# Patient Record
Sex: FEMALE | Race: WHITE | Employment: OTHER | URBAN - METROPOLITAN AREA
[De-identification: names, ages, dates, MRNs, and addresses within clinical notes are randomized per-mention and may not be internally consistent; named-entity substitution may affect disease eponyms.]

---

## 2020-11-20 ENCOUNTER — APPOINTMENT (OUTPATIENT)
Dept: GENERAL RADIOLOGY | Age: 80
DRG: 247 | End: 2020-11-20
Payer: MEDICARE

## 2020-11-20 ENCOUNTER — HOSPITAL ENCOUNTER (INPATIENT)
Age: 80
LOS: 1 days | Discharge: HOME | DRG: 247 | End: 2020-11-21
Attending: EMERGENCY MEDICINE | Admitting: INTERNAL MEDICINE
Payer: MEDICARE

## 2020-11-20 PROBLEM — I21.4 NSTEMI (NON-ST ELEVATED MYOCARDIAL INFARCTION) (HCC): Status: ACTIVE | Noted: 2020-11-20

## 2020-11-20 LAB
A/G RATIO: 0.9 (ref 1.1–2.2)
ALBUMIN SERPL-MCNC: 3.2 G/DL (ref 3.4–5)
ALP BLD-CCNC: 84 U/L (ref 40–129)
ALT SERPL-CCNC: <5 U/L (ref 10–40)
ANION GAP SERPL CALCULATED.3IONS-SCNC: 9 MMOL/L (ref 3–16)
AST SERPL-CCNC: 17 U/L (ref 15–37)
BASOPHILS ABSOLUTE: 0 K/UL (ref 0–0.2)
BASOPHILS RELATIVE PERCENT: 0.5 %
BILIRUB SERPL-MCNC: 0.3 MG/DL (ref 0–1)
BUN BLDV-MCNC: 23 MG/DL (ref 7–20)
CALCIUM SERPL-MCNC: 9.1 MG/DL (ref 8.3–10.6)
CHLORIDE BLD-SCNC: 106 MMOL/L (ref 99–110)
CO2: 23 MMOL/L (ref 21–32)
CREAT SERPL-MCNC: 1.1 MG/DL (ref 0.6–1.2)
EKG ATRIAL RATE: 45 BPM
EKG DIAGNOSIS: NORMAL
EKG P AXIS: 23 DEGREES
EKG P-R INTERVAL: 218 MS
EKG Q-T INTERVAL: 520 MS
EKG QRS DURATION: 86 MS
EKG QTC CALCULATION (BAZETT): 449 MS
EKG R AXIS: -11 DEGREES
EKG T AXIS: 109 DEGREES
EKG VENTRICULAR RATE: 45 BPM
EOSINOPHILS ABSOLUTE: 0.1 K/UL (ref 0–0.6)
EOSINOPHILS RELATIVE PERCENT: 1.1 %
GFR AFRICAN AMERICAN: 58
GFR NON-AFRICAN AMERICAN: 48
GLOBULIN: 3.4 G/DL
GLUCOSE BLD-MCNC: 120 MG/DL (ref 70–99)
HCT VFR BLD CALC: 30.5 % (ref 36–48)
HEMOGLOBIN: 9.4 G/DL (ref 12–16)
LEFT VENTRICULAR EJECTION FRACTION MODE: NORMAL
LIPASE: 25 U/L (ref 13–60)
LV EF: 55 %
LYMPHOCYTES ABSOLUTE: 1.3 K/UL (ref 1–5.1)
LYMPHOCYTES RELATIVE PERCENT: 18.1 %
MCH RBC QN AUTO: 25.2 PG (ref 26–34)
MCHC RBC AUTO-ENTMCNC: 31 G/DL (ref 31–36)
MCV RBC AUTO: 81.3 FL (ref 80–100)
MONOCYTES ABSOLUTE: 0.5 K/UL (ref 0–1.3)
MONOCYTES RELATIVE PERCENT: 6.5 %
NEUTROPHILS ABSOLUTE: 5.5 K/UL (ref 1.7–7.7)
NEUTROPHILS RELATIVE PERCENT: 73.8 %
PDW BLD-RTO: 15.6 % (ref 12.4–15.4)
PLATELET # BLD: 238 K/UL (ref 135–450)
PMV BLD AUTO: 8.8 FL (ref 5–10.5)
POC ACT LR: 176 SEC
POC ACT LR: 288 SEC
POC ACT LR: >400 SEC
POTASSIUM SERPL-SCNC: 4.2 MMOL/L (ref 3.5–5.1)
PRO-BNP: 1026 PG/ML (ref 0–449)
RBC # BLD: 3.75 M/UL (ref 4–5.2)
SODIUM BLD-SCNC: 138 MMOL/L (ref 136–145)
TOTAL PROTEIN: 6.6 G/DL (ref 6.4–8.2)
TROPONIN: 0.03 NG/ML
WBC # BLD: 7.5 K/UL (ref 4–11)

## 2020-11-20 PROCEDURE — 6360000004 HC RX CONTRAST MEDICATION: Performed by: INTERNAL MEDICINE

## 2020-11-20 PROCEDURE — 99223 1ST HOSP IP/OBS HIGH 75: CPT | Performed by: INTERNAL MEDICINE

## 2020-11-20 PROCEDURE — 99152 MOD SED SAME PHYS/QHP 5/>YRS: CPT

## 2020-11-20 PROCEDURE — 6370000000 HC RX 637 (ALT 250 FOR IP): Performed by: INTERNAL MEDICINE

## 2020-11-20 PROCEDURE — 71045 X-RAY EXAM CHEST 1 VIEW: CPT

## 2020-11-20 PROCEDURE — C1874 STENT, COATED/COV W/DEL SYS: HCPCS

## 2020-11-20 PROCEDURE — 6370000000 HC RX 637 (ALT 250 FOR IP): Performed by: PHYSICIAN ASSISTANT

## 2020-11-20 PROCEDURE — 2709999900 HC NON-CHARGEABLE SUPPLY

## 2020-11-20 PROCEDURE — C9600 PERC DRUG-EL COR STENT SING: HCPCS

## 2020-11-20 PROCEDURE — 93458 L HRT ARTERY/VENTRICLE ANGIO: CPT

## 2020-11-20 PROCEDURE — 93005 ELECTROCARDIOGRAM TRACING: CPT | Performed by: EMERGENCY MEDICINE

## 2020-11-20 PROCEDURE — 2100000000 HC CCU R&B

## 2020-11-20 PROCEDURE — 85347 COAGULATION TIME ACTIVATED: CPT

## 2020-11-20 PROCEDURE — 2140000000 HC CCU INTERMEDIATE R&B

## 2020-11-20 PROCEDURE — 2500000003 HC RX 250 WO HCPCS

## 2020-11-20 PROCEDURE — C1725 CATH, TRANSLUMIN NON-LASER: HCPCS

## 2020-11-20 PROCEDURE — 027034Z DILATION OF CORONARY ARTERY, ONE ARTERY WITH DRUG-ELUTING INTRALUMINAL DEVICE, PERCUTANEOUS APPROACH: ICD-10-PCS | Performed by: INTERNAL MEDICINE

## 2020-11-20 PROCEDURE — 84484 ASSAY OF TROPONIN QUANT: CPT

## 2020-11-20 PROCEDURE — 36415 COLL VENOUS BLD VENIPUNCTURE: CPT

## 2020-11-20 PROCEDURE — 6360000002 HC RX W HCPCS: Performed by: INTERNAL MEDICINE

## 2020-11-20 PROCEDURE — 6370000000 HC RX 637 (ALT 250 FOR IP)

## 2020-11-20 PROCEDURE — C1894 INTRO/SHEATH, NON-LASER: HCPCS

## 2020-11-20 PROCEDURE — 99284 EMERGENCY DEPT VISIT MOD MDM: CPT

## 2020-11-20 PROCEDURE — 83690 ASSAY OF LIPASE: CPT

## 2020-11-20 PROCEDURE — 93010 ELECTROCARDIOGRAM REPORT: CPT | Performed by: INTERNAL MEDICINE

## 2020-11-20 PROCEDURE — B2111ZZ FLUOROSCOPY OF MULTIPLE CORONARY ARTERIES USING LOW OSMOLAR CONTRAST: ICD-10-PCS | Performed by: INTERNAL MEDICINE

## 2020-11-20 PROCEDURE — C1769 GUIDE WIRE: HCPCS

## 2020-11-20 PROCEDURE — 6360000002 HC RX W HCPCS

## 2020-11-20 PROCEDURE — B2151ZZ FLUOROSCOPY OF LEFT HEART USING LOW OSMOLAR CONTRAST: ICD-10-PCS | Performed by: INTERNAL MEDICINE

## 2020-11-20 PROCEDURE — 2580000003 HC RX 258: Performed by: INTERNAL MEDICINE

## 2020-11-20 PROCEDURE — 85025 COMPLETE CBC W/AUTO DIFF WBC: CPT

## 2020-11-20 PROCEDURE — 2580000003 HC RX 258

## 2020-11-20 PROCEDURE — 4A023N7 MEASUREMENT OF CARDIAC SAMPLING AND PRESSURE, LEFT HEART, PERCUTANEOUS APPROACH: ICD-10-PCS | Performed by: INTERNAL MEDICINE

## 2020-11-20 PROCEDURE — 83880 ASSAY OF NATRIURETIC PEPTIDE: CPT

## 2020-11-20 PROCEDURE — 99153 MOD SED SAME PHYS/QHP EA: CPT

## 2020-11-20 PROCEDURE — 80053 COMPREHEN METABOLIC PANEL: CPT

## 2020-11-20 RX ORDER — SODIUM CHLORIDE 0.9 % (FLUSH) 0.9 %
10 SYRINGE (ML) INJECTION EVERY 12 HOURS SCHEDULED
Status: DISCONTINUED | OUTPATIENT
Start: 2020-11-20 | End: 2020-11-21 | Stop reason: HOSPADM

## 2020-11-20 RX ORDER — ASPIRIN 81 MG/1
81 TABLET, CHEWABLE ORAL DAILY
Status: DISCONTINUED | OUTPATIENT
Start: 2020-11-21 | End: 2020-11-21 | Stop reason: HOSPADM

## 2020-11-20 RX ORDER — ACETAMINOPHEN 325 MG/1
650 TABLET ORAL EVERY 4 HOURS PRN
Status: DISCONTINUED | OUTPATIENT
Start: 2020-11-20 | End: 2020-11-21 | Stop reason: HOSPADM

## 2020-11-20 RX ORDER — SODIUM CHLORIDE 0.9 % (FLUSH) 0.9 %
10 SYRINGE (ML) INJECTION PRN
Status: DISCONTINUED | OUTPATIENT
Start: 2020-11-20 | End: 2020-11-21 | Stop reason: HOSPADM

## 2020-11-20 RX ORDER — ONDANSETRON 2 MG/ML
4 INJECTION INTRAMUSCULAR; INTRAVENOUS EVERY 6 HOURS PRN
Status: DISCONTINUED | OUTPATIENT
Start: 2020-11-20 | End: 2020-11-21 | Stop reason: HOSPADM

## 2020-11-20 RX ORDER — ATORVASTATIN CALCIUM 40 MG/1
40 TABLET, FILM COATED ORAL NIGHTLY
Status: DISCONTINUED | OUTPATIENT
Start: 2020-11-20 | End: 2020-11-21 | Stop reason: HOSPADM

## 2020-11-20 RX ORDER — CARVEDILOL 3.12 MG/1
3.12 TABLET ORAL 2 TIMES DAILY WITH MEALS
Status: DISCONTINUED | OUTPATIENT
Start: 2020-11-20 | End: 2020-11-21 | Stop reason: HOSPADM

## 2020-11-20 RX ORDER — LISINOPRIL 5 MG/1
2.5 TABLET ORAL DAILY
Status: DISCONTINUED | OUTPATIENT
Start: 2020-11-20 | End: 2020-11-21 | Stop reason: HOSPADM

## 2020-11-20 RX ADMIN — TICAGRELOR 90 MG: 90 TABLET ORAL at 21:33

## 2020-11-20 RX ADMIN — DESMOPRESSIN ACETATE 40 MG: 0.2 TABLET ORAL at 21:33

## 2020-11-20 RX ADMIN — TIROFIBAN 0.15 MCG/KG/MIN: 5 INJECTION, SOLUTION INTRAVENOUS at 15:40

## 2020-11-20 RX ADMIN — IOPAMIDOL 86 ML: 755 INJECTION, SOLUTION INTRAVENOUS at 13:37

## 2020-11-20 RX ADMIN — LISINOPRIL 2.5 MG: 5 TABLET ORAL at 16:42

## 2020-11-20 RX ADMIN — NITROGLYCERIN 1 INCH: 20 OINTMENT TOPICAL at 09:58

## 2020-11-20 RX ADMIN — Medication 10 ML: at 22:22

## 2020-11-20 ASSESSMENT — PAIN SCALES - GENERAL
PAINLEVEL_OUTOF10: 0
PAINLEVEL_OUTOF10: 1
PAINLEVEL_OUTOF10: 0

## 2020-11-20 NOTE — ED PROVIDER NOTES
[facility] CATH LAB  EMERGENCY DEPARTMENT ENCOUNTER        Pt Name: Matilde Max  MRN: [id number]  [place] 1940  Date of evaluation: 11/20/2020  Provider: Destiny Mike PA-C  PCP: Sarahi Shannon MD     I have seen and evaluated this patient with my supervising physician Aguila Marina The total critical care time spent while evaluating and treating this patient was at least 33 minutes. This excludes time spent doing separately billable procedures. This includes time at the bedside, data interpretation, medication management, obtaining critical history from collateral sources if the patient is unable to provide it directly, and physician consultation. Specifics of interventions taken and potentially life-threatening diagnostic considerations are listed above in the medical decision making. CHIEF COMPLAINT       Chief Complaint   Patient presents with    Chest Pain     Pt to ER via [facility] for complaint of chest pain, starting yesterday. Pain 8/10, crushing. Pt self admin. 325 aspirin prior to EMS arrival, had 2 of nitro and 50 of fentanyl en route, pain now 2/10. HISTORY OF PRESENT ILLNESS   (Location, Timing/Onset, Context/Setting, Quality, Duration, Modifying Factors, Severity, Associated Signs and Symptoms)  Note limiting factors. Matilde Max is a 68 y.o. female that presents to the emergency department with a chief complaint of chest pain. Patient has had this 3 times in the past 5 days. She states she had this yesterday mildly in the morning and she states that she belched and it went away. This began again this morning when she was sitting down drinking some tea after she ate some toast.  States that it was a 10 out of 10 and she became diaphoretic, short of breath and nauseous. She called the squad and was given 2 nitro and fentanyl in route and the pain is now 2 out of 10. She already took full-strength aspirin earlier this morning.   She has history of hypertension and family history of heart disease but denies any personal history of heart disease, hyperlipidemia, diabetes or smoking. Denies abdominal pain, urinary or bowel symptoms. Denies radiation of the pain. She states it felt like something was sitting on her chest.  Has never had stress test or angiogram.    Nursing Notes were all reviewed and agreed with or any disagreements were addressed in the HPI. REVIEW OF SYSTEMS    (2-9 systems for level 4, 10 or more for level 5)     Review of Systems    Positives and Pertinent negatives as per HPI. Except as noted above in the ROS, all other systems were reviewed and negative. PAST MEDICAL HISTORY     Past Medical History:   Diagnosis Date    Hypertension          SURGICAL HISTORY     Past Surgical History:   Procedure Laterality Date    CYSTOSCOPY  11/21/13    w/ bladder biopsy    FRACTURE SURGERY      right wrist         CURRENTMEDICATIONS       Current Discharge Medication List      CONTINUE these medications which have NOT CHANGED    Details   ammonium lactate (LAC-HYDRIN) 12 % lotion Apply topically as needed. Qty: 1 Bottle, Refills: 1      amLODIPine (NORVASC) 5 MG tablet Take 1 tablet by mouth daily  Qty: 30 tablet, Refills: 1      ferrous sulfate 325 (65 FE) MG tablet Take 1 tablet by mouth 2 times daily (with meals)  Qty: 30 tablet, Refills: 3               ALLERGIES     Patient has no known allergies.     FAMILYHISTORY       Family History   Problem Relation Age of Onset    Heart Disease Father           SOCIAL HISTORY       Social History     Tobacco Use    Smoking status: Never Smoker    Smokeless tobacco: Never Used    Tobacco comment:  was a heavy smoker   Substance Use Topics    Alcohol use: No    Drug use: No       SCREENINGS             PHYSICAL EXAM    (up to 7 for level 4, 8 or more for level 5)     ED Triage Vitals [11/20/20 0911]   BP Temp Temp Source Pulse Resp SpO2 Height Weight   (!) 145/41 97 °F (36.1 °C) Oral 58 16 99 % 5' 1\" (1.549 m) 234 lb (106.1 kg)       Physical Exam  Vitals signs and nursing note reviewed. Constitutional:       Appearance: She is well-developed. She is not diaphoretic. HENT:      Head: Atraumatic. Nose: Nose normal.   Eyes:      General:         Right eye: No discharge. Left eye: No discharge. Neck:      Musculoskeletal: Normal range of motion. Cardiovascular:      Rate and Rhythm: Normal rate and regular rhythm. Heart sounds: No murmur. No friction rub. No gallop. Pulmonary:      Effort: Pulmonary effort is normal. No respiratory distress. Breath sounds: No stridor. No wheezing, rhonchi or rales. Abdominal:      General: Bowel sounds are normal. There is no distension. Palpations: Abdomen is soft. There is no mass. Tenderness: There is no abdominal tenderness. There is no guarding or rebound. Hernia: No hernia is present. Musculoskeletal: Normal range of motion. General: No swelling. Right lower leg: Edema present. Left lower leg: Edema present. Comments: 1+ pretibial and pedal pitting edema bilaterally   Skin:     General: Skin is warm and dry. Findings: No erythema or rash. Neurological:      Mental Status: She is alert and oriented to person, place, and time. Cranial Nerves: No cranial nerve deficit.    Psychiatric:         Behavior: Behavior normal.         DIAGNOSTIC RESULTS   LABS:    Labs Reviewed   CBC WITH AUTO DIFFERENTIAL - Abnormal; Notable for the following components:       Result Value    RBC 3.75 (*)     Hemoglobin 9.4 (*)     Hematocrit 30.5 (*)     MCH 25.2 (*)     RDW 15.6 (*)     All other components within normal limits    Narrative:     Performed at:  [facility] [address], [place], [facility] [address]   Phone [phone]   COMPREHENSIVE METABOLIC PANEL - Abnormal; Notable for the following components:    Glucose 120 (*)     BUN 23 (*) GFR Non- 48 (*)     GFR  58 (*)     Alb 3.2 (*)     Albumin/Globulin Ratio 0.9 (*)     ALT <5 (*)     All other components within normal limits    Narrative:     Performed at:  [facility] [address]   Phone [phone]   TROPONIN - Abnormal; Notable for the following components:    Troponin 0.03 (*)     All other components within normal limits    Narrative:     Performed at:  [facility] [address]   Phone [phone]  - Abnormal; Notable for the following components:    Pro-BNP 1,026 (*)     All other components within normal limits    Narrative:     Performed at:  [facility] [address]   Phone [phone]   LIPASE    Narrative:     Performed at:  [facility] [address]   Phone [phone]   POC ACT-LR    Narrative:     Performed at:  [facility] [address]   Phone [phone]   POC ACT-LR    Narrative:     Performed at:  [facility] [address]   Phone [phone]   POC ACT-LR    Narrative:     Performed at:  [facility] [address]   Phone [phone]       All other labs were within normal range or not returned as of this dictation. EKG: All EKG's are interpreted by the Emergency Department Physician in the absence of a cardiologist.  Please see their note for interpretation of EKG.       RADIOLOGY:   Non-plain film images such as CT, Ultrasound and MRI are read by the radiologist. Plain radiographic images are visualized and preliminarily interpreted by the ED Provider with the below findings:        Interpretation per the Radiologist below, if available at the time of this note:    XR CHEST PORTABLE   Final Result   Interval removal of the right-sided PICC line catheter. No consolidation. Xr Chest Portable    Result Date: 11/20/2020  EXAMINATION: ONE XRAY VIEW OF THE CHEST 11/20/2020 9:58 am COMPARISON: May 29, 2015 HISTORY: ORDERING SYSTEM PROVIDED HISTORY: chest pain TECHNOLOGIST PROVIDED HISTORY: Reason for exam:->chest pain Reason for Exam: chest pain Acuity: Acute Type of Exam: Initial FINDINGS: The lungs are clear. Previously seen right-sided PICC line catheter has been removed. The cardiac silhouette is magnified. There are no pleural effusions. There is no pneumothorax. Interval removal of the right-sided PICC line catheter. No consolidation. PROCEDURES   Unless otherwise noted below, none     Procedures    CRITICAL CARE TIME   N/A    CONSULTS: Dr. Bossman Michelle and DIFFERENTIAL DIAGNOSIS/MDM:   Vitals:    Vitals:    11/20/20 1130 11/20/20 1145 11/20/20 1200 11/20/20 1215   BP: (!) 142/50 (!) 146/47 (!) 145/56 (!) 160/54   Pulse: 61 60 67 72   Resp: 19 15 22 22   Temp:       TempSrc:       SpO2: 100% 100% 100% 100%   Weight:       Height:           Patient was given the following medications:  Medications   nitroglycerin (NITRO-BID) 2 % ointment 1 inch (1 inch Topical Given 11/20/20 0944)   iopamidol (ISOVUE-370) 76 % injection 86 mL (86 mLs Other Given by Other 11/20/20 1337)           Patient presented with some chest pain, shortness of breath and diaphoresis. This is her third episode today over the past 5 days. Does have some family history of heart disease and history of hypertension. Pain was already down to a 2 out of 10 after fentanyl and nitro in squad. Already had 325 of aspirin this morning. After Nitropaste she is pain-free.   EKG does reveal some flattening ST segments and flipped T waves that

## 2020-11-20 NOTE — H&P
[facility]                     [facility], [address]                              HISTORY AND PHYSICAL    PATIENT NAME: Brandie Alcantara                     :        1940  MED REC NO:   [id number]                          ROOM:       0008  ACCOUNT NO:   [de-identified]                           ADMIT DATE: 2020  PROVIDER:     Kandi Segura MD    REASON FOR ADMISSION:  Chest pain. HISTORY OF PRESENT ILLNESS:  The patient is a pleasant 68-year-old lady  who remains fairly active. She does have a history of hypertension. She is somewhat limited by bilateral knee pain. She has been  experiencing episodic chest discomfort over the last week. Initially,  it came on with exertion and would resolve quickly. She actually  thought it was indigestion, it keeps happening and this morning she had  a bad episode that lasted for 30-45 minutes. It did resolve  spontaneously, but with that she came to the emergency room. In the  emergency room, she had an EKG which showed T-wave inversion anteriorly  consistent with anterior wall ischemia, troponin level 0.03, proBNP is  1000. She is currently pain free. She has been somewhat short of  breath with these episodes. She describes the episodes as becoming more  anxiety provoking. She has felt along it was likely indigestion. She  has had some diaphoresis with it. PAST MEDICAL HISTORY:  Notable for history of hypertension. She has  also had bilateral leg cellulitis. PAST SURGICAL HISTORY:  Includes a wrist surgery in the past.  She has  also had three  sections. MEDICATIONS AT HOME:  Amlodipine 5 mg daily and iron tablets twice a  day. ALLERGIES:  None. FAMILY HISTORY:  Notable for heart disease, but not at a premature age  in her father. SOCIAL HISTORY:  Lives with her . She has not used tobacco.  She  does not drink alcohol.     REVIEW OF SYSTEMS:  14-system review of systems otherwise negative. PHYSICAL EXAMINATION:  Currently on physical examination:  GENERAL:  She appears comfortable at rest.  She is 5 feet 1 inch,  weights 234 pounds. VITAL SIGNS:  Pulse ox on room air 99%. Current pulse rate 58. She is  afebrile. HEENT:  Sclerae are clear. Posterior pharynx clear. NECK:  Neck is supple. There are no carotid bruits. LUNGS:  Lung fields are clear. HEART:  Cardiac sounds are normal.  ABDOMEN:  Soft, nontender, obese. EXTREMITIES:  Without edema. She has good peripheral pulses. She has  evidence for trace pedal edema bilaterally. She also has obvious knee  changes of osteoarthritis. NEUROLOGIC:  She moves all extremities well. Cranial nerves II-XII  intact. SKIN:  Warm and dry. LABORATORY RONNIE:  Lab work is reviewed. Creatinine 1.1, BUN 23,  potassium 4.2. ProBNP 1000. Troponin 0.03. Albumin 3.2. Glucose  slightly elevated at 120. Hemoglobin 9.4, hematocrit 30.5 which is  consistent with her chronic anemia dating back over several years. DIAGNOSTIC DATA:  Chest x-ray is unremarkable. The patient has had no  prior testing. EKG on presentation shows a sinus bradycardia, anterior  lateral T-wave inversion that suggest ischemia. IMPRESSION:  This patient's chest symptoms with associated EKG changes  and slightly elevated troponin is consistent with the presence of  unstable angina. She does have history of hypertension. RECOMMENDATIONS:  Recommendations would be to proceed with cardiac  catheterization. The patient understands risks, benefits, options and  agrees to proceed. This will be done right away due to her just having  had bad symptoms within the last hour.         Alexandria Donato MD    D: 11/20/2020 12:11:55       T: 11/20/2020 12:23:32     [email or web address]  Job#: [id number]     Doc#: [id number]    CC:

## 2020-11-20 NOTE — PROGRESS NOTES
Patient brought over to CVU from cath lab and attached to CVU monitoring. Report reviewed with cath lab RN. Right radial soft and no hematoma or oozing noted. Right radial TR band in place and 2cc air will be removed every 15 minutes once patient is s/p intervention for 60 minutes.       Electronically signed by Salomon Negron RN on 11/20/2020 at 3:47 PM

## 2020-11-20 NOTE — PROGRESS NOTES
New onset angina with ECG suggesting anterior ischemia    Rec- cardiac cath.  Patient understands risks,benefits and options and agrees to proceed

## 2020-11-20 NOTE — CONSULTS
Outpatient Cardiac Rehab explained to pt. Given brochure And reviewed. Pt reports her  was a previous patient and she was very interested in attending and hopefully he could return with her in Phase3.

## 2020-11-20 NOTE — OP NOTE
[facility]  Procedure Note    Procedure: [facility]  Indication: Unstable angina    Procedure Details:  Consent Access Bleed R Sedat Start Stop Versed Fentanyl Contrast Fluoro EBL Comp Spec   Yes RRA high MCSFC 1251 1329 3.5 100 86 7.6 <20 None None   *Ultrasound Note: Ultrasound guidance used to determine aforementioned artery patency, size (>2mm), anatomic variations and ideal puncture location. Real-time ultrasound utilized concurrent with vascular needle entry into the artery. Image(s) permanently recorded and reported in the patient chart. *Sedation Note: MCSFC = minimal conscious sedation for comfort    Findings:  Artery Findings/Result   LM Normal   LAD 95% mid   Cx OM2 50%   RI NA   RCA 20% prox, 20% mid   LVEDP 25   LVG 55%     Intervention:   PCI of LAD with 3.3U62Oafwyp REBECA (PD with 3.75NC)    Post Cath Dx:   Severe , nonobstructive otherwise    Med Rec:   Recommendation Indicated?  Not Given Due To: Note   DAPT  yes  Asa, plavix   STATIN - HIGH DOSE yes  lipitor 40   BETA BLOCKER yes  Coreg 3.125bid   ACE/ARB/ARNI yes  Lisinopril 2.5   ALDACTONE no       Non-Med Rec:   Category Recommendation   EF ASSESSMENT Noninvasive assessment of EF if LVG deferred as IP/OP as appropriate   TOBACCO Avoidance of first and second hand smoke   DIET/EXERCISE Maintain healthy lifestyle with focus on diet, exercise and weight loss

## 2020-11-20 NOTE — ED NOTES
Bed: 08  Expected date:   Expected time:   Means of arrival:   Comments:  Giana Lagos [facility]     Belia Finn RN  11/20/20 5234

## 2020-11-20 NOTE — ED PROVIDER NOTES
[facility] [employer]   Phone [phone]   LIPASE    Narrative:     Performed at:  [facility] [address] [address] [facility] [address]   Phone [phone]           EKG:    Sinus bradycardia at a rate of 45 beats a minute with no acute ST elevations. Patient had T wave inversions that appear to be new on her anterior and lateral precordial leads. Exam:    Well-nourished female no acute distress. Chest was clear to auscultation bilaterally. Heart was regular rate rhythm with no murmurs rubs gallops. Medical decision makin-year-old female presents with chest pain and T wave inversions in her anterior lateral precordial leads. Her initial work-up showed a minimally elevated troponin. Our cardiologist (Dr Samantha Cleveland) was consulted and the patient will go to the cardiac Cath Lab. Critical care time: 40 minutes of critical care time spent with this patient.     FINAL IMPRESSION:    1. NSTEMI (non-ST elevated myocardial infarction) ([facility] [address]           Jennifer Mcduffie MD  20 [id number]

## 2020-11-20 NOTE — PRE SEDATION
Mallampati:II (soft palate, uvula, fauces visible)  ASA Classification: Class 2 - A normal healthy patient with mild systemic disease and Class 2 -- A normal healthy patient with mild systemic disease  Stephon Scale: Activity: 2 - Able to move 4 extremities voluntarily on command  Respiration: 2 - Able to breathe deeply and cough freely  Circulation: 2 - BP+/- 20mmHg of normal  Consciousness: 2 - Fully awake  Oxygen Saturation (color):  2 - Able to maintain oxygen saturation >92% on room air    Sedation Plan     Goals: Guard patient safety/welfare, minimize physical discomfort, minimize negative psych responses to tx by providing sedation/analgesia and maximize potential amnesia. Meet pre-procedure discharge plan.   Meds Planned: IV fentanyl and/or versed  Sedation Candidacy: Patient is an appropriate candidate for plan of sedation: Yes    Abel Albarado MD on 11/20/2020 at 12:37 PM

## 2020-11-21 VITALS
HEIGHT: 60 IN | RESPIRATION RATE: 17 BRPM | TEMPERATURE: 97.2 F | WEIGHT: 195.99 LBS | HEART RATE: 65 BPM | BODY MASS INDEX: 38.48 KG/M2 | DIASTOLIC BLOOD PRESSURE: 46 MMHG | SYSTOLIC BLOOD PRESSURE: 136 MMHG | OXYGEN SATURATION: 98 %

## 2020-11-21 LAB
ANION GAP SERPL CALCULATED.3IONS-SCNC: 11 MMOL/L (ref 3–16)
BUN BLDV-MCNC: 21 MG/DL (ref 7–20)
CALCIUM SERPL-MCNC: 9.3 MG/DL (ref 8.3–10.6)
CHLORIDE BLD-SCNC: 107 MMOL/L (ref 99–110)
CO2: 23 MMOL/L (ref 21–32)
CREAT SERPL-MCNC: 1.1 MG/DL (ref 0.6–1.2)
GFR AFRICAN AMERICAN: 58
GFR NON-AFRICAN AMERICAN: 48
GLUCOSE BLD-MCNC: 110 MG/DL (ref 70–99)
HCT VFR BLD CALC: 30.6 % (ref 36–48)
HEMOGLOBIN: 10.2 G/DL (ref 12–16)
MCH RBC QN AUTO: 26.4 PG (ref 26–34)
MCHC RBC AUTO-ENTMCNC: 33.4 G/DL (ref 31–36)
MCV RBC AUTO: 78.9 FL (ref 80–100)
PDW BLD-RTO: 15 % (ref 12.4–15.4)
PLATELET # BLD: 260 K/UL (ref 135–450)
PMV BLD AUTO: 8.1 FL (ref 5–10.5)
POTASSIUM SERPL-SCNC: 4.4 MMOL/L (ref 3.5–5.1)
RBC # BLD: 3.88 M/UL (ref 4–5.2)
SODIUM BLD-SCNC: 141 MMOL/L (ref 136–145)
WBC # BLD: 9.4 K/UL (ref 4–11)

## 2020-11-21 PROCEDURE — 99239 HOSP IP/OBS DSCHRG MGMT >30: CPT | Performed by: NURSE PRACTITIONER

## 2020-11-21 PROCEDURE — 2580000003 HC RX 258: Performed by: INTERNAL MEDICINE

## 2020-11-21 PROCEDURE — 6370000000 HC RX 637 (ALT 250 FOR IP): Performed by: INTERNAL MEDICINE

## 2020-11-21 PROCEDURE — 85027 COMPLETE CBC AUTOMATED: CPT

## 2020-11-21 PROCEDURE — 80048 BASIC METABOLIC PNL TOTAL CA: CPT

## 2020-11-21 PROCEDURE — 80061 LIPID PANEL: CPT

## 2020-11-21 RX ORDER — ASPIRIN 81 MG/1
81 TABLET, CHEWABLE ORAL DAILY
Qty: 30 TABLET | Refills: 3 | Status: SHIPPED | OUTPATIENT
Start: 2020-11-22 | End: 2021-03-30

## 2020-11-21 RX ORDER — CARVEDILOL 3.12 MG/1
3.12 TABLET ORAL 2 TIMES DAILY WITH MEALS
Qty: 60 TABLET | Refills: 3 | Status: SHIPPED | OUTPATIENT
Start: 2020-11-21 | End: 2021-04-12

## 2020-11-21 RX ORDER — LISINOPRIL 2.5 MG/1
2.5 TABLET ORAL DAILY
Qty: 30 TABLET | Refills: 3 | Status: ON HOLD | OUTPATIENT
Start: 2020-11-22 | End: 2021-03-16 | Stop reason: HOSPADM

## 2020-11-21 RX ORDER — ATORVASTATIN CALCIUM 40 MG/1
40 TABLET, FILM COATED ORAL NIGHTLY
Qty: 30 TABLET | Refills: 3 | Status: SHIPPED | OUTPATIENT
Start: 2020-11-21 | End: 2020-11-30 | Stop reason: SINTOL

## 2020-11-21 RX ADMIN — LISINOPRIL 2.5 MG: 5 TABLET ORAL at 08:41

## 2020-11-21 RX ADMIN — TICAGRELOR 90 MG: 90 TABLET ORAL at 08:41

## 2020-11-21 RX ADMIN — CARVEDILOL 3.12 MG: 3.12 TABLET, FILM COATED ORAL at 08:40

## 2020-11-21 RX ADMIN — ASPIRIN 81 MG: 81 TABLET, CHEWABLE ORAL at 08:41

## 2020-11-21 RX ADMIN — Medication 10 ML: at 08:41

## 2020-11-21 ASSESSMENT — PAIN SCALES - GENERAL
PAINLEVEL_OUTOF10: 0

## 2020-11-21 NOTE — DISCHARGE SUMMARY
[facility] SUMMARY      Patient ID:  Katie Chen  [id number] 6 y.o. 1940    Admit date: 11/20/2020    Discharge date:  11/21/20    Admitting Physician: Enrrique Horton MD     Discharge NP: Kianna Bravo CNP    Admission Diagnoses: NSTEMI (non-ST elevated myocardial infarction) [facility]) [I21.4]    Discharge Diagnoses:   Patient Active Problem List   Diagnosis    Pulmonary nodule    Hypertension    Chronic venous hypertension with ulcer ([facility] [address]    Leg ulcer ([facility]    NSTEMI (non-ST elevated myocardial infarction) [facility])         Discharged Condition: good    Hospital Course: Katie Chen presented with CP. ECG suggested anterior ischemia. [facility] 11/20 s/p PCI of LAD with REBECA, residual nonobstructive disease to OM2 and RCA. LVEF 55%. Pt will be started on Brilinta, aspirin, coreg, lisinopril, and Lipitor. At discharge, pt feels well. She has no further chest pain. Denies SOB, orthopnea, palpations, or dizziness. Right radial site c/d/I. No hematoma or bruising. Good pulses, color, warmth, and sensation to that extremity. Medications and discharge instructions reviewed. All questions and concerns addressed    Consults: IP CONSULT TO CARDIOLOGY  IP CONSULT TO CARDIAC REHAB    The patient was seen and examined. Notes, labs, and recent testing reviewed. There were not complications over night. The patient is being seen for coronary artery stent. Objective:     BP (!) 136/46   Pulse 65   Temp 97.2 °F (36.2 °C) (Temporal)   Resp 17   Ht 5' (1.524 m)   Wt 195 lb 15.8 oz (88.9 kg)   SpO2 98%   BMI 38.28 kg/m²      Intake/Output Summary (Last 24 hours) at 11/21/2020 0934  Last data filed at 11/21/2020 0730  Gross per 24 hour   Intake --   Output 800 ml   Net -800 ml       Physical Exam:  General:  Awake, alert, NAD  Skin:  Warm and dry. Procedure site stable as described above.    Neck:  JVD<8, no bruit  Chest:  Clear to auscultation, no wheezes/rhonchi/rales  Cardiovascular:  RRR S1S2  Abdomen:  Soft, nontender, +bowel sounds  Extremities:  No edema      Significant Diagnostic Studies:     EC20  Marked sinus bradycardia with 1st degree A-V block  T wave abnormality, consider anterolateral ischemia  Abnormal ECG      Cath: 20  Findings:  Artery Findings/Result   LM Normal   LAD 95% mid   Cx OM2 50%   RI NA   RCA 20% prox, 20% mid   LVEDP 25   LVG 55%      Intervention:         PCI of LAD with 3.1W27Xpdmfa REBECA (PD with 3.75NC)     Post Cath Dx:       Severe , nonobstructive otherwise      Labs:   Lab Results   Component Value Date    CREATININE 1.1 2020    BUN 21 (H) 2020     2020    K 4.4 2020     2020    CO2 23 2020      Lab Results   Component Value Date    WBC 9.4 2020    HGB 10.2 (L) 2020    HCT 30.6 (L) 2020    MCV 78.9 (L) 2020     2020    No results found for: INR, PROTIME No results found for: BNP  CARDIAC ENZYMES:  Recent Labs     20  1012   TROPONINI 0.03*     FASTING LIPID PANEL:No results found for: CHOL, HDL, TRIG     Chest pain   ~ Trop 0.03, ECG with anterior lateral T-wave inversion suggestive of ischemia   ~ [facility] :  s/p PCI of LAD  ~ denies further CP     Coronary artery disease   ~ NSTEMI s/p [facility] : PCI of LAD with REBECA, residual nonobstructive disease to OM2 and RCA. LVEF 55%  ~ ASA/ plavix/ coreg/ lisinopril/ Lipitor      Hypertension   ~ controlled     Disposition: home    Patient Instructions:      Levon Garsia   Home Medication Instructions HPO:[id number]    Printed on:20 2832   Medication Information                      ammonium lactate (LAC-HYDRIN) 12 % lotion  Apply topically as needed.              aspirin 81 MG chewable tablet  Take 1 tablet by mouth daily             atorvastatin (LIPITOR) 40 MG tablet  Take 1 tablet by mouth nightly             carvedilol (COREG) 3.125 MG tablet  Take 1 tablet by mouth 2 times daily (with meals)             ferrous sulfate 325 (65 FE) MG tablet  Take 1 tablet by mouth 2 times daily (with meals)             lisinopril (PRINIVIL;ZESTRIL) 2.5 MG tablet  Take 1 tablet by mouth daily             ticagrelor (BRILINTA) 90 MG TABS tablet  Take 1 tablet by mouth 2 times daily                 The patient is on a beta-blocker  The patient is on an ace-I   The patient is on a statin  The patient is on antiplatelet therapy  The patient does not have history of AF, and is not on anticoagulation    1. Overall the patient is stable from CV standpoint  2. Most recent cardiac testing has been reviewed as above. Further testing is not required at this time. 3. The patient is not currently smoking. The risks related to smoking were reviewed with the patient. Recommend maintaining a smoke-free lifestyle. Products available for smoking cessation were discussed in detail. Activity: no heavy lifting for 10 days  Diet: cardiac diet  Wound Care: keep wound clean and dry    Follow-up with [address] in 2 weeks. Recommend follow up with PCP in 3-4 weeks. Signed:  Tristan Martines CNP, 11/21/2020, 9:53 AM  Time spent on discharge of patient: >31 minutes, including plan of care, patient education, and care coordination.

## 2020-11-21 NOTE — PLAN OF CARE
Problem: Falls - Risk of:  Goal: Will remain free from falls  Description: Will remain free from falls  Outcome: Ongoing  Note: Will remain free from falls  All fall precautions in place  Alert and oriented  Uses cane for ambulation  Instructed to call for assistance     Problem: Tissue Perfusion - Cardiopulmonary, Altered:  Goal: Absence of angina  Description: Absence of angina  Outcome: Ongoing  Note: Denies any chest pain or shortness of breath       Problem: Tissue Perfusion - Peripheral, Altered:  Goal: Absence of hematoma at arterial access site  Description: Absence of hematoma at arterial access site  Outcome: Ongoing  Note: Right radial site soft with with no bleeding or hematoma

## 2020-11-22 LAB
CHOLESTEROL, TOTAL: 155 MG/DL (ref 0–199)
HDLC SERPL-MCNC: 38 MG/DL (ref 40–60)
LDL CHOLESTEROL CALCULATED: 93 MG/DL
TRIGL SERPL-MCNC: 121 MG/DL (ref 0–150)
VLDLC SERPL CALC-MCNC: 24 MG/DL

## 2020-11-30 ENCOUNTER — OFFICE VISIT (OUTPATIENT)
Dept: CARDIOLOGY CLINIC | Age: 80
End: 2020-11-30
Payer: MEDICARE

## 2020-11-30 ENCOUNTER — HOSPITAL ENCOUNTER (OUTPATIENT)
Age: 80
Discharge: HOME | End: 2020-11-30
Payer: MEDICARE

## 2020-11-30 VITALS
HEART RATE: 66 BPM | SYSTOLIC BLOOD PRESSURE: 154 MMHG | BODY MASS INDEX: 44.96 KG/M2 | DIASTOLIC BLOOD PRESSURE: 68 MMHG | HEIGHT: 60 IN | WEIGHT: 229 LBS | RESPIRATION RATE: 22 BRPM

## 2020-11-30 LAB
ANION GAP SERPL CALCULATED.3IONS-SCNC: 12 MMOL/L (ref 3–16)
BUN BLDV-MCNC: 23 MG/DL (ref 7–20)
CALCIUM SERPL-MCNC: 9 MG/DL (ref 8.3–10.6)
CHLORIDE BLD-SCNC: 108 MMOL/L (ref 99–110)
CO2: 23 MMOL/L (ref 21–32)
CREAT SERPL-MCNC: 1.1 MG/DL (ref 0.6–1.2)
GFR AFRICAN AMERICAN: 58
GFR NON-AFRICAN AMERICAN: 48
GLUCOSE BLD-MCNC: 103 MG/DL (ref 70–99)
POTASSIUM SERPL-SCNC: 4.6 MMOL/L (ref 3.5–5.1)
SODIUM BLD-SCNC: 143 MMOL/L (ref 136–145)

## 2020-11-30 PROCEDURE — 80048 BASIC METABOLIC PNL TOTAL CA: CPT

## 2020-11-30 PROCEDURE — 36415 COLL VENOUS BLD VENIPUNCTURE: CPT

## 2020-11-30 PROCEDURE — 99214 OFFICE O/P EST MOD 30 MIN: CPT | Performed by: NURSE PRACTITIONER

## 2020-11-30 RX ORDER — ROSUVASTATIN CALCIUM 20 MG/1
20 TABLET, COATED ORAL NIGHTLY
Qty: 30 TABLET | Refills: 2 | Status: SHIPPED | OUTPATIENT
Start: 2020-11-30 | End: 2021-06-21

## 2020-11-30 RX ORDER — HYDROCODONE BITARTRATE AND ACETAMINOPHEN 5; 325 MG/1; MG/1
1 TABLET ORAL 2 TIMES DAILY
Status: ON HOLD | COMMUNITY
Start: 2020-11-12 | End: 2021-03-16 | Stop reason: SDUPTHER

## 2020-11-30 NOTE — PROGRESS NOTES
[facility]     Outpatient Follow Up Note    CHIEF COMPLAINT / HPI: Hospital Follow Up secondary to status post coronary artery stenting    Hospital record has been reviewed  Hospital Course progressed as follows per discharge summary:     Hospital Course: Rony Minaya presented with CP. ECG suggested anterior ischemia. [facility] 11/20 s/p PCI of LAD with REBECA, residual nonobstructive disease to OM2 and RCA. LVEF 55%. Pt will be started on Brilinta, aspirin, coreg, lisinopril, and Lipitor. Rony Minaya is 68 y.o. female who presents today for a routine follow up after a recent hospitalization related to the above mentioned issues. Subjective: Rony Minaya has a significant past medical history of CKD, HTN, anemia  in addition to what is mentioned above. Since the time of discharge, the patient admits their symptoms have improved. Today, she denies significant chest pain. There is no SOB/EUBANKS. She does routine walking in her house back and fourth to build her stamina back up. The patient denies orthopnea/PND. The patient does not have swelling. The patients weight is stable. She is not experiencing palpitations or dizziness. Pt stopped taking lipitor as it causes her GI upset so she is no longer taking it. Home -130/50-70  HR avg 60    With regard to medication therapy the patient has been compliant with prescribed regimen. They have tolerated therapy to date.       Past Medical History:   Diagnosis Date    Hypertension      Social History:    Social History     Tobacco Use   Smoking Status Never Smoker   Smokeless Tobacco Never Used   Tobacco Comment     was a heavy smoker     Current Medications:  Current Outpatient Medications   Medication Sig Dispense Refill    aspirin 81 MG chewable tablet Take 1 tablet by mouth daily 30 tablet 3    atorvastatin (LIPITOR) 40 MG tablet Take 1 tablet by mouth nightly 30 tablet 3    lisinopril (PRINIVIL;ZESTRIL) 2.5 MG tablet Take 1 tablet by mouth daily 30 tablet 3    carvedilol (COREG) 3.125 MG tablet Take 1 tablet by mouth 2 times daily (with meals) 60 tablet 3    ticagrelor (BRILINTA) 90 MG TABS tablet Take 1 tablet by mouth 2 times daily 60 tablet 3    ammonium lactate (LAC-HYDRIN) 12 % lotion Apply topically as needed. 1 Bottle 1    ferrous sulfate 325 (65 FE) MG tablet Take 1 tablet by mouth 2 times daily (with meals) 30 tablet 3     No current facility-administered medications for this visit. REVIEW OF SYSTEMS:   CONSTITUTIONAL: No major weight gain or loss, night sweats, fever, fatigue, or weakness. HEENT: No new vision difficulties or ringing in the ears. RESPIRATORY: No new SOB, PND, orthopnea or cough. CARDIOVASCULAR: See HPI  GI: No N/V/D, constipation, or abdominal pain. No black/tarry stools  : No urinary urgency, incontinence, or hematuria. SKIN: No cyanosis or skin lesions. MUSCULOSKELETAL: No new muscle or joint pain. NEUROLOGICAL: No syncope or TIA-like symptoms. PSYCHIATRIC: No anxiety, pain, insomnia or depression    Objective:   PHYSICAL EXAM:        Vitals:    11/30/20 1404 11/30/20 1420   BP: (!) 158/74 (!) 154/68   Site: Right Upper Arm    Position: Sitting    Cuff Size: Large Adult    Pulse: 66    Resp: 22    Weight: 229 lb (103.9 kg)    Height: 5' (1.524 m)       VITALS:  BP (!) 154/68   Pulse 66   Resp 22   Ht 5' (1.524 m)   Wt 229 lb (103.9 kg)   BMI 44.72 kg/m²     CONSTITUTIONAL: Cooperative, no apparent distress, and appears overweight   NEUROLOGIC:  Awake and orientated to person, place, and time. PSYCH: Calm affect. SKIN: Warm and dry. Right radial site c/d/I. No hematoma or bruising. Good pulses, color, warmth, and sensation to that extremity. HEENT: Sclera non-icteric, normocephalic, neck supple. RESPIRATORY:  No increased work of breathing and clear to auscultation, no crackles or wheezing. CARDIOVASCULAR:  Regular rate and rhythm without murmur. Normal S1 and S2.  No gallops or rubs. Normal PMI. No elevation of JVP. Normal carotid pulses with no bruits. GI:  Normal bowel sounds. Non-distended. Non-tender to palpation  EXT: No edema. No calf tenderness. Pulses are present bilaterally. DATA:    Lab Results   Component Value Date    ALT <5 (L) 2020    AST 17 2020    ALKPHOS 84 2020    BILITOT 0.3 2020     Lab Results   Component Value Date    CREATININE 1.1 2020    BUN 21 (H) 2020     2020    K 4.4 2020     2020    CO2 23 2020     No results found for: TSH, M7ONSNT, O6LEQRT, THYROIDAB  Lab Results   Component Value Date    WBC 9.4 2020    HGB 10.2 (L) 2020    HCT 30.6 (L) 2020    MCV 78.9 (L) 2020     2020     No components found for: CHLPL  Lab Results   Component Value Date    TRIG 121 2020     Lab Results   Component Value Date    HDL 38 (L) 2020     Lab Results   Component Value Date    LDLCALC 93 2020     Lab Results   Component Value Date    LABVLDL 24 2020     Radiology Review:  Pertinent images / reports were reviewed as a part of this visit and reveals the following:    Cath: 20  Findings:  Artery Findings/Result   LM Normal   LAD 95% mid   Cx OM2 50%   RI NA   RCA 20% prox, 20% mid   LVEDP 25   LVG 55%      Intervention:         PCI of LAD with [id number] REBECA (PD with 3.75NC)     Post Cath Dx:       Severe , nonobstructive otherwise    Assessment:     1. Coronary artery disease   ~ stable ; no c/o angina   ~ NSTEMI s/p [facility] : PCI of LAD with REBECA, residual nonobstructive disease to OM2 and RCA. LVEF 55%  ~ ASA/ plavix/ coreg/ lisinopril/ Lipitor   ~ encouraged routine exercise, pt refusing cardiac rehab at this time d/t needing to be home with her debilitated .      2. Dyslipidemia   ~ LDL 93 (2020)  ~ lipitor 40    3.  Hypertension   ~ elevated in office but controlled by home readings Patient  is stable since hospital discharge. Plan:     1. Stop lipitor d/t GI upset and start Crestor 20mg nightly    Recheck fasting lipids and LFTs in 2 months   2. BMP today with newly starting lisinopril  3. Continue home BP monitoring, goal <140/80   Bring cuff to next OV to ensure accuracy    4. Follow up in 3 months     I have addresed the patient's cardiac risk factors and adjusted pharmacologic treatment as needed. In addition, I have reinforced the need for patient directed risk factor modification. Further evaluation will be based upon the patient's clinical course and testing results. All questions and concerns were addressed to the patient. Alternatives to my treatment were discussed. The patient verbalizes understanding not to stop medications without discussing with us. The patient is not currently smoking. The risks related to smoking were reviewed with the patient. Recommend maintaining a smoke-free lifestyle. Patient is on a beta-blocker  Patient is on an ACE   Patient is on a statin    Dual Antiplatelet therapy has been recommended / prescribed for this patient. Education conducted on adverse reactions including bleeding were discussed. Discussed exercise: 30min of sustained cardiovascular exercise most days of the week   Discussed Low saturated fat / Cholesterol diet. Thank you for allowing us to participate in the care of [address] [place] [address].     Lydia BUTTERFIELD  [facility]   Office: [phone]    Documentation of today's visit sent to PCP

## 2020-11-30 NOTE — PATIENT INSTRUCTIONS
Labs today. Start Crestor 20mg nightly. Complete fasting blood work around 1/30/21 to recheck cholesterol and liver function after starting the crestor. Fast for 12 hours prior, may have black coffee or water. Continue home BP monitoring. Goal < 140/80. Bring BP cuff into office next visit to have it compared with our office cuff. Follow up in 3 months.

## 2020-12-01 ENCOUNTER — TELEPHONE (OUTPATIENT)
Dept: CARDIOLOGY CLINIC | Age: 80
End: 2020-12-01

## 2021-03-01 ENCOUNTER — HOSPITAL ENCOUNTER (OUTPATIENT)
Dept: GENERAL RADIOLOGY | Age: 81
Discharge: HOME | DRG: 329 | End: 2021-03-01
Payer: MEDICARE

## 2021-03-01 ENCOUNTER — HOSPITAL ENCOUNTER (OUTPATIENT)
Age: 81
Discharge: HOME | DRG: 329 | End: 2021-03-01
Payer: MEDICARE

## 2021-03-01 ENCOUNTER — TELEPHONE (OUTPATIENT)
Dept: CARDIOLOGY CLINIC | Age: 81
End: 2021-03-01

## 2021-03-01 ENCOUNTER — OFFICE VISIT (OUTPATIENT)
Dept: CARDIOLOGY CLINIC | Age: 81
End: 2021-03-01
Payer: MEDICARE

## 2021-03-01 DIAGNOSIS — R06.09 DOE (DYSPNEA ON EXERTION): ICD-10-CM

## 2021-03-01 DIAGNOSIS — I25.10 CORONARY ARTERY DISEASE INVOLVING NATIVE CORONARY ARTERY OF NATIVE HEART WITHOUT ANGINA PECTORIS: Primary | ICD-10-CM

## 2021-03-01 DIAGNOSIS — R05.8 PRODUCTIVE COUGH: ICD-10-CM

## 2021-03-01 DIAGNOSIS — E78.5 DYSLIPIDEMIA: ICD-10-CM

## 2021-03-01 DIAGNOSIS — R60.0 LEG EDEMA: ICD-10-CM

## 2021-03-01 DIAGNOSIS — I10 ESSENTIAL HYPERTENSION: ICD-10-CM

## 2021-03-01 PROCEDURE — 71046 X-RAY EXAM CHEST 2 VIEWS: CPT

## 2021-03-01 PROCEDURE — 99214 OFFICE O/P EST MOD 30 MIN: CPT | Performed by: NURSE PRACTITIONER

## 2021-03-01 RX ORDER — FUROSEMIDE 20 MG/1
20 TABLET ORAL DAILY PRN
Qty: 30 TABLET | Refills: 1 | Status: ON HOLD | OUTPATIENT
Start: 2021-03-01 | End: 2021-03-16 | Stop reason: HOSPADM

## 2021-03-01 NOTE — PROGRESS NOTES
daily (with meals) 60 tablet 3    ticagrelor (BRILINTA) 90 MG TABS tablet Take 1 tablet by mouth 2 times daily 60 tablet 3    ammonium lactate (LAC-HYDRIN) 12 % lotion Apply topically as needed. 1 Bottle 1    ferrous sulfate 325 (65 FE) MG tablet Take 1 tablet by mouth 2 times daily (with meals) 30 tablet 3     No current facility-administered medications for this visit. REVIEW OF SYSTEMS:    CONSTITUTIONAL: No major weight gain or loss, night sweats, fever, fatigue, or weakness. HEENT: No new vision difficulties or ringing in the ears. RESPIRATORY: No new SOB, PND, orthopnea or cough. CARDIOVASCULAR: See HPI  GI: No N/V/D, constipation, or abdominal pain. No black/tarry stools  : No urinary urgency, incontinence, or hematuria. SKIN: No cyanosis or skin lesions. MUSCULOSKELETAL: No new muscle or joint pain. NEUROLOGICAL: No syncope or TIA-like symptoms. PSYCHIATRIC: No anxiety, pain, insomnia or depression    Objective:   PHYSICAL EXAM:        Vitals:    03/01/21 1343 03/01/21 1358   BP: (!) 150/70 (!) 150/70   Site: Right Upper Arm    Position: Sitting    Cuff Size: Large Adult    Pulse: 83    SpO2: 98%    Weight: 235 lb 9.6 oz (106.9 kg)    Height: 5' 1\" (1.549 m)       VITALS:  BP (!) 150/70   Pulse 83   Ht 5' 1\" (1.549 m)   Wt 235 lb 9.6 oz (106.9 kg)   SpO2 98%   BMI 44.52 kg/m²   CONSTITUTIONAL: Cooperative, no apparent distress, and appears overweight   NEUROLOGIC:  Awake and orientated to person, place, and time. PSYCH: Calm affect. SKIN: Warm and dry. Reddened LE and weeping R shin. HEENT: Sclera non-icteric, normocephalic, neck supple. RESPIRATORY:  No increased work of breathing and with crackles to bases to auscultation. CARDIOVASCULAR:  Regular rate and rhythm without murmur. Normal S1 and S2. No gallops or rubs. Normal PMI. No elevation of JVP. Normal carotid pulses with no bruits. GI:  Normal bowel sounds. Non-distended.  Non-tender to palpation  EXT: +1 pitting LE edema. No calf tenderness. Pulses are present bilaterally. DATA:    Lab Results   Component Value Date    ALT <5 (L) 2020    AST 17 2020    ALKPHOS 84 2020    BILITOT 0.3 2020     Lab Results   Component Value Date    CREATININE 1.1 2020    BUN 23 (H) 2020     2020    K 4.6 2020     2020    CO2 23 2020     No results found for: TSH, R0WYEQJ, F9KFZJP, THYROIDAB  Lab Results   Component Value Date    WBC 9.4 2020    HGB 10.2 (L) 2020    HCT 30.6 (L) 2020    MCV 78.9 (L) 2020     2020     No components found for: CHLPL  Lab Results   Component Value Date    TRIG 121 2020     Lab Results   Component Value Date    HDL 38 (L) 2020     Lab Results   Component Value Date    LDLCALC 93 2020     Lab Results   Component Value Date    LABVLDL 24 2020      No results found for: BNP  Radiology Review:  Pertinent images / reports were reviewed as a part of this visit and reveals the following:    Cath: 20  Findings:  Artery Findings/Result   LM Normal   LAD 95% mid   Cx OM2 50%   RI NA   RCA 20% prox, 20% mid   LVEDP 25   LVG 55%      Intervention:         PCI of LAD with 3.1U69Bnozhf REBECA (PD with 3.75NC)     Post Cath Dx:       Severe , nonobstructive otherwise    Assessment:     1. Coronary artery disease   ~ stable ; no c/o angina   ~ NSTEMI s/p [facility] : PCI of LAD with REBECA, residual nonobstructive disease to OM2 and RCA. LVEF 55%  ~ ASA/ plavix/ coreg/ lisinopril/ Lipitor      2. Dyslipidemia   ~ LDL 93, LFTs ok (2020)  ~ lipitor 40     3. Hypertension   ~ elevated in office     4. EUBANKS  ~ d/t infectious process given green productive cough vs fluid overload   ~ LVG 2020: EF 55%    5.  BLE edema   ~ +1 pitting, reddened, and weeping   ~ pt reports hx of cellulitis and hopes to get abx by PCP tomorrow     I had the opportunity to review the clinical symptoms and presentation of Glynn Person. Plan:     1. CXR today with crackles, SOB, and productive cough. Will also forward results to your PCP as you see her in office tomorrow. 2. Start lasix 20mg daily PRN for swelling and SOB   Educated on daily wts, BP monitoring, salt/fluid restriction. 3. Echocardiogram soon   4. Follow up in 3 months with Dr. Kennedi Luque     Overall the patient is stable from CV standpoint    I have addressed the patient's cardiac risk factors and adjusted pharmacologic treatment as needed. In addition, I have reinforced the need for patient directed risk factor modification. Further evaluation will be based upon the patient's clinical course and testing results. All questions and concerns were addressed to the patient. Alternatives to my treatment were discussed. The patient verbalizes understanding not to stop medications without discussing with us. The patient is not currently smoking. The risks related to smoking were reviewed with the patient. Recommend maintaining a smoke-free lifestyle. Patient is on a beta-blocker  Patient is on an ACE / ARB  Patient is on a statin    Dual Antiplatelet therapy has been recommended / prescribed for this patient. Education conducted on adverse reactions including bleeding were discussed. Angiotension inhibitor has been prescribed / recommended. Daily weights, low sodium diet were discussed. Patient instructed to call the office with a weight gain: 3lbs over night and/or 5lbs in one week, swelling, shortness of breath, orthopnea, and/or PND. Discussed exercise: 30min of sustained cardiovascular exercise most days of the week   Discussed Low saturated fat / Cholesterol diet. Thank you for allowing us to participate in the care of Glynn Person.     Chapis Howell APRN-CNP  [facility]   Office: [phone]    Documentation of today's visit sent to PCP

## 2021-03-01 NOTE — TELEPHONE ENCOUNTER
Patient was in to see npkl today- per check out note, she needs to follow up in 3 mo w/dce. Please advise on where to add patient to schedule.

## 2021-03-03 ENCOUNTER — HOSPITAL ENCOUNTER (INPATIENT)
Age: 81
LOS: 13 days | Discharge: HOME HEALTH | DRG: 329 | End: 2021-03-16
Attending: INTERNAL MEDICINE | Admitting: INTERNAL MEDICINE
Payer: MEDICARE

## 2021-03-03 VITALS
SYSTOLIC BLOOD PRESSURE: 150 MMHG | WEIGHT: 235.6 LBS | BODY MASS INDEX: 44.48 KG/M2 | HEIGHT: 61 IN | HEART RATE: 83 BPM | DIASTOLIC BLOOD PRESSURE: 70 MMHG | OXYGEN SATURATION: 98 %

## 2021-03-03 DIAGNOSIS — C18.2 MALIGNANT NEOPLASM OF ASCENDING COLON (HCC): ICD-10-CM

## 2021-03-03 DIAGNOSIS — D64.9 ANEMIA, UNSPECIFIED TYPE: Primary | ICD-10-CM

## 2021-03-03 PROBLEM — I25.10 CAD (CORONARY ARTERY DISEASE): Status: ACTIVE | Noted: 2021-03-03

## 2021-03-03 PROBLEM — E78.00 HIGH CHOLESTEROL: Status: ACTIVE | Noted: 2021-03-03

## 2021-03-03 LAB
A/G RATIO: 1.3 (ref 1.1–2.2)
ABO/RH: NORMAL
ALBUMIN SERPL-MCNC: 3.8 G/DL (ref 3.4–5)
ALP BLD-CCNC: 89 U/L (ref 40–129)
ALT SERPL-CCNC: 6 U/L (ref 10–40)
ANION GAP SERPL CALCULATED.3IONS-SCNC: 7 MMOL/L (ref 3–16)
ANTIBODY SCREEN: NORMAL
APTT: 30.4 SEC (ref 24.2–36.2)
AST SERPL-CCNC: 11 U/L (ref 15–37)
BASOPHILS ABSOLUTE: 0 K/UL (ref 0–0.2)
BASOPHILS RELATIVE PERCENT: 0.7 %
BILIRUB SERPL-MCNC: 0.3 MG/DL (ref 0–1)
BLOOD BANK DISPENSE STATUS: NORMAL
BLOOD BANK PRODUCT CODE: NORMAL
BPU ID: NORMAL
BUN BLDV-MCNC: 22 MG/DL (ref 7–20)
CALCIUM SERPL-MCNC: 8.8 MG/DL (ref 8.3–10.6)
CHLORIDE BLD-SCNC: 108 MMOL/L (ref 99–110)
CO2: 26 MMOL/L (ref 21–32)
CREAT SERPL-MCNC: 1.1 MG/DL (ref 0.6–1.2)
DESCRIPTION BLOOD BANK: NORMAL
EOSINOPHILS ABSOLUTE: 0.2 K/UL (ref 0–0.6)
EOSINOPHILS RELATIVE PERCENT: 3 %
GFR AFRICAN AMERICAN: 58
GFR NON-AFRICAN AMERICAN: 48
GLOBULIN: 3 G/DL
GLUCOSE BLD-MCNC: 121 MG/DL (ref 70–99)
HCT VFR BLD CALC: 20.2 % (ref 36–48)
HEMOGLOBIN: 5.8 G/DL (ref 12–16)
INR BLD: 1.11 (ref 0.86–1.14)
IRON SATURATION: 4 % (ref 15–50)
IRON: 17 UG/DL (ref 37–145)
LYMPHOCYTES ABSOLUTE: 1.5 K/UL (ref 1–5.1)
LYMPHOCYTES RELATIVE PERCENT: 20.1 %
MCH RBC QN AUTO: 19.2 PG (ref 26–34)
MCHC RBC AUTO-ENTMCNC: 28.7 G/DL (ref 31–36)
MCV RBC AUTO: 66.7 FL (ref 80–100)
MONOCYTES ABSOLUTE: 0.7 K/UL (ref 0–1.3)
MONOCYTES RELATIVE PERCENT: 9.4 %
NEUTROPHILS ABSOLUTE: 4.9 K/UL (ref 1.7–7.7)
NEUTROPHILS RELATIVE PERCENT: 66.8 %
OCCULT BLOOD DIAGNOSTIC: NORMAL
PDW BLD-RTO: 18.7 % (ref 12.4–15.4)
PLATELET # BLD: 379 K/UL (ref 135–450)
PMV BLD AUTO: 7.7 FL (ref 5–10.5)
POTASSIUM REFLEX MAGNESIUM: 4.2 MMOL/L (ref 3.5–5.1)
PROTHROMBIN TIME: 12.9 SEC (ref 10–13.2)
RBC # BLD: 3.02 M/UL (ref 4–5.2)
SODIUM BLD-SCNC: 141 MMOL/L (ref 136–145)
TOTAL IRON BINDING CAPACITY: 420 UG/DL (ref 260–445)
TOTAL PROTEIN: 6.8 G/DL (ref 6.4–8.2)
WBC # BLD: 7.4 K/UL (ref 4–11)

## 2021-03-03 PROCEDURE — 82607 VITAMIN B-12: CPT

## 2021-03-03 PROCEDURE — 2580000003 HC RX 258: Performed by: INTERNAL MEDICINE

## 2021-03-03 PROCEDURE — 86901 BLOOD TYPING SEROLOGIC RH(D): CPT

## 2021-03-03 PROCEDURE — 2060000000 HC ICU INTERMEDIATE R&B

## 2021-03-03 PROCEDURE — 83550 IRON BINDING TEST: CPT

## 2021-03-03 PROCEDURE — 86850 RBC ANTIBODY SCREEN: CPT

## 2021-03-03 PROCEDURE — 85730 THROMBOPLASTIN TIME PARTIAL: CPT

## 2021-03-03 PROCEDURE — 86900 BLOOD TYPING SEROLOGIC ABO: CPT

## 2021-03-03 PROCEDURE — 85610 PROTHROMBIN TIME: CPT

## 2021-03-03 PROCEDURE — G0328 FECAL BLOOD SCRN IMMUNOASSAY: HCPCS

## 2021-03-03 PROCEDURE — 85025 COMPLETE CBC W/AUTO DIFF WBC: CPT

## 2021-03-03 PROCEDURE — 80053 COMPREHEN METABOLIC PANEL: CPT

## 2021-03-03 PROCEDURE — P9016 RBC LEUKOCYTES REDUCED: HCPCS

## 2021-03-03 PROCEDURE — 99285 EMERGENCY DEPT VISIT HI MDM: CPT

## 2021-03-03 PROCEDURE — 6370000000 HC RX 637 (ALT 250 FOR IP): Performed by: INTERNAL MEDICINE

## 2021-03-03 PROCEDURE — 82728 ASSAY OF FERRITIN: CPT

## 2021-03-03 PROCEDURE — 36430 TRANSFUSION BLD/BLD COMPNT: CPT

## 2021-03-03 PROCEDURE — 94760 N-INVAS EAR/PLS OXIMETRY 1: CPT

## 2021-03-03 PROCEDURE — 83540 ASSAY OF IRON: CPT

## 2021-03-03 PROCEDURE — 86923 COMPATIBILITY TEST ELECTRIC: CPT

## 2021-03-03 RX ORDER — POTASSIUM CHLORIDE 7.45 MG/ML
10 INJECTION INTRAVENOUS PRN
Status: DISCONTINUED | OUTPATIENT
Start: 2021-03-03 | End: 2021-03-16 | Stop reason: HOSPADM

## 2021-03-03 RX ORDER — SODIUM CHLORIDE 9 MG/ML
INJECTION, SOLUTION INTRAVENOUS PRN
Status: DISCONTINUED | OUTPATIENT
Start: 2021-03-03 | End: 2021-03-16 | Stop reason: HOSPADM

## 2021-03-03 RX ORDER — POTASSIUM CHLORIDE 20 MEQ/1
40 TABLET, EXTENDED RELEASE ORAL PRN
Status: DISCONTINUED | OUTPATIENT
Start: 2021-03-03 | End: 2021-03-03

## 2021-03-03 RX ORDER — HYDROCODONE BITARTRATE AND ACETAMINOPHEN 5; 325 MG/1; MG/1
1 TABLET ORAL 2 TIMES DAILY
Status: DISCONTINUED | OUTPATIENT
Start: 2021-03-03 | End: 2021-03-08

## 2021-03-03 RX ORDER — POTASSIUM CHLORIDE 7.45 MG/ML
10 INJECTION INTRAVENOUS PRN
Status: DISCONTINUED | OUTPATIENT
Start: 2021-03-03 | End: 2021-03-03

## 2021-03-03 RX ORDER — ONDANSETRON 2 MG/ML
4 INJECTION INTRAMUSCULAR; INTRAVENOUS EVERY 6 HOURS PRN
Status: DISCONTINUED | OUTPATIENT
Start: 2021-03-03 | End: 2021-03-16 | Stop reason: HOSPADM

## 2021-03-03 RX ORDER — 0.9 % SODIUM CHLORIDE 0.9 %
500 INTRAVENOUS SOLUTION INTRAVENOUS PRN
Status: DISCONTINUED | OUTPATIENT
Start: 2021-03-03 | End: 2021-03-16 | Stop reason: HOSPADM

## 2021-03-03 RX ORDER — ROSUVASTATIN CALCIUM 20 MG/1
20 TABLET, COATED ORAL NIGHTLY
Status: DISCONTINUED | OUTPATIENT
Start: 2021-03-03 | End: 2021-03-16 | Stop reason: HOSPADM

## 2021-03-03 RX ORDER — LISINOPRIL 5 MG/1
2.5 TABLET ORAL DAILY
Status: DISCONTINUED | OUTPATIENT
Start: 2021-03-03 | End: 2021-03-11

## 2021-03-03 RX ORDER — HYDROCODONE BITARTRATE AND ACETAMINOPHEN 5; 325 MG/1; MG/1
1 TABLET ORAL ONCE
Status: COMPLETED | OUTPATIENT
Start: 2021-03-03 | End: 2021-03-03

## 2021-03-03 RX ORDER — PROMETHAZINE HYDROCHLORIDE 25 MG/1
12.5 TABLET ORAL EVERY 6 HOURS PRN
Status: DISCONTINUED | OUTPATIENT
Start: 2021-03-03 | End: 2021-03-16 | Stop reason: HOSPADM

## 2021-03-03 RX ORDER — HYDROCODONE BITARTRATE AND ACETAMINOPHEN 5; 325 MG/1; MG/1
1 TABLET ORAL 2 TIMES DAILY
Status: DISCONTINUED | OUTPATIENT
Start: 2021-03-03 | End: 2021-03-03

## 2021-03-03 RX ORDER — CARVEDILOL 3.12 MG/1
3.12 TABLET ORAL 2 TIMES DAILY WITH MEALS
Status: DISCONTINUED | OUTPATIENT
Start: 2021-03-03 | End: 2021-03-16 | Stop reason: HOSPADM

## 2021-03-03 RX ORDER — ACETAMINOPHEN 325 MG/1
650 TABLET ORAL EVERY 6 HOURS PRN
Status: DISCONTINUED | OUTPATIENT
Start: 2021-03-03 | End: 2021-03-16 | Stop reason: HOSPADM

## 2021-03-03 RX ORDER — POTASSIUM CHLORIDE 20 MEQ/1
40 TABLET, EXTENDED RELEASE ORAL PRN
Status: DISCONTINUED | OUTPATIENT
Start: 2021-03-03 | End: 2021-03-16 | Stop reason: HOSPADM

## 2021-03-03 RX ORDER — HYDRALAZINE HYDROCHLORIDE 20 MG/ML
10 INJECTION INTRAMUSCULAR; INTRAVENOUS EVERY 6 HOURS PRN
Status: DISCONTINUED | OUTPATIENT
Start: 2021-03-03 | End: 2021-03-16 | Stop reason: HOSPADM

## 2021-03-03 RX ORDER — SODIUM CHLORIDE 9 MG/ML
INJECTION, SOLUTION INTRAVENOUS CONTINUOUS
Status: DISCONTINUED | OUTPATIENT
Start: 2021-03-03 | End: 2021-03-04

## 2021-03-03 RX ORDER — ACETAMINOPHEN 650 MG/1
650 SUPPOSITORY RECTAL EVERY 6 HOURS PRN
Status: DISCONTINUED | OUTPATIENT
Start: 2021-03-03 | End: 2021-03-16 | Stop reason: HOSPADM

## 2021-03-03 RX ORDER — FAMOTIDINE 20 MG/1
20 TABLET, FILM COATED ORAL DAILY
Status: DISCONTINUED | OUTPATIENT
Start: 2021-03-03 | End: 2021-03-13

## 2021-03-03 RX ORDER — FUROSEMIDE 20 MG/1
20 TABLET ORAL DAILY PRN
Status: DISCONTINUED | OUTPATIENT
Start: 2021-03-03 | End: 2021-03-11

## 2021-03-03 RX ORDER — SODIUM CHLORIDE 0.9 % (FLUSH) 0.9 %
10 SYRINGE (ML) INJECTION EVERY 12 HOURS SCHEDULED
Status: DISCONTINUED | OUTPATIENT
Start: 2021-03-03 | End: 2021-03-11 | Stop reason: SDUPTHER

## 2021-03-03 RX ORDER — SODIUM CHLORIDE 0.9 % (FLUSH) 0.9 %
10 SYRINGE (ML) INJECTION PRN
Status: DISCONTINUED | OUTPATIENT
Start: 2021-03-03 | End: 2021-03-11 | Stop reason: SDUPTHER

## 2021-03-03 RX ADMIN — LISINOPRIL 2.5 MG: 5 TABLET ORAL at 16:25

## 2021-03-03 RX ADMIN — FAMOTIDINE 20 MG: 20 TABLET, FILM COATED ORAL at 16:24

## 2021-03-03 RX ADMIN — TICAGRELOR 90 MG: 90 TABLET ORAL at 20:37

## 2021-03-03 RX ADMIN — HYDROCODONE BITARTRATE AND ACETAMINOPHEN 1 TABLET: 5; 325 TABLET ORAL at 16:25

## 2021-03-03 RX ADMIN — SODIUM CHLORIDE: 9 INJECTION, SOLUTION INTRAVENOUS at 15:15

## 2021-03-03 RX ADMIN — ROSUVASTATIN 20 MG: 20 TABLET, FILM COATED ORAL at 20:37

## 2021-03-03 RX ADMIN — CARVEDILOL 3.12 MG: 3.12 TABLET, FILM COATED ORAL at 16:24

## 2021-03-03 RX ADMIN — Medication 10 ML: at 20:37

## 2021-03-03 ASSESSMENT — ENCOUNTER SYMPTOMS
EYE PAIN: 0
COUGH: 0
VOMITING: 0
BACK PAIN: 0
ABDOMINAL PAIN: 0
SHORTNESS OF BREATH: 0
NAUSEA: 0
BLOOD IN STOOL: 0
CONSTIPATION: 0
COLOR CHANGE: 0
RESPIRATORY NEGATIVE: 1
DIARRHEA: 0
CHEST TIGHTNESS: 0
EYE REDNESS: 0

## 2021-03-03 ASSESSMENT — PAIN SCALES - GENERAL
PAINLEVEL_OUTOF10: 10
PAINLEVEL_OUTOF10: 0

## 2021-03-03 NOTE — ED PROVIDER NOTES
[address] [facility]        Pt Name: Roseanne Ch  MRN: [id number]  [place] 1940  Date of evaluation: 3/3/2021  Provider: BASILIO Doherty  PCP: Nehemiah Rainey MD    BRYANNA. I have evaluated this patient. My supervising physician was available for consultation. CHIEF COMPLAINT       Chief Complaint   Patient presents with    Abnormal Lab     pt sent in by her MD for low H/H       HISTORY OF PRESENT ILLNESS   (Location, Timing/Onset, Context/Setting, Quality, Duration, Modifying Factors, Severity, Associated Signs and Symptoms)  Note limiting factors. Roseanne Ch is a 68 y.o. female with past medical history of hypertension and history of CAD with stent in her heart recently started on Brilinta back in late 2020 who presents to the ED with complaint of abnormal labs. Patient had routine blood work drawn by PCP and states she was told she had low hemoglobin. Patient states she is unsure what hemoglobin actually was. Patient states she feels fine. Denies lightheadedness, dizziness, palpitations, headache, chest pain, shortness of breath, abdominal pain, nausea/vomiting, urinary symptoms or changes in bowel movements. Denies any dark black or melenic stools. Patient denies any epistaxis, vaginal bleeding or hematuria. Denies any history of GI bleeding in the past.  Patient states PCP recommended she come to the ED for further evaluation and treatment. Nursing Notes were all reviewed and agreed with or any disagreements were addressed in the HPI. REVIEW OF SYSTEMS    (2-9 systems for level 4, 10 or more for level 5)     Review of Systems   Constitutional: Negative for activity change, appetite change, chills, diaphoresis, fatigue and fever. Respiratory: Negative. Negative for cough, chest tightness and shortness of breath. Cardiovascular: Negative. Negative for chest pain, palpitations and leg swelling.    Gastrointestinal: Negative for abdominal pain, constipation, diarrhea, nausea and vomiting. Genitourinary: Negative for decreased urine volume, difficulty urinating, dysuria, flank pain, frequency, hematuria and urgency. Musculoskeletal: Negative for arthralgias, back pain, myalgias, neck pain and neck stiffness. Skin: Negative for color change, pallor, rash and wound. Neurological: Negative for dizziness, light-headedness and headaches. Positives and Pertinent negatives as per HPI. Except as noted above in the ROS, all other systems were reviewed and negative. PAST MEDICAL HISTORY     Past Medical History:   Diagnosis Date    Anemia     CKD (chronic kidney disease)     Hyperlipidemia     Hypertension          SURGICAL HISTORY     Past Surgical History:   Procedure Laterality Date    CYSTOSCOPY  11/21/13    w/ bladder biopsy    FRACTURE SURGERY      right wrist         CURRENTMEDICATIONS       Current Discharge Medication List      CONTINUE these medications which have NOT CHANGED    Details   furosemide (LASIX) 20 MG tablet Take 1 tablet by mouth daily as needed (swelling or shortness of breath)  Qty: 30 tablet, Refills: 1      HYDROcodone-acetaminophen (NORCO) 5-325 MG per tablet Take 1 tablet by mouth 2 times daily. rosuvastatin (CRESTOR) 20 MG tablet Take 1 tablet by mouth nightly  Qty: 30 tablet, Refills: 2      aspirin 81 MG chewable tablet Take 1 tablet by mouth daily  Qty: 30 tablet, Refills: 3      lisinopril (PRINIVIL;ZESTRIL) 2.5 MG tablet Take 1 tablet by mouth daily  Qty: 30 tablet, Refills: 3      carvedilol (COREG) 3.125 MG tablet Take 1 tablet by mouth 2 times daily (with meals)  Qty: 60 tablet, Refills: 3      ticagrelor (BRILINTA) 90 MG TABS tablet Take 1 tablet by mouth 2 times daily  Qty: 60 tablet, Refills: 3               ALLERGIES     Patient has no known allergies.     FAMILYHISTORY       Family History   Problem Relation Age of Onset    Heart Disease Father           SOCIAL HISTORY Social History     Tobacco Use    Smoking status: Never Smoker    Smokeless tobacco: Never Used    Tobacco comment:  was a heavy smoker   Substance Use Topics    Alcohol use: No    Drug use: No       SCREENINGS    Dulce Maria Coma Scale  Eye Opening: Spontaneous  Best Verbal Response: Oriented  Best Motor Response: Obeys commands  [place] Coma Scale Score: 15        PHYSICAL EXAM    (up to 7 for level 4, 8 or more for level 5)     ED Triage Vitals [03/03/21 0959]   BP Temp Temp Source Pulse Resp SpO2 Height Weight   (!) 166/72 98 °F (36.7 °C) Oral 67 18 97 % 5' (1.524 m) 234 lb (106.1 kg)       Physical Exam  Constitutional:       General: She is not in acute distress. Appearance: Normal appearance. She is well-developed. She is not ill-appearing, toxic-appearing or diaphoretic. HENT:      Head: Normocephalic and atraumatic. Right Ear: External ear normal.      Left Ear: External ear normal.      Mouth/Throat:      Mouth: Mucous membranes are moist.      Pharynx: No oropharyngeal exudate or posterior oropharyngeal erythema. Eyes:      General:         Right eye: No discharge. Left eye: No discharge. Conjunctiva/sclera: Conjunctivae normal.   Neck:      Musculoskeletal: Normal range of motion and neck supple. Cardiovascular:      Rate and Rhythm: Normal rate and regular rhythm. Pulses: Normal pulses. Heart sounds: Normal heart sounds. No murmur. No friction rub. No gallop. Comments: 2+ radial pulses bilaterally. No calf tenderness. No JVD. Patient does have 2+ pitting edema to the bilateral lower extremities. Pulmonary:      Effort: Pulmonary effort is normal. No respiratory distress. Breath sounds: Normal breath sounds. No stridor. No wheezing, rhonchi or rales. Chest:      Chest wall: No tenderness. Abdominal:      General: Abdomen is flat. Bowel sounds are normal. There is no distension. Palpations: Abdomen is soft. There is no mass. Tenderness: There is no abdominal tenderness. There is no right CVA tenderness, left CVA tenderness, guarding or rebound. Hernia: No hernia is present. Genitourinary:     Comments: Rectal exam performed by myself with chaperone at bedside. Brown stool noted without bright red blood. No melenic stool. No mass. No fissures or fistulas. No tenderness to the rectum. Musculoskeletal: Normal range of motion. Skin:     General: Skin is warm and dry. Coloration: Skin is pale. Findings: No erythema or rash. Comments: Patient appears pale. Patient states she always is pale. States he does not appear any different than normal.   Neurological:      Mental Status: She is alert and oriented to person, place, and time.    Psychiatric:         Behavior: Behavior normal.         DIAGNOSTIC RESULTS   LABS:    Labs Reviewed   CBC WITH AUTO DIFFERENTIAL - Abnormal; Notable for the following components:       Result Value    RBC 3.02 (*)     Hemoglobin 5.8 (*)     Hematocrit 20.2 (*)     MCV 66.7 (*)     MCH 19.2 (*)     MCHC 28.7 (*)     RDW 18.7 (*)     All other components within normal limits    Narrative:     CALL  [facility] tel. [phone],  Hematology results called to and read back by Juan David Giles rn, 03/03/2021  10:52, by [facility]  Performed at:  [facility]  [address] [facility], [address]   Phone [phone]   COMPREHENSIVE METABOLIC PANEL W/ REFLEX TO MG FOR LOW K - Abnormal; Notable for the following components:    Glucose 121 (*)     BUN 22 (*)     GFR Non- 48 (*)     GFR  58 (*)     ALT 6 (*)     AST 11 (*)     All other components within normal limits    Narrative:     Performed at:  [facility]  [address] [facility], [address]   Phone [phone]   IRON AND TIBC - Abnormal; Notable for the following components:    Iron 17 (*)     Iron Saturation 4 (*)     All other components productive cough FINDINGS: Cardiomegaly. Pulmonary vascular congestion. Ill-defined peripheral pulmonary opacities. No pneumothorax. No pleural effusion.      Ill-defined bilateral pulmonary opacities could represent pulmonary edema possibly related to congestive heart failure or atypical pneumonia           PROCEDURES   Unless otherwise noted below, none     Procedures    CRITICAL CARE TIME   N/A    CONSULTS:  IP CONSULT TO INTERNAL MEDICINE  IP CONSULT TO CARDIOLOGY  IP CONSULT TO HEM/ONC      EMERGENCY DEPARTMENT COURSE and DIFFERENTIAL DIAGNOSIS/MDM:   Vitals:    Vitals:    03/03/21 1445 03/03/21 1607 03/03/21 1611 03/03/21 1630   BP: (!) 182/94  (!) 187/70 (!) 175/79   Pulse: 67  67 70   Resp: 18  18 18   Temp: 97.1 °F (36.2 °C)  97.2 °F (36.2 °C) 97.8 °F (36.6 °C)   TempSrc: Oral   Tympanic   SpO2: 96% 91%  95%   Weight:       Height:           Patient was given the following medications:  Medications   0.9 % sodium chloride infusion (has no administration in time range)   lisinopril (PRINIVIL;ZESTRIL) tablet 2.5 mg (2.5 mg Oral Given 3/3/21 1625)   carvedilol (COREG) tablet 3.125 mg (3.125 mg Oral Given 3/3/21 1624)   ticagrelor (BRILINTA) tablet 90 mg (has no administration in time range)   rosuvastatin (CRESTOR) tablet 20 mg (has no administration in time range)   furosemide (LASIX) tablet 20 mg (has no administration in time range)   0.9 % sodium chloride infusion ( Intravenous New Bag 3/3/21 1515)   sodium chloride flush 0.9 % injection 10 mL (has no administration in time range)   sodium chloride flush 0.9 % injection 10 mL (has no administration in time range)   potassium chloride (KLOR-CON M) extended release tablet 40 mEq (has no administration in time range)     Or   potassium bicarb-citric acid (EFFER-K) effervescent tablet 40 mEq (has no administration in time range)     Or   potassium chloride 10 mEq/100 mL IVPB (Peripheral Line) (has no administration in time range)   promethazine (PHENERGAN) tablet 12.5 mg (has no administration in time range)     Or   ondansetron (ZOFRAN) injection 4 mg (has no administration in time range)   magnesium hydroxide (MILK OF MAGNESIA) 400 MG/5ML suspension 30 mL (has no administration in time range)   famotidine (PEPCID) tablet 20 mg (20 mg Oral Given 3/3/21 1624)   acetaminophen (TYLENOL) tablet 650 mg (has no administration in time range)     Or   acetaminophen (TYLENOL) suppository 650 mg (has no administration in time range)   hydrALAZINE (APRESOLINE) injection 10 mg (has no administration in time range)   0.9 % sodium chloride bolus (has no administration in time range)   0.9 % sodium chloride infusion (has no administration in time range)   HYDROcodone-acetaminophen (NORCO) 5-325 MG per tablet 1 tablet (has no administration in time range)   HYDROcodone-acetaminophen (NORCO) 5-325 MG per tablet 1 tablet (1 tablet Oral Given 3/3/21 1625)           Patient is a 81-year-old female who presents to the ED with complaint of abnormal labs. Apparently has low hemoglobin anemia per report from outpatient labs. Unsure the level. Cannot review these for comparison. Patient does appear pale. Patient states she is always pale. CBC showed hemoglobin of 5.8 with normal white count and platelets. Blood occult stool was negative. CMP unremarkable. Blood type a positive. Coags obtained. Patient ordered unit of transfusion here in the emergency department. I have discussed with the patient the rationale for blood transfusion, its benefits in treating or preventing symptomatic anemia which could lead to fatigue, organ damage or death, and its risk which include: mild transfusion reactions, rare risk of blood borne infection, or more serious but rare allergic reactions. I have discussed the alternatives to transfusion, including the risk and consequences of not receiving transfusion.  The patient had an opportunity to ask questions and had agreed to proceed with transfusion

## 2021-03-03 NOTE — ED NOTES
Bed: 08  Expected date:   Expected time:   Means of arrival:   Comments:  Magdalena Starr RN  03/03/21 1522

## 2021-03-03 NOTE — CARE COORDINATION
Discharge Planning Assessment    SW discharge planner met with patient to discuss reason for admission, current living situation, and potential needs at the time of discharge. Pt in ED d/t anemia    Demographics/Insurance verified:  Yes    Current type of dwelling:  House    Living arrangements:  W/spouse and grandtr    Level of function/Support:  Pt reports she uses a cane at all times at baseline. Stated has a lot of support from spouse and grandtr and other family members who live nearby. PCP:  Dr. Bernard Hugo    Last Visit to PCP:  Yesterday    DME:  cane    Active with any community resources/agencies/skilled home care:  None. No non-skilled needs reported. Medication compliance issues:  No    Financial issues that could impact healthcare:  No    Tentative discharge plan:  Home most likely w/no needs. PT/OT pending but pt reported she feels the same psychically and doesn't feel she will need therapy at d/c. Discussed with patient and/or family that on the day of discharge home tentative time of discharge will be between 10 AM and noon. Transportation at the time of discharge:  Pt drives. Family can assist home.     Electronically signed by GOLRIA Cota, JEANA on 3/3/2021 at 1:54 PM

## 2021-03-03 NOTE — H&P
History and Physical  Dr. Colt Tavera  3/3/2021    PCP: En Chappell MD    Cc:   Chief Complaint   Patient presents with    Abnormal Lab     pt sent in by her MD for low H/H       HPI:  Isaiah Dove is a 68 y.o. female who has a past medical history of Hypertension. Patient presents with Anemia. HPI     68 y.o. female with past medical history of hypertension and history of CAD with stent in her heart recently started on Brilinta back in late 2020 who presents to the ED with complaint of abnormal labs. Patient had routine blood work drawn by PCP and states she was told she had low hemoglobin. Patient states she is unsure what hemoglobin actually was. Patient states she feels fine. Denies lightheadedness, dizziness, palpitations, headache, chest pain, shortness of breath, abdominal pain, nausea/vomiting, urinary symptoms or changes in bowel movements. Denies any dark black or melenic stools. Found to be profoundly anemic, microcytic  Occult stool neg in ER  Pt is on multiple blood thinners per cards    To be admiitted  Heme/Onc and Cards asked to see    Problem list of hospitalization thus far: Active Hospital Problems    Diagnosis    Anemia [D64.9]    CAD (coronary artery disease) [I25.10]    High cholesterol [E78.00]    Hypertension [I10]         Review of Systems: (1 system for EPF, 2-9 for detailed, 10+ for comprehensive)  Review of Systems   Constitutional: Positive for fatigue. Negative for chills and fever. HENT: Negative for ear discharge and ear pain. Eyes: Negative for pain and redness. Respiratory: Negative for cough and shortness of breath. Cardiovascular: Negative for chest pain and leg swelling. Gastrointestinal: Negative for blood in stool, nausea and vomiting. Endocrine: Negative for heat intolerance and polydipsia. Genitourinary: Negative for enuresis and urgency. Musculoskeletal: Negative for arthralgias and gait problem. Skin: Positive for pallor.  Negative for color change. Allergic/Immunologic: Negative for food allergies. Neurological: Negative for dizziness and headaches. Hematological: Negative for adenopathy. Psychiatric/Behavioral: Negative for confusion and dysphoric mood. Past Medical History:   Past Medical History:   Diagnosis Date    Hypertension        Past Surgical History:   Past Surgical History:   Procedure Laterality Date    CYSTOSCOPY  11/21/13    w/ bladder biopsy    FRACTURE SURGERY      right wrist       Social History:   Social History     Tobacco History     Smoking Status  Never Smoker    Smokeless Tobacco Use  Never Used    Tobacco Comment   was a heavy smoker          Alcohol History     Alcohol Use Status  No          Drug Use     Drug Use Status  No          Sexual Activity     Sexually Active  Not Currently                Fam History:   Family History   Problem Relation Age of Onset    Heart Disease Father        PFSH: The above PMHx, PSHx, SocHx, FamHx has been reviewed by myself. (1 area for detailed, 2-3 for comprehensive)      Code Status: Prior    Meds - following list of home medications fromelectronic chart has been reviewed by myself  Prior to Admission medications    Medication Sig Start Date End Date Taking? Authorizing Provider   furosemide (LASIX) 20 MG tablet Take 1 tablet by mouth daily as needed (swelling or shortness of breath) 3/1/21  Yes JIMENA Carrera CNP   HYDROcodone-acetaminophen (NORCO) 5-325 MG per tablet Take 1 tablet by mouth 2 times daily.  11/12/20  Yes Historical Provider, MD   rosuvastatin (CRESTOR) 20 MG tablet Take 1 tablet by mouth nightly 11/30/20  Yes JIMENA Carrera CNP   aspirin 81 MG chewable tablet Take 1 tablet by mouth daily 11/22/20  Yes JIMENA Carrera CNP   lisinopril (PRINIVIL;ZESTRIL) 2.5 MG tablet Take 1 tablet by mouth daily 11/22/20  Yes JIMENA Carrera CNP   carvedilol (COREG) 3.125 MG tablet Take 1 tablet by mouth 2 times daily (with meals) 11/21/20  Yes JIMENA Gonzalez CNP   ticagrelor (BRILINTA) 90 MG TABS tablet Take 1 tablet by mouth 2 times daily 11/21/20  Yes JIMENA Gonzalez CNP         No Known Allergies          EXAM: (2-7 system for EPF/Detailed, ?8 for Comprehensive)  BP (!) 145/47   Pulse (!) 48   Temp 98 °F (36.7 °C) (Oral)   Resp 16   Ht 5' (1.524 m)   Wt 234 lb (106.1 kg)   SpO2 97%   BMI 45.70 kg/m²   Constitutional: vitals as above: alert, appears stated age and cooperative  Psychiatric: normal insight and judgment, oriented to person, place, time, and general circumstances  Head: Normocephalic, without obvious abnormality, atraumatic  Eyes:lids and lashes normal, conjunctivae and sclerae normal and pupils equal, round, reactive to light and accomodation  EMNT: external ears normal, lips normal  Neck: no adenopathy, supple, symmetrical, trachea midline and thyroid not enlarged, symmetric, no tenderness/mass/nodules   Respiratory: clear to auscultation and percussion bilaterally with normal respiratory effort  Cardiovascular: normal rate, regular rhythm, normal S1 and S2 and no carotid bruits  Gastrointestinal: soft, non-tender, non-distended, normal bowel sounds, no masses or organomegaly  Lymphatic:   Extremities: no edema,no clubbing  Skin:+pallor  Neurologic:negative    LABS:  Labs Reviewed   CBC WITH AUTO DIFFERENTIAL - Abnormal; Notable for the following components:       Result Value    RBC 3.02 (*)     Hemoglobin 5.8 (*)     Hematocrit 20.2 (*)     MCV 66.7 (*)     MCH 19.2 (*)     MCHC 28.7 (*)     RDW 18.7 (*)     All other components within normal limits    Narrative:     CALL  [facility] tel. [phone],  Hematology results called to and read back by Bear Mcwilliams rn, 03/03/2021  10:52, by [facility]  Performed at:  [facility] [address], [place], [address]   Phone [phone]   COMPREHENSIVE METABOLIC PANEL W/ REFLEX TO MG FOR LOW K - Abnormal; Notable for the following components:    Glucose 121 (*)     BUN 22 (*)     GFR Non- 48 (*)     GFR  58 (*)     ALT 6 (*)     AST 11 (*)     All other components within normal limits    Narrative:     Performed at:  [facility]  [address], [address]   Phone [phone]   PROTIME-INR    Narrative:     Performed at:  [facility] [address], [address]   Phone [phone]   APTT    Narrative:     Performed at:  [facility] [address],  [employer], [address]   Phone [phone]   BLOOD OCCULT STOOL DIAGNOSTIC    Narrative:     ORDER#: [id number]                          ORDERED BY: Keith Mckeon  SOURCE: Stool Formed                       COLLECTED:  03/03/21 11:20  ANTIBIOTICS AT [place].:                      RECEIVED :  03/03/21 11:30  Performed at:  [facility] [address], [place]   Phone [phone]   TYPE AND SCREEN    Narrative:     Performed at:  [facility] [address],  [employer], [place]   Phone [phone]   PREPARE RBC (CROSSMATCH)         IMAGING:  Imaging results from the ER have been reviewed in the computerized chart. Xr Chest (2 Vw)    Result Date: 3/1/2021  EXAMINATION: TWO XRAY VIEWS OF THE CHEST 3/1/2021 2:58 pm COMPARISON: None. HISTORY: ORDERING SYSTEM PROVIDED HISTORY: EUBANKS (dyspnea on exertion) TECHNOLOGIST PROVIDED HISTORY: Reason for exam:->3-4wk hx of EUBANKS and productive cough FINDINGS: Cardiomegaly. Pulmonary vascular congestion. Ill-defined peripheral pulmonary opacities. No pneumothorax. No pleural effusion.      Ill-defined bilateral pulmonary opacities could represent pulmonary edema possibly related to congestive heart failure or atypical pneumonia                 MEDICAL DECISION MAKING:    Principal Problem:    Anemia -New Problem to me. Pt with profound anemia, microcytic  Plan: transfusion ordered. I have discussed with the patient the rationale for blood transfusion, its benefits in treating or preventing symptomatic anemia which could lead to fatigue, organ damage or death, and its risk which include: mild transfusion reactions, rare risk of blood borne infection, or more serious but rare allergic reactions. I have discussed the alternatives to transfusion, including the risk and consequences of not receiving transfusion. The patient had an opportunity to ask questions and had agreed to proceed with transfusion of packed red blood cells. Active Problems:    Hypertension -Established problem. Stable. Plan: Pt home BP meds reviewed and will be continued. IV Hydralazine ordered for control of extremely high blood pressures   Will monitor labs to assess Creat/K for possible complications of medications. CAD (coronary artery disease) -Established problem. Stable. No CP now  Plan: consult cards to see if all antiplatelet agents are necessary    High cholesterol  Plan: Continue present orders/plan. Diagnoses as listed above, designated as new or established and plan outlined for each. Data Reviewed:   (1) Lab tests were reviewed or ordered. (1) Radiology tests were reviewed or ordered. (1) Medical test (Echo, EKG, PFT/bayron) were ordered. (1)History was not obtained from someone other than patient  (1) Old records  were reviewed - see HPI/MDM for pertinent details if review done. (2) Case wasdiscussed with another health care provider:  [facility]  (2) Imaging was viewed by myself. (2) EKG  was viewed by myself.        The patient isbeing placed in inpatient status with the expectation of requiring a hospital stay spanning at least two midnights for care and treatment of the problems noted in the problem list.  This determination is also based on thepatients comorbidities and past medical history, the severity and timing of the signs and symptoms upon presentation.         Electronically signed by: Serafin Richard 3/3/2021

## 2021-03-03 NOTE — PROGRESS NOTES
Pt seen in  ED, admission completed. Pt is alert and oriented x 3. Pt lives at home with her family and is being admitted for anemia. Plan of care updated, all questions answered.

## 2021-03-03 NOTE — ED NOTES
Patient to room 8. Per patient report, her PCP called her and told her to come to the ED due to [facility]. \" Patient denies dark stools or noted blood, but states that she is anticoagulated on Brilenta. Patient is ambulatory with cane at baseline, denies dizziness or lightheadedness. Patient is currently AOx4.             Lito Jarrell RN  03/03/21 1036

## 2021-03-03 NOTE — ED NOTES
RN called blood bank for blood. They state that they will tube it to ED.      Jesus Reardon RN  03/03/21 3745

## 2021-03-04 LAB
A/G RATIO: 1.2 (ref 1.1–2.2)
ALBUMIN SERPL-MCNC: 3.7 G/DL (ref 3.4–5)
ALP BLD-CCNC: 90 U/L (ref 40–129)
ALT SERPL-CCNC: 6 U/L (ref 10–40)
ANION GAP SERPL CALCULATED.3IONS-SCNC: 10 MMOL/L (ref 3–16)
ANION GAP SERPL CALCULATED.3IONS-SCNC: 11 MMOL/L (ref 3–16)
AST SERPL-CCNC: 15 U/L (ref 15–37)
BILIRUB SERPL-MCNC: 0.9 MG/DL (ref 0–1)
BUN BLDV-MCNC: 18 MG/DL (ref 7–20)
BUN BLDV-MCNC: 19 MG/DL (ref 7–20)
CALCIUM SERPL-MCNC: 8.7 MG/DL (ref 8.3–10.6)
CALCIUM SERPL-MCNC: 9.5 MG/DL (ref 8.3–10.6)
CHLORIDE BLD-SCNC: 101 MMOL/L (ref 99–110)
CHLORIDE BLD-SCNC: 106 MMOL/L (ref 99–110)
CO2: 26 MMOL/L (ref 21–32)
CO2: 28 MMOL/L (ref 21–32)
CREAT SERPL-MCNC: 1 MG/DL (ref 0.6–1.2)
CREAT SERPL-MCNC: 1.2 MG/DL (ref 0.6–1.2)
EKG ATRIAL RATE: 102 BPM
EKG ATRIAL RATE: 326 BPM
EKG DIAGNOSIS: NORMAL
EKG DIAGNOSIS: NORMAL
EKG Q-T INTERVAL: 328 MS
EKG Q-T INTERVAL: 368 MS
EKG QRS DURATION: 130 MS
EKG QRS DURATION: 84 MS
EKG QTC CALCULATION (BAZETT): 423 MS
EKG QTC CALCULATION (BAZETT): 472 MS
EKG R AXIS: -15 DEGREES
EKG R AXIS: -29 DEGREES
EKG T AXIS: 14 DEGREES
EKG T AXIS: 87 DEGREES
EKG VENTRICULAR RATE: 100 BPM
EKG VENTRICULAR RATE: 99 BPM
FERRITIN: 8.2 NG/ML (ref 15–150)
GFR AFRICAN AMERICAN: 53
GFR AFRICAN AMERICAN: >60
GFR NON-AFRICAN AMERICAN: 43
GFR NON-AFRICAN AMERICAN: 54
GLOBULIN: 3.1 G/DL
GLUCOSE BLD-MCNC: 113 MG/DL (ref 70–99)
GLUCOSE BLD-MCNC: 129 MG/DL (ref 70–99)
HCT VFR BLD CALC: 30.3 % (ref 36–48)
HCT VFR BLD CALC: 33.9 % (ref 36–48)
HEMATOLOGY PATH CONSULT: NORMAL
HEMOGLOBIN: 10.6 G/DL (ref 12–16)
HEMOGLOBIN: 9.3 G/DL (ref 12–16)
MAGNESIUM: 2.1 MG/DL (ref 1.8–2.4)
MAGNESIUM: 2.1 MG/DL (ref 1.8–2.4)
MCH RBC QN AUTO: 22.8 PG (ref 26–34)
MCHC RBC AUTO-ENTMCNC: 31.2 G/DL (ref 31–36)
MCV RBC AUTO: 72.8 FL (ref 80–100)
PDW BLD-RTO: 22.3 % (ref 12.4–15.4)
PLATELET # BLD: 367 K/UL (ref 135–450)
PMV BLD AUTO: 7.7 FL (ref 5–10.5)
POTASSIUM REFLEX MAGNESIUM: 3.7 MMOL/L (ref 3.5–5.1)
POTASSIUM SERPL-SCNC: 3.7 MMOL/L (ref 3.5–5.1)
PRO-BNP: 2792 PG/ML (ref 0–449)
RBC # BLD: 4.65 M/UL (ref 4–5.2)
SODIUM BLD-SCNC: 140 MMOL/L (ref 136–145)
SODIUM BLD-SCNC: 142 MMOL/L (ref 136–145)
TOTAL PROTEIN: 6.8 G/DL (ref 6.4–8.2)
TSH REFLEX: 2.06 UIU/ML (ref 0.27–4.2)
VITAMIN B-12: 328 PG/ML (ref 211–911)
WBC # BLD: 9.2 K/UL (ref 4–11)

## 2021-03-04 PROCEDURE — 97162 PT EVAL MOD COMPLEX 30 MIN: CPT

## 2021-03-04 PROCEDURE — 6370000000 HC RX 637 (ALT 250 FOR IP): Performed by: INTERNAL MEDICINE

## 2021-03-04 PROCEDURE — APPNB45 APP NON BILLABLE 31-45 MINUTES: Performed by: NURSE PRACTITIONER

## 2021-03-04 PROCEDURE — 85014 HEMATOCRIT: CPT

## 2021-03-04 PROCEDURE — 6360000002 HC RX W HCPCS: Performed by: INTERNAL MEDICINE

## 2021-03-04 PROCEDURE — 99223 1ST HOSP IP/OBS HIGH 75: CPT | Performed by: INTERNAL MEDICINE

## 2021-03-04 PROCEDURE — 99222 1ST HOSP IP/OBS MODERATE 55: CPT | Performed by: INTERNAL MEDICINE

## 2021-03-04 PROCEDURE — 97530 THERAPEUTIC ACTIVITIES: CPT

## 2021-03-04 PROCEDURE — 80053 COMPREHEN METABOLIC PANEL: CPT

## 2021-03-04 PROCEDURE — 83735 ASSAY OF MAGNESIUM: CPT

## 2021-03-04 PROCEDURE — 93010 ELECTROCARDIOGRAM REPORT: CPT | Performed by: INTERNAL MEDICINE

## 2021-03-04 PROCEDURE — 36415 COLL VENOUS BLD VENIPUNCTURE: CPT

## 2021-03-04 PROCEDURE — 94760 N-INVAS EAR/PLS OXIMETRY 1: CPT

## 2021-03-04 PROCEDURE — 2060000000 HC ICU INTERMEDIATE R&B

## 2021-03-04 PROCEDURE — 93005 ELECTROCARDIOGRAM TRACING: CPT | Performed by: INTERNAL MEDICINE

## 2021-03-04 PROCEDURE — 85027 COMPLETE CBC AUTOMATED: CPT

## 2021-03-04 PROCEDURE — 2580000003 HC RX 258: Performed by: INTERNAL MEDICINE

## 2021-03-04 PROCEDURE — 83880 ASSAY OF NATRIURETIC PEPTIDE: CPT

## 2021-03-04 PROCEDURE — 97166 OT EVAL MOD COMPLEX 45 MIN: CPT

## 2021-03-04 PROCEDURE — 84443 ASSAY THYROID STIM HORMONE: CPT

## 2021-03-04 PROCEDURE — 85018 HEMOGLOBIN: CPT

## 2021-03-04 PROCEDURE — 51702 INSERT TEMP BLADDER CATH: CPT

## 2021-03-04 RX ORDER — PEG-3350, SODIUM SULFATE, SODIUM CHLORIDE, POTASSIUM CHLORIDE, SODIUM ASCORBATE AND ASCORBIC ACID 7.5-2.691G
100 KIT ORAL ONCE
Status: COMPLETED | OUTPATIENT
Start: 2021-03-04 | End: 2021-03-04

## 2021-03-04 RX ORDER — ASPIRIN 81 MG/1
81 TABLET ORAL DAILY
Status: DISCONTINUED | OUTPATIENT
Start: 2021-03-04 | End: 2021-03-16 | Stop reason: HOSPADM

## 2021-03-04 RX ORDER — FUROSEMIDE 10 MG/ML
80 INJECTION INTRAMUSCULAR; INTRAVENOUS 2 TIMES DAILY
Status: DISCONTINUED | OUTPATIENT
Start: 2021-03-04 | End: 2021-03-04

## 2021-03-04 RX ORDER — FUROSEMIDE 10 MG/ML
20 INJECTION INTRAMUSCULAR; INTRAVENOUS ONCE
Status: COMPLETED | OUTPATIENT
Start: 2021-03-04 | End: 2021-03-04

## 2021-03-04 RX ORDER — FUROSEMIDE 10 MG/ML
40 INJECTION INTRAMUSCULAR; INTRAVENOUS 2 TIMES DAILY
Status: DISCONTINUED | OUTPATIENT
Start: 2021-03-04 | End: 2021-03-05

## 2021-03-04 RX ADMIN — HYDROCODONE BITARTRATE AND ACETAMINOPHEN 1 TABLET: 5; 325 TABLET ORAL at 21:56

## 2021-03-04 RX ADMIN — CARVEDILOL 3.12 MG: 3.12 TABLET, FILM COATED ORAL at 07:41

## 2021-03-04 RX ADMIN — ROSUVASTATIN 20 MG: 20 TABLET, FILM COATED ORAL at 21:56

## 2021-03-04 RX ADMIN — POLYETHYLENE GLYCOL 3350, SODIUM SULFATE, SODIUM CHLORIDE, POTASSIUM CHLORIDE, ASCORBIC ACID, SODIUM ASCORBATE 100 G: KIT at 23:35

## 2021-03-04 RX ADMIN — Medication 10 ML: at 08:43

## 2021-03-04 RX ADMIN — HYDRALAZINE HYDROCHLORIDE 10 MG: 20 INJECTION INTRAMUSCULAR; INTRAVENOUS at 08:43

## 2021-03-04 RX ADMIN — LISINOPRIL 2.5 MG: 5 TABLET ORAL at 08:42

## 2021-03-04 RX ADMIN — HYDROCODONE BITARTRATE AND ACETAMINOPHEN 1 TABLET: 5; 325 TABLET ORAL at 07:40

## 2021-03-04 RX ADMIN — CARVEDILOL 3.12 MG: 3.12 TABLET, FILM COATED ORAL at 18:24

## 2021-03-04 RX ADMIN — IRON SUCROSE 200 MG: 20 INJECTION, SOLUTION INTRAVENOUS at 10:26

## 2021-03-04 RX ADMIN — POLYETHYLENE GLYCOL 3350, SODIUM SULFATE, SODIUM CHLORIDE, POTASSIUM CHLORIDE, ASCORBIC ACID, SODIUM ASCORBATE 100 G: KIT at 19:54

## 2021-03-04 RX ADMIN — FAMOTIDINE 20 MG: 20 TABLET, FILM COATED ORAL at 08:42

## 2021-03-04 RX ADMIN — FUROSEMIDE 40 MG: 10 INJECTION, SOLUTION INTRAMUSCULAR; INTRAVENOUS at 08:44

## 2021-03-04 RX ADMIN — ASPIRIN 81 MG: 81 TABLET, COATED ORAL at 08:44

## 2021-03-04 RX ADMIN — FUROSEMIDE 40 MG: 10 INJECTION, SOLUTION INTRAMUSCULAR; INTRAVENOUS at 18:17

## 2021-03-04 RX ADMIN — SODIUM CHLORIDE: 9 INJECTION, SOLUTION INTRAVENOUS at 00:52

## 2021-03-04 RX ADMIN — FUROSEMIDE 20 MG: 10 INJECTION, SOLUTION INTRAMUSCULAR; INTRAVENOUS at 02:07

## 2021-03-04 ASSESSMENT — PAIN DESCRIPTION - LOCATION: LOCATION: LEG

## 2021-03-04 ASSESSMENT — PAIN SCALES - GENERAL
PAINLEVEL_OUTOF10: 0
PAINLEVEL_OUTOF10: 0
PAINLEVEL_OUTOF10: 8
PAINLEVEL_OUTOF10: 8
PAINLEVEL_OUTOF10: 0

## 2021-03-04 ASSESSMENT — PAIN DESCRIPTION - PAIN TYPE: TYPE: CHRONIC PAIN

## 2021-03-04 NOTE — CONSULTS
[facility]  [phone]      Chief Complaint   Patient presents with    Abnormal Lab     pt sent in by her MD for low H/H            History of Present Illness:  Avery Mirza is a 68 y.o. patient who presented to the hospital with complaints of fatigue. She was found to be in anemic in the ED. She was given blood transfusion. Overnight she had episodes of bradycardia and atrial fibrillation. She had NSVT as well. She denies any symptoms of SOB/CP/palpitations. She had recent REBECA to LAD with REBECA 3 months ago and has been on brilinta. She was not put on DAPT. No evidence of significant bleeding. I have been asked to provide consultation regarding further management and testing. [facility] 11/20 s/p PCI of LAD with REBECA, residual nonobstructive disease to OM2 and RCA. LVEF 55%    Past Medical History:   has a past medical history of Anemia, CAD (coronary artery disease), CKD (chronic kidney disease), Hyperlipidemia, and Hypertension. Surgical History:   has a past surgical history that includes fracture surgery and Cystocopy (11/21/13). Social History:   reports that she has never smoked. She has never used smokeless tobacco. She reports that she does not drink alcohol or use drugs. Family History:  family history includes Heart Disease in her father. Home Medications:  Were reviewed and are listed in nursing record. and/or listed below  Prior to Admission medications    Medication Sig Start Date End Date Taking? Authorizing Provider   furosemide (LASIX) 20 MG tablet Take 1 tablet by mouth daily as needed (swelling or shortness of breath) 3/1/21  Yes JIMENA Mathew CNP   HYDROcodone-acetaminophen (NORCO) 5-325 MG per tablet Take 1 tablet by mouth 2 times daily.  11/12/20  Yes Historical Provider, MD   rosuvastatin (CRESTOR) 20 MG tablet Take 1 tablet by mouth nightly 11/30/20  Yes JIMENA Mathew CNP   aspirin 81 MG chewable tablet Take 1 tablet by mouth daily 11/22/20  Yes JIMENA Nunez CNP   lisinopril (PRINIVIL;ZESTRIL) 2.5 MG tablet Take 1 tablet by mouth daily 11/22/20  Yes JIMENA Nunez CNP   carvedilol (COREG) 3.125 MG tablet Take 1 tablet by mouth 2 times daily (with meals) 11/21/20  Yes JIMENA Nunez CNP   ticagrelor (BRILINTA) 90 MG TABS tablet Take 1 tablet by mouth 2 times daily 11/21/20  Yes JIMENA Nunez CNP        Current Medications:  Current Facility-Administered Medications   Medication Dose Route Frequency Provider Last Rate Last Admin    iron sucrose (VENOFER) 200 mg in sodium chloride 0.9 % 100 mL IVPB  200 mg Intravenous Q24H Natalie Musa MD        aspirin EC tablet 81 mg  81 mg Oral Daily Patrice Zayas MD        furosemide (LASIX) injection 40 mg  40 mg Intravenous BID Patrice Zayas MD        0.9 % sodium chloride infusion   Intravenous PRN BASILIO Bragg        lisinopril (PRINIVIL;ZESTRIL) tablet 2.5 mg  2.5 mg Oral Daily Yolanda Forbes MD   2.5 mg at 03/03/21 1625    carvedilol (COREG) tablet 3.125 mg  3.125 mg Oral BID WC Yolanda Forbes MD   3.125 mg at 03/04/21 0741    rosuvastatin (CRESTOR) tablet 20 mg  20 mg Oral Nightly Yolanda Forbes MD   20 mg at 03/03/21 2037    furosemide (LASIX) tablet 20 mg  20 mg Oral Daily PRN Yolanda Forbes MD        sodium chloride flush 0.9 % injection 10 mL  10 mL Intravenous 2 times per day Yolanda Forbes MD   10 mL at 03/03/21 2037    sodium chloride flush 0.9 % injection 10 mL  10 mL Intravenous PRN Yolanda Forbes MD        potassium chloride Geovany LITTLE) extended release tablet 40 mEq  40 mEq Oral PRN Yolanda Forbes MD        Or    potassium bicarb-citric acid (EFFER-K) effervescent tablet 40 mEq  40 mEq Oral PRN Yolanda Forbes MD        Or    potassium chloride 10 mEq/100 mL IVPB (Peripheral Line)  10 mEq Intravenous PRN Yolanda Forbes MD        promethazine (PHENERGAN) tablet 12.5 mg  12.5 mg Oral Q6H PRN Vesta Modest Brian Hernandez MD        Or    ondansetron [facility]) injection 4 mg  4 mg Intravenous Q6H PRN Ralph Abarca MD        magnesium hydroxide (MILK OF MAGNESIA) 400 MG/5ML suspension 30 mL  30 mL Oral Daily PRN Ralph Abarca MD        famotidine (PEPCID) tablet 20 mg  20 mg Oral Daily Ralph Abarca MD   20 mg at 03/03/21 1624    acetaminophen (TYLENOL) tablet 650 mg  650 mg Oral Q6H PRN Ralph Abarca MD        Or    acetaminophen (TYLENOL) suppository 650 mg  650 mg Rectal Q6H PRN Ralph Abarca MD        hydrALAZINE (APRESOLINE) injection 10 mg  10 mg Intravenous Q6H PRN Ralph Abarca MD        0.9 % sodium chloride bolus  500 mL Intravenous PRN Ralph Abarca MD        0.9 % sodium chloride infusion   Intravenous PRN Ralph Abarca MD        HYDROcodone-acetaminophen [facility]) 5-325 MG per tablet 1 tablet  1 tablet Oral BID Ralph Abarca MD   1 tablet at 03/04/21 0740        Allergies:  Patient has no known allergies. Review of Systems:     · Constitutional: there has been no unanticipated weight loss. There's been no change in energy level, sleep pattern, or activity level. · Eyes: No visual changes or diplopia. No scleral icterus. · ENT: No Headaches, hearing loss or vertigo. No mouth sores or sore throat. · Cardiovascular: Reviewed in HPI  · Respiratory: No cough or wheezing, no sputum production. No hematemesis. · Gastrointestinal: No abdominal pain, appetite loss, blood in stools. No change in bowel or bladder habits. · Genitourinary: No dysuria, trouble voiding, or hematuria. · Musculoskeletal:  No gait disturbance, weakness or joint complaints. · Integumentary: No rash or pruritis. · Neurological: No headache, diplopia, change in muscle strength, numbness or tingling. No change in gait, balance, coordination, mood, affect, memory, mentation, behavior. · Psychiatric: No anxiety, no depression. · Endocrine: No malaise, fatigue or temperature intolerance.  No excessive thirst, fluid intake, or urination. No tremor. · Hematologic/Lymphatic: No abnormal bruising or bleeding, blood clots or swollen lymph nodes. · Allergic/Immunologic: No nasal congestion or hives.   ·     Physical Examination:    Vitals:    03/04/21 0730   BP: (!) 162/98   Pulse: 92   Resp: 18   Temp: 97.9 °F (36.6 °C)   SpO2: 94%    Weight: 220 lb 0.3 oz (99.8 kg)         General Appearance:  Alert, cooperative, no distress, appears stated age   Head:  Normocephalic, without obvious abnormality, atraumatic   Eyes:  PERRL, conjunctiva/corneas clear       Nose: Nares normal, no drainage or sinus tenderness   Throat: Lips, mucosa, and tongue normal   Neck: Supple, symmetrical, trachea midline, no adenopathy, thyroid: not enlarged, symmetric, no tenderness/mass/nodules, no carotid bruit or JVD       Lungs:   Clear to auscultation bilaterally, respirations unlabored   Chest Wall:  No tenderness or deformity   Heart:  Irregular rate and rhythm, S1, S2 normal, no murmur, rub or gallop   Abdomen:   Soft, non-tender, bowel sounds active all four quadrants,  no masses, no organomegaly           Extremities: Extremities normal, atraumatic, no cyanosis or edema   Pulses: 2+ and symmetric   Skin: Skin color, texture, turgor normal, no rashes or lesions   Pysch: Normal mood and affect   Neurologic: Normal gross motor and sensory exam.         Labs  CBC:   Lab Results   Component Value Date    WBC 7.4 03/03/2021    RBC 3.02 03/03/2021    HGB 9.3 03/04/2021    HCT 30.3 03/04/2021    MCV 66.7 03/03/2021    RDW 18.7 03/03/2021     03/03/2021     CMP:    Lab Results   Component Value Date     03/04/2021    K 3.7 03/04/2021     03/04/2021    CO2 26 03/04/2021    BUN 19 03/04/2021    CREATININE 1.0 03/04/2021    GFRAA >60 03/04/2021    AGRATIO 1.2 03/04/2021    LABGLOM 54 03/04/2021    GLUCOSE 113 03/04/2021    PROT 6.8 03/04/2021    CALCIUM 8.7 03/04/2021    BILITOT 0.9 03/04/2021    ALKPHOS 90 03/04/2021    AST 15 03/04/2021    ALT 6 03/04/2021     PT/INR:  No results found for: PTINR  Lab Results   Component Value Date    TROPONINI 0.03 (H) 11/20/2020       EKG:  I have reviewed EKG with the following interpretation:  Impression:  Atrial fibrillation with premature ventricular or aberrantly conducted complexesMinimal voltage criteria for LVH, may be normal variantCannot rule out Anterior infarct , age undeterminedAbnormal ECGWhen compared with ECG of 04-MAR-2021 12:42,No significant change was foundConfirmed     Pt has CAD with following history:  CABG: (date/details),   Valve replacement (date/details)   GXT/Myoview/Lexiscan: (date)  (results)   Stress/echo: (date) (result)   CATH/PCI:  (date)- (results)  - (# and type of stents) -   ECHO: (date) results EF    %. EDWIN (date) (results)  EP procedures/studies/ablation:  (specific data). Device information:  Event monitor: (date/results)    All testing and labs listed below were personally reviewed. Assessment  Patient Active Problem List   Diagnosis    Pulmonary nodule    Hypertension    [facility]    Leg ulcer ([facility]    NSTEMI (non-ST elevated myocardial infarction) ([facility]    Anemia    CAD (coronary artery disease)    High cholesterol         Plan:    I had the opportunity to review the clinical symptoms and presentation of [address] [place] [address]. Assessment/Plan:  Principal Problem:    Anemia  Plan: unclear cause for low hgb. Possibly iron deficiency. Hold brilinta for possible GIB. Resume if ok with GI  Active Problems:    Hypertension  Plan: controlled. Cont home meds lisinopril, coreg    CAD (coronary artery disease)  Plan: h/o PCI to LAD. On brilinta, no aspirin. No symptoms. Resume brilinta when ok per GI    High cholesterol  Plan: cont statin      Afib: likely result of volume overload from rapid transfusion of blood products. Cont lasix. C/s EP for mgmt given anemia and bradycardia.     I will address the patient's cardiac risk factors and adjusted pharmacologic treatment as needed. In addition, I have reinforced the need for patient directed risk factor modification. Tobacco use was discussed with the patient and educated on the negative effects. I have asked the patient to not utilize these agents. Thank you for allowing to us to participate in the care or Aletha Tapia. Further evaluation will be based upon the patient's clinical course and testing results. All questions and concerns were addressed to the patient/family. Alternatives to my treatment were discussed. The note was completed using EMR. Every effort was made to ensure accuracy; however, inadvertent computerized transcription errors may be present.     Gallito Donato MD 3/4/2021 8:20 AM

## 2021-03-04 NOTE — PROGRESS NOTES
Progress Note - Dr. Alexx Heath - Internal Medicine  PCP: Veronique Johnson  [address] [place] [address] Dayan Ortega  [phone]    Hospital Day: 1  Code Status: Full Code  Current Diet: DIET GENERAL;        CC: follow up on medical issues    Subjective: Ant Giron is a 68 y.o. female. She denies problems    hgb much better after transfusion, 9.3  Pt feels better  Heme/onc eval pending    She denies chest pain, denies shortness of breath, denies nausea,  denies emesis. 10 system Review of Systems is reviewed with patient, and pertinent positives are noted in HPI above . Otherwise, Review of systems is negative. I have reviewed the patient's medical and social history in detail and updated the computerized patient record. To recap: She  has a past medical history of Anemia, CAD (coronary artery disease), CKD (chronic kidney disease), Hyperlipidemia, and Hypertension. . She  has a past surgical history that includes fracture surgery and Cystocopy (11/21/13). . She  reports that she has never smoked. She has never used smokeless tobacco. She reports that she does not drink alcohol or use drugs. .        Active Hospital Problems    Diagnosis Date Noted    Anemia [D64.9] 03/03/2021    CAD (coronary artery disease) [I25.10] 03/03/2021    High cholesterol [E78.00] 03/03/2021    Hypertension [I10] 01/10/2014       Current Facility-Administered Medications: iron sucrose (VENOFER) 200 mg in sodium chloride 0.9 % 100 mL IVPB, 200 mg, Intravenous, Q24H  0.9 % sodium chloride infusion, , Intravenous, PRN  lisinopril (PRINIVIL;ZESTRIL) tablet 2.5 mg, 2.5 mg, Oral, Daily  carvedilol (COREG) tablet 3.125 mg, 3.125 mg, Oral, BID WC  ticagrelor (BRILINTA) tablet 90 mg, 90 mg, Oral, BID  rosuvastatin (CRESTOR) tablet 20 mg, 20 mg, Oral, Nightly  furosemide (LASIX) tablet 20 mg, 20 mg, Oral, Daily PRN  sodium chloride flush 0.9 % injection 10 mL, 10 mL, Intravenous, 2 times per day  sodium chloride flush 0.9 Tympanic 70 18 95 % -- --   03/03/21 1611 (!) 187/70 97.2 °F (36.2 °C) -- 67 18 -- -- --   03/03/21 1607 -- -- -- -- -- 91 % -- --   03/03/21 1445 (!) 182/94 97.1 °F (36.2 °C) Oral 67 18 96 % -- --   03/03/21 1311 (!) 147/51 97 °F (36.1 °C) -- 67 20 100 % -- --   03/03/21 1250 (!) 144/49 96.8 °F (36 °C) Oral -- 16 -- -- --   03/03/21 1230 (!) 147/103 -- -- 65 -- 96 % -- --   03/03/21 1219 (!) 145/47 -- -- (!) 48 16 97 % -- --   03/03/21 1200 (!) 145/47 -- -- 60 -- 96 % -- --   03/03/21 1130 (!) 150/56 -- -- 66 -- 97 % -- --   03/03/21 1100 (!) 158/60 -- -- 66 -- 97 % -- --   03/03/21 1045 (!) 148/48 -- -- 65 -- 97 % -- --   03/03/21 1044 (!) 144/56 -- -- 65 16 97 % -- --   03/03/21 1036 (!) 173/55 -- -- 67 16 97 % -- --   03/03/21 0959 (!) 166/72 98 °F (36.7 °C) Oral 67 18 97 % 5' (1.524 m) 234 lb (106.1 kg)     Patient Vitals for the past 96 hrs (Last 3 readings):   Weight   03/04/21 0730 220 lb 0.3 oz (99.8 kg)   03/03/21 0959 234 lb (106.1 kg)           Intake/Output Summary (Last 24 hours) at 3/4/2021 0805  Last data filed at 3/4/2021 0630  Gross per 24 hour   Intake 2080 ml   Output 3025 ml   Net -945 ml         Physical Exam:   BP (!) 162/98   Pulse 92   Temp 97.9 °F (36.6 °C) (Oral)   Resp 18   Ht 5' (1.524 m)   Wt 220 lb 0.3 oz (99.8 kg)   SpO2 94%   BMI 42.97 kg/m²   General appearance: alert, appears stated age and cooperative  Head: Normocephalic, without obvious abnormality, atraumatic  Lungs: clear to auscultation bilaterally  Heart: regular rate and rhythm, S1, S2 normal, no murmur, click, rub or gallop  Abdomen: soft, non-tender; bowel sounds normal; no masses,  no organomegaly  Extremities: extremities normal, atraumatic, no cyanosis or edema    Labs:  Lab Results   Component Value Date    WBC 7.4 03/03/2021    HGB 9.3 (L) 03/04/2021    HCT 30.3 (L) 03/04/2021     03/03/2021    CHOL 155 11/21/2020    TRIG 121 11/21/2020    HDL 38 (L) 11/21/2020    ALT 6 (L) 03/04/2021    AST 15 03/04/2021     03/04/2021    K 3.7 03/04/2021     03/04/2021    CREATININE 1.0 03/04/2021    BUN 19 03/04/2021    CO2 26 03/04/2021    INR 1.11 03/03/2021     Lab Results   Component Value Date    TROPONINI 0.03 (H) 11/20/2020       Recent Imaging Results are Reviewed:  Xr Chest (2 Vw)    Result Date: 3/1/2021  EXAMINATION: TWO XRAY VIEWS OF THE CHEST 3/1/2021 2:58 pm COMPARISON: None. HISTORY: ORDERING SYSTEM PROVIDED HISTORY: EUBANKS (dyspnea on exertion) TECHNOLOGIST PROVIDED HISTORY: Reason for exam:->3-4wk hx of EUBANKS and productive cough FINDINGS: Cardiomegaly. Pulmonary vascular congestion. Ill-defined peripheral pulmonary opacities. No pneumothorax. No pleural effusion. Ill-defined bilateral pulmonary opacities could represent pulmonary edema possibly related to congestive heart failure or atypical pneumonia       Assessment and Plan:  Principal Problem:    Anemia -Established problem. Stable. Better after transfusion  Plan: await workup per heme/onc  Active Problems:    Hypertension -Established problem. Stable. 162/98  Plan: see if improves with am meds    CAD (coronary artery disease) -Established problem. Stable. No chest pain. Plan: Continue present orders/plan. Await cards eval to see whether we should continue all 3 agents    High cholesterol -Established problem. Stable.     Plan: cont statin            Hope Cool  3/4/2021

## 2021-03-04 NOTE — PROGRESS NOTES
Physical Therapy    Facility/Department: [address]  Initial Assessment    NAME: Nya Harrington  : 1940  MRN: [id number]    Date of Service: 3/4/2021    Discharge Recommendations: Nya Harrington scored a 19/24 on the AM-PAC short mobility form. Current research shows that an AM-PAC score of 18 or greater is typically associated with a discharge to the patient's home setting. Based on the patient's AM-PAC score and their current functional mobility deficits, it is recommended that the patient have 2-3 sessions per week of Physical Therapy at d/c to increase the patient's independence. At this time, this patient demonstrates the endurance and safety to discharge home with (home services) and a follow up treatment frequency of 2-3x/wk. Please see assessment section for further patient specific details. If patient discharges prior to next session this note will serve as a discharge summary. Please see below for the latest assessment towards goals. HOME HEALTH CARE: LEVEL 1 STANDARD    - Initial home health evaluation to occur within 24-48 hours, in patient home   - Therapy to evaluate with goal of regaining prior level of functioning   - Therapy to evaluate if patient has [address] needs for personal care    2-3 sessions per week   PT Equipment Recommendations  Equipment Needed: No    Assessment   Body structures, Functions, Activity limitations: Decreased functional mobility ; Decreased strength;Decreased endurance;Decreased balance  Prognosis: Good  Decision Making: Medium Complexity  Clinical Presentation: evolving  PT Education: Goals;PT Role;Plan of Care;Transfer Training;Functional Mobility Training;Gait Training  REQUIRES PT FOLLOW UP: Yes  Activity Tolerance  Activity Tolerance: Patient Tolerated treatment well       Patient Diagnosis(es): The encounter diagnosis was Anemia, unspecified type.      has a past medical history of Anemia, CAD (coronary artery disease), CKD Cognition  Overall Cognitive Status: WFL    Objective  Bed mobility  Supine to Sit: Stand by assistance  Scooting: Stand by assistance  Comment: left in recliner  Transfers  Sit to Stand: Stand by assistance(up to cane)  Stand to sit: Stand by assistance  Ambulation  Ambulation?: Yes  Ambulation 1  Surface: level tile  Device: Single point cane  Assistance: Contact guard assistance  Gait Deviations: Slow Bibiana;Decreased step length  Distance: 60'  Comments: Trendelenburg gait  Stairs/Curb  Stairs?: No     Balance  Posture: Good  Sitting - Static: Good  Sitting - Dynamic: Good  Standing - Static: Good  Standing - Dynamic: [address]  Times per week: 3-5  Current Treatment Recommendations: Strengthening, Transfer Training, Gait Training, Safety Education & Training, Functional Mobility Training, Neuromuscular Re-education  Safety Devices  Type of devices: Call light within reach, Chair alarm in place, Left in chair, Nurse notified    AM-PAC Score  AM-PAC Inpatient Mobility Raw Score : 19 (03/04/21 1526)  AM-PAC Inpatient T-Scale Score : 45.44 (03/04/21 1526)  Mobility Inpatient CMS 0-100% Score: 41.77 (03/04/21 1526)  Mobility Inpatient CMS G-Code Modifier : CK (03/04/21 1526)          Goals  Short term goals  Time Frame for Short term goals: to be met by d/c  Short term goal 1: pt will complete STS with mod I  Short term goal 2: pt will ambulate x 150' with mod I  Short term goal 3: pt completes bed mobility independently  Patient Goals   Patient goals : to go home       Therapy Time   Individual Concurrent Group Co-treatment   Time In       1055   Time Out       1118   Minutes       23   Timed Code Treatment Minutes: [address] [place] [place] Asif Ibrahim DPT [id number]

## 2021-03-04 NOTE — PROGRESS NOTES
Patient has been in fluid overload and she has titus down to the 20\"s overnnight, I called the cardiology nurse to make her aware of what is going on and she is going to let Dr. Erica Dixon know.

## 2021-03-04 NOTE — CONSULTS
[address]   Electrophysiology Consultation   Date: 3/4/2021  Reason for Consultation: Bradycardia   Consult Requesting Physician: Ralph Abarca MD   Chief Complaint   Patient presents with    Abnormal Lab     pt sent in by her MD for low H/H       CC: Bradycardia   HPI: Joshua Tinajero is a 68 y.o. female past medical history significant for hypertension, hyperlipidemia, CKD, iron deficiency anemia, CAD status post PCI to LAD on 11/20/2020 on Brilinta and aspirin who presented to the hospital with severe anemia. Her hemoglobin was 5.8. She received blood transfusion and hemoglobin is now stable. She has been evaluated by GI recommend EGD and colonoscopy. Patient reports no chest pain or shortness of breath. She has noted to have some episodes of atrial fibrillation. She also has had bradycardia mostly at night. She noted to have wide-complex rhythm on telemetry and EP has been consulted. Assessment and plan:   -Left bundle branch block aberrancy   Patient has a left bundle branch block aberrancy on telemetry and also by twelve-lead ECG. No ventricular tachycardia noted. -Paroxysmal atrial fibrillation. Patient has had paroxysmal atrial fibrillation, reports no chest pressure or shortness of breath   [id number] Score: 5   Patient has high [id number] score and is high risk for thromboembolism however due to severe anemia and being on aspirin and Brilinta for recent PCI anticoagulation is not recommended at this time. Interventional cardiology opinion for duration of dual antiplatelet therapy. If patient can receive single antiplatelet therapy and her hemoglobin is stable (> 10) and there is no active GI bleeding, then anticoagulation is recommended. Can use low dose DOACs. Long-term no tolerates anticoagulation, watchman procedure for her would be appropriate. -Bradycardia   Appears asymptomatic. Most bradycardia episodes happens at night.   Patient has had atrial fibrillation with bradycardia and pauses at night  Patient is obese and has clinical characteristics of obstructive sleep apnea. Overnight pulse oximetry. - Morbid obesity: Body mass index is 42.97 kg/m². Weight loss       Discussed with nursing staff. Active Hospital Problems    Diagnosis Date Noted    Anemia [D64.9] 2021    CAD (coronary artery disease) [I25.10] 2021    High cholesterol [E78.00] 2021    Hypertension [I10] 01/10/2014       Diagnostic studies:     Findings:  Artery Findings/Result  LM Normal  LAD 95% mid  Cx OM2 50%  RI NA  RCA 20% prox, 20% mid  LVEDP 25  LVG 55%     Intervention:         PCI of LAD with 3.7X59Atrtdu REBECA (PD with 3.75NC)      I independently reviewed the cardiac diagnostic studies, ECG and relevant imaging studies. Lab Results   Component Value Date    LVEF 55 2020    LVEFMODE Cardiac Cath 2020     No results found for: TSHFT4, TSH    Physical Examination:  Vitals:    21 1151   BP: 126/64   Pulse: 91   Resp: 16   Temp: 97.9 °F (36.6 °C)   SpO2:       In: 825.8 [P.O.:480; Blood:345.8]  Out: 5185    Wt Readings from Last 3 Encounters:   21 220 lb 0.3 oz (99.8 kg)   21 235 lb 9.6 oz (106.9 kg)   20 229 lb (103.9 kg)     Temp  Av °F (36.7 °C)  Min: 96.6 °F (35.9 °C)  Max: 98.6 °F (37 °C)  Pulse  Av.8  Min: 63  Max: 92  BP  Min: 126/64  Max: 179/72  SpO2  Av.4 %  Min: 93 %  Max: 97 %    Intake/Output Summary (Last 24 hours) at 3/4/2021 1615  Last data filed at 3/4/2021 1449  Gross per 24 hour   Intake 2560 ml   Output 5185 ml   Net -2625 ml         I independently reviewed all cardiac tracing from cardiac telemetry. · Constitutional: Oriented. No distress. · Head: Normocephalic and atraumatic. · Mouth/Throat: Oropharynx is clear and moist.   · Eyes: Conjunctivae normal. EOM are normal.   · Neck: Neck supple. No JVD present. · Cardiovascular: Normal rate, Irregular rhythm, S1&S2. · Pulmonary/Chest: Bilateral respiratory sounds. No rhonchi. · Abdominal: Soft. No tenderness. · Musculoskeletal: No tenderness. + Ace wrap bilateral lower ext. · Lymphadenopathy: Has no cervical adenopathy. · Neurological: Alert and oriented. Follows command, No Gross deficit   · Skin: Skin is warm, No rash noted. · Psychiatric: Has a normal behavior       Scheduled Meds:   iron sucrose (VENOFER) iv piggyback 100 mL (Admin over 60 minutes)  200 mg Intravenous Q24H    aspirin  81 mg Oral Daily    furosemide  40 mg Intravenous BID    lisinopril  2.5 mg Oral Daily    carvedilol  3.125 mg Oral BID WC    rosuvastatin  20 mg Oral Nightly    sodium chloride flush  10 mL Intravenous 2 times per day    famotidine  20 mg Oral Daily    HYDROcodone-acetaminophen  1 tablet Oral BID     Continuous Infusions:   sodium chloride      sodium chloride       PRN Meds:.perflutren lipid microspheres, sodium chloride, furosemide, sodium chloride flush, potassium chloride **OR** potassium alternative oral replacement **OR** potassium chloride, promethazine **OR** ondansetron, magnesium hydroxide, acetaminophen **OR** acetaminophen, hydrALAZINE, sodium chloride, sodium chloride     Review of System:  [x] Full ROS obtained and negative except as mentioned in HPI    Prior to Admission medications    Medication Sig Start Date End Date Taking? Authorizing Provider   furosemide (LASIX) 20 MG tablet Take 1 tablet by mouth daily as needed (swelling or shortness of breath) 3/1/21  Yes JIMENA Mathew CNP   HYDROcodone-acetaminophen (NORCO) 5-325 MG per tablet Take 1 tablet by mouth 2 times daily.  11/12/20  Yes Historical Provider, MD   rosuvastatin (CRESTOR) 20 MG tablet Take 1 tablet by mouth nightly 11/30/20  Yes JIMENA Mathew CNP   aspirin 81 MG chewable tablet Take 1 tablet by mouth daily 11/22/20  Yes JIMENA Mathew CNP   lisinopril (PRINIVIL;ZESTRIL) 2.5 MG tablet Take 1 tablet by mouth daily 11/22/20  Yes JIMENA Hernandez CNP   carvedilol (COREG) 3.125 MG tablet Take 1 tablet by mouth 2 times daily (with meals) 11/21/20  Yes JIMENA Hernandez CNP   ticagrelor (BRILINTA) 90 MG TABS tablet Take 1 tablet by mouth 2 times daily 11/21/20  Yes JIMENA Hernandez - CNP       Past Medical History:   Diagnosis Date    Anemia     CAD (coronary artery disease) 11/2020    Stent    CKD (chronic kidney disease)     Hyperlipidemia     Hypertension         Past Surgical History:   Procedure Laterality Date    CYSTOSCOPY  11/21/13    w/ bladder biopsy    FRACTURE SURGERY      right wrist       No Known Allergies    Social History:  Reviewed. reports that she has never smoked. She has never used smokeless tobacco. She reports that she does not drink alcohol or use drugs. Family History:  Reviewed. Reviewed. No family history of SCD. Relevant and available labs, and cardiovascular diagnostics reviewed. Reviewed. Recent Labs     03/03/21  1024 03/04/21  0439 03/04/21  1310    142 140   K 4.2 3.7 3.7    106 101   CO2 26 26 28   BUN 22* 19 18   CREATININE 1.1 1.0 1.2     Recent Labs     03/03/21  1024 03/04/21  0054 03/04/21  1310   WBC 7.4  --  9.2   HGB 5.8* 9.3* 10.6*   HCT 20.2* 30.3* 33.9*   MCV 66.7*  --  72.8*     --  367     Estimated Creatinine Clearance: 41 mL/min (based on SCr of 1.2 mg/dL). No results found for: BNP    I independently reviewed all cardiac tracing from cardiac telemetry. I independently reviewed relevant and available cardiac diagnostic tests ECG, CXR, Echo, Stress test, Device interrogation, Holter, CT scan. Complex medical condition with multiple medical problems affecting prognosis and outcome of EP interventions  Severe exacerbation of underlying medical condition requiring hospitalization and at risk of decompensation. All questions and concerns were addressed to the patient/family.  Alternatives to my treatment were

## 2021-03-04 NOTE — DISCHARGE INSTR - COC
Continuity of Care Form    Patient Name: Jose Coleman   :  1940  MRN:  [id number]    Admit date:  3/3/2021  Discharge date:  3/16/2021    Code Status Order: Full Code   Advance Directives:   Advance Care Flowsheet Documentation       Date/Time Healthcare Directive Type of Healthcare Directive Copy in [address] Agent's Name Healthcare Agent's Phone Number    21 1014  Yes, patient has an advance directive for healthcare treatment  --  --  --  --  --    21 1458  Yes, patient has an advance directive for healthcare treatment  Living will  No, copy requested from family  --  --  --    21 1226  Yes, patient has an advance directive for healthcare treatment  Living will;Durable power of  for health care  No, copy requested from family  --  --  --            Admitting Physician:  Ludin Delgadillo MD  PCP: Carri Alvarez MD    Discharging Nurse: Stacia Michelle [facility] Unit/Room#: 1PM-2870/6482-88  Discharging Unit Phone Number: [phone]    Emergency Contact:   Extended Emergency Contact Information  Primary Emergency Contact: [facility]  Address: [address] Phone: [phone]  Relation: Spouse  Secondary Emergency Contact[address] Phone: [phone]  Relation: Child    Past Surgical History:  Past Surgical History:   Procedure Laterality Date    CHOLECYSTECTOMY, LAPAROSCOPIC N/A 3/8/2021    LAPAROSCOPIC CHOLECYSTECTOMY performed by Lidia Cabrera MD at [facility] [place] [address] N/A 3/5/2021    COLONOSCOPY WITH BIOPSY performed by Paul Bates MD at [address] [place] [address]  13    w/ bladder biopsy    FRACTURE SURGERY      right wrist    HEMICOLECTOMY Right 3/8/2021    LAPAROSCOPIC RIGHT COLON RESECTION performed by Lidia Cabrera MD at [facility] 3/5/2021    EGD BIOPSY performed by Paul Bates MD at [address] ENDOSCOPY       Immunization History: There is no immunization history on file for this patient. Active Problems:  Patient Active Problem List   Diagnosis Code    Hypertension I10    Anemia D64.9    CAD (coronary artery disease) I25.10    High cholesterol H52.38    Diastolic dysfunction P31.22    Hypokalemia E87.6    Malignant neoplasm of ascending colon (HCC) C18.2    Atrial fibrillation (HCC) I48.91    Colonic mass K63.89       Isolation/Infection:   Isolation            No Isolation          Patient Infection Status       None to display            Nurse Assessment:  Last Vital Signs: /70   Pulse 84   Temp 97.6 °F (36.4 °C) (Axillary)   Resp 18   Ht 5' (1.524 m)   Wt 229 lb 8 oz (104.1 kg)   SpO2 99%   BMI 44.82 kg/m²     Last documented pain score (0-10 scale): Pain Level: 0  Last Weight:   Wt Readings from Last 1 Encounters:   03/12/21 229 lb 8 oz (104.1 kg)     Mental Status:  oriented and alert    IV Access:  - None    Nursing Mobility/ADLs:  Walking   Assisted  Transfer  Assisted  Bathing  Assisted  Dressing  Assisted  Toileting  Assisted  Feeding  Independent  Med Admin  Assisted  Med Delivery   crushed in applesauce    Wound Care Documentation and Therapy:        Elimination:  Continence:   · Bowel: Yes  · Bladder: Yes  Urinary Catheter:   Colostomy/Ileostomy/Ileal Conduit: No       Date of Last BM: 3/14/2021    Intake/Output Summary (Last 24 hours) at 3/15/2021 1039  Last data filed at 3/15/2021 0656  Gross per 24 hour   Intake 840 ml   Output 830 ml   Net 10 ml     I/O last 3 completed shifts: In: 65 [P.O.:840; I.V.:10]  Out: 1600 [Urine:1100; Emesis/NG output:500]    Safety Concerns:      At Risk for Falls    Impairments/Disabilities:      None    Nutrition Therapy:  Current Nutrition Therapy:   - Oral Diet:  Low Fiber    Routes of Feeding: Oral  Liquids: No Restrictions  Daily Fluid Restriction: no  Last Modified Barium Swallow with Video (Video Swallowing Test): not done    Treatments at the Time of Hospital Discharge:   Respiratory Treatments: none  Oxygen Therapy:  is not on home oxygen therapy.   Ventilator:    - No ventilator support    Rehab Therapies: Physical Therapy, Occupational Therapy and Nursing  Weight Bearing Status/Restrictions: No weight bearing restirctions  Other Medical Equipment (for information only, NOT a DME order):  walker  Other Treatments: HOME HEALTH CARE: LEVEL 3 SAFETY        -Initial home health evaluation to occur within 24-48 hours, in patient home    -Home health agency to establish plan of care for patient over 60 day period    -Medication Reconciliation    -PT/OT/Speech evaluations in home within 24-48 hours of discharge; including  -DME and home safety    -Frontload therapy 5 days, then 3x a week    -OT to evaluate if patient has [address] needs for personal care    - evaluation within 24-48 hours, includes evaluation of resources   and insurance to determine [place], [place], LTC, and Medicaid options    -PCP Visit scheduled within three to seven days of discharge               -Telehealth if Patient Able and Willing to Participate    Patient's personal belongings (please select all that are sent with patient):  belongings pt came in with    RN SIGNATURE:  Electronically signed by Ernst Somers RN on 3/16/21 at 12:43 PM EDT    CASE MANAGEMENT/SOCIAL WORK SECTION    Inpatient Status Date: 3-3-21    Readmission Risk Assessment Score:  Readmission Risk              Risk of Unplanned Readmission:        28           Discharging to Facility/ Agency     Name: Javier Beltran will call for Appointment  Phone: [phone]  Fax: [phone]      Dialysis Facility (if applicable)     / signature: Electronically signed by JEANA Juarez on 3/4/21 at 12:52 PM EST    PHYSICIAN SECTION    Prognosis: Guarded    Condition at Discharge: Stable    Rehab Potential (if transferring to Rehab): Good    Recommended Labs or Other Treatments After Discharge: Home Care: RN PT OT    Physician Certification: I certify the above information and transfer of Veronika Schaffer  is necessary for the continuing treatment of the diagnosis listed and that she requires Home Care for greater 30 days.      Update Admission H&P: No change in H&P    PHYSICIAN SIGNATURE:  Electronically signed by Loyd Frias MD on 3/16/21 at 9:02 AM EDT

## 2021-03-04 NOTE — CONSULTS
Gastroenterology Consult Note        Patient: [address] [place] [address]  : 1940  Acct#:      Date:  3/4/2021    Subjective:       History of Present Illness  Patient is a 68 y.o.  female admitted with Anemia [D64.9] who is seen in consult for anemia. H/o CAD with coronary artery stent 2020 on Brilinta (last took this last night) and ASA. She was sent to the ED for anemia. Found to have iron deficiency. No melena or hematochezia. No prior EGD or colonoscopy. She reports SOB and mild nonproductive cough. Per report, pt was bradycardic this am and now in afib. No abdominal pain, N/V, change in bowel habits. Past Medical History:   Diagnosis Date    Anemia     CAD (coronary artery disease) 2020    Stent    CKD (chronic kidney disease)     Hyperlipidemia     Hypertension       Past Surgical History:   Procedure Laterality Date    CYSTOSCOPY  13    w/ bladder biopsy    FRACTURE SURGERY      right wrist      Past Endoscopic History    Admission Meds  No current facility-administered medications on file prior to encounter. Current Outpatient Medications on File Prior to Encounter   Medication Sig Dispense Refill    furosemide (LASIX) 20 MG tablet Take 1 tablet by mouth daily as needed (swelling or shortness of breath) 30 tablet 1    HYDROcodone-acetaminophen (NORCO) 5-325 MG per tablet Take 1 tablet by mouth 2 times daily.       rosuvastatin (CRESTOR) 20 MG tablet Take 1 tablet by mouth nightly 30 tablet 2    aspirin 81 MG chewable tablet Take 1 tablet by mouth daily 30 tablet 3    lisinopril (PRINIVIL;ZESTRIL) 2.5 MG tablet Take 1 tablet by mouth daily 30 tablet 3    carvedilol (COREG) 3.125 MG tablet Take 1 tablet by mouth 2 times daily (with meals) 60 tablet 3    ticagrelor (BRILINTA) 90 MG TABS tablet Take 1 tablet by mouth 2 times daily 60 tablet 3       Ibuprofen occasionally - a few times a month    Allergies  No Known Allergies   Social   Social History Tobacco Use    Smoking status: Never Smoker    Smokeless tobacco: Never Used    Tobacco comment:  was a heavy smoker   Substance Use Topics    Alcohol use: No        Family History   Problem Relation Age of Onset    Heart Disease Father         Review of Systems  Constitutional: negative for fevers, chills, sweats    Ears, nose, mouth, throat, and face: negative for nasal congestion and sore throat   Respiratory: + for cough and shortness of breath   Cardiovascular: negative for chest pain and dyspnea   Gastrointestinal: see hpi   Genitourinary:negative for dysuria and frequency   Integument/breast: negative for pruritus and rash   Hematologic/lymphatic: negative for bleeding and easy bruising   Musculoskeletal:negative for arthralgias and myalgias   Neurological: negative for dizziness and weakness         Physical Exam  Blood pressure (!) 162/98, pulse 92, temperature 97.9 °F (36.6 °C), temperature source Oral, resp. rate 18, height 5' (1.524 m), weight 220 lb 0.3 oz (99.8 kg), SpO2 93 %. General appearance: alert, cooperative, no distress, appears stated age  Eyes: Anicteric  Head: Normocephalic, without obvious abnormality  Lungs: clear to auscultation bilaterally, Normal Effort  Heart: irregularly irregular, no murmurs or rubs  Abdomen: soft, non-tender. Bowel sounds normal. No masses,  no organomegaly. Extremities: atraumatic, erythema RLE.  Trace ble edema  Skin: warm and dry, no jaundice  Neuro: Grossly intact, A&OX3  Musculoskeletal: 5/5  strength BUE      Data Review:    Recent Labs     03/03/21  1024 03/04/21  0054   WBC 7.4  --    HGB 5.8* 9.3*   HCT 20.2* 30.3*   MCV 66.7*  --      --      Recent Labs     03/03/21  1024 03/04/21  0439    142   K 4.2 3.7    106   CO2 26 26   BUN 22* 19   CREATININE 1.1 1.0     Recent Labs     03/03/21  1024 03/04/21  0439   AST 11* 15   ALT 6* 6*   BILITOT 0.3 0.9   ALKPHOS 89 90     No results for input(s): LIPASE, AMYLASE in the last 72 hours. Recent Labs     03/03/21  1024   PROTIME 12.9   INR 1.11     No results for input(s): PTT in the last 72 hours. No results for input(s): OCCULTBLD in the last 72 hours. Imaging Studies:                               Assessment:     Principal Problem:    Anemia  Active Problems:    Hypertension    CAD (coronary artery disease)    High cholesterol  Resolved Problems:    * No resolved hospital problems. *    Iron deficiency anemia -hgb from 5.8 to 9.3 with 3 u prbc. No gross GI bleeding. CAD - on Brilinta and ASA for stent 11/2020. Recommendations:   - iron replacement  - clear liquids  - rec EGD and colonoscopy when pt stable from cardiology perspective  - If Brilinta needed from cardiac standpoint, ok from GI standpoint to continue. Discussed with Dr. Emanuel Phillips PA-C  [facility]    I have personally performed a face to face diagnostic evaluation on this patient. I have interviewed and examined the patient and I agree with the findings and recommended plan of care. In summary, my findings and plan are the following: anemia but no visible gi bleed on asa and brilinta s/p stent, abd nontender, hbg incremented with prbc, plan egd/colonoscopy when ok cardiology, can continue asa/brilinta if needed since hbg incremented and no visible gi blood loss and stent 11/2020.      Kd Pat MD  [address]

## 2021-03-04 NOTE — PROGRESS NOTES
HR dropped to 24 b/min for approximately 8 seconds. Patient woke up when name called. HR returned to 74 b/min. Resting well without complaints. Denies having sleep apnea and has never had a sleep study. Pickard drained 1700 cc.

## 2021-03-04 NOTE — PROGRESS NOTES
[address]   Electrophysiology Nurse Practitioner  Pre-Consult Note    HISTORY OF PRESENT ILLNESS: History obtained from patient and medical record. Jose Coleman is a 68 y.o. female with a past medical history of HTN, HLD,  CKD, iron deficiency anemia, and CAD s.p stent to LAD 11/20/2020 who presented on the recommendation of her PCP for anemia. Recent stent placed 11/2020 and she has been on Brilenta and ASA. She was found to be in atrial fibrillation RVR on admission, which is new for her and EP has been asked for consultation. Found to be severely anemic on admission with H/H of 5.8/20.2. Received multiple blood transfusions and hgb stable today 9.3.  GI has evaluated and recommending EGD and colonoscopy with iron replacement. No gross GI bleeding noted. Interval Hx: Today, she reports she had worsening SOB and was seen in the cardiology office and started on diuretic 3/1/2021. She had improvement of her SOB with that. She had blood work with PCP and hgb found to be critically low and she was sent to the ER. Today she is in afib CVR on monitor and having runs of tachycardia (VT vs aberrancy). She is asymptomatic and feels better today. Dr. Kimberly Georges is following, she had recent stent. On ASA Brilenta was being held. Patient seen and examined. Clinical notes reviewed. Telemetry reviewed. No new complaints today. No major events overnight. Denies having chest pain, palpitations, shortness of breath, orthopnea, cough, or dizziness at the time of this visit.     Assessment and Plan:   Atrial Fibrillation RVR- New diagnosis   - PAF on monitor CVR   - [id number] 5 and high risk for thromboembolism, however she is very high risk for bleeding on triple therapy and is being treted for very severe anemia     ~ Not a candidate for [facility] at this time   - Add TSH   - Obtain echo   Tachycardia   - BMP, mag, CBC and BNP    - Echo    - Reviewed with Dr. Yokasta Yu and felt to be aberrancy with hx of LBBB  [place]ia   - Transient HR to 20's overnight   - Likely undiagnosed SAHRA is contributing   - She is asymptomatic    - May need PM in future if tachy-titus syndrome  Severe Anemia   - Required blood transfusions   - Stable today    - Plans for EGD/colon with GI  CAD   - S/p stent to LAD 11/2020 (LVG showed EF 55% at that time)   - Dr. Caresse Prader following    Full consult to follow by EP physician    ECG: 3/4/2021: afib LBBB    ECHO: Pending    2013, records not available    Stress Test:   Findings:  Artery Findings/Result   LM Normal   LAD 95% mid   Cx OM2 50%   RI NA   RCA 20% prox, 20% mid   LVEDP 25   LVG 55%      Intervention:         PCI of LAD with 3.4T52Hyftod REBECA (PD with 3.75NC)    JIMENA Madrigal-CNP  [facility]   Office: [phone]

## 2021-03-04 NOTE — PROGRESS NOTES
Lost IV access, attempted to get new access x2 unsuccessful. Will have another nurse attempt to get a peripheral IV so can infuse iron.

## 2021-03-04 NOTE — CONSULTS
[facility]   CONSULT NOTE    3/4/2021 9:45 AM    Patient Information: Markel Person   Date of Admit:  3/3/2021  Primary Care Physician:  Geo Hsu MD  Requesting Physician:  Mony Perez MD    Reason for consult:   Evaluation and recommendations for anemia    Chief complaint:    Chief Complaint   Patient presents with    Abnormal Lab     pt sent in by her MD for low H/H       History of Present Illness:  Markel Person is a 68 y.o. female on Mony Perez MD service who was admitted on 3/3/2021 for anemia. She presented to the ED as advised by her PCP after routine blood work showed low hemoglobin. Hgb drawn in ED was 5.8, she received 3 units pRBCs, hgb 9.3 today. She had been feeling well at that time, no dizziness, lightheadedness, SOB or chest pain. Iron studies showed deficiency. She has been having bradycardia overnight, cardiology has been consulted. She has a history of CAD with recent stent placement, recently started on Brilinta late 2020. Past Medical History:     has a past medical history of Anemia, CAD (coronary artery disease), CKD (chronic kidney disease), Hyperlipidemia, and Hypertension. Past Surgical History:    Past Surgical History:   Procedure Laterality Date    CYSTOSCOPY  11/21/13    w/ bladder biopsy    FRACTURE SURGERY      right wrist        Current Medications:     iron sucrose (VENOFER) iv piggyback 100 mL (Admin over 60 minutes)  200 mg Intravenous Q24H    aspirin  81 mg Oral Daily    furosemide  40 mg Intravenous BID    lisinopril  2.5 mg Oral Daily    carvedilol  3.125 mg Oral BID WC    rosuvastatin  20 mg Oral Nightly    sodium chloride flush  10 mL Intravenous 2 times per day    famotidine  20 mg Oral Daily    HYDROcodone-acetaminophen  1 tablet Oral BID       Allergies:    No Known Allergies     Social History:    reports that she has never smoked.  She has never used smokeless tobacco. She reports that she does not drink alcohol or use drugs. Family History:     family history includes Heart Disease in her father. ROS:    Constitutional:  Negative for fever, chills or night sweats  Eyes:  Negative for exudate, itching  Ears:  Negative for drainage   Nose:  Negative for rhinorrhea, epistaxis  Mouth/Throat:  Negative for hoarseness, sore throat. Respiratory:   Negative for shortness of breath, hemoptysis, wheezing  Cardiovascular: Negative for chest pain, palpitations   Gastrointestinal:  Negative for nausea, vomiting, diarrhea, black stool, bright red blood in the stool  Genitourinary:  Negative for dysuria, hematuria   Hematologic/Lymphatic:  Negative for  bleeding tendency, easy bruising  Musculoskeletal:  Negative for myalgias, arthralgias  Neurologic:  Negative for  confusion,dysarthria. Skin :  Negative for itching, rash  Psychiatric:  Negative for depression, anxiety. Endocrine:  Negative for polydipsia, polyuria, heat /cold intolerance. PHYSICAL EXAM:    Vitals:  Vitals:    03/04/21 0907   BP:    Pulse:    Resp:    Temp:    SpO2: 93%        Intake/Output Summary (Last 24 hours) at 3/4/2021 0945  Last data filed at 3/4/2021 0630  Gross per 24 hour   Intake 2080 ml   Output 3025 ml   Net -945 ml      Wt Readings from Last 3 Encounters:   03/04/21 220 lb 0.3 oz (99.8 kg)   03/01/21 235 lb 9.6 oz (106.9 kg)   11/30/20 229 lb (103.9 kg)        General appearance: Appears comfortable. Well nourished  Eyes: Sclera clear, pupils equal  ENT: Moist mucus membranes, no thrush  Neck: Trachea midline, symmetrical  Cardiovascular: Regular rhythm, normal S1, S2. No murmur, gallop, rub. No edema in  lower extremities  Respiratory: Clear to auscultation bilaterally. No wheeze. Good inspiratory effort  Gastrointestinal: Abdomen soft, not tender, not distended, normal bowel sounds  Musculoskeletal: No cyanosis in digits, warm extremities  Neurologic: Cranial nerves grossly intact, no motor or speech deficits.   Psychiatric: Normal affect. Alert and oriented to time, place and person. Skin: Warm, dry, normal turgor, no rash    DATA:  CBC:   Lab Results   Component Value Date    WBC 7.4 03/03/2021    RBC 3.02 03/03/2021    HGB 9.3 03/04/2021    HCT 30.3 03/04/2021    MCV 66.7 03/03/2021    MCH 19.2 03/03/2021    MCHC 28.7 03/03/2021    RDW 18.7 03/03/2021     03/03/2021    MPV 7.7 03/03/2021     BMP:  Lab Results   Component Value Date     03/04/2021    K 3.7 03/04/2021     03/04/2021    CO2 26 03/04/2021    BUN 19 03/04/2021    CREATININE 1.0 03/04/2021    CALCIUM 8.7 03/04/2021    GFRAA >60 03/04/2021    LABGLOM 54 03/04/2021    GLUCOSE 113 03/04/2021     Magnesium:   Lab Results   Component Value Date    MG 2.10 03/04/2021     LIVER PROFILE:   Recent Labs     03/03/21  1024 03/04/21  0439   AST 11* 15   ALT 6* 6*   BILITOT 0.3 0.9   ALKPHOS 89 90     PT/INR:    Lab Results   Component Value Date    PROTIME 12.9 03/03/2021    INR 1.11 03/03/2021       IMPRESSION/RECOMMENDATIONS:    Principal Problem:    Anemia  Active Problems:    Hypertension    CAD (coronary artery disease)    High cholesterol  Resolved Problems:    * No resolved hospital problems. *       Anemia  - iron deficiency  - Venofer ordered  - Vit B12 wnl  - stool for occult blood negative 3/3/21  - GI consulted    CAD  - s/p stent placement  - on Brilinta  - cardiology consult pending      This plan was discussed with the patient and he/she verbalized understanding. Thank you for allowing us to participate in the care of this patient. Ayse Schaffer CNP  [facility]    Patient was seen and examined. Agree with above. Has iron deficiency anemia  Never had colonoscopy done. Will do IV iron  Consult GI.     Peyton Starr MD

## 2021-03-04 NOTE — PROGRESS NOTES
Patient agreeable to having a urinary catheter placed. Lasix given and catheter placed. Patient immediately felt better. Will continue to monitor.

## 2021-03-04 NOTE — PROGRESS NOTES
Occupational Therapy   Occupational Therapy Initial Assessment  Date: 3/4/2021   Patient Name: Kalpesh Lopez  MRN: [id number]     : 1940    Date of Service: 3/4/2021    Discharge Recommendations: Kalpesh Lopez scored a 18/24 on the AM-PAC ADL Inpatient form. Current research shows that an AM-PAC score of 18 or greater is typically associated with a discharge to the patient's home setting. Based on the patient's AM-PAC score, and their current ADL deficits, it is recommended that the patient have 2-3 sessions per week of Occupational Therapy at d/c to increase the patient's independence. At this time, this patient demonstrates the endurance and safety to discharge home with home health  (home vs OP services) and a follow up treatment frequency of 2-3x/wk. Please see assessment section for further patient specific details. If patient discharges prior to next session this note will serve as a discharge summary. Please see below for the latest assessment towards goals. HOME HEALTH CARE: LEVEL 1 STANDARD    - Initial home health evaluation to occur within 24-48 hours, in patient home   - Therapy to evaluate with goal of regaining prior level of functioning   - Therapy to evaluate if patient has [address] needs for personal care       OT Equipment Recommendations  Equipment Needed: No    Assessment   Performance deficits / Impairments: Decreased functional mobility ; Decreased ADL status; Decreased high-level IADLs  Assessment: pt not at baseline level of functioning and would benefit from ongoing skilled OT services in order to return to PLOF.   Treatment Diagnosis: Anemia  Prognosis: Good  Decision Making: Low Complexity  History: pt lives at home with family, independent with ADLs  Assistance / Modification: assist of 1, CGA  Patient Education: LINDA perez, discharge-pt independent with education  REQUIRES OT FOLLOW UP: Yes  Activity Tolerance  Activity Tolerance: Patient Tolerated treatment well  Safety Devices  Safety Devices in place: Yes  Type of devices: All fall risk precautions in place;Nurse notified;Call light within reach; Left in chair;Chair alarm in place; Patient at risk for falls  Restraints  Initially in place: No           Patient Diagnosis(es): The encounter diagnosis was Anemia, unspecified type. has a past medical history of Anemia, CAD (coronary artery disease), CKD (chronic kidney disease), Hyperlipidemia, and Hypertension. has a past surgical history that includes fracture surgery and Cystocopy (11/21/13). Treatment Diagnosis: Anemia      Restrictions  Restrictions/Precautions  Restrictions/Precautions: Fall Risk  Position Activity Restriction  Other position/activity restrictions: Zeinab Bianchi is a 68 y.o. female on Kenneth James MD service who was admitted on 3/3/2021 for anemia. She presented to the ED as advised by her PCP after routine blood work showed low hemoglobin. Hgb drawn in ED was 5.8, she received 3 units pRBCs, hgb 9.3 today. She had been feeling well at that time, no dizziness, lightheadedness, SOB or chest pain. Iron studies showed deficiency. She has been having bradycardia overnight, cardiology has been consulted. She has a history of CAD with recent stent placement, recently started on Brilinta late 2020. Subjective   General  Chart Reviewed: Yes  Patient assessed for rehabilitation services?: Yes  Family / Caregiver Present: No(family arriving at end of session)  Diagnosis: anemia  Subjective  Subjective: pt requesting to sit EOB upon arrival, agreeable to OT eval, reporting 8/10 pain in B LEs, RN aware  Patient Currently in Pain: No  Pain Assessment  Pain Assessment: 0-10  Pain Level: 8  Pain Type: Chronic pain  Pain Location: Leg  Non-Pharmaceutical Pain Intervention(s): Massage;Repositioned  Response to Pain Intervention: Patient Satisfied  Pre Treatment Pain Screening  Intervention List: Nurse/Physician notified; Patient able to continue Stand by assistance  Stand to sit: Stand by assistance     Cognition  Overall Cognitive Status: WFL  Perception  Overall Perceptual Status: WFL     Sensation  Overall Sensation Status: WFL        LUE AROM (degrees)  LUE AROM : WFL  RUE AROM (degrees)  RUE AROM : WFL  LUE Strength  Gross LUE Strength: WFL  RUE Strength  Gross RUE Strength: WFL                   Plan   Plan  Times per week: 3-5  Times per day: Daily  Current Treatment Recommendations: Functional Mobility Training, Self-Care / ADL, Home Management Training    AM-PAC Score        AM-[facility] Inpatient Daily Activity Raw Score: 18 (03/04/21 1215)  AM-PAC Inpatient ADL T-Scale Score : 38.66 (03/04/21 1215)  ADL Inpatient CMS 0-100% Score: 46.65 (03/04/21 1215)  ADL Inpatient CMS G-Code Modifier : CK (03/04/21 1215)    Goals  Short term goals  Time Frame for Short term goals: discharge  Short term goal 1: bed mobility mod I  Short term goal 2: functional mobility with LRAD mod I for ADL/IADL tasks  Short term goal 3: functional ADL transfer mod I  Short term goal 4: UB/LB ADLs mod I  Patient Goals   Patient goals : get home       Therapy Time   Individual Concurrent Group Co-treatment   Time In 6003         Time Out 1118         Minutes 23           Timed Code Treatment Minutes:  8 Minutes    Total Treatment Minutes:  23 minutes       Kinjal Gunter OTR/L [id number]    Kinjal Gunter OT

## 2021-03-04 NOTE — PROGRESS NOTES
Had 5.35 sec pause and now rhythm has changed to atrial flutter. Stat EKG ordered. Waiting for K and Mg lab results. Will notify Dr. Gema Monae if EKG is confirmed. 12 lead shows Afib with wide QRS rate is 73 b/min. Patient remains asymptomatic.

## 2021-03-04 NOTE — PROGRESS NOTES
Third unit of blood started. Tolerating well. Denies any pain or SOB. Vitals are stable. Assessment complete.

## 2021-03-04 NOTE — PROGRESS NOTES
While ambulating to the bathroom, SOB noted. Unable to rest and BP is elevated. Dr. Alexx Heath notified. Lasix 20 mg IV ordered. Will place purewick to conserve patient energy.

## 2021-03-04 NOTE — PROGRESS NOTES
Dr. Kandy Simpson was called and made aware of the change in rhythm. Am electrolytes are WNL. No new orders. Cardiology will see today.

## 2021-03-04 NOTE — PROGRESS NOTES
Monitor showed 5 beat run of VT. Patient is asymptomatic. BP elevated. Will recheck. Continues to report how much she feels better. Will continue to monitor. Repeat /91. Approximately 1000 cc drained into the harris bag. Monitor tech reported HR dropped down to 37b/min then returned to the 60's. Please see strips on the chart.

## 2021-03-05 ENCOUNTER — ANESTHESIA (OUTPATIENT)
Dept: ENDOSCOPY | Age: 81
DRG: 329 | End: 2021-03-05
Payer: MEDICARE

## 2021-03-05 ENCOUNTER — ANESTHESIA EVENT (OUTPATIENT)
Dept: ENDOSCOPY | Age: 81
DRG: 329 | End: 2021-03-05
Payer: MEDICARE

## 2021-03-05 ENCOUNTER — APPOINTMENT (OUTPATIENT)
Dept: CT IMAGING | Age: 81
DRG: 329 | End: 2021-03-05
Payer: MEDICARE

## 2021-03-05 VITALS
SYSTOLIC BLOOD PRESSURE: 155 MMHG | RESPIRATION RATE: 14 BRPM | OXYGEN SATURATION: 82 % | DIASTOLIC BLOOD PRESSURE: 105 MMHG

## 2021-03-05 LAB
ANION GAP SERPL CALCULATED.3IONS-SCNC: 11 MMOL/L (ref 3–16)
BASOPHILS ABSOLUTE: 0.1 K/UL (ref 0–0.2)
BASOPHILS RELATIVE PERCENT: 0.5 %
BUN BLDV-MCNC: 22 MG/DL (ref 7–20)
CALCIUM SERPL-MCNC: 9.2 MG/DL (ref 8.3–10.6)
CEA: 1.7 NG/ML (ref 0–5)
CHLORIDE BLD-SCNC: 102 MMOL/L (ref 99–110)
CO2: 29 MMOL/L (ref 21–32)
CREAT SERPL-MCNC: 1.3 MG/DL (ref 0.6–1.2)
EKG ATRIAL RATE: 72 BPM
EKG DIAGNOSIS: NORMAL
EKG P AXIS: 54 DEGREES
EKG P-R INTERVAL: 192 MS
EKG Q-T INTERVAL: 404 MS
EKG QRS DURATION: 88 MS
EKG QTC CALCULATION (BAZETT): 442 MS
EKG R AXIS: -17 DEGREES
EKG T AXIS: 24 DEGREES
EKG VENTRICULAR RATE: 72 BPM
EOSINOPHILS ABSOLUTE: 0.1 K/UL (ref 0–0.6)
EOSINOPHILS RELATIVE PERCENT: 0.8 %
GFR AFRICAN AMERICAN: 48
GFR NON-AFRICAN AMERICAN: 40
GLUCOSE BLD-MCNC: 117 MG/DL (ref 70–99)
HCT VFR BLD CALC: 33.2 % (ref 36–48)
HEMOGLOBIN: 10.2 G/DL (ref 12–16)
LYMPHOCYTES ABSOLUTE: 1.5 K/UL (ref 1–5.1)
LYMPHOCYTES RELATIVE PERCENT: 13.2 %
MCH RBC QN AUTO: 22.1 PG (ref 26–34)
MCHC RBC AUTO-ENTMCNC: 30.6 G/DL (ref 31–36)
MCV RBC AUTO: 72 FL (ref 80–100)
MONOCYTES ABSOLUTE: 0.9 K/UL (ref 0–1.3)
MONOCYTES RELATIVE PERCENT: 7.7 %
NEUTROPHILS ABSOLUTE: 8.7 K/UL (ref 1.7–7.7)
NEUTROPHILS RELATIVE PERCENT: 77.8 %
PDW BLD-RTO: 23.1 % (ref 12.4–15.4)
PLATELET # BLD: 352 K/UL (ref 135–450)
PMV BLD AUTO: 8.1 FL (ref 5–10.5)
POTASSIUM SERPL-SCNC: 3.2 MMOL/L (ref 3.5–5.1)
RBC # BLD: 4.6 M/UL (ref 4–5.2)
SODIUM BLD-SCNC: 142 MMOL/L (ref 136–145)
WBC # BLD: 11.1 K/UL (ref 4–11)

## 2021-03-05 PROCEDURE — 6360000002 HC RX W HCPCS: Performed by: NURSE ANESTHETIST, CERTIFIED REGISTERED

## 2021-03-05 PROCEDURE — 6370000000 HC RX 637 (ALT 250 FOR IP): Performed by: INTERNAL MEDICINE

## 2021-03-05 PROCEDURE — 2580000003 HC RX 258: Performed by: INTERNAL MEDICINE

## 2021-03-05 PROCEDURE — 85025 COMPLETE CBC W/AUTO DIFF WBC: CPT

## 2021-03-05 PROCEDURE — 2500000003 HC RX 250 WO HCPCS: Performed by: NURSE ANESTHETIST, CERTIFIED REGISTERED

## 2021-03-05 PROCEDURE — 99222 1ST HOSP IP/OBS MODERATE 55: CPT | Performed by: SURGERY

## 2021-03-05 PROCEDURE — 88342 IMHCHEM/IMCYTCHM 1ST ANTB: CPT

## 2021-03-05 PROCEDURE — 2580000003 HC RX 258: Performed by: NURSE ANESTHETIST, CERTIFIED REGISTERED

## 2021-03-05 PROCEDURE — 94762 N-INVAS EAR/PLS OXIMTRY CONT: CPT

## 2021-03-05 PROCEDURE — 6360000002 HC RX W HCPCS: Performed by: INTERNAL MEDICINE

## 2021-03-05 PROCEDURE — 88305 TISSUE EXAM BY PATHOLOGIST: CPT

## 2021-03-05 PROCEDURE — 3700000000 HC ANESTHESIA ATTENDED CARE: Performed by: INTERNAL MEDICINE

## 2021-03-05 PROCEDURE — 2709999900 HC NON-CHARGEABLE SUPPLY: Performed by: INTERNAL MEDICINE

## 2021-03-05 PROCEDURE — 88341 IMHCHEM/IMCYTCHM EA ADD ANTB: CPT

## 2021-03-05 PROCEDURE — 7100000000 HC PACU RECOVERY - FIRST 15 MIN: Performed by: INTERNAL MEDICINE

## 2021-03-05 PROCEDURE — 3609010300 HC COLONOSCOPY W/BIOPSY SINGLE/MULTIPLE: Performed by: INTERNAL MEDICINE

## 2021-03-05 PROCEDURE — 97116 GAIT TRAINING THERAPY: CPT

## 2021-03-05 PROCEDURE — 82378 CARCINOEMBRYONIC ANTIGEN: CPT

## 2021-03-05 PROCEDURE — 3700000001 HC ADD 15 MINUTES (ANESTHESIA): Performed by: INTERNAL MEDICINE

## 2021-03-05 PROCEDURE — 99233 SBSQ HOSP IP/OBS HIGH 50: CPT | Performed by: NURSE PRACTITIONER

## 2021-03-05 PROCEDURE — 97530 THERAPEUTIC ACTIVITIES: CPT

## 2021-03-05 PROCEDURE — 71250 CT THORAX DX C-: CPT

## 2021-03-05 PROCEDURE — APPNB30 APP NON BILLABLE TIME 0-30 MINS: Performed by: NURSE PRACTITIONER

## 2021-03-05 PROCEDURE — 36415 COLL VENOUS BLD VENIPUNCTURE: CPT

## 2021-03-05 PROCEDURE — 51702 INSERT TEMP BLADDER CATH: CPT

## 2021-03-05 PROCEDURE — 2060000000 HC ICU INTERMEDIATE R&B

## 2021-03-05 PROCEDURE — 93010 ELECTROCARDIOGRAM REPORT: CPT | Performed by: INTERNAL MEDICINE

## 2021-03-05 PROCEDURE — 7100000001 HC PACU RECOVERY - ADDTL 15 MIN: Performed by: INTERNAL MEDICINE

## 2021-03-05 PROCEDURE — 6360000004 HC RX CONTRAST MEDICATION: Performed by: INTERNAL MEDICINE

## 2021-03-05 PROCEDURE — 80048 BASIC METABOLIC PNL TOTAL CA: CPT

## 2021-03-05 PROCEDURE — 3609012400 HC EGD TRANSORAL BIOPSY SINGLE/MULTIPLE: Performed by: INTERNAL MEDICINE

## 2021-03-05 RX ORDER — HYDROCODONE BITARTRATE AND ACETAMINOPHEN 5; 325 MG/1; MG/1
1 TABLET ORAL
Status: DISCONTINUED | OUTPATIENT
Start: 2021-03-05 | End: 2021-03-05 | Stop reason: HOSPADM

## 2021-03-05 RX ORDER — LIDOCAINE HYDROCHLORIDE 20 MG/ML
INJECTION, SOLUTION INFILTRATION; PERINEURAL PRN
Status: DISCONTINUED | OUTPATIENT
Start: 2021-03-05 | End: 2021-03-05 | Stop reason: SDUPTHER

## 2021-03-05 RX ORDER — SODIUM CHLORIDE 9 MG/ML
INJECTION, SOLUTION INTRAVENOUS CONTINUOUS PRN
Status: DISCONTINUED | OUTPATIENT
Start: 2021-03-05 | End: 2021-03-05 | Stop reason: SDUPTHER

## 2021-03-05 RX ORDER — LIDOCAINE HYDROCHLORIDE 10 MG/ML
1 INJECTION, SOLUTION EPIDURAL; INFILTRATION; INTRACAUDAL; PERINEURAL
Status: DISCONTINUED | OUTPATIENT
Start: 2021-03-05 | End: 2021-03-05 | Stop reason: HOSPADM

## 2021-03-05 RX ORDER — FENTANYL CITRATE 50 UG/ML
INJECTION, SOLUTION INTRAMUSCULAR; INTRAVENOUS PRN
Status: DISCONTINUED | OUTPATIENT
Start: 2021-03-05 | End: 2021-03-05 | Stop reason: SDUPTHER

## 2021-03-05 RX ORDER — GLYCOPYRROLATE 0.2 MG/ML
INJECTION INTRAMUSCULAR; INTRAVENOUS PRN
Status: DISCONTINUED | OUTPATIENT
Start: 2021-03-05 | End: 2021-03-05 | Stop reason: SDUPTHER

## 2021-03-05 RX ORDER — PROPOFOL 10 MG/ML
INJECTION, EMULSION INTRAVENOUS PRN
Status: DISCONTINUED | OUTPATIENT
Start: 2021-03-05 | End: 2021-03-05 | Stop reason: SDUPTHER

## 2021-03-05 RX ORDER — ONDANSETRON 2 MG/ML
4 INJECTION INTRAMUSCULAR; INTRAVENOUS
Status: DISCONTINUED | OUTPATIENT
Start: 2021-03-05 | End: 2021-03-05 | Stop reason: HOSPADM

## 2021-03-05 RX ORDER — SODIUM CHLORIDE 9 MG/ML
INJECTION, SOLUTION INTRAVENOUS CONTINUOUS
Status: DISCONTINUED | OUTPATIENT
Start: 2021-03-05 | End: 2021-03-05

## 2021-03-05 RX ADMIN — PROPOFOL 50 MG: 10 INJECTION, EMULSION INTRAVENOUS at 11:19

## 2021-03-05 RX ADMIN — PROPOFOL 50 MG: 10 INJECTION, EMULSION INTRAVENOUS at 11:42

## 2021-03-05 RX ADMIN — PHENYLEPHRINE HYDROCHLORIDE 100 MCG: 10 INJECTION INTRAVENOUS at 11:51

## 2021-03-05 RX ADMIN — PROPOFOL 50 MG: 10 INJECTION, EMULSION INTRAVENOUS at 11:36

## 2021-03-05 RX ADMIN — CARVEDILOL 3.12 MG: 3.12 TABLET, FILM COATED ORAL at 16:59

## 2021-03-05 RX ADMIN — Medication 10 ML: at 16:54

## 2021-03-05 RX ADMIN — PROPOFOL 50 MG: 10 INJECTION, EMULSION INTRAVENOUS at 11:25

## 2021-03-05 RX ADMIN — PROPOFOL 50 MG: 10 INJECTION, EMULSION INTRAVENOUS at 11:13

## 2021-03-05 RX ADMIN — ASPIRIN 81 MG: 81 TABLET, COATED ORAL at 14:15

## 2021-03-05 RX ADMIN — PHENYLEPHRINE HYDROCHLORIDE 100 MCG: 10 INJECTION INTRAVENOUS at 11:33

## 2021-03-05 RX ADMIN — Medication 10 ML: at 22:45

## 2021-03-05 RX ADMIN — PROPOFOL 50 MG: 10 INJECTION, EMULSION INTRAVENOUS at 11:53

## 2021-03-05 RX ADMIN — IOHEXOL 50 ML: 240 INJECTION, SOLUTION INTRATHECAL; INTRAVASCULAR; INTRAVENOUS; ORAL at 14:14

## 2021-03-05 RX ADMIN — FENTANYL CITRATE 50 MCG: 50 INJECTION INTRAMUSCULAR; INTRAVENOUS at 12:06

## 2021-03-05 RX ADMIN — LIDOCAINE HYDROCHLORIDE 100 MG: 20 INJECTION, SOLUTION INFILTRATION; PERINEURAL at 11:13

## 2021-03-05 RX ADMIN — PROPOFOL 50 MG: 10 INJECTION, EMULSION INTRAVENOUS at 11:17

## 2021-03-05 RX ADMIN — HYDROCODONE BITARTRATE AND ACETAMINOPHEN 1 TABLET: 5; 325 TABLET ORAL at 22:44

## 2021-03-05 RX ADMIN — POTASSIUM BICARBONATE 40 MEQ: 782 TABLET, EFFERVESCENT ORAL at 18:13

## 2021-03-05 RX ADMIN — PHENYLEPHRINE HYDROCHLORIDE 100 MCG: 10 INJECTION INTRAVENOUS at 11:39

## 2021-03-05 RX ADMIN — GLYCOPYRROLATE 0.2 MG: 0.2 INJECTION INTRAMUSCULAR; INTRAVENOUS at 11:10

## 2021-03-05 RX ADMIN — PHENYLEPHRINE HYDROCHLORIDE 100 MCG: 10 INJECTION INTRAVENOUS at 11:25

## 2021-03-05 RX ADMIN — SODIUM CHLORIDE: 9 INJECTION, SOLUTION INTRAVENOUS at 10:45

## 2021-03-05 RX ADMIN — FAMOTIDINE 20 MG: 20 TABLET, FILM COATED ORAL at 14:15

## 2021-03-05 RX ADMIN — PROPOFOL 50 MG: 10 INJECTION, EMULSION INTRAVENOUS at 11:22

## 2021-03-05 RX ADMIN — IRON SUCROSE 200 MG: 20 INJECTION, SOLUTION INTRAVENOUS at 15:22

## 2021-03-05 RX ADMIN — LISINOPRIL 2.5 MG: 5 TABLET ORAL at 14:14

## 2021-03-05 RX ADMIN — PROPOFOL 50 MG: 10 INJECTION, EMULSION INTRAVENOUS at 11:31

## 2021-03-05 RX ADMIN — PHENYLEPHRINE HYDROCHLORIDE 100 MCG: 10 INJECTION INTRAVENOUS at 11:45

## 2021-03-05 RX ADMIN — PROPOFOL 50 MG: 10 INJECTION, EMULSION INTRAVENOUS at 11:15

## 2021-03-05 RX ADMIN — PHENYLEPHRINE HYDROCHLORIDE 200 MCG: 10 INJECTION INTRAVENOUS at 11:22

## 2021-03-05 RX ADMIN — ROSUVASTATIN 20 MG: 20 TABLET, FILM COATED ORAL at 22:44

## 2021-03-05 RX ADMIN — Medication 10 ML: at 09:01

## 2021-03-05 RX ADMIN — FENTANYL CITRATE 50 MCG: 50 INJECTION INTRAMUSCULAR; INTRAVENOUS at 12:10

## 2021-03-05 ASSESSMENT — PULMONARY FUNCTION TESTS
PIF_VALUE: 1

## 2021-03-05 ASSESSMENT — PAIN SCALES - GENERAL
PAINLEVEL_OUTOF10: 0

## 2021-03-05 ASSESSMENT — ENCOUNTER SYMPTOMS
SHORTNESS OF BREATH: 1
COLOR CHANGE: 1
VOMITING: 0
NAUSEA: 0
BACK PAIN: 0
EYE DISCHARGE: 0
ABDOMINAL PAIN: 0
ABDOMINAL DISTENTION: 0
APNEA: 0
EYE ITCHING: 0
CHEST TIGHTNESS: 0

## 2021-03-05 NOTE — CONSULTS
Megha Rojas     CC-anemia, colon mass    HPI:-year-old female admitted 2 days ago with anemia. She is status post coronary stent placement in November 2020 and is currently taking Brilinta and aspirin. No complaints of abdominal pain, unexpected weight loss, change in bowel habits or urinary symptoms. Colonoscopy today showed a right colonic mass. Biopsies and CAT scan are pending.     Past Medical History:   Diagnosis Date    Anemia     CAD (coronary artery disease) 11/2020    Stent    CKD (chronic kidney disease)     Hyperlipidemia     Hypertension        Past Surgical History:   Procedure Laterality Date    COLONOSCOPY N/A 3/5/2021    COLONOSCOPY WITH BIOPSY performed by Bishnu Bowen MD at [address] [place] [address]  11/21/13    w/ bladder biopsy    FRACTURE SURGERY      right wrist    UPPER GASTROINTESTINAL ENDOSCOPY N/A 3/5/2021    EGD BIOPSY performed by Bishnu Bowen MD at [address] [place] [address] Marital status:      Spouse name: Not on file    Number of children: Not on file    Years of education: Not on file    Highest education level: Not on file   Occupational History    Not on file   Social Needs    Financial resource strain: Not on file    Food insecurity     Worry: Not on file     Inability: Not on file    Transportation needs     Medical: Not on file     Non-medical: Not on file   Tobacco Use    Smoking status: Never Smoker    Smokeless tobacco: Never Used    Tobacco comment:  was a heavy smoker   Substance and Sexual Activity    Alcohol use: No    Drug use: No    Sexual activity: Not Currently   Lifestyle    Physical activity     Days per week: Not on file     Minutes per session: Not on file    Stress: Not on file   Relationships    Social connections     Talks on phone: Not on file     Gets together: Not on file     Attends Sabianist service: Not on file     Active member of club or organization: Not on file     Attends meetings of clubs or organizations: Not on file     Relationship status: Not on file    Intimate partner violence     Fear of current or ex partner: Not on file     Emotionally abused: Not on file     Physically abused: Not on file     Forced sexual activity: Not on file   Other Topics Concern    Not on file   Social History Narrative    Not on file       Allergies: No Known Allergies    Prior to Admission medications    Medication Sig Start Date End Date Taking? Authorizing Provider   furosemide (LASIX) 20 MG tablet Take 1 tablet by mouth daily as needed (swelling or shortness of breath) 3/1/21  Yes JIMENA Marquez CNP   HYDROcodone-acetaminophen (NORCO) 5-325 MG per tablet Take 1 tablet by mouth 2 times daily. 11/12/20  Yes Historical Provider, MD   rosuvastatin (CRESTOR) 20 MG tablet Take 1 tablet by mouth nightly 11/30/20  Yes JIMENA Marquez CNP   aspirin 81 MG chewable tablet Take 1 tablet by mouth daily 11/22/20  Yes JIMENA Marquez CNP   lisinopril (PRINIVIL;ZESTRIL) 2.5 MG tablet Take 1 tablet by mouth daily 11/22/20  Yes JIMENA Marquez CNP   carvedilol (COREG) 3.125 MG tablet Take 1 tablet by mouth 2 times daily (with meals) 11/21/20  Yes JIMENA Marquez CNP   ticagrelor (BRILINTA) 90 MG TABS tablet Take 1 tablet by mouth 2 times daily 11/21/20  Yes JIMENA Marquez CNP       Principal Problem:    Anemia  Active Problems:    Hypertension    CAD (coronary artery disease)    High cholesterol  Resolved Problems:    * No resolved hospital problems. *      Blood pressure (!) 143/104, pulse 72, temperature 97.3 °F (36.3 °C), temperature source Temporal, resp. rate 18, height 5' (1.524 m), weight 217 lb 2.5 oz (98.5 kg), SpO2 96 %. Review of Systems   Constitutional: Negative for activity change and appetite change. HENT: Negative for congestion and dental problem. Eyes: Negative for discharge and itching.    Respiratory: mass concerning for a primary colon cancer. Biopsies are pending. Plan:  CAT scan of the abdomen and pelvis has been ordered for tonight to evaluate for metastatic disease. Will check a CEA level. Continue to hold Brilinta for anticipated laparoscopic cholecystectomy on Monday (3 days from now ).     Ambar Marmolejo MD  3/5/2021

## 2021-03-05 NOTE — PROGRESS NOTES
After pt went to BR and had a bowel movement, pt converted back to NSR. Stat EKG confirmed NSR and HR in 70's.

## 2021-03-05 NOTE — PROGRESS NOTES
Pt arrived from OR to PACU bay 8. Report received from OR staff. Pt arouses easily to voice. 3L NC, NSR, VSS. Will continue to monitor.

## 2021-03-05 NOTE — ANESTHESIA POSTPROCEDURE EVALUATION
Department of Anesthesiology  Postprocedure Note    Patient: Angelo Ornelas  MRN: [id number]  YOB: 1940  Date of evaluation: 3/5/2021  Time:  2:17 PM     Procedure Summary     Date: 03/05/21 Room / Location: [address] [place] [address]    Anesthesia Start: 1110 Anesthesia Stop: 7244    Procedures:       EGD BIOPSY (N/A Abdomen)      COLONOSCOPY WITH BIOPSY (N/A )      COLONOSCOPY POLYPECTOMY SNARE/COLD BIOPSY Diagnosis: (Anemia)    Surgeons: Leslie Spencer MD Responsible Provider: Mitra Valencia MD    Anesthesia Type: TIVA ASA Status: 3          Anesthesia Type: TIVA    Stephon Phase I: Stephon Score: 10    Stephon Phase II:      Last vitals: Reviewed and per EMR flowsheets.        Anesthesia Post Evaluation    Patient location during evaluation: PACU  Patient participation: complete - patient participated  Level of consciousness: awake  Airway patency: patent  Nausea & Vomiting: no vomiting  Complications: no  Cardiovascular status: hemodynamically stable  Respiratory status: acceptable  Hydration status: euvolemic

## 2021-03-05 NOTE — PROGRESS NOTES
PACU complete.   Ready to be transferred back to floor but waiting on MD to speak with patient per his request.

## 2021-03-05 NOTE — PROGRESS NOTES
[facility]   Progress Note      3/5/2021 9:50 AM        Name: Aletha Tapia .               Admitted: 3/3/2021    SUBJECTIVE:  She is feeling better today, planning on colonoscopy and EGD today    Reviewed interval ancillary notes    Current Medications      HYDROcodone-acetaminophen (NORCO) 5-325 MG per tablet 1 tablet, Once PRN      ondansetron (ZOFRAN) injection 4 mg, Once PRN      ticagrelor (BRILINTA) tablet 90 mg, BID      iron sucrose (VENOFER) 200 mg in sodium chloride 0.9 % 100 mL IVPB, Q24H      aspirin EC tablet 81 mg, Daily      perflutren lipid microspheres (DEFINITY) injection 1.65 mg, ONCE PRN      0.9 % sodium chloride infusion, PRN      lisinopril (PRINIVIL;ZESTRIL) tablet 2.5 mg, Daily      carvedilol (COREG) tablet 3.125 mg, BID WC      rosuvastatin (CRESTOR) tablet 20 mg, Nightly      furosemide (LASIX) tablet 20 mg, Daily PRN      sodium chloride flush 0.9 % injection 10 mL, 2 times per day      sodium chloride flush 0.9 % injection 10 mL, PRN      potassium chloride (KLOR-CON M) extended release tablet 40 mEq, PRN    Or      potassium bicarb-citric acid (EFFER-K) effervescent tablet 40 mEq, PRN    Or      potassium chloride 10 mEq/100 mL IVPB (Peripheral Line), PRN      promethazine (PHENERGAN) tablet 12.5 mg, Q6H PRN    Or      ondansetron (ZOFRAN) injection 4 mg, Q6H PRN      magnesium hydroxide (MILK OF MAGNESIA) 400 MG/5ML suspension 30 mL, Daily PRN      famotidine (PEPCID) tablet 20 mg, Daily      acetaminophen (TYLENOL) tablet 650 mg, Q6H PRN    Or      acetaminophen (TYLENOL) suppository 650 mg, Q6H PRN      hydrALAZINE (APRESOLINE) injection 10 mg, Q6H PRN      0.9 % sodium chloride bolus, PRN      0.9 % sodium chloride infusion, PRN      HYDROcodone-acetaminophen (NORCO) 5-325 MG per tablet 1 tablet, BID        Objective:  BP (!) 148/76   Pulse 70   Temp 97 °F (36.1 °C) (Tympanic)   Resp 16   Ht 5' (1.524 m)   Wt 217 lb 2.5 oz (98.5 kg) Care    The patient was evaluated in the morning. Dr. Tommy Wood called me with the results of colonoscopy. She was noted to have ascending colon mass. This is suspicious for adenocarcinoma. Plan would be to obtain CEA, CT scans chest abdomen and pelvis. General surgery has been consulted.     Olena Gillis MD

## 2021-03-05 NOTE — PROGRESS NOTES
Physician Progress Note      PATIENT:               Ludmila Lopez  CSN #:                  [id number]  :                       1940  ADMIT DATE:       3/3/2021 9:55 AM  DISCH DATE:  RESPONDING  PROVIDER #:        Zee Duran MD          QUERY TEXT:    Patient admitted with BMI 42.97. If possible, please document in progress   notes and discharge summary if you are evaluating and /or treating any of the   following: The medical record reflects the following:  Risk Factors: N/A  Clinical Indicators: Ht: 5'0 Wt: 220 lbs BMI: 42.97  Treatment: N/A  Options provided:  -- Morbid obesity  -- BMI not clinically significant  -- Other - I will add my own diagnosis  -- Disagree - Not applicable / Not valid  -- Disagree - Clinically unable to determine / Unknown  -- Refer to Clinical Documentation Reviewer    PROVIDER RESPONSE TEXT:    This patient has morbid obesity.     Query created by: Cresencio Cortes on 3/4/2021 1:54 PM      Electronically signed by:  Zee Duran MD 3/5/2021 7:59 AM

## 2021-03-05 NOTE — PROGRESS NOTES
egd possible short mauro's, otherwise normal  Colon mass prox ascending colon explains anemia; smaller polyp not removed. Rec;  follow up biopsy results  follow up oncology for imaging and cea levels -- i left voicemail with Dr Madeleine Cantrell  Surgical consult placed for surgical resection evaluation  ok continue asa; may need hold brilinta for possible surgery, will leave to cardio/surgery discussion; no active bleeding at this time so if needs brilinta in meantime ok resume since appears slow blood loss from mass/ no active/visible bleed. i discussed with patient who understands; offerred call daughter as well.   otherwise will sign off, call if needed.

## 2021-03-05 NOTE — PROGRESS NOTES
Progress Note - Dr. Mario Staton - Internal Medicine  PCP: Antonio Zuniga MD [address] [place] [address] [facility]  [phone]    Hospital Day: 2  Code Status: Full Code  Current Diet: Diet NPO, After Midnight        CC: follow up on medical issues    Subjective: Jevon Chen is a 68 y.o. female. She denies problems    Feels about the same  Hgb much better, 10.2  Plan for poss egd today        She denies chest pain, denies shortness of breath, denies nausea,  denies emesis. 10 system Review of Systems is reviewed with patient, and pertinent positives are noted in HPI above . Otherwise, Review of systems is negative. I have reviewed the patient's medical and social history in detail and updated the computerized patient record. To recap: She  has a past medical history of Anemia, CAD (coronary artery disease), CKD (chronic kidney disease), Hyperlipidemia, and Hypertension. . She  has a past surgical history that includes fracture surgery and Cystocopy (11/21/13). . She  reports that she has never smoked. She has never used smokeless tobacco. She reports that she does not drink alcohol or use drugs. .        Active Hospital Problems    Diagnosis Date Noted    Anemia [D64.9] 03/03/2021    CAD (coronary artery disease) [I25.10] 03/03/2021    High cholesterol [E78.00] 03/03/2021    Hypertension [I10] 01/10/2014       Current Facility-Administered Medications: HYDROcodone-acetaminophen (NORCO) 5-325 MG per tablet 1 tablet, 1 tablet, Oral, Once PRN  ondansetron (ZOFRAN) injection 4 mg, 4 mg, Intravenous, Once PRN  ticagrelor (BRILINTA) tablet 90 mg, 90 mg, Oral, BID  iron sucrose (VENOFER) 200 mg in sodium chloride 0.9 % 100 mL IVPB, 200 mg, Intravenous, Q24H  aspirin EC tablet 81 mg, 81 mg, Oral, Daily  perflutren lipid microspheres (DEFINITY) injection 1.65 mg, 1.5 mL, Intravenous, ONCE PRN  0.9 % sodium chloride infusion, , Intravenous, PRN  lisinopril (PRINIVIL;ZESTRIL) tablet 2.5 mg, 2.5 mg, Oral, Daily  carvedilol (COREG) tablet 3.125 mg, 3.125 mg, Oral, BID WC  rosuvastatin (CRESTOR) tablet 20 mg, 20 mg, Oral, Nightly  furosemide (LASIX) tablet 20 mg, 20 mg, Oral, Daily PRN  sodium chloride flush 0.9 % injection 10 mL, 10 mL, Intravenous, 2 times per day  sodium chloride flush 0.9 % injection 10 mL, 10 mL, Intravenous, PRN  potassium chloride (KLOR-CON M) extended release tablet 40 mEq, 40 mEq, Oral, PRN **OR** potassium bicarb-citric acid (EFFER-K) effervescent tablet 40 mEq, 40 mEq, Oral, PRN **OR** potassium chloride 10 mEq/100 mL IVPB (Peripheral Line), 10 mEq, Intravenous, PRN  promethazine (PHENERGAN) tablet 12.5 mg, 12.5 mg, Oral, Q6H PRN **OR** ondansetron (ZOFRAN) injection 4 mg, 4 mg, Intravenous, Q6H PRN  magnesium hydroxide (MILK OF MAGNESIA) 400 MG/5ML suspension 30 mL, 30 mL, Oral, Daily PRN  famotidine (PEPCID) tablet 20 mg, 20 mg, Oral, Daily  acetaminophen (TYLENOL) tablet 650 mg, 650 mg, Oral, Q6H PRN **OR** acetaminophen (TYLENOL) suppository 650 mg, 650 mg, Rectal, Q6H PRN  hydrALAZINE (APRESOLINE) injection 10 mg, 10 mg, Intravenous, Q6H PRN  0.9 % sodium chloride bolus, 500 mL, Intravenous, PRN  0.9 % sodium chloride infusion, , Intravenous, PRN  HYDROcodone-acetaminophen (NORCO) 5-325 MG per tablet 1 tablet, 1 tablet, Oral, BID         Objective:  BP (!) 148/76   Pulse 70   Temp 97 °F (36.1 °C) (Tympanic)   Resp 16   Ht 5' (1.524 m)   Wt 217 lb 2.5 oz (98.5 kg)   SpO2 94%   BMI 42.41 kg/m²      Patient Vitals for the past 24 hrs:   BP Temp Temp src Pulse Resp SpO2 Weight   03/05/21 0800 (!) 148/76 97 °F (36.1 °C) Tympanic 70 16 94 % --   03/05/21 0754 -- -- -- -- -- -- 217 lb 2.5 oz (98.5 kg)   03/05/21 0421 (!) 138/54 98.2 °F (36.8 °C) Oral 60 18 93 % --   03/05/21 0400 -- -- -- -- -- 90 % --   03/05/21 0157 -- -- -- -- -- (!) 83 % --   03/05/21 0050 (!) 147/52 -- -- 70 18 95 % --   03/04/21 2145 (!) 115/53 96.8 °F (36 °C) Temporal 66 18 95 % --   03/04/21 1645 (!) 143/82 98 °F (36.7 °C) Oral 72 16 95 % --   03/04/21 1151 126/64 97.9 °F (36.6 °C) Oral 91 16 -- --   03/04/21 1143 -- -- -- -- -- 97 % --   03/04/21 0907 -- -- -- -- -- 93 % --     Patient Vitals for the past 96 hrs (Last 3 readings):   Weight   03/05/21 0754 217 lb 2.5 oz (98.5 kg)   03/04/21 0730 220 lb 0.3 oz (99.8 kg)   03/03/21 0959 234 lb (106.1 kg)           Intake/Output Summary (Last 24 hours) at 3/5/2021 0809  Last data filed at 3/5/2021 7619  Gross per 24 hour   Intake 480 ml   Output 2760 ml   Net -2280 ml         Physical Exam:   BP (!) 148/76   Pulse 70   Temp 97 °F (36.1 °C) (Tympanic)   Resp 16   Ht 5' (1.524 m)   Wt 217 lb 2.5 oz (98.5 kg)   SpO2 94%   BMI 42.41 kg/m²   General appearance: alert, appears stated age and cooperative  Head: Normocephalic, without obvious abnormality, atraumatic  Lungs: clear to auscultation bilaterally  Heart: regular rate and rhythm, S1, S2 normal, no murmur, click, rub or gallop  Abdomen: soft, non-tender; bowel sounds normal; no masses,  no organomegaly  Extremities: extremities normal, atraumatic, no cyanosis or edema    Labs:  Lab Results   Component Value Date    WBC 11.1 (H) 03/05/2021    HGB 10.2 (L) 03/05/2021    HCT 33.2 (L) 03/05/2021     03/05/2021    CHOL 155 11/21/2020    TRIG 121 11/21/2020    HDL 38 (L) 11/21/2020    ALT 6 (L) 03/04/2021    AST 15 03/04/2021     03/05/2021    K 3.2 (L) 03/05/2021     03/05/2021    CREATININE 1.3 (H) 03/05/2021    BUN 22 (H) 03/05/2021    CO2 29 03/05/2021    INR 1.11 03/03/2021     Lab Results   Component Value Date    TROPONINI 0.03 (H) 11/20/2020       Recent Imaging Results are Reviewed:  Xr Chest (2 Vw)    Result Date: 3/1/2021  EXAMINATION: TWO XRAY VIEWS OF THE CHEST 3/1/2021 2:58 pm COMPARISON: None. HISTORY: ORDERING SYSTEM PROVIDED HISTORY: EUBANKS (dyspnea on exertion) TECHNOLOGIST PROVIDED HISTORY: Reason for exam:->3-4wk hx of EUBANKS and productive cough FINDINGS: Cardiomegaly.   Pulmonary

## 2021-03-05 NOTE — PROGRESS NOTES
Per continuous pulsox monitor, patient's 02 sat 83% on RA. Rn spoke to RT Janelle Ibrahim and was informed overnight study is supposed to be done RA.  02 not applied. Will continue to monitor.

## 2021-03-05 NOTE — ANESTHESIA PRE PROCEDURE
Department of Anesthesiology  Preprocedure Note       Name:  Iker January   Age:  68 y.o.  :  1940                                          MRN:  [id number]         Date:  3/5/2021      Surgeon: Aniyah Cohn):  Lauren Schwarz MD    Procedure: Procedure(s):  EGD DIAGNOSTIC ONLY  COLONOSCOPY DIAGNOSTIC    Medications prior to admission:   Prior to Admission medications    Medication Sig Start Date End Date Taking? Authorizing Provider   furosemide (LASIX) 20 MG tablet Take 1 tablet by mouth daily as needed (swelling or shortness of breath) 3/1/21  Yes JIMENA Caicedo CNP   HYDROcodone-acetaminophen (NORCO) 5-325 MG per tablet Take 1 tablet by mouth 2 times daily.  20  Yes Historical Provider, MD   rosuvastatin (CRESTOR) 20 MG tablet Take 1 tablet by mouth nightly 20  Yes JIMENA Caicedo CNP   aspirin 81 MG chewable tablet Take 1 tablet by mouth daily 20  Yes JIMENA Caicedo CNP   lisinopril (PRINIVIL;ZESTRIL) 2.5 MG tablet Take 1 tablet by mouth daily 20  Yes JIMENA Caicedo CNP   carvedilol (COREG) 3.125 MG tablet Take 1 tablet by mouth 2 times daily (with meals) 20  Yes JIMENA Caicedo CNP   ticagrelor (BRILINTA) 90 MG TABS tablet Take 1 tablet by mouth 2 times daily 20  Yes JIMENA Caicedo CNP       Current medications:    Current Facility-Administered Medications   Medication Dose Route Frequency Provider Last Rate Last Admin    HYDROcodone-acetaminophen (NORCO) 5-325 MG per tablet 1 tablet  1 tablet Oral Once PRN Deisy Hinton MD        ondansetron [facility]) injection 4 mg  4 mg Intravenous Once PRN Deisy Hinton MD        0.9 % sodium chloride infusion   Intravenous Continuous Deisy Hitnon MD        lidocaine PF 1 % injection 1 mL  1 mL Intradermal Once PRN Deisy Hinton MD        ticagrelor [facility]) tablet 90 mg  90 mg Oral BID Loy Greer MD        iron sucrose (VENOFER) 200 mg in sodium chloride 0.9 % 100 mL IVPB  200 mg Intravenous Q24H Jesu Hall MD   Stopped at 03/04/21 1126    aspirin EC tablet 81 mg  81 mg Oral Daily Nga Noriega MD   81 mg at 03/04/21 0844    perflutren lipid microspheres (DEFINITY) injection 1.65 mg  1.5 mL Intravenous ONCE PRN Greta Casey APRN - CNP        0.9 % sodium chloride infusion   Intravenous PRN BASILIO Worthy        lisinopril (PRINIVIL;ZESTRIL) tablet 2.5 mg  2.5 mg Oral Daily Itz Anaya MD   2.5 mg at 03/04/21 1711    carvedilol (COREG) tablet 3.125 mg  3.125 mg Oral BID WC Itz Anaya MD   3.125 mg at 03/04/21 1824    rosuvastatin (CRESTOR) tablet 20 mg  20 mg Oral Nightly Itz Anaya MD   20 mg at 03/04/21 2156    furosemide (LASIX) tablet 20 mg  20 mg Oral Daily PRN Itz Anaya MD        sodium chloride flush 0.9 % injection 10 mL  10 mL Intravenous 2 times per day Itz Anaya MD   10 mL at 03/05/21 0901    sodium chloride flush 0.9 % injection 10 mL  10 mL Intravenous PRN Itz Anaya MD        potassium chloride (KLOR-CON M) extended release tablet 40 mEq  40 mEq Oral PRN Itz Anaya MD        Or    potassium bicarb-citric acid (EFFER-K) effervescent tablet 40 mEq  40 mEq Oral PRN Itz Anaya MD        Or    potassium chloride 10 mEq/100 mL IVPB (Peripheral Line)  10 mEq Intravenous PRN Itz Anaya MD        promethazine [facility]) tablet 12.5 mg  12.5 mg Oral Q6H PRN Itz Anaya MD        Or    ondansetron [facility]) injection 4 mg  4 mg Intravenous Q6H PRN Itz Anaya MD        magnesium hydroxide (MILK OF MAGNESIA) 400 MG/5ML suspension 30 mL  30 mL Oral Daily PRN Itz Anaya MD        famotidine (PEPCID) tablet 20 mg  20 mg Oral Daily Itz Anaya MD   20 mg at 03/04/21 8836    acetaminophen (TYLENOL) tablet 650 mg  650 mg Oral Q6H PRN Itz Anaya MD        Or    acetaminophen (TYLENOL) suppository 650 mg  650 mg Rectal Q6H PRN Ulyess Human, MD        hydrALAZINE (APRESOLINE) injection 10 mg  10 mg Intravenous Q6H PRN Gilberto Ding MD   10 mg at 03/04/21 0843    0.9 % sodium chloride bolus  500 mL Intravenous PRN Gilberto Ding MD        0.9 % sodium chloride infusion   Intravenous PRN Gilberto Ding MD        HYDROcodone-acetaminophen [facility]) 5-325 MG per tablet 1 tablet  1 tablet Oral BID Gilberto Ding MD   1 tablet at 03/04/21 2156       Allergies:  No Known Allergies    Problem List:    Patient Active Problem List   Diagnosis Code    Pulmonary nodule R91.1    Hypertension I10    Chronic venous hypertension with ulcer ([facility] [address] I87.319, L97.909    Leg ulcer ([facility] [address] L97.909    NSTEMI (non-ST elevated myocardial infarction) ([facility] [address] I21.4    Anemia D64.9    CAD (coronary artery disease) I25.10    High cholesterol E78.00       Past Medical History:        Diagnosis Date    Anemia     CAD (coronary artery disease) 11/2020    Stent    CKD (chronic kidney disease)     Hyperlipidemia     Hypertension        Past Surgical History:        Procedure Laterality Date    CYSTOSCOPY  11/21/13    w/ bladder biopsy    FRACTURE SURGERY      right wrist       Social History:    Social History     Tobacco Use    Smoking status: Never Smoker    Smokeless tobacco: Never Used    Tobacco comment:  was a heavy smoker   Substance Use Topics    Alcohol use:  No                                Counseling given: Not Answered  Comment:  was a heavy smoker      Vital Signs (Current):   Vitals:    03/05/21 0421 03/05/21 0754 03/05/21 0800 03/05/21 1013   BP: (!) 138/54  (!) 148/76 (!) 166/55   Pulse: 60  70 70   Resp: 18  16 14   Temp: 98.2 °F (36.8 °C)  97 °F (36.1 °C) 97.8 °F (36.6 °C)   TempSrc: Oral  Tympanic Temporal   SpO2: 93%  94% 98%   Weight:  217 lb 2.5 oz (98.5 kg)     Height:                                                  BP Readings from Last 3 Encounters:   03/05/21 (!) 166/55   03/01/21 (!) 150/70   11/30/20 (!) 154/68 NPO Status: Time of last liquid consumption: 1245                        Time of last solid consumption: 1400                        Date of last liquid consumption: 03/05/21                        Date of last solid food consumption: 03/03/21    BMI:   Wt Readings from Last 3 Encounters:   03/05/21 217 lb 2.5 oz (98.5 kg)   03/01/21 235 lb 9.6 oz (106.9 kg)   11/30/20 229 lb (103.9 kg)     Body mass index is 42.41 kg/m². CBC:   Lab Results   Component Value Date    WBC 11.1 03/05/2021    RBC 4.60 03/05/2021    HGB 10.2 03/05/2021    HCT 33.2 03/05/2021    MCV 72.0 03/05/2021    RDW 23.1 03/05/2021     03/05/2021       CMP:   Lab Results   Component Value Date     03/05/2021    K 3.2 03/05/2021    K 3.7 03/04/2021     03/05/2021    CO2 29 03/05/2021    BUN 22 03/05/2021    CREATININE 1.3 03/05/2021    GFRAA 48 03/05/2021    AGRATIO 1.2 03/04/2021    LABGLOM 40 03/05/2021    GLUCOSE 117 03/05/2021    PROT 6.8 03/04/2021    CALCIUM 9.2 03/05/2021    BILITOT 0.9 03/04/2021    ALKPHOS 90 03/04/2021    AST 15 03/04/2021    ALT 6 03/04/2021       POC Tests: No results for input(s): POCGLU, POCNA, POCK, POCCL, POCBUN, POCHEMO, POCHCT in the last 72 hours.     Coags:   Lab Results   Component Value Date    PROTIME 12.9 03/03/2021    INR 1.11 03/03/2021    APTT 30.4 03/03/2021       HCG (If Applicable): No results found for: PREGTESTUR, PREGSERUM, HCG, HCGQUANT     ABGs: No results found for: PHART, PO2ART, LHN0TXB, AMF1YZC, BEART, S3XEOQFR     Type & Screen (If Applicable):  No results found for: LABABO, LABRH    Drug/Infectious Status (If Applicable):  No results found for: HIV, HEPCAB    COVID-19 Screening (If Applicable): No results found for: COVID19      Anesthesia Evaluation  Patient summary reviewed no history of anesthetic complications:   Airway: Mallampati: II  TM distance: >3 FB   Neck ROM: full  Mouth opening: > = 3 FB Dental:    (+) upper dentures  Comment: Lower broken teeth, fragile appearance, missing many. Patient verbalizes understanding that fragile teeth may be broken or come out during the procedure and pose an aspiration risk. She verbalizes her wishes to proceed with planned anesthetic. Pulmonary:Negative Pulmonary ROS breath sounds clear to auscultation      (-) wheezes                           Cardiovascular:    (+) hypertension:, past MI:, CAD:, CABG/stent (stent 11/2020, no CP since):, dysrhythmias: atrial fibrillation,         Rhythm: irregular  Rate: normal      Cleared by cardiology     Beta Blocker:  Dose within 24 Hrs      ROS comment: Seen by EP for bradycardia and pauses at night. Pt denies syncope or symptoms during the day    LBBB     Neuro/Psych:   Negative Neuro/Psych ROS     (-) seizures, TIA and CVA           GI/Hepatic/Renal:            ROS comment: anemia. Endo/Other:    (+) blood dyscrasia: anticoagulation therapy:., .                 Abdominal:           Vascular:                                        Anesthesia Plan      TIVA     ASA 3     (Patient verbalizes understanding that there is the possibility of recall with TIVA. Patient verbalizes her wishes to proceed with planned anesthetic.)  Induction: intravenous. Anesthetic plan and risks discussed with patient. Plan discussed with CRNA.                   Aminta Morrison MD   3/5/2021

## 2021-03-05 NOTE — PROGRESS NOTES
Teaching / education initiated regarding perioperative experience, expectations, and pain management during stay. Patient verbalized understanding.     Electronically signed by Alis Romero RN on 3/5/2021 at 10:14 AM

## 2021-03-05 NOTE — PROGRESS NOTES
[facility]   Electrophysiology Progress Note   Date: 3/5/2021  Admit Date: 3/3/2021     Reason for follow up: Bradycardia    Chief Complaint:   Chief Complaint   Patient presents with    Abnormal Lab     pt sent in by her MD for low H/H     History of Present Illness: History obtained from patient and medical record. Avery Mirza is a 68 y.o. female with a past medical history of HTN, HLD,  CKD, iron deficiency anemia, and CAD s.p stent to LAD 11/20/2020 who presented on the recommendation of her PCP for anemia. Recent stent placed 11/2020 and she has been on Brilenta and ASA. She was found to be in atrial fibrillation RVR on admission, which is new for her and EP has been asked for consultation. Found to be severely anemic on admission with H/H of 5.8/20.2. Received multiple blood transfusions and hgb stable today 9.3.  GI has evaluated and recommending EGD and colonoscopy with iron replacement. No gross GI bleeding noted. Reports she had worsening SOB and was seen in the cardiology office and started on diuretic 3/1/2021. She had improvement of her SOB with that. She had blood work with PCP and hgb found to be critically low and she was sent to the ER. Today she is in afib CVR with LBBB aberrracy. Dr. Tacho Frye is following, she had recent stent and Maco Miu was stopped. Interval Hx: Today, she is SR on monitor with intermittent PAF/CVR. Remains asymptomatic with it. Overnight pulse oximetry showed avg SPO2 91% with 10 drop between 80-89% and no drops below 80%. Found to have mass in colon during colonoscopy today. General surgery planning for resection on Monday. AC are on hold until then. No new complaints today. No major events overnight. Denies having chest pain, palpitations, shortness of breath, orthopnea/PND, cough, or dizziness. Patient seen and examined. Clinical notes reviewed. Telemetry reviewed.     Problem List:   Patient Active Problem List Diagnosis Date Noted    Anemia 03/03/2021    CAD (coronary artery disease) 03/03/2021    High cholesterol 03/03/2021    NSTEMI (non-ST elevated myocardial infarction) ([facility] 11/20/2020    [facility] with [facility] 05/18/2015    Leg ulcer ([facility] 05/18/2015    Pulmonary nodule 01/10/2014    Hypertension 01/10/2014      Assessment and Plan:  LBBB with aberrancy   - Noted on ECG and confirmed by electrophysiologist   - No VT noted  PAF   - Now in SR, PAF overnight with CVR lasting 3 hours   - Asymptomatic   - [id number] 5. High risk for thromboembolism    ~ On hold for surgery monday    ~ Watchman procedure can be considered as an OP if she does not tolerate long-term AC   - Not a good candidate for ablation  Bradycardia   - No recurrence today   - Asymptomatic and isolated to night time and sleeping   - Tolerating Coreg, very low-dose   - Overnight pulse oximetry showed 10 desaturations  Obesity: Body mass index is 42.41 kg/m². - Recommend weight loss and lifestyle modifications. Suspected sleep apnea   - Overnight pulse oximetry showed avg O2 91% with 10 drops 80-89% none below 80%   - Will need OP sleep medicine referral  Colon Mass   - New finding with colonoscopy   - gen sx following planning for resection on Monday   - No AC due to risk of bleeding and pre-surgery washout    - Stop Brilinta  - Continue ASA, OK per surgery  - Will follow as needed, AC is recommended when acceptable per gen surgery post-rocedure    Discussed with Dr. Shiloh Mullins. All pertinent information and plan of care discussed with the EP physician. Multiple medical conditions with risk of decompensation. Allergies:  No Known Allergies    Home Meds:  Prior to Visit Medications    Medication Sig Taking?  Authorizing Provider   furosemide (LASIX) 20 MG tablet Take 1 tablet by mouth daily as needed (swelling or shortness of breath) Yes JIMENA Acosta - CNP   HYDROcodone-acetaminophen (NORCO) 5-325 MG per tablet Take 1 tablet by mouth 2 times daily. Yes Historical Provider, MD   rosuvastatin (CRESTOR) 20 MG tablet Take 1 tablet by mouth nightly Yes JIMENA Marquez CNP   aspirin 81 MG chewable tablet Take 1 tablet by mouth daily Yes JIMENA Marquez CNP   lisinopril (PRINIVIL;ZESTRIL) 2.5 MG tablet Take 1 tablet by mouth daily Yes JIMENA Marquez CNP   carvedilol (COREG) 3.125 MG tablet Take 1 tablet by mouth 2 times daily (with meals) Yes JIMENA Marquez CNP   ticagrelor (BRILINTA) 90 MG TABS tablet Take 1 tablet by mouth 2 times daily Yes JIMENA Marquez CNP      Scheduled Meds:   ticagrelor  90 mg Oral BID    iron sucrose (VENOFER) iv piggyback 100 mL (Admin over 60 minutes)  200 mg Intravenous Q24H    aspirin  81 mg Oral Daily    lisinopril  2.5 mg Oral Daily    carvedilol  3.125 mg Oral BID     rosuvastatin  20 mg Oral Nightly    sodium chloride flush  10 mL Intravenous 2 times per day    famotidine  20 mg Oral Daily    HYDROcodone-acetaminophen  1 tablet Oral BID     Continuous Infusions:   sodium chloride      sodium chloride       PRN Meds:HYDROcodone 5 mg - acetaminophen, ondansetron, perflutren lipid microspheres, sodium chloride, furosemide, sodium chloride flush, potassium chloride **OR** potassium alternative oral replacement **OR** potassium chloride, promethazine **OR** ondansetron, magnesium hydroxide, acetaminophen **OR** acetaminophen, hydrALAZINE, sodium chloride, sodium chloride   Past Medical History:  Past Medical History:   Diagnosis Date    Anemia     CAD (coronary artery disease) 11/2020    Stent    CKD (chronic kidney disease)     Hyperlipidemia     Hypertension         Past Surgical History:    has a past surgical history that includes fracture surgery and Cystocopy (11/21/13). Social History:  Reviewed. reports that she has never smoked.  She has never used smokeless tobacco. She reports that she does not drink alcohol or use drugs. Family History:  Reviewed. family history includes Heart Disease in her father. Denies family history of sudden cardiac death, arrhythmia, premature CAD    Review of Systems:  · Constitutional: Negative for fever, weight changes, or weakness  · Skin: Negative for bruising, bleeding, blood clots, or changes in skin pigment  · HEENT: Negative for vision changes or dysphagia  · Respiratory: Reviewed in HPI  · Cardiovascular: Reviewed in HPI  · Gastrointestinal: Negative for abdominal pain, N/V/D, constipation, or black/tarry stools  · Genito-Urinary: Negative for hematuria  · Musculoskeletal: No focal weakness  · Neurological/Psych: Negative for confusion or TIA-like symptoms. No anxiety, depression, or insomnia    Physical Examination:  Vitals:    03/05/21 0800   BP: (!) 148/76   Pulse: 70   Resp: 16   Temp: 97 °F (36.1 °C)   SpO2: 94%      In: 10 [I.V.:10]  Out: 600    Wt Readings from Last 3 Encounters:   03/05/21 217 lb 2.5 oz (98.5 kg)   03/01/21 235 lb 9.6 oz (106.9 kg)   11/30/20 229 lb (103.9 kg)       Intake/Output Summary (Last 24 hours) at 3/5/2021 5304  Last data filed at 3/5/2021 0901  Gross per 24 hour   Intake 250 ml   Output 2760 ml   Net -2510 ml     Telemetry: Personally Reviewed Normal sinus rhythm  · Constitutional: Cooperative and in no apparent distress, and appears well nourished  · Skin: Warm and pink; no cyanosis, bruising, or clubbing + pallor  · HEENT: Symmetric and normocephalic. Conjunctiva pink with clear sclera. Mucus membranes pink and moist.   · Cardiovascular: regular and rhythm. S1 & S2, negative for murmurs. Peripheral pulses 2+, capillary refill < 3 seconds. negative elevation of JVP. Trace edema  · Respiratory: Respirations symmetric and unlabored. Lungs clear to auscultation bilaterally, no wheezing, crackles, or rhonchi  · Gastrointestinal: Abdomen soft and round. Bowel sounds normoactive in all quadrants.   · Musculoskeletal: No focal weakness. · Neurologic/Psych: Awake and orientated to person, place and time. Calm affect, appropriate mood    Pertinent labs, diagnostic, device, and imaging results reviewed as a part of this visit    Labs:    BMP:   Recent Labs     03/04/21  0429 03/04/21  0439 03/04/21  1310 03/05/21  0459   NA  --  142 140 142   K  --  3.7 3.7 3.2*   CL  --  106 101 102   CO2  --  26 28 29   BUN  --  19 18 22*   CREATININE  --  1.0 1.2 1.3*   MG 2.10  --  2.10  --      Estimated Creatinine Clearance: 38 mL/min (A) (based on SCr of 1.3 mg/dL (H)). CBC:   Recent Labs     03/03/21  1024 03/04/21  0054 03/04/21  1310 03/05/21  0459   WBC 7.4  --  9.2 11.1*   HGB 5.8* 9.3* 10.6* 10.2*   HCT 20.2* 30.3* 33.9* 33.2*   MCV 66.7*  --  72.8* 72.0*     --  367 352     Thyroid: No results found for: TSH, H5PRWAL, M9HPHPZ, THYROIDAB  Lipids:   Lab Results   Component Value Date    CHOL 155 11/21/2020    HDL 38 11/21/2020    TRIG 121 11/21/2020     LFTS:   Lab Results   Component Value Date    ALT 6 03/04/2021    AST 15 03/04/2021    ALKPHOS 90 03/04/2021    PROT 6.8 03/04/2021    AGRATIO 1.2 03/04/2021    BILITOT 0.9 03/04/2021     Cardiac Enzymes:   Lab Results   Component Value Date    TROPONINI 0.03 11/20/2020     Coags:   Lab Results   Component Value Date    PROTIME 12.9 03/03/2021    INR 1.11 03/03/2021     ECG: 3/4/2021: afib LBBB    ECHO: Pending    2013, records not available    Stress Test:   Findings:  Artery Findings/Result   LM Normal   LAD 95% mid   Cx OM2 50%   RI NA   RCA 20% prox, 20% mid   LVEDP 25   LVG 55%      Intervention:         PCI of LAD with [id number] REBECA (PD with 3.75NC)    All questions and concerns were addressed to the patient. Alternatives to my treatment were discussed. I have discussed the above stated plan with patient and the nurse. The patient verbalized understanding and agreed with the plan. Thank you for allowing to us to participate in the care of [address] [place] [address].     Tam Mullins, JIMENA-CNP  [place]    Office: [phone]

## 2021-03-05 NOTE — PROGRESS NOTES
Physical Therapy  Facility/Department: [address]  Daily Treatment Note  NAME: Angelo Ornelas  : 1940  MRN: [id number]    Date of Service: 3/5/2021    Discharge Recommendations: Angelo Ornelas scored a 19/24 on the AM-PAC short mobility form. Current research shows that an AM-PAC score of 18 or greater is typically associated with a discharge to the patient's home setting. Based on the patient's AM-PAC score and their current functional mobility deficits, it is recommended that the patient have 2-3 sessions per week of Physical Therapy at d/c to increase the patient's independence. At this time, this patient demonstrates the endurance and safety to discharge home with HHPT and a follow up treatment frequency of 2-3x/wk. Please see assessment section for further patient specific details. HOME HEALTH CARE: LEVEL 1 STANDARD  - Initial home health evaluation to occur within 24-48 hours, in patient home   - Therapy to evaluate with goal of regaining prior level of functioning   - Therapy to evaluate if patient has [id number] [facility] needs for personal care    If patient discharges prior to next session this note will serve as a discharge summary. Please see below for the latest assessment towards goals. PT Equipment Recommendations  Equipment Needed: No    Assessment   Body structures, Functions, Activity limitations: Decreased functional mobility ; Decreased strength;Decreased endurance;Decreased balance  Assessment: Pt able to increase ambulation disstance this date and perform all mobility with supervision/SBA with SPC. Pt does present with slightly decreased dynamic standing balance, especially with ambulatory tasks, and would benefit from continued skilled PT services to address deficits.   Treatment Diagnosis: impaired gait, transfers, and balance  Prognosis: Good  Decision Making: Medium Complexity  Clinical Presentation: evolving  PT Education: Goals;PT Role;Plan of Care;Transfer Training;Functional Mobility Training;Gait Training;General Safety  Patient Education: d/c recommendations--pt verbalizing understanding  Barriers to Learning: none  REQUIRES PT FOLLOW UP: Yes  Activity Tolerance  Activity Tolerance: Patient Tolerated treatment well     Patient Diagnosis(es): The encounter diagnosis was Anemia, unspecified type. has a past medical history of Anemia, CAD (coronary artery disease), CKD (chronic kidney disease), Hyperlipidemia, and Hypertension. has a past surgical history that includes fracture surgery and Cystocopy (11/21/13). Restrictions  Restrictions/Precautions  Restrictions/Precautions: Fall Risk(medium fall risk)  Position Activity Restriction  Other position/activity restrictions: Sin Murillo is a 68 y.o. female on [facility] MD service who was admitted on 3/3/2021 for anemia. She presented to the ED as advised by her PCP after routine blood work showed low hemoglobin. Hgb drawn in ED was 5.8, she received 3 units pRBCs, hgb 9.3 today. She had been feeling well at that time, no dizziness, lightheadedness, SOB or chest pain. Iron studies showed deficiency. She has been having bradycardia overnight, cardiology has been consulted. She has a history of CAD with recent stent placement, recently started on Brilinta late 2020. Subjective   General  Chart Reviewed: Yes  Response To Previous Treatment: Patient with no complaints from previous session. Family / Caregiver Present: No  Subjective  Subjective: Pt reporting no pain at this time, agreeable to PT session.   General Comment  Comments: Pt supine in bed upon arrival.  Pain Screening  Patient Currently in Pain: Denies  Vital Signs  Patient Currently in Pain: Denies       Orientation  Orientation  Overall Orientation Status: Within Functional Limits    Objective   Bed mobility  Supine to Sit: Supervision  Scooting: Supervision  Transfers  Sit to Stand: Supervision(x1 EOB, x1 toilet)  Stand to sit: Supervision  Ambulation  Ambulation?: Yes  Ambulation 1  Surface: level tile  Device: Single point cane  Assistance: Stand by assistance  Quality of Gait: wide Cristopher, slightly decreased pawan, increased medial-lateral sway noted with trendelenberg gait  Distance: 100'+10'  Comments: Pt with slight LOB towards R when turning and when exiting bathroom however pt able to self-correct.   Stairs/Curb  Stairs?: No     Balance  Posture: Good  Sitting - Static: Good;-(supervision seated at toilet for toileting ~8 minutes total)  Sitting - Dynamic: Good;-(supervision seated at toilet for pericare)  Standing - Static: Good;-(with SPC and intermittent UE support on sink during ADLs ~4 minutes total)  Standing - Dynamic: Fair;+(with SPC for transfers and ambulation)            G-Code     OutComes Score       AM-PAC Score  AM-PAC Inpatient Mobility Raw Score : 19 (03/05/21 0959)  AM-PAC Inpatient T-Scale Score : 45.44 (03/05/21 0959)  Mobility Inpatient CMS 0-100% Score: 41.77 (03/05/21 0959)  Mobility Inpatient CMS G-Code Modifier : CK (03/05/21 0959)          Goals  Short term goals  Time Frame for Short term goals: to be met by d/c  Short term goal 1: pt will complete STS with mod I  Short term goal 2: pt will ambulate x 150' with mod I  Short term goal 3: pt completes bed mobility independently  Patient Goals   Patient goals : to go home  NO GOALS MET THIS DATE    Plan    Plan  Times per week: 3-5x  Times per day: Daily  Current Treatment Recommendations: Strengthening, Transfer Training, Gait Training, Safety Education & Training, Functional Mobility Training, Neuromuscular Re-education, Balance Training, Endurance Training, Home Exercise Program, Positioning, Modalities, Equipment Evaluation, Education, & procurement, Patient/Caregiver Education & Training  Safety Devices  Type of devices: Call light within reach, Nurse notified, Left in bed, Gait belt  Restraints  Initially in place: No     Therapy Time   Individual Concurrent Group Co-treatment   Time In 0816         Time Out 0839         Minutes 23              Timed Code Treatment Minutes: 23  Total Treatment Minutes:  [address], [address] [facility], [facility] [address] [place] [address]

## 2021-03-06 LAB
ANION GAP SERPL CALCULATED.3IONS-SCNC: 9 MMOL/L (ref 3–16)
BASOPHILS ABSOLUTE: 0 K/UL (ref 0–0.2)
BASOPHILS RELATIVE PERCENT: 0.5 %
BUN BLDV-MCNC: 18 MG/DL (ref 7–20)
CA 125: 16.4 U/ML (ref 0–35)
CALCIUM SERPL-MCNC: 8.6 MG/DL (ref 8.3–10.6)
CHLORIDE BLD-SCNC: 104 MMOL/L (ref 99–110)
CO2: 29 MMOL/L (ref 21–32)
CREAT SERPL-MCNC: 1.2 MG/DL (ref 0.6–1.2)
EOSINOPHILS ABSOLUTE: 0.2 K/UL (ref 0–0.6)
EOSINOPHILS RELATIVE PERCENT: 2.4 %
GFR AFRICAN AMERICAN: 53
GFR NON-AFRICAN AMERICAN: 43
GLUCOSE BLD-MCNC: 110 MG/DL (ref 70–99)
HCT VFR BLD CALC: 29.1 % (ref 36–48)
HEMOGLOBIN: 9.6 G/DL (ref 12–16)
LV EF: 58 %
LVEF MODALITY: NORMAL
LYMPHOCYTES ABSOLUTE: 1.7 K/UL (ref 1–5.1)
LYMPHOCYTES RELATIVE PERCENT: 18.5 %
MCH RBC QN AUTO: 23.9 PG (ref 26–34)
MCHC RBC AUTO-ENTMCNC: 33 G/DL (ref 31–36)
MCV RBC AUTO: 72.3 FL (ref 80–100)
MONOCYTES ABSOLUTE: 1 K/UL (ref 0–1.3)
MONOCYTES RELATIVE PERCENT: 11.4 %
NEUTROPHILS ABSOLUTE: 6 K/UL (ref 1.7–7.7)
NEUTROPHILS RELATIVE PERCENT: 67.2 %
PDW BLD-RTO: 23.6 % (ref 12.4–15.4)
PLATELET # BLD: 310 K/UL (ref 135–450)
PMV BLD AUTO: 7.7 FL (ref 5–10.5)
POTASSIUM SERPL-SCNC: 3.9 MMOL/L (ref 3.5–5.1)
RBC # BLD: 4.02 M/UL (ref 4–5.2)
SODIUM BLD-SCNC: 142 MMOL/L (ref 136–145)
WBC # BLD: 8.9 K/UL (ref 4–11)

## 2021-03-06 PROCEDURE — 2580000003 HC RX 258: Performed by: INTERNAL MEDICINE

## 2021-03-06 PROCEDURE — 6360000002 HC RX W HCPCS: Performed by: INTERNAL MEDICINE

## 2021-03-06 PROCEDURE — 85025 COMPLETE CBC W/AUTO DIFF WBC: CPT

## 2021-03-06 PROCEDURE — 80048 BASIC METABOLIC PNL TOTAL CA: CPT

## 2021-03-06 PROCEDURE — 94760 N-INVAS EAR/PLS OXIMETRY 1: CPT

## 2021-03-06 PROCEDURE — APPNB30 APP NON BILLABLE TIME 0-30 MINS: Performed by: NURSE PRACTITIONER

## 2021-03-06 PROCEDURE — 86304 IMMUNOASSAY TUMOR CA 125: CPT

## 2021-03-06 PROCEDURE — 2060000000 HC ICU INTERMEDIATE R&B

## 2021-03-06 PROCEDURE — 36415 COLL VENOUS BLD VENIPUNCTURE: CPT

## 2021-03-06 PROCEDURE — 6370000000 HC RX 637 (ALT 250 FOR IP): Performed by: INTERNAL MEDICINE

## 2021-03-06 PROCEDURE — APPSS15 APP SPLIT SHARED TIME 0-15 MINUTES: Performed by: NURSE PRACTITIONER

## 2021-03-06 PROCEDURE — 93005 ELECTROCARDIOGRAM TRACING: CPT | Performed by: INTERNAL MEDICINE

## 2021-03-06 PROCEDURE — 93306 TTE W/DOPPLER COMPLETE: CPT

## 2021-03-06 RX ADMIN — ASPIRIN 81 MG: 81 TABLET, COATED ORAL at 08:06

## 2021-03-06 RX ADMIN — IRON SUCROSE 200 MG: 20 INJECTION, SOLUTION INTRAVENOUS at 10:57

## 2021-03-06 RX ADMIN — HYDROCODONE BITARTRATE AND ACETAMINOPHEN 1 TABLET: 5; 325 TABLET ORAL at 08:06

## 2021-03-06 RX ADMIN — Medication 10 ML: at 08:08

## 2021-03-06 RX ADMIN — FAMOTIDINE 20 MG: 20 TABLET, FILM COATED ORAL at 08:06

## 2021-03-06 RX ADMIN — HYDROCODONE BITARTRATE AND ACETAMINOPHEN 1 TABLET: 5; 325 TABLET ORAL at 21:43

## 2021-03-06 RX ADMIN — CARVEDILOL 3.12 MG: 3.12 TABLET, FILM COATED ORAL at 08:06

## 2021-03-06 RX ADMIN — LISINOPRIL 2.5 MG: 5 TABLET ORAL at 08:06

## 2021-03-06 RX ADMIN — ROSUVASTATIN 20 MG: 20 TABLET, FILM COATED ORAL at 21:43

## 2021-03-06 RX ADMIN — Medication 10 ML: at 21:44

## 2021-03-06 RX ADMIN — CARVEDILOL 3.12 MG: 3.12 TABLET, FILM COATED ORAL at 16:20

## 2021-03-06 ASSESSMENT — PAIN SCALES - GENERAL
PAINLEVEL_OUTOF10: 0

## 2021-03-06 NOTE — PROGRESS NOTES
Patient back from ECHO. Legs unwrapped and IV iron started. Daughter, Dallas Oakes @ bedside. Patient denies other needs at this time.

## 2021-03-06 NOTE — PROGRESS NOTES
[facility]   Progress Note      3/6/2021 3:17 PM        Name: Donavan Webster . Admitted: 3/3/2021    SUBJECTIVE:  No new complaints. No pain at present.      Reviewed interval ancillary notes    Current Medications  aspirin EC tablet 81 mg, Daily  perflutren lipid microspheres (DEFINITY) injection 1.65 mg, ONCE PRN  0.9 % sodium chloride infusion, PRN  lisinopril (PRINIVIL;ZESTRIL) tablet 2.5 mg, Daily  carvedilol (COREG) tablet 3.125 mg, BID WC  rosuvastatin (CRESTOR) tablet 20 mg, Nightly  furosemide (LASIX) tablet 20 mg, Daily PRN  sodium chloride flush 0.9 % injection 10 mL, 2 times per day  sodium chloride flush 0.9 % injection 10 mL, PRN  potassium chloride (KLOR-CON M) extended release tablet 40 mEq, PRN    Or  potassium bicarb-citric acid (EFFER-K) effervescent tablet 40 mEq, PRN    Or  potassium chloride 10 mEq/100 mL IVPB (Peripheral Line), PRN  promethazine (PHENERGAN) tablet 12.5 mg, Q6H PRN    Or  ondansetron (ZOFRAN) injection 4 mg, Q6H PRN  magnesium hydroxide (MILK OF MAGNESIA) 400 MG/5ML suspension 30 mL, Daily PRN  famotidine (PEPCID) tablet 20 mg, Daily  acetaminophen (TYLENOL) tablet 650 mg, Q6H PRN    Or  acetaminophen (TYLENOL) suppository 650 mg, Q6H PRN  hydrALAZINE (APRESOLINE) injection 10 mg, Q6H PRN  0.9 % sodium chloride bolus, PRN  0.9 % sodium chloride infusion, PRN  HYDROcodone-acetaminophen (NORCO) 5-325 MG per tablet 1 tablet, BID        Objective:  /64   Pulse 60   Temp 97.4 °F (36.3 °C) (Temporal)   Resp 16   Ht 5' (1.524 m)   Wt 219 lb 5.7 oz (99.5 kg)   SpO2 94%   BMI 42.84 kg/m²     Intake/Output Summary (Last 24 hours) at 3/6/2021 1517  Last data filed at 3/6/2021 0808  Gross per 24 hour   Intake 1010 ml   Output 3300 ml   Net -2290 ml      Wt Readings from Last 3 Encounters:   03/06/21 219 lb 5.7 oz (99.5 kg)   03/01/21 235 lb 9.6 oz (106.9 kg)   11/30/20 229 lb (103.9 kg)       General appearance:  Appears comfortable  Eyes: Sclera clear. Pupils equal.  ENT: Moist oral mucosa. Trachea midline, no adenopathy. Cardiovascular: Regular rhythm, normal S1, S2. No murmur. No edema in lower extremities  Respiratory: Not using accessory muscles. Good inspiratory effort. Clear to auscultation bilaterally, no wheeze or crackles. GI: Abdomen soft, no tenderness, not distended  Musculoskeletal: No cyanosis in digits, neck supple  Neurology: CN 2-12 grossly intact. No speech or motor deficits  Psych: Normal affect. Alert and oriented in time, place and person  Skin: Warm, dry, normal turgor    Labs and Tests:  CBC:   Recent Labs     03/04/21  1310 03/05/21  0459 03/06/21  0448   WBC 9.2 11.1* 8.9   HGB 10.6* 10.2* 9.6*    352 310     BMP:    Recent Labs     03/04/21  1310 03/05/21  0459 03/06/21  0448    142 142   K 3.7 3.2* 3.9    102 104   CO2 28 29 29   BUN 18 22* 18   CREATININE 1.2 1.3* 1.2   GLUCOSE 129* 117* 110*     Hepatic:   Recent Labs     03/04/21  0439   AST 15   ALT 6*   BILITOT 0.9   ALKPHOS 90       ASSESSMENT AND PLAN    Principal Problem:    Anemia  Active Problems:    Hypertension    CAD (coronary artery disease)    High cholesterol  Resolved Problems:    * No resolved hospital problems. *      Anemia. Hb remains stable. - iron deficiency due to GI blood loss. - Venofer ordered  - Vit B12 wnl    Ascending colon mass consistent with primary colon cancer. Pathology for colonoscopy pending. CEA 1.7. Enlarged bilateral adnexal masses and peritoneal nodules. Suspicion for gynecologic malignancy is low at this point.  was ordered. Right hemicolectomy was scheduled for Monday. Exploration will be considered. Otherwise no clear evidence of distant metastasis. Mediastinal nodes, small. Apparently she was evaluated by Dr. Carlitos Blum in the past.  No malignancy was reported.      CAD  - s/p stent placement  - on Brilinta  - cardiology consult pending    Explained to the patient and her family this morning.

## 2021-03-06 NOTE — PROGRESS NOTES
Stopped by to see patient but out of room for echo. CEA normal and CT chest/abdomen/pelvis reviewed. Updated daughter Elena Johnson), all questions answered. Anticipating surgery on Monday to allow time for Brilinta to metabolize. Okay to continue ASA. Diet as tolerated today. Reviewed and discussed with Dr. Magi Hensley.       Signed:  JIMENA Laughlin - CNP  3/6/2021 11:05 AM    Patient out of the room undergoing cardiac echo  Spoke with the patient's daughter, Adriana Workman  Discussed upcoming surgery in 2 days  Okay for patient to continue  general diet  Will see patient tomorrow to preop for surgery

## 2021-03-06 NOTE — PROGRESS NOTES
Pt resting in bed w/ eyes closed, awakens easily to voice. Pt denies any Chest pain, palpitations or shortness of breath. Telemetry  Now showing afib with multiple PVC's at times and back to sinus rhythm. Rhythms appear similar to prior EKGs/tele strips. Will notify RT of ekg order d/t downtime.

## 2021-03-07 PROBLEM — I51.89 DIASTOLIC DYSFUNCTION: Status: ACTIVE | Noted: 2021-03-07

## 2021-03-07 PROBLEM — C18.2 MALIGNANT NEOPLASM OF ASCENDING COLON (HCC): Status: ACTIVE | Noted: 2021-03-07

## 2021-03-07 PROBLEM — E87.6 HYPOKALEMIA: Status: ACTIVE | Noted: 2021-03-07

## 2021-03-07 PROBLEM — I21.4 NSTEMI (NON-ST ELEVATED MYOCARDIAL INFARCTION) (HCC): Status: RESOLVED | Noted: 2020-11-20 | Resolved: 2021-03-07

## 2021-03-07 PROCEDURE — 2060000000 HC ICU INTERMEDIATE R&B

## 2021-03-07 PROCEDURE — 2580000003 HC RX 258: Performed by: INTERNAL MEDICINE

## 2021-03-07 PROCEDURE — 6370000000 HC RX 637 (ALT 250 FOR IP): Performed by: INTERNAL MEDICINE

## 2021-03-07 RX ADMIN — CARVEDILOL 3.12 MG: 3.12 TABLET, FILM COATED ORAL at 16:02

## 2021-03-07 RX ADMIN — Medication 10 ML: at 08:32

## 2021-03-07 RX ADMIN — ASPIRIN 81 MG: 81 TABLET, COATED ORAL at 08:31

## 2021-03-07 RX ADMIN — ROSUVASTATIN 20 MG: 20 TABLET, FILM COATED ORAL at 21:18

## 2021-03-07 RX ADMIN — FAMOTIDINE 20 MG: 20 TABLET, FILM COATED ORAL at 08:31

## 2021-03-07 RX ADMIN — Medication 10 ML: at 21:19

## 2021-03-07 RX ADMIN — LISINOPRIL 2.5 MG: 5 TABLET ORAL at 08:31

## 2021-03-07 RX ADMIN — CARVEDILOL 3.12 MG: 3.12 TABLET, FILM COATED ORAL at 08:31

## 2021-03-07 ASSESSMENT — PAIN SCALES - GENERAL
PAINLEVEL_OUTOF10: 0

## 2021-03-07 NOTE — PROGRESS NOTES
Department of Internal Medicine  General Internal Medicine   Progress Note      SUBJECTIVE: no unusual pain general weakness bowels constipated does have exertional SOB     History obtained from the patient  General ROS: positive for  - fatigue, malaise and weight loss  negative for - chills, fever or night sweats  Psychological ROS: negative  Ophthalmic ROS: negative  Respiratory ROS: positive for - shortness of breath  negative for - cough, pleuritic pain or wheezing  Cardiovascular ROS: positive for - dyspnea on exertion  negative for - chest pain  Gastrointestinal ROS: no abdominal pain, change in bowel habits, or black or bloody stools  Genito-Urinary ROS: no dysuria, trouble voiding, or hematuria  Musculoskeletal ROS: negative  Neurological ROS: no TIA or stroke symptoms  Dermatological ROS: negative    OBJECTIVE      Medications      Current Facility-Administered Medications: aspirin EC tablet 81 mg, 81 mg, Oral, Daily  perflutren lipid microspheres (DEFINITY) injection 1.65 mg, 1.5 mL, Intravenous, ONCE PRN  0.9 % sodium chloride infusion, , Intravenous, PRN  lisinopril (PRINIVIL;ZESTRIL) tablet 2.5 mg, 2.5 mg, Oral, Daily  carvedilol (COREG) tablet 3.125 mg, 3.125 mg, Oral, BID WC  rosuvastatin (CRESTOR) tablet 20 mg, 20 mg, Oral, Nightly  furosemide (LASIX) tablet 20 mg, 20 mg, Oral, Daily PRN  sodium chloride flush 0.9 % injection 10 mL, 10 mL, Intravenous, 2 times per day  sodium chloride flush 0.9 % injection 10 mL, 10 mL, Intravenous, PRN  potassium chloride (KLOR-CON M) extended release tablet 40 mEq, 40 mEq, Oral, PRN **OR** potassium bicarb-citric acid (EFFER-K) effervescent tablet 40 mEq, 40 mEq, Oral, PRN **OR** potassium chloride 10 mEq/100 mL IVPB (Peripheral Line), 10 mEq, Intravenous, PRN  promethazine (PHENERGAN) tablet 12.5 mg, 12.5 mg, Oral, Q6H PRN **OR** ondansetron (ZOFRAN) injection 4 mg, 4 mg, Intravenous, Q6H PRN  magnesium hydroxide (MILK OF MAGNESIA) 400 MG/5ML suspension 30 mL, 30 mL, Oral, Daily PRN  famotidine (PEPCID) tablet 20 mg, 20 mg, Oral, Daily  acetaminophen (TYLENOL) tablet 650 mg, 650 mg, Oral, Q6H PRN **OR** acetaminophen (TYLENOL) suppository 650 mg, 650 mg, Rectal, Q6H PRN  hydrALAZINE (APRESOLINE) injection 10 mg, 10 mg, Intravenous, Q6H PRN  0.9 % sodium chloride bolus, 500 mL, Intravenous, PRN  0.9 % sodium chloride infusion, , Intravenous, PRN  HYDROcodone-acetaminophen (NORCO) 5-325 MG per tablet 1 tablet, 1 tablet, Oral, BID    Physical      Vitals: BP (!) 151/71   Pulse 72   Temp 96.2 °F (35.7 °C) (Temporal)   Resp 16   Ht 5' (1.524 m)   Wt 218 lb 8 oz (99.1 kg)   SpO2 95%   BMI 42.67 kg/m²   Temp: Temp: 96.2 °F (35.7 °C)  Max: Temp  Av.9 °F (36.1 °C)  Min: 96.2 °F (35.7 °C)  Max: 97.4 °F (36.3 °C)  Respiration range:  Resp  Avg: 15.2  Min: 14  Max: 16  Pulse Range:  Pulse  Av.9  Min: 60  Max: 87  Blood pressure range:  Systolic (89GSG), EFK:232 , Min:117 , LH   , Diastolic (28VIA), XDS:94, Min:55, Max:72    SpO2  Av.3 %  Min: 88 %  Max: 98 %    Intake/Output Summary (Last 24 hours) at 3/7/2021 1052  Last data filed at 3/7/2021 3393  Gross per 24 hour   Intake 10 ml   Output 450 ml   Net -440 ml       Vent settings:  Pulse  Av.1  Min: 48  Max: 92  Resp  Av.3  Min: 14  Max: 28  SpO2  Av.2 %  Min: 82 %  Max: 100 %    CONSTITUTIONAL:  fatigued, alert, cooperative, mild distress, appears stated age and morbidly obese  EYES:  Unremarkable   NECK:  No JVD  and supple, symmetrical, trachea midline  BACK:  symmetric and no curvature  LUNGS:  No increased work of breathing, good air exchange, clear to auscultation bilaterally, no crackles or wheezing  CARDIOVASCULAR:  normal apical pulses, regular rate and rhythm, normal S1 and S2 and no S3  ABDOMEN:  Soft BS + non tender   MUSCULOSKELETAL:  Trace edema   NEUROLOGIC:  Grossly intact   SKIN:  Warm dry and pale  and no bruising or bleeding    Data      Recent Results (from the past 96 hour(s))   Blood occult stool #1    Collection Time: 03/03/21 11:20 AM   Result Value Ref Range    Occult Blood Diagnostic Result: Negative  Normal range: Negative      Vitamin B12    Collection Time: 03/03/21  9:45 PM   Result Value Ref Range    Vitamin B-12 328 211 - 911 pg/mL   Hemoglobin and hematocrit, blood    Collection Time: 03/04/21 12:54 AM   Result Value Ref Range    Hemoglobin 9.3 (L) 12.0 - 16.0 g/dL    Hematocrit 30.3 (L) 36.0 - 48.0 %   Magnesium    Collection Time: 03/04/21  4:29 AM   Result Value Ref Range    Magnesium 2.10 1.80 - 2.40 mg/dL   Comprehensive Metabolic Panel w/ Reflex to MG    Collection Time: 03/04/21  4:39 AM   Result Value Ref Range    Sodium 142 136 - 145 mmol/L    Potassium reflex Magnesium 3.7 3.5 - 5.1 mmol/L    Chloride 106 99 - 110 mmol/L    CO2 26 21 - 32 mmol/L    Anion Gap 10 3 - 16    Glucose 113 (H) 70 - 99 mg/dL    BUN 19 7 - 20 mg/dL    CREATININE 1.0 0.6 - 1.2 mg/dL    GFR Non-African American 54 (A) >60    GFR African American >60 >60    Calcium 8.7 8.3 - 10.6 mg/dL    Total Protein 6.8 6.4 - 8.2 g/dL    Albumin 3.7 3.4 - 5.0 g/dL    Albumin/Globulin Ratio 1.2 1.1 - 2.2    Total Bilirubin 0.9 0.0 - 1.0 mg/dL    Alkaline Phosphatase 90 40 - 129 U/L    ALT 6 (L) 10 - 40 U/L    AST 15 15 - 37 U/L    Globulin 3.1 g/dL   TSH with Reflex    Collection Time: 03/04/21  4:39 AM   Result Value Ref Range    TSH 2.06 0.27 - 4.20 uIU/mL   EKG 12 Lead    Collection Time: 03/04/21  5:40 AM   Result Value Ref Range    Ventricular Rate 100 BPM    Atrial Rate 102 BPM    QRS Duration 130 ms    Q-T Interval 328 ms    QTc Calculation (Bazett) 423 ms    R Axis -29 degrees    T Axis 87 degrees    Diagnosis       Atrial fibrillationLeft bundle branch blockAbnormal ECGWhen compared with ECG of 20-NOV-2020 09:23,Atrial fibrillation has replaced Sinus rhythmVent.  rate has increased BY  55 BPMLeft bundle branch block is now PresentConfirmed by Eben tSorey MD, Jose Decker ([id number]) on 3/4/2021 12:09:00 PM EKG 12 Lead    Collection Time: 03/04/21 12:43 PM   Result Value Ref Range    Ventricular Rate 99 BPM    Atrial Rate 326 BPM    QRS Duration 84 ms    Q-T Interval 368 ms    QTc Calculation (Bazett) 472 ms    R Axis -15 degrees    T Axis 14 degrees    Diagnosis       Atrial fibrillation with premature ventricular or aberrantly conducted complexesMinimal voltage criteria for LVH, may be normal variantCannot rule out Anterior infarct , age undeterminedAbnormal ECGWhen compared with ECG of 04-MAR-2021 12:42,No significant change was foundConfirmed by Eben Storey MD, Jose Decker ([id number]) on 3/4/2021 2:26:20 PM   CBC    Collection Time: 03/04/21  1:10 PM   Result Value Ref Range    WBC 9.2 4.0 - 11.0 K/uL    RBC 4.65 4.00 - 5.20 M/uL    Hemoglobin 10.6 (L) 12.0 - 16.0 g/dL    Hematocrit 33.9 (L) 36.0 - 48.0 %    MCV 72.8 (L) 80.0 - 100.0 fL    MCH 22.8 (L) 26.0 - 34.0 pg    MCHC 31.2 31.0 - 36.0 g/dL    RDW 22.3 (H) 12.4 - 15.4 %    Platelets 987 050 - 900 K/uL    MPV 7.7 5.0 - 10.5 fL   Basic metabolic panel    Collection Time: 03/04/21  1:10 PM   Result Value Ref Range    Sodium 140 136 - 145 mmol/L    Potassium 3.7 3.5 - 5.1 mmol/L    Chloride 101 99 - 110 mmol/L    CO2 28 21 - 32 mmol/L    Anion Gap 11 3 - 16    Glucose 129 (H) 70 - 99 mg/dL    BUN 18 7 - 20 mg/dL    CREATININE 1.2 0.6 - 1.2 mg/dL    GFR Non- 43 (A) >60    GFR  53 (A) >60    Calcium 9.5 8.3 - 10.6 mg/dL   Brain Natriuretic Peptide    Collection Time: 03/04/21  1:10 PM   Result Value Ref Range    Pro-BNP 2,792 (H) 0 - 449 pg/mL   Magnesium    Collection Time: 03/04/21  1:10 PM   Result Value Ref Range    Magnesium 2.10 1.80 - 2.40 mg/dL   EKG 12 Lead    Collection Time: 03/04/21  5:06 PM   Result Value Ref Range    Ventricular Rate 72 BPM    Atrial Rate 72 BPM    P-R Interval 192 ms    QRS Duration 88 ms    Q-T Interval 404 ms    QTc Calculation (Bazett) 442 ms    P Axis 54 degrees    R Axis -17 degrees    T Axis 24 degrees Diagnosis       Sinus rhythm with Premature atrial complexesMinimal voltage criteria for LVH, may be normal variantAnterior infarct (cited on or before 04-MAR-2021)Abnormal ECGWhen compared with ECG of 04-MAR-2021 12:43,Sinus rhythm has replaced Atrial fibrillationConfirmed by Yared Riojas MD ([id number]) on 3/5/2021 11:52:38 AM   CEA    Collection Time: 03/05/21  4:49 AM   Result Value Ref Range    CEA 1.7 0.0 - 5.0 ng/mL   Basic Metabolic Panel    Collection Time: 03/05/21  4:59 AM   Result Value Ref Range    Sodium 142 136 - 145 mmol/L    Potassium 3.2 (L) 3.5 - 5.1 mmol/L    Chloride 102 99 - 110 mmol/L    CO2 29 21 - 32 mmol/L    Anion Gap 11 3 - 16    Glucose 117 (H) 70 - 99 mg/dL    BUN 22 (H) 7 - 20 mg/dL    CREATININE 1.3 (H) 0.6 - 1.2 mg/dL    GFR Non- 40 (A) >60    GFR  48 (A) >60    Calcium 9.2 8.3 - 10.6 mg/dL   CBC Auto Differential    Collection Time: 03/05/21  4:59 AM   Result Value Ref Range    WBC 11.1 (H) 4.0 - 11.0 K/uL    RBC 4.60 4.00 - 5.20 M/uL    Hemoglobin 10.2 (L) 12.0 - 16.0 g/dL    Hematocrit 33.2 (L) 36.0 - 48.0 %    MCV 72.0 (L) 80.0 - 100.0 fL    MCH 22.1 (L) 26.0 - 34.0 pg    MCHC 30.6 (L) 31.0 - 36.0 g/dL    RDW 23.1 (H) 12.4 - 15.4 %    Platelets 717 499 - 432 K/uL    MPV 8.1 5.0 - 10.5 fL    Neutrophils % 77.8 %    Lymphocytes % 13.2 %    Monocytes % 7.7 %    Eosinophils % 0.8 %    Basophils % 0.5 %    Neutrophils Absolute 8.7 (H) 1.7 - 7.7 K/uL    Lymphocytes Absolute 1.5 1.0 - 5.1 K/uL    Monocytes Absolute 0.9 0.0 - 1.3 K/uL    Eosinophils Absolute 0.1 0.0 - 0.6 K/uL    Basophils Absolute 0.1 0.0 - 0.2 K/uL   EKG 12 Lead    Collection Time: 03/06/21  2:33 AM   Result Value Ref Range    Ventricular Rate 118 BPM    Atrial Rate 144 BPM    P-R Interval 208 ms    QRS Duration 144 ms    Q-T Interval 398 ms    QTc Calculation (Bazett) 557 ms    P Axis 89 degrees    R Axis -26 degrees    T Axis 115 degrees    Diagnosis       Sinus tachycardia with frequent and consecutive Premature ventricular complexes and Fusion complexesLeft bundle branch blockAbnormal ECGNo previous ECGs available       Collection Time: 03/06/21  4:46 AM   Result Value Ref Range     16.4 0.0 - 35.0 U/mL   CBC Auto Differential    Collection Time: 03/06/21  4:48 AM   Result Value Ref Range    WBC 8.9 4.0 - 11.0 K/uL    RBC 4.02 4.00 - 5.20 M/uL    Hemoglobin 9.6 (L) 12.0 - 16.0 g/dL    Hematocrit 29.1 (L) 36.0 - 48.0 %    MCV 72.3 (L) 80.0 - 100.0 fL    MCH 23.9 (L) 26.0 - 34.0 pg    MCHC 33.0 31.0 - 36.0 g/dL    RDW 23.6 (H) 12.4 - 15.4 %    Platelets 228 801 - 258 K/uL    MPV 7.7 5.0 - 10.5 fL    Neutrophils % 67.2 %    Lymphocytes % 18.5 %    Monocytes % 11.4 %    Eosinophils % 2.4 %    Basophils % 0.5 %    Neutrophils Absolute 6.0 1.7 - 7.7 K/uL    Lymphocytes Absolute 1.7 1.0 - 5.1 K/uL    Monocytes Absolute 1.0 0.0 - 1.3 K/uL    Eosinophils Absolute 0.2 0.0 - 0.6 K/uL    Basophils Absolute 0.0 0.0 - 0.2 K/uL   Basic Metabolic Panel    Collection Time: 03/06/21  4:48 AM   Result Value Ref Range    Sodium 142 136 - 145 mmol/L    Potassium 3.9 3.5 - 5.1 mmol/L    Chloride 104 99 - 110 mmol/L    CO2 29 21 - 32 mmol/L    Anion Gap 9 3 - 16    Glucose 110 (H) 70 - 99 mg/dL    BUN 18 7 - 20 mg/dL    CREATININE 1.2 0.6 - 1.2 mg/dL    GFR Non- 43 (A) >60    GFR  53 (A) >60    Calcium 8.6 8.3 - 10.6 mg/dL       ASSESSMENT AND PLAN     Principal Problem:    Anemia  Active Problems:    Hypertension    CAD (coronary artery disease)    High cholesterol    Diastolic dysfunction    Hypokalemia    Malignant neoplasm of ascending colon (HCC)  Resolved Problems:    * No resolved hospital problems.  *    Chart reviewed    Patient interviewed and examined    ct Iron therapy Hgb now at 9.6 up from 5.8 on admission   Microcytic indices indicate chronic blood loss for a long time    awaiting biopsy report of presumed colon malignancy , in fact Right Hemicolectomy already planned ,  May have mediastinal nodes   Discussed at length with patient , she appears upbeat

## 2021-03-07 NOTE — PLAN OF CARE
Problem: Falls - Risk of:  Goal: Will remain free from falls  3/7/2021 0709 by Kurt Oakes  Outcome: Ongoing  3/6/2021 1728 by Roslyn Morrison RN  Outcome: Ongoing     Problem: Pain:  Goal: Pain level will decrease  3/7/2021 0709 by Kurt Oakes  Outcome: Ongoing  3/6/2021 1728 by Roslyn Morrison RN  Outcome: Ongoing     Problem: Musculor/Skeletal Functional Status  Goal: Highest potential functional level  3/6/2021 1728 by Roslyn Morrison RN  Outcome: Ongoing

## 2021-03-07 NOTE — PLAN OF CARE
Problem: Falls - Risk of:  Goal: Will remain free from falls  Description: Will remain free from falls  3/7/2021 1833 by Balbina Ponce RN  Outcome: Ongoing     Problem: Falls - Risk of:  Goal: Absence of physical injury  Description: Absence of physical injury  Outcome: Ongoing     Problem: Pain:  Goal: Pain level will decrease  Description: Pain level will decrease  3/7/2021 1833 by Balbina Ponce RN  Outcome: Ongoing     Problem: Musculor/Skeletal Functional Status  Goal: Highest potential functional level  3/7/2021 1833 by Balbina Ponce RN  Outcome: Ongoing     Problem: Musculor/Skeletal Functional Status  Goal: Absence of falls  Outcome: Ongoing

## 2021-03-08 ENCOUNTER — ANESTHESIA (OUTPATIENT)
Dept: OPERATING ROOM | Age: 81
DRG: 329 | End: 2021-03-08
Payer: MEDICARE

## 2021-03-08 ENCOUNTER — APPOINTMENT (OUTPATIENT)
Dept: GENERAL RADIOLOGY | Age: 81
DRG: 329 | End: 2021-03-08
Payer: MEDICARE

## 2021-03-08 ENCOUNTER — ANESTHESIA EVENT (OUTPATIENT)
Dept: OPERATING ROOM | Age: 81
DRG: 329 | End: 2021-03-08
Payer: MEDICARE

## 2021-03-08 VITALS
SYSTOLIC BLOOD PRESSURE: 119 MMHG | OXYGEN SATURATION: 97 % | DIASTOLIC BLOOD PRESSURE: 57 MMHG | RESPIRATION RATE: 8 BRPM | TEMPERATURE: 68.4 F

## 2021-03-08 LAB
ABO/RH: NORMAL
ANION GAP SERPL CALCULATED.3IONS-SCNC: 8 MMOL/L (ref 3–16)
ANTIBODY SCREEN: NORMAL
BUN BLDV-MCNC: 27 MG/DL (ref 7–20)
CALCIUM SERPL-MCNC: 9 MG/DL (ref 8.3–10.6)
CHLORIDE BLD-SCNC: 105 MMOL/L (ref 99–110)
CO2: 29 MMOL/L (ref 21–32)
CREAT SERPL-MCNC: 1.2 MG/DL (ref 0.6–1.2)
EKG ATRIAL RATE: 144 BPM
EKG DIAGNOSIS: NORMAL
EKG P AXIS: 89 DEGREES
EKG P-R INTERVAL: 208 MS
EKG Q-T INTERVAL: 398 MS
EKG QRS DURATION: 144 MS
EKG QTC CALCULATION (BAZETT): 557 MS
EKG R AXIS: -26 DEGREES
EKG T AXIS: 115 DEGREES
EKG VENTRICULAR RATE: 118 BPM
GFR AFRICAN AMERICAN: 53
GFR NON-AFRICAN AMERICAN: 43
GLUCOSE BLD-MCNC: 118 MG/DL (ref 70–99)
HCT VFR BLD CALC: 32.9 % (ref 36–48)
HEMOGLOBIN: 10 G/DL (ref 12–16)
INR BLD: 1.09 (ref 0.86–1.14)
MCH RBC QN AUTO: 22.8 PG (ref 26–34)
MCHC RBC AUTO-ENTMCNC: 30.5 G/DL (ref 31–36)
MCV RBC AUTO: 74.5 FL (ref 80–100)
PDW BLD-RTO: 24.7 % (ref 12.4–15.4)
PLATELET # BLD: 288 K/UL (ref 135–450)
PMV BLD AUTO: 7.9 FL (ref 5–10.5)
POTASSIUM SERPL-SCNC: 4.5 MMOL/L (ref 3.5–5.1)
PROTHROMBIN TIME: 12.7 SEC (ref 10–13.2)
RBC # BLD: 4.42 M/UL (ref 4–5.2)
SODIUM BLD-SCNC: 142 MMOL/L (ref 136–145)
WBC # BLD: 8.4 K/UL (ref 4–11)

## 2021-03-08 PROCEDURE — 6370000000 HC RX 637 (ALT 250 FOR IP): Performed by: SURGERY

## 2021-03-08 PROCEDURE — 2500000003 HC RX 250 WO HCPCS: Performed by: NURSE ANESTHETIST, CERTIFIED REGISTERED

## 2021-03-08 PROCEDURE — 88342 IMHCHEM/IMCYTCHM 1ST ANTB: CPT

## 2021-03-08 PROCEDURE — 0FT44ZZ RESECTION OF GALLBLADDER, PERCUTANEOUS ENDOSCOPIC APPROACH: ICD-10-PCS | Performed by: SURGERY

## 2021-03-08 PROCEDURE — 6360000002 HC RX W HCPCS: Performed by: SURGERY

## 2021-03-08 PROCEDURE — 80048 BASIC METABOLIC PNL TOTAL CA: CPT

## 2021-03-08 PROCEDURE — 7100000000 HC PACU RECOVERY - FIRST 15 MIN: Performed by: SURGERY

## 2021-03-08 PROCEDURE — 3600000004 HC SURGERY LEVEL 4 BASE: Performed by: SURGERY

## 2021-03-08 PROCEDURE — 2060000000 HC ICU INTERMEDIATE R&B

## 2021-03-08 PROCEDURE — 2500000003 HC RX 250 WO HCPCS: Performed by: SURGERY

## 2021-03-08 PROCEDURE — 0DTF4ZZ RESECTION OF RIGHT LARGE INTESTINE, PERCUTANEOUS ENDOSCOPIC APPROACH: ICD-10-PCS | Performed by: SURGERY

## 2021-03-08 PROCEDURE — 88341 IMHCHEM/IMCYTCHM EA ADD ANTB: CPT

## 2021-03-08 PROCEDURE — 86901 BLOOD TYPING SEROLOGIC RH(D): CPT

## 2021-03-08 PROCEDURE — 2580000003 HC RX 258: Performed by: SURGERY

## 2021-03-08 PROCEDURE — 6370000000 HC RX 637 (ALT 250 FOR IP): Performed by: INTERNAL MEDICINE

## 2021-03-08 PROCEDURE — 36415 COLL VENOUS BLD VENIPUNCTURE: CPT

## 2021-03-08 PROCEDURE — 2580000003 HC RX 258: Performed by: INTERNAL MEDICINE

## 2021-03-08 PROCEDURE — 2720000010 HC SURG SUPPLY STERILE: Performed by: SURGERY

## 2021-03-08 PROCEDURE — P9045 ALBUMIN (HUMAN), 5%, 250 ML: HCPCS | Performed by: NURSE ANESTHETIST, CERTIFIED REGISTERED

## 2021-03-08 PROCEDURE — 3700000000 HC ANESTHESIA ATTENDED CARE: Performed by: SURGERY

## 2021-03-08 PROCEDURE — 2580000003 HC RX 258: Performed by: NURSE ANESTHETIST, CERTIFIED REGISTERED

## 2021-03-08 PROCEDURE — 6360000002 HC RX W HCPCS: Performed by: ANESTHESIOLOGY

## 2021-03-08 PROCEDURE — 44205 LAP COLECTOMY PART W/ILEUM: CPT | Performed by: SURGERY

## 2021-03-08 PROCEDURE — 7100000001 HC PACU RECOVERY - ADDTL 15 MIN: Performed by: SURGERY

## 2021-03-08 PROCEDURE — 86900 BLOOD TYPING SEROLOGIC ABO: CPT

## 2021-03-08 PROCEDURE — 99233 SBSQ HOSP IP/OBS HIGH 50: CPT | Performed by: NURSE PRACTITIONER

## 2021-03-08 PROCEDURE — 88309 TISSUE EXAM BY PATHOLOGIST: CPT

## 2021-03-08 PROCEDURE — 3700000001 HC ADD 15 MINUTES (ANESTHESIA): Performed by: SURGERY

## 2021-03-08 PROCEDURE — 47562 LAPAROSCOPIC CHOLECYSTECTOMY: CPT | Performed by: SURGERY

## 2021-03-08 PROCEDURE — 85610 PROTHROMBIN TIME: CPT

## 2021-03-08 PROCEDURE — 3600000014 HC SURGERY LEVEL 4 ADDTL 15MIN: Performed by: SURGERY

## 2021-03-08 PROCEDURE — 93010 ELECTROCARDIOGRAM REPORT: CPT | Performed by: INTERNAL MEDICINE

## 2021-03-08 PROCEDURE — 88304 TISSUE EXAM BY PATHOLOGIST: CPT

## 2021-03-08 PROCEDURE — 86850 RBC ANTIBODY SCREEN: CPT

## 2021-03-08 PROCEDURE — 85027 COMPLETE CBC AUTOMATED: CPT

## 2021-03-08 PROCEDURE — 6360000002 HC RX W HCPCS: Performed by: NURSE ANESTHETIST, CERTIFIED REGISTERED

## 2021-03-08 PROCEDURE — 2709999900 HC NON-CHARGEABLE SUPPLY: Performed by: SURGERY

## 2021-03-08 PROCEDURE — APPNB30 APP NON BILLABLE TIME 0-30 MINS: Performed by: NURSE PRACTITIONER

## 2021-03-08 RX ORDER — SODIUM CHLORIDE 9 MG/ML
INJECTION, SOLUTION INTRAVENOUS CONTINUOUS
Status: DISCONTINUED | OUTPATIENT
Start: 2021-03-08 | End: 2021-03-14

## 2021-03-08 RX ORDER — LIDOCAINE HYDROCHLORIDE 10 MG/ML
1 INJECTION, SOLUTION EPIDURAL; INFILTRATION; INTRACAUDAL; PERINEURAL
Status: DISCONTINUED | OUTPATIENT
Start: 2021-03-08 | End: 2021-03-08 | Stop reason: HOSPADM

## 2021-03-08 RX ORDER — EPHEDRINE SULFATE 50 MG/ML
INJECTION INTRAVENOUS PRN
Status: DISCONTINUED | OUTPATIENT
Start: 2021-03-08 | End: 2021-03-08 | Stop reason: SDUPTHER

## 2021-03-08 RX ORDER — DEXAMETHASONE SODIUM PHOSPHATE 4 MG/ML
INJECTION, SOLUTION INTRA-ARTICULAR; INTRALESIONAL; INTRAMUSCULAR; INTRAVENOUS; SOFT TISSUE PRN
Status: DISCONTINUED | OUTPATIENT
Start: 2021-03-08 | End: 2021-03-08 | Stop reason: SDUPTHER

## 2021-03-08 RX ORDER — DEXMEDETOMIDINE HYDROCHLORIDE 100 UG/ML
INJECTION, SOLUTION INTRAVENOUS PRN
Status: DISCONTINUED | OUTPATIENT
Start: 2021-03-08 | End: 2021-03-08 | Stop reason: SDUPTHER

## 2021-03-08 RX ORDER — SUCCINYLCHOLINE CHLORIDE 20 MG/ML
INJECTION INTRAMUSCULAR; INTRAVENOUS PRN
Status: DISCONTINUED | OUTPATIENT
Start: 2021-03-08 | End: 2021-03-08 | Stop reason: SDUPTHER

## 2021-03-08 RX ORDER — LORAZEPAM 2 MG/ML
1 INJECTION INTRAMUSCULAR ONCE
Status: DISCONTINUED | OUTPATIENT
Start: 2021-03-08 | End: 2021-03-08

## 2021-03-08 RX ORDER — ALBUMIN, HUMAN INJ 5% 5 %
SOLUTION INTRAVENOUS PRN
Status: DISCONTINUED | OUTPATIENT
Start: 2021-03-08 | End: 2021-03-08 | Stop reason: SDUPTHER

## 2021-03-08 RX ORDER — SODIUM CHLORIDE, SODIUM LACTATE, POTASSIUM CHLORIDE, CALCIUM CHLORIDE 600; 310; 30; 20 MG/100ML; MG/100ML; MG/100ML; MG/100ML
INJECTION, SOLUTION INTRAVENOUS CONTINUOUS PRN
Status: COMPLETED | OUTPATIENT
Start: 2021-03-08 | End: 2021-03-08

## 2021-03-08 RX ORDER — MAGNESIUM SULFATE HEPTAHYDRATE 500 MG/ML
INJECTION, SOLUTION INTRAMUSCULAR; INTRAVENOUS PRN
Status: DISCONTINUED | OUTPATIENT
Start: 2021-03-08 | End: 2021-03-08 | Stop reason: SDUPTHER

## 2021-03-08 RX ORDER — ONDANSETRON 2 MG/ML
4 INJECTION INTRAMUSCULAR; INTRAVENOUS
Status: DISCONTINUED | OUTPATIENT
Start: 2021-03-08 | End: 2021-03-08 | Stop reason: HOSPADM

## 2021-03-08 RX ORDER — LIDOCAINE HYDROCHLORIDE 20 MG/ML
INJECTION, SOLUTION EPIDURAL; INFILTRATION; INTRACAUDAL; PERINEURAL PRN
Status: DISCONTINUED | OUTPATIENT
Start: 2021-03-08 | End: 2021-03-08 | Stop reason: SDUPTHER

## 2021-03-08 RX ORDER — HYDROMORPHONE HCL 110MG/55ML
0.25 PATIENT CONTROLLED ANALGESIA SYRINGE INTRAVENOUS EVERY 5 MIN PRN
Status: DISCONTINUED | OUTPATIENT
Start: 2021-03-08 | End: 2021-03-08 | Stop reason: HOSPADM

## 2021-03-08 RX ORDER — SODIUM CHLORIDE 9 MG/ML
INJECTION, SOLUTION INTRAVENOUS CONTINUOUS PRN
Status: DISCONTINUED | OUTPATIENT
Start: 2021-03-08 | End: 2021-03-08 | Stop reason: SDUPTHER

## 2021-03-08 RX ORDER — MAGNESIUM HYDROXIDE 1200 MG/15ML
LIQUID ORAL
Status: COMPLETED | OUTPATIENT
Start: 2021-03-08 | End: 2021-03-08

## 2021-03-08 RX ORDER — MAGNESIUM HYDROXIDE 1200 MG/15ML
LIQUID ORAL CONTINUOUS PRN
Status: COMPLETED | OUTPATIENT
Start: 2021-03-08 | End: 2021-03-08

## 2021-03-08 RX ORDER — PROPOFOL 10 MG/ML
INJECTION, EMULSION INTRAVENOUS PRN
Status: DISCONTINUED | OUTPATIENT
Start: 2021-03-08 | End: 2021-03-08 | Stop reason: SDUPTHER

## 2021-03-08 RX ORDER — HYDROCODONE BITARTRATE AND ACETAMINOPHEN 5; 325 MG/1; MG/1
1 TABLET ORAL
Status: DISCONTINUED | OUTPATIENT
Start: 2021-03-08 | End: 2021-03-08 | Stop reason: HOSPADM

## 2021-03-08 RX ORDER — HYDROMORPHONE HCL 110MG/55ML
0.5 PATIENT CONTROLLED ANALGESIA SYRINGE INTRAVENOUS EVERY 5 MIN PRN
Status: COMPLETED | OUTPATIENT
Start: 2021-03-08 | End: 2021-03-08

## 2021-03-08 RX ORDER — ESMOLOL HYDROCHLORIDE 10 MG/ML
INJECTION INTRAVENOUS PRN
Status: DISCONTINUED | OUTPATIENT
Start: 2021-03-08 | End: 2021-03-08 | Stop reason: SDUPTHER

## 2021-03-08 RX ORDER — ONDANSETRON 2 MG/ML
INJECTION INTRAMUSCULAR; INTRAVENOUS PRN
Status: DISCONTINUED | OUTPATIENT
Start: 2021-03-08 | End: 2021-03-08 | Stop reason: SDUPTHER

## 2021-03-08 RX ORDER — HYDROMORPHONE HCL 110MG/55ML
PATIENT CONTROLLED ANALGESIA SYRINGE INTRAVENOUS PRN
Status: DISCONTINUED | OUTPATIENT
Start: 2021-03-08 | End: 2021-03-08 | Stop reason: SDUPTHER

## 2021-03-08 RX ORDER — ROCURONIUM BROMIDE 10 MG/ML
INJECTION, SOLUTION INTRAVENOUS PRN
Status: DISCONTINUED | OUTPATIENT
Start: 2021-03-08 | End: 2021-03-08 | Stop reason: SDUPTHER

## 2021-03-08 RX ORDER — SODIUM CHLORIDE 9 MG/ML
INJECTION, SOLUTION INTRAVENOUS CONTINUOUS
Status: DISCONTINUED | OUTPATIENT
Start: 2021-03-08 | End: 2021-03-08

## 2021-03-08 RX ORDER — BUPIVACAINE HYDROCHLORIDE 5 MG/ML
INJECTION, SOLUTION EPIDURAL; INTRACAUDAL
Status: COMPLETED | OUTPATIENT
Start: 2021-03-08 | End: 2021-03-08

## 2021-03-08 RX ORDER — ACETAMINOPHEN 500 MG
1000 TABLET ORAL EVERY 8 HOURS SCHEDULED
Status: DISCONTINUED | OUTPATIENT
Start: 2021-03-08 | End: 2021-03-10

## 2021-03-08 RX ADMIN — ROCURONIUM BROMIDE 10 MG: 10 INJECTION, SOLUTION INTRAVENOUS at 14:02

## 2021-03-08 RX ADMIN — HYDROMORPHONE HYDROCHLORIDE 0.5 MG: 2 INJECTION, SOLUTION INTRAMUSCULAR; INTRAVENOUS; SUBCUTANEOUS at 12:55

## 2021-03-08 RX ADMIN — PHENYLEPHRINE HYDROCHLORIDE 200 MCG: 10 INJECTION INTRAVENOUS at 13:22

## 2021-03-08 RX ADMIN — HYDROMORPHONE HYDROCHLORIDE 0.5 MG: 2 INJECTION, SOLUTION INTRAMUSCULAR; INTRAVENOUS; SUBCUTANEOUS at 16:33

## 2021-03-08 RX ADMIN — MAGNESIUM SULFATE HEPTAHYDRATE 1 G: 500 INJECTION, SOLUTION INTRAMUSCULAR; INTRAVENOUS at 13:09

## 2021-03-08 RX ADMIN — Medication 10 ML: at 09:52

## 2021-03-08 RX ADMIN — PROPOFOL 110 MG: 10 INJECTION, EMULSION INTRAVENOUS at 12:40

## 2021-03-08 RX ADMIN — ACETAMINOPHEN 1000 MG: 500 TABLET, FILM COATED ORAL at 23:02

## 2021-03-08 RX ADMIN — LIDOCAINE HYDROCHLORIDE 50 MG: 20 INJECTION, SOLUTION EPIDURAL; INFILTRATION; INTRACAUDAL; PERINEURAL at 12:40

## 2021-03-08 RX ADMIN — SUGAMMADEX 200 MG: 100 INJECTION, SOLUTION INTRAVENOUS at 15:07

## 2021-03-08 RX ADMIN — SODIUM CHLORIDE: 9 INJECTION, SOLUTION INTRAVENOUS at 12:36

## 2021-03-08 RX ADMIN — ROCURONIUM BROMIDE 10 MG: 10 INJECTION, SOLUTION INTRAVENOUS at 14:17

## 2021-03-08 RX ADMIN — HYDROMORPHONE HYDROCHLORIDE 0.5 MG: 2 INJECTION, SOLUTION INTRAMUSCULAR; INTRAVENOUS; SUBCUTANEOUS at 16:54

## 2021-03-08 RX ADMIN — HYDROMORPHONE HYDROCHLORIDE 0.5 MG: 2 INJECTION, SOLUTION INTRAMUSCULAR; INTRAVENOUS; SUBCUTANEOUS at 16:41

## 2021-03-08 RX ADMIN — SUCCINYLCHOLINE CHLORIDE 100 MG: 20 INJECTION, SOLUTION INTRAMUSCULAR; INTRAVENOUS at 12:41

## 2021-03-08 RX ADMIN — PHENYLEPHRINE HYDROCHLORIDE 100 MCG: 10 INJECTION INTRAVENOUS at 14:05

## 2021-03-08 RX ADMIN — HYDROMORPHONE HYDROCHLORIDE 1 MG: 1 INJECTION, SOLUTION INTRAMUSCULAR; INTRAVENOUS; SUBCUTANEOUS at 22:17

## 2021-03-08 RX ADMIN — PHENYLEPHRINE HYDROCHLORIDE 200 MCG: 10 INJECTION INTRAVENOUS at 12:49

## 2021-03-08 RX ADMIN — CARVEDILOL 3.12 MG: 3.12 TABLET, FILM COATED ORAL at 09:51

## 2021-03-08 RX ADMIN — ESMOLOL HYDROCHLORIDE 5 MG: 10 INJECTION, SOLUTION INTRAVENOUS at 14:04

## 2021-03-08 RX ADMIN — HYDROMORPHONE HYDROCHLORIDE 0.5 MG: 2 INJECTION, SOLUTION INTRAMUSCULAR; INTRAVENOUS; SUBCUTANEOUS at 16:20

## 2021-03-08 RX ADMIN — HYDROMORPHONE HYDROCHLORIDE 0.5 MG: 2 INJECTION, SOLUTION INTRAMUSCULAR; INTRAVENOUS; SUBCUTANEOUS at 14:40

## 2021-03-08 RX ADMIN — ALBUMIN (HUMAN) 250 ML: 12.5 INJECTION, SOLUTION INTRAVENOUS at 13:52

## 2021-03-08 RX ADMIN — ROCURONIUM BROMIDE 50 MG: 10 INJECTION, SOLUTION INTRAVENOUS at 12:48

## 2021-03-08 RX ADMIN — PHENYLEPHRINE HYDROCHLORIDE 100 MCG: 10 INJECTION INTRAVENOUS at 15:03

## 2021-03-08 RX ADMIN — Medication 10 ML: at 20:29

## 2021-03-08 RX ADMIN — DEXMEDETOMIDINE HYDROCHLORIDE 5 MCG: 100 INJECTION, SOLUTION INTRAVENOUS at 14:48

## 2021-03-08 RX ADMIN — SODIUM CHLORIDE: 9 INJECTION, SOLUTION INTRAVENOUS at 14:12

## 2021-03-08 RX ADMIN — ROSUVASTATIN 20 MG: 20 TABLET, FILM COATED ORAL at 23:02

## 2021-03-08 RX ADMIN — CEFAZOLIN SODIUM 2000 MG: 10 INJECTION, POWDER, FOR SOLUTION INTRAVENOUS at 12:30

## 2021-03-08 RX ADMIN — METRONIDAZOLE 500 MG: 500 INJECTION, SOLUTION INTRAVENOUS at 20:29

## 2021-03-08 RX ADMIN — ONDANSETRON 4 MG: 2 INJECTION INTRAMUSCULAR; INTRAVENOUS at 14:48

## 2021-03-08 RX ADMIN — PHENYLEPHRINE HYDROCHLORIDE 40 MCG/MIN: 10 INJECTION INTRAVENOUS at 14:10

## 2021-03-08 RX ADMIN — SODIUM CHLORIDE: 9 INJECTION, SOLUTION INTRAVENOUS at 18:10

## 2021-03-08 RX ADMIN — ROCURONIUM BROMIDE 20 MG: 10 INJECTION, SOLUTION INTRAVENOUS at 13:36

## 2021-03-08 RX ADMIN — DEXMEDETOMIDINE HYDROCHLORIDE 10 MCG: 100 INJECTION, SOLUTION INTRAVENOUS at 14:17

## 2021-03-08 RX ADMIN — PHENYLEPHRINE HYDROCHLORIDE 100 MCG: 10 INJECTION INTRAVENOUS at 13:55

## 2021-03-08 RX ADMIN — EPHEDRINE SULFATE 10 MG: 50 INJECTION, SOLUTION INTRAVENOUS at 12:48

## 2021-03-08 RX ADMIN — METRONIDAZOLE 500 MG: 500 INJECTION, SOLUTION INTRAVENOUS at 12:44

## 2021-03-08 RX ADMIN — DEXAMETHASONE SODIUM PHOSPHATE 8 MG: 4 INJECTION, SOLUTION INTRAMUSCULAR; INTRAVENOUS at 12:59

## 2021-03-08 RX ADMIN — PHENYLEPHRINE HYDROCHLORIDE 100 MCG: 10 INJECTION INTRAVENOUS at 14:12

## 2021-03-08 RX ADMIN — MAGNESIUM SULFATE HEPTAHYDRATE 1 G: 500 INJECTION, SOLUTION INTRAMUSCULAR; INTRAVENOUS at 13:50

## 2021-03-08 ASSESSMENT — PULMONARY FUNCTION TESTS
PIF_VALUE: 29
PIF_VALUE: 29
PIF_VALUE: 35
PIF_VALUE: 35
PIF_VALUE: 1
PIF_VALUE: 23
PIF_VALUE: 23
PIF_VALUE: 28
PIF_VALUE: 24
PIF_VALUE: 29
PIF_VALUE: 22
PIF_VALUE: 32
PIF_VALUE: 24
PIF_VALUE: 32
PIF_VALUE: 22
PIF_VALUE: 24
PIF_VALUE: 24
PIF_VALUE: 22
PIF_VALUE: 4
PIF_VALUE: 27
PIF_VALUE: 30
PIF_VALUE: 24
PIF_VALUE: 27
PIF_VALUE: 23
PIF_VALUE: 23
PIF_VALUE: 36
PIF_VALUE: 25
PIF_VALUE: 23
PIF_VALUE: 24
PIF_VALUE: 34
PIF_VALUE: 24
PIF_VALUE: 29
PIF_VALUE: 29
PIF_VALUE: 28
PIF_VALUE: 27
PIF_VALUE: 29
PIF_VALUE: 30
PIF_VALUE: 32
PIF_VALUE: 31
PIF_VALUE: 29
PIF_VALUE: 26
PIF_VALUE: 32
PIF_VALUE: 1
PIF_VALUE: 33
PIF_VALUE: 23
PIF_VALUE: 28
PIF_VALUE: 35
PIF_VALUE: 22
PIF_VALUE: 24
PIF_VALUE: 20
PIF_VALUE: 3
PIF_VALUE: 29
PIF_VALUE: 36
PIF_VALUE: 27
PIF_VALUE: 30
PIF_VALUE: 23
PIF_VALUE: 21
PIF_VALUE: 27
PIF_VALUE: 23
PIF_VALUE: 27
PIF_VALUE: 24
PIF_VALUE: 24
PIF_VALUE: 25
PIF_VALUE: 28
PIF_VALUE: 22
PIF_VALUE: 23
PIF_VALUE: 13
PIF_VALUE: 36
PIF_VALUE: 24
PIF_VALUE: 0
PIF_VALUE: 24
PIF_VALUE: 36
PIF_VALUE: 23
PIF_VALUE: 24
PIF_VALUE: 27
PIF_VALUE: 24
PIF_VALUE: 29
PIF_VALUE: 35
PIF_VALUE: 24
PIF_VALUE: 36
PIF_VALUE: 27
PIF_VALUE: 23
PIF_VALUE: 35
PIF_VALUE: 30
PIF_VALUE: 32
PIF_VALUE: 31
PIF_VALUE: 23
PIF_VALUE: 26

## 2021-03-08 ASSESSMENT — PAIN SCALES - GENERAL
PAINLEVEL_OUTOF10: 8
PAINLEVEL_OUTOF10: 10

## 2021-03-08 ASSESSMENT — PAIN DESCRIPTION - LOCATION: LOCATION: ABDOMEN

## 2021-03-08 NOTE — PROGRESS NOTES
Patient asleep, comfortable. VSS. Abd soft, lap sites intact. Phase 1 recovery completed, will transfer back to 3T.

## 2021-03-08 NOTE — ANESTHESIA PRE PROCEDURE
Department of Anesthesiology  Preprocedure Note       Name:  Iker January   Age:  68 y.o.  :  1940                                          MRN:  [id number]         Date:  3/8/2021      Surgeon: Aniyah Cohn):  Steph Ram MD    Procedure: Procedure(s):  LAPAROSCOPIC RIGHT COLON RESECTION  CHOLECYSTECTOMY LAPAROSCOPIC, CHOLANGIOGRAM    Medications prior to admission:   Prior to Admission medications    Medication Sig Start Date End Date Taking? Authorizing Provider   furosemide (LASIX) 20 MG tablet Take 1 tablet by mouth daily as needed (swelling or shortness of breath) 3/1/21   JIMENA Caicedo CNP   HYDROcodone-acetaminophen (NORCO) 5-325 MG per tablet Take 1 tablet by mouth 2 times daily. 20   Historical Provider, MD   rosuvastatin (CRESTOR) 20 MG tablet Take 1 tablet by mouth nightly 20   JIMENA Caicedo CNP   aspirin 81 MG chewable tablet Take 1 tablet by mouth daily 20   JIMENA Caicedo - CNP   lisinopril (PRINIVIL;ZESTRIL) 2.5 MG tablet Take 1 tablet by mouth daily 20   JIMENA Caicedo - CNP   carvedilol (COREG) 3.125 MG tablet Take 1 tablet by mouth 2 times daily (with meals) 20   JIMENA Caicedo CNP   ticagrelor (BRILINTA) 90 MG TABS tablet Take 1 tablet by mouth 2 times daily 20   JIMENA Caicedo CNP       Current medications:    No current facility-administered medications for this visit. No current outpatient medications on file.      Facility-Administered Medications Ordered in Other Visits   Medication Dose Route Frequency Provider Last Rate Last Admin    HYDROcodone-acetaminophen (NORCO) 5-325 MG per tablet 1 tablet  1 tablet Oral Once PRN Deisy Hinton MD        ondansetron [facility]) injection 4 mg  4 mg Intravenous Once PRN Deisy Hinton MD        HYDROmorphone (DILAUDID) injection 0.25 mg  0.25 mg Intravenous Q5 Min PRN Deisy Hinton MD        HYDROmorphone (DILAUDID) injection 0.5 mg  0.5 mg Intravenous Q5 Min PRN Maida Cheema MD        aspirin EC tablet 81 mg  81 mg Oral Daily Rod Burns MD   81 mg at 03/07/21 0831    perflutren lipid microspheres (DEFINITY) injection 1.65 mg  1.5 mL Intravenous ONCE PRN Rod Burns MD        0.9 % sodium chloride infusion   Intravenous PRN Rod Burns MD        lisinopril (PRINIVIL;ZESTRIL) tablet 2.5 mg  2.5 mg Oral Daily Rod Burns MD   2.5 mg at 03/07/21 0831    carvedilol (COREG) tablet 3.125 mg  3.125 mg Oral BID WC Rod Burns MD   3.125 mg at 03/08/21 7979    rosuvastatin (CRESTOR) tablet 20 mg  20 mg Oral Nightly Rod Burns MD   20 mg at 03/07/21 2118    furosemide (LASIX) tablet 20 mg  20 mg Oral Daily PRN Rod Burns MD        sodium chloride flush 0.9 % injection 10 mL  10 mL Intravenous 2 times per day Rod Burns MD   10 mL at 03/08/21 0952    sodium chloride flush 0.9 % injection 10 mL  10 mL Intravenous PRN Rod Burns MD   10 mL at 03/05/21 1654    potassium chloride (KLOR-CON M) extended release tablet 40 mEq  40 mEq Oral PRN Rod Burns MD        Or    potassium bicarb-citric acid (EFFER-K) effervescent tablet 40 mEq  40 mEq Oral PRN Rod Burns MD   40 mEq at 03/05/21 1813    Or    potassium chloride 10 mEq/100 mL IVPB (Peripheral Line)  10 mEq Intravenous PRN Rod Burns MD        promethazine (PHENERGAN) tablet 12.5 mg  12.5 mg Oral Q6H PRN Rod Burns MD        Or    ondansetron [facility]) injection 4 mg  4 mg Intravenous Q6H PRN Rod Burns MD        magnesium hydroxide (MILK OF MAGNESIA) 400 MG/5ML suspension 30 mL  30 mL Oral Daily PRN Rod Burns MD        famotidine (PEPCID) tablet 20 mg  20 mg Oral Daily Rod Burns MD   20 mg at 03/07/21 0831    acetaminophen (TYLENOL) tablet 650 mg  650 mg Oral Q6H PRN Rod Burns MD        Or    acetaminophen (TYLENOL) suppository 650 mg  650 mg Rectal Q6H PRN Rod Burns MD Encounters:   03/07/21 218 lb 8 oz (99.1 kg)   03/01/21 235 lb 9.6 oz (106.9 kg)   11/30/20 229 lb (103.9 kg)     There is no height or weight on file to calculate BMI.    CBC:   Lab Results   Component Value Date    WBC 8.4 03/08/2021    RBC 4.42 03/08/2021    HGB 10.0 03/08/2021    HCT 32.9 03/08/2021    MCV 74.5 03/08/2021    RDW 24.7 03/08/2021     03/08/2021       CMP:   Lab Results   Component Value Date     03/08/2021    K 4.5 03/08/2021    K 3.7 03/04/2021     03/08/2021    CO2 29 03/08/2021    BUN 27 03/08/2021    CREATININE 1.2 03/08/2021    GFRAA 53 03/08/2021    AGRATIO 1.2 03/04/2021    LABGLOM 43 03/08/2021    GLUCOSE 118 03/08/2021    PROT 6.8 03/04/2021    CALCIUM 9.0 03/08/2021    BILITOT 0.9 03/04/2021    ALKPHOS 90 03/04/2021    AST 15 03/04/2021    ALT 6 03/04/2021       POC Tests: No results for input(s): POCGLU, POCNA, POCK, POCCL, POCBUN, POCHEMO, POCHCT in the last 72 hours. Coags:   Lab Results   Component Value Date    PROTIME 12.7 03/08/2021    INR 1.09 03/08/2021    APTT 30.4 03/03/2021       HCG (If Applicable): No results found for: PREGTESTUR, PREGSERUM, HCG, HCGQUANT     ABGs: No results found for: PHART, PO2ART, KNK0XSS, CPZ5TYD, BEART, X6VENVIN     Type & Screen (If Applicable):  No results found for: LABABO, LABRH    Drug/Infectious Status (If Applicable):  No results found for: HIV, HEPCAB    COVID-19 Screening (If Applicable): No results found for: COVID19      Anesthesia Evaluation  Patient summary reviewed and Nursing notes reviewed no history of anesthetic complications:   Airway: Mallampati: II  TM distance: >3 FB   Neck ROM: full  Mouth opening: > = 3 FB Dental:    (+) upper dentures  Comment: Lower broken teeth, fragile appearance, missing many. Patient verbalizes understanding that fragile teeth may be broken or come out during the procedure and pose an aspiration risk. She verbalizes her wishes to proceed with planned anesthetic. Pulmonary:Negative Pulmonary ROS breath sounds clear to auscultation      (-) wheezes                           Cardiovascular:  Exercise tolerance: poor (<4 METS),   (+) hypertension: moderate, past MI:, CAD: non-obstructive, CABG/stent (stent 11/2020, no CP since):, dysrhythmias (Sick sinus . HEART RATE GOES DOWN TO 20): SVT,         Rhythm: irregular  Rate: normal      Cleared by cardiology     Beta Blocker:  Dose within 24 Hrs      ROS comment: Seen by EP for bradycardia and pauses at night. Pt denies syncope or symptoms during the day    LBBB    ECHO 2021   Normal left ventricle size, and systolic function with an estimated ejection   fraction of 55-60%. Mild concentric left ventricular hypertrophy is present. No regional wall motion abnormalities are seen. Mild tricuspid regurgitation, RVSP is estimated at 38 mmhg. Neuro/Psych:   Negative Neuro/Psych ROS     (-) seizures, TIA and CVA           GI/Hepatic/Renal:            ROS comment: anemia. Endo/Other:    (+) blood dyscrasia: anticoagulation therapy:., .                 Abdominal:           Vascular:                                          Anesthesia Plan      TIVA and general     ASA 3       Induction: intravenous and rapid sequence. MIPS: Postoperative opioids intended, Prophylactic antiemetics administered and Postoperative trial extubation. Anesthetic plan and risks discussed with patient. Plan discussed with CRNA.                 Gillian Kaiser MD   3/8/2021

## 2021-03-08 NOTE — ANESTHESIA POSTPROCEDURE EVALUATION
Department of Anesthesiology  Postprocedure Note    Patient: Zach Alfaro  MRN: [id number]  YOB: 1940  Date of evaluation: 3/8/2021  Time:  3:36 PM     Procedure Summary     Date: 03/08/21 Room / Location: [address]    Anesthesia Start: 1236 Anesthesia Stop: 9057    Procedures:       LAPAROSCOPIC RIGHT COLON RESECTION (Right Abdomen)      LAPAROSCOPIC CHOLECYSTECTOMY (N/A Abdomen) Diagnosis: (COLON MASS)    Surgeons: Destinee Lerma MD Responsible Provider: Julia Angeles MD    Anesthesia Type: TIVA, general ASA Status: 3          Anesthesia Type: TIVA, general    Stephon Phase I: Stephon Score: 8    Stephon Phase II:      Last vitals: Reviewed and per EMR flowsheets.        Anesthesia Post Evaluation    Patient location during evaluation: PACU  Patient participation: complete - patient participated  Level of consciousness: awake and alert  Airway patency: patent  Nausea & Vomiting: no vomiting and no nausea  Complications: no  Cardiovascular status: hemodynamically stable  Respiratory status: acceptable  Hydration status: stable

## 2021-03-08 NOTE — BRIEF OP NOTE
Brief Postoperative Note      Patient: Gena Blake  YOB: 1940  MRN: [id number]    Date of Procedure: 3/8/2021    Pre-Op Diagnosis: COLON MASS, chronic cholecystitis with cholelithiasis    Post-Op Diagnosis: Same       Procedure(s):  LAPAROSCOPIC RIGHT COLON RESECTION  LAPAROSCOPIC CHOLECYSTECTOMY    Surgeon(s):  Gem Finch MD    Assistant:  Surgical Assistant: Wesley Ventura RN  First Assistant: Tom Webber    Anesthesia: General    Estimated Blood Loss (mL): Minimal    Complications: None    Specimens:   ID Type Source Tests Collected by Time Destination   A : A) GALLBLADDER, RIGHT COLON, ADDITIONAL SMALL BOWEL Tissue Tissue SURGICAL PATHOLOGY Gem Finch MD 3/8/2021 1443        Implants:  * No implants in log *      Drains:   Urethral Catheter Straight-tip 18 fr (Active)   $ Urethral catheter insertion $ Not inserted for procedure 03/05/21 1536   Catheter Indications Need for fluid management in critically ill patients in a critical care setting not able to be managed by other means such as BSC with hat, bedpan, urinal, condom catheter, or short term intermittent urethral catherization 03/08/21 0945   Site Assessment No urethral drainage;Pink 03/08/21 0945   Urine Color Yellow; Gala 03/08/21 0945   Urine Appearance Clear 03/08/21 0945   Urine Odor Other (Comment) 03/08/21 0945   Output (mL) 425 mL 03/08/21 0505       Findings: Right colon mass consistent with adenocarcinoma.   No evidence of metastatic disease, gallstones    Electronically signed by Gem Finch MD on 3/8/2021 at 3:51 PM

## 2021-03-08 NOTE — ADT AUTH CERT
Anemia, Iron Deficiency or Unspecified - Care Day 3 (3/5/2021) by Jessica Jurado RN       Review Status Review Entered   Completed 3/8/2021 10:51      Criteria Review      Care Day: 3 Care Date: 3/5/2021 Level of Care: Inpatient Floor    Guideline Day 2    Level Of Care    (X) Floor to discharge    Clinical Status    (X) * Hemodynamic stability    (X) * Mental status at baseline    (X) * Active blood loss absent    (X) * Signs and symptoms of anemia absent or improved    ( ) * Hgb/Hct level stable and acceptable for next level of care    3/8/2021 10:51 AM EST by Layla Ignacio      3/5/2021 04:59  Hemoglobin Quant: 10.2 (L)  Hematocrit: 33.2 (L)    ( ) * Etiology of anemia requiring inpatient care absent    3/8/2021 10:51 AM EST by Helena Goodell for poss egd today    ( ) * Discharge plans and education understood    Activity    (X) * Ambulatory or acceptable for next level of care [G]    Routes    ( ) * Oral hydration, medications, and diet    3/8/2021 10:51 AM EST by Layla Ignacio      Clears > NPO after MN    Interventions    (X) Hgb/Hct    3/8/2021 10:51 AM EST by Layla Ignacio      3/5/2021 04:59  Hemoglobin Quant: 10.2 (L)  Hematocrit: 33.2 (L)    Medications    (X) Possible iron or micronutrients    (X) Possible hemopoietic stimulating agents    * Milestone   Additional Notes   3/5      IM      Feels about the same   Hgb much better, 10.2   Plan for poss egd today         BP (!) 148/76   Pulse 70   Temp 97 °F (36.1 °C) (Tympanic)   Resp 16   Ht 5' (1.524 m)   Wt 217 lb 2.5 oz (98.5 kg)   SpO2 94%   BMI 42.41 kg/m²    General appearance: alert, appears stated age and cooperative   Head: Normocephalic, without obvious abnormality, atraumatic   Lungs: clear to auscultation bilaterally   Heart: regular rate and rhythm, S1, S2 normal, no murmur, click, rub or gallop   Abdomen: soft, non-tender; bowel sounds normal; no masses,  no organomegaly   Extremities: extremities normal, atraumatic, no cyanosis or edema          Assessment and Plan:   Principal Problem:     Anemia -Established problem. Better. Hgb now 10.2 after transfusions   Plan: holding brilinta for now. GI workup in progress, poss EGD today.  IV Iron ordered by hay   Active Problems:     Hypertension -Established problem. Stable.  148/76   Plan: stay on same meds     CAD (coronary artery disease) -Established problem. Stable.  No chest pain   Plan: Continue present orders/plan.      High cholesterol -Established problem. Stable.     Plan: cont statin    -----------------------   Oncology   She is feeling better today, planning on colonoscopy and EGD today      ASSESSMENT AND PLAN       Principal Problem:     Anemia   Active Problems:     Hypertension     CAD (coronary artery disease)     High cholesterol   Resolved Problems:     * No resolved hospital problems. *           Anemia   - iron deficiency   - Venofer ordered   - Vit B12 wnl   - stool for occult blood negative 3/3/21   - hgb improved to 10.2   - GI following, colonoscopy/EGD today       CAD   - s/p stent placement   - on Brilinta   - cardiology consult pending   ----------------------   GI      egd possible short mauro's, otherwise normal   Colon mass prox ascending colon explains anemia; smaller polyp not removed.       Rec;   follow up biopsy results   follow up oncology for imaging and cea levels -- i left voicemail with Dr Madeleine Cantrell   Surgical consult placed for surgical resection evaluation   ok continue asa; may need hold brilinta for possible surgery, will leave to cardio/surgery discussion; no active bleeding at this time so if needs brilinta in meantime ok resume since appears slow blood loss from mass/ no active/visible bleed. i discussed with patient who understands; offerred call daughter as well.    otherwise will sign off, call if needed.     ----------------------      Gen Surg   CC-anemia, colon mass       HPI:-year-old female admitted 2 days ago with anemia. Lisette Mckeon is status post coronary stent placement in November 2020 and is currently taking Brilinta and aspirin.  No complaints of abdominal pain, unexpected weight loss, change in bowel habits or urinary symptoms.  Colonoscopy today showed a right colonic mass.  Biopsies and CAT scan are pending. Assessment:   Pleasant 62-year-old female who is admitted 2 days ago with anemia (hemoglobin 5.8 g/dL). Lisette Mckeon is status post cardiac catheterization with stent placement in November 2020 and is currently taking Brilinta and aspirin.  She denies unexpected weight loss or abdominal symptoms.  Colonoscopy today showed a right colonic mass concerning for a primary colon cancer.  Biopsies are pending.       Plan:   CAT scan of the abdomen and pelvis has been ordered for tonight to evaluate for metastatic disease.  Will check a CEA level.  Continue to hold Brilinta for anticipated laparoscopic cholecystectomy on Monday (3 days from now ).   --------------------   Cardio   Assessment and Plan:   LBBB with aberrancy               - Noted on ECG and confirmed by electrophysiologist               - No VT noted   PAF               - Now in SR, PAF overnight with CVR lasting 3 hours               - Asymptomatic               - [id number] 5.  High risk for thromboembolism                           ~ On hold for surgery monday                           ~ Watchman procedure can be considered as an OP if she does not tolerate long-term AC               - Not a good candidate for ablation   Bradycardia               - No recurrence today               - Asymptomatic and isolated to night time and sleeping               - Tolerating Coreg, very low-dose               - Overnight pulse oximetry showed 10 desaturations   Obesity: Body mass index is 42.41 kg/m².               - Recommend weight loss and lifestyle modifications.     Suspected sleep apnea               - Overnight pulse oximetry showed avg O2 91% with 10 drops aspirin 81 mg Oral Daily   · lisinopril 2.5 mg Oral Daily   · carvedilol 3.125 mg Oral BID WC   · rosuvastatin 20 mg Oral Nightly   · sodium chloride flush 10 mL Intravenous 2 times per day   · famotidine 20 mg Oral Daily   · HYDROcodone-acetaminophen 1 tablet Oral       iohexol (OMNIPAQUE 240) injection 50 mL x1         PRN   potassium bicarb-citric acid (EFFER-K) effervescent tablet 40 mEq x1   sodium chloride flush 0.9 % injection 10 mL x1

## 2021-03-08 NOTE — PROGRESS NOTES
[facility]   Progress Note      3/8/2021 1:51 PM        Name: Ant Giron . Admitted: 3/3/2021    SUBJECTIVE:      Doing okay. Little anxious about the surgery no external bleeding.     Reviewed interval ancillary notes    Current Medications  HYDROcodone-acetaminophen (NORCO) 5-325 MG per tablet 1 tablet, Once PRN  ondansetron (ZOFRAN) injection 4 mg, Once PRN  0.9 % sodium chloride infusion, Continuous  lidocaine PF 1 % injection 1 mL, Once PRN  HYDROmorphone (DILAUDID) injection 0.25 mg, Q5 Min PRN  HYDROmorphone (DILAUDID) injection 0.5 mg, Q5 Min PRN  metronidazole (FLAGYL) 500 mg in NaCl 100 mL IVPB premix, Q8H  sodium chloride 0.9 % irrigation, Continuous PRN  lactated ringers infusion, Continuous PRN  aspirin EC tablet 81 mg, Daily  perflutren lipid microspheres (DEFINITY) injection 1.65 mg, ONCE PRN  0.9 % sodium chloride infusion, PRN  lisinopril (PRINIVIL;ZESTRIL) tablet 2.5 mg, Daily  carvedilol (COREG) tablet 3.125 mg, BID WC  rosuvastatin (CRESTOR) tablet 20 mg, Nightly  furosemide (LASIX) tablet 20 mg, Daily PRN  sodium chloride flush 0.9 % injection 10 mL, 2 times per day  sodium chloride flush 0.9 % injection 10 mL, PRN  potassium chloride (KLOR-CON M) extended release tablet 40 mEq, PRN    Or  potassium bicarb-citric acid (EFFER-K) effervescent tablet 40 mEq, PRN    Or  potassium chloride 10 mEq/100 mL IVPB (Peripheral Line), PRN  promethazine (PHENERGAN) tablet 12.5 mg, Q6H PRN    Or  ondansetron (ZOFRAN) injection 4 mg, Q6H PRN  magnesium hydroxide (MILK OF MAGNESIA) 400 MG/5ML suspension 30 mL, Daily PRN  famotidine (PEPCID) tablet 20 mg, Daily  acetaminophen (TYLENOL) tablet 650 mg, Q6H PRN    Or  acetaminophen (TYLENOL) suppository 650 mg, Q6H PRN  hydrALAZINE (APRESOLINE) injection 10 mg, Q6H PRN  0.9 % sodium chloride bolus, PRN  0.9 % sodium chloride infusion, PRN  HYDROcodone-acetaminophen (NORCO) 5-325 MG per tablet 1 tablet, BID    ePHEDrine injection, PRN  phenylephrine (JAMES-SYNEPHRINE) injection, PRN  HYDROmorphone (DILAUDID) injection, PRN  lidocaine PF 2 % injection, PRN  propofol injection, PRN  succinylcholine (ANECTINE) injection, PRN  rocuronium (ZEMURON) injection, PRN  0.9 % sodium chloride infusion, Continuous PRN  magnesium sulfate injection, PRN  Dexamethasone Sodium Phosphate injection, PRN        Objective:  BP (!) 156/77   Pulse 81   Temp 98.9 °F (37.2 °C) (Temporal)   Resp 16   Ht 5' (1.524 m)   Wt 218 lb 8 oz (99.1 kg)   SpO2 97%   BMI 42.67 kg/m²     Intake/Output Summary (Last 24 hours) at 3/8/2021 1351  Last data filed at 3/8/2021 0505  Gross per 24 hour   Intake --   Output 1225 ml   Net -1225 ml      Wt Readings from Last 3 Encounters:   03/07/21 218 lb 8 oz (99.1 kg)   03/01/21 235 lb 9.6 oz (106.9 kg)   11/30/20 229 lb (103.9 kg)       Conscious alert oriented. Appears comfortable. No neck fullness. Respiratory efforts are normal.    Abdomen is not distended. No leg edema    No focal deficits. Labs and Tests:  CBC:   Recent Labs     03/06/21  0448 03/08/21  0444   WBC 8.9 8.4   HGB 9.6* 10.0*    288     BMP:    Recent Labs     03/06/21  0448 03/08/21  0444    142   K 3.9 4.5    105   CO2 29 29   BUN 18 27*   CREATININE 1.2 1.2   GLUCOSE 110* 118*     Hepatic:   No results for input(s): AST, ALT, ALB, BILITOT, ALKPHOS in the last 72 hours. ASSESSMENT AND PLAN    Principal Problem:    Anemia  Active Problems:    Hypertension    CAD (coronary artery disease)    High cholesterol    Diastolic dysfunction    Hypokalemia    Malignant neoplasm of ascending colon (HCC)  Resolved Problems:    * No resolved hospital problems. *      Anemia  - iron deficiency  - Venofer ordered  - Vit B12 wnl  - stool for occult blood negative 3/3/21  - hgb improved to 10. Ascending colon mass      Colonoscopy was done on 3/5/2021  -Findings were suspicious for adenocarcinoma.   Biopsies were taken  -CEA and CT scans were negative for metastatic disease.  -The patient will be undergoing right hemicolectomy today.       CAD  - s/p stent placement      Anastacia Fay MD

## 2021-03-08 NOTE — PROGRESS NOTES
[facility]   Electrophysiology Progress Note     Date: 3/8/2021  Admit Date: 3/3/2021     Reason for consultation: Atrial fibrillation    Chief Complaint:   Chief Complaint   Patient presents with    Abnormal Lab     pt sent in by her MD for low H/H       History of Present Illness: History obtained from patient and medical record. Moni Olivera is a 68 y.o. female with a past medical history of HTN, HLD, CKD< FIDEL, and CAD. Pt presented to hospital by recommendation of her PCP for anemia. Her hemoglobin was 5.8 and was she received multiple units of blood. GI completed colonoscopy/EGD with no gross bleeding found, however, she was found to have a mass in her colon. She had a recent stent placed and has been on ASA/Brilenta. She was found to be in AF with RVR on admission, thus EP was consulted    Interval Hx: Today, she is being seen for follow up. Her daughter is at the bedside. She remains in atrial fibrillation with controlled rates. Pt does have bradycardia at night. We discussed the need for a sleep study evaluation as outpatient. She denies any symptoms with her AF. Plans for colon mass resection today. No further bleeding noted with stable H/H    Patient seen and examined. Clinical notes reviewed. Telemetry reviewed. No new complaints today. No major events overnight. Denies having chest pain, palpitations, shortness of breath, orthopnea/PND, cough, or dizziness at the time of this visit. Allergies:  No Known Allergies    Home Meds:  Prior to Visit Medications    Medication Sig Taking? Authorizing Provider   furosemide (LASIX) 20 MG tablet Take 1 tablet by mouth daily as needed (swelling or shortness of breath) Yes JIMENA Vera CNP   HYDROcodone-acetaminophen (NORCO) 5-325 MG per tablet Take 1 tablet by mouth 2 times daily.  Yes Historical Provider, MD   rosuvastatin (CRESTOR) 20 MG tablet Take 1 tablet by mouth nightly Yes JIMENA Vera CNP   aspirin 81 MG or changes in skin pigment  · HEENT: Negative for vision changes, ringing in the ears, sore throat, dysphagia, or swollen lymph nodes  · Respiratory: Reviewed in HPI  · Cardiovascular: Reviewed in HPI  · Gastrointestinal: Negative for abdominal pain, N/V/D, constipation, or black/tarry stools  · Genito-Urinary: Negative for dysuria, incontinence, urgency, or hematuria  · Musculoskeletal: Negative for joint swelling, muscle pain, or injuries  · Neurological/Psych: Negative for confusion, seizures, headaches, balance issues or TIA-like symptoms. No anxiety, depression, or insomnia    Physical Examination:  Vitals:    03/08/21 0450   BP: (!) 143/65   Pulse: 59   Resp: 16   Temp: 98.3 °F (36.8 °C)   SpO2: 95%      In: -   Out: 1225    Wt Readings from Last 3 Encounters:   03/07/21 218 lb 8 oz (99.1 kg)   03/01/21 235 lb 9.6 oz (106.9 kg)   11/30/20 229 lb (103.9 kg)       Intake/Output Summary (Last 24 hours) at 3/8/2021 6376  Last data filed at 3/8/2021 0505  Gross per 24 hour   Intake --   Output 1500 ml   Net -1500 ml       Telemetry: Personally Reviewed  - Atrial fibrillation   · Constitutional: Cooperative and in no apparent distress, and appears well nourished  · Skin: Warm and pink; no pallor, cyanosis, bruising, or clubbing  · HEENT: Symmetric and normocephalic. PERRL, EOM intact. Conjunctiva pink with clear sclera. Mucus membranes pink and moist. Teeth intact. Thyroid smooth without nodules or goiter. · Cardiovascular: Regular rate and irregular rhythm. S1/S2 present without murmurs, rubs, or gallops. Peripheral pulses 2+, capillary refill < 3 seconds. No elevation of JVP. No peripheral edema  · Respiratory: Respirations symmetric and unlabored. Lungs clear to auscultation bilaterally, no wheezing, crackles, or rhonchi  · Gastrointestinal: Abdomen soft and round. Bowel sounds normoactive in all quadrants without tenderness or masses.   · Musculoskeletal: Bilateral upper and lower extremity strength 5/5 with full ROM  · Neurologic/Psych: Awake and orientated to person, place and time. Calm affect, appropriate mood    Pertinent labs, diagnostic, device, and imaging results reviewed as a part of this visit    Labs:    BMP:   Recent Labs     218 21    142   K 3.9 4.5    105   CO2 29 29   BUN 18 27*   CREATININE 1.2 1.2     Estimated Creatinine Clearance: 41 mL/min (based on SCr of 1.2 mg/dL). CBC:   Recent Labs     21   WBC 8.9 8.4   HGB 9.6* 10.0*   HCT 29.1* 32.9*   MCV 72.3* 74.5*    288     Thyroid: No results found for: TSH, R8KINAF, G1YHRVO, THYROIDAB  Lipids:   Lab Results   Component Value Date    CHOL 155 2020    HDL 38 2020    TRIG 121 2020     LFTS:   Lab Results   Component Value Date    ALT 6 2021    AST 15 2021    ALKPHOS 90 2021    PROT 6.8 2021    AGRATIO 1.2 2021    BILITOT 0.9 2021     Cardiac Enzymes:   Lab Results   Component Value Date    TROPONINI 0.03 2020     Coags:   Lab Results   Component Value Date    PROTIME 12.7 2021    INR 1.09 2021       ECG: 3/6/21  Atrial fibrillation    ECHO: 3/6/21   Normal left ventricle size, and systolic function with an estimated ejection fraction of 55-60%. Mild concentric left ventricular hypertrophy is present. No regional wall motion abnormalities are seen. Mild tricuspid regurgitation, RVSP is estimated at 38 mmhg. Stress Test: None    Cath: 20  Findings:  Artery Findings/Result  LM Normal  LAD 95% mid  Cx OM2 50%  RI NA  RCA 20% prox, 20% mid  LVEDP 25  LVG 55%     Intervention:         PCI of LAD with 3.6L78Enrcle REBECA (PD with 3.75NC)     Post Cath Dx:       Severe , nonobstructive otherwise    CT Chest: 3/5/21  Within the chest, small mediastinal nodes are seen, slightly increased   compared to prior, either reactive or metastatic.  Small pleural effusions   are seen, compatible with mild fluid overload.     Stable lobular pulmonary nodule in the left lower lobe.       Nodular wall thickening of the colon on the right, compatible with the given   history of colon mass.  Small mesenteric node is seen in this region,, likely   early desiree metastasis       Increase in size of ovarian-adnexal masses on the right and left.  Consider   pelvic ultrasound for further characterization.       Tiny nonobstructing right renal stone     Problem List:   Patient Active Problem List    Diagnosis Date Noted    Diastolic dysfunction 84/79/1706    Hypokalemia 03/07/2021    Malignant neoplasm of ascending colon ([address] 03/07/2021    Anemia 03/03/2021    CAD (coronary artery disease) 03/03/2021    High cholesterol 03/03/2021    Hypertension 01/10/2014        Assessment and Plan:     1. Paroxysmal Atrial Fibrillation  - Currently in AF, rate controlled  - Continue coreg 3.125 mg BID  - [id number] score:5 ; [id number] Vasc score and anticoagulation discussed. High risk for stroke and thromboembolism. Anticoagulation is recommended. Risk of bleeding was discussed. ~ [place] [facility] team    - Afib risk factors including age, HTN, obesity, inactivity and SAHRA were discussed with patient. Risk factor modification recommended   ~ TSH 2.06 (3/4/21)      - Treatment options including cardioversion, rate control strategy, antiarrhythmics, anticoagulation and possible ablation were discussed with patient. Risks, benefits and alternative of each treatment options were explained. All questions answered    ~ Will consider for DCCV after 3-4 weeks of adequate anti-coagulation if she remains in AF     2. Bradycardia, At Risk for Sleep Apnea   - Noted at night time while sleeping   - Overnight pulse oximetry notes desaturations    ~ Will benefit from outpatient sleep study    3. LBBB with aberrancy   - Noted on EKG   - No VT    4. Colon Mass   - Plan for surgery today    5.  CAD  - Hx of PCI to LAD (11/20)  - Stable  - No complaints of angina  - Continue ASA, ACEI, BB, and statin    ~ Brilinta on hold per IC (Will discuss with Dr. Darlin Amezquita if pt to remain on brilinta or ASA at d/c)    6. HTN  - Controlled: Goal <130/80  - Continue current medications    7. Anemia   - Improved s/p PRBCs    ~ 10/32.9 this AM   - No recurrent bleeding   - Will need close monitoring as outpatient with initiation of anti-coagulation    Multiple medical conditions with risk of decompensation. All pertinent information and plan of care discussed with the EP physician. All questions and concerns were addressed to the patient. Alternatives to my treatment were discussed. I have discussed the above stated plan with patient and the nurse. The patient verbalized understanding and agreed with the plan. The patient was seen for >35 minutes. I reviewed interval history, physical exam, review of data including labs, imaging, development and implementation of treatment plan and coordination of complex care. Thank you for allowing to us to participate in the care of [address] [place] [address].     Letha Lee, JIMENA-CNP  [facility]   Office: [phone]

## 2021-03-08 NOTE — PROGRESS NOTES
Pharmacy to check patient copays for Eliquis/Xarelto:    Copay for patient will be: $47/month for either Eliquis or [address] will continue to follow the decision for anticoagulation and  the patient if appropriate.        David Lowry, PharmD  ED Pharmacist [id number]  3/8/2021

## 2021-03-08 NOTE — PROGRESS NOTES
Department of Internal Medicine  General Internal Medicine   Progress Note      SUBJECTIVE: appears in excellent  Spirit denies pain  No SOB at rest     History obtained from the patient  General ROS: positive for  - fatigue, malaise and weight loss  negative for - chills, fever or night sweats  Psychological ROS: negative  Ophthalmic ROS: negative  Respiratory ROS: positive for - shortness of breath  negative for - cough, pleuritic pain or wheezing  Cardiovascular ROS: positive for - dyspnea on exertion  negative for - chest pain  Gastrointestinal ROS: no abdominal pain, change in bowel habits, or black or bloody stools  Genito-Urinary ROS: no dysuria, trouble voiding, or hematuria  Musculoskeletal ROS: negative  Neurological ROS: no TIA or stroke symptoms  Dermatological ROS: negative    OBJECTIVE      Medications      Current Facility-Administered Medications: aspirin EC tablet 81 mg, 81 mg, Oral, Daily  perflutren lipid microspheres (DEFINITY) injection 1.65 mg, 1.5 mL, Intravenous, ONCE PRN  0.9 % sodium chloride infusion, , Intravenous, PRN  lisinopril (PRINIVIL;ZESTRIL) tablet 2.5 mg, 2.5 mg, Oral, Daily  carvedilol (COREG) tablet 3.125 mg, 3.125 mg, Oral, BID WC  rosuvastatin (CRESTOR) tablet 20 mg, 20 mg, Oral, Nightly  furosemide (LASIX) tablet 20 mg, 20 mg, Oral, Daily PRN  sodium chloride flush 0.9 % injection 10 mL, 10 mL, Intravenous, 2 times per day  sodium chloride flush 0.9 % injection 10 mL, 10 mL, Intravenous, PRN  potassium chloride (KLOR-CON M) extended release tablet 40 mEq, 40 mEq, Oral, PRN **OR** potassium bicarb-citric acid (EFFER-K) effervescent tablet 40 mEq, 40 mEq, Oral, PRN **OR** potassium chloride 10 mEq/100 mL IVPB (Peripheral Line), 10 mEq, Intravenous, PRN  promethazine (PHENERGAN) tablet 12.5 mg, 12.5 mg, Oral, Q6H PRN **OR** ondansetron (ZOFRAN) injection 4 mg, 4 mg, Intravenous, Q6H PRN  magnesium hydroxide (MILK OF MAGNESIA) 400 MG/5ML suspension 30 mL, 30 mL, Oral, Daily Collection Time: 03/03/21  9:45 PM   Result Value Ref Range    Vitamin B-12 328 211 - 911 pg/mL   Hemoglobin and hematocrit, blood    Collection Time: 03/04/21 12:54 AM   Result Value Ref Range    Hemoglobin 9.3 (L) 12.0 - 16.0 g/dL    Hematocrit 30.3 (L) 36.0 - 48.0 %   Magnesium    Collection Time: 03/04/21  4:29 AM   Result Value Ref Range    Magnesium 2.10 1.80 - 2.40 mg/dL   Comprehensive Metabolic Panel w/ Reflex to MG    Collection Time: 03/04/21  4:39 AM   Result Value Ref Range    Sodium 142 136 - 145 mmol/L    Potassium reflex Magnesium 3.7 3.5 - 5.1 mmol/L    Chloride 106 99 - 110 mmol/L    CO2 26 21 - 32 mmol/L    Anion Gap 10 3 - 16    Glucose 113 (H) 70 - 99 mg/dL    BUN 19 7 - 20 mg/dL    CREATININE 1.0 0.6 - 1.2 mg/dL    GFR Non-African American 54 (A) >60    GFR African American >60 >60    Calcium 8.7 8.3 - 10.6 mg/dL    Total Protein 6.8 6.4 - 8.2 g/dL    Albumin 3.7 3.4 - 5.0 g/dL    Albumin/Globulin Ratio 1.2 1.1 - 2.2    Total Bilirubin 0.9 0.0 - 1.0 mg/dL    Alkaline Phosphatase 90 40 - 129 U/L    ALT 6 (L) 10 - 40 U/L    AST 15 15 - 37 U/L    Globulin 3.1 g/dL   TSH with Reflex    Collection Time: 03/04/21  4:39 AM   Result Value Ref Range    TSH 2.06 0.27 - 4.20 uIU/mL   EKG 12 Lead    Collection Time: 03/04/21  5:40 AM   Result Value Ref Range    Ventricular Rate 100 BPM    Atrial Rate 102 BPM    QRS Duration 130 ms    Q-T Interval 328 ms    QTc Calculation (Bazett) 423 ms    R Axis -29 degrees    T Axis 87 degrees    Diagnosis       Atrial fibrillationLeft bundle branch blockAbnormal ECGWhen compared with ECG of 20-NOV-2020 09:23,Atrial fibrillation has replaced Sinus rhythmVent.  rate has increased BY  55 BPMLeft bundle branch block is now PresentConfirmed by Yared Riojas MD ([id number]) on 3/4/2021 12:09:00 PM   EKG 12 Lead    Collection Time: 03/04/21 12:43 PM   Result Value Ref Range    Ventricular Rate 99 BPM    Atrial Rate 326 BPM    QRS Duration 84 ms    Q-T Interval 368 ms    QTc Calculation (Bazett) 472 ms    R Axis -15 degrees    T Axis 14 degrees    Diagnosis       Atrial fibrillation with premature ventricular or aberrantly conducted complexesMinimal voltage criteria for LVH, may be normal variantCannot rule out Anterior infarct , age undeterminedAbnormal ECGWhen compared with ECG of 04-MAR-2021 12:42,No significant change was foundConfirmed by Trae Dick MD, Paulo Camrichael ([id number]) on 3/4/2021 2:26:20 PM   CBC    Collection Time: 03/04/21  1:10 PM   Result Value Ref Range    WBC 9.2 4.0 - 11.0 K/uL    RBC 4.65 4.00 - 5.20 M/uL    Hemoglobin 10.6 (L) 12.0 - 16.0 g/dL    Hematocrit 33.9 (L) 36.0 - 48.0 %    MCV 72.8 (L) 80.0 - 100.0 fL    MCH 22.8 (L) 26.0 - 34.0 pg    MCHC 31.2 31.0 - 36.0 g/dL    RDW 22.3 (H) 12.4 - 15.4 %    Platelets 919 113 - 297 K/uL    MPV 7.7 5.0 - 10.5 fL   Basic metabolic panel    Collection Time: 03/04/21  1:10 PM   Result Value Ref Range    Sodium 140 136 - 145 mmol/L    Potassium 3.7 3.5 - 5.1 mmol/L    Chloride 101 99 - 110 mmol/L    CO2 28 21 - 32 mmol/L    Anion Gap 11 3 - 16    Glucose 129 (H) 70 - 99 mg/dL    BUN 18 7 - 20 mg/dL    CREATININE 1.2 0.6 - 1.2 mg/dL    GFR Non- 43 (A) >60    GFR  53 (A) >60    Calcium 9.5 8.3 - 10.6 mg/dL   Brain Natriuretic Peptide    Collection Time: 03/04/21  1:10 PM   Result Value Ref Range    Pro-BNP 2,792 (H) 0 - 449 pg/mL   Magnesium    Collection Time: 03/04/21  1:10 PM   Result Value Ref Range    Magnesium 2.10 1.80 - 2.40 mg/dL   EKG 12 Lead    Collection Time: 03/04/21  5:06 PM   Result Value Ref Range    Ventricular Rate 72 BPM    Atrial Rate 72 BPM    P-R Interval 192 ms    QRS Duration 88 ms    Q-T Interval 404 ms    QTc Calculation (Bazett) 442 ms    P Axis 54 degrees    R Axis -17 degrees    T Axis 24 degrees    Diagnosis       Sinus rhythm with Premature atrial complexesMinimal voltage criteria for LVH, may be normal variantAnterior infarct (cited on or before 04-MAR-2021)Abnormal ECGWhen compared with ECG of 04-MAR-2021 12:43,Sinus rhythm has replaced Atrial fibrillationConfirmed by Dana Amaya MD, Gabe Antoine ([id number]) on 3/5/2021 11:52:38 AM   CEA    Collection Time: 03/05/21  4:49 AM   Result Value Ref Range    CEA 1.7 0.0 - 5.0 ng/mL   Basic Metabolic Panel    Collection Time: 03/05/21  4:59 AM   Result Value Ref Range    Sodium 142 136 - 145 mmol/L    Potassium 3.2 (L) 3.5 - 5.1 mmol/L    Chloride 102 99 - 110 mmol/L    CO2 29 21 - 32 mmol/L    Anion Gap 11 3 - 16    Glucose 117 (H) 70 - 99 mg/dL    BUN 22 (H) 7 - 20 mg/dL    CREATININE 1.3 (H) 0.6 - 1.2 mg/dL    GFR Non- 40 (A) >60    GFR  48 (A) >60    Calcium 9.2 8.3 - 10.6 mg/dL   CBC Auto Differential    Collection Time: 03/05/21  4:59 AM   Result Value Ref Range    WBC 11.1 (H) 4.0 - 11.0 K/uL    RBC 4.60 4.00 - 5.20 M/uL    Hemoglobin 10.2 (L) 12.0 - 16.0 g/dL    Hematocrit 33.2 (L) 36.0 - 48.0 %    MCV 72.0 (L) 80.0 - 100.0 fL    MCH 22.1 (L) 26.0 - 34.0 pg    MCHC 30.6 (L) 31.0 - 36.0 g/dL    RDW 23.1 (H) 12.4 - 15.4 %    Platelets 424 941 - 566 K/uL    MPV 8.1 5.0 - 10.5 fL    Neutrophils % 77.8 %    Lymphocytes % 13.2 %    Monocytes % 7.7 %    Eosinophils % 0.8 %    Basophils % 0.5 %    Neutrophils Absolute 8.7 (H) 1.7 - 7.7 K/uL    Lymphocytes Absolute 1.5 1.0 - 5.1 K/uL    Monocytes Absolute 0.9 0.0 - 1.3 K/uL    Eosinophils Absolute 0.1 0.0 - 0.6 K/uL    Basophils Absolute 0.1 0.0 - 0.2 K/uL   EKG 12 Lead    Collection Time: 03/06/21  2:33 AM   Result Value Ref Range    Ventricular Rate 118 BPM    Atrial Rate 144 BPM    P-R Interval 208 ms    QRS Duration 144 ms    Q-T Interval 398 ms    QTc Calculation (Bazett) 557 ms    P Axis 89 degrees    R Axis -26 degrees    T Axis 115 degrees    Diagnosis       Sinus tachycardia with frequent and consecutive Premature ventricular complexes and Fusion complexesLeft bundle branch blockAbnormal ECGNo previous ECGs available       Collection Time: 03/06/21  4:46 AM Result Value Ref Range     16.4 0.0 - 35.0 U/mL   CBC Auto Differential    Collection Time: 03/06/21  4:48 AM   Result Value Ref Range    WBC 8.9 4.0 - 11.0 K/uL    RBC 4.02 4.00 - 5.20 M/uL    Hemoglobin 9.6 (L) 12.0 - 16.0 g/dL    Hematocrit 29.1 (L) 36.0 - 48.0 %    MCV 72.3 (L) 80.0 - 100.0 fL    MCH 23.9 (L) 26.0 - 34.0 pg    MCHC 33.0 31.0 - 36.0 g/dL    RDW 23.6 (H) 12.4 - 15.4 %    Platelets 928 697 - 573 K/uL    MPV 7.7 5.0 - 10.5 fL    Neutrophils % 67.2 %    Lymphocytes % 18.5 %    Monocytes % 11.4 %    Eosinophils % 2.4 %    Basophils % 0.5 %    Neutrophils Absolute 6.0 1.7 - 7.7 K/uL    Lymphocytes Absolute 1.7 1.0 - 5.1 K/uL    Monocytes Absolute 1.0 0.0 - 1.3 K/uL    Eosinophils Absolute 0.2 0.0 - 0.6 K/uL    Basophils Absolute 0.0 0.0 - 0.2 K/uL   Basic Metabolic Panel    Collection Time: 03/06/21  4:48 AM   Result Value Ref Range    Sodium 142 136 - 145 mmol/L    Potassium 3.9 3.5 - 5.1 mmol/L    Chloride 104 99 - 110 mmol/L    CO2 29 21 - 32 mmol/L    Anion Gap 9 3 - 16    Glucose 110 (H) 70 - 99 mg/dL    BUN 18 7 - 20 mg/dL    CREATININE 1.2 0.6 - 1.2 mg/dL    GFR Non- 43 (A) >60    GFR  53 (A) >60    Calcium 8.6 8.3 - 10.6 mg/dL       ASSESSMENT AND PLAN     Principal Problem:    Anemia  Active Problems:    Hypertension    CAD (coronary artery disease)    High cholesterol    Diastolic dysfunction    Hypokalemia    Malignant neoplasm of ascending colon (HCC)  Resolved Problems:    * No resolved hospital problems.  *    Right Hemicolectomy for presumed colon cancer

## 2021-03-08 NOTE — PROGRESS NOTES
Progress Note - Dr. Gema Monae - Internal Medicine  PCP: Domonique Mcdermott MD [address] [place] [address] Kristi Ortega [phone]    Hospital Day: 5  Code Status: Full Code  Current Diet: Diet NPO, After Midnight        CC: follow up on medical issues    Subjective: Haley Hurley is a 68 y.o. female. She denies problems    Pt resting comfortably in bed  daughterpresent  All questions answered  Pt anticipating surgery later today    She denies chest pain, denies shortness of breath, denies nausea,  denies emesis. 10 system Review of Systems is reviewed with patient, and pertinent positives are noted in HPI above . Otherwise, Review of systems is negative. I have reviewed the patient's medical and social history in detail and updated the computerized patient record. To recap: She  has a past medical history of Anemia, CAD (coronary artery disease), CKD (chronic kidney disease), Hyperlipidemia, and Hypertension. . She  has a past surgical history that includes fracture surgery; Cystocopy (11/21/13); Upper gastrointestinal endoscopy (N/A, 3/5/2021); and Colonoscopy (N/A, 3/5/2021). . She  reports that she has never smoked. She has never used smokeless tobacco. She reports that she does not drink alcohol or use drugs. .        Active Hospital Problems    Diagnosis Date Noted    Diastolic dysfunction [K34.20] 03/07/2021    Hypokalemia [E87.6] 03/07/2021    Malignant neoplasm of ascending colon (HCC) [C18.2] 03/07/2021    Anemia [D64.9] 03/03/2021    CAD (coronary artery disease) [I25.10] 03/03/2021    High cholesterol [E78.00] 03/03/2021    Hypertension [I10] 01/10/2014       Current Facility-Administered Medications: aspirin EC tablet 81 mg, 81 mg, Oral, Daily  perflutren lipid microspheres (DEFINITY) injection 1.65 mg, 1.5 mL, Intravenous, ONCE PRN  0.9 % sodium chloride infusion, , Intravenous, PRN  lisinopril (PRINIVIL;ZESTRIL) tablet 2.5 mg, 2.5 mg, Oral, Daily  carvedilol (COREG) tablet 3.125 mg, 3.125 mg, Oral, BID WC  rosuvastatin (CRESTOR) tablet 20 mg, 20 mg, Oral, Nightly  furosemide (LASIX) tablet 20 mg, 20 mg, Oral, Daily PRN  sodium chloride flush 0.9 % injection 10 mL, 10 mL, Intravenous, 2 times per day  sodium chloride flush 0.9 % injection 10 mL, 10 mL, Intravenous, PRN  potassium chloride (KLOR-CON M) extended release tablet 40 mEq, 40 mEq, Oral, PRN **OR** potassium bicarb-citric acid (EFFER-K) effervescent tablet 40 mEq, 40 mEq, Oral, PRN **OR** potassium chloride 10 mEq/100 mL IVPB (Peripheral Line), 10 mEq, Intravenous, PRN  promethazine (PHENERGAN) tablet 12.5 mg, 12.5 mg, Oral, Q6H PRN **OR** ondansetron (ZOFRAN) injection 4 mg, 4 mg, Intravenous, Q6H PRN  magnesium hydroxide (MILK OF MAGNESIA) 400 MG/5ML suspension 30 mL, 30 mL, Oral, Daily PRN  famotidine (PEPCID) tablet 20 mg, 20 mg, Oral, Daily  acetaminophen (TYLENOL) tablet 650 mg, 650 mg, Oral, Q6H PRN **OR** acetaminophen (TYLENOL) suppository 650 mg, 650 mg, Rectal, Q6H PRN  hydrALAZINE (APRESOLINE) injection 10 mg, 10 mg, Intravenous, Q6H PRN  0.9 % sodium chloride bolus, 500 mL, Intravenous, PRN  0.9 % sodium chloride infusion, , Intravenous, PRN  HYDROcodone-acetaminophen (NORCO) 5-325 MG per tablet 1 tablet, 1 tablet, Oral, BID         Objective:  BP (!) 143/65   Pulse 59   Temp 98.3 °F (36.8 °C) (Oral)   Resp 16   Ht 5' (1.524 m)   Wt 218 lb 8 oz (99.1 kg)   SpO2 95%   BMI 42.67 kg/m²      Patient Vitals for the past 24 hrs:   BP Temp Temp src Pulse Resp SpO2 Weight   03/08/21 0450 (!) 143/65 98.3 °F (36.8 °C) Oral 59 16 95 % --   03/08/21 0115 123/60 98.2 °F (36.8 °C) Temporal 76 15 95 % --   03/07/21 2116 128/74 96.6 °F (35.9 °C) Temporal 67 17 96 % --   03/07/21 1559 (!) 151/78 96.8 °F (36 °C) Temporal 72 16 97 % --   03/07/21 1157 108/60 96.2 °F (35.7 °C) Temporal 80 14 95 % --   03/07/21 0857 -- -- -- -- -- -- 218 lb 8 oz (99.1 kg)   03/07/21 0829 (!) 151/71 96.2 °F (35.7 °C) Temporal 72 16 95 % -- Patient Vitals for the past 96 hrs (Last 3 readings):   Weight   03/07/21 0857 218 lb 8 oz (99.1 kg)   03/06/21 0830 219 lb 5.7 oz (99.5 kg)   03/05/21 0754 217 lb 2.5 oz (98.5 kg)           Intake/Output Summary (Last 24 hours) at 3/8/2021 0809  Last data filed at 3/8/2021 0505  Gross per 24 hour   Intake 10 ml   Output 1500 ml   Net -1490 ml         Physical Exam:   BP (!) 143/65   Pulse 59   Temp 98.3 °F (36.8 °C) (Oral)   Resp 16   Ht 5' (1.524 m)   Wt 218 lb 8 oz (99.1 kg)   SpO2 95%   BMI 42.67 kg/m²   General appearance: alert, appears stated age and cooperative  Head: Normocephalic, without obvious abnormality, atraumatic  Lungs: clear to auscultation bilaterally  Heart: regular rate and rhythm, S1, S2 normal, no murmur, click, rub or gallop  Abdomen: soft, non-tender; bowel sounds normal; no masses,  no organomegaly  Extremities: extremities normal, atraumatic, no cyanosis or edema    Labs:  Lab Results   Component Value Date    WBC 8.4 03/08/2021    HGB 10.0 (L) 03/08/2021    HCT 32.9 (L) 03/08/2021     03/08/2021    CHOL 155 11/21/2020    TRIG 121 11/21/2020    HDL 38 (L) 11/21/2020    ALT 6 (L) 03/04/2021    AST 15 03/04/2021     03/08/2021    K 4.5 03/08/2021     03/08/2021    CREATININE 1.2 03/08/2021    BUN 27 (H) 03/08/2021    CO2 29 03/08/2021    INR 1.09 03/08/2021     Lab Results   Component Value Date    TROPONINI 0.03 (H) 11/20/2020       Recent Imaging Results are Reviewed:  Echo Complete    Result Date: 3/6/2021  Transthoracic Echocardiography Report (TTE)  Demographics   Patient Name        Rosa Isela Mueller   Date of Study       03/06/2021  Gender                 Female   Patient Number      [id number]  Date of Birth          1940   Visit Number        [id number]   Age                    68 year(s)   Accession Number    [id number]  Room Number            7548   Corporate ID        [id number]    Sonographer            Gato Angeles RDCS, RVT   Ordering Physician              Interpreting Physician Verdia Romberg MD,                                                         [facility], [id number]  Procedure Type of Study   TTE procedure:ECHOCARDIOGRAM COMPLETE 2D W DOPPLER W COLOR. Procedure Date Date: 03/06/2021 Start: 09:12 AM Study Location: [facility] - Echo Lab Technical Quality: Good visualization Additional Indications:NSTEMI, Leg edema, CAD. Patient Status: Routine Height: 60 inches Weight: 220 pounds BSA: 1.94 m2 BMI: 42.97 kg/m2 BP: 134/75 mmHg  Conclusions   Summary  Normal left ventricle size, and systolic function with an estimated ejection  fraction of 55-60%. Mild concentric left ventricular hypertrophy is present. No regional wall motion abnormalities are seen. Mild tricuspid regurgitation, RVSP is estimated at 38 mmhg. Signature   ------------------------------------------------------------------  Electronically signed by Verdia Romberg MD, [facility], [id number]  (Interpreting physician) on 03/06/2021 at 12:12 PM  ------------------------------------------------------------------   Findings   Left Ventricle  Normal left ventricle size, and systolic function with an estimated ejection  fraction of 55-60%. Mild concentric left ventricular hypertrophy is present. No regional wall motion abnormalities are seen. Grade I diastolic dysfunction with normal LV filling pressures. Mitral Valve  Calcification of the mitral valve noted. No evidence of mitral regurgitation. Left Atrium  The left atrium is normal in size. Aortic Valve  The aortic valve is structurally normal. There is no significant aortic  valve regurgitation or stenosis. Aorta  The aortic root is normal in size. Right Ventricle  The right ventricle is normal in size and function. Tricuspid Valve  The tricuspid valve is normal in structure. Mild tricuspid regurgitation, RVSP is estimated at 38 mmhg.    Right Atrium  The right atrial size is normal.   Pulmonic Valve  The pulmonic valve is not well visualized. Mild pulmonic regurgitation present. Pericardial Effusion  No pericardial effusion noted. Pleural Effusion  No pleural effusion. Miscellaneous  The inferior vena cava appears normal in size with normal respiratory  variation. M-Mode/2D Measurements (cm)   LV Diastolic Dimension: 4.5 cm  LV Systolic Dimension: 0.81 cm  LV Septum Diastolic: 8.55 cm  LV PW Diastolic: 1.5 cm         AO Root Dimension: 3.1 cm  RV Diastolic Dimension: 2.44 cm LA Dimension: 3.4 cm                                  LA Area: 11.7 cm2                                  LA volume/Index: 21.6 ml /11 ml/m2  Doppler Measurements   AV Peak Velocity: 194 cm/s     MV Peak E-Wave: 98.5 cm/s  AV Peak Gradient: 15.05 mmHg   MV Peak A-Wave: 89.5 cm/s  AV Mean Gradient: 9 mmHg       MV E/A Ratio: 1.1  LVOT Peak Velocity: 122 cm/s   MV Mean Gradient: 3 mmHg                                 MV Max P mmHg  TR Velocity:290 cm/s           MV Vmax:125 cm/s  TR Gradient:33.64 mmHg         MV VTI:51.9 cm/s   E' Septal Velocity: 6.2 cm/s   MV Deceleration Time: 215 msec  E' Lateral Velocity: 5.98 cm/s  PV Peak Velocity: 123 cm/s  PV Peak Gradient: 6.05 mmHg   Aortic Valve   Peak Velocity: 194 cm/s   Mean Velocity: 141 cm/s  Peak Gradient: 15.05 mmHg Mean Gradient: 9 mmHg  AV VTI: 43.8 cm  Aorta   Aortic Root: 3.1 cm      Xr Chest (2 Vw)    Result Date: 3/1/2021  EXAMINATION: TWO XRAY VIEWS OF THE CHEST 3/1/2021 2:58 pm COMPARISON: None. HISTORY: ORDERING SYSTEM PROVIDED HISTORY: EUBANKS (dyspnea on exertion) TECHNOLOGIST PROVIDED HISTORY: Reason for exam:->3-4wk hx of EUBANKS and productive cough FINDINGS: Cardiomegaly. Pulmonary vascular congestion. Ill-defined peripheral pulmonary opacities. No pneumothorax. No pleural effusion.      Ill-defined bilateral pulmonary opacities could represent pulmonary edema possibly related to congestive heart failure or atypical pneumonia     Ct Chest Abdomen Pelvis Wo Contrast    Result Date: 3/5/2021  EXAMINATION: CT OF THE CHEST, ABDOMEN, AND PELVIS WITHOUT CONTRAST 3/5/2021 4:20 pm TECHNIQUE: CT of the chest, abdomen and pelvis was performed without the administration of intravenous contrast. Multiplanar reformatted images are provided for review. Dose modulation, iterative reconstruction, and/or weight based adjustment of the mA/kV was utilized to reduce the radiation dose to as low as reasonably achievable. COMPARISON: Chest CT 2014 2013 HISTORY: ORDERING SYSTEM PROVIDED HISTORY: Colon mass, r/o mets TECHNOLOGIST PROVIDED HISTORY: Reason for exam:->Colon mass, r/o mets Additional Contrast?->Oral Reason for Exam: Colon mass, r/o mets Acuity: Unknown Type of Exam: Unknown FINDINGS: Chest: Mediastinum: 7 mm nodule seen in right lobe of thyroid gland. No specific imaging follow-up recommended based on size. coronary artery calcification is seen. Small mediastinal nodes are noted. Right paratracheal node measures 1.4 cm in short axis, previously 10 mm. Lungs/pleura: Mosaic attenuation is seen throughout the lungs, suggesting small airways disease or air trapping. Trace pleural effusions are seen bilaterally. There is adjacent consolidation seen in the lung bases. 1.8 x 1.2 cm nodule is seen in the left lower lobe Soft Tissues/Bones: Degenerative changes are seen in the spine. Degenerative changes are seen in the shoulder joints. Abdomen/Pelvis: Organs: No splenomegaly Adrenal glands appear normal. There is rotation of the kidney anteriorly, incidentally noted. .  No stones noted 2 mm stone seen in the right kidney. No hydronephrosis noted. No intrahepatic ductal dilatation. No perihepatic fluid Gallbladder appears normal No peripancreatic inflammatory change GI/Bowel: No significant small bowel distention noted. Mild stool load seen in the colon. Scattered colonic diverticula are seen.  There is focal wall thickening of the colon on the right, extending over a length of 3.7 cm. There is surrounding injection the Nadja colonic fat. Small pericolonic lymph node is seen in the right upper quadrant mesentery measuring 8 mm. Pelvis: No free fluid seen within the pelvis. Pickard catheter is seen in the bladder Left adnexal mass is seen measuring 4.4 cm x 3.5 cm previously 3.9 cm by 3.3 cm. Right adnexal nodule measures 3.2 x 3.9 cm, previously 2.5 x 3.6 cm Peritoneum/Retroperitoneum: Small retroperitoneal nodes are seen. Small lymph nodes are seen along the iliac chains. No aortic aneurysm. Atherosclerotic change is seen in aorta and iliacs. Bones/Soft Tissues: Spurring is seen in the spine. Spurring is seen in the hips. Tiny periumbilical hernia containing fat is seen     Within the chest, small mediastinal nodes are seen, slightly increased compared to prior, either reactive or metastatic. Small pleural effusions are seen, compatible with mild fluid overload. Stable lobular pulmonary nodule in the left lower lobe. Nodular wall thickening of the colon on the right, compatible with the given history of colon mass. Small mesenteric node is seen in this region,, likely early desiree metastasis Increase in size of ovarian-adnexal masses on the right and left. Consider pelvic ultrasound for further characterization. Tiny nonobstructing right renal stone       Assessment and Plan:  Principal Problem:    Anemia -Established problem. Stable.  hgb 10.0  Plan: Continue present orders/plan. Active Problems:    Hypertension -Established problem. Stable. 143/65  Plan: stay on same meds    CAD (coronary artery disease)    High cholesterol    Diastolic dysfunction    Hypokalemia -Established problem. Resolved. K=4.5  Plan: Continue present orders/plan. Malignant neoplasm of ascending colon ([address] -Established problem. Stable.     Plan: for resection today per dr Yadiel Perez  3/8/2021

## 2021-03-09 LAB
ANION GAP SERPL CALCULATED.3IONS-SCNC: 9 MMOL/L (ref 3–16)
BASOPHILS ABSOLUTE: 0 K/UL (ref 0–0.2)
BASOPHILS RELATIVE PERCENT: 0.1 %
BUN BLDV-MCNC: 29 MG/DL (ref 7–20)
CALCIUM SERPL-MCNC: 8.2 MG/DL (ref 8.3–10.6)
CHLORIDE BLD-SCNC: 109 MMOL/L (ref 99–110)
CO2: 23 MMOL/L (ref 21–32)
CREAT SERPL-MCNC: 1.1 MG/DL (ref 0.6–1.2)
EOSINOPHILS ABSOLUTE: 0 K/UL (ref 0–0.6)
EOSINOPHILS RELATIVE PERCENT: 0 %
GFR AFRICAN AMERICAN: 58
GFR NON-AFRICAN AMERICAN: 48
GLUCOSE BLD-MCNC: 159 MG/DL (ref 70–99)
HCT VFR BLD CALC: 30.1 % (ref 36–48)
HEMOGLOBIN: 8.9 G/DL (ref 12–16)
LYMPHOCYTES ABSOLUTE: 0.8 K/UL (ref 1–5.1)
LYMPHOCYTES RELATIVE PERCENT: 4.3 %
MCH RBC QN AUTO: 22.8 PG (ref 26–34)
MCHC RBC AUTO-ENTMCNC: 29.7 G/DL (ref 31–36)
MCV RBC AUTO: 76.9 FL (ref 80–100)
MONOCYTES ABSOLUTE: 1.1 K/UL (ref 0–1.3)
MONOCYTES RELATIVE PERCENT: 6 %
NEUTROPHILS ABSOLUTE: 17.2 K/UL (ref 1.7–7.7)
NEUTROPHILS RELATIVE PERCENT: 89.6 %
PDW BLD-RTO: 25.4 % (ref 12.4–15.4)
PLATELET # BLD: 266 K/UL (ref 135–450)
PMV BLD AUTO: 7.6 FL (ref 5–10.5)
POTASSIUM SERPL-SCNC: 4.7 MMOL/L (ref 3.5–5.1)
RBC # BLD: 3.92 M/UL (ref 4–5.2)
SODIUM BLD-SCNC: 141 MMOL/L (ref 136–145)
WBC # BLD: 19.2 K/UL (ref 4–11)

## 2021-03-09 PROCEDURE — 2580000003 HC RX 258: Performed by: SURGERY

## 2021-03-09 PROCEDURE — 2500000003 HC RX 250 WO HCPCS: Performed by: SURGERY

## 2021-03-09 PROCEDURE — APPNB30 APP NON BILLABLE TIME 0-30 MINS: Performed by: NURSE PRACTITIONER

## 2021-03-09 PROCEDURE — 36415 COLL VENOUS BLD VENIPUNCTURE: CPT

## 2021-03-09 PROCEDURE — 6360000002 HC RX W HCPCS: Performed by: SURGERY

## 2021-03-09 PROCEDURE — 6370000000 HC RX 637 (ALT 250 FOR IP): Performed by: SURGERY

## 2021-03-09 PROCEDURE — 99233 SBSQ HOSP IP/OBS HIGH 50: CPT | Performed by: NURSE PRACTITIONER

## 2021-03-09 PROCEDURE — 6360000002 HC RX W HCPCS: Performed by: NURSE PRACTITIONER

## 2021-03-09 PROCEDURE — 99024 POSTOP FOLLOW-UP VISIT: CPT | Performed by: SURGERY

## 2021-03-09 PROCEDURE — 2060000000 HC ICU INTERMEDIATE R&B

## 2021-03-09 PROCEDURE — 80048 BASIC METABOLIC PNL TOTAL CA: CPT

## 2021-03-09 PROCEDURE — APPSS15 APP SPLIT SHARED TIME 0-15 MINUTES: Performed by: NURSE PRACTITIONER

## 2021-03-09 PROCEDURE — 85025 COMPLETE CBC W/AUTO DIFF WBC: CPT

## 2021-03-09 RX ORDER — KETOROLAC TROMETHAMINE 30 MG/ML
30 INJECTION, SOLUTION INTRAMUSCULAR; INTRAVENOUS ONCE
Status: COMPLETED | OUTPATIENT
Start: 2021-03-09 | End: 2021-03-09

## 2021-03-09 RX ADMIN — ACETAMINOPHEN 1000 MG: 500 TABLET, FILM COATED ORAL at 13:19

## 2021-03-09 RX ADMIN — ACETAMINOPHEN 1000 MG: 500 TABLET, FILM COATED ORAL at 05:23

## 2021-03-09 RX ADMIN — METRONIDAZOLE 500 MG: 500 INJECTION, SOLUTION INTRAVENOUS at 21:05

## 2021-03-09 RX ADMIN — ASPIRIN 81 MG: 81 TABLET, COATED ORAL at 08:43

## 2021-03-09 RX ADMIN — HYDROMORPHONE HYDROCHLORIDE 1 MG: 1 INJECTION, SOLUTION INTRAMUSCULAR; INTRAVENOUS; SUBCUTANEOUS at 08:40

## 2021-03-09 RX ADMIN — HYDROMORPHONE HYDROCHLORIDE 1 MG: 1 INJECTION, SOLUTION INTRAMUSCULAR; INTRAVENOUS; SUBCUTANEOUS at 18:15

## 2021-03-09 RX ADMIN — Medication 10 ML: at 08:46

## 2021-03-09 RX ADMIN — METRONIDAZOLE 500 MG: 500 INJECTION, SOLUTION INTRAVENOUS at 04:09

## 2021-03-09 RX ADMIN — KETOROLAC TROMETHAMINE 30 MG: 30 INJECTION, SOLUTION INTRAMUSCULAR at 13:16

## 2021-03-09 RX ADMIN — CARVEDILOL 3.12 MG: 3.12 TABLET, FILM COATED ORAL at 08:44

## 2021-03-09 RX ADMIN — CARVEDILOL 3.12 MG: 3.12 TABLET, FILM COATED ORAL at 17:51

## 2021-03-09 RX ADMIN — LISINOPRIL 2.5 MG: 5 TABLET ORAL at 08:43

## 2021-03-09 RX ADMIN — METRONIDAZOLE 500 MG: 500 INJECTION, SOLUTION INTRAVENOUS at 13:19

## 2021-03-09 RX ADMIN — FAMOTIDINE 20 MG: 20 TABLET, FILM COATED ORAL at 08:43

## 2021-03-09 RX ADMIN — ENOXAPARIN SODIUM 40 MG: 40 INJECTION SUBCUTANEOUS at 17:53

## 2021-03-09 RX ADMIN — ROSUVASTATIN 20 MG: 20 TABLET, FILM COATED ORAL at 21:05

## 2021-03-09 RX ADMIN — ACETAMINOPHEN 1000 MG: 500 TABLET, FILM COATED ORAL at 21:05

## 2021-03-09 RX ADMIN — Medication 10 ML: at 23:47

## 2021-03-09 ASSESSMENT — PAIN SCALES - GENERAL
PAINLEVEL_OUTOF10: 5
PAINLEVEL_OUTOF10: 0
PAINLEVEL_OUTOF10: 9

## 2021-03-09 ASSESSMENT — PAIN DESCRIPTION - LOCATION: LOCATION: ABDOMEN

## 2021-03-09 NOTE — PROGRESS NOTES
Progress Note - Dr. Elena Begum - Internal Medicine  PCP: Tyree Whitmore MD [address] [place] [address] Jayro Ortega 80 [phone]    Hospital Day: 6  Code Status: Full Code  Current Diet: Diet NPO Effective Now Exceptions are: Ice Chips, Sips of Clear Liquids, Sips of Water with Meds        CC: follow up on medical issues    Subjective: Roland King is a 68 y.o. female. She denies problems    Pt tolerated surgery OK  Findings: Right colon mass consistent with adenocarcinoma. No evidence of metastatic disease, gallstones     Pain is controlled; has iv meds on board    She denies chest pain, denies shortness of breath, denies nausea,  denies emesis. 10 system Review of Systems is reviewed with patient, and pertinent positives are noted in HPI above . Otherwise, Review of systems is negative. I have reviewed the patient's medical and social history in detail and updated the computerized patient record. To recap: She  has a past medical history of Anemia, CAD (coronary artery disease), CKD (chronic kidney disease), Hyperlipidemia, and Hypertension. . She  has a past surgical history that includes fracture surgery; Cystocopy (11/21/13); Upper gastrointestinal endoscopy (N/A, 3/5/2021); Colonoscopy (N/A, 3/5/2021); hemicolectomy (Right, 3/8/2021); and Cholecystectomy, laparoscopic (N/A, 3/8/2021). . She  reports that she has never smoked. She has never used smokeless tobacco. She reports that she does not drink alcohol or use drugs. .        Active Hospital Problems    Diagnosis Date Noted    Diastolic dysfunction [L71.07] 03/07/2021    Hypokalemia [E87.6] 03/07/2021    Malignant neoplasm of ascending colon (HCC) [C18.2] 03/07/2021    Anemia [D64.9] 03/03/2021    CAD (coronary artery disease) [I25.10] 03/03/2021    High cholesterol [E78.00] 03/03/2021    Hypertension [I10] 01/10/2014       Current Facility-Administered Medications: metronidazole (FLAGYL) 500 mg in NaCl 100 mL IVPB premix, 500 mg, Intravenous, Q8H  0.9 % sodium chloride infusion, , Intravenous, Continuous  HYDROmorphone (DILAUDID) injection 1 mg, 1 mg, Intravenous, Q3H PRN  acetaminophen (TYLENOL) tablet 1,000 mg, 1,000 mg, Oral, 3 times per day  aspirin EC tablet 81 mg, 81 mg, Oral, Daily  perflutren lipid microspheres (DEFINITY) injection 1.65 mg, 1.5 mL, Intravenous, ONCE PRN  0.9 % sodium chloride infusion, , Intravenous, PRN  lisinopril (PRINIVIL;ZESTRIL) tablet 2.5 mg, 2.5 mg, Oral, Daily  carvedilol (COREG) tablet 3.125 mg, 3.125 mg, Oral, BID WC  rosuvastatin (CRESTOR) tablet 20 mg, 20 mg, Oral, Nightly  furosemide (LASIX) tablet 20 mg, 20 mg, Oral, Daily PRN  sodium chloride flush 0.9 % injection 10 mL, 10 mL, Intravenous, 2 times per day  sodium chloride flush 0.9 % injection 10 mL, 10 mL, Intravenous, PRN  potassium chloride (KLOR-CON M) extended release tablet 40 mEq, 40 mEq, Oral, PRN **OR** potassium bicarb-citric acid (EFFER-K) effervescent tablet 40 mEq, 40 mEq, Oral, PRN **OR** potassium chloride 10 mEq/100 mL IVPB (Peripheral Line), 10 mEq, Intravenous, PRN  promethazine (PHENERGAN) tablet 12.5 mg, 12.5 mg, Oral, Q6H PRN **OR** ondansetron (ZOFRAN) injection 4 mg, 4 mg, Intravenous, Q6H PRN  magnesium hydroxide (MILK OF MAGNESIA) 400 MG/5ML suspension 30 mL, 30 mL, Oral, Daily PRN  famotidine (PEPCID) tablet 20 mg, 20 mg, Oral, Daily  acetaminophen (TYLENOL) tablet 650 mg, 650 mg, Oral, Q6H PRN **OR** acetaminophen (TYLENOL) suppository 650 mg, 650 mg, Rectal, Q6H PRN  hydrALAZINE (APRESOLINE) injection 10 mg, 10 mg, Intravenous, Q6H PRN  0.9 % sodium chloride bolus, 500 mL, Intravenous, PRN  0.9 % sodium chloride infusion, , Intravenous, PRN         Objective:  /61   Pulse 82   Temp 97.2 °F (36.2 °C) (Temporal)   Resp 17   Ht 5' (1.524 m)   Wt 218 lb 8 oz (99.1 kg)   SpO2 94%   BMI 42.67 kg/m²      Patient Vitals for the past 24 hrs:   BP Temp Temp src Pulse Resp SpO2   03/09/21 0407 129/61 97.2 °F (36.2 °C) Temporal 82 17 94 %   03/09/21 0011 (!) 146/63 96.8 °F (36 °C) Temporal 85 17 93 %   03/08/21 2216 (!) 148/69 -- -- -- 20 94 %   03/08/21 2014 (!) 159/66 97.8 °F (36.6 °C) Temporal 84 17 98 %   03/08/21 1813 (!) 140/64 97.7 °F (36.5 °C) Temporal 72 20 97 %   03/08/21 1715 (!) 141/68 97.4 °F (36.3 °C) Temporal 78 -- 98 %   03/08/21 1645 (!) 119/40 -- -- 78 14 96 %   03/08/21 1630 (!) 116/44 -- -- 78 14 94 %   03/08/21 1600 (!) 123/46 -- -- 79 15 95 %   03/08/21 1545 (!) 123/48 -- -- 75 14 95 %   03/08/21 1540 (!) 126/48 96.9 °F (36.1 °C) Temporal 73 21 99 %   03/08/21 1535 (!) 122/48 -- -- 74 14 98 %   03/08/21 1530 (!) 119/41 -- -- 73 18 98 %   03/08/21 1527 (!) 126/43 97 °F (36.1 °C) Temporal 74 15 98 %   03/08/21 1130 (!) 156/77 98.9 °F (37.2 °C) Temporal 81 16 97 %   03/08/21 0945 138/78 98.2 °F (36.8 °C) Oral 82 16 97 %     Patient Vitals for the past 96 hrs (Last 3 readings):   Weight   03/07/21 0857 218 lb 8 oz (99.1 kg)   03/06/21 0830 219 lb 5.7 oz (99.5 kg)   03/05/21 0754 217 lb 2.5 oz (98.5 kg)           Intake/Output Summary (Last 24 hours) at 3/9/2021 0753  Last data filed at 3/9/2021 0526  Gross per 24 hour   Intake 2000.77 ml   Output 1100 ml   Net 900.77 ml         Physical Exam:   /61   Pulse 82   Temp 97.2 °F (36.2 °C) (Temporal)   Resp 17   Ht 5' (1.524 m)   Wt 218 lb 8 oz (99.1 kg)   SpO2 94%   BMI 42.67 kg/m²   General appearance: alert, appears stated age and cooperative  Head: Normocephalic, without obvious abnormality, atraumatic  Lungs: clear to auscultation bilaterally  Heart: regular rate and rhythm, S1, S2 normal, no murmur, click, rub or gallop  Abdomen: dressing CDI.  Bowel sounds hypoactive  Extremities: extremities normal, atraumatic, no cyanosis or edema    Labs:  Lab Results   Component Value Date    WBC 19.2 (H) 03/09/2021    HGB 8.9 (L) 03/09/2021    HCT 30.1 (L) 03/09/2021     03/09/2021    CHOL 155 11/21/2020    TRIG 121 11/21/2020    HDL 38 (L) 11/21/2020 ALT 6 (L) 03/04/2021    AST 15 03/04/2021     03/09/2021    K 4.7 03/09/2021     03/09/2021    CREATININE 1.1 03/09/2021    BUN 29 (H) 03/09/2021    CO2 23 03/09/2021    INR 1.09 03/08/2021     Lab Results   Component Value Date    TROPONINI 0.03 (H) 11/20/2020       Recent Imaging Results are Reviewed:  Echo Complete    Result Date: 3/6/2021  Transthoracic Echocardiography Report (TTE)  Demographics   Patient Name        David Roman   Date of Study       03/06/2021  Gender                 Female   Patient Number      [id number]  Date of Birth          1940   Visit Number        [id number]   Age                    68 year(s)   Accession Number    [id number]  Room Number            3244   Corporate ID        [id number]    Sonographer            Gil Kurtz RD,                                                         RVT   Ordering Physician              Interpreting Physician Isidoro Ledesma MD,                                                         [facility] [place], [facility]  Procedure Type of Study   TTE procedure:ECHOCARDIOGRAM COMPLETE 2D W DOPPLER W COLOR. Procedure Date Date: 03/06/2021 Start: 09:12 AM Study Location: [facility] - Echo Lab Technical Quality: Good visualization Additional Indications:NSTEMI, Leg edema, CAD. Patient Status: Routine Height: 60 inches Weight: 220 pounds BSA: 1.94 m2 BMI: 42.97 kg/m2 BP: 134/75 mmHg  Conclusions   Summary  Normal left ventricle size, and systolic function with an estimated ejection  fraction of 55-60%. Mild concentric left ventricular hypertrophy is present. No regional wall motion abnormalities are seen. Mild tricuspid regurgitation, RVSP is estimated at 38 mmhg.    Signature   ------------------------------------------------------------------  Electronically signed by Isidoro Ledesma MD, [facility] [place], [facility]  (Interpreting physician) on 03/06/2021 at 12:12 PM  ------------------------------------------------------------------   Findings   Left Ventricle Normal left ventricle size, and systolic function with an estimated ejection  fraction of 55-60%. Mild concentric left ventricular hypertrophy is present. No regional wall motion abnormalities are seen. Grade I diastolic dysfunction with normal LV filling pressures. Mitral Valve  Calcification of the mitral valve noted. No evidence of mitral regurgitation. Left Atrium  The left atrium is normal in size. Aortic Valve  The aortic valve is structurally normal. There is no significant aortic  valve regurgitation or stenosis. Aorta  The aortic root is normal in size. Right Ventricle  The right ventricle is normal in size and function. Tricuspid Valve  The tricuspid valve is normal in structure. Mild tricuspid regurgitation, RVSP is estimated at 38 mmhg. Right Atrium  The right atrial size is normal.   Pulmonic Valve  The pulmonic valve is not well visualized. Mild pulmonic regurgitation present. Pericardial Effusion  No pericardial effusion noted. Pleural Effusion  No pleural effusion. Miscellaneous  The inferior vena cava appears normal in size with normal respiratory  variation.   M-Mode/2D Measurements (cm)   LV Diastolic Dimension: 4.5 cm  LV Systolic Dimension: 5.62 cm  LV Septum Diastolic: 8.22 cm  LV PW Diastolic: 1.5 cm         AO Root Dimension: 3.1 cm  RV Diastolic Dimension: 9.80 cm LA Dimension: 3.4 cm                                  LA Area: 11.7 cm2                                  LA volume/Index: 21.6 ml /11 ml/m2  Doppler Measurements   AV Peak Velocity: 194 cm/s     MV Peak E-Wave: 98.5 cm/s  AV Peak Gradient: 15.05 mmHg   MV Peak A-Wave: 89.5 cm/s  AV Mean Gradient: 9 mmHg       MV E/A Ratio: 1.1  LVOT Peak Velocity: 122 cm/s   MV Mean Gradient: 3 mmHg                                 MV Max P mmHg  TR Velocity:290 cm/s           MV Vmax:125 cm/s  TR Gradient:33.64 mmHg         MV VTI:51.9 cm/s   E' Septal Velocity: 6.2 cm/s   MV Deceleration Time: 215 msec  E' Lateral Velocity: 5.98 cm/s  PV Peak Velocity: 123 cm/s  PV Peak Gradient: 6.05 mmHg   Aortic Valve   Peak Velocity: 194 cm/s   Mean Velocity: 141 cm/s  Peak Gradient: 15.05 mmHg Mean Gradient: 9 mmHg  AV VTI: 43.8 cm  Aorta   Aortic Root: 3.1 cm      Xr Chest (2 Vw)    Result Date: 3/1/2021  EXAMINATION: TWO XRAY VIEWS OF THE CHEST 3/1/2021 2:58 pm COMPARISON: None. HISTORY: ORDERING SYSTEM PROVIDED HISTORY: EUBANKS (dyspnea on exertion) TECHNOLOGIST PROVIDED HISTORY: Reason for exam:->3-4wk hx of EUBANKS and productive cough FINDINGS: Cardiomegaly. Pulmonary vascular congestion. Ill-defined peripheral pulmonary opacities. No pneumothorax. No pleural effusion. Ill-defined bilateral pulmonary opacities could represent pulmonary edema possibly related to congestive heart failure or atypical pneumonia     Ct Chest Abdomen Pelvis Wo Contrast    Result Date: 3/5/2021  EXAMINATION: CT OF THE CHEST, ABDOMEN, AND PELVIS WITHOUT CONTRAST 3/5/2021 4:20 pm TECHNIQUE: CT of the chest, abdomen and pelvis was performed without the administration of intravenous contrast. Multiplanar reformatted images are provided for review. Dose modulation, iterative reconstruction, and/or weight based adjustment of the mA/kV was utilized to reduce the radiation dose to as low as reasonably achievable. COMPARISON: Chest CT 2014 2013 HISTORY: ORDERING SYSTEM PROVIDED HISTORY: Colon mass, r/o mets TECHNOLOGIST PROVIDED HISTORY: Reason for exam:->Colon mass, r/o mets Additional Contrast?->Oral Reason for Exam: Colon mass, r/o mets Acuity: Unknown Type of Exam: Unknown FINDINGS: Chest: Mediastinum: 7 mm nodule seen in right lobe of thyroid gland. No specific imaging follow-up recommended based on size. coronary artery calcification is seen. Small mediastinal nodes are noted. Right paratracheal node measures 1.4 cm in short axis, previously 10 mm.  Lungs/pleura: Mosaic attenuation is seen throughout the lungs, suggesting small airways disease or air trapping. Trace pleural effusions are seen bilaterally. There is adjacent consolidation seen in the lung bases. 1.8 x 1.2 cm nodule is seen in the left lower lobe Soft Tissues/Bones: Degenerative changes are seen in the spine. Degenerative changes are seen in the shoulder joints. Abdomen/Pelvis: Organs: No splenomegaly Adrenal glands appear normal. There is rotation of the kidney anteriorly, incidentally noted. .  No stones noted 2 mm stone seen in the right kidney. No hydronephrosis noted. No intrahepatic ductal dilatation. No perihepatic fluid Gallbladder appears normal No peripancreatic inflammatory change GI/Bowel: No significant small bowel distention noted. Mild stool load seen in the colon. Scattered colonic diverticula are seen. There is focal wall thickening of the colon on the right, extending over a length of 3.7 cm. There is surrounding injection the Nadja colonic fat. Small pericolonic lymph node is seen in the right upper quadrant mesentery measuring 8 mm. Pelvis: No free fluid seen within the pelvis. Pickard catheter is seen in the bladder Left adnexal mass is seen measuring 4.4 cm x 3.5 cm previously 3.9 cm by 3.3 cm. Right adnexal nodule measures 3.2 x 3.9 cm, previously 2.5 x 3.6 cm Peritoneum/Retroperitoneum: Small retroperitoneal nodes are seen. Small lymph nodes are seen along the iliac chains. No aortic aneurysm. Atherosclerotic change is seen in aorta and iliacs. Bones/Soft Tissues: Spurring is seen in the spine. Spurring is seen in the hips. Tiny periumbilical hernia containing fat is seen     Within the chest, small mediastinal nodes are seen, slightly increased compared to prior, either reactive or metastatic. Small pleural effusions are seen, compatible with mild fluid overload. Stable lobular pulmonary nodule in the left lower lobe. Nodular wall thickening of the colon on the right, compatible with the given history of colon mass.   Small mesenteric node is seen in this region,, likely early desiree metastasis Increase in size of ovarian-adnexal masses on the right and left. Consider pelvic ultrasound for further characterization. Tiny nonobstructing right renal stone       Assessment and Plan:  Principal Problem:    Anemia -Established problem. Stable.  hgb 8.9  Plan: Continue present orders/plan. Active Problems:    Hypertension -Established problem. Stable. 129/61  Plan: stay on same meds    CAD (coronary artery disease)  Plan: Continue present orders/plan. High cholesterol  Plan: resume statin when yobani to take PO    Diastolic dysfunction    Hypokalemia -Established problem. Stable. 4.7 today  Plan: Continue present orders/plan. Malignant neoplasm of ascending colon ([facility] [address] -Established problem. Stable.   S/p resection 3/8/21 per corian  Plan: awaiting path results            Margot Trejo  3/9/2021

## 2021-03-09 NOTE — PROGRESS NOTES
[facility] and Laparoscopic Surgery        Progress Note    Patient Name: Landry Gaitan  MRN: [id number]  YOB: 1940  Date of Evaluation: 3/9/2021    Subjective:  No acute events overnight  Pain moderate, worse along left-sided than right  Denies nausea or vomiting, tolerating sips of clear liquids  No flatus or BM  Resting in bed at this time      Post-Operative Day #1    Vital Signs:  Patient Vitals for the past 24 hrs:   BP Temp Temp src Pulse Resp SpO2 Weight   21 1327 (!) 153/68 98.2 °F (36.8 °C) Temporal 78 20 95 % --   21 0838 128/69 98.2 °F (36.8 °C) Temporal 85 20 93 % --   21 0822 -- -- -- -- -- -- 220 lb 7.4 oz (100 kg)   21 0407 129/61 97.2 °F (36.2 °C) Temporal 82 17 94 % --   21 0011 (!) 146/63 96.8 °F (36 °C) Temporal 85 17 93 % --   21 2216 (!) 148/69 -- -- -- 20 94 % --   21 (!) 159/66 97.8 °F (36.6 °C) Temporal 84 17 98 % --   21 1813 (!) 140/64 97.7 °F (36.5 °C) Temporal 72 20 97 % --   21 1715 (!) 141/68 97.4 °F (36.3 °C) Temporal 78 -- 98 % --   21 1645 (!) 119/40 -- -- 78 14 96 % --   21 1630 (!) 116/44 -- -- 78 14 94 % --   21 1600 (!) 123/46 -- -- 79 15 95 % --   21 1545 (!) 123/48 -- -- 75 14 95 % --   21 1540 (!) 126/48 96.9 °F (36.1 °C) Temporal 73 21 99 % --   21 1535 (!) 122/48 -- -- 74 14 98 % --   21 1530 (!) 119/41 -- -- 73 18 98 % --   21 1527 (!) 126/43 97 °F (36.1 °C) Temporal 74 15 98 % --      TEMPERATURE HISTORY 24H: Temp (24hrs), Av.4 °F (36.3 °C), Min:68.4 °F (20.2 °C), Max:98.4 °F (36.9 °C)    BLOOD PRESSURE HISTORY: Systolic (22JVA), FNB:206 , Min:58 , NGZ:605    Diastolic (70BNX), ZVD:37, Min:36, Max:94      Intake/Output:  I/O last 3 completed shifts: In: .8 [I.V.:.8]  Out: 1100 [Urine:1000; Blood:100]  No intake/output data recorded.   Drain/tube Output:       Physical Exam:  General: awake, alert, oriented to  person, place, time  Lungs: clear to auscultation  Abdomen: soft, non-distended, incisional tenderness only, hypoactive bowel sounds present   Skin/Wound: healing well, no drainage, no erythema, well approximated    Labs:  CBC:    Recent Labs     03/08/21  0444 03/09/21  0456   WBC 8.4 19.2*   HGB 10.0* 8.9*   HCT 32.9* 30.1*    266     BMP:    Recent Labs     03/08/21  0444 03/09/21  0456    141   K 4.5 4.7    109   CO2 29 23   BUN 27* 29*   CREATININE 1.2 1.1   GLUCOSE 118* 159*     Hepatic:  No results for input(s): AST, ALT, ALB, BILITOT, ALKPHOS in the last 72 hours. Amylase:  No results found for: AMYLASE  Lipase:    Lab Results   Component Value Date    LIPASE 25.0 11/20/2020      Mag:    Lab Results   Component Value Date    MG 2.10 03/04/2021    MG 2.10 03/04/2021     Phos:     Lab Results   Component Value Date    PHOS 3.8 05/29/2015    PHOS 4.0 05/28/2015      Coags:   Lab Results   Component Value Date    PROTIME 12.7 03/08/2021    INR 1.09 03/08/2021    APTT 30.4 03/03/2021       Cultures:  Anaerobic culture  No results found for: LABANAE  Fungus stain  No results found for requested labs within last 30 days. Gram stain  No results found for requested labs within last 30 days. Organism  No results found for: [facility]  Surgical culture  No results found for: CXSURG  Blood culture 1  No results found for requested labs within last 30 days. Blood culture 2  No results found for requested labs within last 30 days. Fecal occult  Results in Past 30 Days  Result Component Current Result Ref Range Previous Result Ref Range   Occult Blood Diagnostic Result: Negative  Normal range: Negative   (3/3/2021)  Not in Time Range      GI bacterial pathogens by PCR  No results found for requested labs within last 30 days. C. difficile  No results found for requested labs within last 30 days. Urine culture  No results found for: LABURIN    Pathology:  Pathology results pending     Imaging:   I have personally reviewed the following films:    No results found. Scheduled Meds:   metroNIDAZOLE  500 mg Intravenous Q8H    acetaminophen  1,000 mg Oral 3 times per day    aspirin  81 mg Oral Daily    lisinopril  2.5 mg Oral Daily    carvedilol  3.125 mg Oral BID WC    rosuvastatin  20 mg Oral Nightly    sodium chloride flush  10 mL Intravenous 2 times per day    famotidine  20 mg Oral Daily     Continuous Infusions:   sodium chloride Stopped (03/08/21 2026)    sodium chloride      sodium chloride       PRN Meds:. HYDROmorphone, perflutren lipid microspheres, sodium chloride, furosemide, sodium chloride flush, potassium chloride **OR** potassium alternative oral replacement **OR** potassium chloride, promethazine **OR** ondansetron, magnesium hydroxide, acetaminophen **OR** acetaminophen, hydrALAZINE, sodium chloride, sodium chloride      Assessment:  68 y.o. female admitted with   1. Anemia, unspecified type        Status-post laparoscopic right colon resection and cholecystectomy on 3/8/2021 for colon mass, chronic cholecystitis with cholelithiasis   Anemia  Hypertension  CAD  Medical coagulopathy, on Brilinta      Plan:  1. Keep Pickard today  2. Clear liquid diet as tolerated; monitor bowel function  3. IV hydration; monitor and correct electrolytes  4. Antibiotics  5. Activity as tolerated, ambulate TID, up to chair for all meals--PT/OT evaluation and treatment  6. Pulmonary toilet, incentive spirometry--wean oxygen  7. PRN analgesics and antiemetics--minimizing narcotics as tolerated, add Toradol x1, apply ice  8. DVT prophylaxis with SCD's; holding Brilinta  9. Management of medical comorbid etiologies per primary team and consulting services    EDUCATION:  Educated patient on plan of care and disease process--all questions answered. Plans discussed with patient and nursing. Reviewed and discussed with Dr. Harper Martinez.       Signed:  JIMENA Montalvo - CNP  3/9/2021 2:37 PM 72-year-old female who is postop day #1 status post right colon resection and cholecystectomy  Doing fairly well  Pain not well controlled  Will try Toradol x1  Clear liquid diet today  Up to chair

## 2021-03-09 NOTE — PLAN OF CARE
Problem: Pain:  Goal: Pain level will decrease  Description: Pain level will decrease  Outcome: Ongoing  Note: Pt complained of pain to incision site on right abdomen, rating 9/10. Pt reported relief with PRN pain medication. Will continue to monitor.    Goal: Control of acute pain  Description: Control of acute pain  Outcome: Ongoing  Goal: Control of chronic pain  Description: Control of chronic pain  Outcome: Ongoing

## 2021-03-09 NOTE — PROGRESS NOTES
[facility]   Progress Note      3/9/2021 8:25 AM        Name: Veronika Schaffer . Admitted: 3/3/2021    SUBJECTIVE:  Patient resting quietly in bed, pain is controlled, she offers no complaints.      Reviewed interval ancillary notes    Current Medications  metronidazole (FLAGYL) 500 mg in NaCl 100 mL IVPB premix, Q8H  0.9 % sodium chloride infusion, Continuous  HYDROmorphone (DILAUDID) injection 1 mg, Q3H PRN  acetaminophen (TYLENOL) tablet 1,000 mg, 3 times per day  aspirin EC tablet 81 mg, Daily  perflutren lipid microspheres (DEFINITY) injection 1.65 mg, ONCE PRN  0.9 % sodium chloride infusion, PRN  lisinopril (PRINIVIL;ZESTRIL) tablet 2.5 mg, Daily  carvedilol (COREG) tablet 3.125 mg, BID WC  rosuvastatin (CRESTOR) tablet 20 mg, Nightly  furosemide (LASIX) tablet 20 mg, Daily PRN  sodium chloride flush 0.9 % injection 10 mL, 2 times per day  sodium chloride flush 0.9 % injection 10 mL, PRN  potassium chloride (KLOR-CON M) extended release tablet 40 mEq, PRN    Or  potassium bicarb-citric acid (EFFER-K) effervescent tablet 40 mEq, PRN    Or  potassium chloride 10 mEq/100 mL IVPB (Peripheral Line), PRN  promethazine (PHENERGAN) tablet 12.5 mg, Q6H PRN    Or  ondansetron (ZOFRAN) injection 4 mg, Q6H PRN  magnesium hydroxide (MILK OF MAGNESIA) 400 MG/5ML suspension 30 mL, Daily PRN  famotidine (PEPCID) tablet 20 mg, Daily  acetaminophen (TYLENOL) tablet 650 mg, Q6H PRN    Or  acetaminophen (TYLENOL) suppository 650 mg, Q6H PRN  hydrALAZINE (APRESOLINE) injection 10 mg, Q6H PRN  0.9 % sodium chloride bolus, PRN  0.9 % sodium chloride infusion, PRN        Objective:  /61   Pulse 82   Temp 97.2 °F (36.2 °C) (Temporal)   Resp 17   Ht 5' (1.524 m)   Wt 220 lb 7.4 oz (100 kg)   SpO2 94%   BMI 43.06 kg/m²     Intake/Output Summary (Last 24 hours) at 3/9/2021 0825  Last data filed at 3/9/2021 0526  Gross per 24 hour   Intake 2000.77 ml   Output 1100 ml   Net 900.77 ml      Wt Readings from Last 3 Encounters:   03/09/21 220 lb 7.4 oz (100 kg)   03/01/21 235 lb 9.6 oz (106.9 kg)   11/30/20 229 lb (103.9 kg)       General appearance:  Appears comfortable  Eyes: Sclera clear. Pupils equal.  ENT: Moist oral mucosa. Trachea midline, no adenopathy. Cardiovascular: Regular rhythm, normal S1, S2. No murmur. No edema in lower extremities  Respiratory: Not using accessory muscles. Good inspiratory effort. Clear to auscultation bilaterally, no wheeze or crackles. GI: Abdomen soft, no tenderness, not distended  Musculoskeletal: No cyanosis in digits, neck supple  Neurology: CN 2-12 grossly intact. No speech or motor deficits  Psych: Normal affect. Alert and oriented in time, place and person  Skin: Warm, dry, normal turgor    Labs and Tests:  CBC:   Recent Labs     03/08/21  0444 03/09/21  0456   WBC 8.4 19.2*   HGB 10.0* 8.9*    266     BMP:    Recent Labs     03/08/21  0444 03/09/21  0456    141   K 4.5 4.7    109   CO2 29 23   BUN 27* 29*   CREATININE 1.2 1.1   GLUCOSE 118* 159*     Hepatic: No results for input(s): AST, ALT, ALB, BILITOT, ALKPHOS in the last 72 hours. ASSESSMENT AND PLAN    Principal Problem:    Anemia  Active Problems:    Hypertension    CAD (coronary artery disease)    High cholesterol    Diastolic dysfunction    Hypokalemia    Malignant neoplasm of ascending colon (HCC)  Resolved Problems:    * No resolved hospital problems. *      Anemia  - iron deficiency  - completed 3 doses of Venofer on 3/6/21  - Vit B12 wnl  - stool for occult blood negative 3/3/21  - hgb lower today at 8.9 likely d/t surgery     Ascending colon mass  -Colonoscopy was done on 3/5/2021  -Findings were suspicious for adenocarcinoma.   Biopsies were taken  -CEA and CT scans were negative for metastatic disease.  - s/p right hemicolectomy 3/8/21, pathology pending        CAD  - s/p stent placement       Megan Henry, [facility]  [facility]

## 2021-03-09 NOTE — PROGRESS NOTES
Comprehensive Nutrition Assessment    Type and Reason for Visit:  Initial    Nutrition Recommendations/Plan:    · When diet resumed recommend clear liquids with adv to cardiac / low fiber as tolerated  · Consider oral nutrition supplement  · Low fiber / cardiac diet education prior to d/c.  ·     Nutrition Assessment:  5 day LOS nutrition assessment. Currently NPO s/p lap renu with right colon resection. During visit pt reported being in pain however did stated that PO intake prior to admission was good and wt status was stable. Prior to surgery PO intake was 50% or greater with pt reporting good appetite. Pt would benefit from oral nutrition supplement as diet adv to help meet increased protein needs and would benefit from diet education prior to d/c. Will continue to follow for diet advance, tolerance and PO Intake. Malnutrition Assessment:  Malnutrition Status:  No malnutrition    Context:  Acute Illness     Findings of the 6 clinical characteristics of malnutrition:  Energy Intake:  No significant decrease in energy intake  Weight Loss:  No significant weight loss     Body Fat Loss:  No significant body fat loss     Muscle Mass Loss:  No significant muscle mass loss    Fluid Accumulation:  No significant fluid accumulation     Strength:  Not Performed    Estimated Daily Nutrient Needs:  Energy (kcal):  813-1323; Weight Used for Energy Requirements:  Current(99.5kg)     Protein (g):  90;  Weight Used for Protein Requirements:  Ideal(2.0g/45kg)        Fluid (ml/day):   ; Method Used for Fluid Requirements:  1 ml/kcal      Nutrition Related Findings:  Trace edema lower extremities, no BM noted      Wounds:  Surgical Incision(medial abdomen)       Current Nutrition Therapies:    Diet NPO Effective Now Exceptions are: Ice Chips, Sips of Clear Liquids, Sips of Water with Meds    Anthropometric Measures:  · Height: 5' (152.4 cm)  · Current Body Weight: 220 lb (99.8 kg)   · Ideal Body Weight: 100 lbs; % Ideal Body Weight 220 %   · BMI: 43  · Adjusted Body Weight:  ; No Adjustment    · BMI Categories: Obese Class 3 (BMI 40.0 or greater)       Nutrition Diagnosis:   · Increased nutrient needs related to increase demand for energy/nutrients as evidenced by wounds(s/p colon resection)      Nutrition Interventions:   Food and/or Nutrient Delivery:  Continue Current Diet  Nutrition Education/Counseling:  Education needed(prior to d/c low-fat / low fiber )   Coordination of Nutrition Care:  Continue to monitor while inpatient    Goals:  PO intake will continue to be greater than 50% of meals       Nutrition Monitoring and Evaluation:   Behavioral-Environmental Outcomes:  None Identified   Food/Nutrient Intake Outcomes:  Food and Nutrient Intake  Physical Signs/Symptoms Outcomes:  Biochemical Data     Discharge Planning:     Too soon to determine     Electronically signed by [facility] on 3/9/21 at 3:45 PM EST  Contact: [phone]      Electronically signed by Erica Winston RD, LD on 3/9/2021 at 4:34 PM

## 2021-03-09 NOTE — PROGRESS NOTES
- CNP   lisinopril (PRINIVIL;ZESTRIL) 2.5 MG tablet Take 1 tablet by mouth daily Yes JIMENA Caicedo CNP   carvedilol (COREG) 3.125 MG tablet Take 1 tablet by mouth 2 times daily (with meals) Yes JIMENA Caicedo - CNP   ticagrelor (BRILINTA) 90 MG TABS tablet Take 1 tablet by mouth 2 times daily Yes JIMENA Caicedo CNP      Scheduled Meds:   metroNIDAZOLE  500 mg Intravenous Q8H    acetaminophen  1,000 mg Oral 3 times per day    aspirin  81 mg Oral Daily    lisinopril  2.5 mg Oral Daily    carvedilol  3.125 mg Oral BID WC    rosuvastatin  20 mg Oral Nightly    sodium chloride flush  10 mL Intravenous 2 times per day    famotidine  20 mg Oral Daily     Continuous Infusions:   sodium chloride Stopped (03/08/21 2026)    sodium chloride      sodium chloride       PRN Meds:HYDROmorphone, perflutren lipid microspheres, sodium chloride, furosemide, sodium chloride flush, potassium chloride **OR** potassium alternative oral replacement **OR** potassium chloride, promethazine **OR** ondansetron, magnesium hydroxide, acetaminophen **OR** acetaminophen, hydrALAZINE, sodium chloride, sodium chloride     Past Medical History:  Past Medical History:   Diagnosis Date    Anemia     CAD (coronary artery disease) 11/2020    Stent    CKD (chronic kidney disease)     Hyperlipidemia     Hypertension       Past Surgical History:    has a past surgical history that includes fracture surgery; Cystocopy (11/21/13); Upper gastrointestinal endoscopy (N/A, 3/5/2021); Colonoscopy (N/A, 3/5/2021); hemicolectomy (Right, 3/8/2021); and Cholecystectomy, laparoscopic (N/A, 3/8/2021). Social History:  Reviewed. reports that she has never smoked. She has never used smokeless tobacco. She reports that she does not drink alcohol or use drugs. Family History:  Reviewed. family history includes Heart Disease in her father.      Review of Systems:  · Constitutional: Negative for fever, night sweats, chills, weight changes, or weakness  · Skin: Negative for rash, dry skin, pruritus, bruising, bleeding, blood clots, or changes in skin pigment  · HEENT: Negative for vision changes, ringing in the ears, sore throat, dysphagia, or swollen lymph nodes  · Respiratory: Reviewed in HPI  · Cardiovascular: Reviewed in HPI  · Gastrointestinal: Negative for abdominal pain, N/V/D, constipation, or black/tarry stools  · Genito-Urinary: Negative for dysuria, incontinence, urgency, or hematuria  · Musculoskeletal: Negative for joint swelling, muscle pain, or injuries  · Neurological/Psych: Negative for confusion, seizures, headaches, balance issues or TIA-like symptoms. No anxiety, depression, or insomnia    Physical Examination:  Vitals:    03/09/21 0838   BP: 128/69   Pulse: 85   Resp: 20   Temp: 98.2 °F (36.8 °C)   SpO2: 93%      In: 1000.8 [I.V.:1000.8]  Out: 450    Wt Readings from Last 3 Encounters:   03/09/21 220 lb 7.4 oz (100 kg)   03/01/21 235 lb 9.6 oz (106.9 kg)   11/30/20 229 lb (103.9 kg)       Intake/Output Summary (Last 24 hours) at 3/9/2021 1041  Last data filed at 3/9/2021 0526  Gross per 24 hour   Intake 2000.77 ml   Output 1100 ml   Net 900.77 ml       Telemetry: Personally Reviewed  - NSR  · Constitutional: Cooperative and in no apparent distress, and appears well nourished  · Skin: Warm and pink; no pallor, cyanosis, bruising, or clubbing  · HEENT: Symmetric and normocephalic. PERRL, EOM intact. Conjunctiva pink with clear sclera. Mucus membranes pink and moist. Teeth intact. Thyroid smooth without nodules or goiter. · Cardiovascular: Regular rate and rhythm. S1/S2 present without murmurs, rubs, or gallops. Peripheral pulses 2+, capillary refill < 3 seconds. No elevation of JVP. No peripheral edema  · Respiratory: Respirations symmetric and unlabored. Lungs clear to auscultation bilaterally, no wheezing, crackles, or rhonchi  · Gastrointestinal: Abdomen soft and round.  Bowel sounds normoactive in all quadrants without tenderness or masses. · Musculoskeletal: Bilateral upper and lower extremity strength 5/5 with full ROM  · Neurologic/Psych: Awake and orientated to person, place and time. Calm affect, appropriate mood    Pertinent labs, diagnostic, device, and imaging results reviewed as a part of this visit    Labs:    BMP:   Recent Labs     216    141   K 4.5 4.7    109   CO2 29 23   BUN 27* 29*   CREATININE 1.2 1.1     Estimated Creatinine Clearance: 45 mL/min (based on SCr of 1.1 mg/dL). CBC:   Recent Labs     216   WBC 8.4 19.2*   HGB 10.0* 8.9*   HCT 32.9* 30.1*   MCV 74.5* 76.9*    266     Thyroid: No results found for: TSH, Q9KUJGL, O0LLJVX, THYROIDAB  Lipids:   Lab Results   Component Value Date    CHOL 155 2020    HDL 38 2020    TRIG 121 2020     LFTS:   Lab Results   Component Value Date    ALT 6 2021    AST 15 2021    ALKPHOS 90 2021    PROT 6.8 2021    AGRATIO 1.2 2021    BILITOT 0.9 2021     Cardiac Enzymes:   Lab Results   Component Value Date    TROPONINI 0.03 2020     Coags:   Lab Results   Component Value Date    PROTIME 12.7 2021    INR 1.09 2021       ECG: 3/6/21  Atrial fibrillation    ECHO: 3/6/21   Normal left ventricle size, and systolic function with an estimated ejection fraction of 55-60%. Mild concentric left ventricular hypertrophy is present. No regional wall motion abnormalities are seen. Mild tricuspid regurgitation, RVSP is estimated at 38 mmhg.     Stress Test: None    Cath: 20  Findings:  Artery Findings/Result  LM Normal  LAD 95% mid  Cx OM2 50%  RI NA  RCA 20% prox, 20% mid  LVEDP 25  LVG 55%     Intervention:         PCI of LAD with [id number] REBECA (PD with 3.75NC)     Post Cath Dx:       Severe , nonobstructive otherwise    CT Chest: 3/5/21  Within the chest, small mediastinal nodes are seen, slightly increased   compared to prior, either reactive or metastatic.  Small pleural effusions   are seen, compatible with mild fluid overload.       Stable lobular pulmonary nodule in the left lower lobe.       Nodular wall thickening of the colon on the right, compatible with the given   history of colon mass.  Small mesenteric node is seen in this region,, likely   early desiree metastasis       Increase in size of ovarian-adnexal masses on the right and left.  Consider   pelvic ultrasound for further characterization.       Tiny nonobstructing right renal stone     Problem List:   Patient Active Problem List    Diagnosis Date Noted    Diastolic dysfunction 67/83/3231    Hypokalemia 03/07/2021    Malignant neoplasm of ascending colon ([facility] [address] 03/07/2021    Anemia 03/03/2021    CAD (coronary artery disease) 03/03/2021    High cholesterol 03/03/2021    Hypertension 01/10/2014        Assessment and Plan:     1. Paroxysmal Atrial Fibrillation  - Currently in NSR  - Continue coreg 3.125 mg BID  - [id number] score:5 ; MYT1ND7 Vasc score and anticoagulation discussed. High risk for stroke and thromboembolism. Anticoagulation is recommended. Risk of bleeding was discussed. ~ Will need to start [facility] once ok with surgical team and Hgb stable (>10)    - Afib risk factors including age, HTN, obesity, inactivity and SAHRA were discussed with patient. Risk factor modification recommended   ~ TSH 2.06 (3/4/21)      - Treatment options including cardioversion, rate control strategy, antiarrhythmics, anticoagulation and possible ablation were discussed with patient. Risks, benefits and alternative of each treatment options were explained. All questions answered    ~ Will consider for DCCV after 3-4 weeks of adequate anti-coagulation if she remains in AF      - May consider for SAIDA ligation with Watchman if she has bleeding issues long term    2.  Bradycardia, At Risk for Sleep Apnea   - Noted at night time while sleeping   - Overnight pulse oximetry notes desaturations    ~ Will benefit from outpatient sleep study    3. LBBB with aberrancy   - Noted on EKG   - No VT    4. Colon Mass   - S/p hemicolectomy surgery (3/8/21)   - General surgery following    5. CAD  - Hx of PCI to LAD (11/20)  - Stable  - No complaints of angina  - Continue ASA, ACEI, BB, and statin    ~ Brilinta on hold (Discussed with Dr. Sania Frank; will stop with initiation of anti-coagulant)    6. HTN  - Slightly uncontrolled: Goal <130/80   ~ Likely secondary to pain  - Continue current medications for now    7. Anemia   - Lower today s/p surgery    ~ 8.9/30.1 this AM   - No recurrent bleeding   - Will need close monitoring as outpatient with initiation of anti-coagulation    Multiple medical conditions with risk of decompensation. All pertinent information and plan of care discussed with the EP physician. All questions and concerns were addressed to the patient. Alternatives to my treatment were discussed. I have discussed the above stated plan with patient and the nurse. The patient verbalized understanding and agreed with the plan. The patient was seen for >35 minutes. I reviewed interval history, physical exam, review of data including labs, imaging, development and implementation of treatment plan and coordination of complex care. Thank you for allowing to us to participate in the care of [address] [place] [address].     JIMENA Huang-CNP  [address]   Office: [phone]

## 2021-03-10 PROBLEM — I48.91 ATRIAL FIBRILLATION (HCC): Status: ACTIVE | Noted: 2021-03-10

## 2021-03-10 LAB
A/G RATIO: 1 (ref 1.1–2.2)
ALBUMIN SERPL-MCNC: 3 G/DL (ref 3.4–5)
ALP BLD-CCNC: 73 U/L (ref 40–129)
ALT SERPL-CCNC: 9 U/L (ref 10–40)
ANION GAP SERPL CALCULATED.3IONS-SCNC: 6 MMOL/L (ref 3–16)
AST SERPL-CCNC: 21 U/L (ref 15–37)
BASOPHILS ABSOLUTE: 0 K/UL (ref 0–0.2)
BASOPHILS RELATIVE PERCENT: 0.1 %
BILIRUB SERPL-MCNC: 0.6 MG/DL (ref 0–1)
BUN BLDV-MCNC: 44 MG/DL (ref 7–20)
CALCIUM SERPL-MCNC: 8.5 MG/DL (ref 8.3–10.6)
CHLORIDE BLD-SCNC: 109 MMOL/L (ref 99–110)
CO2: 24 MMOL/L (ref 21–32)
CREAT SERPL-MCNC: 1.6 MG/DL (ref 0.6–1.2)
EKG ATRIAL RATE: 136 BPM
EKG DIAGNOSIS: NORMAL
EKG Q-T INTERVAL: 292 MS
EKG QRS DURATION: 132 MS
EKG QTC CALCULATION (BAZETT): 439 MS
EKG R AXIS: -23 DEGREES
EKG T AXIS: 136 DEGREES
EKG VENTRICULAR RATE: 136 BPM
EOSINOPHILS ABSOLUTE: 0 K/UL (ref 0–0.6)
EOSINOPHILS RELATIVE PERCENT: 0.2 %
GFR AFRICAN AMERICAN: 38
GFR NON-AFRICAN AMERICAN: 31
GLOBULIN: 3.1 G/DL
GLUCOSE BLD-MCNC: 123 MG/DL (ref 70–99)
HCT VFR BLD CALC: 28.5 % (ref 36–48)
HEMOGLOBIN: 8.5 G/DL (ref 12–16)
LYMPHOCYTES ABSOLUTE: 0.7 K/UL (ref 1–5.1)
LYMPHOCYTES RELATIVE PERCENT: 6.1 %
MCH RBC QN AUTO: 23.2 PG (ref 26–34)
MCHC RBC AUTO-ENTMCNC: 29.9 G/DL (ref 31–36)
MCV RBC AUTO: 77.6 FL (ref 80–100)
MONOCYTES ABSOLUTE: 0.8 K/UL (ref 0–1.3)
MONOCYTES RELATIVE PERCENT: 6.7 %
NEUTROPHILS ABSOLUTE: 10 K/UL (ref 1.7–7.7)
NEUTROPHILS RELATIVE PERCENT: 86.9 %
PDW BLD-RTO: 25.8 % (ref 12.4–15.4)
PLATELET # BLD: 235 K/UL (ref 135–450)
PMV BLD AUTO: 8 FL (ref 5–10.5)
POTASSIUM SERPL-SCNC: 4.9 MMOL/L (ref 3.5–5.1)
RBC # BLD: 3.68 M/UL (ref 4–5.2)
SODIUM BLD-SCNC: 139 MMOL/L (ref 136–145)
TOTAL PROTEIN: 6.1 G/DL (ref 6.4–8.2)
TROPONIN: <0.01 NG/ML
WBC # BLD: 11.5 K/UL (ref 4–11)

## 2021-03-10 PROCEDURE — 93005 ELECTROCARDIOGRAM TRACING: CPT | Performed by: INTERNAL MEDICINE

## 2021-03-10 PROCEDURE — 36415 COLL VENOUS BLD VENIPUNCTURE: CPT

## 2021-03-10 PROCEDURE — 84484 ASSAY OF TROPONIN QUANT: CPT

## 2021-03-10 PROCEDURE — 97530 THERAPEUTIC ACTIVITIES: CPT

## 2021-03-10 PROCEDURE — 85025 COMPLETE CBC W/AUTO DIFF WBC: CPT

## 2021-03-10 PROCEDURE — 99024 POSTOP FOLLOW-UP VISIT: CPT | Performed by: SURGERY

## 2021-03-10 PROCEDURE — 2500000003 HC RX 250 WO HCPCS: Performed by: SURGERY

## 2021-03-10 PROCEDURE — 2580000003 HC RX 258: Performed by: SURGERY

## 2021-03-10 PROCEDURE — APPSS15 APP SPLIT SHARED TIME 0-15 MINUTES: Performed by: NURSE PRACTITIONER

## 2021-03-10 PROCEDURE — 2060000000 HC ICU INTERMEDIATE R&B

## 2021-03-10 PROCEDURE — 97116 GAIT TRAINING THERAPY: CPT

## 2021-03-10 PROCEDURE — APPNB30 APP NON BILLABLE TIME 0-30 MINS: Performed by: NURSE PRACTITIONER

## 2021-03-10 PROCEDURE — 97164 PT RE-EVAL EST PLAN CARE: CPT

## 2021-03-10 PROCEDURE — 99233 SBSQ HOSP IP/OBS HIGH 50: CPT | Performed by: NURSE PRACTITIONER

## 2021-03-10 PROCEDURE — 6370000000 HC RX 637 (ALT 250 FOR IP): Performed by: SURGERY

## 2021-03-10 PROCEDURE — 6360000002 HC RX W HCPCS: Performed by: SURGERY

## 2021-03-10 PROCEDURE — 2580000003 HC RX 258: Performed by: NURSE PRACTITIONER

## 2021-03-10 PROCEDURE — 80053 COMPREHEN METABOLIC PANEL: CPT

## 2021-03-10 PROCEDURE — 6360000002 HC RX W HCPCS: Performed by: NURSE PRACTITIONER

## 2021-03-10 PROCEDURE — 93010 ELECTROCARDIOGRAM REPORT: CPT | Performed by: INTERNAL MEDICINE

## 2021-03-10 RX ADMIN — HYDROMORPHONE HYDROCHLORIDE 1 MG: 1 INJECTION, SOLUTION INTRAMUSCULAR; INTRAVENOUS; SUBCUTANEOUS at 17:33

## 2021-03-10 RX ADMIN — FAMOTIDINE 20 MG: 20 TABLET, FILM COATED ORAL at 11:13

## 2021-03-10 RX ADMIN — Medication 10 ML: at 20:07

## 2021-03-10 RX ADMIN — ENOXAPARIN SODIUM 40 MG: 40 INJECTION SUBCUTANEOUS at 11:13

## 2021-03-10 RX ADMIN — HYDROMORPHONE HYDROCHLORIDE 1 MG: 1 INJECTION, SOLUTION INTRAMUSCULAR; INTRAVENOUS; SUBCUTANEOUS at 06:27

## 2021-03-10 RX ADMIN — Medication 10 ML: at 20:09

## 2021-03-10 RX ADMIN — ASPIRIN 81 MG: 81 TABLET, COATED ORAL at 11:12

## 2021-03-10 RX ADMIN — SODIUM CHLORIDE: 9 INJECTION, SOLUTION INTRAVENOUS at 20:07

## 2021-03-10 RX ADMIN — ONDANSETRON HYDROCHLORIDE 4 MG: 2 INJECTION, SOLUTION INTRAMUSCULAR; INTRAVENOUS at 08:33

## 2021-03-10 RX ADMIN — HYDROMORPHONE HYDROCHLORIDE 1 MG: 1 INJECTION, SOLUTION INTRAMUSCULAR; INTRAVENOUS; SUBCUTANEOUS at 22:07

## 2021-03-10 RX ADMIN — Medication 10 ML: at 09:00

## 2021-03-10 RX ADMIN — ONDANSETRON HYDROCHLORIDE 4 MG: 2 INJECTION, SOLUTION INTRAMUSCULAR; INTRAVENOUS at 17:15

## 2021-03-10 RX ADMIN — Medication 10 ML: at 20:08

## 2021-03-10 RX ADMIN — CARVEDILOL 3.12 MG: 3.12 TABLET, FILM COATED ORAL at 11:12

## 2021-03-10 RX ADMIN — HYDROMORPHONE HYDROCHLORIDE 1 MG: 1 INJECTION, SOLUTION INTRAMUSCULAR; INTRAVENOUS; SUBCUTANEOUS at 02:59

## 2021-03-10 RX ADMIN — ROSUVASTATIN 20 MG: 20 TABLET, FILM COATED ORAL at 20:06

## 2021-03-10 RX ADMIN — METRONIDAZOLE 500 MG: 500 INJECTION, SOLUTION INTRAVENOUS at 06:27

## 2021-03-10 ASSESSMENT — PAIN SCALES - GENERAL
PAINLEVEL_OUTOF10: 0
PAINLEVEL_OUTOF10: 10
PAINLEVEL_OUTOF10: 6
PAINLEVEL_OUTOF10: 10
PAINLEVEL_OUTOF10: 0

## 2021-03-10 ASSESSMENT — PAIN DESCRIPTION - ORIENTATION
ORIENTATION: RIGHT
ORIENTATION: RIGHT

## 2021-03-10 ASSESSMENT — PAIN DESCRIPTION - LOCATION: LOCATION: ABDOMEN

## 2021-03-10 ASSESSMENT — PAIN DESCRIPTION - PROGRESSION
CLINICAL_PROGRESSION: NOT CHANGED
CLINICAL_PROGRESSION: GRADUALLY WORSENING

## 2021-03-10 ASSESSMENT — PAIN DESCRIPTION - PAIN TYPE: TYPE: ACUTE PAIN

## 2021-03-10 ASSESSMENT — PAIN DESCRIPTION - FREQUENCY: FREQUENCY: CONTINUOUS

## 2021-03-10 ASSESSMENT — PAIN DESCRIPTION - DESCRIPTORS: DESCRIPTORS: HEAVINESS

## 2021-03-10 ASSESSMENT — PAIN DESCRIPTION - ONSET: ONSET: ON-GOING

## 2021-03-10 ASSESSMENT — PAIN - FUNCTIONAL ASSESSMENT: PAIN_FUNCTIONAL_ASSESSMENT: PREVENTS OR INTERFERES SOME ACTIVE ACTIVITIES AND ADLS

## 2021-03-10 NOTE — PLAN OF CARE
Problem: Falls - Risk of:  Goal: Will remain free from falls  Description: Will remain free from falls  Outcome: Ongoing  Note: All fall precautions in place, bed in lowest position, frequent rounding to assist with toileting. Pt absent of fall this shift.     Goal: Absence of physical injury  Description: Absence of physical injury  Outcome: Ongoing     Problem: Pain:  Goal: Pain level will decrease  Description: Pain level will decrease  Outcome: Ongoing  Goal: Control of acute pain  Description: Control of acute pain  Outcome: Ongoing  Note: Pt does not complain of pain at this time  Goal: Control of chronic pain  Description: Control of chronic pain  Outcome: Ongoing     Problem: Musculor/Skeletal Functional Status  Goal: Highest potential functional level  Outcome: Ongoing  Goal: Absence of falls  Outcome: Ongoing     Problem: Nutrition  Goal: Optimal nutrition therapy  Outcome: Ongoing

## 2021-03-10 NOTE — PROGRESS NOTES
Physical Therapy    Facility/Department: [address]  Re- Assessment    NAME: Joshua Tinajero  : 1940  MRN: [id number]    Date of Service: 3/10/2021    Discharge Recommendations: Joshua Tinajero scored a 14/24 on the AM-PAC short mobility form. Current research shows that an AM-PAC score of 17 or less is typically not associated with a discharge to the patient's home setting. Based on the patient's AM-PAC score and their current functional mobility deficits, it is recommended that the patient have 3-5 sessions per week of Physical Therapy at d/c to increase the patient's independence. Please see assessment section for further patient specific details. Patient currently declining non-home discharge stating she sleeps in her lift chair, will have lift chair help her stand and her daughter and granddaughter will be available for assistance. If continues to refuse, recommend home with 24 hour supervision and S3 level home care. Discussed with case management. HOME HEALTH CARE: LEVEL 3 SAFETY     - Initial home health evaluation to occur within 24-48 hours, in patient home   - Therapy evaluations in home within 24-48 hours of discharge; including DME and home safety   - Frontload therapy 5 days, then 3x a week   - Therapy to evaluate if patient has [address] needs for personal care   -  evaluation within 24-48 hours, includes evaluation of resources and insurance to determine [place], [place], LTC, and Medicaid options     If patient discharges prior to next session this note will serve as a discharge summary. Please see below for the latest assessment towards goals. 3-5 sessions per week   PT Equipment Recommendations  Equipment Needed: No    Assessment   Body structures, Functions, Activity limitations: Decreased functional mobility ; Decreased strength;Decreased endurance;Decreased balance  Assessment: Significant decline from previous assessment following surgery.  Patient now needs significant assistance for bed mobillity and transfers and needs frequent rest breaks for short distance ambulation. Goals updated to reflect current status. Patient needs skilled PT to address above deficits and facilitate return to baseline function. Not interested in non-home discharge at this time despite education stating her daughter and granddaughter will be present for assistance  Treatment Diagnosis: impaired gait, transfers, and balance  Clinical Presentation: evolving  PT Education: Goals;PT Role;Plan of Care;Transfer Training;Functional Mobility Training;Gait Training;General Safety  Patient Education: d/c recommendations--pt verbalizing understanding  Barriers to Learning: none  REQUIRES PT FOLLOW UP: Yes  Activity Tolerance  Activity Tolerance: Patient limited by fatigue  Activity Tolerance: able to continue with frequwnt rest breaks       Patient Diagnosis(es): The encounter diagnosis was Anemia, unspecified type. has a past medical history of Anemia, CAD (coronary artery disease), CKD (chronic kidney disease), Hyperlipidemia, and Hypertension. has a past surgical history that includes fracture surgery; Cystocopy (11/21/13); Upper gastrointestinal endoscopy (N/A, 3/5/2021); Colonoscopy (N/A, 3/5/2021); hemicolectomy (Right, 3/8/2021); and Cholecystectomy, laparoscopic (N/A, 3/8/2021). Restrictions  Restrictions/Precautions  Restrictions/Precautions: Fall Risk, Modified Diet(High fall risk, NPO with ice chips, sips of clears)  Required Braces or Orthoses?: No  Position Activity Restriction  Other position/activity restrictions: Leeroy Shaw is a 68 y.o. female on Judy Morejon MD service who was admitted on 3/3/2021 for anemia. She presented to the ED as advised by her PCP after routine blood work showed low hemoglobin. Hgb drawn in ED was 5.8, she received 3 units pRBCs, hgb 9.3 today. She had been feeling well at that time, no dizziness, lightheadedness, SOB or chest pain.   Iron (approximately every 10 feet on return to room), holding onto hollins rail for standing rest breaks, cues for walker safety with fatigue  Distance: 100'  Comments: Significant labored breathing noted (SpO2 92% on 1.5fL O2 following ambulation). Stairs/Curb  Stairs?: No     Balance  Sitting - Static: Fair  Sitting - Dynamic: Fair  Standing - Static: Fair  Standing - Dynamic: [address]  Times per week: 3-5x  Times per day: Daily  Current Treatment Recommendations: Strengthening, Transfer Training, Gait Training, Safety Education & Training, Functional Mobility Training, Neuromuscular Re-education, Balance Training, Endurance Training, Home Exercise Program, Equipment Evaluation, Education, & procurement, Patient/Caregiver Education & Training  Safety Devices  Type of devices:  All fall risk precautions in place, Call light within reach, Chair alarm in place, Left in chair, Patient at risk for falls, Nurse notified  Restraints  Initially in place: No      AM-PAC Score  AM-PAC Inpatient Mobility Raw Score : 14 (03/10/21 1208)  AM-PAC Inpatient T-Scale Score : 38.1 (03/10/21 1208)  Mobility Inpatient CMS 0-100% Score: 61.29 (03/10/21 1208)  Mobility Inpatient CMS G-Code Modifier : CL (03/10/21 1208)          Goals  Short term goals  Time Frame for Short term goals: to be met by d/c  Short term goal 1: Bed mobility with min A  Short term goal 2: Sit to/from stand with min A  Short term goal 3: Ambulate 150' with AAD and SBA  Patient Goals   Patient goals : to go home       Therapy Time   Individual Concurrent Group Co-treatment   Time In 1040         Time Out 1104         Minutes 24         Timed Code Treatment Minutes: [address] [place], Rip Boyce PT, DPT [id number]

## 2021-03-10 NOTE — PROGRESS NOTES
[facility]   Progress Note      3/10/2021 9:31 AM        Name: Aletha Tapia . Admitted: 3/3/2021    SUBJECTIVE:  Patient had nausea and vomiting after drinking water this morning.      Reviewed interval ancillary notes    Current Medications  enoxaparin (LOVENOX) injection 40 mg, Daily  metronidazole (FLAGYL) 500 mg in NaCl 100 mL IVPB premix, Q8H  0.9 % sodium chloride infusion, Continuous  HYDROmorphone (DILAUDID) injection 1 mg, Q3H PRN  acetaminophen (TYLENOL) tablet 1,000 mg, 3 times per day  aspirin EC tablet 81 mg, Daily  perflutren lipid microspheres (DEFINITY) injection 1.65 mg, ONCE PRN  0.9 % sodium chloride infusion, PRN  lisinopril (PRINIVIL;ZESTRIL) tablet 2.5 mg, Daily  carvedilol (COREG) tablet 3.125 mg, BID WC  rosuvastatin (CRESTOR) tablet 20 mg, Nightly  furosemide (LASIX) tablet 20 mg, Daily PRN  sodium chloride flush 0.9 % injection 10 mL, 2 times per day  sodium chloride flush 0.9 % injection 10 mL, PRN  potassium chloride (KLOR-CON M) extended release tablet 40 mEq, PRN    Or  potassium bicarb-citric acid (EFFER-K) effervescent tablet 40 mEq, PRN    Or  potassium chloride 10 mEq/100 mL IVPB (Peripheral Line), PRN  promethazine (PHENERGAN) tablet 12.5 mg, Q6H PRN    Or  ondansetron (ZOFRAN) injection 4 mg, Q6H PRN  magnesium hydroxide (MILK OF MAGNESIA) 400 MG/5ML suspension 30 mL, Daily PRN  famotidine (PEPCID) tablet 20 mg, Daily  acetaminophen (TYLENOL) tablet 650 mg, Q6H PRN    Or  acetaminophen (TYLENOL) suppository 650 mg, Q6H PRN  hydrALAZINE (APRESOLINE) injection 10 mg, Q6H PRN  0.9 % sodium chloride bolus, PRN  0.9 % sodium chloride infusion, PRN        Objective:  BP (!) 114/54   Pulse 94   Temp 98.1 °F (36.7 °C) (Oral)   Resp 20   Ht 5' (1.524 m)   Wt 220 lb 7.4 oz (100 kg)   SpO2 94%   BMI 43.06 kg/m²     Intake/Output Summary (Last 24 hours) at 3/10/2021 0931  Last data filed at 3/10/2021 0635  Gross per 24 hour   Intake --   Output 430 ml Net -430 ml      Wt Readings from Last 3 Encounters:   03/09/21 220 lb 7.4 oz (100 kg)   03/01/21 235 lb 9.6 oz (106.9 kg)   11/30/20 229 lb (103.9 kg)       General appearance:  Appears comfortable  Eyes: Sclera clear. Pupils equal.  ENT: Moist oral mucosa. Trachea midline, no adenopathy. Cardiovascular: Regular rhythm, normal S1, S2. No murmur. No edema in lower extremities  Respiratory: Not using accessory muscles. Good inspiratory effort. Clear to auscultation bilaterally, no wheeze or crackles. GI: Abdomen soft, no tenderness, not distended  Musculoskeletal: No cyanosis in digits, neck supple  Neurology: CN 2-12 grossly intact. No speech or motor deficits  Psych: Normal affect. Alert and oriented in time, place and person  Skin: Warm, dry, normal turgor    Labs and Tests:  CBC:   Recent Labs     03/08/21  0444 03/09/21  0456 03/10/21  0512   WBC 8.4 19.2* 11.5*   HGB 10.0* 8.9* 8.5*    266 235     BMP:    Recent Labs     03/08/21  0444 03/09/21  0456 03/10/21  0512    141 139   K 4.5 4.7 4.9    109 109   CO2 29 23 24   BUN 27* 29* 44*   CREATININE 1.2 1.1 1.6*   GLUCOSE 118* 159* 123*     Hepatic:   Recent Labs     03/10/21  0512   AST 21   ALT 9*   BILITOT 0.6   ALKPHOS 68       ASSESSMENT AND PLAN    Principal Problem:    Anemia  Active Problems:    Hypertension    CAD (coronary artery disease)    High cholesterol    Diastolic dysfunction    Hypokalemia    Malignant neoplasm of ascending colon (HCC)    Atrial fibrillation (HCC)  Resolved Problems:    * No resolved hospital problems. *    Ascending colon mass  -Colonoscopy was done on 3/5/2021  -Findings were suspicious for adenocarcinoma.  Biopsies were taken. -CEA is normal and CT scans were negative for metastatic disease.  - s/p right hemicolectomy 3/8/21, pathology pending       Anemia  - Iron deficiency due to GI blood loss. - S/p 3 doses of Venofer on 3/6/21.  - Vit B12 wnl.  - hgb 8.5 today. No indication to transfuse.    CAD  - s/p stent placement       Jordan Anaya.  [phone]    The patient was seen and examined. Agree with above. Continue current care. Await final pathology from surgery.     Devi Mederos MD

## 2021-03-10 NOTE — PLAN OF CARE
Problem: Falls - Risk of:  Goal: Absence of physical injury  Description: Absence of physical injury  3/10/2021 1319 by Yoshi Salguero RN  Outcome: Ongoing  4/56/1591 [id number] by Kylah Jovel RN  Outcome: Ongoing

## 2021-03-10 NOTE — PROGRESS NOTES
Patient drank 60cc apple juice and had episode of n&v. Patient complaining of 10/10 pain to incision area. Area is CDI, no redness, warmth or swelling noted. Medicated per [facility] for nausea and for pain. /64, IV fluids infusing per order. WCTM.

## 2021-03-10 NOTE — PROGRESS NOTES
Progress Note - Dr. Alexx Heath - Internal Medicine  PCP: Veronique Johnson  [address] [place] [address] Dayan Ortega 80 [phone]    Hospital Day: 7  Code Status: Full Code  Current Diet: Diet NPO Effective Now Exceptions are: Ice Chips, Sips of Clear Liquids, Sips of Water with Meds        CC: follow up on medical issues    Subjective: Ant Giron is a 68 y.o. female. She complains of new  problems    Pt had bilious emesis this am  Found to be in afib  Has felt better since emesis    She denies chest pain, denies shortness of breath, complains of nausea,  complains of emesis. 10 system Review of Systems is reviewed with patient, and pertinent positives are noted in HPI above . Otherwise, Review of systems is negative. I have reviewed the patient's medical and social history in detail and updated the computerized patient record. To recap: She  has a past medical history of Anemia, CAD (coronary artery disease), CKD (chronic kidney disease), Hyperlipidemia, and Hypertension. . She  has a past surgical history that includes fracture surgery; Cystocopy (11/21/13); Upper gastrointestinal endoscopy (N/A, 3/5/2021); Colonoscopy (N/A, 3/5/2021); hemicolectomy (Right, 3/8/2021); and Cholecystectomy, laparoscopic (N/A, 3/8/2021). . She  reports that she has never smoked. She has never used smokeless tobacco. She reports that she does not drink alcohol or use drugs. .        Active Hospital Problems    Diagnosis Date Noted    Diastolic dysfunction [N75.08] 03/07/2021    Hypokalemia [E87.6] 03/07/2021    Malignant neoplasm of ascending colon (HCC) [C18.2] 03/07/2021    Anemia [D64.9] 03/03/2021    CAD (coronary artery disease) [I25.10] 03/03/2021    High cholesterol [E78.00] 03/03/2021    Hypertension [I10] 01/10/2014       Current Facility-Administered Medications: enoxaparin (LOVENOX) injection 40 mg, 40 mg, Subcutaneous, Daily  metronidazole (FLAGYL) 500 mg in NaCl 100 mL IVPB premix, 500 mg, Intravenous, Q8H  0.9 % sodium chloride infusion, , Intravenous, Continuous  HYDROmorphone (DILAUDID) injection 1 mg, 1 mg, Intravenous, Q3H PRN  acetaminophen (TYLENOL) tablet 1,000 mg, 1,000 mg, Oral, 3 times per day  aspirin EC tablet 81 mg, 81 mg, Oral, Daily  perflutren lipid microspheres (DEFINITY) injection 1.65 mg, 1.5 mL, Intravenous, ONCE PRN  0.9 % sodium chloride infusion, , Intravenous, PRN  lisinopril (PRINIVIL;ZESTRIL) tablet 2.5 mg, 2.5 mg, Oral, Daily  carvedilol (COREG) tablet 3.125 mg, 3.125 mg, Oral, BID WC  rosuvastatin (CRESTOR) tablet 20 mg, 20 mg, Oral, Nightly  furosemide (LASIX) tablet 20 mg, 20 mg, Oral, Daily PRN  sodium chloride flush 0.9 % injection 10 mL, 10 mL, Intravenous, 2 times per day  sodium chloride flush 0.9 % injection 10 mL, 10 mL, Intravenous, PRN  potassium chloride (KLOR-CON M) extended release tablet 40 mEq, 40 mEq, Oral, PRN **OR** potassium bicarb-citric acid (EFFER-K) effervescent tablet 40 mEq, 40 mEq, Oral, PRN **OR** potassium chloride 10 mEq/100 mL IVPB (Peripheral Line), 10 mEq, Intravenous, PRN  promethazine (PHENERGAN) tablet 12.5 mg, 12.5 mg, Oral, Q6H PRN **OR** ondansetron (ZOFRAN) injection 4 mg, 4 mg, Intravenous, Q6H PRN  magnesium hydroxide (MILK OF MAGNESIA) 400 MG/5ML suspension 30 mL, 30 mL, Oral, Daily PRN  famotidine (PEPCID) tablet 20 mg, 20 mg, Oral, Daily  acetaminophen (TYLENOL) tablet 650 mg, 650 mg, Oral, Q6H PRN **OR** acetaminophen (TYLENOL) suppository 650 mg, 650 mg, Rectal, Q6H PRN  hydrALAZINE (APRESOLINE) injection 10 mg, 10 mg, Intravenous, Q6H PRN  0.9 % sodium chloride bolus, 500 mL, Intravenous, PRN  0.9 % sodium chloride infusion, , Intravenous, PRN         Objective:  BP (!) 114/54   Pulse 94   Temp 98.1 °F (36.7 °C) (Oral)   Resp 20   Ht 5' (1.524 m)   Wt 220 lb 7.4 oz (100 kg)   SpO2 94%   BMI 43.06 kg/m²      Patient Vitals for the past 24 hrs:   BP Temp Temp src Pulse Resp SpO2 Height Weight   03/10/21 0745 (!) 114/54 98.1 °F (36.7 °C) Oral 94 20 94 % -- --   03/10/21 0615 127/74 98.4 °F (36.9 °C) Oral 76 20 93 % -- --   03/10/21 0259 (!) 137/56 98.1 °F (36.7 °C) Oral 85 20 94 % -- --   03/09/21 2103 -- -- -- -- -- 94 % -- --   03/09/21 2045 (!) 111/54 97.4 °F (36.3 °C) Temporal 77 20 95 % -- --   03/09/21 1745 132/65 98.8 °F (37.1 °C) Temporal 81 20 96 % -- --   03/09/21 1513 -- -- -- -- -- -- 5' (1.524 m) --   03/09/21 1327 (!) 153/68 98.2 °F (36.8 °C) Temporal 78 20 95 % -- --   03/09/21 0838 128/69 98.2 °F (36.8 °C) Temporal 85 20 93 % -- --   03/09/21 0822 -- -- -- -- -- -- -- 220 lb 7.4 oz (100 kg)     Patient Vitals for the past 96 hrs (Last 3 readings):   Weight   03/09/21 0822 220 lb 7.4 oz (100 kg)   03/07/21 0857 218 lb 8 oz (99.1 kg)   03/06/21 0830 219 lb 5.7 oz (99.5 kg)           Intake/Output Summary (Last 24 hours) at 3/10/2021 0813  Last data filed at 3/10/2021 7787  Gross per 24 hour   Intake --   Output 430 ml   Net -430 ml         Physical Exam:   BP (!) 114/54   Pulse 94   Temp 98.1 °F (36.7 °C) (Oral)   Resp 20   Ht 5' (1.524 m)   Wt 220 lb 7.4 oz (100 kg)   SpO2 94%   BMI 43.06 kg/m²   General appearance: alert, appears stated age and cooperative  Head: Normocephalic, without obvious abnormality, atraumatic  Lungs: clear to auscultation bilaterally  Heart: irreg irreg  Abdomen: soft, mildly tender, hypoactive BS  Extremities: extremities normal, atraumatic, no cyanosis or edema    Labs:  Lab Results   Component Value Date    WBC 11.5 (H) 03/10/2021    HGB 8.5 (L) 03/10/2021    HCT 28.5 (L) 03/10/2021     03/10/2021    CHOL 155 11/21/2020    TRIG 121 11/21/2020    HDL 38 (L) 11/21/2020    ALT 9 (L) 03/10/2021    AST 21 03/10/2021     03/10/2021    K 4.9 03/10/2021     03/10/2021    CREATININE 1.6 (H) 03/10/2021    BUN 44 (H) 03/10/2021    CO2 24 03/10/2021    INR 1.09 03/08/2021     Lab Results   Component Value Date    TROPONINI 0.03 (H) 11/20/2020       Recent Imaging Results are Reviewed:  Echo Complete    Result Date: 3/6/2021  Transthoracic Echocardiography Report (TTE)  Demographics   Patient Name        Lasha Zaldivar   Date of Study       03/06/2021  Gender                 Female   Patient Number      [id number]  Date of Birth          1940   Visit Number        [id number]   Age                    68 year(s)   Accession Number    [id number]  Room Number            7613   Corporate ID        [id number]    Sonographer            Milagros Strong, ELINOR,                                                         RVT   Ordering Physician              Interpreting Physician Roberta Jordan MD,                                                         [address], [place]  Procedure Type of Study   TTE procedure:ECHOCARDIOGRAM COMPLETE 2D W DOPPLER W COLOR. Procedure Date Date: 03/06/2021 Start: 09:12 AM Study Location: [facility] - Echo Lab Technical Quality: Good visualization Additional Indications:NSTEMI, Leg edema, CAD. Patient Status: Routine Height: 60 inches Weight: 220 pounds BSA: 1.94 m2 BMI: 42.97 kg/m2 BP: 134/75 mmHg  Conclusions   Summary  Normal left ventricle size, and systolic function with an estimated ejection  fraction of 55-60%. Mild concentric left ventricular hypertrophy is present. No regional wall motion abnormalities are seen. Mild tricuspid regurgitation, RVSP is estimated at 38 mmhg. Signature   ------------------------------------------------------------------  Electronically signed by Roberta Jordan MD, [address], [place]  (Interpreting physician) on 03/06/2021 at 12:12 PM  ------------------------------------------------------------------   Findings   Left Ventricle  Normal left ventricle size, and systolic function with an estimated ejection  fraction of 55-60%. Mild concentric left ventricular hypertrophy is present. No regional wall motion abnormalities are seen. Grade I diastolic dysfunction with normal LV filling pressures.    Mitral Valve  Calcification of the mitral valve noted. No evidence of mitral regurgitation. Left Atrium  The left atrium is normal in size. Aortic Valve  The aortic valve is structurally normal. There is no significant aortic  valve regurgitation or stenosis. Aorta  The aortic root is normal in size. Right Ventricle  The right ventricle is normal in size and function. Tricuspid Valve  The tricuspid valve is normal in structure. Mild tricuspid regurgitation, RVSP is estimated at 38 mmhg. Right Atrium  The right atrial size is normal.   Pulmonic Valve  The pulmonic valve is not well visualized. Mild pulmonic regurgitation present. Pericardial Effusion  No pericardial effusion noted. Pleural Effusion  No pleural effusion. Miscellaneous  The inferior vena cava appears normal in size with normal respiratory  variation.   M-Mode/2D Measurements (cm)   LV Diastolic Dimension: 4.5 cm  LV Systolic Dimension: 2.89 cm  LV Septum Diastolic: 0.37 cm  LV PW Diastolic: 1.5 cm         AO Root Dimension: 3.1 cm  RV Diastolic Dimension: 5.90 cm LA Dimension: 3.4 cm                                  LA Area: 11.7 cm2                                  LA volume/Index: 21.6 ml /11 ml/m2  Doppler Measurements   AV Peak Velocity: 194 cm/s     MV Peak E-Wave: 98.5 cm/s  AV Peak Gradient: 15.05 mmHg   MV Peak A-Wave: 89.5 cm/s  AV Mean Gradient: 9 mmHg       MV E/A Ratio: 1.1  LVOT Peak Velocity: 122 cm/s   MV Mean Gradient: 3 mmHg                                 MV Max P mmHg  TR Velocity:290 cm/s           MV Vmax:125 cm/s  TR Gradient:33.64 mmHg         MV VTI:51.9 cm/s   E' Septal Velocity: 6.2 cm/s   MV Deceleration Time: 215 msec  E' Lateral Velocity: 5.98 cm/s  PV Peak Velocity: 123 cm/s  PV Peak Gradient: 6.05 mmHg   Aortic Valve   Peak Velocity: 194 cm/s   Mean Velocity: 141 cm/s  Peak Gradient: 15.05 mmHg Mean Gradient: 9 mmHg  AV VTI: 43.8 cm  Aorta   Aortic Root: 3.1 cm      Xr Chest (2 Vw)    Result Date: 3/1/2021  EXAMINATION: TWO XRAY VIEWS OF THE CHEST 3/1/2021 2:58 pm COMPARISON: None. HISTORY: ORDERING SYSTEM PROVIDED HISTORY: EUBANKS (dyspnea on exertion) TECHNOLOGIST PROVIDED HISTORY: Reason for exam:->3-4wk hx of EUBANKS and productive cough FINDINGS: Cardiomegaly. Pulmonary vascular congestion. Ill-defined peripheral pulmonary opacities. No pneumothorax. No pleural effusion. Ill-defined bilateral pulmonary opacities could represent pulmonary edema possibly related to congestive heart failure or atypical pneumonia     Ct Chest Abdomen Pelvis Wo Contrast    Result Date: 3/5/2021  EXAMINATION: CT OF THE CHEST, ABDOMEN, AND PELVIS WITHOUT CONTRAST 3/5/2021 4:20 pm TECHNIQUE: CT of the chest, abdomen and pelvis was performed without the administration of intravenous contrast. Multiplanar reformatted images are provided for review. Dose modulation, iterative reconstruction, and/or weight based adjustment of the mA/kV was utilized to reduce the radiation dose to as low as reasonably achievable. COMPARISON: Chest CT 2014 2013 HISTORY: ORDERING SYSTEM PROVIDED HISTORY: Colon mass, r/o mets TECHNOLOGIST PROVIDED HISTORY: Reason for exam:->Colon mass, r/o mets Additional Contrast?->Oral Reason for Exam: Colon mass, r/o mets Acuity: Unknown Type of Exam: Unknown FINDINGS: Chest: Mediastinum: 7 mm nodule seen in right lobe of thyroid gland. No specific imaging follow-up recommended based on size. coronary artery calcification is seen. Small mediastinal nodes are noted. Right paratracheal node measures 1.4 cm in short axis, previously 10 mm. Lungs/pleura: Mosaic attenuation is seen throughout the lungs, suggesting small airways disease or air trapping. Trace pleural effusions are seen bilaterally. There is adjacent consolidation seen in the lung bases. 1.8 x 1.2 cm nodule is seen in the left lower lobe Soft Tissues/Bones: Degenerative changes are seen in the spine. Degenerative changes are seen in the shoulder joints. Abdomen/Pelvis: Organs:  No splenomegaly Adrenal glands appear normal. There is rotation of the kidney anteriorly, incidentally noted. .  No stones noted 2 mm stone seen in the right kidney. No hydronephrosis noted. No intrahepatic ductal dilatation. No perihepatic fluid Gallbladder appears normal No peripancreatic inflammatory change GI/Bowel: No significant small bowel distention noted. Mild stool load seen in the colon. Scattered colonic diverticula are seen. There is focal wall thickening of the colon on the right, extending over a length of 3.7 cm. There is surrounding injection the Nadja colonic fat. Small pericolonic lymph node is seen in the right upper quadrant mesentery measuring 8 mm. Pelvis: No free fluid seen within the pelvis. Pickard catheter is seen in the bladder Left adnexal mass is seen measuring 4.4 cm x 3.5 cm previously 3.9 cm by 3.3 cm. Right adnexal nodule measures 3.2 x 3.9 cm, previously 2.5 x 3.6 cm Peritoneum/Retroperitoneum: Small retroperitoneal nodes are seen. Small lymph nodes are seen along the iliac chains. No aortic aneurysm. Atherosclerotic change is seen in aorta and iliacs. Bones/Soft Tissues: Spurring is seen in the spine. Spurring is seen in the hips. Tiny periumbilical hernia containing fat is seen     Within the chest, small mediastinal nodes are seen, slightly increased compared to prior, either reactive or metastatic. Small pleural effusions are seen, compatible with mild fluid overload. Stable lobular pulmonary nodule in the left lower lobe. Nodular wall thickening of the colon on the right, compatible with the given history of colon mass. Small mesenteric node is seen in this region,, likely early desiree metastasis Increase in size of ovarian-adnexal masses on the right and left. Consider pelvic ultrasound for further characterization. Tiny nonobstructing right renal stone       Assessment and Plan:  Principal Problem:    Anemia -Established problem.  Stable.  hgb 8.5  Plan: No indication for transfusion. Cont to monitor h/h to assess progression of anemia. Recommend ferrous sulfate or MVI as outpatient. Active Problems:    Hypertension -Established problem. Stable. 114/54  Plan: stay on same meds    AFib = New Problem to me. Pt now in AFib. Unclear if releated to emesis episode  Plan: keep on tele. EP notified overnight by RN    High cholesterol  Plan: Continue present orders/plan. Diastolic dysfunction  Plan: Continue present orders/plan. Hypokalemia -Established problem. Stable. Plan: cont to follow labs    Malignant neoplasm of ascending colon ([facility] [address] -Established problem. Stable. S/p resection per arriaga  Plan: Continue present orders/plan.              Juan Mcintyre  3/10/2021

## 2021-03-10 NOTE — PROGRESS NOTES
[address]   Electrophysiology Progress Note     Date: 3/10/2021  Admit Date: 3/3/2021     Reason for consultation: Atrial fibrillation    Chief Complaint:   Chief Complaint   Patient presents with    Abnormal Lab     pt sent in by her MD for low H/H       History of Present Illness: History obtained from patient and medical record. Graeme Fatima is a 68 y.o. female with a past medical history of HTN, HLD, CKD< FIDEL, and CAD. Pt presented to hospital by recommendation of her PCP for anemia. Her hemoglobin was 5.8 and was she received multiple units of blood. GI completed colonoscopy/EGD with no gross bleeding found, however, she was found to have a mass in her colon. She had a recent stent placed and has been on ASA/Brilenta. She was found to be in AF with RVR on admission, thus EP was consulted    Interval Hx: Today, she is being seen for AF follow up. She is doing better today, her pain is better controlled, but she is having issues with nausea. She is currently in sinus rhythm, but had brief episodes of PAF overnight. Her kidney function worsened today. Waiting on biopsy results. Patient seen and examined. Clinical notes reviewed. Telemetry reviewed. No new complaints today. No major events overnight. Denies having chest pain, palpitations, shortness of breath, orthopnea/PND, cough, or dizziness at the time of this visit. Allergies:  No Known Allergies    Home Meds:  Prior to Visit Medications    Medication Sig Taking? Authorizing Provider   furosemide (LASIX) 20 MG tablet Take 1 tablet by mouth daily as needed (swelling or shortness of breath) Yes JIMENA Alberto CNP   HYDROcodone-acetaminophen (NORCO) 5-325 MG per tablet Take 1 tablet by mouth 2 times daily.  Yes Historical Provider, MD   rosuvastatin (CRESTOR) 20 MG tablet Take 1 tablet by mouth nightly Yes JIMENA Alberto CNP   aspirin 81 MG chewable tablet Take 1 tablet by mouth daily Yes JIMENA Alberto CNP   lisinopril (PRINIVIL;ZESTRIL) 2.5 MG tablet Take 1 tablet by mouth daily Yes [place], APRN - CNP   carvedilol (COREG) 3.125 MG tablet Take 1 tablet by mouth 2 times daily (with meals) Yes [place], APRN - CNP   ticagrelor (BRILINTA) 90 MG TABS tablet Take 1 tablet by mouth 2 times daily Yes [place], APRN - CNP      Scheduled Meds:   enoxaparin  40 mg Subcutaneous Daily    metroNIDAZOLE  500 mg Intravenous Q8H    acetaminophen  1,000 mg Oral 3 times per day    aspirin  81 mg Oral Daily    lisinopril  2.5 mg Oral Daily    carvedilol  3.125 mg Oral BID WC    rosuvastatin  20 mg Oral Nightly    sodium chloride flush  10 mL Intravenous 2 times per day    famotidine  20 mg Oral Daily     Continuous Infusions:   sodium chloride Stopped (03/08/21 2026)    sodium chloride      sodium chloride       PRN Meds:HYDROmorphone, perflutren lipid microspheres, sodium chloride, furosemide, sodium chloride flush, potassium chloride **OR** potassium alternative oral replacement **OR** potassium chloride, promethazine **OR** ondansetron, magnesium hydroxide, acetaminophen **OR** acetaminophen, hydrALAZINE, sodium chloride, sodium chloride     Past Medical History:  Past Medical History:   Diagnosis Date    Anemia     CAD (coronary artery disease) 11/2020    Stent    CKD (chronic kidney disease)     Hyperlipidemia     Hypertension       Past Surgical History:    has a past surgical history that includes fracture surgery; Cystocopy (11/21/13); Upper gastrointestinal endoscopy (N/A, 3/5/2021); Colonoscopy (N/A, 3/5/2021); hemicolectomy (Right, 3/8/2021); and Cholecystectomy, laparoscopic (N/A, 3/8/2021). Social History:  Reviewed. reports that she has never smoked. She has never used smokeless tobacco. She reports that she does not drink alcohol or use drugs. Family History:  Reviewed. family history includes Heart Disease in her father.      Review of Systems:  · Constitutional: Negative for fever, night sweats, chills, weight changes, or weakness  · Skin: Negative for rash, dry skin, pruritus, bruising, bleeding, blood clots, or changes in skin pigment  · HEENT: Negative for vision changes, ringing in the ears, sore throat, dysphagia, or swollen lymph nodes  · Respiratory: Reviewed in HPI  · Cardiovascular: Reviewed in HPI  · Gastrointestinal: Negative for abdominal pain, N/V/D, constipation, or black/tarry stools  · Genito-Urinary: Negative for dysuria, incontinence, urgency, or hematuria  · Musculoskeletal: Negative for joint swelling, muscle pain, or injuries  · Neurological/Psych: Negative for confusion, seizures, headaches, balance issues or TIA-like symptoms. No anxiety, depression, or insomnia    Physical Examination:  Vitals:    03/10/21 0745   BP: (!) 114/54   Pulse: 94   Resp: 20   Temp: 98.1 °F (36.7 °C)   SpO2: 94%      In: -   Out: 430    Wt Readings from Last 3 Encounters:   03/09/21 220 lb 7.4 oz (100 kg)   03/01/21 235 lb 9.6 oz (106.9 kg)   11/30/20 229 lb (103.9 kg)       Intake/Output Summary (Last 24 hours) at 3/10/2021 1005  Last data filed at 3/10/2021 9361  Gross per 24 hour   Intake --   Output 430 ml   Net -430 ml       Telemetry: Personally Reviewed  - NSR. PAF  · Constitutional: Cooperative and in no apparent distress, and appears well nourished  · Skin: Warm and pink; no pallor, cyanosis, bruising, or clubbing  · HEENT: Symmetric and normocephalic. PERRL, EOM intact. Conjunctiva pink with clear sclera. Mucus membranes pink and moist. Teeth intact. Thyroid smooth without nodules or goiter. · Cardiovascular: Regular rate and rhythm. S1/S2 present without murmurs, rubs, or gallops. Peripheral pulses 2+, capillary refill < 3 seconds. No elevation of JVP. No peripheral edema  · Respiratory: Respirations symmetric and unlabored. Lungs diminished to auscultation bilaterally, no wheezing, crackles, or rhonchi.  On 2L of oxygen  · Gastrointestinal: Abdomen soft and round. Bowel sounds normoactive in all quadrants without tenderness or masses. · Musculoskeletal: Bilateral upper and lower extremity strength 5/5 with full ROM  · Neurologic/Psych: Awake and orientated to person, place and time. Calm affect, appropriate mood    Pertinent labs, diagnostic, device, and imaging results reviewed as a part of this visit    Labs:    BMP:   Recent Labs     03/08/21  0444 03/09/21  0456 03/10/21  0512    141 139   K 4.5 4.7 4.9    109 109   CO2 29 23 24   BUN 27* 29* 44*   CREATININE 1.2 1.1 1.6*     Estimated Creatinine Clearance: 31 mL/min (A) (based on SCr of 1.6 mg/dL (H)). CBC:   Recent Labs     03/08/21  0444 03/09/21  0456 03/10/21  0512   WBC 8.4 19.2* 11.5*   HGB 10.0* 8.9* 8.5*   HCT 32.9* 30.1* 28.5*   MCV 74.5* 76.9* 77.6*    266 235     Thyroid: No results found for: TSH, D9YNJYB, E0IQBWJ, THYROIDAB  Lipids:   Lab Results   Component Value Date    CHOL 155 11/21/2020    HDL 38 11/21/2020    TRIG 121 11/21/2020     LFTS:   Lab Results   Component Value Date    ALT 9 03/10/2021    AST 21 03/10/2021    ALKPHOS 73 03/10/2021    PROT 6.1 03/10/2021    AGRATIO 1.0 03/10/2021    BILITOT 0.6 03/10/2021     Cardiac Enzymes:   Lab Results   Component Value Date    TROPONINI <0.01 03/10/2021    TROPONINI 0.03 11/20/2020     Coags:   Lab Results   Component Value Date    PROTIME 12.7 03/08/2021    INR 1.09 03/08/2021       ECG: 3/6/21  Atrial fibrillation    ECHO: 3/6/21   Normal left ventricle size, and systolic function with an estimated ejection fraction of 55-60%. Mild concentric left ventricular hypertrophy is present. No regional wall motion abnormalities are seen. Mild tricuspid regurgitation, RVSP is estimated at 38 mmhg.     Stress Test: None    Cath: 11/20/20  Findings:  Artery Findings/Result  LM Normal  LAD 95% mid  Cx OM2 50%  RI NA  RCA 20% prox, 20% mid  LVEDP 25  LVG 55%     Intervention:         PCI of LAD with [id number] REBECA (PD with options were explained. All questions answered     - Consider event monitor at d/c to assess AF burden   - May consider for SAIDA ligation with Watchman device if she has recurrent bleeding issues long term    2. Bradycardia, At Risk for Sleep Apnea   - Noted at night time while sleeping   - Overnight pulse oximetry notes desaturations    ~ Will benefit from outpatient sleep study    3. LBBB with aberrancy   - Noted on EKG   - No recurrence    4. Colon Mass   - S/p hemicolectomy surgery (3/8/21)   - General surgery following    5. CAD  - Hx of PCI to LAD (11/20)  - Stable  - No complaints of angina  - Continue ASA, ACEI, BB, and statin    ~ Brilinta on hold (Discussed with Dr. Ovidio Bob; will stop with initiation of anti-coagulant)    6. HTN  - Controlled: Goal <130/80  - Continue current medications    7. Anemia   - Lower today s/p surgery    ~ 8.5/28.5 this AM    - No recurrent bleeding   - Will need close monitoring as outpatient with initiation of anti-coagulation    8. YESENIA   - Worsening today   - Hold ACEi   - Encourage PO intake (If insufficient, will need to start gentle IVF)   - Monitor UO   - Recheck in AM    Multiple medical conditions with risk of decompensation. All pertinent information and plan of care discussed with the EP physician. All questions and concerns were addressed to the patient. Alternatives to my treatment were discussed. I have discussed the above stated plan with patient and the nurse. The patient verbalized understanding and agreed with the plan. The patient was seen for >35 minutes. I reviewed interval history, physical exam, review of data including labs, imaging, development and implementation of treatment plan and coordination of complex care. Thank you for allowing to us to participate in the care of [address] [place] [address].     JIMENA Liriano-CNP  [address]   Office: [phone]

## 2021-03-10 NOTE — CARE COORDINATION
SW met with pt to discuss SNF recs. Pt states she does not want SNF. Pt states family including daughter, granddaughter and son are all supportive and granddaughter lives with pt. Agreed for SW to follow up with daughter to coordinate care. SW spoke with pt's daughter and she is on board with pt returning home with [facility]  And would like to [facility] to coordinate with her upon discharge. [facility] updated, as previously referred.     Amanda Mckay MSW, [address]

## 2021-03-10 NOTE — PROGRESS NOTES
[address] and Laparoscopic Surgery        Progress Note    Patient Name: Donavan Webster  MRN: [id number]  YOB: 1940  Date of Evaluation: 3/10/2021    Subjective:  Brief episodes of atrial fibrillation with RVR overnight  Pain much better today, only around larger left-sided incision  Reports small-volume emesis after Tylenol intake--not with other pills; reports that this has happened in the past with Tylenol and she does not tolerate it, reports that she is able to tolerate Norco  Passing flatus, no stool  Up to chair at this time, reports ambulating halls without difficulty      Post-Operative Day #2    Vital Signs:  Patient Vitals for the past 24 hrs:   BP Temp Temp src Pulse Resp SpO2 Height   03/10/21 1210 -- 98.6 °F (37 °C) Oral 95 18 -- --   03/10/21 0745 (!) 114/54 98.1 °F (36.7 °C) Oral 94 20 94 % --   03/10/21 0615 127/74 98.4 °F (36.9 °C) Oral 76 20 93 % --   03/10/21 0259 (!) 137/56 98.1 °F (36.7 °C) Oral 85 20 94 % --   21 2103 -- -- -- -- -- 94 % --   21 2045 (!) 111/54 97.4 °F (36.3 °C) Temporal 77 20 95 % --   21 1745 132/65 98.8 °F (37.1 °C) Temporal 81 20 96 % --   21 1513 -- -- -- -- -- -- 5' (1.524 m)      TEMPERATURE HISTORY 24H: Temp (24hrs), Av.2 °F (36.8 °C), Min:97.4 °F (36.3 °C), Max:98.8 °F (37.1 °C)    BLOOD PRESSURE HISTORY: Systolic (49XCB), GPX:282 , Min:111 , THC:223    Diastolic (02NOI), NXX:57, Min:54, Max:74      Intake/Output:  I/O last 3 completed shifts:  In: -   Out: 430 [Urine:400; Emesis/NG output:30]  No intake/output data recorded.   Drain/tube Output:       Physical Exam:  General: awake, alert, oriented to  person, place, time  Lungs: unlabored respirations  Abdomen: soft, non-distended, appropriate incisional tenderness only, active bowel sounds present   Skin/Wound: healing well, no drainage, no erythema, well approximated    Labs:  CBC:    Recent Labs     21  0444 21  0456 03/10/21  0512   WBC 8.4 19.2* 11.5*   HGB 10.0* 8.9* 8.5*   HCT 32.9* 30.1* 28.5*    266 235     BMP:    Recent Labs     03/08/21  0444 03/09/21  0456 03/10/21  0512    141 139   K 4.5 4.7 4.9    109 109   CO2 29 23 24   BUN 27* 29* 44*   CREATININE 1.2 1.1 1.6*   GLUCOSE 118* 159* 123*     Hepatic:    Recent Labs     03/10/21  0512   AST 21   ALT 9*   BILITOT 0.6   ALKPHOS 73     Amylase:  No results found for: AMYLASE  Lipase:    Lab Results   Component Value Date    LIPASE 25.0 11/20/2020      Mag:    Lab Results   Component Value Date    MG 2.10 03/04/2021    MG 2.10 03/04/2021     Phos:     Lab Results   Component Value Date    PHOS 3.8 05/29/2015    PHOS 4.0 05/28/2015      Coags:   Lab Results   Component Value Date    PROTIME 12.7 03/08/2021    INR 1.09 03/08/2021    APTT 30.4 03/03/2021       Cultures:  Anaerobic culture  No results found for: LABANAE  Fungus stain  No results found for requested labs within last 30 days. Gram stain  No results found for requested labs within last 30 days. Organism  No results found for: [facility]  Surgical culture  No results found for: CXSURG  Blood culture 1  No results found for requested labs within last 30 days. Blood culture 2  No results found for requested labs within last 30 days. Fecal occult  Results in Past 30 Days  Result Component Current Result Ref Range Previous Result Ref Range   Occult Blood Diagnostic Result: Negative  Normal range: Negative   (3/3/2021)  Not in Time Range      GI bacterial pathogens by PCR  No results found for requested labs within last 30 days. C. difficile  No results found for requested labs within last 30 days. Urine culture  No results found for: LABURIN    Pathology:  OR 3/8/2021--FINAL DIAGNOSIS:     Right colon and small bowel, segmental resection:   - Invasive colonic adenocarcinoma, moderately differentiated. - Margins not involved. - One pericolonic lymph nodes positive for metastatic carcinoma (1/16). Gallbladder, cholecystectomy:   - Chronic cholecystitis, moderate. - Cholelithiasis. - Cholesterolosis. Procedure:  Right hemicolectomy   Tumor Site: Ileocecal valve   Tumor Size: Greatest dimension (centimeter): 4.2        Additional dimensions (centimeters): 3.7 x 0.8   Macroscopic tumor perforation: Not identified   Histologic Type: Adenocarcinoma   Histologic Grade: G2: Moderately   Tumor Extension: Tumor invades through muscularis propria into subserosal   adipose tissue. MARGINS   All margins are uninvolved by invasive carcinoma, high grade dysplasia /   intramucosal carcinoma, and low grade dysplasia   Margins examined: Proximal, distal, and mesenteric    + Distance of invasive carcinoma from closest margin (millimeters or   centimeters): 40 mm    + Specify closest margin: Distal     Treatment Effect (applicable to carcinomas treated with neoadjuvant   therapy): No known pre surgical treatment   Lymph-Vascular Invasion: Not identified   Perineural Invasion: Not identified     + Tumor Budding (Note I)             Low score (0-4)   Type of Polyp in Which Invasive Carcinoma Arose: Not applicable   Tumor deposits: Absent     Regional lymph nodes     Number of Lymph nodes Involved: 1     Number of Lymph Nodes Examined: 16     Pathologic Stage Classification (pTNM, AJCC 8th Edition)     Primary Tumor (pT):      pT 3: Tumor invades through muscularis propria into pericolonic fat     Regional Lymph Nodes (pN):     pN 1a: Metastasis in 1 lymph node     + Additional pathologic findings: Histologically unremarkable appendix. + Ancillary studies: Not applicable     Imaging:  I have personally reviewed the following films:    No results found.     Scheduled Meds:   enoxaparin  40 mg Subcutaneous Daily    metroNIDAZOLE  500 mg Intravenous Q8H    aspirin  81 mg Oral Daily    [Held by provider] lisinopril  2.5 mg Oral Daily    carvedilol  3.125 mg Oral BID WC    rosuvastatin  20 mg Oral Nightly    Postop day #2 status post lap right colon and lap cholecystectomy  Doing better today  Passing flatus  BUN and creatinine mildly elevated today  Will start clear liquids  Remove Pickard  Ambulate in hallways  Repeat labs in a.m.

## 2021-03-11 ENCOUNTER — APPOINTMENT (OUTPATIENT)
Dept: CT IMAGING | Age: 81
DRG: 329 | End: 2021-03-11
Payer: MEDICARE

## 2021-03-11 ENCOUNTER — APPOINTMENT (OUTPATIENT)
Dept: NUCLEAR MEDICINE | Age: 81
DRG: 329 | End: 2021-03-11
Payer: MEDICARE

## 2021-03-11 LAB
A/G RATIO: 0.9 (ref 1.1–2.2)
ALBUMIN SERPL-MCNC: 2.9 G/DL (ref 3.4–5)
ALP BLD-CCNC: 79 U/L (ref 40–129)
ALT SERPL-CCNC: 8 U/L (ref 10–40)
ANION GAP SERPL CALCULATED.3IONS-SCNC: 12 MMOL/L (ref 3–16)
ANION GAP SERPL CALCULATED.3IONS-SCNC: 9 MMOL/L (ref 3–16)
ANISOCYTOSIS: ABNORMAL
AST SERPL-CCNC: 16 U/L (ref 15–37)
BASOPHILS ABSOLUTE: 0 K/UL (ref 0–0.2)
BASOPHILS RELATIVE PERCENT: 0.1 %
BILIRUB SERPL-MCNC: 0.5 MG/DL (ref 0–1)
BILIRUBIN URINE: ABNORMAL
BLOOD, URINE: ABNORMAL
BUN BLDV-MCNC: 62 MG/DL (ref 7–20)
BUN BLDV-MCNC: 65 MG/DL (ref 7–20)
CALCIUM SERPL-MCNC: 8.7 MG/DL (ref 8.3–10.6)
CALCIUM SERPL-MCNC: 8.7 MG/DL (ref 8.3–10.6)
CHLORIDE BLD-SCNC: 109 MMOL/L (ref 99–110)
CHLORIDE BLD-SCNC: 112 MMOL/L (ref 99–110)
CLARITY: ABNORMAL
CO2: 20 MMOL/L (ref 21–32)
CO2: 22 MMOL/L (ref 21–32)
COARSE CASTS, UA: ABNORMAL /LPF (ref 0–2)
COLOR: ABNORMAL
CREAT SERPL-MCNC: 2.3 MG/DL (ref 0.6–1.2)
CREAT SERPL-MCNC: 2.4 MG/DL (ref 0.6–1.2)
CREATININE URINE: 168.1 MG/DL (ref 28–259)
EOSINOPHILS ABSOLUTE: 0.1 K/UL (ref 0–0.6)
EOSINOPHILS RELATIVE PERCENT: 0.8 %
EPITHELIAL CELLS, UA: 7 /HPF (ref 0–5)
FINE CASTS, UA: ABNORMAL /LPF (ref 0–2)
GFR AFRICAN AMERICAN: 24
GFR AFRICAN AMERICAN: 25
GFR NON-AFRICAN AMERICAN: 20
GFR NON-AFRICAN AMERICAN: 20
GLOBULIN: 3.2 G/DL
GLUCOSE BLD-MCNC: 113 MG/DL (ref 70–99)
GLUCOSE BLD-MCNC: 114 MG/DL (ref 70–99)
GLUCOSE URINE: NEGATIVE MG/DL
HCT VFR BLD CALC: 30 % (ref 36–48)
HEMOGLOBIN: 8.6 G/DL (ref 12–16)
HYPOCHROMIA: ABNORMAL
KETONES, URINE: ABNORMAL MG/DL
LEUKOCYTE ESTERASE, URINE: ABNORMAL
LYMPHOCYTES ABSOLUTE: 0.6 K/UL (ref 1–5.1)
LYMPHOCYTES RELATIVE PERCENT: 7.9 %
MCH RBC QN AUTO: 22.7 PG (ref 26–34)
MCHC RBC AUTO-ENTMCNC: 28.6 G/DL (ref 31–36)
MCV RBC AUTO: 79.5 FL (ref 80–100)
MICROSCOPIC EXAMINATION: YES
MONOCYTES ABSOLUTE: 0.8 K/UL (ref 0–1.3)
MONOCYTES RELATIVE PERCENT: 10.4 %
NEUTROPHILS ABSOLUTE: 5.8 K/UL (ref 1.7–7.7)
NEUTROPHILS RELATIVE PERCENT: 80.8 %
NITRITE, URINE: NEGATIVE
OVALOCYTES: ABNORMAL
PDW BLD-RTO: 26.1 % (ref 12.4–15.4)
PH UA: 5 (ref 5–8)
PLATELET # BLD: 249 K/UL (ref 135–450)
PLATELET SLIDE REVIEW: ADEQUATE
PMV BLD AUTO: 8.7 FL (ref 5–10.5)
POLYCHROMASIA: ABNORMAL
POTASSIUM SERPL-SCNC: 5.2 MMOL/L (ref 3.5–5.1)
POTASSIUM SERPL-SCNC: 5.3 MMOL/L (ref 3.5–5.1)
PROTEIN UA: 100 MG/DL
RBC # BLD: 3.77 M/UL (ref 4–5.2)
RBC UA: 20 /HPF (ref 0–4)
SLIDE REVIEW: ABNORMAL
SODIUM BLD-SCNC: 140 MMOL/L (ref 136–145)
SODIUM BLD-SCNC: 144 MMOL/L (ref 136–145)
SODIUM URINE: 22 MMOL/L
SPECIFIC GRAVITY UA: 1.02 (ref 1–1.03)
TEAR DROP CELLS: ABNORMAL
TOTAL CK: 41 U/L (ref 26–192)
TOTAL PROTEIN: 6.1 G/DL (ref 6.4–8.2)
URINE TYPE: ABNORMAL
UROBILINOGEN, URINE: 1 E.U./DL
WBC # BLD: 7.2 K/UL (ref 4–11)
WBC UA: 12 /HPF (ref 0–5)

## 2021-03-11 PROCEDURE — 84300 ASSAY OF URINE SODIUM: CPT

## 2021-03-11 PROCEDURE — 36415 COLL VENOUS BLD VENIPUNCTURE: CPT

## 2021-03-11 PROCEDURE — 99233 SBSQ HOSP IP/OBS HIGH 50: CPT | Performed by: NURSE PRACTITIONER

## 2021-03-11 PROCEDURE — 78226 HEPATOBILIARY SYSTEM IMAGING: CPT

## 2021-03-11 PROCEDURE — 85025 COMPLETE CBC W/AUTO DIFF WBC: CPT

## 2021-03-11 PROCEDURE — APPNB30 APP NON BILLABLE TIME 0-30 MINS: Performed by: NURSE PRACTITIONER

## 2021-03-11 PROCEDURE — 36569 INSJ PICC 5 YR+ W/O IMAGING: CPT

## 2021-03-11 PROCEDURE — 2580000003 HC RX 258: Performed by: NURSE PRACTITIONER

## 2021-03-11 PROCEDURE — 99024 POSTOP FOLLOW-UP VISIT: CPT | Performed by: SURGERY

## 2021-03-11 PROCEDURE — 81001 URINALYSIS AUTO W/SCOPE: CPT

## 2021-03-11 PROCEDURE — 82550 ASSAY OF CK (CPK): CPT

## 2021-03-11 PROCEDURE — A9537 TC99M MEBROFENIN: HCPCS | Performed by: NURSE PRACTITIONER

## 2021-03-11 PROCEDURE — 74176 CT ABD & PELVIS W/O CONTRAST: CPT

## 2021-03-11 PROCEDURE — 6360000002 HC RX W HCPCS: Performed by: NURSE PRACTITIONER

## 2021-03-11 PROCEDURE — 3430000000 HC RX DIAGNOSTIC RADIOPHARMACEUTICAL: Performed by: NURSE PRACTITIONER

## 2021-03-11 PROCEDURE — 2580000003 HC RX 258: Performed by: SURGERY

## 2021-03-11 PROCEDURE — 80053 COMPREHEN METABOLIC PANEL: CPT

## 2021-03-11 PROCEDURE — 2060000000 HC ICU INTERMEDIATE R&B

## 2021-03-11 PROCEDURE — 82570 ASSAY OF URINE CREATININE: CPT

## 2021-03-11 PROCEDURE — 6360000002 HC RX W HCPCS: Performed by: INTERNAL MEDICINE

## 2021-03-11 PROCEDURE — 2500000003 HC RX 250 WO HCPCS: Performed by: INTERNAL MEDICINE

## 2021-03-11 PROCEDURE — APPSS15 APP SPLIT SHARED TIME 0-15 MINUTES: Performed by: NURSE PRACTITIONER

## 2021-03-11 PROCEDURE — 6360000002 HC RX W HCPCS: Performed by: SURGERY

## 2021-03-11 PROCEDURE — C1751 CATH, INF, PER/CENT/MIDLINE: HCPCS

## 2021-03-11 PROCEDURE — 2580000003 HC RX 258: Performed by: INTERNAL MEDICINE

## 2021-03-11 PROCEDURE — 6370000000 HC RX 637 (ALT 250 FOR IP): Performed by: SURGERY

## 2021-03-11 RX ORDER — LIDOCAINE HYDROCHLORIDE 10 MG/ML
5 INJECTION, SOLUTION EPIDURAL; INFILTRATION; INTRACAUDAL; PERINEURAL ONCE
Status: COMPLETED | OUTPATIENT
Start: 2021-03-11 | End: 2021-03-11

## 2021-03-11 RX ORDER — SODIUM CHLORIDE 9 MG/ML
INJECTION, SOLUTION INTRAVENOUS CONTINUOUS
Status: ACTIVE | OUTPATIENT
Start: 2021-03-11 | End: 2021-03-11

## 2021-03-11 RX ORDER — SODIUM CHLORIDE 0.9 % (FLUSH) 0.9 %
10 SYRINGE (ML) INJECTION PRN
Status: DISCONTINUED | OUTPATIENT
Start: 2021-03-11 | End: 2021-03-16 | Stop reason: HOSPADM

## 2021-03-11 RX ORDER — SODIUM CHLORIDE 0.9 % (FLUSH) 0.9 %
10 SYRINGE (ML) INJECTION EVERY 12 HOURS SCHEDULED
Status: DISCONTINUED | OUTPATIENT
Start: 2021-03-11 | End: 2021-03-16 | Stop reason: HOSPADM

## 2021-03-11 RX ORDER — FUROSEMIDE 10 MG/ML
60 INJECTION INTRAMUSCULAR; INTRAVENOUS ONCE
Status: COMPLETED | OUTPATIENT
Start: 2021-03-11 | End: 2021-03-11

## 2021-03-11 RX ADMIN — ONDANSETRON HYDROCHLORIDE 4 MG: 2 INJECTION, SOLUTION INTRAMUSCULAR; INTRAVENOUS at 14:30

## 2021-03-11 RX ADMIN — HYDROMORPHONE HYDROCHLORIDE 1 MG: 1 INJECTION, SOLUTION INTRAMUSCULAR; INTRAVENOUS; SUBCUTANEOUS at 14:05

## 2021-03-11 RX ADMIN — AMIODARONE HYDROCHLORIDE 150 MG: 50 INJECTION, SOLUTION INTRAVENOUS at 10:49

## 2021-03-11 RX ADMIN — Medication 10 ML: at 22:16

## 2021-03-11 RX ADMIN — Medication 10 ML: at 14:59

## 2021-03-11 RX ADMIN — LIDOCAINE HYDROCHLORIDE 5 ML: 10 INJECTION, SOLUTION EPIDURAL; INFILTRATION; INTRACAUDAL; PERINEURAL at 11:35

## 2021-03-11 RX ADMIN — AMIODARONE HYDROCHLORIDE 0.5 MG/MIN: 50 INJECTION, SOLUTION INTRAVENOUS at 18:32

## 2021-03-11 RX ADMIN — Medication 10 ML: at 08:03

## 2021-03-11 RX ADMIN — Medication 6.15 MILLICURIE: at 14:59

## 2021-03-11 RX ADMIN — FUROSEMIDE 60 MG: 10 INJECTION, SOLUTION INTRAMUSCULAR; INTRAVENOUS at 12:54

## 2021-03-11 RX ADMIN — SODIUM CHLORIDE: 9 INJECTION, SOLUTION INTRAVENOUS at 12:46

## 2021-03-11 RX ADMIN — AMIODARONE HYDROCHLORIDE 1 MG/MIN: 50 INJECTION, SOLUTION INTRAVENOUS at 12:29

## 2021-03-11 RX ADMIN — ONDANSETRON HYDROCHLORIDE 4 MG: 2 INJECTION, SOLUTION INTRAMUSCULAR; INTRAVENOUS at 06:09

## 2021-03-11 RX ADMIN — Medication 10 ML: at 13:00

## 2021-03-11 RX ADMIN — HYDROMORPHONE HYDROCHLORIDE 1 MG: 1 INJECTION, SOLUTION INTRAMUSCULAR; INTRAVENOUS; SUBCUTANEOUS at 01:40

## 2021-03-11 ASSESSMENT — PAIN SCALES - GENERAL
PAINLEVEL_OUTOF10: 2
PAINLEVEL_OUTOF10: 8
PAINLEVEL_OUTOF10: 2
PAINLEVEL_OUTOF10: 10

## 2021-03-11 ASSESSMENT — PAIN DESCRIPTION - PROGRESSION: CLINICAL_PROGRESSION: NOT CHANGED

## 2021-03-11 ASSESSMENT — PAIN DESCRIPTION - PAIN TYPE: TYPE: ACUTE PAIN

## 2021-03-11 NOTE — PROGRESS NOTES
Attempt to insert Picc line unsuccessful. Guidlewire met obstruction at 10 to 15 cm. Placed 20g PIV in LT. Cephalic vein. Notified staff RN.

## 2021-03-11 NOTE — PROGRESS NOTES
[address]   Electrophysiology Progress Note     Date: 3/11/2021  Admit Date: 3/3/2021     Reason for consultation: Atrial fibrillation    Chief Complaint:   Chief Complaint   Patient presents with    Abnormal Lab     pt sent in by her MD for low H/H       History of Present Illness: History obtained from patient and medical record. José Luis Braga is a 68 y.o. female with a past medical history of HTN, HLD, CKD< FIDEL, and CAD. Pt presented to hospital by recommendation of her PCP for anemia. Her hemoglobin was 5.8 and was she received multiple units of blood. GI completed colonoscopy/EGD with no gross bleeding found, however, she was found to have a mass in her colon. She had a recent stent placed and has been on ASA/Brilenta. She was found to be in AF with RVR on admission, thus EP was consulted    Interval Hx: Today, she is being seen for AF follow up. She feels terrible today, worsening abdominal pain with nausea and vomiting. Per nursing, plans for CT abdomen shortly. She has gone back into atrial fibrillation with mildly tachycardic rate. Her kidney function is worsening. Patient seen and examined. Clinical notes reviewed. Telemetry reviewed. No new complaints today. No major events overnight. Denies having chest pain, palpitations, shortness of breath, orthopnea/PND, cough, or dizziness at the time of this visit. Allergies:  No Known Allergies    Home Meds:  Prior to Visit Medications    Medication Sig Taking? Authorizing Provider   furosemide (LASIX) 20 MG tablet Take 1 tablet by mouth daily as needed (swelling or shortness of breath) Yes Raeford Alpers, APRN - CNP   HYDROcodone-acetaminophen (NORCO) 5-325 MG per tablet Take 1 tablet by mouth 2 times daily.  Yes Historical Provider, MD   rosuvastatin (CRESTOR) 20 MG tablet Take 1 tablet by mouth nightly Yes Raeford Alpers, APRN - CNP   aspirin 81 MG chewable tablet Take 1 tablet by mouth daily Yes Raeford Alpers, APRN - CNP lisinopril (PRINIVIL;ZESTRIL) 2.5 MG tablet Take 1 tablet by mouth daily Yes JIMENA Nunez CNP   carvedilol (COREG) 3.125 MG tablet Take 1 tablet by mouth 2 times daily (with meals) Yes JIMENA Nunez CNP   ticagrelor (BRILINTA) 90 MG TABS tablet Take 1 tablet by mouth 2 times daily Yes JIMENA Nunez CNP      Scheduled Meds:   amiodarone bolus  150 mg Intravenous Once    enoxaparin  40 mg Subcutaneous Daily    aspirin  81 mg Oral Daily    [Held by provider] lisinopril  2.5 mg Oral Daily    carvedilol  3.125 mg Oral BID WC    rosuvastatin  20 mg Oral Nightly    sodium chloride flush  10 mL Intravenous 2 times per day    famotidine  20 mg Oral Daily     Continuous Infusions:   sodium chloride 125 mL/hr at 03/10/21 2007    sodium chloride      sodium chloride       PRN Meds:HYDROmorphone, perflutren lipid microspheres, sodium chloride, furosemide, sodium chloride flush, potassium chloride **OR** potassium alternative oral replacement **OR** potassium chloride, promethazine **OR** ondansetron, magnesium hydroxide, acetaminophen **OR** acetaminophen, hydrALAZINE, sodium chloride, sodium chloride     Past Medical History:  Past Medical History:   Diagnosis Date    Anemia     CAD (coronary artery disease) 11/2020    Stent    CKD (chronic kidney disease)     Hyperlipidemia     Hypertension       Past Surgical History:    has a past surgical history that includes fracture surgery; Cystocopy (11/21/13); Upper gastrointestinal endoscopy (N/A, 3/5/2021); Colonoscopy (N/A, 3/5/2021); hemicolectomy (Right, 3/8/2021); and Cholecystectomy, laparoscopic (N/A, 3/8/2021). Social History:  Reviewed. reports that she has never smoked. She has never used smokeless tobacco. She reports that she does not drink alcohol or use drugs. Family History:  Reviewed. family history includes Heart Disease in her father.      Review of Systems:  · Constitutional: Negative for fever, night soft and round. Bowel sounds normoactive in all quadrants without tenderness or masses. · Musculoskeletal: Bilateral upper and lower extremity strength 5/5 with full ROM  · Neurologic/Psych: Awake and orientated to person, place and time. Calm affect, appropriate mood    Pertinent labs, diagnostic, device, and imaging results reviewed as a part of this visit    Labs:    BMP:   Recent Labs     03/09/21  0456 03/10/21  0512 03/11/21  0445    139 140   K 4.7 4.9 5.2*    109 109   CO2 23 24 22   BUN 29* 44* 65*   CREATININE 1.1 1.6* 2.3*     Estimated Creatinine Clearance: 22 mL/min (A) (based on SCr of 2.3 mg/dL (H)). CBC:   Recent Labs     03/09/21  0456 03/10/21  0512 03/11/21  0445   WBC 19.2* 11.5* 7.2   HGB 8.9* 8.5* 8.6*   HCT 30.1* 28.5* 30.0*   MCV 76.9* 77.6* 79.5*    235 249     Thyroid: No results found for: TSH, L3LPLLG, V2UTZMH, THYROIDAB  Lipids:   Lab Results   Component Value Date    CHOL 155 11/21/2020    HDL 38 11/21/2020    TRIG 121 11/21/2020     LFTS:   Lab Results   Component Value Date    ALT 9 03/10/2021    AST 21 03/10/2021    ALKPHOS 73 03/10/2021    PROT 6.1 03/10/2021    AGRATIO 1.0 03/10/2021    BILITOT 0.6 03/10/2021     Cardiac Enzymes:   Lab Results   Component Value Date    TROPONINI <0.01 03/10/2021    TROPONINI 0.03 11/20/2020     Coags:   Lab Results   Component Value Date    PROTIME 12.7 03/08/2021    INR 1.09 03/08/2021       ECG: 3/6/21  Atrial fibrillation    ECHO: 3/6/21   Normal left ventricle size, and systolic function with an estimated ejection fraction of 55-60%. Mild concentric left ventricular hypertrophy is present. No regional wall motion abnormalities are seen. Mild tricuspid regurgitation, RVSP is estimated at 38 mmhg.     Stress Test: None    Cath: 11/20/20  Findings:  Artery Findings/Result  LM Normal  LAD 95% mid  Cx OM2 50%  RI NA  RCA 20% prox, 20% mid  LVEDP 25  LVG 55%     Intervention:         PCI of LAD with [id number] REBECA (PD with candidate for invasive procedures due to acute issues and [facility]   - May consider for SAIDA ligation with Watchman device if she has recurrent bleeding issues long term    2. Bradycardia, At Risk for Sleep Apnea   - Noted at night time while sleeping   - Overnight pulse oximetry notes desaturations    ~ Will benefit from outpatient sleep study    3. LBBB with aberrancy   - Noted on EKG   - No recurrence    4. Colon Mass   - S/p hemicolectomy surgery (3/8/21)   - Worsening pain with N/V; concern for ileus   - Plan for CT abd   - General surgery following    5. CAD  - Hx of PCI to LAD (11/20)  - Stable  - No complaints of angina  - Medical therapy on hold due to NPO status (Will resume once able)    ~ Brilinta on hold (Discussed with Dr. Marilyn Rojo; will stop with initiation of anti-coagulant)    6. HTN  - BP marginal with worsening kidney function  - Hold BP meds for now    7. Anemia   - Stable but low s/p surgery    ~ 8.6/30 this AM    - No recurrent bleeding   - Continue to monitor closely    8. YESENIA, hyperkalemia   - Worsening, creatinine up to 2.3/BUN 65   - Hold ACEi   - Continue IVF   - Monitor UO   - Nephrology consulted    Multiple medical conditions with risk of decompensation. All pertinent information and plan of care discussed with the EP physician. All questions and concerns were addressed to the patient. Alternatives to my treatment were discussed. I have discussed the above stated plan with patient and the nurse. The patient verbalized understanding and agreed with the plan. The patient was seen for >35 minutes. I reviewed interval history, physical exam, review of data including labs, imaging, development and implementation of treatment plan and coordination of complex care. Thank you for allowing to us to participate in the care of [address] [place] [address].     JIMENA Sharma-CNP  [address]   Office: [phone]

## 2021-03-11 NOTE — CONSULTS
Nephrology Consult Note  [phone]   [email or web address].        Reason for Consult:  YESENIA    HISTORY OF PRESENT ILLNESS:                This is a patient with significant past medical history of YESENIA in 2015, peak Scr 1.8, HTN, CAD-s/p recent stent on ASA and Brilinta. HTN, dCHF, [place],  who was sent to ER for anemia, hgb was 5.8, s/p PRBC. She had A.fib with RVR at admission and EP consulted. She had EGD/colonoscopy done which revealed mass, CT scan C/A/P with contrast was negative for metastatic disease. She underwent laparoscopic right hemicolectomy on 3/8/21.  EBL minimal.  She has received IV lasix on 3/4-ketorolac on 3/9, on lisinopril,-stopped on 3/9  -hospital course complicated by YESENIA - Scr gradually trending up, 1.1-->1.6-->2.3, harris removed yesterday-incontinent of urine  -had hypotension due to dilaudid, given NS bolus  -has N/V-had vomiting today, plan for CT  -c/o nausea and abdominal discomfort  -she is feeling anxious and treaful  -denies fever/chill/CP  -c/o SOB that started today  -UO is declining  -BP has been stable  -currently getting IVF   -K is trending up    Past Medical History:        Diagnosis Date    Anemia     CAD (coronary artery disease) 11/2020    Stent    CKD (chronic kidney disease)     Hyperlipidemia     Hypertension        Past Surgical History:        Procedure Laterality Date    CHOLECYSTECTOMY, LAPAROSCOPIC N/A 3/8/2021    LAPAROSCOPIC CHOLECYSTECTOMY performed by Oscar Kumari MD at [address] [facility] N/A 3/5/2021    COLONOSCOPY WITH BIOPSY performed by Jeronimo Payne MD at [facility]  11/21/13    w/ bladder biopsy    FRACTURE SURGERY      right wrist    HEMICOLECTOMY Right 3/8/2021    LAPAROSCOPIC RIGHT COLON RESECTION performed by Oscar Kumari MD at [place] [id number] 3/5/2021    EGD BIOPSY performed by Jeronimo Payne MD at [facility]       Current Medications:    No current facility-administered medications on file prior to encounter. Current Outpatient Medications on File Prior to Encounter   Medication Sig Dispense Refill    furosemide (LASIX) 20 MG tablet Take 1 tablet by mouth daily as needed (swelling or shortness of breath) 30 tablet 1    HYDROcodone-acetaminophen (NORCO) 5-325 MG per tablet Take 1 tablet by mouth 2 times daily.  rosuvastatin (CRESTOR) 20 MG tablet Take 1 tablet by mouth nightly 30 tablet 2    aspirin 81 MG chewable tablet Take 1 tablet by mouth daily 30 tablet 3    lisinopril (PRINIVIL;ZESTRIL) 2.5 MG tablet Take 1 tablet by mouth daily 30 tablet 3    carvedilol (COREG) 3.125 MG tablet Take 1 tablet by mouth 2 times daily (with meals) 60 tablet 3    ticagrelor (BRILINTA) 90 MG TABS tablet Take 1 tablet by mouth 2 times daily 60 tablet 3       Allergies:  Patient has no known allergies.     Social History:    Social History     Socioeconomic History    Marital status:      Spouse name: Not on file    Number of children: Not on file    Years of education: Not on file    Highest education level: Not on file   Occupational History    Not on file   Social Needs    Financial resource strain: Not on file    Food insecurity     Worry: Not on file     Inability: Not on file    Transportation needs     Medical: Not on file     Non-medical: Not on file   Tobacco Use    Smoking status: Never Smoker    Smokeless tobacco: Never Used    Tobacco comment:  was a heavy smoker   Substance and Sexual Activity    Alcohol use: No    Drug use: No    Sexual activity: Not Currently   Lifestyle    Physical activity     Days per week: Not on file     Minutes per session: Not on file    Stress: Not on file   Relationships    Social connections     Talks on phone: Not on file     Gets together: Not on file     Attends Christian service: Not on file     Active member of club or organization: Not on file     Attends meetings of clubs or organizations: Not on file     Relationship status: Not on file    Intimate partner violence     Fear of current or ex partner: Not on file     Emotionally abused: Not on file     Physically abused: Not on file     Forced sexual activity: Not on file   Other Topics Concern    Not on file   Social History Narrative    Not on file       Family History:       Problem Relation Age of Onset    Heart Disease Father          Review of Systems:  a comprehensive Review of systems was negative except for the following: nausea/voming/abdominal pain    PHYSICAL EXAM:    Vitals:    BP (!) 140/47   Pulse 97   Temp 98.4 °F (36.9 °C) (Oral)   Resp 17   Ht 5' (1.524 m)   Wt 227 lb 15.3 oz (103.4 kg)   SpO2 96%   BMI 44.52 kg/m²   I/O last 3 completed shifts: In: 2062.5 [P.O.:60; I.V.:2002.5]  Out: 250 [Urine:250]  No intake/output data recorded. Physical Exam:  Gen: anxious, tearful  HEENT: anicteric, dry mouth, poor dentition  CV: irregular, +S1/S2. Lungs: coarse BS, mild tachypnea  Abd: distended, decreased BS, laparoscopic incision C/D/I  Ext: trace edema, no cyanosis  Skin: Warm. No rashes appreciated. : No TTP over bladder,  Neuro: Alert and oriented x 2, nonfocal.  Joints: No erythema noted over joints.     DATA:    CBC:   Lab Results   Component Value Date    WBC 7.2 03/11/2021    RBC 3.77 03/11/2021    HGB 8.6 03/11/2021    HCT 30.0 03/11/2021    MCV 79.5 03/11/2021    MCH 22.7 03/11/2021    MCHC 28.6 03/11/2021    RDW 26.1 03/11/2021     03/11/2021    MPV 8.7 03/11/2021     BMP:    Lab Results   Component Value Date     03/11/2021    K 5.2 03/11/2021    K 3.7 03/04/2021     03/11/2021    CO2 22 03/11/2021    BUN 65 03/11/2021    LABALBU 3.0 03/10/2021    CREATININE 2.3 03/11/2021    CALCIUM 8.7 03/11/2021    GFRAA 25 03/11/2021    LABGLOM 20 03/11/2021    GLUCOSE 114 03/11/2021       IMPRESSION/RECOMMENDATIONS:      YESENIA on CKD stage 3: recurrent baseline Scr 1.1-1.2-adm Scr 1.1-2.3 today  -multifactorial-relative IV depletion/diminshed perfusion in the setting of ACEI-less likely contrast given exposure > 5 days prior while of lasix and ACEI, did receive one dose of NSAIDs/hemodynamic due to A.fib/RVR/hypotension-most likely all factors combined-progression to ATI-r/o obstruction  -oliguric  -re-insert harris for accurate I/O  -check urine lytes  -will give lasix with IVF given K trend and unable to receive PO binders  -monitor closely regarding need for RRT-currently not indicated    SOB: on oxygen, worsening this am  -add CT of chest to A/P CT  -will dose lasix  -had vomiting, r/o aspiration    FEN: K is trending up due to YESENIA-r/o obstruction  -plan to give lasix   hyperchloremic acidosis: YESENIA/NS infusion    CKD stage 3: baseline Scr 1.1-1.3  -due to YESENIA/presumed HTN NS    Anemia: adm hgb 5.8-s/p RPBC  -work up revealed colonic mass-s/p right hemicolectomy  -iron deficient-s/p venofer  -epogen per Oncology    Colonic mass: s/p hemicolectomy  -per Oncology    A.fib with RVR: on amiodarone-per EP    CAD: recent stent-on ASA/statin/Bilinta on hold    HTN: recent hypotension  -lasix and lisinopril on hold    dCHF: lasix as above      Thank you for allowing me to participate in the care of this patient. I will continue to follow along. Please call with questions.     Carlos Lozada MD

## 2021-03-11 NOTE — PROGRESS NOTES
Oncology and Hematology Care   Progress Note      3/11/2021 2:19 PM        Name: Beulah James . Admitted: 3/3/2021    SUBJECTIVE:  Patient continues to have abdominal pain, nausea and vomiting.      Reviewed interval ancillary notes    Current Medications  amiodarone (CORDARONE) 450 mg in dextrose 5 % 250 mL infusion, Continuous    Followed by  amiodarone (CORDARONE) 450 mg in dextrose 5 % 250 mL infusion, Continuous  sodium chloride flush 0.9 % injection 10 mL, 2 times per day  sodium chloride flush 0.9 % injection 10 mL, PRN  0.9 % sodium chloride infusion, Continuous  enoxaparin (LOVENOX) injection 40 mg, Daily  0.9 % sodium chloride infusion, Continuous  HYDROmorphone (DILAUDID) injection 1 mg, Q3H PRN  aspirin EC tablet 81 mg, Daily  perflutren lipid microspheres (DEFINITY) injection 1.65 mg, ONCE PRN  0.9 % sodium chloride infusion, PRN  [Held by provider] carvedilol (COREG) tablet 3.125 mg, BID WC  rosuvastatin (CRESTOR) tablet 20 mg, Nightly  potassium chloride (KLOR-CON M) extended release tablet 40 mEq, PRN    Or  potassium bicarb-citric acid (EFFER-K) effervescent tablet 40 mEq, PRN    Or  potassium chloride 10 mEq/100 mL IVPB (Peripheral Line), PRN  promethazine (PHENERGAN) tablet 12.5 mg, Q6H PRN    Or  ondansetron (ZOFRAN) injection 4 mg, Q6H PRN  magnesium hydroxide (MILK OF MAGNESIA) 400 MG/5ML suspension 30 mL, Daily PRN  famotidine (PEPCID) tablet 20 mg, Daily  acetaminophen (TYLENOL) tablet 650 mg, Q6H PRN    Or  acetaminophen (TYLENOL) suppository 650 mg, Q6H PRN  hydrALAZINE (APRESOLINE) injection 10 mg, Q6H PRN  0.9 % sodium chloride bolus, PRN  0.9 % sodium chloride infusion, PRN        Objective:  /64   Pulse 105   Temp 98.2 °F (36.8 °C) (Oral)   Resp 19   Ht 5' (1.524 m)   Wt 227 lb 15.3 oz (103.4 kg)   SpO2 95%   BMI 44.52 kg/m²     Intake/Output Summary (Last 24 hours) at 3/11/2021 1419  Last data filed at 3/11/2021 0445  Gross per 24 hour   Intake 2062.49 ml Output 250 ml   Net 1812.49 ml      Wt Readings from Last 3 Encounters:   03/11/21 227 lb 15.3 oz (103.4 kg)   03/01/21 235 lb 9.6 oz (106.9 kg)   11/30/20 229 lb (103.9 kg)       General appearance:  Appears comfortable  Eyes: Sclera clear. Pupils equal.  ENT: Moist oral mucosa. Trachea midline, no adenopathy. Cardiovascular: Regular rhythm, normal S1, S2. No murmur. No edema in lower extremities  Respiratory: Not using accessory muscles. Good inspiratory effort. Clear to auscultation bilaterally, no wheeze or crackles. GI: Abdomen soft, no tenderness, not distended  Musculoskeletal: No cyanosis in digits, neck supple  Neurology: CN 2-12 grossly intact. No speech or motor deficits  Psych: Normal affect. Alert and oriented in time, place and person  Skin: Warm, dry, normal turgor    Labs and Tests:  CBC:   Recent Labs     03/09/21  0456 03/10/21  0512 03/11/21  0445   WBC 19.2* 11.5* 7.2   HGB 8.9* 8.5* 8.6*    235 249     BMP:    Recent Labs     03/09/21  0456 03/10/21  0512 03/11/21  0445    139 144  140   K 4.7 4.9 5.3*  5.2*    109 112*  109   CO2 23 24 20*  22   BUN 29* 44* 62*  65*   CREATININE 1.1 1.6* 2.4*  2.3*   GLUCOSE 159* 123* 113*  114*     Hepatic:   Recent Labs     03/10/21  0512 03/11/21  0445   AST 21 16   ALT 9* 8*   BILITOT 0.6 0.5   ALKPHOS 73 79       ASSESSMENT AND PLAN    Principal Problem:    Anemia  Active Problems:    Hypertension    CAD (coronary artery disease)    High cholesterol    Diastolic dysfunction    Hypokalemia    Malignant neoplasm of ascending colon (HCC)    Atrial fibrillation (HCC)    Colonic mass  Resolved Problems:    * No resolved hospital problems.  *      Ascending colon cancer   -Colon mass found on colonoscopy 3/5/2021  -s/p right hemicolectomy 3/8/21  -CEA is normal and CT scans were negative for metastatic disease.  -Pathology consistent with invasive colonic adenocarcinoma, 1/16 lymph node involvement.    -Plan will be for chemotherapy as an outpatient.   -Diagnosis and treatment discussed with patient's daughter, Geraldine Ovalles.        Anemia  - Iron deficiency due to GI blood loss. - S/p 3 doses of Venofer on 3/6/21.  - Vit B12 wnl.  - hgb 8.6 today. No indication to transfuse.         CAD  - s/p stent placement       Jordan Goode.  [phone]    Patient was seen and examined. Agree with above. Continue current care. She has gallbladder issues which are being dealt with by Dr. Ganga Schulte and the team.  Patient has a stage III colon cancer. Will consider adjuvant chemotherapy once acute events are over. Transfuse PRBCs if hemoglobin is less than 7.   Discussed with patient's daughter at bedside    Cielo Guzmán MD

## 2021-03-11 NOTE — PROGRESS NOTES
Patient still having severe pain. Patient's BP trending low. RN informed dr. Kvng Moran of low BP but still requiring/needing pain medication and requested pain medication that may not affect BP as much. Waiting for further recs    [phone] Hospital or Facility: [facility] From: Dale Eldridge RE: Bria Brannon RM: [id number] vivian Moran. her bp has been consistently 100s/50s. sometimes 80/50s because we've been giving her dilaudid pretty frequently. she is still having SEVERE pain though. I don't really know what to do for her.  I was going to ask if you wanted to put her back on the Norco if that wouldn't affect her BP as much unless you're ok with her BP. thanks Terrence Need Callback: NO CALLBACK REQ 3T ROUTINE

## 2021-03-11 NOTE — PROGRESS NOTES
[facility]        Progress Note    Patient Name: Aletha Tapia  MRN: [id number]  YOB: 1940  Date of Evaluation: 3/11/2021    Subjective:  More nausea and vomiting overnight along with low BP and increased pain  Pickard reinserted for urinary retention  No further flatus, no stool  Resting in bed at this time; revisited patient following CT this afternoon and pain was much better, none at rest, only with pressure/palpation to abdomen    Post-Operative Day #3      Vital Signs:  Patient Vitals for the past 24 hrs:   BP Temp Temp src Pulse Resp SpO2 Height Weight   21 1245 122/64 98.2 °F (36.8 °C) Oral 105 19 95 % -- --   21 1000 (!) 140/47 98.4 °F (36.9 °C) Oral 97 17 96 % -- --   21 0745 (!) 100/54 98.4 °F (36.9 °C) Oral 95 17 96 % 5' (1.524 m) 227 lb 15.3 oz (103.4 kg)   21 0445 (!) 105/55 -- -- -- -- -- -- --   21 0322 107/63 98.4 °F (36.9 °C) Oral 109 17 93 % -- --   21 0221 (!) 103/56 -- -- 96 -- -- -- --   21 0137 (!) 108/56 -- -- 96 -- -- -- --   03/10/21 2303 (!) 87/51 -- -- 96 17 -- -- --   03/10/21 2155 (!) 100/56 -- -- -- -- -- -- --   03/10/21 1958 (!) 115/50 97.4 °F (36.3 °C) Temporal 90 18 95 % -- --   03/10/21 1700 106/64 -- -- 90 18 97 % -- --   03/10/21 1500 129/64 97 °F (36.1 °C) Temporal 76 18 97 % -- --      TEMPERATURE HISTORY 24H: Temp (24hrs), Av °F (36.7 °C), Min:97 °F (36.1 °C), Max:98.4 °F (36.9 °C)    BLOOD PRESSURE HISTORY: Systolic (80IKF), NTP:359 , Min:87 , LLV:959    Diastolic (18BUB), LNE:12, Min:47, Max:74      Intake/Output:  I/O last 3 completed shifts: In: .5 [P.O.:60; I.V.:.5]  Out: 250 [Urine:250]  No intake/output data recorded.   Drain/tube Output:       Physical Exam:  General: awake, alert, oriented to  person, place, time  Lungs: unlabored respirations  Abdomen: soft, mildly distended, appropriate incisional tenderness with most focal tenderness to right upper Abdomen Pelvis Wo Contrast Additional Contrast? None    Result Date: 3/11/2021  EXAMINATION: CT OF THE ABDOMEN AND PELVIS WITHOUT CONTRAST 3/11/2021 12:05 pm TECHNIQUE: CT of the abdomen and pelvis was performed without the administration of intravenous contrast. Multiplanar reformatted images are provided for review. Dose modulation, iterative reconstruction, and/or weight based adjustment of the mA/kV was utilized to reduce the radiation dose to as low as reasonably achievable. COMPARISON: CT of the chest abdomen and pelvis March 5, 2020 HISTORY: ORDERING SYSTEM PROVIDED HISTORY: RUQ pain TECHNOLOGIST PROVIDED HISTORY: Reason for exam:->RUQ pain Additional Contrast?->None Reason for Exam: RUQ pain Acuity: Unknown Type of Exam: Unknown FINDINGS: Lower Chest: Increased mild-moderate right pleural effusion with adjacent compressive atelectasis. Unchanged trace amount of left pleural fluid. Organs: In the gallbladder fossa there is a fluid collection measuring 4.2 x 2.3 by 2.4 cm. Along the inferior-lateral aspect of the fluid collection a few tiny curvilinear/rounded high-density foci are present measuring 3 mm and less in size best shown on coronal image 90. Cholecystectomy clips noted in the expected location. There is a small amount of fluid along the inferior margin of the right hepatic lobe anterior laterally. Small amount of fluid between the diaphragm and liver also present. No pancreatitis. No ureteral stone or hydronephrosis. 2 mm right renal stone again noted. GI/Bowel: There is new fluid-filled small bowel dilation involving proximal to mid small bowel loops measuring up to 3 cm. There is distal small bowel collapse extending to the surgical site. Oral contrast within the colon from the oral contrast given for the comparison. No oral contrast within small bowel at this time. Pelvis: No acute abnormality of the pelvis. Normal appearance of the bladder.  Peritoneum/Retroperitoneum: Small amount of

## 2021-03-11 NOTE — PROGRESS NOTES
CT completed, surgery team aware. Pickard placed and draining tea colored urine. Oxygen via NC at 2 liters in place. IVF and medications infusing per MAR. States that her abdomen pain is better at this time and tolerable.

## 2021-03-11 NOTE — PROGRESS NOTES
44.52 kg/m²      Patient Vitals for the past 24 hrs:   BP Temp Temp src Pulse Resp SpO2 Height Weight   03/11/21 0745 (!) 100/54 98.4 °F (36.9 °C) Oral 95 17 96 % 5' (1.524 m) 227 lb 15.3 oz (103.4 kg)   03/11/21 0445 (!) 105/55 -- -- -- -- -- -- --   03/11/21 0322 107/63 98.4 °F (36.9 °C) Oral 109 17 93 % -- --   03/11/21 0221 (!) 103/56 -- -- 96 -- -- -- --   03/11/21 0137 (!) 108/56 -- -- 96 -- -- -- --   03/10/21 2303 (!) 87/51 -- -- 96 17 -- -- --   03/10/21 2155 (!) 100/56 -- -- -- -- -- -- --   03/10/21 1958 (!) 115/50 97.4 °F (36.3 °C) Temporal 90 18 95 % -- --   03/10/21 1700 106/64 -- -- 90 18 97 % -- --   03/10/21 1500 129/64 97 °F (36.1 °C) Temporal 76 18 97 % -- --   03/10/21 1210 -- 98.6 °F (37 °C) Oral 95 18 -- -- --     Patient Vitals for the past 96 hrs (Last 3 readings):   Weight   03/11/21 0745 227 lb 15.3 oz (103.4 kg)   03/09/21 0822 220 lb 7.4 oz (100 kg)   03/07/21 0857 218 lb 8 oz (99.1 kg)           Intake/Output Summary (Last 24 hours) at 3/11/2021 0832  Last data filed at 3/11/2021 0445  Gross per 24 hour   Intake 2062.49 ml   Output 250 ml   Net 1812.49 ml         Physical Exam:   BP (!) 100/54   Pulse 95   Temp 98.4 °F (36.9 °C) (Oral)   Resp 17   Ht 5' (1.524 m)   Wt 227 lb 15.3 oz (103.4 kg)   SpO2 96%   BMI 44.52 kg/m²   General appearance: alert, appears stated age and cooperative  Head: Normocephalic, without obvious abnormality, atraumatic  Lungs: clear to auscultation bilaterally  Heart: irreg irreg  Abdomen: soft, incisional tenderness.  Hypoactive BS  Extremities: extremities normal, atraumatic, no cyanosis or edema    Labs:  Lab Results   Component Value Date    WBC 7.2 03/11/2021    HGB 8.6 (L) 03/11/2021    HCT 30.0 (L) 03/11/2021     03/11/2021    CHOL 155 11/21/2020    TRIG 121 11/21/2020    HDL 38 (L) 11/21/2020    ALT 9 (L) 03/10/2021    AST 21 03/10/2021     03/11/2021    K 5.2 (H) 03/11/2021     03/11/2021    CREATININE 2.3 (H) 03/11/2021    BUN 65 (H) 03/11/2021    CO2 22 03/11/2021    INR 1.09 03/08/2021     Lab Results   Component Value Date    TROPONINI <0.01 03/10/2021       Recent Imaging Results are Reviewed:  Echo Complete    Result Date: 3/6/2021  Transthoracic Echocardiography Report (TTE)  Demographics   Patient Name        David Roman   Date of Study       03/06/2021  Gender                 Female   Patient Number      [id number]  Date of Birth          1940   Visit Number        [id number]   Age                    68 year(s)   Accession Number    [id number]  Room Number            6341   Corporate ID        [id number]    Sonographer            Gil Kurtz, ELINOR,                                                         RVT   Ordering Physician              Interpreting Physician Isidoro Ledesma MD,                                                         [facility] [place], [facility]   Procedure Type of Study   TTE procedure:ECHOCARDIOGRAM COMPLETE 2D W DOPPLER W COLOR. Procedure Date Date: 03/06/2021 Start: 09:12 AM Study Location: [facility] - Echo Lab Technical Quality: Good visualization Additional Indications:NSTEMI, Leg edema, CAD. Patient Status: Routine Height: 60 inches Weight: 220 pounds BSA: 1.94 m2 BMI: 42.97 kg/m2 BP: 134/75 mmHg  Conclusions   Summary  Normal left ventricle size, and systolic function with an estimated ejection  fraction of 55-60%. Mild concentric left ventricular hypertrophy is present. No regional wall motion abnormalities are seen. Mild tricuspid regurgitation, RVSP is estimated at 38 mmhg. Signature   ------------------------------------------------------------------  Electronically signed by Isidoro Ledesma MD, [facility] [place], [address]  (Interpreting physician) on 03/06/2021 at 12:12 PM  ------------------------------------------------------------------   Findings   Left Ventricle  Normal left ventricle size, and systolic function with an estimated ejection  fraction of 55-60%.  Mild concentric left ventricular hypertrophy is present. No regional wall motion abnormalities are seen. Grade I diastolic dysfunction with normal LV filling pressures. Mitral Valve  Calcification of the mitral valve noted. No evidence of mitral regurgitation. Left Atrium  The left atrium is normal in size. Aortic Valve  The aortic valve is structurally normal. There is no significant aortic  valve regurgitation or stenosis. Aorta  The aortic root is normal in size. Right Ventricle  The right ventricle is normal in size and function. Tricuspid Valve  The tricuspid valve is normal in structure. Mild tricuspid regurgitation, RVSP is estimated at 38 mmhg. Right Atrium  The right atrial size is normal.   Pulmonic Valve  The pulmonic valve is not well visualized. Mild pulmonic regurgitation present. Pericardial Effusion  No pericardial effusion noted. Pleural Effusion  No pleural effusion. Miscellaneous  The inferior vena cava appears normal in size with normal respiratory  variation.   M-Mode/2D Measurements (cm)   LV Diastolic Dimension: 4.5 cm  LV Systolic Dimension: 5.89 cm  LV Septum Diastolic: 1.19 cm  LV PW Diastolic: 1.5 cm         AO Root Dimension: 3.1 cm  RV Diastolic Dimension: 8.05 cm LA Dimension: 3.4 cm                                  LA Area: 11.7 cm2                                  LA volume/Index: 21.6 ml /11 ml/m2  Doppler Measurements   AV Peak Velocity: 194 cm/s     MV Peak E-Wave: 98.5 cm/s  AV Peak Gradient: 15.05 mmHg   MV Peak A-Wave: 89.5 cm/s  AV Mean Gradient: 9 mmHg       MV E/A Ratio: 1.1  LVOT Peak Velocity: 122 cm/s   MV Mean Gradient: 3 mmHg                                 MV Max P mmHg  TR Velocity:290 cm/s           MV Vmax:125 cm/s  TR Gradient:33.64 mmHg         MV VTI:51.9 cm/s   E' Septal Velocity: 6.2 cm/s   MV Deceleration Time: 215 msec  E' Lateral Velocity: 5.98 cm/s  PV Peak Velocity: 123 cm/s  PV Peak Gradient: 6.05 mmHg   Aortic Valve   Peak Velocity: 194 cm/s   Mean Velocity: 141 cm/s  Peak Gradient: 15.05 mmHg Mean Gradient: 9 mmHg  AV VTI: 43.8 cm  Aorta   Aortic Root: 3.1 cm      Xr Chest (2 Vw)    Result Date: 3/1/2021  EXAMINATION: TWO XRAY VIEWS OF THE CHEST 3/1/2021 2:58 pm COMPARISON: None. HISTORY: ORDERING SYSTEM PROVIDED HISTORY: EUBANKS (dyspnea on exertion) TECHNOLOGIST PROVIDED HISTORY: Reason for exam:->3-4wk hx of EUBANKS and productive cough FINDINGS: Cardiomegaly. Pulmonary vascular congestion. Ill-defined peripheral pulmonary opacities. No pneumothorax. No pleural effusion. Ill-defined bilateral pulmonary opacities could represent pulmonary edema possibly related to congestive heart failure or atypical pneumonia     Ct Chest Abdomen Pelvis Wo Contrast    Result Date: 3/5/2021  EXAMINATION: CT OF THE CHEST, ABDOMEN, AND PELVIS WITHOUT CONTRAST 3/5/2021 4:20 pm TECHNIQUE: CT of the chest, abdomen and pelvis was performed without the administration of intravenous contrast. Multiplanar reformatted images are provided for review. Dose modulation, iterative reconstruction, and/or weight based adjustment of the mA/kV was utilized to reduce the radiation dose to as low as reasonably achievable. COMPARISON: Chest CT 2014 2013 HISTORY: ORDERING SYSTEM PROVIDED HISTORY: Colon mass, r/o mets TECHNOLOGIST PROVIDED HISTORY: Reason for exam:->Colon mass, r/o mets Additional Contrast?->Oral Reason for Exam: Colon mass, r/o mets Acuity: Unknown Type of Exam: Unknown FINDINGS: Chest: Mediastinum: 7 mm nodule seen in right lobe of thyroid gland. No specific imaging follow-up recommended based on size. coronary artery calcification is seen. Small mediastinal nodes are noted. Right paratracheal node measures 1.4 cm in short axis, previously 10 mm. Lungs/pleura: Mosaic attenuation is seen throughout the lungs, suggesting small airways disease or air trapping. Trace pleural effusions are seen bilaterally. There is adjacent consolidation seen in the lung bases.  1.8 x 1.2 cm nodule is seen in the left Tiny nonobstructing right renal stone       Assessment and Plan:  Principal Problem:    Anemia -Established problem. Stable. Acute post op blood loss anemia   Plan: No indication for transfusion. Cont to monitor h/h to assess progression of anemia. Recommend ferrous sulfate or MVI as outpatient. Active Problems:    Hypertension -Established problem. Stable. 100/54  Plan: hold parameters d/w RN    CAD (coronary artery disease)    High cholesterol    Diastolic dysfunction    Hypokalemia -Established problem. Stable. K actually high today  Plan: Continue present orders/plan. Malignant neoplasm of ascending colon ([facility] [address] -Established problem. Stable. Plan: await heme/onc recs    Atrial fibrillation ([facility] [address] -Established problem. Stable.   Remains in fib  Plan: per EP: \"may consider adding amiodarone to help with rhythm control is recurrence\"  Also to start oral AC when ok with surgery    Colonic mass            Wolm Barthel  3/11/2021

## 2021-03-11 NOTE — OP NOTE
[facility]                     [address] [facility], [address] [facility]                                OPERATIVE REPORT    PATIENT NAME: Ericka Severino                     :        1940  MED REC NO:   [id number]                          ROOM:       8180  ACCOUNT NO:   [de-identified]                           ADMIT DATE: 2021  PROVIDER:     Nia Carroll MD    DATE OF PROCEDURE:  2021    PREOPERATIVE DIAGNOSES:  Right colon mass, chronic cholecystitis with  cholelithiasis. POSTOPERATIVE DIAGNOSES:  Right colon mass, chronic cholecystitis with  cholelithiasis. PROCEDURE:  Laparoscopic right colon resection and laparoscopic  cholecystectomy. SURGEON:  Nia Carroll MD    ANESTHESIA:  General endotracheal.    ESTIMATED BLOOD LOSS:  Less than 100 mL. OPERATIVE INDICATIONS AND CONSENT:  The patient is a 82-year-old female  who is on Brilinta because of recent cardiac catheterization. The  patient was noted to be anemic. Colonoscopy per Dr. Fred Nash showed a  right colon mass concerning for adenocarcinoma. The patient underwent a  CT scan of the abdomen and pelvis. This did not show any evidence of  metastatic disease. It did incidentally show gallstones. The plan  today is for laparoscopic right colon resection as well as  cholecystectomy. The patient was explained the risks, benefits and  possible complications including risk of bleeding, bowel injury, bile  duct injury or vascular injury to the liver. DETAILS OF THE PROCEDURE:  The patient was brought to the operative  suite and placed in a supine position on the operative table. After  general endotracheal anesthesia, she was prepped and draped in the usual  sterile fashion. We made a 5-mm transverse incision just above the umbilicus. A Veress  needle was passed into the peritoneal cavity, and after adequate  insufflation, a 5-mm Optiview trocar was placed at this site.   A 5-mm subxiphoid trocar was placed followed by a 5-mm trocar in the lower  midline. Lastly, we placed another 5-mm trocar in the left upper  quadrant of the abdomen. We began mobilizing the cecum and the right colon along the white line  of Toldt. The hepatic flexure was completely mobilized. We divided the  gastrocolic ligament from just the left of midline around to the  ascending colon. At this point in time, we had the right colon  completely mobilized. We marked a 5-cm incision in the right upper quadrant at the expected  extraction site. At the lateral aspect of this incision, we placed a  12-mm trocar and at the medial aspect of this incision, we placed a 5-mm  trocar. The patient was placed in reverse Trendelenburg and tilted toward the  left side. The gallbladder was identified. Via the two new ports, the  gallbladder was grasped. Dissection was undertaken in the hilar region. The cystic duct and cystic artery were both identified and dissected  free circumferentially. The cystic duct was triply clipped distally and  singly clipped proximally before it was divided. The cystic artery was  doubly clipped proximally, singly clipped distally and divided. The  gallbladder was taken off of the liver bed with Bovie electrocautery,  placed in an EndoCatch bag and then removed through the 12-mm trocar  site. Trocar was then reinserted. We then divided the terminal ileum approximately 5 cm proximal to the  ileocecal valve with a transverse firing of an Endo MELI 60-mm stapler. The mesentery to the terminal ileum was taken down using the LigaSure. The mesentery in the region of the ileocolonic vessels was somewhat  nodular and firm. This made exposure difficult. We did divide the  ileocolonic vessels using the LigaSure down near their base so we would  have adequate lymph node harvest.  We had some difficulty dissecting the  ascending colon mesentery because of exposure.     We connected the two incisions from the two trocar sites where the  extraction site was marked. This was increased about 3 cm laterally  giving us an incision which was about 8 cm in length. Dissection was  carried through the external abdominal oblique, rectus abdominus and  then posterior fascia and peritoneum. A medium Alvin retractor was  then placed at this site. We divided the remainder of the ascending  colon mesentery and mesentery to the proximal transverse colon in an  open fashion using the LigaSure. The transverse colon was then  dissected free circumferentially and divided with a transverse firing of  a MELI 75 green stapler load. On the terminal ileum, we cleared about 3  cm proximal to the previous staple line. The mesentery to this segment  was then divided. We placed a new stapler line across the small bowel  with a MELI 75 green stapler load. This was sent off as \"additional  small bowel segment. \"    We then cut the corners of the previously placed staple lines on the  terminal ileum and transverse colon. A MELI 75 stapler was fed into the  opening and fired creating a side-to-side anastomosis. The anastomosis  was completed with a last transverse firing of a MELI 75-mm green  stapler. The mesentery was left open. We placed 3-0 Vicryl sutures at  the apex of anastomosis. The junctions of the staple line were oversewn  with interrupted 3-0 Vicryl sutures. After careful inspection of the  anastomosis, it was placed back into the abdominal cavity. The extraction site incision was closed with a 0 Vicryl suture on the  posterior fascia and peritoneum. The external abdominal oblique was  closed with a running 0 loop PDS suture. The abdomen was then reinsufflated. We had excellent hemostasis at the  operative site. The anastomosis did lay comfortably in the right upper  quadrant. We checked to make sure that we had proper orientation of  both the colon and small bowel.   The omentum was then laid gently over  the anastomosis. The remaining trocars were removed under direct  visualization and the abdomen was de-insufflated. All the sites had been previously injected with 0.5% Marcaine with  epinephrine. The extraction site incision was painted with Betadine  before it was closed with interrupted 3-0 Vicryl sutures. All the skin  incisions were closed with running 4-0 subcuticular sutures. The skin  of the extraction site was closed with a Prineo dressing and then normal  Dermabond glue was placed on the laparoscopic incisions. The patient  tolerated the procedure without difficulty and was transferred to  recovery room in stable condition. Paul Hong.  Danita Sandoval MD    D: 03/10/2021 46:91:18       T: 03/10/2021 13:04:47     [email or web address]  Job#: [id number]     Doc#: [id number]    CC:

## 2021-03-11 NOTE — PROGRESS NOTES
Physical Therapy  Glynn Person     Upon entry, pt crying out in pain. Per Dr. Nnamdi Strong, hold therapy at this time as pt presents with increased abdominal pain.  Will re-attempt when pt medically appropriate as schedule allows.      Anselmo Sanchez, [address]

## 2021-03-12 ENCOUNTER — APPOINTMENT (OUTPATIENT)
Dept: GENERAL RADIOLOGY | Age: 81
DRG: 329 | End: 2021-03-12
Payer: MEDICARE

## 2021-03-12 LAB
ANION GAP SERPL CALCULATED.3IONS-SCNC: 11 MMOL/L (ref 3–16)
BASOPHILS ABSOLUTE: 0 K/UL (ref 0–0.2)
BASOPHILS RELATIVE PERCENT: 0.3 %
BUN BLDV-MCNC: 66 MG/DL (ref 7–20)
CALCIUM SERPL-MCNC: 8.6 MG/DL (ref 8.3–10.6)
CHLORIDE BLD-SCNC: 110 MMOL/L (ref 99–110)
CO2: 22 MMOL/L (ref 21–32)
CREAT SERPL-MCNC: 2.1 MG/DL (ref 0.6–1.2)
EOSINOPHILS ABSOLUTE: 0 K/UL (ref 0–0.6)
EOSINOPHILS RELATIVE PERCENT: 0.9 %
GFR AFRICAN AMERICAN: 28
GFR NON-AFRICAN AMERICAN: 23
GLUCOSE BLD-MCNC: 122 MG/DL (ref 70–99)
HCT VFR BLD CALC: 27 % (ref 36–48)
HEMOGLOBIN: 8.3 G/DL (ref 12–16)
LYMPHOCYTES ABSOLUTE: 0.6 K/UL (ref 1–5.1)
LYMPHOCYTES RELATIVE PERCENT: 16.7 %
MCH RBC QN AUTO: 23.1 PG (ref 26–34)
MCHC RBC AUTO-ENTMCNC: 30.6 G/DL (ref 31–36)
MCV RBC AUTO: 75.3 FL (ref 80–100)
MONOCYTES ABSOLUTE: 0.9 K/UL (ref 0–1.3)
MONOCYTES RELATIVE PERCENT: 25.1 %
NEUTROPHILS ABSOLUTE: 2.1 K/UL (ref 1.7–7.7)
NEUTROPHILS RELATIVE PERCENT: 57 %
PDW BLD-RTO: 26.3 % (ref 12.4–15.4)
PLATELET # BLD: 257 K/UL (ref 135–450)
PMV BLD AUTO: 8.3 FL (ref 5–10.5)
POTASSIUM SERPL-SCNC: 4.5 MMOL/L (ref 3.5–5.1)
RBC # BLD: 3.59 M/UL (ref 4–5.2)
SODIUM BLD-SCNC: 143 MMOL/L (ref 136–145)
WBC # BLD: 3.7 K/UL (ref 4–11)

## 2021-03-12 PROCEDURE — APPNB30 APP NON BILLABLE TIME 0-30 MINS: Performed by: NURSE PRACTITIONER

## 2021-03-12 PROCEDURE — 6360000002 HC RX W HCPCS: Performed by: SURGERY

## 2021-03-12 PROCEDURE — 99233 SBSQ HOSP IP/OBS HIGH 50: CPT | Performed by: NURSE PRACTITIONER

## 2021-03-12 PROCEDURE — 97530 THERAPEUTIC ACTIVITIES: CPT

## 2021-03-12 PROCEDURE — APPSS15 APP SPLIT SHARED TIME 0-15 MINUTES: Performed by: NURSE PRACTITIONER

## 2021-03-12 PROCEDURE — 85025 COMPLETE CBC W/AUTO DIFF WBC: CPT

## 2021-03-12 PROCEDURE — 97116 GAIT TRAINING THERAPY: CPT

## 2021-03-12 PROCEDURE — 6370000000 HC RX 637 (ALT 250 FOR IP): Performed by: NURSE PRACTITIONER

## 2021-03-12 PROCEDURE — 6360000002 HC RX W HCPCS: Performed by: NURSE PRACTITIONER

## 2021-03-12 PROCEDURE — 2580000003 HC RX 258: Performed by: INTERNAL MEDICINE

## 2021-03-12 PROCEDURE — 2060000000 HC ICU INTERMEDIATE R&B

## 2021-03-12 PROCEDURE — 99024 POSTOP FOLLOW-UP VISIT: CPT | Performed by: SURGERY

## 2021-03-12 PROCEDURE — 2580000003 HC RX 258: Performed by: NURSE PRACTITIONER

## 2021-03-12 PROCEDURE — 80048 BASIC METABOLIC PNL TOTAL CA: CPT

## 2021-03-12 PROCEDURE — 71045 X-RAY EXAM CHEST 1 VIEW: CPT

## 2021-03-12 PROCEDURE — 6360000002 HC RX W HCPCS: Performed by: INTERNAL MEDICINE

## 2021-03-12 PROCEDURE — 36415 COLL VENOUS BLD VENIPUNCTURE: CPT

## 2021-03-12 RX ORDER — FUROSEMIDE 10 MG/ML
INJECTION INTRAMUSCULAR; INTRAVENOUS
Status: DISPENSED
Start: 2021-03-12 | End: 2021-03-13

## 2021-03-12 RX ORDER — LIDOCAINE HYDROCHLORIDE 20 MG/ML
JELLY TOPICAL ONCE
Status: DISCONTINUED | OUTPATIENT
Start: 2021-03-12 | End: 2021-03-16 | Stop reason: HOSPADM

## 2021-03-12 RX ORDER — FUROSEMIDE 10 MG/ML
40 INJECTION INTRAMUSCULAR; INTRAVENOUS ONCE
Status: COMPLETED | OUTPATIENT
Start: 2021-03-12 | End: 2021-03-12

## 2021-03-12 RX ORDER — METOPROLOL TARTRATE 5 MG/5ML
5 INJECTION INTRAVENOUS EVERY 8 HOURS PRN
Status: DISCONTINUED | OUTPATIENT
Start: 2021-03-12 | End: 2021-03-16 | Stop reason: HOSPADM

## 2021-03-12 RX ORDER — AMIODARONE HYDROCHLORIDE 200 MG/1
200 TABLET ORAL 3 TIMES DAILY
Status: DISCONTINUED | OUTPATIENT
Start: 2021-03-12 | End: 2021-03-16 | Stop reason: HOSPADM

## 2021-03-12 RX ORDER — OXYMETAZOLINE HYDROCHLORIDE 0.05 G/100ML
2 SPRAY NASAL ONCE
Status: DISPENSED | OUTPATIENT
Start: 2021-03-12 | End: 2021-03-15

## 2021-03-12 RX ADMIN — Medication 10 ML: at 09:00

## 2021-03-12 RX ADMIN — AMIODARONE HYDROCHLORIDE 200 MG: 200 TABLET ORAL at 22:02

## 2021-03-12 RX ADMIN — ONDANSETRON HYDROCHLORIDE 4 MG: 2 INJECTION, SOLUTION INTRAMUSCULAR; INTRAVENOUS at 09:29

## 2021-03-12 RX ADMIN — AMIODARONE HYDROCHLORIDE 0.5 MG/MIN: 50 INJECTION, SOLUTION INTRAVENOUS at 06:28

## 2021-03-12 RX ADMIN — FUROSEMIDE 40 MG: 10 INJECTION, SOLUTION INTRAMUSCULAR; INTRAVENOUS at 10:11

## 2021-03-12 RX ADMIN — HYDROMORPHONE HYDROCHLORIDE 1 MG: 1 INJECTION, SOLUTION INTRAMUSCULAR; INTRAVENOUS; SUBCUTANEOUS at 01:17

## 2021-03-12 ASSESSMENT — PAIN DESCRIPTION - LOCATION: LOCATION: ABDOMEN

## 2021-03-12 ASSESSMENT — PAIN SCALES - GENERAL
PAINLEVEL_OUTOF10: 5
PAINLEVEL_OUTOF10: 0

## 2021-03-12 NOTE — PROGRESS NOTES
Physician Progress Note      PATIENT:               Azael Milian  CSN #:                  [id number]  :                       1940  ADMIT DATE:       3/3/2021 9:55 AM  DISCH DATE:  RESPONDING  PROVIDER #:        Connor Gilmore MD          QUERY TEXT:    Pt admitted with Colon Cancer S/P Right Colectomy on 3/8/21. Noted to remain   on O2 at 2L since surgery. Please document if you are treating the following   in progress notes and discharge summary:    The medical record reflects the following:  Risk Factors: Pleural Effusion and Atelectasis  Clinical Indicators: Per CT \"? Increased size of mild-moderate right pleural   effusion with increased adjacent compressive atelectasis of the right lung   base. \" Since 3/8- post op-pt remains on 2L. ? Treatment: Remains on O2 x 3 days post op, Pulmonary toilet, IS  Options provided:  -- Acute Postoperative Pulmonary Insufficiency due to pleural effusion and   atelectasis  -- No evidence of Acute Postoperative Pulmonary Insufficiency  -- Other - I will add my own diagnosis  -- Disagree - Not applicable / Not valid  -- Disagree - Clinically unable to determine / Unknown  -- Refer to Clinical Documentation Reviewer    PROVIDER RESPONSE TEXT:    Patient has acute postoperative pulmonary insufficiency due to pleural   effusion and atelectasis.     Query created by: Antonietta Frazier on 3/11/2021 2:55 PM      Electronically signed by:  Connor Gilmore MD 3/12/2021 8:04 AM

## 2021-03-12 NOTE — PROGRESS NOTES
[facility]   Progress Note      3/12/2021 5:50 PM        Name: Veronika Schaffer . Admitted: 3/3/2021    SUBJECTIVE:      Events noted. Has a nasogastric tube.   Has some abdominal pain    Reviewed interval ancillary notes    Current Medications  amiodarone (CORDARONE) tablet 200 mg, TID  oxymetazoline (AFRIN) 0.05 % nasal spray 2 spray, Once  lidocaine (XYLOCAINE) 2 % jelly, Once  phenol 1.4 % mouth spray 1 spray, Q2H PRN  metoprolol (LOPRESSOR) injection 5 mg, Q8H PRN  furosemide (LASIX) 10 MG/ML injection,   furosemide (LASIX) 10 MG/ML injection,   sodium chloride flush 0.9 % injection 10 mL, 2 times per day  sodium chloride flush 0.9 % injection 10 mL, PRN  sodium chloride flush 0.9 % injection 10 mL, PRN  enoxaparin (LOVENOX) injection 40 mg, Daily  0.9 % sodium chloride infusion, Continuous  HYDROmorphone (DILAUDID) injection 1 mg, Q3H PRN  aspirin EC tablet 81 mg, Daily  perflutren lipid microspheres (DEFINITY) injection 1.65 mg, ONCE PRN  0.9 % sodium chloride infusion, PRN  carvedilol (COREG) tablet 3.125 mg, BID WC  rosuvastatin (CRESTOR) tablet 20 mg, Nightly  potassium chloride (KLOR-CON M) extended release tablet 40 mEq, PRN    Or  potassium bicarb-citric acid (EFFER-K) effervescent tablet 40 mEq, PRN    Or  potassium chloride 10 mEq/100 mL IVPB (Peripheral Line), PRN  promethazine (PHENERGAN) tablet 12.5 mg, Q6H PRN    Or  ondansetron (ZOFRAN) injection 4 mg, Q6H PRN  magnesium hydroxide (MILK OF MAGNESIA) 400 MG/5ML suspension 30 mL, Daily PRN  famotidine (PEPCID) tablet 20 mg, Daily  acetaminophen (TYLENOL) tablet 650 mg, Q6H PRN    Or  acetaminophen (TYLENOL) suppository 650 mg, Q6H PRN  hydrALAZINE (APRESOLINE) injection 10 mg, Q6H PRN  0.9 % sodium chloride bolus, PRN  0.9 % sodium chloride infusion, PRN        Objective:  /69   Pulse 94   Temp 97.5 °F (36.4 °C) (Oral)   Resp 16   Ht 5' (1.524 m)   Wt 229 lb 8 oz (104.1 kg)   SpO2 97%   BMI 44.82 kg/m² Intake/Output Summary (Last 24 hours) at 3/12/2021 1750  Last data filed at 3/12/2021 1730  Gross per 24 hour   Intake 10 ml   Output 3350 ml   Net -3340 ml      Wt Readings from Last 3 Encounters:   03/12/21 229 lb 8 oz (104.1 kg)   03/01/21 235 lb 9.6 oz (106.9 kg)   11/30/20 229 lb (103.9 kg)       Conscious alert oriented. No pallor or icterus  Respiratory efforts are normal.  Abdomen is not distended. Extremities no edema  Neuro no focal deficits noted    Labs and Tests:  CBC:   Recent Labs     03/10/21  0512 03/11/21  0445 03/12/21  0506   WBC 11.5* 7.2 3.7*   HGB 8.5* 8.6* 8.3*    249 257     BMP:    Recent Labs     03/10/21  0512 03/11/21  0445 03/12/21  0506    144  140 143   K 4.9 5.3*  5.2* 4.5    112*  109 110   CO2 24 20*  22 22   BUN 44* 62*  65* 66*   CREATININE 1.6* 2.4*  2.3* 2.1*   GLUCOSE 123* 113*  114* 122*     Hepatic:   Recent Labs     03/10/21  0512 03/11/21  0445   AST 21 16   ALT 9* 8*   BILITOT 0.6 0.5   ALKPHOS 73 79       ASSESSMENT AND PLAN    Principal Problem:    Anemia  Active Problems:    Hypertension    CAD (coronary artery disease)    High cholesterol    Diastolic dysfunction    Hypokalemia    Malignant neoplasm of ascending colon (HCC)    Atrial fibrillation (HCC)    Colonic mass  Resolved Problems:    * No resolved hospital problems. *      Ascending colon cancer   -Colon mass found on colonoscopy 3/5/2021  -s/p right hemicolectomy 3/8/21  -CEA is normal and CT scans were negative for metastatic disease.  -Pathology consistent with invasive colonic adenocarcinoma, 1/16 lymph node involvement.    -Plan will be for chemotherapy as an outpatient.   -Diagnosis and treatment discussed with patient's daughter, Zaria.        Anemia  - Iron deficiency due to GI blood loss. - S/p 3 doses of Venofer on 3/6/21.  - Vit B12 wnl.  - hgb 8.3 today. No indication to transfuse.         CAD  - s/p stent placement       YESENIA: Renal following.     Postop nausea and vomiting, Chronic cholecystitis: Managed by surgery    Pawan Crenshaw MD

## 2021-03-12 NOTE — PROGRESS NOTES
Comprehensive Nutrition Assessment    Type and Reason for Visit:  Reassess    Nutrition Recommendations/Plan:   1. Continue NPO status per Surgery  2. RD to monitor; once diet advanced RD to order supplement- Magic cups    Nutrition Assessment:  Pt is s/p lap renu with right colon resection. Pt currently NPO for the past 3 days following surgery d/t pt continuing to have n/v. Pt to remain NPO and have NGT placed for decompression. Pt is wanting food, RD explained pt is NPO. Pt is interested in a supplement. RD to order Magic cups once diet is advanced per surgery. RD to continue to monitor. Malnutrition Assessment:  Malnutrition Status: At risk for malnutrition (Comment)    Context:  Acute Illness     Findings of the 6 clinical characteristics of malnutrition:  Energy Intake:  Mild decrease in energy intake (Comment)(d/t NPO status following surgery)  Weight Loss:  No significant weight loss     Body Fat Loss:  No significant body fat loss     Muscle Mass Loss:  No significant muscle mass loss    Fluid Accumulation:  1 - Mild Extremities   Strength:  Not Performed    Estimated Daily Nutrient Needs:  Energy (kcal):  832-1560 (8-15 cals/104kg); Weight Used for Energy Requirements:  Current     Protein (g):  90 gms (2.0gms/45kg); Weight Used for Protein Requirements:  Ideal        Fluid (ml/day):   ; Method Used for Fluid Requirements:  1 ml/kcal      Nutrition Related Findings:  Generalized +1, BLE trace.  I/O's: -4.7L      Wounds:  Surgical Incision       Current Nutrition Therapies:    No diet orders on file    Anthropometric Measures:  · Height: 5' (152.4 cm)  · Current Body Weight: 229 lb (103.9 kg)   · Ideal Body Weight: 100 lbs; % Ideal Body Weight 220 %   · BMI: 44.7  · BMI Categories: Obese Class 3 (BMI 40.0 or greater)       Nutrition Diagnosis:   · Increased nutrient needs related to increase demand for energy/nutrients as evidenced by wounds      Nutrition Interventions:   Food and/or Nutrient Delivery:  Continue NPO  Nutrition Education/Counseling:  Education needed   Coordination of Nutrition Care:  Continue to monitor while inpatient    Goals:  PO intake will continue to be greater than 50% of meals       Nutrition Monitoring and Evaluation:   Behavioral-Environmental Outcomes:  None Identified   Food/Nutrient Intake Outcomes:  Diet Advancement/Tolerance, Food and Nutrient Intake, Supplement Intake  Physical Signs/Symptoms Outcomes:  Weight, Fluid Status or Edema, Nausea or Vomiting     Discharge Planning:     Too soon to determine     Electronically signed by Vicky Hernandez RD, CNSC, LD on 3/12/21 at 3:18 PM EST    Contact: [phone]

## 2021-03-12 NOTE — ADT AUTH CERT
Anemia, Iron Deficiency or Unspecified - Care Day 8 (3/10/2021) by Abelino López RN       Review Status Review Entered   Completed 3/12/2021 15:44      Criteria Review      Care Day: 8 Care Date: 3/10/2021 Level of Care: Inpatient Floor    Guideline Day 2    Level Of Care    (X) Floor to discharge    3/12/2021 3:44 PM EST by Aguilar Ramírez      MS w/ tele    Clinical Status    (X) * Hemodynamic stability    3/12/2021 3:44 PM EST by Aguilar Ramírez      O2 @ 1.5-2L/min    (X) * Mental status at baseline    (X) * Active blood loss absent    (X) * Signs and symptoms of anemia absent or improved    (X) * Hgb/Hct level stable and acceptable for next level of care    3/12/2021 3:44 PM EST by Aguilar Ramírez      3/10/2021 05:12  Hemoglobin Quant: 8.5 (L)  Hematocrit: 28.5 (L)    ( ) * Etiology of anemia requiring inpatient care absent    ( ) * Discharge plans and education understood    Activity    (X) * Ambulatory or acceptable for next level of care [G]    Routes    ( ) * Oral hydration, medications, and diet    3/12/2021 3:44 PM EST by Aguilar Ramírez      NPO    Interventions    (X) Hgb/Hct    3/12/2021 3:44 PM EST by Aguilar Ramírez      3/10/2021 05:12  WBC: 11.5 (H)  RBC: 3.68 (L)  Hemoglobin Quant: 8.5 (L)  Hematocrit: 28.5 (L)    Medications    (X) Possible iron or micronutrients    (X) Possible hemopoietic stimulating agents    * Milestone   Additional Notes   3/10      IM      Current Diet: Diet NPO Effective Now Exceptions are: Ice Chips, Sips of Clear Liquids, Sips of Water with Meds   She complains of new  problems       Pt had bilious emesis this am   Found to be in afib   Has felt better since emesis      BP (!) 114/54   Pulse 94   Temp 98.1 °F (36.7 °C) (Oral)   Resp 20   Ht 5' (1.524 m)   Wt 220 lb 7.4 oz (100 kg)   SpO2 94%   BMI 43.06 kg/m²    General appearance: alert, appears stated age and cooperative   Head: Normocephalic, without obvious abnormality, atraumatic   Lungs: clear to is doing better today, her pain is better controlled, but she is having issues with nausea. She is currently in sinus rhythm, but had brief episodes of PAF overnight. Her kidney function worsened today. Waiting on biopsy results. Assessment and Plan:        1. Paroxysmal Atrial Fibrillation   - Currently in NSR   - Continue coreg 3.125 mg BID               ~ Limited AAD options, may consider adding amiodarone to help with rhythm control is recurrence       - [id number] score:5 ; [id number] Vasc score and anticoagulation discussed. High risk for stroke and thromboembolism. Anticoagulation is recommended. Risk of bleeding was discussed. ~ Will need to start [address] once ok with surgical team and Hgb stable (>10). May need to wait until outpatient       - Afib risk factors including age, HTN, obesity, inactivity and SAHRA were discussed with patient. Risk factor modification recommended               ~ TSH 2.06 (3/4/21)                   - Treatment options including cardioversion, rate control strategy, antiarrhythmics, anticoagulation and possible ablation were discussed with patient. Risks, benefits and alternative of each treatment options were explained. All questions answered                   - Consider event monitor at d/c to assess AF burden               - May consider for SAIDA ligation with Watchman device if she has recurrent bleeding issues long term       2. Bradycardia, At Risk for Sleep Apnea               - Noted at night time while sleeping               - Overnight pulse oximetry notes desaturations                           ~ Will benefit from outpatient sleep study       3. LBBB with aberrancy               - Noted on EKG               - No recurrence       4. Colon Mass               - S/p hemicolectomy surgery (3/8/21)               - General surgery following       5.  CAD   - Hx of PCI to LAD (11/20)   - Stable   - No complaints of angina   - Continue ASA, ACEI, BB, and statin                        -----------------   Results for Jolie Garcia (MRN [id number]) as of 3/12/2021 15:44      3/10/2021 05:12   Sodium: 139   Potassium: 4.9   Chloride: 109   CO2: 24   BUN: 44 (H)   Creatinine: 1.6 (H)   Anion Gap: 6   GFR Non-: 31 (A)   GFR : 38 (A)   Glucose: 123 (H)   Calcium: 8.5   Total Protein: 6.1 (L)   Albumin: 3.0 (L)   Globulin: 3.1   Albumin/Globulin Ratio: 1.0 (L)   Alk Phos: 73   ALT: 9 (L)   AST: 21   Bilirubin: 0.6   WBC: 11.5 (H)   RBC: 3.68 (L)   Hemoglobin Quant: 8.5 (L)   Hematocrit: 28.5 (L)   MCV: 77.6 (L)   MCH: 23.2 (L)   MCHC: 29.9 (L)   MPV: 8.0   RDW: 25.8 (H)   Platelet Count: 836   Neutrophils %: 86.9   Lymphocyte %: 6.1   Monocytes %: 6.7   Eosinophils %: 0.2   Basophils %: 0.1   Neutrophils Absolute: 10.0 (H)   Lymphocytes Absolute: 0.7 (L)   Monocytes Absolute: 0.8   Eosinophils Absolute: 0.0   Basophils Absolute: 0.0      3/10/2021 08:52   Troponin: <0.01   --------------------      3/10/2021 06:53   Atrial Rate: 136   Diagnosis: Atrial fibrillation with rapid ventricular responseLeft bundle branch block     Q-T Interval: 292   QRS Duration: 132   QTc Calculation (Bazett): 439   R Axis: -23   T Axis: 136   Ventricular Rate: 136   -------------------   Scheduled Medications   · enoxaparin 40 mg Subcutaneous Daily   · metroNIDAZOLE 500 mg Intravenous Q8H   · acetaminophen 1,000 mg Oral 3 times per day   · aspirin 81 mg Oral Daily   · lisinopril 2.5 mg Oral Daily   · carvedilol 3.125 mg Oral BID WC   · rosuvastatin 20 mg Oral Nightly   · sodium chloride flush 10 mL Intravenous 2 times per day   · famotidine 20 mg Oral Daily       0.9 % sodium chloride infusion Rate: 125 mL/hr       PRN   HYDROmorphone (DILAUDID) injection 1 mg x4   ondansetron (ZOFRAN) injection 4 mg x2   sodium chloride flush 0.9 % injection 10 mL x2

## 2021-03-12 NOTE — PROGRESS NOTES
[facility]   Electrophysiology Progress Note     Date: 3/12/2021  Admit Date: 3/3/2021     Reason for consultation: Atrial fibrillation    Chief Complaint:   Chief Complaint   Patient presents with    Abnormal Lab     pt sent in by her MD for low H/H       History of Present Illness: History obtained from patient and medical record. Heaven Mohr is a 68 y.o. female with a past medical history of HTN, HLD, CKD< FIDEL, and CAD. Pt presented to hospital by recommendation of her PCP for anemia. Her hemoglobin was 5.8 and was she received multiple units of blood. GI completed colonoscopy/EGD with no gross bleeding found, however, she was found to have a mass in her colon. She had a recent stent placed and has been on ASA/Brilenta. She was found to be in AF with RVR on admission, thus EP was consulted    Interval Hx: Today, she is being seen for follow up. She feels better today, but is still having some issues with nausea at times. She has converted back to sinus rhythm. Her BP has improved. She is making adequate UO with improving kidney function. Her H/H remains low, but stable    Patient seen and examined. Clinical notes reviewed. Telemetry reviewed. No new complaints today. No major events overnight. Denies having chest pain, palpitations, shortness of breath, orthopnea/PND, cough, or dizziness at the time of this visit. Allergies:  No Known Allergies    Home Meds:  Prior to Visit Medications    Medication Sig Taking? Authorizing Provider   furosemide (LASIX) 20 MG tablet Take 1 tablet by mouth daily as needed (swelling or shortness of breath) Yes JIMENA Mendoza CNP   HYDROcodone-acetaminophen (NORCO) 5-325 MG per tablet Take 1 tablet by mouth 2 times daily.  Yes Historical Provider, MD   rosuvastatin (CRESTOR) 20 MG tablet Take 1 tablet by mouth nightly Yes JIMENA Mendoza CNP   aspirin 81 MG chewable tablet Take 1 tablet by mouth daily Yes JIMENA Mendoza CNP lisinopril (PRINIVIL;ZESTRIL) 2.5 MG tablet Take 1 tablet by mouth daily Yes Cayden Ordonez APRN - CNP   carvedilol (COREG) 3.125 MG tablet Take 1 tablet by mouth 2 times daily (with meals) Yes Cayden Ordonez, APRN - CNP   ticagrelor (BRILINTA) 90 MG TABS tablet Take 1 tablet by mouth 2 times daily Yes Cayden Odronez, APRN - CNP      Scheduled Meds:   furosemide  40 mg Intravenous Once    sodium chloride flush  10 mL Intravenous 2 times per day    enoxaparin  40 mg Subcutaneous Daily    aspirin  81 mg Oral Daily    [Held by provider] carvedilol  3.125 mg Oral BID WC    rosuvastatin  20 mg Oral Nightly    famotidine  20 mg Oral Daily     Continuous Infusions:   amiodarone 0.5 mg/min (03/12/21 4205)    sodium chloride Stopped (03/11/21 1856)    sodium chloride      sodium chloride       PRN Meds:sodium chloride flush, sodium chloride flush, HYDROmorphone, perflutren lipid microspheres, sodium chloride, potassium chloride **OR** potassium alternative oral replacement **OR** potassium chloride, promethazine **OR** ondansetron, magnesium hydroxide, acetaminophen **OR** acetaminophen, hydrALAZINE, sodium chloride, sodium chloride     Past Medical History:  Past Medical History:   Diagnosis Date    Anemia     CAD (coronary artery disease) 11/2020    Stent    CKD (chronic kidney disease)     Hyperlipidemia     Hypertension       Past Surgical History:    has a past surgical history that includes fracture surgery; Cystocopy (11/21/13); Upper gastrointestinal endoscopy (N/A, 3/5/2021); Colonoscopy (N/A, 3/5/2021); hemicolectomy (Right, 3/8/2021); and Cholecystectomy, laparoscopic (N/A, 3/8/2021). Social History:  Reviewed. reports that she has never smoked. She has never used smokeless tobacco. She reports that she does not drink alcohol or use drugs. Family History:  Reviewed. family history includes Heart Disease in her father.      Review of Systems:  · Constitutional: Negative for fever, night sweats, chills, weight changes, or weakness  · Skin: Negative for rash, dry skin, pruritus, bruising, bleeding, blood clots, or changes in skin pigment  · HEENT: Negative for vision changes, ringing in the ears, sore throat, dysphagia, or swollen lymph nodes  · Respiratory: Reviewed in HPI  · Cardiovascular: Reviewed in HPI  · Gastrointestinal: Positive for N/V and abdominal pain. · Genito-Urinary: Negative for dysuria, incontinence, urgency, or hematuria  · Musculoskeletal: Positive for weakness. Negative for joint swelling, muscle pain, or injuries  · Neurological/Psych: Negative for confusion, seizures, headaches, balance issues or TIA-like symptoms. No anxiety, depression, or insomnia    Physical Examination:  Vitals:    03/12/21 0800   BP: (!) 148/65   Pulse: 82   Resp: 20   Temp: 98.5 °F (36.9 °C)   SpO2: 98%      In: 10 [I.V.:10]  Out: 2350    Wt Readings from Last 3 Encounters:   03/12/21 229 lb 8 oz (104.1 kg)   03/01/21 235 lb 9.6 oz (106.9 kg)   11/30/20 229 lb (103.9 kg)       Intake/Output Summary (Last 24 hours) at 3/12/2021 1737  Last data filed at 3/12/2021 0547  Gross per 24 hour   Intake 10 ml   Output 2350 ml   Net -2340 ml       Telemetry: Personally Reviewed  - NSR  · Constitutional: Cooperative and in moderate distress  · Skin: Warm and pink; no pallor, cyanosis, bruising, or clubbing  · HEENT: Symmetric and normocephalic. PERRL, EOM intact. Conjunctiva pink with clear sclera. Mucus membranes pink and moist. Teeth intact. Thyroid smooth without nodules or goiter. · Cardiovascular: Regular rate and rhythm. S1/S2 present without murmurs, rubs, or gallops. Peripheral pulses 2+, capillary refill < 3 seconds. No elevation of JVP. No peripheral edema  · Respiratory: Respirations symmetric and unlabored. Lungs diminished to auscultation bilaterally, no wheezing, crackles, or rhonchi. On 2L of oxygen  · Gastrointestinal: Abdomen soft and round.  Bowel sounds normoactive in all quadrants without tenderness or masses. · Musculoskeletal: Bilateral upper and lower extremity strength 5/5 with full ROM  · Neurologic/Psych: Awake and orientated to person, place and time. Calm affect, appropriate mood    Pertinent labs, diagnostic, device, and imaging results reviewed as a part of this visit    Labs:    BMP:   Recent Labs     03/10/21  0512 03/11/21  0445 03/12/21  0506    144  140 143   K 4.9 5.3*  5.2* 4.5    112*  109 110   CO2 24 20*  22 22   BUN 44* 62*  65* 66*   CREATININE 1.6* 2.4*  2.3* 2.1*     Estimated Creatinine Clearance: 24 mL/min (A) (based on SCr of 2.1 mg/dL (H)). CBC:   Recent Labs     03/10/21  0512 03/11/21  0445 03/12/21  0506   WBC 11.5* 7.2 3.7*   HGB 8.5* 8.6* 8.3*   HCT 28.5* 30.0* 27.0*   MCV 77.6* 79.5* 75.3*    249 257     Thyroid: No results found for: TSH, E6MGURB, F1CNQCE, THYROIDAB  Lipids:   Lab Results   Component Value Date    CHOL 155 11/21/2020    HDL 38 11/21/2020    TRIG 121 11/21/2020     LFTS:   Lab Results   Component Value Date    ALT 8 03/11/2021    AST 16 03/11/2021    ALKPHOS 79 03/11/2021    PROT 6.1 03/11/2021    AGRATIO 0.9 03/11/2021    BILITOT 0.5 03/11/2021     Cardiac Enzymes:   Lab Results   Component Value Date    CKTOTAL 41 03/11/2021    TROPONINI <0.01 03/10/2021    TROPONINI 0.03 11/20/2020     Coags:   Lab Results   Component Value Date    PROTIME 12.7 03/08/2021    INR 1.09 03/08/2021       ECG: 3/6/21  Atrial fibrillation    ECHO: 3/6/21   Normal left ventricle size, and systolic function with an estimated ejection fraction of 55-60%. Mild concentric left ventricular hypertrophy is present. No regional wall motion abnormalities are seen. Mild tricuspid regurgitation, RVSP is estimated at 38 mmhg.     Stress Test: None    Cath: 11/20/20  Findings:  Artery Findings/Result  LM Normal  LAD 95% mid  Cx OM2 50%  RI NA  RCA 20% prox, 20% mid  LVEDP 25  LVG 55%     Intervention:         PCI of LAD with 3.5V97Yeojid REBECA (PD with 3. 75NC)     Post Cath Dx:       Severe , nonobstructive otherwise    CT Chest: 3/5/21  Within the chest, small mediastinal nodes are seen, slightly increased   compared to prior, either reactive or metastatic.  Small pleural effusions   are seen, compatible with mild fluid overload.       Stable lobular pulmonary nodule in the left lower lobe.       Nodular wall thickening of the colon on the right, compatible with the given   history of colon mass.  Small mesenteric node is seen in this region,, likely   early desiree metastasis       Increase in size of ovarian-adnexal masses on the right and left.  Consider   pelvic ultrasound for further characterization.       Tiny nonobstructing right renal stone     NM Biliary: 3/11/21  No evident bile leak following cholecystectomy.  Slight reduction in the   amount of subhepatic fluid present since the earlier CT study 5 hours ago.         CT Abd: 3/11/21  New fluid collection in the gallbladder fossa with adjacent small amount of   fluid around the liver.  This may be residual fluid from the recent surgery   or related to biliary leak.  Consider HIDA scan evaluation       There are 2-3 tiny gallstones within the gallbladder fossa fluid collection,   measuring 3 mm and less in size.       Increased size of mild-moderate right pleural effusion with increased   adjacent compressive atelectasis of the right lung base. Problem List:   Patient Active Problem List    Diagnosis Date Noted    Atrial fibrillation ([facility] [address] 03/10/2021    Colonic mass     Diastolic dysfunction     Hypokalemia 2021    Malignant neoplasm of ascending colon ([facility] [address] 2021    Anemia 2021    CAD (coronary artery disease) 2021    High cholesterol 2021    Hypertension 01/10/2014        Assessment and Plan:     1.  Paroxysmal Atrial Fibrillation  - Currently in AF, rate fairly well controlled  - Resume coreg once able to take PO  - Limited AAD options given marginal BP and CAD: Stop amiodarone gtt and start PO loadin mg TID x10 days, then 200 mg daily    - [id number] score:5 ; [id number] Vasc score and anticoagulation discussed. High risk for stroke and thromboembolism. Anticoagulation is recommended. Risk of bleeding was discussed.  ~ No AC due to anemia and possible need for surgery, however, she will need to start [address] once ok with surgical team and Hgb stable (>10). May need to wait until outpatient    - Afib risk factors including age, HTN, obesity, inactivity and SAHRA were discussed with patient. Risk factor modification recommended     - Poor candidate for invasive procedures due to acute issues and inability to take [facility]   - May consider for SAIDA ligation with Watchman device if she has recurrent bleeding issues long term    2. Bradycardia, At Risk for Sleep Apnea   - Noted at night time while sleeping   - Overnight pulse oximetry notes desaturations    ~ Will benefit from outpatient sleep study    3. LBBB with aberrancy   - Noted on EKG   - No recurrence    4. Colon Mass, Abd pain   - S/p hemicolectomy surgery (3/8/21)   - Worsening pain with N/V; concern for ileus   - CT with GB fluid   - Management per general surgery    5. CAD  - Hx of PCI to LAD ()  - Stable  - No complaints of angina  - Resume medical therapy once able to take PO    ~ Brilinta on hold (Discussed with Dr. Grisel Sarmiento; will stop with initiation of anti-coagulant)    6. HTN  - Stable  - Hold BP meds for now    7. Anemia   - Stable but low s/p surgery    ~ 8.3 this AM    - No recurrent bleeding   - Continue to monitor closely    ~ Recommend unit of blood if Hgb <8 given her cardiac issues)   - Hem/onco following    8. YESENIA, hyperkalemia   - Stable, but elevated    ~ Creatinine 2.1/BUN 66   - Hold ACEi   - Monitor UO   - Nephrology following    Multiple medical conditions with risk of decompensation. All pertinent information and plan of care discussed with the EP physician.     All questions and concerns were addressed to the patient. Alternatives to my treatment were discussed. I have discussed the above stated plan with patient and the nurse. The patient verbalized understanding and agreed with the plan. The patient was seen for >35 minutes. I reviewed interval history, physical exam, review of data including labs, imaging, development and implementation of treatment plan and coordination of complex care. Thank you for allowing to us to participate in the care of [address] [place] [address].     JIMENA Sanchez-CNP  [address]   Office: [phone]

## 2021-03-12 NOTE — PROGRESS NOTES
Nephrology Progress Note  [phone]   [email or web address]        Reason for Consult:  YESENIA    HISTORY OF PRESENT ILLNESS:                This is a patient with significant past medical history of YESENIA in 2015, peak Scr 1.8, HTN, CAD-s/p recent stent on ASA and Brilinta. HTN, dCHF, [email or web address],  who was sent to ER for anemia, hgb was 5.8, s/p PRBC. She had A.fib with RVR at admission and EP consulted. She had EGD/colonoscopy done which revealed mass, CT scan C/A/P with contrast was negative for metastatic disease. She underwent laparoscopic right hemicolectomy on 3/8/21. EBL minimal.  She has received IV lasix on 3/4-ketorolac on 3/9, on lisinopril,-stopped on 3/9  -hospital course complicated by YESENIA - Scr gradually trending up, 1.1-->1.6-->2.3, harris removed yesterday-incontinent of urine  -had hypotension due to dilaudid, given NS bolus  -has N/V-had vomiting today, plan for CT  -c/o nausea and abdominal discomfort  -she is feeling anxious and treaful  -denies fever/chill/CP  -c/o SOB that started today  -UO is declining      Subjective:  -pt seen and examined  -PMSHx and meds reviewed  -No family at bedside    Events noted  S/p lasix-UO improved, Scr improving  Feels better today  BP stable    ROS: feels tired/denies SOB/abodminal pain      PHYSICAL EXAM:    Vitals:    BP (!) 115/56   Pulse 79   Temp 97.9 °F (36.6 °C) (Axillary)   Resp 18   Ht 5' (1.524 m)   Wt 229 lb 8 oz (104.1 kg)   SpO2 96%   BMI 44.82 kg/m²   I/O last 3 completed shifts: In: 10 [I.V.:10]  Out: 2350 [Urine:2350]  No intake/output data recorded. Physical Exam:  Gen: anxious, tearful  HEENT: anicteric, dry mouth, poor dentition  CV: irregular, +S1/S2. Lungs: coarse BS, mild tachypnea  Abd: distended, decreased BS, laparoscopic incision C/D/I  Ext: trace edema, no cyanosis  Skin: Warm. No rashes appreciated. : No TTP over bladder,  Neuro: Alert and oriented x 2, nonfocal.  Joints: No erythema noted over joints.     DATA: CBC:   Lab Results   Component Value Date    WBC 3.7 03/12/2021    RBC 3.59 03/12/2021    HGB 8.3 03/12/2021    HCT 27.0 03/12/2021    MCV 75.3 03/12/2021    MCH 23.1 03/12/2021    MCHC 30.6 03/12/2021    RDW 26.3 03/12/2021     03/12/2021    MPV 8.3 03/12/2021     BMP:    Lab Results   Component Value Date     03/12/2021    K 4.5 03/12/2021    K 3.7 03/04/2021     03/12/2021    CO2 22 03/12/2021    BUN 66 03/12/2021    LABALBU 2.9 03/11/2021    CREATININE 2.1 03/12/2021    CALCIUM 8.6 03/12/2021    GFRAA 28 03/12/2021    LABGLOM 23 03/12/2021    GLUCOSE 122 03/12/2021       IMPRESSION/RECOMMENDATIONS:      YESENIA on CKD stage 3: recurrent baseline Scr 1.1-1.2-adm Scr 1.1-2.3 today  -multifactorial-relative IV depletion/diminshed perfusion in the setting of ACEI-less likely contrast given exposure > 5 days prior while of lasix and ACEI, did receive one dose of NSAIDs/hemodynamic due to A.fib/RVR/hypotension-all factors combined-progression to ATI. -non-oliguric  -peak SCr 2.4-improved to 2.1  -urine lytes c/w pre-renal, clinically hypervolemic-will repeat lasix IV today  -no KRT needed today    SOB: on oxygen, better today  -will repeat lasix     FEN:   Hyperkalemia: resolved  hyperchloremic acidosis: YESENIA/NS infusion  -improving    CKD stage 3: baseline Scr 1.1-1.3  -due to YESENIA/presumed HTN NS    Anemia: adm hgb 5.8-s/p RPBC  -work up revealed colonic mass-s/p right hemicolectomy  -iron deficient-s/p venofer  -epogen per Oncology    Colonic mass: s/p hemicolectomy-path shows invasive colonic adenocarcinoma  -per Oncology  -plan to start outpatient chemo    A.fib with RVR: unable to get IV amiodarone due to no IV access or PO due to nausea/vomiting-per EP    CAD: recent stent-on ASA/statin/Bilinta on hold    HTN: recent hypotension  -lasix and lisinopril on hold  -BP trending up  -lasix as above    dCHF: lasix as above      Thank you for allowing me to participate in the care of this patient.   I

## 2021-03-12 NOTE — PROGRESS NOTES
20g PIV in left AC infusing with amiodarone per [facility], site/extremetiy is accessed hourly and elevated with pillow. Patient denies any pain to site.

## 2021-03-12 NOTE — PLAN OF CARE
Nutrition Problem #1: Increased nutrient needs  Intervention: Food and/or Nutrient Delivery: Continue NPO  Nutritional Goals: PO intake will continue to be greater than 50% of meals

## 2021-03-12 NOTE — PROGRESS NOTES
none  REQUIRES PT FOLLOW UP: Yes  Activity Tolerance  Activity Tolerance: Patient limited by endurance; Patient limited by fatigue;Treatment limited secondary to medical complications (free text)  Activity Tolerance: N/V, drowsiness     Patient Diagnosis(es): The encounter diagnosis was Anemia, unspecified type. has a past medical history of Anemia, CAD (coronary artery disease), CKD (chronic kidney disease), Hyperlipidemia, and Hypertension. has a past surgical history that includes fracture surgery; Cystocopy (11/21/13); Upper gastrointestinal endoscopy (N/A, 3/5/2021); Colonoscopy (N/A, 3/5/2021); hemicolectomy (Right, 3/8/2021); and Cholecystectomy, laparoscopic (N/A, 3/8/2021). Restrictions  Restrictions/Precautions  Restrictions/Precautions: Fall Risk, Modified Diet(High fall risk, NPO with ice chips, sips of clears)  Required Braces or Orthoses?: No  Position Activity Restriction  Other position/activity restrictions: Zeinab Bianchi is a 68 y.o. female on Kenneth James MD service who was admitted on 3/3/2021 for anemia. She presented to the ED as advised by her PCP after routine blood work showed low hemoglobin. Hgb drawn in ED was 5.8, she received 3 units pRBCs, hgb 9.3 today. She had been feeling well at that time, no dizziness, lightheadedness, SOB or chest pain. Iron studies showed deficiency. She has been having bradycardia overnight, cardiology has been consulted. She has a history of CAD with recent stent placement, recently started on Brilinta late 2020. 3/8:LAPAROSCOPIC RIGHT COLON RESECTION, LAPAROSCOPIC CHOLECYSTECTOMY due to Malignant neoplasm of ascending colon  Subjective   General  Chart Reviewed: Yes  Response To Previous Treatment: Patient with no complaints from previous session. Family / Caregiver Present: Yes(nursing for part of treatment.)  Subjective  Subjective: Pt reporting less pain today but did not rate. Pt very drowsy likely from pain and nausea meds.   General Comment  Comments: Pt supine in bed upon arrival.  Pain Screening  Patient Currently in Pain: Yes  Pain Assessment  Pain Assessment: Faces  Gomez-Baker Pain Rating: Hurts even more  Pain Type: Surgical pain  Pain Location: Abdomen  Vital Signs  Patient Currently in Pain: Yes       Orientation  Orientation  Overall Orientation Status: Within Functional Limits  Cognition      Objective   Bed mobility  Supine to Sit: Moderate assistance  Scooting: Minimal assistance  Comment: Pt sat EOB for 15 min with close SBA balance. Pt nauseated and vomiting after seated for 3 min. Pt threw up  a small amount of dark green fluid. Gown changed and pt assisted with donning pull up brief. Nursing in to give nausea meds. Transfers  Sit to Stand: Moderate Assistance  Stand to sit: Minimal Assistance  Comment: Pt assisted with hygiene and donning brief once standing. VCs for upright standing posture. Ambulation  Ambulation?: Yes  Ambulation 1  Surface: level tile  Device: Rolling Walker  Other Apparatus: O2(2LO2)  Assistance: Contact guard assistance  Quality of Gait: flexed trunk  Gait Deviations: Slow Bibiana; Shuffles;Decreased step length;Decreased step height  Distance: Pt amb 3 ft with RW and CGA bed to chair. Comments: Pt needing VCs for hand placement and keeping eyes open during amb. Pt positioned in chair for comfort. Once settled, pt comfortable and O2 sats remained 97% with activity. Stairs/Curb  Stairs?: No     Balance  Posture: Fair(forward head, rounded shoulders.)  Sitting - Static: Good;-  Sitting - Dynamic: Good;-  Standing - Static: Fair;+(with RW)  Standing - Dynamic: Fair;+(with RW)            Comment: gown and brief change, care during nausea and vomiting, positioning for comfort.               G-Code     OutComes Score                                                     AM-PAC Score  AM-PAC Inpatient Mobility Raw Score : 11 (03/12/21 1006)  AM-PAC Inpatient T-Scale Score : 33.86 (03/12/21 1006)  Mobility Inpatient CMS 0-100% Score: 72.57 (03/12/21 1006)  Mobility Inpatient CMS G-Code Modifier : CL (03/12/21 1006)          Goals  Short term goals  Time Frame for Short term goals: to be met by d/c (No goals met in full this date)  Short term goal 1: Bed mobility with min A  Short term goal 2: Sit to/from stand with min A  Short term goal 3: Ambulate 150' with AAD and SBA  Patient Goals   Patient goals : to go home    Plan    Plan  Times per week: 3-5x  Times per day: Daily  Current Treatment Recommendations: Strengthening, Transfer Training, Gait Training, Safety Education & Training, Functional Mobility Training, Neuromuscular Re-education, Balance Training, Endurance Training, Home Exercise Program, Equipment Evaluation, Education, & procurement, Patient/Caregiver Education & Training  Safety Devices  Type of devices:  All fall risk precautions in place, Call light within reach, Chair alarm in place, Left in chair, Patient at risk for falls, Nurse notified  Restraints  Initially in place: No     Therapy Time   Individual Concurrent Group Co-treatment   Time In 0911         Time Out 0952         Minutes 41         Timed Code Treatment Minutes: Luis 71, [id number]

## 2021-03-12 NOTE — PROGRESS NOTES
[facility]        Progress Note    Patient Name: Kalpesh Lopez  MRN: [id number]  YOB: 1940  Date of Evaluation: 3/12/2021    Subjective:  No acute events overnight  Pain better today  Continues to have nausea and vomiting, again this morning  Only scant flatus, no stool  Up to chair at this time    Post-Operative Day #4      Vital Signs:  Patient Vitals for the past 24 hrs:   BP Temp Temp src Pulse Resp SpO2 Weight   21 0800 (!) 148/65 98.5 °F (36.9 °C) Axillary 82 20 98 % --   21 0546 -- -- -- -- -- -- 229 lb 8 oz (104.1 kg)   21 0400 (!) 115/56 97.9 °F (36.6 °C) Axillary 79 18 96 % --   21 0000 127/66 98.4 °F (36.9 °C) Axillary 86 22 96 % --   21 2100 (!) 148/68 98 °F (36.7 °C) Oral 94 18 97 % --   21 1630 132/71 98.7 °F (37.1 °C) Oral 79 19 98 % --      TEMPERATURE HISTORY 24H: Temp (24hrs), Av.3 °F (36.8 °C), Min:97.9 °F (36.6 °C), Max:98.7 °F (37.1 °C)    BLOOD PRESSURE HISTORY: Systolic (58KSD), HUD:777 , Min:100 , SQC:403    Diastolic (67RXF), MLE:24, Min:47, Max:71      Intake/Output:  I/O last 3 completed shifts: In: 10 [I.V.:10]  Out: 2350 [Urine:2350]  No intake/output data recorded.   Drain/tube Output:       Physical Exam:  General: awake, alert, oriented to  person, place, time  Lungs: unlabored respirations  Abdomen: soft, mildly distended, appropriate incisional tenderness, hypoactive bowel sounds present   Skin/Wound: healing well, no drainage, no erythema, well approximated    Labs:  CBC:    Recent Labs     03/10/21  0512 21  0445 21  0506   WBC 11.5* 7.2 3.7*   HGB 8.5* 8.6* 8.3*   HCT 28.5* 30.0* 27.0*    249 257     BMP:    Recent Labs     03/10/21  0512 21  0445 21  0506    144  140 143   K 4.9 5.3*  5.2* 4.5    112*  109 110   CO2 24 20*  22 22   BUN 44* 62*  65* 66*   CREATININE 1.6* 2.4*  2.3* 2.1*   GLUCOSE 123* 113*  114* 122*     Hepatic: Recent Labs     03/10/21  0512 03/11/21  0445   AST 21 16   ALT 9* 8*   BILITOT 0.6 0.5   ALKPHOS 73 79     Amylase:  No results found for: AMYLASE  Lipase:    Lab Results   Component Value Date    LIPASE 25.0 11/20/2020      Mag:    Lab Results   Component Value Date    MG 2.10 03/04/2021    MG 2.10 03/04/2021     Phos:     Lab Results   Component Value Date    PHOS 3.8 05/29/2015    PHOS 4.0 05/28/2015      Coags:   Lab Results   Component Value Date    PROTIME 12.7 03/08/2021    INR 1.09 03/08/2021    APTT 30.4 03/03/2021       Cultures:  Anaerobic culture  No results found for: LABANAE  Fungus stain  No results found for requested labs within last 30 days. Gram stain  No results found for requested labs within last 30 days. Organism  No results found for: [facility]  Surgical culture  No results found for: CXSURG  Blood culture 1  No results found for requested labs within last 30 days. Blood culture 2  No results found for requested labs within last 30 days. Fecal occult  Results in Past 30 Days  Result Component Current Result Ref Range Previous Result Ref Range   Occult Blood Diagnostic Result: Negative  Normal range: Negative   (3/3/2021)  Not in Time Range      GI bacterial pathogens by PCR  No results found for requested labs within last 30 days. C. difficile  No results found for requested labs within last 30 days. Urine culture  No results found for: LABURIN    Pathology:  OR 3/8/2021--FINAL DIAGNOSIS:     Right colon and small bowel, segmental resection:   - Invasive colonic adenocarcinoma, moderately differentiated. - Margins not involved. - One pericolonic lymph nodes positive for metastatic carcinoma (1/16). Gallbladder, cholecystectomy:   - Chronic cholecystitis, moderate. - Cholelithiasis. - Cholesterolosis.      Procedure:  Right hemicolectomy   Tumor Site: Ileocecal valve   Tumor Size: Greatest dimension (centimeter): 4.2        Additional dimensions (centimeters): 3.7 x 0.8   Macroscopic tumor perforation: Not identified   Histologic Type: Adenocarcinoma   Histologic Grade: G2: Moderately   Tumor Extension: Tumor invades through muscularis propria into subserosal   adipose tissue. MARGINS   All margins are uninvolved by invasive carcinoma, high grade dysplasia /   intramucosal carcinoma, and low grade dysplasia   Margins examined: Proximal, distal, and mesenteric    + Distance of invasive carcinoma from closest margin (millimeters or   centimeters): 40 mm    + Specify closest margin: Distal     Treatment Effect (applicable to carcinomas treated with neoadjuvant   therapy): No known pre surgical treatment   Lymph-Vascular Invasion: Not identified   Perineural Invasion: Not identified     + Tumor Budding (Note I)             Low score (0-4)   Type of Polyp in Which Invasive Carcinoma Arose: Not applicable   Tumor deposits: Absent     Regional lymph nodes     Number of Lymph nodes Involved: 1     Number of Lymph Nodes Examined: 16     Pathologic Stage Classification (pTNM, AJCC 8th Edition)     Primary Tumor (pT):      pT 3: Tumor invades through muscularis propria into pericolonic fat     Regional Lymph Nodes (pN):     pN 1a: Metastasis in 1 lymph node     + Additional pathologic findings: Histologically unremarkable appendix. + Ancillary studies: Not applicable     Imaging:  I have personally reviewed the following films:    Ct Abdomen Pelvis Wo Contrast Additional Contrast? None    Result Date: 3/11/2021  EXAMINATION: CT OF THE ABDOMEN AND PELVIS WITHOUT CONTRAST 3/11/2021 12:05 pm TECHNIQUE: CT of the abdomen and pelvis was performed without the administration of intravenous contrast. Multiplanar reformatted images are provided for review. Dose modulation, iterative reconstruction, and/or weight based adjustment of the mA/kV was utilized to reduce the radiation dose to as low as reasonably achievable.  COMPARISON: CT of the chest abdomen and pelvis March 5, 2020 HISTORY: ORDERING SYSTEM PROVIDED HISTORY: RUQ pain TECHNOLOGIST PROVIDED HISTORY: Reason for exam:->RUQ pain Additional Contrast?->None Reason for Exam: RUQ pain Acuity: Unknown Type of Exam: Unknown FINDINGS: Lower Chest: Increased mild-moderate right pleural effusion with adjacent compressive atelectasis. Unchanged trace amount of left pleural fluid. Organs: In the gallbladder fossa there is a fluid collection measuring 4.2 x 2.3 by 2.4 cm. Along the inferior-lateral aspect of the fluid collection a few tiny curvilinear/rounded high-density foci are present measuring 3 mm and less in size best shown on coronal image 90. Cholecystectomy clips noted in the expected location. There is a small amount of fluid along the inferior margin of the right hepatic lobe anterior laterally. Small amount of fluid between the diaphragm and liver also present. No pancreatitis. No ureteral stone or hydronephrosis. 2 mm right renal stone again noted. GI/Bowel: There is new fluid-filled small bowel dilation involving proximal to mid small bowel loops measuring up to 3 cm. There is distal small bowel collapse extending to the surgical site. Oral contrast within the colon from the oral contrast given for the comparison. No oral contrast within small bowel at this time. Pelvis: No acute abnormality of the pelvis. Normal appearance of the bladder. Peritoneum/Retroperitoneum: Small amount of free fluid in the right upper quadrant as discussed. No free air. Bones/Soft Tissues: Expected postsurgical changes of the abdominal wall. No acute osseous findings. New fluid collection in the gallbladder fossa with adjacent small amount of fluid around the liver. This may be residual fluid from the recent surgery or related to biliary leak. Consider HIDA scan evaluation There are 2-3 tiny gallstones within the gallbladder fossa fluid collection, measuring 3 mm and less in size.  Increased size of mild-moderate right pleural effusion with increased adjacent compressive atelectasis of the right lung base. Nm Hepatobiliary    Result Date: 3/11/2021  EXAMINATION: NUCLEAR MEDICINE HEPATOBILIARY SCINTIGRAPHY (HIDA SCAN). 3/11/2021 2:40 pm TECHNIQUE: Approximately 0.08 [id number] Mebrofenin (Choletec) was administered IV. Then, dynamic images of the abdomen were obtained in the anterior projection for 60 mins. A right lateral view was also obtained at 60 mins. COMPARISON: CT abdomen and pelvis 03/11/2021. HISTORY: ORDERING SYSTEM PROVIDED HISTORY: R/o bile leak s/p renu TECHNOLOGIST PROVIDED HISTORY: Reason for exam:->R/o bile leak s/p renu Reason for Exam: R/O Bile Leak Acuity: Acute Type of Exam: Ongoing FINDINGS: Prompt, homogenous uptake by the liver is noted with normal appearance of radiotracer excretion into the biliary system. Clearance of bloodpool activity appears appropriate. During the 1st 35 minutes of the study, the common duct is normal in size and appearance. Beginning at 40 minutes, accumulation of radio activity which overlies the common duct is seen. .  This accumulation most likely corresponds to the proximal duodenum as the abnormal fluid collection on the CT scan lies more laterally. Gallbladder is surgically absent. Delayed planar images as well as SPECT images were obtained due to the confusing appearance on the initial images. These latter images do not show any accumulation of activity in the subhepatic region. The only activity is seen within the small bowel confirming that there is no bile leak present. The CT images also show a slight decrease in the amount of fluid in the gallbladder fossa. No evident bile leak following cholecystectomy. Slight reduction in the amount of subhepatic fluid present since the earlier CT study 5 hours ago.      Scheduled Meds:   amiodarone  200 mg Oral TID    oxymetazoline  2 spray Each Nostril Once    lidocaine   Topical Once    furosemide        furosemide        sodium chloride flush  10 mL Intravenous 2 times per day    enoxaparin  40 mg Subcutaneous Daily    aspirin  81 mg Oral Daily    carvedilol  3.125 mg Oral BID WC    rosuvastatin  20 mg Oral Nightly    famotidine  20 mg Oral Daily     Continuous Infusions:   sodium chloride Stopped (03/11/21 4526)    sodium chloride      sodium chloride       PRN Meds:.phenol, metoprolol, sodium chloride flush, sodium chloride flush, HYDROmorphone, perflutren lipid microspheres, sodium chloride, potassium chloride **OR** potassium alternative oral replacement **OR** potassium chloride, promethazine **OR** ondansetron, magnesium hydroxide, acetaminophen **OR** acetaminophen, hydrALAZINE, sodium chloride, sodium chloride      Assessment:  68 y.o. female admitted with   1. Anemia, unspecified type        Status-post laparoscopic right colon resection and cholecystectomy on 3/8/2021 for colon mass, chronic cholecystitis with cholelithiasis   Anemia  Acute kidney injury  Hypertension  CAD  Paroxysmal atrial fibrillation  Medical coagulopathy, on Brilinta      Plan:  1. Pain better this morning but continued nausea and vomiting; HIDA scan negative for bile leak  2. Keep NPO and place NGT for decompression; monitor bowel function  3. IV hydration and electrolyte correction per Nephrology--creatinine improving  4. Activity as tolerated, ambulate TID--PT/OT following  5. Pulmonary toilet, incentive spirometry  6. PRN analgesics and antiemetics--minimizing narcotics as tolerated, apply ice  7. DVT prophylaxis with Lovenox and SCD's; holding Brilinta  8. Management of medical comorbid etiologies per primary team and consulting services    EDUCATION:  Educated patient on plan of care and disease process--all questions answered. Plans discussed with patient and nursing. Reviewed and discussed with Dr. Marylene Levin.       Signed:  Lendel Felty, APRN - CNP  3/12/2021 2:06 PM     I have personally performed a face to face diagnostic evaluation on this patient. I have interviewed and examined the patient and I agree with the assessment above. In summary, my findings and plan are the following:   Ms. Shila Goodwin is a 68 y.o. female who presents with   Status-post laparoscopic right colon resection and cholecystectomy on 3/8/2021 for colon mass, chronic cholecystitis with cholelithiasis   Anemia  Acute kidney injury  Hypertension  CAD  Paroxysmal atrial fibrillation  Medical coagulopathy, on Brilinta    Plan:  1. Pain control, monitor  2. NG decompression, bowel rest  3. IVF, monitor and replace electrolytes  4. Monitor bowel function, passing only small flatus  5. Activity as tolerated  6. Hold anticoagulation  7. Defer management of remainder of medical comorbidities to primary and consulting teams    Douglas B. Marylene Levin MD, FACS  3/12/2021  2:47 PM

## 2021-03-12 NOTE — PROGRESS NOTES
% 250 mL infusion, 1 mg/min, Intravenous, Continuous **FOLLOWED BY** amiodarone (CORDARONE) 450 mg in dextrose 5 % 250 mL infusion, 0.5 mg/min, Intravenous, Continuous  sodium chloride flush 0.9 % injection 10 mL, 10 mL, Intravenous, 2 times per day  sodium chloride flush 0.9 % injection 10 mL, 10 mL, Intravenous, PRN  sodium chloride flush 0.9 % injection 10 mL, 10 mL, Intravenous, PRN  enoxaparin (LOVENOX) injection 40 mg, 40 mg, Subcutaneous, Daily  0.9 % sodium chloride infusion, , Intravenous, Continuous  HYDROmorphone (DILAUDID) injection 1 mg, 1 mg, Intravenous, Q3H PRN  aspirin EC tablet 81 mg, 81 mg, Oral, Daily  perflutren lipid microspheres (DEFINITY) injection 1.65 mg, 1.5 mL, Intravenous, ONCE PRN  0.9 % sodium chloride infusion, , Intravenous, PRN  [Held by provider] carvedilol (COREG) tablet 3.125 mg, 3.125 mg, Oral, BID WC  rosuvastatin (CRESTOR) tablet 20 mg, 20 mg, Oral, Nightly  potassium chloride (KLOR-CON M) extended release tablet 40 mEq, 40 mEq, Oral, PRN **OR** potassium bicarb-citric acid (EFFER-K) effervescent tablet 40 mEq, 40 mEq, Oral, PRN **OR** potassium chloride 10 mEq/100 mL IVPB (Peripheral Line), 10 mEq, Intravenous, PRN  promethazine (PHENERGAN) tablet 12.5 mg, 12.5 mg, Oral, Q6H PRN **OR** ondansetron (ZOFRAN) injection 4 mg, 4 mg, Intravenous, Q6H PRN  magnesium hydroxide (MILK OF MAGNESIA) 400 MG/5ML suspension 30 mL, 30 mL, Oral, Daily PRN  famotidine (PEPCID) tablet 20 mg, 20 mg, Oral, Daily  acetaminophen (TYLENOL) tablet 650 mg, 650 mg, Oral, Q6H PRN **OR** acetaminophen (TYLENOL) suppository 650 mg, 650 mg, Rectal, Q6H PRN  hydrALAZINE (APRESOLINE) injection 10 mg, 10 mg, Intravenous, Q6H PRN  0.9 % sodium chloride bolus, 500 mL, Intravenous, PRN  0.9 % sodium chloride infusion, , Intravenous, PRN         Objective:  BP (!) 115/56   Pulse 79   Temp 97.9 °F (36.6 °C) (Axillary)   Resp 18   Ht 5' (1.524 m)   Wt 229 lb 8 oz (104.1 kg)   SpO2 96%   BMI 44.82 kg/m² Patient Vitals for the past 24 hrs:   BP Temp Temp src Pulse Resp SpO2 Weight   03/12/21 0546 -- -- -- -- -- -- 229 lb 8 oz (104.1 kg)   03/12/21 0400 (!) 115/56 97.9 °F (36.6 °C) Axillary 79 18 96 % --   03/12/21 0000 127/66 98.4 °F (36.9 °C) Axillary 86 22 96 % --   03/11/21 2100 (!) 148/68 98 °F (36.7 °C) Oral 94 18 97 % --   03/11/21 1630 132/71 98.7 °F (37.1 °C) Oral 79 19 98 % --   03/11/21 1245 122/64 98.2 °F (36.8 °C) Oral 105 19 95 % --   03/11/21 1000 (!) 140/47 98.4 °F (36.9 °C) Oral 97 17 96 % --     Patient Vitals for the past 96 hrs (Last 3 readings):   Weight   03/12/21 0546 229 lb 8 oz (104.1 kg)   03/11/21 0745 227 lb 15.3 oz (103.4 kg)   03/09/21 0822 220 lb 7.4 oz (100 kg)           Intake/Output Summary (Last 24 hours) at 3/12/2021 0805  Last data filed at 3/12/2021 0547  Gross per 24 hour   Intake 10 ml   Output 2350 ml   Net -2340 ml         Physical Exam:   BP (!) 115/56   Pulse 79   Temp 97.9 °F (36.6 °C) (Axillary)   Resp 18   Ht 5' (1.524 m)   Wt 229 lb 8 oz (104.1 kg)   SpO2 96%   BMI 44.82 kg/m²   General appearance: alert, appears stated age and cooperative  Head: Normocephalic, without obvious abnormality, atraumatic  Lungs: clear to auscultation bilaterally  Heart: regular rate and rhythm, S1, S2 normal, no murmur, click, rub or gallop  Abdomen: soft, appropriately tender, bs+  Extremities: extremities normal, atraumatic, no cyanosis or edema    Labs:  Lab Results   Component Value Date    WBC 3.7 (L) 03/12/2021    HGB 8.3 (L) 03/12/2021    HCT 27.0 (L) 03/12/2021     03/12/2021    CHOL 155 11/21/2020    TRIG 121 11/21/2020    HDL 38 (L) 11/21/2020    ALT 8 (L) 03/11/2021    AST 16 03/11/2021     03/12/2021    K 4.5 03/12/2021     03/12/2021    CREATININE 2.1 (H) 03/12/2021    BUN 66 (H) 03/12/2021    CO2 22 03/12/2021    INR 1.09 03/08/2021    LABMICR YES 03/11/2021     Lab Results   Component Value Date    CKTOTAL 41 03/11/2021    TROPONINI <0.01 03/10/2021       Recent Imaging Results are Reviewed:  Echo Complete    Result Date: 3/6/2021  Transthoracic Echocardiography Report (TTE)  Demographics   Patient Name        Jessica Paris   Date of Study       03/06/2021  Gender                 Female   Patient Number      [id number]  Date of Birth          1940   Visit Number        [id number]   Age                    68 year(s)   Accession Number    [id number]  Room Number            2117   Corporate ID        [id number]    Sonographer            Santos Casas, RDCS,                                                         RVT   Ordering Physician              Interpreting Physician Essie Arango MD,                                                         [facility] [place], [address]  Procedure Type of Study   TTE procedure:ECHOCARDIOGRAM COMPLETE 2D W DOPPLER W COLOR. Procedure Date Date: 03/06/2021 Start: 09:12 AM Study Location: [facility] - Echo Lab Technical Quality: Good visualization Additional Indications:NSTEMI, Leg edema, CAD. Patient Status: Routine Height: 60 inches Weight: 220 pounds BSA: 1.94 m2 BMI: 42.97 kg/m2 BP: 134/75 mmHg  Conclusions   Summary  Normal left ventricle size, and systolic function with an estimated ejection  fraction of 55-60%. Mild concentric left ventricular hypertrophy is present. No regional wall motion abnormalities are seen. Mild tricuspid regurgitation, RVSP is estimated at 38 mmhg. Signature   ------------------------------------------------------------------  Electronically signed by Essie Arango MD, [facility] [place], [address]  (Interpreting physician) on 03/06/2021 at 12:12 PM  ------------------------------------------------------------------   Findings   Left Ventricle  Normal left ventricle size, and systolic function with an estimated ejection  fraction of 55-60%. Mild concentric left ventricular hypertrophy is present. No regional wall motion abnormalities are seen.   Grade I diastolic dysfunction with normal LV filling pressures. Mitral Valve  Calcification of the mitral valve noted. No evidence of mitral regurgitation. Left Atrium  The left atrium is normal in size. Aortic Valve  The aortic valve is structurally normal. There is no significant aortic  valve regurgitation or stenosis. Aorta  The aortic root is normal in size. Right Ventricle  The right ventricle is normal in size and function. Tricuspid Valve  The tricuspid valve is normal in structure. Mild tricuspid regurgitation, RVSP is estimated at 38 mmhg. Right Atrium  The right atrial size is normal.   Pulmonic Valve  The pulmonic valve is not well visualized. Mild pulmonic regurgitation present. Pericardial Effusion  No pericardial effusion noted. Pleural Effusion  No pleural effusion. Miscellaneous  The inferior vena cava appears normal in size with normal respiratory  variation.   M-Mode/2D Measurements (cm)   LV Diastolic Dimension: 4.5 cm  LV Systolic Dimension: 8.43 cm  LV Septum Diastolic: 5.47 cm  LV PW Diastolic: 1.5 cm         AO Root Dimension: 3.1 cm  RV Diastolic Dimension: 5.25 cm LA Dimension: 3.4 cm                                  LA Area: 11.7 cm2                                  LA volume/Index: 21.6 ml /11 ml/m2  Doppler Measurements   AV Peak Velocity: 194 cm/s     MV Peak E-Wave: 98.5 cm/s  AV Peak Gradient: 15.05 mmHg   MV Peak A-Wave: 89.5 cm/s  AV Mean Gradient: 9 mmHg       MV E/A Ratio: 1.1  LVOT Peak Velocity: 122 cm/s   MV Mean Gradient: 3 mmHg                                 MV Max P mmHg  TR Velocity:290 cm/s           MV Vmax:125 cm/s  TR Gradient:33.64 mmHg         MV VTI:51.9 cm/s   E' Septal Velocity: 6.2 cm/s   MV Deceleration Time: 215 msec  E' Lateral Velocity: 5.98 cm/s  PV Peak Velocity: 123 cm/s  PV Peak Gradient: 6.05 mmHg   Aortic Valve   Peak Velocity: 194 cm/s   Mean Velocity: 141 cm/s  Peak Gradient: 15.05 mmHg Mean Gradient: 9 mmHg  AV VTI: 43.8 cm  Aorta   Aortic Root: 3.1 cm      Ct Abdomen Pelvis Wo Contrast Additional Contrast? None    Result Date: 3/11/2021  EXAMINATION: CT OF THE ABDOMEN AND PELVIS WITHOUT CONTRAST 3/11/2021 12:05 pm TECHNIQUE: CT of the abdomen and pelvis was performed without the administration of intravenous contrast. Multiplanar reformatted images are provided for review. Dose modulation, iterative reconstruction, and/or weight based adjustment of the mA/kV was utilized to reduce the radiation dose to as low as reasonably achievable. COMPARISON: CT of the chest abdomen and pelvis March 5, 2020 HISTORY: ORDERING SYSTEM PROVIDED HISTORY: RUQ pain TECHNOLOGIST PROVIDED HISTORY: Reason for exam:->RUQ pain Additional Contrast?->None Reason for Exam: RUQ pain Acuity: Unknown Type of Exam: Unknown FINDINGS: Lower Chest: Increased mild-moderate right pleural effusion with adjacent compressive atelectasis. Unchanged trace amount of left pleural fluid. Organs: In the gallbladder fossa there is a fluid collection measuring 4.2 x 2.3 by 2.4 cm. Along the inferior-lateral aspect of the fluid collection a few tiny curvilinear/rounded high-density foci are present measuring 3 mm and less in size best shown on coronal image 90. Cholecystectomy clips noted in the expected location. There is a small amount of fluid along the inferior margin of the right hepatic lobe anterior laterally. Small amount of fluid between the diaphragm and liver also present. No pancreatitis. No ureteral stone or hydronephrosis. 2 mm right renal stone again noted. GI/Bowel: There is new fluid-filled small bowel dilation involving proximal to mid small bowel loops measuring up to 3 cm. There is distal small bowel collapse extending to the surgical site. Oral contrast within the colon from the oral contrast given for the comparison. No oral contrast within small bowel at this time. Pelvis: No acute abnormality of the pelvis. Normal appearance of the bladder.  Peritoneum/Retroperitoneum: Small amount of free fluid in the right upper quadrant as discussed. No free air. Bones/Soft Tissues: Expected postsurgical changes of the abdominal wall. No acute osseous findings. New fluid collection in the gallbladder fossa with adjacent small amount of fluid around the liver. This may be residual fluid from the recent surgery or related to biliary leak. Consider HIDA scan evaluation There are 2-3 tiny gallstones within the gallbladder fossa fluid collection, measuring 3 mm and less in size. Increased size of mild-moderate right pleural effusion with increased adjacent compressive atelectasis of the right lung base. Xr Chest (2 Vw)    Result Date: 3/1/2021  EXAMINATION: TWO XRAY VIEWS OF THE CHEST 3/1/2021 2:58 pm COMPARISON: None. HISTORY: ORDERING SYSTEM PROVIDED HISTORY: EUBANKS (dyspnea on exertion) TECHNOLOGIST PROVIDED HISTORY: Reason for exam:->3-4wk hx of EUBANKS and productive cough FINDINGS: Cardiomegaly. Pulmonary vascular congestion. Ill-defined peripheral pulmonary opacities. No pneumothorax. No pleural effusion. Ill-defined bilateral pulmonary opacities could represent pulmonary edema possibly related to congestive heart failure or atypical pneumonia     Nm Hepatobiliary    Result Date: 3/11/2021  EXAMINATION: NUCLEAR MEDICINE HEPATOBILIARY SCINTIGRAPHY (HIDA SCAN). 3/11/2021 2:40 pm TECHNIQUE: Approximately 4.65 [id number] Mebrofenin (Choletec) was administered IV. Then, dynamic images of the abdomen were obtained in the anterior projection for 60 mins. A right lateral view was also obtained at 60 mins. COMPARISON: CT abdomen and pelvis 03/11/2021. HISTORY: ORDERING SYSTEM PROVIDED HISTORY: R/o bile leak s/p renu TECHNOLOGIST PROVIDED HISTORY: Reason for exam:->R/o bile leak s/p renu Reason for Exam: R/O Bile Leak Acuity: Acute Type of Exam: Ongoing FINDINGS: Prompt, homogenous uptake by the liver is noted with normal appearance of radiotracer excretion into the biliary system.   Clearance of bloodpool activity appears appropriate. During the 1st 35 minutes of the study, the common duct is normal in size and appearance. Beginning at 40 minutes, accumulation of radio activity which overlies the common duct is seen. .  This accumulation most likely corresponds to the proximal duodenum as the abnormal fluid collection on the CT scan lies more laterally. Gallbladder is surgically absent. Delayed planar images as well as SPECT images were obtained due to the confusing appearance on the initial images. These latter images do not show any accumulation of activity in the subhepatic region. The only activity is seen within the small bowel confirming that there is no bile leak present. The CT images also show a slight decrease in the amount of fluid in the gallbladder fossa. No evident bile leak following cholecystectomy. Slight reduction in the amount of subhepatic fluid present since the earlier CT study 5 hours ago. Ct Chest Abdomen Pelvis Wo Contrast    Result Date: 3/5/2021  EXAMINATION: CT OF THE CHEST, ABDOMEN, AND PELVIS WITHOUT CONTRAST 3/5/2021 4:20 pm TECHNIQUE: CT of the chest, abdomen and pelvis was performed without the administration of intravenous contrast. Multiplanar reformatted images are provided for review. Dose modulation, iterative reconstruction, and/or weight based adjustment of the mA/kV was utilized to reduce the radiation dose to as low as reasonably achievable. COMPARISON: Chest CT 2014 2013 HISTORY: ORDERING SYSTEM PROVIDED HISTORY: Colon mass, r/o mets TECHNOLOGIST PROVIDED HISTORY: Reason for exam:->Colon mass, r/o mets Additional Contrast?->Oral Reason for Exam: Colon mass, r/o mets Acuity: Unknown Type of Exam: Unknown FINDINGS: Chest: Mediastinum: 7 mm nodule seen in right lobe of thyroid gland. No specific imaging follow-up recommended based on size. coronary artery calcification is seen. Small mediastinal nodes are noted.   Right paratracheal node measures 1.4 cm in short axis, previously 10 mm. Lungs/pleura: Mosaic attenuation is seen throughout the lungs, suggesting small airways disease or air trapping. Trace pleural effusions are seen bilaterally. There is adjacent consolidation seen in the lung bases. 1.8 x 1.2 cm nodule is seen in the left lower lobe Soft Tissues/Bones: Degenerative changes are seen in the spine. Degenerative changes are seen in the shoulder joints. Abdomen/Pelvis: Organs: No splenomegaly Adrenal glands appear normal. There is rotation of the kidney anteriorly, incidentally noted. .  No stones noted 2 mm stone seen in the right kidney. No hydronephrosis noted. No intrahepatic ductal dilatation. No perihepatic fluid Gallbladder appears normal No peripancreatic inflammatory change GI/Bowel: No significant small bowel distention noted. Mild stool load seen in the colon. Scattered colonic diverticula are seen. There is focal wall thickening of the colon on the right, extending over a length of 3.7 cm. There is surrounding injection the Nadja colonic fat. Small pericolonic lymph node is seen in the right upper quadrant mesentery measuring 8 mm. Pelvis: No free fluid seen within the pelvis. Pickard catheter is seen in the bladder Left adnexal mass is seen measuring 4.4 cm x 3.5 cm previously 3.9 cm by 3.3 cm. Right adnexal nodule measures 3.2 x 3.9 cm, previously 2.5 x 3.6 cm Peritoneum/Retroperitoneum: Small retroperitoneal nodes are seen. Small lymph nodes are seen along the iliac chains. No aortic aneurysm. Atherosclerotic change is seen in aorta and iliacs. Bones/Soft Tissues: Spurring is seen in the spine. Spurring is seen in the hips. Tiny periumbilical hernia containing fat is seen     Within the chest, small mediastinal nodes are seen, slightly increased compared to prior, either reactive or metastatic. Small pleural effusions are seen, compatible with mild fluid overload. Stable lobular pulmonary nodule in the left lower lobe.  Nodular wall thickening of the colon on the right, compatible with the given history of colon mass. Small mesenteric node is seen in this region,, likely early desiree metastasis Increase in size of ovarian-adnexal masses on the right and left. Consider pelvic ultrasound for further characterization. Tiny nonobstructing right renal stone       Assessment and Plan:  Principal Problem:    Anemia -Established problem. Stable.  hgb 8.3  Plan: No indication for transfusion. Cont to monitor h/h to assess progression of anemia. Recommend ferrous sulfate or MVI as outpatient. Active Problems:    Hypertension -Established problem. Stable. 115/56  Plan: stay on same meds    CAD (coronary artery disease)    High cholesterol    Diastolic dysfunction    Hypokalemia -Established problem. Stable. K 4.5  Plan: Continue present orders/plan. Malignant neoplasm of ascending colon ([address] -Established problem. Stable. S/p resection per dr Ranjit Zavala. Some n/v, but HIDA does not show bile leak  Plan: cont conservative tx per surgery. Atrial fibrillation ([address] -Established problem. Stable. In sinus now on tele  Plan: Continue present orders/plan.      Colonic mass            Anibal Waters  3/12/2021

## 2021-03-13 LAB
ANION GAP SERPL CALCULATED.3IONS-SCNC: 11 MMOL/L (ref 3–16)
BASOPHILS ABSOLUTE: 0 K/UL (ref 0–0.2)
BASOPHILS RELATIVE PERCENT: 0.1 %
BUN BLDV-MCNC: 64 MG/DL (ref 7–20)
CALCIUM SERPL-MCNC: 9.1 MG/DL (ref 8.3–10.6)
CHLORIDE BLD-SCNC: 112 MMOL/L (ref 99–110)
CO2: 24 MMOL/L (ref 21–32)
CREAT SERPL-MCNC: 1.7 MG/DL (ref 0.6–1.2)
EOSINOPHILS ABSOLUTE: 0 K/UL (ref 0–0.6)
EOSINOPHILS RELATIVE PERCENT: 0.3 %
GFR AFRICAN AMERICAN: 35
GFR NON-AFRICAN AMERICAN: 29
GLUCOSE BLD-MCNC: 123 MG/DL (ref 70–99)
HCT VFR BLD CALC: 28.7 % (ref 36–48)
HEMOGLOBIN: 8.8 G/DL (ref 12–16)
LYMPHOCYTES ABSOLUTE: 0.8 K/UL (ref 1–5.1)
LYMPHOCYTES RELATIVE PERCENT: 14.8 %
MCH RBC QN AUTO: 22.8 PG (ref 26–34)
MCHC RBC AUTO-ENTMCNC: 30.5 G/DL (ref 31–36)
MCV RBC AUTO: 74.7 FL (ref 80–100)
MONOCYTES ABSOLUTE: 1 K/UL (ref 0–1.3)
MONOCYTES RELATIVE PERCENT: 19.3 %
NEUTROPHILS ABSOLUTE: 3.5 K/UL (ref 1.7–7.7)
NEUTROPHILS RELATIVE PERCENT: 65.5 %
PDW BLD-RTO: 26.5 % (ref 12.4–15.4)
PLATELET # BLD: 292 K/UL (ref 135–450)
PMV BLD AUTO: 7.9 FL (ref 5–10.5)
POTASSIUM SERPL-SCNC: 4.2 MMOL/L (ref 3.5–5.1)
RBC # BLD: 3.85 M/UL (ref 4–5.2)
SODIUM BLD-SCNC: 147 MMOL/L (ref 136–145)
WBC # BLD: 5.4 K/UL (ref 4–11)

## 2021-03-13 PROCEDURE — 6370000000 HC RX 637 (ALT 250 FOR IP): Performed by: NURSE PRACTITIONER

## 2021-03-13 PROCEDURE — 2500000003 HC RX 250 WO HCPCS: Performed by: INTERNAL MEDICINE

## 2021-03-13 PROCEDURE — 6360000002 HC RX W HCPCS: Performed by: NURSE PRACTITIONER

## 2021-03-13 PROCEDURE — 99024 POSTOP FOLLOW-UP VISIT: CPT | Performed by: SURGERY

## 2021-03-13 PROCEDURE — 36415 COLL VENOUS BLD VENIPUNCTURE: CPT

## 2021-03-13 PROCEDURE — 85025 COMPLETE CBC W/AUTO DIFF WBC: CPT

## 2021-03-13 PROCEDURE — 2060000000 HC ICU INTERMEDIATE R&B

## 2021-03-13 PROCEDURE — 80048 BASIC METABOLIC PNL TOTAL CA: CPT

## 2021-03-13 PROCEDURE — 2580000003 HC RX 258: Performed by: INTERNAL MEDICINE

## 2021-03-13 PROCEDURE — 99232 SBSQ HOSP IP/OBS MODERATE 35: CPT | Performed by: NURSE PRACTITIONER

## 2021-03-13 RX ADMIN — Medication 10 ML: at 22:26

## 2021-03-13 RX ADMIN — Medication 10 ML: at 09:00

## 2021-03-13 RX ADMIN — AMIODARONE HYDROCHLORIDE 200 MG: 200 TABLET ORAL at 22:19

## 2021-03-13 RX ADMIN — FAMOTIDINE 20 MG: 10 INJECTION, SOLUTION INTRAVENOUS at 14:53

## 2021-03-13 RX ADMIN — Medication 10 ML: at 00:23

## 2021-03-13 RX ADMIN — ENOXAPARIN SODIUM 40 MG: 40 INJECTION SUBCUTANEOUS at 11:32

## 2021-03-13 ASSESSMENT — PAIN SCALES - GENERAL
PAINLEVEL_OUTOF10: 0

## 2021-03-13 NOTE — PROGRESS NOTES
Progress Note - Dr. Leatha Starr - Internal Medicine  PCP: Marisol Tristan  [address] [place] [address] [phone]    Hospital Day: 10  Code Status: Full Code  Current Diet: Diet NPO Effective Now Exceptions are: Ice Chips, Sips of Clear Liquids, Sips of Water with Meds        CC: follow up on medical issues    Subjective: Graeme Fatima is a 68 y.o. female. She denies problems    Feels a little better  NG tube remains in, still with drainage    She denies chest pain, denies shortness of breath, denies nausea,  denies emesis. 10 system Review of Systems is reviewed with patient, and pertinent positives are noted in HPI above . Otherwise, Review of systems is negative. I have reviewed the patient's medical and social history in detail and updated the computerized patient record. To recap: She  has a past medical history of Anemia, CAD (coronary artery disease), CKD (chronic kidney disease), Hyperlipidemia, and Hypertension. . She  has a past surgical history that includes fracture surgery; Cystocopy (11/21/13); Upper gastrointestinal endoscopy (N/A, 3/5/2021); Colonoscopy (N/A, 3/5/2021); hemicolectomy (Right, 3/8/2021); and Cholecystectomy, laparoscopic (N/A, 3/8/2021). . She  reports that she has never smoked. She has never used smokeless tobacco. She reports that she does not drink alcohol or use drugs. .        Active Hospital Problems    Diagnosis Date Noted    Atrial fibrillation ([facility] [address] [I48.91] 03/10/2021    Colonic mass [P15.23]     Diastolic dysfunction [D44.72] 03/07/2021    Hypokalemia [E87.6] 03/07/2021    Malignant neoplasm of ascending colon (HCC) [C18.2] 03/07/2021    Anemia [D64.9] 03/03/2021    CAD (coronary artery disease) [I25.10] 03/03/2021    High cholesterol [E78.00] 03/03/2021    Hypertension [I10] 01/10/2014       Current Facility-Administered Medications: amiodarone (CORDARONE) tablet 200 mg, 200 mg, Oral, TID  oxymetazoline (AFRIN) 0.05 % nasal spray 2 spray, 2 spray, Each Nostril, Once  lidocaine (XYLOCAINE) 2 % jelly, , Topical, Once  phenol 1.4 % mouth spray 1 spray, 1 spray, Mouth/Throat, Q2H PRN  metoprolol (LOPRESSOR) injection 5 mg, 5 mg, Intravenous, Q8H PRN  sodium chloride flush 0.9 % injection 10 mL, 10 mL, Intravenous, 2 times per day  sodium chloride flush 0.9 % injection 10 mL, 10 mL, Intravenous, PRN  sodium chloride flush 0.9 % injection 10 mL, 10 mL, Intravenous, PRN  enoxaparin (LOVENOX) injection 40 mg, 40 mg, Subcutaneous, Daily  0.9 % sodium chloride infusion, , Intravenous, Continuous  HYDROmorphone (DILAUDID) injection 1 mg, 1 mg, Intravenous, Q3H PRN  aspirin EC tablet 81 mg, 81 mg, Oral, Daily  perflutren lipid microspheres (DEFINITY) injection 1.65 mg, 1.5 mL, Intravenous, ONCE PRN  0.9 % sodium chloride infusion, , Intravenous, PRN  carvedilol (COREG) tablet 3.125 mg, 3.125 mg, Oral, BID WC  rosuvastatin (CRESTOR) tablet 20 mg, 20 mg, Oral, Nightly  potassium chloride (KLOR-CON M) extended release tablet 40 mEq, 40 mEq, Oral, PRN **OR** potassium bicarb-citric acid (EFFER-K) effervescent tablet 40 mEq, 40 mEq, Oral, PRN **OR** potassium chloride 10 mEq/100 mL IVPB (Peripheral Line), 10 mEq, Intravenous, PRN  promethazine (PHENERGAN) tablet 12.5 mg, 12.5 mg, Oral, Q6H PRN **OR** ondansetron (ZOFRAN) injection 4 mg, 4 mg, Intravenous, Q6H PRN  magnesium hydroxide (MILK OF MAGNESIA) 400 MG/5ML suspension 30 mL, 30 mL, Oral, Daily PRN  famotidine (PEPCID) tablet 20 mg, 20 mg, Oral, Daily  acetaminophen (TYLENOL) tablet 650 mg, 650 mg, Oral, Q6H PRN **OR** acetaminophen (TYLENOL) suppository 650 mg, 650 mg, Rectal, Q6H PRN  hydrALAZINE (APRESOLINE) injection 10 mg, 10 mg, Intravenous, Q6H PRN  0.9 % sodium chloride bolus, 500 mL, Intravenous, PRN  0.9 % sodium chloride infusion, , Intravenous, PRN         Objective:  BP (!) 147/69   Pulse 82   Temp 98 °F (36.7 °C) (Oral)   Resp 18   Ht 5' (1.524 m)   Wt 229 lb 8 oz (104.1 kg)   SpO2 96%   BMI 44.82 kg/m²      Patient Vitals for the past 24 hrs:   BP Temp Temp src Pulse Resp SpO2   03/13/21 0731 (!) 147/69 98 °F (36.7 °C) Oral 82 18 96 %   03/13/21 0452 -- -- -- 81 -- --   03/13/21 0348 (!) 157/65 97.9 °F (36.6 °C) Oral 93 16 97 %   03/13/21 0201 -- -- -- 118 -- --   03/13/21 0023 126/67 98.9 °F (37.2 °C) Oral 83 16 95 %   03/12/21 2201 (!) 155/70 -- -- 88 -- --   03/12/21 2115 -- -- -- 85 -- --   03/12/21 2014 132/82 97.9 °F (36.6 °C) Oral 112 20 96 %   03/12/21 1611 138/69 97.5 °F (36.4 °C) Oral 94 16 97 %   03/12/21 1222 139/66 97.6 °F (36.4 °C) Oral 85 18 --     Patient Vitals for the past 96 hrs (Last 3 readings):   Weight   03/12/21 0546 229 lb 8 oz (104.1 kg)   03/11/21 0745 227 lb 15.3 oz (103.4 kg)           Intake/Output Summary (Last 24 hours) at 3/13/2021 0834  Last data filed at 3/13/2021 0451  Gross per 24 hour   Intake 50 ml   Output 3375 ml   Net -3325 ml         Physical Exam:   BP (!) 147/69   Pulse 82   Temp 98 °F (36.7 °C) (Oral)   Resp 18   Ht 5' (1.524 m)   Wt 229 lb 8 oz (104.1 kg)   SpO2 96%   BMI 44.82 kg/m²   General appearance: alert, appears stated age and cooperative  Head: Normocephalic, without obvious abnormality, atraumatic  Nose: +NG tube  Lungs: clear to auscultation bilaterally  Heart: regular rate and rhythm, S1, S2 normal, no murmur, click, rub or gallop  Abdomen: soft, non-tender; bowel sounds normal; no masses,  no organomegaly  Extremities: extremities normal, atraumatic, no cyanosis or edema    Labs:  Lab Results   Component Value Date    WBC 5.4 03/13/2021    HGB 8.8 (L) 03/13/2021    HCT 28.7 (L) 03/13/2021     03/13/2021    CHOL 155 11/21/2020    TRIG 121 11/21/2020    HDL 38 (L) 11/21/2020    ALT 8 (L) 03/11/2021    AST 16 03/11/2021     (H) 03/13/2021    K 4.2 03/13/2021     (H) 03/13/2021    CREATININE 1.7 (H) 03/13/2021    BUN 64 (H) 03/13/2021    CO2 24 03/13/2021    INR 1.09 03/08/2021    LABMICR YES 03/11/2021     Lab Results   Component Value Date    CKTOTAL 41 03/11/2021    TROPONINI <0.01 03/10/2021       Recent Imaging Results are Reviewed:  Echo Complete    Result Date: 3/6/2021  Transthoracic Echocardiography Report (TTE)  Demographics   Patient Name        Alexia Rinne   Date of Study       03/06/2021  Gender                 Female   Patient Number      [id number]  Date of Birth          1940   Visit Number        [id number]   Age                    68 year(s)   Accession Number    [id number]  Room Number            1360   Corporate ID        [id number]    Sonographer            Kyra Godinez, ELINOR,                                                         RVT   Ordering Physician              Interpreting Physician Verdia Romberg MD,                                                         [facility] [place]   Procedure Type of Study   TTE procedure:ECHOCARDIOGRAM COMPLETE 2D W DOPPLER W COLOR. Procedure Date Date: 03/06/2021 Start: 09:12 AM Study Location: [facility] - Echo Lab Technical Quality: Good visualization Additional Indications:NSTEMI, Leg edema, CAD. Patient Status: Routine Height: 60 inches Weight: 220 pounds BSA: 1.94 m2 BMI: 42.97 kg/m2 BP: 134/75 mmHg  Conclusions   Summary  Normal left ventricle size, and systolic function with an estimated ejection  fraction of 55-60%. Mild concentric left ventricular hypertrophy is present. No regional wall motion abnormalities are seen. Mild tricuspid regurgitation, RVSP is estimated at 38 mmhg. Signature   ------------------------------------------------------------------  Electronically signed by Verdia Romberg MD, [facility] Demar Tomas  (Interpreting physician) on 03/06/2021 at 12:12 PM  ------------------------------------------------------------------   Findings   Left Ventricle  Normal left ventricle size, and systolic function with an estimated ejection  fraction of 55-60%. Mild concentric left ventricular hypertrophy is present.   No regional wall motion 9 mmHg  AV VTI: 43.8 cm  Aorta   Aortic Root: 3.1 cm      Ct Abdomen Pelvis Wo Contrast Additional Contrast? None    Result Date: 3/11/2021  EXAMINATION: CT OF THE ABDOMEN AND PELVIS WITHOUT CONTRAST 3/11/2021 12:05 pm TECHNIQUE: CT of the abdomen and pelvis was performed without the administration of intravenous contrast. Multiplanar reformatted images are provided for review. Dose modulation, iterative reconstruction, and/or weight based adjustment of the mA/kV was utilized to reduce the radiation dose to as low as reasonably achievable. COMPARISON: CT of the chest abdomen and pelvis March 5, 2020 HISTORY: ORDERING SYSTEM PROVIDED HISTORY: RUQ pain TECHNOLOGIST PROVIDED HISTORY: Reason for exam:->RUQ pain Additional Contrast?->None Reason for Exam: RUQ pain Acuity: Unknown Type of Exam: Unknown FINDINGS: Lower Chest: Increased mild-moderate right pleural effusion with adjacent compressive atelectasis. Unchanged trace amount of left pleural fluid. Organs: In the gallbladder fossa there is a fluid collection measuring 4.2 x 2.3 by 2.4 cm. Along the inferior-lateral aspect of the fluid collection a few tiny curvilinear/rounded high-density foci are present measuring 3 mm and less in size best shown on coronal image 90. Cholecystectomy clips noted in the expected location. There is a small amount of fluid along the inferior margin of the right hepatic lobe anterior laterally. Small amount of fluid between the diaphragm and liver also present. No pancreatitis. No ureteral stone or hydronephrosis. 2 mm right renal stone again noted. GI/Bowel: There is new fluid-filled small bowel dilation involving proximal to mid small bowel loops measuring up to 3 cm. There is distal small bowel collapse extending to the surgical site. Oral contrast within the colon from the oral contrast given for the comparison. No oral contrast within small bowel at this time. Pelvis: No acute abnormality of the pelvis.  Normal appearance of the bladder. Peritoneum/Retroperitoneum: Small amount of free fluid in the right upper quadrant as discussed. No free air. Bones/Soft Tissues: Expected postsurgical changes of the abdominal wall. No acute osseous findings. New fluid collection in the gallbladder fossa with adjacent small amount of fluid around the liver. This may be residual fluid from the recent surgery or related to biliary leak. Consider HIDA scan evaluation There are 2-3 tiny gallstones within the gallbladder fossa fluid collection, measuring 3 mm and less in size. Increased size of mild-moderate right pleural effusion with increased adjacent compressive atelectasis of the right lung base. Xr Chest (2 Vw)    Result Date: 3/1/2021  EXAMINATION: TWO XRAY VIEWS OF THE CHEST 3/1/2021 2:58 pm COMPARISON: None. HISTORY: ORDERING SYSTEM PROVIDED HISTORY: EUBANKS (dyspnea on exertion) TECHNOLOGIST PROVIDED HISTORY: Reason for exam:->3-4wk hx of EUBANKS and productive cough FINDINGS: Cardiomegaly. Pulmonary vascular congestion. Ill-defined peripheral pulmonary opacities. No pneumothorax. No pleural effusion. Ill-defined bilateral pulmonary opacities could represent pulmonary edema possibly related to congestive heart failure or atypical pneumonia     Nm Hepatobiliary    Result Date: 3/11/2021  EXAMINATION: NUCLEAR MEDICINE HEPATOBILIARY SCINTIGRAPHY (HIDA SCAN). 3/11/2021 2:40 pm TECHNIQUE: Approximately 6.51 [id number] Mebrofenin (Choletec) was administered IV. Then, dynamic images of the abdomen were obtained in the anterior projection for 60 mins. A right lateral view was also obtained at 60 mins. COMPARISON: CT abdomen and pelvis 03/11/2021.  HISTORY: ORDERING SYSTEM PROVIDED HISTORY: R/o bile leak s/p renu TECHNOLOGIST PROVIDED HISTORY: Reason for exam:->R/o bile leak s/p renu Reason for Exam: R/O Bile Leak Acuity: Acute Type of Exam: Ongoing FINDINGS: Prompt, homogenous uptake by the liver is noted with normal appearance of radiotracer excretion into the biliary system. Clearance of bloodpool activity appears appropriate. During the 1st 35 minutes of the study, the common duct is normal in size and appearance. Beginning at 40 minutes, accumulation of radio activity which overlies the common duct is seen. .  This accumulation most likely corresponds to the proximal duodenum as the abnormal fluid collection on the CT scan lies more laterally. Gallbladder is surgically absent. Delayed planar images as well as SPECT images were obtained due to the confusing appearance on the initial images. These latter images do not show any accumulation of activity in the subhepatic region. The only activity is seen within the small bowel confirming that there is no bile leak present. The CT images also show a slight decrease in the amount of fluid in the gallbladder fossa. No evident bile leak following cholecystectomy. Slight reduction in the amount of subhepatic fluid present since the earlier CT study 5 hours ago. Xr Chest Portable    Result Date: 3/12/2021  EXAMINATION: ONE XRAY VIEW OF THE CHEST 3/12/2021 6:06 pm COMPARISON: 1 March 2021 HISTORY: ORDERING SYSTEM PROVIDED HISTORY: ng tube placement TECHNOLOGIST PROVIDED HISTORY: Reason for exam:->ng tube placement Reason for Exam: ng tube placement Acuity: Unknown Type of Exam: Unknown FINDINGS: AP portable view of the chest time stamped at 1752 hours overlying cardiac monitoring electrodes are noted. Intestinal tube extends to the distal stomach. Heart is stable, mildly enlarged. Elevated hemidiaphragm is noted. There is left perihilar stranding likely atelectasis. No extrapleural air. Intestinal tube terminates in the distal stomach. Other findings as above.      Ct Chest Abdomen Pelvis Wo Contrast    Result Date: 3/5/2021  EXAMINATION: CT OF THE CHEST, ABDOMEN, AND PELVIS WITHOUT CONTRAST 3/5/2021 4:20 pm TECHNIQUE: CT of the chest, abdomen and pelvis was performed without the administration of intravenous contrast. Multiplanar reformatted images are provided for review. Dose modulation, iterative reconstruction, and/or weight based adjustment of the mA/kV was utilized to reduce the radiation dose to as low as reasonably achievable. COMPARISON: Chest CT 2014 2013 HISTORY: ORDERING SYSTEM PROVIDED HISTORY: Colon mass, r/o mets TECHNOLOGIST PROVIDED HISTORY: Reason for exam:->Colon mass, r/o mets Additional Contrast?->Oral Reason for Exam: Colon mass, r/o mets Acuity: Unknown Type of Exam: Unknown FINDINGS: Chest: Mediastinum: 7 mm nodule seen in right lobe of thyroid gland. No specific imaging follow-up recommended based on size. coronary artery calcification is seen. Small mediastinal nodes are noted. Right paratracheal node measures 1.4 cm in short axis, previously 10 mm. Lungs/pleura: Mosaic attenuation is seen throughout the lungs, suggesting small airways disease or air trapping. Trace pleural effusions are seen bilaterally. There is adjacent consolidation seen in the lung bases. 1.8 x 1.2 cm nodule is seen in the left lower lobe Soft Tissues/Bones: Degenerative changes are seen in the spine. Degenerative changes are seen in the shoulder joints. Abdomen/Pelvis: Organs: No splenomegaly Adrenal glands appear normal. There is rotation of the kidney anteriorly, incidentally noted. .  No stones noted 2 mm stone seen in the right kidney. No hydronephrosis noted. No intrahepatic ductal dilatation. No perihepatic fluid Gallbladder appears normal No peripancreatic inflammatory change GI/Bowel: No significant small bowel distention noted. Mild stool load seen in the colon. Scattered colonic diverticula are seen. There is focal wall thickening of the colon on the right, extending over a length of 3.7 cm. There is surrounding injection the Nadja colonic fat. Small pericolonic lymph node is seen in the right upper quadrant mesentery measuring 8 mm.  Pelvis: No free fluid seen within the pelvis. Pickard catheter is seen in the bladder Left adnexal mass is seen measuring 4.4 cm x 3.5 cm previously 3.9 cm by 3.3 cm. Right adnexal nodule measures 3.2 x 3.9 cm, previously 2.5 x 3.6 cm Peritoneum/Retroperitoneum: Small retroperitoneal nodes are seen. Small lymph nodes are seen along the iliac chains. No aortic aneurysm. Atherosclerotic change is seen in aorta and iliacs. Bones/Soft Tissues: Spurring is seen in the spine. Spurring is seen in the hips. Tiny periumbilical hernia containing fat is seen     Within the chest, small mediastinal nodes are seen, slightly increased compared to prior, either reactive or metastatic. Small pleural effusions are seen, compatible with mild fluid overload. Stable lobular pulmonary nodule in the left lower lobe. Nodular wall thickening of the colon on the right, compatible with the given history of colon mass. Small mesenteric node is seen in this region,, likely early desiree metastasis Increase in size of ovarian-adnexal masses on the right and left. Consider pelvic ultrasound for further characterization. Tiny nonobstructing right renal stone       Assessment and Plan:  Principal Problem:    Anemia -Established problem. Stable.  hgb 8.8  Plan: No indication for transfusion. Cont to monitor h/h to assess progression of anemia. Recommend ferrous sulfate or MVI as outpatient. Active Problems:    Hypertension -Established problem. Stable. 147/69  Plan: cont same meds    CAD (coronary artery disease)    High cholesterol    Diastolic dysfunction    Hypokalemia -Established problem. Stable. K 4.2  Plan: Continue present orders/plan. Malignant neoplasm of ascending colon ([address] -Established problem. Stable. S/p resection  Plan: slow post op recovery. Plan for outpt tx of cancer    Atrial fibrillation ([address] -Established problem. Stable. In sinus now  Plan: Continue present orders/plan.      Colonic mass            Lisa Danny  3/13/2021

## 2021-03-13 NOTE — PROGRESS NOTES
Pt now back in NSR w/ 1st degree block. Per telemetry strips pt has flipped between NSR and Afib multiple times overnight. Pt remains asymptomatic with this throughout this shift. NG tube remains at 51cm forrest at nare to intermittent low wall suction 500mL green bile liquid into canister so far. Pt denies further needs, no acute distress upon assessment. Cont. Pulse ox remains in place. Incentive spirometry upon rounding while awake. Pt remains with congested cough w/o sputum production.

## 2021-03-13 NOTE — PROGRESS NOTES
Nephrology Progress Note  [phone]   [email or web address].        Reason for Consult:  YESENIA    HISTORY OF PRESENT ILLNESS:                This is a patient with significant past medical history of YESENIA in 2015, peak Scr 1.8, HTN, CAD-s/p recent stent on ASA and Brilinta. HTN, dCHF, [place],  who was sent to ER for anemia, hgb was 5.8, s/p PRBC. She had A.fib with RVR at admission and EP consulted. She had EGD/colonoscopy done which revealed mass, CT scan C/A/P with contrast was negative for metastatic disease. She underwent laparoscopic right hemicolectomy on 3/8/21. EBL minimal.  She has received IV lasix on 3/4-ketorolac on 3/9, on lisinopril,-stopped on 3/9  -hospital course complicated by YESENIA - Scr gradually trending up, 1.1-->1.6-->2.3, harris removed yesterday-incontinent of urine  -had hypotension due to dilaudid, given NS bolus  -has N/V-had vomiting today, plan for CT  -c/o nausea and abdominal discomfort  -she is feeling anxious and treaful  -denies fever/chill/CP  -c/o SOB that started today  -UO is declining      Subjective:  -pt seen and examined  -PMSHx and meds reviewed  -No family at bedside    Events noted  Feels better today  BP stable        PHYSICAL EXAM:    Vitals:    /71   Pulse 115   Temp 97.9 °F (36.6 °C) (Oral)   Resp 18   Ht 5' (1.524 m)   Wt 229 lb 8 oz (104.1 kg)   SpO2 95%   BMI 44.82 kg/m²   I/O last 3 completed shifts: In: 50 [NG/GT:50]  Out: 8318 [Urine:2375; Emesis/NG output:500]  No intake/output data recorded. Physical Exam:  Gen: anxious, tearful  HEENT: anicteric, dry mouth, poor dentition  CV: irregular, +S1/S2. Lungs: clear on anterior exam ,fair air entry   Abd: distended, decreased BS, laparoscopic incision C/D/I  Ext: trace edema, no cyanosis  Skin: Warm. No rashes appreciated. Neuro: Alert and oriented x 3, nonfocal.  Joints: No erythema noted over joints.     DATA:    CBC:   Lab Results   Component Value Date    WBC 5.4 03/13/2021    RBC 3.85 03/13/2021    HGB 8.8 03/13/2021    HCT 28.7 03/13/2021    MCV 74.7 03/13/2021    MCH 22.8 03/13/2021    MCHC 30.5 03/13/2021    RDW 26.5 03/13/2021     03/13/2021    MPV 7.9 03/13/2021     BMP:    Lab Results   Component Value Date     03/13/2021    K 4.2 03/13/2021    K 3.7 03/04/2021     03/13/2021    CO2 24 03/13/2021    BUN 64 03/13/2021    LABALBU 2.9 03/11/2021    CREATININE 1.7 03/13/2021    CALCIUM 9.1 03/13/2021    GFRAA 35 03/13/2021    LABGLOM 29 03/13/2021    GLUCOSE 123 03/13/2021       IMPRESSION/RECOMMENDATIONS:      YESENIA on CKD stage 3: recurrent baseline Scr 1.1-1.2-adm Scr 1.1-2.3 today  -multifactorial-relative IV depletion/diminshed perfusion in the setting of ACEI-less likely contrast given exposure > 5 days prior while of lasix and ACEI, did receive one dose of NSAIDs/hemodynamic due to A.fib/RVR/hypotension-all factors combined-progression to ATI. -non-oliguric, likely pre-renal with element of ATN  -peak SCr 2.4-improved to 1.7    SOB: better today  - hold on further diuresis today    FEN:   Hyperkalemia: resolved  hyperchloremic acidosis: YESENIA/NS infusion  Hypernatremia: poor IV access. Monitor off of lasix today     CKD stage 3: baseline Scr 1.1-1.3  -due to YESENIA/presumed HTN NS    Anemia: adm hgb 5.8-s/p RPBC  -work up revealed colonic mass-s/p right hemicolectomy  -iron deficient-s/p venofer  -epogen per Oncology    Colonic mass: s/p hemicolectomy-path shows invasive colonic adenocarcinoma  -per Oncology  -plan to start outpatient chemo    A.fib with RVR: unable to get IV amiodarone due to no IV access or PO due to nausea/vomiting-per EP    CAD: recent stent-on ASA/statin/Bilinta on hold    HTN: recent hypotension  -lasix and lisinopril on hold  -BP stable     dCHF: lasix as needed      Thank you for allowing me to participate in the care of this patient. I will continue to follow along. Please call with questions.     Junior Alejandra MD

## 2021-03-13 NOTE — PROGRESS NOTES
Dr. Tiffany Moura updated on prior afib w/ RVR, NG tube in place and NPO orders. He would like for pt to receive PO amiodarone crushed through NG tube and clamp for 30 minutes afterwards prior to resuming intermittent low wall suction.

## 2021-03-13 NOTE — PROGRESS NOTES
[address]   Cardiology Progress Note     Date: 3/13/2021  Admit Date: 3/3/2021     Reason for consultation: atrial fibrillation     Chief Complaint:   Chief Complaint   Patient presents with    Abnormal Lab     pt sent in by her MD for low H/H       History of Present Illness: History obtained from patient and medical record. Avery Mirza is a 68 y.o. female with a past medical history of HTN, HLD, CKD< FIDEL, and CAD. Pt presented to hospital by recommendation of her PCP for anemia. Her hemoglobin was 5.8 and was she received multiple units of blood. GI completed colonoscopy/EGD with no gross bleeding found, however, she was found to have a mass in her colon. She had a recent stent placed and has been on ASA/Brilenta. She was found to be in AF with RVR on admission     Interval Hx: Today, she is resting in bed. NG in place. She states she feels better today than she did yesterday. Passing gas. Patient seen and examined. Clinical notes reviewed. Telemetry reviewed. No new complaints today. No major events overnight. Denies having chest pain, palpitations, shortness of breath, orthopnea/PND, cough, or dizziness at the time of this visit. Allergies:  No Known Allergies    Home Meds:  Prior to Visit Medications    Medication Sig Taking? Authorizing Provider   furosemide (LASIX) 20 MG tablet Take 1 tablet by mouth daily as needed (swelling or shortness of breath) Yes JIMENA Mathew CNP   HYDROcodone-acetaminophen (NORCO) 5-325 MG per tablet Take 1 tablet by mouth 2 times daily.  Yes Historical Provider, MD   rosuvastatin (CRESTOR) 20 MG tablet Take 1 tablet by mouth nightly Yes JIMENA Mathew CNP   aspirin 81 MG chewable tablet Take 1 tablet by mouth daily Yes JIMENA Mathew CNP   lisinopril (PRINIVIL;ZESTRIL) 2.5 MG tablet Take 1 tablet by mouth daily Yes JIMENA Mathew CNP   carvedilol (COREG) 3.125 MG tablet Take 1 tablet by mouth 2 times daily (with meals) Yes Dimple Dilling, APRN - CNP   ticagrelor (BRILINTA) 90 MG TABS tablet Take 1 tablet by mouth 2 times daily Yes Dimple Dilling, APRN - CNP      Scheduled Meds:   famotidine (PEPCID) injection  20 mg Intravenous Daily    amiodarone  200 mg Oral TID    oxymetazoline  2 spray Each Nostril Once    lidocaine   Topical Once    sodium chloride flush  10 mL Intravenous 2 times per day    enoxaparin  40 mg Subcutaneous Daily    aspirin  81 mg Oral Daily    carvedilol  3.125 mg Oral BID WC    rosuvastatin  20 mg Oral Nightly     Continuous Infusions:   sodium chloride Stopped (03/11/21 1856)    sodium chloride      sodium chloride       PRN Meds:phenol, metoprolol, sodium chloride flush, sodium chloride flush, HYDROmorphone, perflutren lipid microspheres, sodium chloride, potassium chloride **OR** potassium alternative oral replacement **OR** potassium chloride, promethazine **OR** ondansetron, magnesium hydroxide, acetaminophen **OR** acetaminophen, hydrALAZINE, sodium chloride, sodium chloride     Past Medical History:  Past Medical History:   Diagnosis Date    Anemia     CAD (coronary artery disease) 11/2020    Stent    CKD (chronic kidney disease)     Hyperlipidemia     Hypertension         Past Surgical History:    has a past surgical history that includes fracture surgery; Cystocopy (11/21/13); Upper gastrointestinal endoscopy (N/A, 3/5/2021); Colonoscopy (N/A, 3/5/2021); hemicolectomy (Right, 3/8/2021); and Cholecystectomy, laparoscopic (N/A, 3/8/2021). Social History:  Reviewed. reports that she has never smoked. She has never used smokeless tobacco. She reports that she does not drink alcohol or use drugs. Family History:  Reviewed. family history includes Heart Disease in her father.  Denies family history of sudden cardiac death, arrhythmia, premature CAD    Review of Systems:  · Constitutional: Negative for fever, night sweats, chills, weight changes, or weakness  · Skin: Negative for rash, dry skin, pruritus, bruising, bleeding, blood clots, or changes in skin pigment  · HEENT: Negative for vision changes, ringing in the ears, sore throat, dysphagia, or swollen lymph nodes  · Respiratory: Reviewed in HPI  · Cardiovascular: Reviewed in HPI  · Gastrointestinal: Negative for abdominal pain, N/V/D, constipation, or black/tarry stools  · Genito-Urinary: Negative for dysuria, incontinence, urgency, or hematuria  · Musculoskeletal: Negative for joint swelling, muscle pain, or injuries  · Neurological/Psych: Negative for confusion, seizures, headaches, balance issues or TIA-like symptoms. No anxiety, depression, or insomnia    Physical Examination:  Vitals:    03/13/21 1215   BP: (!) 187/136   Pulse: 106   Resp: 30   Temp: 97.2 °F (36.2 °C)   SpO2: 94%      In: 50 [NG/GT:50]  Out: 2400    Wt Readings from Last 3 Encounters:   03/12/21 229 lb 8 oz (104.1 kg)   03/01/21 235 lb 9.6 oz (106.9 kg)   11/30/20 229 lb (103.9 kg)       Intake/Output Summary (Last 24 hours) at 3/13/2021 1232  Last data filed at 3/13/2021 0451  Gross per 24 hour   Intake 50 ml   Output 3375 ml   Net -3325 ml       Telemetry: Personally Reviewed  - Afib, 103   · Constitutional: Cooperative and in no apparent distress, and appears well nourished  · Skin: Warm and pink; no pallor, cyanosis, bruising, or clubbing  · HEENT: Symmetric and normocephalic. PERRL, EOM intact. · Cardiovascular: Irregular rate and rhythm. S1/S2 present without murmurs, rubs, or gallops. Peripheral pulses 2+, capillary refill < 3 seconds. No elevation of JVP. No peripheral edema  · Respiratory: Respirations symmetric and unlabored. Lungs clear to auscultation bilaterally, no wheezing, crackles, or rhonchi 2L O2  · Gastrointestinal: Abdomen soft and round. NG in place for decompression   · Musculoskeletal: Bilateral upper and lower extremity strength 5/5 with full ROM  · Neurologic/Psych: Awake and orientated to person, place and time.  Calm affect, appropriate mood    Pertinent labs, diagnostic, device, and imaging results reviewed as a part of this visit    Labs:    BMP:   Recent Labs     21  0506 219     140 143 147*   K 5.3*  5.2* 4.5 4.2   *  109 110 112*   CO2 20*  22 22 24   BUN 62*  65* 66* 64*   CREATININE 2.4*  2.3* 2.1* 1.7*     Estimated Creatinine Clearance: 30 mL/min (A) (based on SCr of 1.7 mg/dL (H)). CBC:   Recent Labs     21  050219   WBC 7.2 3.7* 5.4   HGB 8.6* 8.3* 8.8*   HCT 30.0* 27.0* 28.7*   MCV 79.5* 75.3* 74.7*    257 292     Thyroid: No results found for: TSH, M3TRJMI, X7VRLLO, THYROIDAB  Lipids:   Lab Results   Component Value Date    CHOL 155 2020    HDL 38 2020    TRIG 121 2020     LFTS:   Lab Results   Component Value Date    ALT 8 2021    AST 16 2021    ALKPHOS 79 2021    PROT 6.1 2021    AGRATIO 0.9 2021    BILITOT 0.5 2021     Cardiac Enzymes:   Lab Results   Component Value Date    CKTOTAL 41 2021    TROPONINI <0.01 03/10/2021    TROPONINI 0.03 2020     Coags:   Lab Results   Component Value Date    PROTIME 12.7 2021    INR 1.09 2021     ECG: 3/6/21  Atrial fibrillation     ECHO: 3/6/21   Normal left ventricle size, and systolic function with an estimated ejection fraction of 55-60%. Mild concentric left ventricular hypertrophy is present.   No regional wall motion abnormalities are seen.  Mild tricuspid regurgitation, RVSP is estimated at 38 mmhg.     Stress Test: None     Cath: 20  Findings:  Artery   Findings/Result  LM       Normal  LAD     95% mid  Cx        OM2 50%  RI         NA  RCA     20% prox, 20% mid  LVEDP            25  LVG     55%     Intervention:         PCI of LAD with [id number] REBECA (PD with 3.75NC)     Post Cath Dx:       Severe , nonobstructive otherwise     CT Chest: 3/5/21  Within the chest, small mediastinal nodes are seen, slightly increased   compared to prior, either reactive or metastatic.  Small pleural effusions   are seen, compatible with mild fluid overload.       Stable lobular pulmonary nodule in the left lower lobe.       Nodular wall thickening of the colon on the right, compatible with the given   history of colon mass.  Small mesenteric node is seen in this region,, likely   early desiree metastasis       Increase in size of ovarian-adnexal masses on the right and left.  Consider   pelvic ultrasound for further characterization.       Tiny nonobstructing right renal stone      NM Biliary: 3/11/21  No evident bile leak following cholecystectomy.  Slight reduction in the   amount of subhepatic fluid present since the earlier CT study 5 hours ago.          CT Abd: 3/11/21  New fluid collection in the gallbladder fossa with adjacent small amount of   fluid around the liver.  This may be residual fluid from the recent surgery   or related to biliary leak.  Consider HIDA scan evaluation       There are 2-3 tiny gallstones within the gallbladder fossa fluid collection,   measuring 3 mm and less in size.       Increased size of mild-moderate right pleural effusion with increased   adjacent compressive atelectasis of the right lung base. Problem List:   Patient Active Problem List    Diagnosis Date Noted    Atrial fibrillation ([address] 03/10/2021    Colonic mass     Diastolic dysfunction 02/39/7361    Hypokalemia 03/07/2021    Malignant neoplasm of ascending colon ([address] 03/07/2021    Anemia 03/03/2021    CAD (coronary artery disease) 03/03/2021    High cholesterol 03/03/2021    Hypertension 01/10/2014      Assessment and Plan:     PAF  ~ afib on the monitor,    ~ coreg when able to take PO  ~ amio gtt stopped   ~ No AC d/t anemia and possible need for surgery. Plan for [address] [place] [address] once OK with surgical team and hgb stable.      Bradycardia   ~ noted at night time while sleeping  ~ would benefit from OP sleep study    LBBB  ~ noted on EKG  ~ will monitor     Colon mass  ~ s/p hemicolectomry surgery (3/8/21)   ~ managed per general surgery    CAD  ~ hx of PCI to LAD (11/20)  ~ stable, no complaints of angina   ~ Brillinta on hold     Anemia  ~ hgb stable this am at 8.8    YESENIA/hyperkalemia  ~ K normalized at 4.2 today   ~ creat improving, 1.7 today  ~ nephrology on board     Multiple medical conditions with risk of decompensation. All pertinent information and plan of care discussed with the physician. All questions and concerns were addressed to the patient. Alternatives to my treatment were discussed. I have discussed the above stated plan with patient and the nurse. The patient verbalized understanding and agreed with the plan. Thank you for allowing to us to participate in the care of [address] [place] [address].     Brice HESS-CNP  [facility]   Office: [phone]

## 2021-03-13 NOTE — PROGRESS NOTES
[address] and Laparoscopic Surgery        Progress Note    Patient Name: Angelo Ornelas  MRN: [id number]  YOB: 1940  Date of Evaluation: 3/13/2021    Subjective:  No acute events overnight  Pain and nausea better with NG  Passing flatus, no stool  Back in atrial fibrillation this morning    Post-Operative Day #5    Vital Signs:  Patient Vitals for the past 24 hrs:   BP Temp Temp src Pulse Resp SpO2   21 0731 (!) 147/69 98 °F (36.7 °C) Oral 82 18 96 %   21 0452 -- -- -- 81 -- --   21 0348 (!) 157/65 97.9 °F (36.6 °C) Oral 93 16 97 %   21 0201 -- -- -- 118 -- --   21 0023 126/67 98.9 °F (37.2 °C) Oral 83 16 95 %   21 2201 (!) 155/70 -- -- 88 -- --   21 2115 -- -- -- 85 -- --   21 132/82 97.9 °F (36.6 °C) Oral 112 20 96 %   21 1611 138/69 97.5 °F (36.4 °C) Oral 94 16 97 %   21 1222 139/66 97.6 °F (36.4 °C) Oral 85 18 --      TEMPERATURE HISTORY 24H: Temp (24hrs), Av °F (36.7 °C), Min:97.5 °F (36.4 °C), Max:98.9 °F (37.2 °C)    BLOOD PRESSURE HISTORY: Systolic (34KCP), SE , Min:115 , RAQ:794    Diastolic (22OJO), AJR:43, Min:56, Max:82      Intake/Output:  I/O last 3 completed shifts: In: 50 [NG/GT:50]  Out:  [Urine:2875; Emesis/NG output:500]  No intake/output data recorded.   Drain/tube Output:       Physical Exam:  General: awake, alert, oriented to  person, place, time  Abdomen: soft, mildly distended, appropriate incisional tenderness, incisions clean dry and intact    Labs:  CBC:    Recent Labs     21  0445 21  0506 21  0459   WBC 7.2 3.7* 5.4   HGB 8.6* 8.3* 8.8*   HCT 30.0* 27.0* 28.7*    257 292     BMP:    Recent Labs     215 21  0506 21  0459     140 143 147*   K 5.3*  5.2* 4.5 4.2   *  109 110 112*   CO2 20*  22 22 24   BUN 62*  65* 66* 64*   CREATININE 2.4*  2.3* 2.1* 1.7*   GLUCOSE 113*  114* 122* 123*     Hepatic:    Recent Labs 03/11/21  0445   AST 16   ALT 8*   BILITOT 0.5   ALKPHOS 79     Amylase:  No results found for: AMYLASE  Lipase:    Lab Results   Component Value Date    LIPASE 25.0 11/20/2020      Mag:    Lab Results   Component Value Date    MG 2.10 03/04/2021    MG 2.10 03/04/2021     Phos:     Lab Results   Component Value Date    PHOS 3.8 05/29/2015    PHOS 4.0 05/28/2015      Coags:   Lab Results   Component Value Date    PROTIME 12.7 03/08/2021    INR 1.09 03/08/2021    APTT 30.4 03/03/2021       Cultures:  Anaerobic culture  No results found for: LABANAE  Fungus stain  No results found for requested labs within last 30 days. Gram stain  No results found for requested labs within last 30 days. Organism  No results found for: [facility]  Surgical culture  No results found for: CXSURG  Blood culture 1  No results found for requested labs within last 30 days. Blood culture 2  No results found for requested labs within last 30 days. Fecal occult  Results in Past 30 Days  Result Component Current Result Ref Range Previous Result Ref Range   Occult Blood Diagnostic Result: Negative  Normal range: Negative   (3/3/2021)  Not in Time Range      GI bacterial pathogens by PCR  No results found for requested labs within last 30 days. C. difficile  No results found for requested labs within last 30 days. Urine culture  No results found for: LABURIN    Pathology:  OR 3/8/2021--FINAL DIAGNOSIS:     Right colon and small bowel, segmental resection:   - Invasive colonic adenocarcinoma, moderately differentiated. - Margins not involved. - One pericolonic lymph nodes positive for metastatic carcinoma (1/16). Gallbladder, cholecystectomy:   - Chronic cholecystitis, moderate. - Cholelithiasis. - Cholesterolosis.      Procedure:  Right hemicolectomy   Tumor Site: Ileocecal valve   Tumor Size: Greatest dimension (centimeter): 4.2        Additional dimensions (centimeters): 3.7 x 0.8   Macroscopic tumor perforation: Not identified   Histologic Type: Adenocarcinoma   Histologic Grade: G2: Moderately   Tumor Extension: Tumor invades through muscularis propria into subserosal   adipose tissue. MARGINS   All margins are uninvolved by invasive carcinoma, high grade dysplasia /   intramucosal carcinoma, and low grade dysplasia   Margins examined: Proximal, distal, and mesenteric    + Distance of invasive carcinoma from closest margin (millimeters or   centimeters): 40 mm    + Specify closest margin: Distal     Treatment Effect (applicable to carcinomas treated with neoadjuvant   therapy): No known pre surgical treatment   Lymph-Vascular Invasion: Not identified   Perineural Invasion: Not identified     + Tumor Budding (Note I)             Low score (0-4)   Type of Polyp in Which Invasive Carcinoma Arose: Not applicable   Tumor deposits: Absent     Regional lymph nodes     Number of Lymph nodes Involved: 1     Number of Lymph Nodes Examined: 16     Pathologic Stage Classification (pTNM, AJCC 8th Edition)     Primary Tumor (pT):      pT 3: Tumor invades through muscularis propria into pericolonic fat     Regional Lymph Nodes (pN):     pN 1a: Metastasis in 1 lymph node     + Additional pathologic findings: Histologically unremarkable appendix. + Ancillary studies: Not applicable     Imaging:  I have personally reviewed the following films:    Ct Abdomen Pelvis Wo Contrast Additional Contrast? None    Result Date: 3/11/2021  EXAMINATION: CT OF THE ABDOMEN AND PELVIS WITHOUT CONTRAST 3/11/2021 12:05 pm TECHNIQUE: CT of the abdomen and pelvis was performed without the administration of intravenous contrast. Multiplanar reformatted images are provided for review. Dose modulation, iterative reconstruction, and/or weight based adjustment of the mA/kV was utilized to reduce the radiation dose to as low as reasonably achievable.  COMPARISON: CT of the chest abdomen and pelvis March 5, 2020 HISTORY: ORDERING SYSTEM PROVIDED HISTORY: LUIS pain TECHNOLOGIST PROVIDED HISTORY: Reason for exam:->RUQ pain Additional Contrast?->None Reason for Exam: RUQ pain Acuity: Unknown Type of Exam: Unknown FINDINGS: Lower Chest: Increased mild-moderate right pleural effusion with adjacent compressive atelectasis. Unchanged trace amount of left pleural fluid. Organs: In the gallbladder fossa there is a fluid collection measuring 4.2 x 2.3 by 2.4 cm. Along the inferior-lateral aspect of the fluid collection a few tiny curvilinear/rounded high-density foci are present measuring 3 mm and less in size best shown on coronal image 90. Cholecystectomy clips noted in the expected location. There is a small amount of fluid along the inferior margin of the right hepatic lobe anterior laterally. Small amount of fluid between the diaphragm and liver also present. No pancreatitis. No ureteral stone or hydronephrosis. 2 mm right renal stone again noted. GI/Bowel: There is new fluid-filled small bowel dilation involving proximal to mid small bowel loops measuring up to 3 cm. There is distal small bowel collapse extending to the surgical site. Oral contrast within the colon from the oral contrast given for the comparison. No oral contrast within small bowel at this time. Pelvis: No acute abnormality of the pelvis. Normal appearance of the bladder. Peritoneum/Retroperitoneum: Small amount of free fluid in the right upper quadrant as discussed. No free air. Bones/Soft Tissues: Expected postsurgical changes of the abdominal wall. No acute osseous findings. New fluid collection in the gallbladder fossa with adjacent small amount of fluid around the liver. This may be residual fluid from the recent surgery or related to biliary leak. Consider HIDA scan evaluation There are 2-3 tiny gallstones within the gallbladder fossa fluid collection, measuring 3 mm and less in size.  Increased size of mild-moderate right pleural effusion with increased adjacent compressive atelectasis of the right lung base. Nm Hepatobiliary    Result Date: 3/11/2021  EXAMINATION: NUCLEAR MEDICINE HEPATOBILIARY SCINTIGRAPHY (HIDA SCAN). 3/11/2021 2:40 pm TECHNIQUE: Approximately 0.28 [id number] Mebrofenin (Choletec) was administered IV. Then, dynamic images of the abdomen were obtained in the anterior projection for 60 mins. A right lateral view was also obtained at 60 mins. COMPARISON: CT abdomen and pelvis 03/11/2021. HISTORY: ORDERING SYSTEM PROVIDED HISTORY: R/o bile leak s/p renu TECHNOLOGIST PROVIDED HISTORY: Reason for exam:->R/o bile leak s/p renu Reason for Exam: R/O Bile Leak Acuity: Acute Type of Exam: Ongoing FINDINGS: Prompt, homogenous uptake by the liver is noted with normal appearance of radiotracer excretion into the biliary system. Clearance of bloodpool activity appears appropriate. During the 1st 35 minutes of the study, the common duct is normal in size and appearance. Beginning at 40 minutes, accumulation of radio activity which overlies the common duct is seen. .  This accumulation most likely corresponds to the proximal duodenum as the abnormal fluid collection on the CT scan lies more laterally. Gallbladder is surgically absent. Delayed planar images as well as SPECT images were obtained due to the confusing appearance on the initial images. These latter images do not show any accumulation of activity in the subhepatic region. The only activity is seen within the small bowel confirming that there is no bile leak present. The CT images also show a slight decrease in the amount of fluid in the gallbladder fossa. No evident bile leak following cholecystectomy. Slight reduction in the amount of subhepatic fluid present since the earlier CT study 5 hours ago.      Scheduled Meds:   famotidine (PEPCID) injection  20 mg Intravenous Daily    amiodarone  200 mg Oral TID    oxymetazoline  2 spray Each Nostril Once    lidocaine   Topical Once    sodium chloride flush  10 mL Intravenous 2 times per day    enoxaparin  40 mg Subcutaneous Daily    aspirin  81 mg Oral Daily    carvedilol  3.125 mg Oral BID WC    rosuvastatin  20 mg Oral Nightly     Continuous Infusions:   sodium chloride Stopped (03/11/21 1856)    sodium chloride      sodium chloride       PRN Meds:.phenol, metoprolol, sodium chloride flush, sodium chloride flush, HYDROmorphone, perflutren lipid microspheres, sodium chloride, potassium chloride **OR** potassium alternative oral replacement **OR** potassium chloride, promethazine **OR** ondansetron, magnesium hydroxide, acetaminophen **OR** acetaminophen, hydrALAZINE, sodium chloride, sodium chloride      Assessment:  68 y.o. female admitted with   1. Anemia, unspecified type      Ms. Shyanne Oliveira is a 68 y.o. female who presents with   Status-post laparoscopic right colon resection and cholecystectomy on 3/8/2021 for colon mass, chronic cholecystitis with cholelithiasis   Anemia  Acute kidney injury  Hypertension  CAD  Paroxysmal atrial fibrillation  Medical coagulopathy, on Brilinta  Ileus    Plan:  1. Pain control, monitor  2. NG decompression, bowel rest  3. IVF, monitor and replace electrolytes  4. Monitor bowel function, passing small flatus  5. Activity as tolerated, pulmonary toilet, incentive spirometry, monitor productive cough  6. Hold anticoagulation  7. Defer management of remainder of medical comorbidities to primary and consulting teams    Marlo Weems MD, FACS  3/13/2021  11:37 AM

## 2021-03-13 NOTE — PROGRESS NOTES
NG placed left johnston. Anchored with nose bandage and clip @ 51 cm. Pt tolerated well. Xray verified placement. Hooked to intermittent suction per order. Green liquid drainage returned to canister.

## 2021-03-14 LAB
ANION GAP SERPL CALCULATED.3IONS-SCNC: 12 MMOL/L (ref 3–16)
BASOPHILS ABSOLUTE: 0 K/UL (ref 0–0.2)
BASOPHILS RELATIVE PERCENT: 0.1 %
BUN BLDV-MCNC: 61 MG/DL (ref 7–20)
CALCIUM SERPL-MCNC: 9.4 MG/DL (ref 8.3–10.6)
CHLORIDE BLD-SCNC: 115 MMOL/L (ref 99–110)
CO2: 23 MMOL/L (ref 21–32)
CREAT SERPL-MCNC: 1.3 MG/DL (ref 0.6–1.2)
EOSINOPHILS ABSOLUTE: 0 K/UL (ref 0–0.6)
EOSINOPHILS RELATIVE PERCENT: 0.1 %
GFR AFRICAN AMERICAN: 48
GFR NON-AFRICAN AMERICAN: 40
GLUCOSE BLD-MCNC: 117 MG/DL (ref 70–99)
HCT VFR BLD CALC: 31.1 % (ref 36–48)
HEMOGLOBIN: 9.3 G/DL (ref 12–16)
LYMPHOCYTES ABSOLUTE: 1.1 K/UL (ref 1–5.1)
LYMPHOCYTES RELATIVE PERCENT: 12.6 %
MAGNESIUM: 2.5 MG/DL (ref 1.8–2.4)
MCH RBC QN AUTO: 22.1 PG (ref 26–34)
MCHC RBC AUTO-ENTMCNC: 29.8 G/DL (ref 31–36)
MCV RBC AUTO: 74.1 FL (ref 80–100)
MONOCYTES ABSOLUTE: 1.1 K/UL (ref 0–1.3)
MONOCYTES RELATIVE PERCENT: 11.7 %
NEUTROPHILS ABSOLUTE: 6.8 K/UL (ref 1.7–7.7)
NEUTROPHILS RELATIVE PERCENT: 75.5 %
PDW BLD-RTO: 25.9 % (ref 12.4–15.4)
PHOSPHORUS: 2.8 MG/DL (ref 2.5–4.9)
PLATELET # BLD: 309 K/UL (ref 135–450)
PMV BLD AUTO: 8.6 FL (ref 5–10.5)
POTASSIUM SERPL-SCNC: 4 MMOL/L (ref 3.5–5.1)
RBC # BLD: 4.19 M/UL (ref 4–5.2)
SODIUM BLD-SCNC: 150 MMOL/L (ref 136–145)
WBC # BLD: 9 K/UL (ref 4–11)

## 2021-03-14 PROCEDURE — 99024 POSTOP FOLLOW-UP VISIT: CPT | Performed by: SURGERY

## 2021-03-14 PROCEDURE — 36415 COLL VENOUS BLD VENIPUNCTURE: CPT

## 2021-03-14 PROCEDURE — 84100 ASSAY OF PHOSPHORUS: CPT

## 2021-03-14 PROCEDURE — 2060000000 HC ICU INTERMEDIATE R&B

## 2021-03-14 PROCEDURE — 85025 COMPLETE CBC W/AUTO DIFF WBC: CPT

## 2021-03-14 PROCEDURE — 6360000002 HC RX W HCPCS: Performed by: NURSE PRACTITIONER

## 2021-03-14 PROCEDURE — 2580000003 HC RX 258: Performed by: NURSE PRACTITIONER

## 2021-03-14 PROCEDURE — 83735 ASSAY OF MAGNESIUM: CPT

## 2021-03-14 PROCEDURE — 6370000000 HC RX 637 (ALT 250 FOR IP): Performed by: NURSE PRACTITIONER

## 2021-03-14 PROCEDURE — 2580000003 HC RX 258: Performed by: INTERNAL MEDICINE

## 2021-03-14 PROCEDURE — 6370000000 HC RX 637 (ALT 250 FOR IP): Performed by: SURGERY

## 2021-03-14 PROCEDURE — 94760 N-INVAS EAR/PLS OXIMETRY 1: CPT

## 2021-03-14 PROCEDURE — 2700000000 HC OXYGEN THERAPY PER DAY

## 2021-03-14 PROCEDURE — 80048 BASIC METABOLIC PNL TOTAL CA: CPT

## 2021-03-14 PROCEDURE — 99232 SBSQ HOSP IP/OBS MODERATE 35: CPT | Performed by: NURSE PRACTITIONER

## 2021-03-14 PROCEDURE — 2500000003 HC RX 250 WO HCPCS: Performed by: INTERNAL MEDICINE

## 2021-03-14 RX ADMIN — AMIODARONE HYDROCHLORIDE 200 MG: 200 TABLET ORAL at 20:22

## 2021-03-14 RX ADMIN — AMIODARONE HYDROCHLORIDE 200 MG: 200 TABLET ORAL at 13:14

## 2021-03-14 RX ADMIN — ASPIRIN 81 MG: 81 TABLET, COATED ORAL at 08:10

## 2021-03-14 RX ADMIN — ROSUVASTATIN 20 MG: 20 TABLET, FILM COATED ORAL at 20:22

## 2021-03-14 RX ADMIN — Medication 10 ML: at 08:24

## 2021-03-14 RX ADMIN — ENOXAPARIN SODIUM 40 MG: 40 INJECTION SUBCUTANEOUS at 08:12

## 2021-03-14 RX ADMIN — CARVEDILOL 3.12 MG: 3.12 TABLET, FILM COATED ORAL at 08:10

## 2021-03-14 RX ADMIN — AMIODARONE HYDROCHLORIDE 200 MG: 200 TABLET ORAL at 08:10

## 2021-03-14 RX ADMIN — FAMOTIDINE 20 MG: 10 INJECTION, SOLUTION INTRAVENOUS at 08:12

## 2021-03-14 RX ADMIN — CARVEDILOL 3.12 MG: 3.12 TABLET, FILM COATED ORAL at 16:32

## 2021-03-14 RX ADMIN — SODIUM CHLORIDE: 9 INJECTION, SOLUTION INTRAVENOUS at 10:33

## 2021-03-14 RX ADMIN — Medication 10 ML: at 20:22

## 2021-03-14 ASSESSMENT — PAIN SCALES - GENERAL
PAINLEVEL_OUTOF10: 0
PAINLEVEL_OUTOF10: 0

## 2021-03-14 NOTE — PROGRESS NOTES
[facility]        Progress Note    Patient Name: Roseanne Ch  MRN: [id number]  YOB: 1940  Date of Evaluation: 3/14/2021    Subjective:  No acute events overnight  Denies pain  Denies nausea  Passing flatus and stool    Post-Operative Day #6    Vital Signs:  Patient Vitals for the past 24 hrs:   BP Temp Temp src Pulse Resp SpO2   21 0800 123/68 98.4 °F (36.9 °C) Axillary 85 22 96 %   21 0426 138/76 98 °F (36.7 °C) Oral 88 20 95 %   21 2359 (!) 155/81 97.6 °F (36.4 °C) Oral 96 20 97 %   21 2220 (!) 151/76 -- -- 92 -- 95 %   21 2045 (!) 147/63 97.9 °F (36.6 °C) Oral 80 20 97 %   21 1630 133/85 97.8 °F (36.6 °C) Oral 95 26 96 %   21 1230 134/71 97.9 °F (36.6 °C) Oral 115 18 95 %   21 1215 (!) 187/136 97.2 °F (36.2 °C) Oral 106 30 94 %      TEMPERATURE HISTORY 24H: Temp (24hrs), Av.8 °F (36.6 °C), Min:97.2 °F (36.2 °C), Max:98.4 °F (36.9 °C)    BLOOD PRESSURE HISTORY: Systolic (14EUH), RII:568 , Min:123 , OZN:578    Diastolic (90FNV), CA, Min:63, Max:136      Intake/Output:  I/O last 3 completed shifts:   In: 360 [P.O.:360]  Out: 1950 [Urine:1800; Emesis/NG output:150]  I/O this shift:  In: 10 [I.V.:10]  Out: 36 [Urine:320; Emesis/NG output:500]  Drain/tube Output:       Physical Exam:  General: awake, alert, oriented to  person, place, time  Abdomen: soft, mildly distended, appropriate incisional tenderness, incisions clean dry and intact    Labs:  CBC:    Recent Labs     21  0506 21  0459 21  0542   WBC 3.7* 5.4 9.0   HGB 8.3* 8.8* 9.3*   HCT 27.0* 28.7* 31.1*    292 309     BMP:    Recent Labs     21  0506 21  0459 21  0542    147* 150*   K 4.5 4.2 4.0    112* 115*   CO2 22 24 23   BUN 66* 64* 61*   CREATININE 2.1* 1.7* 1.3*   GLUCOSE 122* 123* 117*     Hepatic:    No results for input(s): AST, ALT, ALB, BILITOT, ALKPHOS in the last 72 hours. Amylase:  No results found for: AMYLASE  Lipase:    Lab Results   Component Value Date    LIPASE 25.0 11/20/2020      Mag:    Lab Results   Component Value Date    MG 2.50 03/14/2021    MG 2.10 03/04/2021     Phos:     Lab Results   Component Value Date    PHOS 2.8 03/14/2021    PHOS 3.8 05/29/2015      Coags:   Lab Results   Component Value Date    PROTIME 12.7 03/08/2021    INR 1.09 03/08/2021    APTT 30.4 03/03/2021       Cultures:  Anaerobic culture  No results found for: LABANAE  Fungus stain  No results found for requested labs within last 30 days. Gram stain  No results found for requested labs within last 30 days. Organism  No results found for: [facility]  Surgical culture  No results found for: CXSURG  Blood culture 1  No results found for requested labs within last 30 days. Blood culture 2  No results found for requested labs within last 30 days. Fecal occult  Results in Past 30 Days  Result Component Current Result Ref Range Previous Result Ref Range   Occult Blood Diagnostic Result: Negative  Normal range: Negative   (3/3/2021)  Not in Time Range      GI bacterial pathogens by PCR  No results found for requested labs within last 30 days. C. difficile  No results found for requested labs within last 30 days. Urine culture  No results found for: LABURIN    Pathology:  OR 3/8/2021--FINAL DIAGNOSIS:     Right colon and small bowel, segmental resection:   - Invasive colonic adenocarcinoma, moderately differentiated. - Margins not involved. - One pericolonic lymph nodes positive for metastatic carcinoma (1/16). Gallbladder, cholecystectomy:   - Chronic cholecystitis, moderate. - Cholelithiasis. - Cholesterolosis.      Procedure:  Right hemicolectomy   Tumor Site: Ileocecal valve   Tumor Size: Greatest dimension (centimeter): 4.2        Additional dimensions (centimeters): 3.7 x 0.8   Macroscopic tumor perforation: Not identified   Histologic Type: Adenocarcinoma Histologic Grade: G2: Moderately   Tumor Extension: Tumor invades through muscularis propria into subserosal   adipose tissue. MARGINS   All margins are uninvolved by invasive carcinoma, high grade dysplasia /   intramucosal carcinoma, and low grade dysplasia   Margins examined: Proximal, distal, and mesenteric    + Distance of invasive carcinoma from closest margin (millimeters or   centimeters): 40 mm    + Specify closest margin: Distal     Treatment Effect (applicable to carcinomas treated with neoadjuvant   therapy): No known pre surgical treatment   Lymph-Vascular Invasion: Not identified   Perineural Invasion: Not identified     + Tumor Budding (Note I)             Low score (0-4)   Type of Polyp in Which Invasive Carcinoma Arose: Not applicable   Tumor deposits: Absent     Regional lymph nodes     Number of Lymph nodes Involved: 1     Number of Lymph Nodes Examined: 16     Pathologic Stage Classification (pTNM, AJCC 8th Edition)     Primary Tumor (pT):      pT 3: Tumor invades through muscularis propria into pericolonic fat     Regional Lymph Nodes (pN):     pN 1a: Metastasis in 1 lymph node     + Additional pathologic findings: Histologically unremarkable appendix. + Ancillary studies: Not applicable     Imaging:  I have personally reviewed the following films:    Ct Abdomen Pelvis Wo Contrast Additional Contrast? None    Result Date: 3/11/2021  EXAMINATION: CT OF THE ABDOMEN AND PELVIS WITHOUT CONTRAST 3/11/2021 12:05 pm TECHNIQUE: CT of the abdomen and pelvis was performed without the administration of intravenous contrast. Multiplanar reformatted images are provided for review. Dose modulation, iterative reconstruction, and/or weight based adjustment of the mA/kV was utilized to reduce the radiation dose to as low as reasonably achievable.  COMPARISON: CT of the chest abdomen and pelvis March 5, 2020 HISTORY: ORDERING SYSTEM PROVIDED HISTORY: [facility] pain TECHNOLOGIST PROVIDED HISTORY: Reason for exam:->RUQ pain Additional Contrast?->None Reason for Exam: RUQ pain Acuity: Unknown Type of Exam: Unknown FINDINGS: Lower Chest: Increased mild-moderate right pleural effusion with adjacent compressive atelectasis. Unchanged trace amount of left pleural fluid. Organs: In the gallbladder fossa there is a fluid collection measuring 4.2 x 2.3 by 2.4 cm. Along the inferior-lateral aspect of the fluid collection a few tiny curvilinear/rounded high-density foci are present measuring 3 mm and less in size best shown on coronal image 90. Cholecystectomy clips noted in the expected location. There is a small amount of fluid along the inferior margin of the right hepatic lobe anterior laterally. Small amount of fluid between the diaphragm and liver also present. No pancreatitis. No ureteral stone or hydronephrosis. 2 mm right renal stone again noted. GI/Bowel: There is new fluid-filled small bowel dilation involving proximal to mid small bowel loops measuring up to 3 cm. There is distal small bowel collapse extending to the surgical site. Oral contrast within the colon from the oral contrast given for the comparison. No oral contrast within small bowel at this time. Pelvis: No acute abnormality of the pelvis. Normal appearance of the bladder. Peritoneum/Retroperitoneum: Small amount of free fluid in the right upper quadrant as discussed. No free air. Bones/Soft Tissues: Expected postsurgical changes of the abdominal wall. No acute osseous findings. New fluid collection in the gallbladder fossa with adjacent small amount of fluid around the liver. This may be residual fluid from the recent surgery or related to biliary leak. Consider HIDA scan evaluation There are 2-3 tiny gallstones within the gallbladder fossa fluid collection, measuring 3 mm and less in size. Increased size of mild-moderate right pleural effusion with increased adjacent compressive atelectasis of the right lung base.      Nm Hepatobiliary    Result Date: 3/11/2021  EXAMINATION: NUCLEAR MEDICINE HEPATOBILIARY SCINTIGRAPHY (HIDA SCAN). 3/11/2021 2:40 pm TECHNIQUE: Approximately 9.13 [id number] Mebrofenin (Choletec) was administered IV. Then, dynamic images of the abdomen were obtained in the anterior projection for 60 mins. A right lateral view was also obtained at 60 mins. COMPARISON: CT abdomen and pelvis 03/11/2021. HISTORY: ORDERING SYSTEM PROVIDED HISTORY: R/o bile leak s/p renu TECHNOLOGIST PROVIDED HISTORY: Reason for exam:->R/o bile leak s/p renu Reason for Exam: R/O Bile Leak Acuity: Acute Type of Exam: Ongoing FINDINGS: Prompt, homogenous uptake by the liver is noted with normal appearance of radiotracer excretion into the biliary system. Clearance of bloodpool activity appears appropriate. During the 1st 35 minutes of the study, the common duct is normal in size and appearance. Beginning at 40 minutes, accumulation of radio activity which overlies the common duct is seen. .  This accumulation most likely corresponds to the proximal duodenum as the abnormal fluid collection on the CT scan lies more laterally. Gallbladder is surgically absent. Delayed planar images as well as SPECT images were obtained due to the confusing appearance on the initial images. These latter images do not show any accumulation of activity in the subhepatic region. The only activity is seen within the small bowel confirming that there is no bile leak present. The CT images also show a slight decrease in the amount of fluid in the gallbladder fossa. No evident bile leak following cholecystectomy. Slight reduction in the amount of subhepatic fluid present since the earlier CT study 5 hours ago.      Scheduled Meds:   famotidine (PEPCID) injection  20 mg Intravenous Daily    amiodarone  200 mg Oral TID    oxymetazoline  2 spray Each Nostril Once    lidocaine   Topical Once    sodium chloride flush  10 mL Intravenous 2 times per day  enoxaparin  40 mg Subcutaneous Daily    aspirin  81 mg Oral Daily    carvedilol  3.125 mg Oral BID WC    rosuvastatin  20 mg Oral Nightly     Continuous Infusions:   sodium chloride 125 mL/hr at 03/14/21 1033    sodium chloride      sodium chloride       PRN Meds:.phenol, metoprolol, sodium chloride flush, sodium chloride flush, HYDROmorphone, perflutren lipid microspheres, sodium chloride, potassium chloride **OR** potassium alternative oral replacement **OR** potassium chloride, promethazine **OR** ondansetron, magnesium hydroxide, acetaminophen **OR** acetaminophen, hydrALAZINE, sodium chloride, sodium chloride      Assessment:  68 y.o. female admitted with   1. Anemia, unspecified type      Ms. Rocky Lentz is a 68 y.o. female who presents with   Status-post laparoscopic right colon resection and cholecystectomy on 3/8/2021 for colon mass, chronic cholecystitis with cholelithiasis   Anemia  Acute kidney injury  Hypertension  CAD  Paroxysmal atrial fibrillation  Medical coagulopathy, on Brilinta  Ileus    Plan:  1. Pain control, monitor  2. Remove NG, clear liquids  3. Monitor bowel function, passing stool  4. IVF, monitor and replace electrolytes  5. Activity as tolerated, pulmonary toilet, incentive spirometry, monitor productive cough  6. Hold anticoagulation  7. Defer management of remainder of medical comorbidities to primary and consulting teams    Marlo Paiz MD, FACS  3/14/2021  10:54 AM

## 2021-03-14 NOTE — PROGRESS NOTES
Patient assisted to bathroom for BM. Patient bathed, gown and brief changed by Nanci Jain. IVF restarted. Patient requesting to walk in hallway if MD allows and have NG removed.

## 2021-03-14 NOTE — PROGRESS NOTES
Pt declines offers of assistance to get out of bed into chair this evening. Pt agreeable to get out of bed and into chair tomorrow AM on the next shift. Pt daughter at bedside for this conversation. Pt and daughter agreeable for staff to assist w/ pulling pt up in bed and repositioning at this time.

## 2021-03-14 NOTE — PROGRESS NOTES
by mouth 2 times daily (with meals) Yes JIMENA Saldivar CNP   ticagrelor (BRILINTA) 90 MG TABS tablet Take 1 tablet by mouth 2 times daily Yes JIMENA Saldivar CNP      Scheduled Meds:   famotidine (PEPCID) injection  20 mg Intravenous Daily    amiodarone  200 mg Oral TID    oxymetazoline  2 spray Each Nostril Once    lidocaine   Topical Once    sodium chloride flush  10 mL Intravenous 2 times per day    enoxaparin  40 mg Subcutaneous Daily    aspirin  81 mg Oral Daily    carvedilol  3.125 mg Oral BID WC    rosuvastatin  20 mg Oral Nightly     Continuous Infusions:   sodium chloride 125 mL/hr at 03/14/21 1033    sodium chloride      sodium chloride       PRN Meds:phenol, metoprolol, sodium chloride flush, sodium chloride flush, HYDROmorphone, perflutren lipid microspheres, sodium chloride, potassium chloride **OR** potassium alternative oral replacement **OR** potassium chloride, promethazine **OR** ondansetron, magnesium hydroxide, acetaminophen **OR** acetaminophen, hydrALAZINE, sodium chloride, sodium chloride     Past Medical History:  Past Medical History:   Diagnosis Date    Anemia     CAD (coronary artery disease) 11/2020    Stent    CKD (chronic kidney disease)     Hyperlipidemia     Hypertension         Past Surgical History:    has a past surgical history that includes fracture surgery; Cystocopy (11/21/13); Upper gastrointestinal endoscopy (N/A, 3/5/2021); Colonoscopy (N/A, 3/5/2021); hemicolectomy (Right, 3/8/2021); and Cholecystectomy, laparoscopic (N/A, 3/8/2021). Social History:  Reviewed. reports that she has never smoked. She has never used smokeless tobacco. She reports that she does not drink alcohol or use drugs. Family History:  Reviewed. family history includes Heart Disease in her father.  Denies family history of sudden cardiac death, arrhythmia, premature CAD    Review of Systems:  · Constitutional: Negative for fever, night sweats, chills, weight affect, appropriate mood    Pertinent labs, diagnostic, device, and imaging results reviewed as a part of this visit    Labs:    BMP:   Recent Labs     21  0506 21  0459 21  0542    147* 150*   K 4.5 4.2 4.0    112* 115*   CO2 22 24 23   PHOS  --   --  2.8   BUN 66* 64* 61*   CREATININE 2.1* 1.7* 1.3*   MG  --   --  2.50*     Estimated Creatinine Clearance: 39 mL/min (A) (based on SCr of 1.3 mg/dL (H)). CBC:   Recent Labs     21  0506 21  0459 21  0542   WBC 3.7* 5.4 9.0   HGB 8.3* 8.8* 9.3*   HCT 27.0* 28.7* 31.1*   MCV 75.3* 74.7* 74.1*    292 309     Thyroid: No results found for: TSH, [id number], K1PTZDO, THYROIDAB  Lipids:   Lab Results   Component Value Date    CHOL 155 2020    HDL 38 2020    TRIG 121 2020     LFTS:   Lab Results   Component Value Date    ALT 8 2021    AST 16 2021    ALKPHOS 79 2021    PROT 6.1 2021    AGRATIO 0.9 2021    BILITOT 0.5 2021     Cardiac Enzymes:   Lab Results   Component Value Date    CKTOTAL 41 2021    TROPONINI <0.01 03/10/2021    TROPONINI 0.03 2020     Coags:   Lab Results   Component Value Date    PROTIME 12.7 2021    INR 1.09 2021     ECG: 3/6/21  Atrial fibrillation     ECHO: 3/6/21   Normal left ventricle size, and systolic function with an estimated ejection fraction of 55-60%. Mild concentric left ventricular hypertrophy is present.   No regional wall motion abnormalities are seen.  Mild tricuspid regurgitation, RVSP is estimated at 38 mmhg.     Stress Test: None     Cath: 20  Findings:  Artery   Findings/Result  LM       Normal  LAD     95% mid  Cx        OM2 50%  RI         NA  RCA     20% prox, 20% mid  LVEDP            25  LVG     55%     Intervention:         PCI of LAD with [id number] REBECA (PD with 3.75NC)     Post Cath Dx:       Severe , nonobstructive otherwise     CT Chest: 3/5/21  Within the chest, small mediastinal nodes are seen, slightly increased   compared to prior, either reactive or metastatic.  Small pleural effusions   are seen, compatible with mild fluid overload.       Stable lobular pulmonary nodule in the left lower lobe.       Nodular wall thickening of the colon on the right, compatible with the given   history of colon mass.  Small mesenteric node is seen in this region,, likely   early desiree metastasis       Increase in size of ovarian-adnexal masses on the right and left.  Consider   pelvic ultrasound for further characterization.       Tiny nonobstructing right renal stone      NM Biliary: 3/11/21  No evident bile leak following cholecystectomy.  Slight reduction in the   amount of subhepatic fluid present since the earlier CT study 5 hours ago.          CT Abd: 3/11/21  New fluid collection in the gallbladder fossa with adjacent small amount of   fluid around the liver.  This may be residual fluid from the recent surgery   or related to biliary leak.  Consider HIDA scan evaluation       There are 2-3 tiny gallstones within the gallbladder fossa fluid collection,   measuring 3 mm and less in size.       Increased size of mild-moderate right pleural effusion with increased   adjacent compressive atelectasis of the right lung base. Problem List:   Patient Active Problem List    Diagnosis Date Noted    Atrial fibrillation ([address] 03/10/2021    Colonic mass     Diastolic dysfunction 16/87/8207    Hypokalemia 03/07/2021    Malignant neoplasm of ascending colon ([address] 03/07/2021    Anemia 03/03/2021    CAD (coronary artery disease) 03/03/2021    High cholesterol 03/03/2021    Hypertension 01/10/2014      Assessment and Plan:     PAF  ~ afib on the monitor,    ~ coreg / PO amio taken today  ~ Plan for [facility] once OK with surgical team and hgb stable.      Bradycardia   ~ noted at night time while sleeping  ~ would benefit from OP sleep study    LBBB  ~ noted on EKG  ~ will monitor     Colon mass  ~ s/p hemicolectomry surgery (3/8/21)   ~ managed per general surgery    CAD  ~ hx of PCI to LAD (11/20)  ~ stable, no complaints of angina   ~ Lendia Brisk on hold     Anemia  ~ hgb stable this am at 9.3    YESENIA/hyperkalemia  ~ K normalized at 4.0 today   ~ creat improving, 1.3 today  ~ nephrology on board     Multiple medical conditions with risk of decompensation. All pertinent information and plan of care discussed with the physician. All questions and concerns were addressed to the patient. Alternatives to my treatment were discussed. I have discussed the above stated plan with patient and the nurse. The patient verbalized understanding and agreed with the plan. Thank you for allowing to us to participate in the care of [address] [place] [address].     Alison HESS-CNP  [facility]   Office: [phone]

## 2021-03-14 NOTE — PROGRESS NOTES
Progress Note - Dr. Stan Ha - Internal Medicine  PCP: Libby Ventura MD [address] [place] [address] [facility] [phone]    Hospital Day: 11  Code Status: Full Code  Current Diet: Diet NPO Effective Now Exceptions are: Ice Chips, Sips of Clear Liquids, Sips of Water with Meds        CC: follow up on medical issues    Subjective: José Luis Braga is a 68 y.o. female. She denies problems    Feels a little better  NG tube remains in, still with drainage  Fluids restarted (poor uop, no intake due to NG)    She denies chest pain, denies shortness of breath, denies nausea,  denies emesis. 10 system Review of Systems is reviewed with patient, and pertinent positives are noted in HPI above . Otherwise, Review of systems is negative. I have reviewed the patient's medical and social history in detail and updated the computerized patient record. To recap: She  has a past medical history of Anemia, CAD (coronary artery disease), CKD (chronic kidney disease), Hyperlipidemia, and Hypertension. . She  has a past surgical history that includes fracture surgery; Cystocopy (11/21/13); Upper gastrointestinal endoscopy (N/A, 3/5/2021); Colonoscopy (N/A, 3/5/2021); hemicolectomy (Right, 3/8/2021); and Cholecystectomy, laparoscopic (N/A, 3/8/2021). . She  reports that she has never smoked. She has never used smokeless tobacco. She reports that she does not drink alcohol or use drugs. .        Active Hospital Problems    Diagnosis Date Noted    Atrial fibrillation ([facility] [address] [I48.91] 03/10/2021    Colonic mass [B19.04]     Diastolic dysfunction [I98.79] 03/07/2021    Hypokalemia [E87.6] 03/07/2021    Malignant neoplasm of ascending colon (HCC) [C18.2] 03/07/2021    Anemia [D64.9] 03/03/2021    CAD (coronary artery disease) [I25.10] 03/03/2021    High cholesterol [E78.00] 03/03/2021    Hypertension [I10] 01/10/2014       Current Facility-Administered Medications: famotidine (PEPCID) injection 20 mg, 20 mg, Intravenous, Daily  amiodarone (CORDARONE) tablet 200 mg, 200 mg, Oral, TID  oxymetazoline (AFRIN) 0.05 % nasal spray 2 spray, 2 spray, Each Nostril, Once  lidocaine (XYLOCAINE) 2 % jelly, , Topical, Once  phenol 1.4 % mouth spray 1 spray, 1 spray, Mouth/Throat, Q2H PRN  metoprolol (LOPRESSOR) injection 5 mg, 5 mg, Intravenous, Q8H PRN  sodium chloride flush 0.9 % injection 10 mL, 10 mL, Intravenous, 2 times per day  sodium chloride flush 0.9 % injection 10 mL, 10 mL, Intravenous, PRN  sodium chloride flush 0.9 % injection 10 mL, 10 mL, Intravenous, PRN  enoxaparin (LOVENOX) injection 40 mg, 40 mg, Subcutaneous, Daily  0.9 % sodium chloride infusion, , Intravenous, Continuous  HYDROmorphone (DILAUDID) injection 1 mg, 1 mg, Intravenous, Q3H PRN  aspirin EC tablet 81 mg, 81 mg, Oral, Daily  perflutren lipid microspheres (DEFINITY) injection 1.65 mg, 1.5 mL, Intravenous, ONCE PRN  0.9 % sodium chloride infusion, , Intravenous, PRN  carvedilol (COREG) tablet 3.125 mg, 3.125 mg, Oral, BID WC  rosuvastatin (CRESTOR) tablet 20 mg, 20 mg, Oral, Nightly  potassium chloride (KLOR-CON M) extended release tablet 40 mEq, 40 mEq, Oral, PRN **OR** potassium bicarb-citric acid (EFFER-K) effervescent tablet 40 mEq, 40 mEq, Oral, PRN **OR** potassium chloride 10 mEq/100 mL IVPB (Peripheral Line), 10 mEq, Intravenous, PRN  promethazine (PHENERGAN) tablet 12.5 mg, 12.5 mg, Oral, Q6H PRN **OR** ondansetron (ZOFRAN) injection 4 mg, 4 mg, Intravenous, Q6H PRN  magnesium hydroxide (MILK OF MAGNESIA) 400 MG/5ML suspension 30 mL, 30 mL, Oral, Daily PRN  acetaminophen (TYLENOL) tablet 650 mg, 650 mg, Oral, Q6H PRN **OR** acetaminophen (TYLENOL) suppository 650 mg, 650 mg, Rectal, Q6H PRN  hydrALAZINE (APRESOLINE) injection 10 mg, 10 mg, Intravenous, Q6H PRN  0.9 % sodium chloride bolus, 500 mL, Intravenous, PRN  0.9 % sodium chloride infusion, , Intravenous, PRN         Objective:  /68   Pulse 85   Temp 98.4 °F (36.9 °C) (Axillary) Resp 22   Ht 5' (1.524 m)   Wt 229 lb 8 oz (104.1 kg)   SpO2 96%   BMI 44.82 kg/m²      Patient Vitals for the past 24 hrs:   BP Temp Temp src Pulse Resp SpO2   03/14/21 0800 123/68 98.4 °F (36.9 °C) Axillary 85 22 96 %   03/14/21 0426 138/76 98 °F (36.7 °C) Oral 88 20 95 %   03/13/21 2359 (!) 155/81 97.6 °F (36.4 °C) Oral 96 20 97 %   03/13/21 2220 (!) 151/76 -- -- 92 -- 95 %   03/13/21 2045 (!) 147/63 97.9 °F (36.6 °C) Oral 80 20 97 %   03/13/21 1630 133/85 97.8 °F (36.6 °C) Oral 95 26 96 %   03/13/21 1230 134/71 97.9 °F (36.6 °C) Oral 115 18 95 %   03/13/21 1215 (!) 187/136 97.2 °F (36.2 °C) Oral 106 30 94 %     Patient Vitals for the past 96 hrs (Last 3 readings):   Weight   03/12/21 0546 229 lb 8 oz (104.1 kg)   03/11/21 0745 227 lb 15.3 oz (103.4 kg)           Intake/Output Summary (Last 24 hours) at 3/14/2021 1006  Last data filed at 3/14/2021 0939  Gross per 24 hour   Intake 370 ml   Output 2720 ml   Net -2350 ml         Physical Exam:   /68   Pulse 85   Temp 98.4 °F (36.9 °C) (Axillary)   Resp 22   Ht 5' (1.524 m)   Wt 229 lb 8 oz (104.1 kg)   SpO2 96%   BMI 44.82 kg/m²   General appearance: alert, appears stated age and cooperative  Head: Normocephalic, without obvious abnormality, atraumatic  Nose: +NG tube  Lungs: clear to auscultation bilaterally  Heart: irreg irreg  Abdomen: soft, non-tender; bowel sounds normal; no masses,  no organomegaly  Extremities: extremities normal, atraumatic, no cyanosis or edema    Labs:  Lab Results   Component Value Date    WBC 9.0 03/14/2021    HGB 9.3 (L) 03/14/2021    HCT 31.1 (L) 03/14/2021     03/14/2021    CHOL 155 11/21/2020    TRIG 121 11/21/2020    HDL 38 (L) 11/21/2020    ALT 8 (L) 03/11/2021    AST 16 03/11/2021     (H) 03/14/2021    K 4.0 03/14/2021     (H) 03/14/2021    CREATININE 1.3 (H) 03/14/2021    BUN 61 (H) 03/14/2021    CO2 23 03/14/2021    INR 1.09 03/08/2021    LABMICR YES 03/11/2021     Lab Results   Component Value Date    CKTOTAL 41 03/11/2021    TROPONINI <0.01 03/10/2021       Recent Imaging Results are Reviewed:  Echo Complete    Result Date: 3/6/2021  Transthoracic Echocardiography Report (TTE)  Demographics   Patient Name        Saintclair Eng   Date of Study       03/06/2021  Gender                 Female   Patient Number      [id number]  Date of Birth          1940   Visit Number        [id number]   Age                    68 year(s)   Accession Number    [id number]  Room Number            6609   Corporate ID        [id number]    Sonographer            Jaylen Luo RD,                                                         RVT   Ordering Physician              Interpreting Physician Trish Tatum MD,                                                         [facility] [place], [address] [facility]  Procedure Type of Study   TTE procedure:ECHOCARDIOGRAM COMPLETE 2D W DOPPLER W COLOR. Procedure Date Date: 03/06/2021 Start: 09:12 AM Study Location: [facility] - Echo Lab Technical Quality: Good visualization Additional Indications:NSTEMI, Leg edema, CAD. Patient Status: Routine Height: 60 inches Weight: 220 pounds BSA: 1.94 m2 BMI: 42.97 kg/m2 BP: 134/75 mmHg  Conclusions   Summary  Normal left ventricle size, and systolic function with an estimated ejection  fraction of 55-60%. Mild concentric left ventricular hypertrophy is present. No regional wall motion abnormalities are seen. Mild tricuspid regurgitation, RVSP is estimated at 38 mmhg. Signature   ------------------------------------------------------------------  Electronically signed by Trish Tatum MD, [facility] [place], [address]  (Interpreting physician) on 03/06/2021 at 12:12 PM  ------------------------------------------------------------------   Findings   Left Ventricle  Normal left ventricle size, and systolic function with an estimated ejection  fraction of 55-60%. Mild concentric left ventricular hypertrophy is present. No regional wall motion abnormalities are seen.   Grade I diastolic dysfunction with normal LV filling pressures. Mitral Valve  Calcification of the mitral valve noted. No evidence of mitral regurgitation. Left Atrium  The left atrium is normal in size. Aortic Valve  The aortic valve is structurally normal. There is no significant aortic  valve regurgitation or stenosis. Aorta  The aortic root is normal in size. Right Ventricle  The right ventricle is normal in size and function. Tricuspid Valve  The tricuspid valve is normal in structure. Mild tricuspid regurgitation, RVSP is estimated at 38 mmhg. Right Atrium  The right atrial size is normal.   Pulmonic Valve  The pulmonic valve is not well visualized. Mild pulmonic regurgitation present. Pericardial Effusion  No pericardial effusion noted. Pleural Effusion  No pleural effusion. Miscellaneous  The inferior vena cava appears normal in size with normal respiratory  variation.   M-Mode/2D Measurements (cm)   LV Diastolic Dimension: 4.5 cm  LV Systolic Dimension: 5.55 cm  LV Septum Diastolic: 2.04 cm  LV PW Diastolic: 1.5 cm         AO Root Dimension: 3.1 cm  RV Diastolic Dimension: 2.04 cm LA Dimension: 3.4 cm                                  LA Area: 11.7 cm2                                  LA volume/Index: 21.6 ml /11 ml/m2  Doppler Measurements   AV Peak Velocity: 194 cm/s     MV Peak E-Wave: 98.5 cm/s  AV Peak Gradient: 15.05 mmHg   MV Peak A-Wave: 89.5 cm/s  AV Mean Gradient: 9 mmHg       MV E/A Ratio: 1.1  LVOT Peak Velocity: 122 cm/s   MV Mean Gradient: 3 mmHg                                 MV Max P mmHg  TR Velocity:290 cm/s           MV Vmax:125 cm/s  TR Gradient:33.64 mmHg         MV VTI:51.9 cm/s   E' Septal Velocity: 6.2 cm/s   MV Deceleration Time: 215 msec  E' Lateral Velocity: 5.98 cm/s  PV Peak Velocity: 123 cm/s  PV Peak Gradient: 6.05 mmHg   Aortic Valve   Peak Velocity: 194 cm/s   Mean Velocity: 141 cm/s  Peak Gradient: 15.05 mmHg Mean Gradient: 9 mmHg  AV VTI: 43.8 cm  Aorta Aortic Root: 3.1 cm      Ct Abdomen Pelvis Wo Contrast Additional Contrast? None    Result Date: 3/11/2021  EXAMINATION: CT OF THE ABDOMEN AND PELVIS WITHOUT CONTRAST 3/11/2021 12:05 pm TECHNIQUE: CT of the abdomen and pelvis was performed without the administration of intravenous contrast. Multiplanar reformatted images are provided for review. Dose modulation, iterative reconstruction, and/or weight based adjustment of the mA/kV was utilized to reduce the radiation dose to as low as reasonably achievable. COMPARISON: CT of the chest abdomen and pelvis March 5, 2020 HISTORY: ORDERING SYSTEM PROVIDED HISTORY: RUQ pain TECHNOLOGIST PROVIDED HISTORY: Reason for exam:->RUQ pain Additional Contrast?->None Reason for Exam: RUQ pain Acuity: Unknown Type of Exam: Unknown FINDINGS: Lower Chest: Increased mild-moderate right pleural effusion with adjacent compressive atelectasis. Unchanged trace amount of left pleural fluid. Organs: In the gallbladder fossa there is a fluid collection measuring 4.2 x 2.3 by 2.4 cm. Along the inferior-lateral aspect of the fluid collection a few tiny curvilinear/rounded high-density foci are present measuring 3 mm and less in size best shown on coronal image 90. Cholecystectomy clips noted in the expected location. There is a small amount of fluid along the inferior margin of the right hepatic lobe anterior laterally. Small amount of fluid between the diaphragm and liver also present. No pancreatitis. No ureteral stone or hydronephrosis. 2 mm right renal stone again noted. GI/Bowel: There is new fluid-filled small bowel dilation involving proximal to mid small bowel loops measuring up to 3 cm. There is distal small bowel collapse extending to the surgical site. Oral contrast within the colon from the oral contrast given for the comparison. No oral contrast within small bowel at this time. Pelvis: No acute abnormality of the pelvis. Normal appearance of the bladder. Peritoneum/Retroperitoneum: Small amount of free fluid in the right upper quadrant as discussed. No free air. Bones/Soft Tissues: Expected postsurgical changes of the abdominal wall. No acute osseous findings. New fluid collection in the gallbladder fossa with adjacent small amount of fluid around the liver. This may be residual fluid from the recent surgery or related to biliary leak. Consider HIDA scan evaluation There are 2-3 tiny gallstones within the gallbladder fossa fluid collection, measuring 3 mm and less in size. Increased size of mild-moderate right pleural effusion with increased adjacent compressive atelectasis of the right lung base. Xr Chest (2 Vw)    Result Date: 3/1/2021  EXAMINATION: TWO XRAY VIEWS OF THE CHEST 3/1/2021 2:58 pm COMPARISON: None. HISTORY: ORDERING SYSTEM PROVIDED HISTORY: EUBANKS (dyspnea on exertion) TECHNOLOGIST PROVIDED HISTORY: Reason for exam:->3-4wk hx of EUBANKS and productive cough FINDINGS: Cardiomegaly. Pulmonary vascular congestion. Ill-defined peripheral pulmonary opacities. No pneumothorax. No pleural effusion. Ill-defined bilateral pulmonary opacities could represent pulmonary edema possibly related to congestive heart failure or atypical pneumonia     Nm Hepatobiliary    Result Date: 3/11/2021  EXAMINATION: NUCLEAR MEDICINE HEPATOBILIARY SCINTIGRAPHY (HIDA SCAN). 3/11/2021 2:40 pm TECHNIQUE: Approximately 3.89 [id number] Mebrofenin (Choletec) was administered IV. Then, dynamic images of the abdomen were obtained in the anterior projection for 60 mins. A right lateral view was also obtained at 60 mins. COMPARISON: CT abdomen and pelvis 03/11/2021.  HISTORY: ORDERING SYSTEM PROVIDED HISTORY: R/o bile leak s/p renu TECHNOLOGIST PROVIDED HISTORY: Reason for exam:->R/o bile leak s/p renu Reason for Exam: R/O Bile Leak Acuity: Acute Type of Exam: Ongoing FINDINGS: Prompt, homogenous uptake by the liver is noted with normal appearance of radiotracer excretion into the biliary system. Clearance of bloodpool activity appears appropriate. During the 1st 35 minutes of the study, the common duct is normal in size and appearance. Beginning at 40 minutes, accumulation of radio activity which overlies the common duct is seen. .  This accumulation most likely corresponds to the proximal duodenum as the abnormal fluid collection on the CT scan lies more laterally. Gallbladder is surgically absent. Delayed planar images as well as SPECT images were obtained due to the confusing appearance on the initial images. These latter images do not show any accumulation of activity in the subhepatic region. The only activity is seen within the small bowel confirming that there is no bile leak present. The CT images also show a slight decrease in the amount of fluid in the gallbladder fossa. No evident bile leak following cholecystectomy. Slight reduction in the amount of subhepatic fluid present since the earlier CT study 5 hours ago. Xr Chest Portable    Result Date: 3/12/2021  EXAMINATION: ONE XRAY VIEW OF THE CHEST 3/12/2021 6:06 pm COMPARISON: 1 March 2021 HISTORY: ORDERING SYSTEM PROVIDED HISTORY: ng tube placement TECHNOLOGIST PROVIDED HISTORY: Reason for exam:->ng tube placement Reason for Exam: ng tube placement Acuity: Unknown Type of Exam: Unknown FINDINGS: AP portable view of the chest time stamped at 1752 hours overlying cardiac monitoring electrodes are noted. Intestinal tube extends to the distal stomach. Heart is stable, mildly enlarged. Elevated hemidiaphragm is noted. There is left perihilar stranding likely atelectasis. No extrapleural air. Intestinal tube terminates in the distal stomach. Other findings as above.      Ct Chest Abdomen Pelvis Wo Contrast    Result Date: 3/5/2021  EXAMINATION: CT OF THE CHEST, ABDOMEN, AND PELVIS WITHOUT CONTRAST 3/5/2021 4:20 pm TECHNIQUE: CT of the chest, abdomen and pelvis was performed without the administration of intravenous contrast. Multiplanar reformatted images are provided for review. Dose modulation, iterative reconstruction, and/or weight based adjustment of the mA/kV was utilized to reduce the radiation dose to as low as reasonably achievable. COMPARISON: Chest CT 2014 2013 HISTORY: ORDERING SYSTEM PROVIDED HISTORY: Colon mass, r/o mets TECHNOLOGIST PROVIDED HISTORY: Reason for exam:->Colon mass, r/o mets Additional Contrast?->Oral Reason for Exam: Colon mass, r/o mets Acuity: Unknown Type of Exam: Unknown FINDINGS: Chest: Mediastinum: 7 mm nodule seen in right lobe of thyroid gland. No specific imaging follow-up recommended based on size. coronary artery calcification is seen. Small mediastinal nodes are noted. Right paratracheal node measures 1.4 cm in short axis, previously 10 mm. Lungs/pleura: Mosaic attenuation is seen throughout the lungs, suggesting small airways disease or air trapping. Trace pleural effusions are seen bilaterally. There is adjacent consolidation seen in the lung bases. 1.8 x 1.2 cm nodule is seen in the left lower lobe Soft Tissues/Bones: Degenerative changes are seen in the spine. Degenerative changes are seen in the shoulder joints. Abdomen/Pelvis: Organs: No splenomegaly Adrenal glands appear normal. There is rotation of the kidney anteriorly, incidentally noted. .  No stones noted 2 mm stone seen in the right kidney. No hydronephrosis noted. No intrahepatic ductal dilatation. No perihepatic fluid Gallbladder appears normal No peripancreatic inflammatory change GI/Bowel: No significant small bowel distention noted. Mild stool load seen in the colon. Scattered colonic diverticula are seen. There is focal wall thickening of the colon on the right, extending over a length of 3.7 cm. There is surrounding injection the Nadja colonic fat. Small pericolonic lymph node is seen in the right upper quadrant mesentery measuring 8 mm.  Pelvis: No free fluid seen within the pelvis. Pickard catheter is seen in the bladder Left adnexal mass is seen measuring 4.4 cm x 3.5 cm previously 3.9 cm by 3.3 cm. Right adnexal nodule measures 3.2 x 3.9 cm, previously 2.5 x 3.6 cm Peritoneum/Retroperitoneum: Small retroperitoneal nodes are seen. Small lymph nodes are seen along the iliac chains. No aortic aneurysm. Atherosclerotic change is seen in aorta and iliacs. Bones/Soft Tissues: Spurring is seen in the spine. Spurring is seen in the hips. Tiny periumbilical hernia containing fat is seen     Within the chest, small mediastinal nodes are seen, slightly increased compared to prior, either reactive or metastatic. Small pleural effusions are seen, compatible with mild fluid overload. Stable lobular pulmonary nodule in the left lower lobe. Nodular wall thickening of the colon on the right, compatible with the given history of colon mass. Small mesenteric node is seen in this region,, likely early desiree metastasis Increase in size of ovarian-adnexal masses on the right and left. Consider pelvic ultrasound for further characterization. Tiny nonobstructing right renal stone       Assessment and Plan:  Principal Problem:    Anemia -Established problem. Stable.  hgb 9.3  Plan: No indication for transfusion. Cont to monitor h/h to assess progression of anemia. Recommend ferrous sulfate or MVI as outpatient. Active Problems:    Hypertension -Established problem. Stable. 123/68  Plan: cont same meds    CAD (coronary artery disease)    High cholesterol    Diastolic dysfunction    Hypokalemia -Established problem. Stable. K 4.0  Plan: Continue present orders/plan. Malignant neoplasm of ascending colon ([address] -Established problem. Stable. S/p resection  Plan: slow post op recovery. Plan for outpt tx of cancer    Atrial fibrillation ([address] -Established problem. Stable. In fib today on tele  Plan: Continue present orders/plan.      Colonic mass            Darek Harden  3/14/2021

## 2021-03-14 NOTE — PROGRESS NOTES
Nephrology Progress Note  [phone]  [phone]   [email or web address].        Reason for Consult:  YEESNIA    HISTORY OF PRESENT ILLNESS:                This is a patient with significant past medical history of YESENIA in 2015, peak Scr 1.8, HTN, CAD-s/p recent stent on ASA and Brilinta. HTN, dCHF, [email or web address],  who was sent to ER for anemia, hgb was 5.8, s/p PRBC. She had A.fib with RVR at admission and EP consulted. She had EGD/colonoscopy done which revealed mass, CT scan C/A/P with contrast was negative for metastatic disease. She underwent laparoscopic right hemicolectomy on 3/8/21. EBL minimal.  She has received IV lasix on 3/4-ketorolac on 3/9, on lisinopril,-stopped on 3/9  -hospital course complicated by YESENIA - Scr gradually trending up, 1.1-->1.6-->2.3, harris removed yesterday-incontinent of urine  -had hypotension due to dilaudid, given NS bolus  -has N/V-had vomiting today, plan for CT  -c/o nausea and abdominal discomfort  -she is feeling anxious and treaful  -denies fever/chill/CP  -c/o SOB that started today  -UO is declining      Subjective:  -pt seen and examined  -PMSHx and meds reviewed  -No family at bedside    Doing better  NGT is out  Taking liquids       PHYSICAL EXAM:    Vitals:    /83   Pulse 93   Temp 97.6 °F (36.4 °C) (Axillary)   Resp 18   Ht 5' (1.524 m)   Wt 229 lb 8 oz (104.1 kg)   SpO2 95%   BMI 44.82 kg/m²   I/O last 3 completed shifts: In: 360 [P.O.:360]  Out: 1950 [Urine:1800; Emesis/NG output:150]  I/O this shift:  In: 10 [I.V.:10]  Out: 36 [Urine:320; Emesis/NG output:500]    Physical Exam:  Gen: no distress, sitting in chair   HEENT: anicteric, dry mouth, poor dentition  CV: irregular, +S1/S2. Lungs: clear on anterior exam ,fair air entry   Abd: soft, laparoscopic incision C/D/I  Ext: trace edema, no cyanosis  Skin: Warm. No rashes appreciated.   Neuro: Alert and oriented x 3, nonfocal.       DATA:    CBC:   Lab Results   Component Value Date    WBC 9.0 03/14/2021    RBC 4.19 03/14/2021    HGB 9.3 03/14/2021    HCT 31.1 03/14/2021    MCV 74.1 03/14/2021    MCH 22.1 03/14/2021    MCHC 29.8 03/14/2021    RDW 25.9 03/14/2021     03/14/2021    MPV 8.6 03/14/2021     BMP:    Lab Results   Component Value Date     03/14/2021    K 4.0 03/14/2021    K 3.7 03/04/2021     03/14/2021    CO2 23 03/14/2021    BUN 61 03/14/2021    LABALBU 2.9 03/11/2021    CREATININE 1.3 03/14/2021    CALCIUM 9.4 03/14/2021    GFRAA 48 03/14/2021    LABGLOM 40 03/14/2021    GLUCOSE 117 03/14/2021       IMPRESSION/RECOMMENDATIONS:      YESENIA on CKD stage 3: recurrent baseline Scr 1.1-1.2-adm Scr 1.1-2.3 today  -multifactorial-relative IV depletion/diminshed perfusion in the setting of ACEI-less likely contrast given exposure > 5 days prior while of lasix and ACEI, did receive one dose of NSAIDs/hemodynamic due to A.fib/RVR/hypotension-all factors combined-progression to ATI. -non-oliguric, likely pre-renal with element of ATN  -peak SCr 2.4-improved to 1.2    SOB: better today  - hold on further diuresis      FEN:   Hyperkalemia: resolved  hyperchloremic acidosis: YESENIA/NS infusion  Hypernatremia: worse and now taking PO. Given this and hyperchloremia will stop 0.9% saline     CKD stage 3: baseline Scr 1.1-1.3  -due to YESENIA/presumed HTN NS    Anemia: adm hgb 5.8-s/p RPBC  -work up revealed colonic mass-s/p right hemicolectomy  -iron deficient-s/p venofer  -epogen per Oncology    Colonic mass: s/p hemicolectomy-path shows invasive colonic adenocarcinoma  -per Oncology  -plan to start outpatient chemo    A.fib with RVR: unable to get IV amiodarone due to no IV access or PO due to nausea/vomiting-per EP    CAD: recent stent-on ASA/statin/Bilinta on hold    HTN: recent hypotension  -lasix and lisinopril on hold  -BP stable     dCHF: lasix as needed      Thank you for allowing me to participate in the care of this patient. I will continue to follow along. Please call with questions.     Coreen Hernandez, MD

## 2021-03-14 NOTE — PROGRESS NOTES
Shift assessment complete. Patient up in chair. Feet elevated. Afib on monitor. Remains on 2L NC. Patient w/ NG still in place to suction. Patient w/ difficulty trying to swallow pills this AM. Believes it's due to NG. Writer did crush pills and patient was able to swallow them. Surgical sites on abd C/D/I. Denies pain. Encouraged patient to use IS that is on bedside table.

## 2021-03-14 NOTE — PROGRESS NOTES
IVF D/C per nephrology. Patient assisted to the BR again. No BM this time; only gas. Patient then walked in hallway to nursing stating and back to chair. Tolerated well. No oxygen. AFIB -150s on monitor while up. Back to HR 80-90s upon returning to chair.

## 2021-03-15 LAB
ANION GAP SERPL CALCULATED.3IONS-SCNC: 11 MMOL/L (ref 3–16)
BASOPHILS ABSOLUTE: 0 K/UL (ref 0–0.2)
BASOPHILS RELATIVE PERCENT: 0.2 %
BUN BLDV-MCNC: 56 MG/DL (ref 7–20)
CALCIUM SERPL-MCNC: 8.9 MG/DL (ref 8.3–10.6)
CHLORIDE BLD-SCNC: 114 MMOL/L (ref 99–110)
CO2: 27 MMOL/L (ref 21–32)
CREAT SERPL-MCNC: 1.3 MG/DL (ref 0.6–1.2)
EOSINOPHILS ABSOLUTE: 0 K/UL (ref 0–0.6)
EOSINOPHILS RELATIVE PERCENT: 0.4 %
GFR AFRICAN AMERICAN: 48
GFR NON-AFRICAN AMERICAN: 40
GLUCOSE BLD-MCNC: 136 MG/DL (ref 70–99)
HCT VFR BLD CALC: 30 % (ref 36–48)
HEMOGLOBIN: 9.1 G/DL (ref 12–16)
LYMPHOCYTES ABSOLUTE: 1.5 K/UL (ref 1–5.1)
LYMPHOCYTES RELATIVE PERCENT: 14.2 %
MCH RBC QN AUTO: 22.4 PG (ref 26–34)
MCHC RBC AUTO-ENTMCNC: 30.3 G/DL (ref 31–36)
MCV RBC AUTO: 74 FL (ref 80–100)
MONOCYTES ABSOLUTE: 1 K/UL (ref 0–1.3)
MONOCYTES RELATIVE PERCENT: 9.8 %
NEUTROPHILS ABSOLUTE: 7.8 K/UL (ref 1.7–7.7)
NEUTROPHILS RELATIVE PERCENT: 75.4 %
PDW BLD-RTO: 26.3 % (ref 12.4–15.4)
PLATELET # BLD: 317 K/UL (ref 135–450)
PMV BLD AUTO: 9 FL (ref 5–10.5)
POTASSIUM SERPL-SCNC: 3.9 MMOL/L (ref 3.5–5.1)
RBC # BLD: 4.05 M/UL (ref 4–5.2)
SODIUM BLD-SCNC: 152 MMOL/L (ref 136–145)
WBC # BLD: 10.3 K/UL (ref 4–11)

## 2021-03-15 PROCEDURE — 2580000003 HC RX 258: Performed by: NURSE PRACTITIONER

## 2021-03-15 PROCEDURE — 94760 N-INVAS EAR/PLS OXIMETRY 1: CPT

## 2021-03-15 PROCEDURE — 6370000000 HC RX 637 (ALT 250 FOR IP): Performed by: SURGERY

## 2021-03-15 PROCEDURE — 85025 COMPLETE CBC W/AUTO DIFF WBC: CPT

## 2021-03-15 PROCEDURE — 6360000002 HC RX W HCPCS: Performed by: NURSE PRACTITIONER

## 2021-03-15 PROCEDURE — 2700000000 HC OXYGEN THERAPY PER DAY

## 2021-03-15 PROCEDURE — APPSS15 APP SPLIT SHARED TIME 0-15 MINUTES: Performed by: NURSE PRACTITIONER

## 2021-03-15 PROCEDURE — 97530 THERAPEUTIC ACTIVITIES: CPT

## 2021-03-15 PROCEDURE — 2500000003 HC RX 250 WO HCPCS: Performed by: INTERNAL MEDICINE

## 2021-03-15 PROCEDURE — 99024 POSTOP FOLLOW-UP VISIT: CPT | Performed by: SURGERY

## 2021-03-15 PROCEDURE — 2580000003 HC RX 258: Performed by: INTERNAL MEDICINE

## 2021-03-15 PROCEDURE — 97116 GAIT TRAINING THERAPY: CPT

## 2021-03-15 PROCEDURE — APPNB30 APP NON BILLABLE TIME 0-30 MINS: Performed by: NURSE PRACTITIONER

## 2021-03-15 PROCEDURE — 36415 COLL VENOUS BLD VENIPUNCTURE: CPT

## 2021-03-15 PROCEDURE — 80048 BASIC METABOLIC PNL TOTAL CA: CPT

## 2021-03-15 PROCEDURE — 6370000000 HC RX 637 (ALT 250 FOR IP): Performed by: NURSE PRACTITIONER

## 2021-03-15 PROCEDURE — 2060000000 HC ICU INTERMEDIATE R&B

## 2021-03-15 PROCEDURE — 99233 SBSQ HOSP IP/OBS HIGH 50: CPT | Performed by: NURSE PRACTITIONER

## 2021-03-15 RX ORDER — DEXTROSE MONOHYDRATE 50 MG/ML
INJECTION, SOLUTION INTRAVENOUS CONTINUOUS
Status: DISCONTINUED | OUTPATIENT
Start: 2021-03-15 | End: 2021-03-16 | Stop reason: HOSPADM

## 2021-03-15 RX ADMIN — AMIODARONE HYDROCHLORIDE 200 MG: 200 TABLET ORAL at 18:48

## 2021-03-15 RX ADMIN — ROSUVASTATIN 20 MG: 20 TABLET, FILM COATED ORAL at 21:26

## 2021-03-15 RX ADMIN — DEXTROSE MONOHYDRATE: 50 INJECTION, SOLUTION INTRAVENOUS at 11:24

## 2021-03-15 RX ADMIN — ASPIRIN 81 MG: 81 TABLET, COATED ORAL at 09:49

## 2021-03-15 RX ADMIN — AMIODARONE HYDROCHLORIDE 150 MG: 50 INJECTION, SOLUTION INTRAVENOUS at 11:34

## 2021-03-15 RX ADMIN — CARVEDILOL 3.12 MG: 3.12 TABLET, FILM COATED ORAL at 09:49

## 2021-03-15 RX ADMIN — AMIODARONE HYDROCHLORIDE 200 MG: 200 TABLET ORAL at 21:26

## 2021-03-15 RX ADMIN — AMIODARONE HYDROCHLORIDE 200 MG: 200 TABLET ORAL at 09:49

## 2021-03-15 RX ADMIN — Medication 10 ML: at 21:26

## 2021-03-15 RX ADMIN — CARVEDILOL 3.12 MG: 3.12 TABLET, FILM COATED ORAL at 18:47

## 2021-03-15 RX ADMIN — ENOXAPARIN SODIUM 40 MG: 40 INJECTION SUBCUTANEOUS at 09:49

## 2021-03-15 RX ADMIN — FAMOTIDINE 20 MG: 10 INJECTION, SOLUTION INTRAVENOUS at 09:49

## 2021-03-15 RX ADMIN — Medication 10 ML: at 09:50

## 2021-03-15 ASSESSMENT — PAIN SCALES - GENERAL
PAINLEVEL_OUTOF10: 0
PAINLEVEL_OUTOF10: 0

## 2021-03-15 NOTE — PROGRESS NOTES
Nephrology Progress Note  [phone]  [phone]   [email or web address].        Reason for Consult:  YESENIA    HISTORY OF PRESENT ILLNESS:                This is a patient with significant past medical history of YESENIA in 2015, peak Scr 1.8, HTN, CAD-s/p recent stent on ASA and Brilinta,  DCHF,  HTN, A.fib,  sent to ER for anemia, hgb was 5.8, s/p PRBC. She had A.fib with RVR at admission and EP consulted. She had EGD/colonoscopy done which revealed colon mass, CT scan C/A/P with contrast was negative for metastatic disease. She underwent laparoscopic right hemicolectomy on 3/8/21. EBL minimal.  She has received IV lasix on 3/4-ketorolac on 3/9, on lisinopril,-stopped on 3/9  -hospital course complicated by YESENIA     Subjective/Interval history    Pt seen and examined  Hypernatremia persistent  Creatinine stable  BPs fairly controlled  Has been afebrile  Denies abdominal pain or nausea/emesis  Good urine out-put  Started PO now    PHYSICAL EXAM:    Vitals:    /70   Pulse 84   Temp 97.6 °F (36.4 °C) (Axillary)   Resp 18   Ht 5' (1.524 m)   Wt 229 lb 8 oz (104.1 kg)   SpO2 99%   BMI 44.82 kg/m²   I/O last 3 completed shifts: In: 65 [P.O.:840; I.V.:10]  Out: 1600 [Urine:1100; Emesis/NG output:500]  No intake/output data recorded. Physical Exam:  Gen: no distress, sitting in chair   HEENT: normocephalic, atraumatic, no palor  CV: S1 S2 normal, regular rhythm  Lungs: clear on anterior exam ,fair air entry   Abd: soft, laparoscopic incision C/D/I, harris + with clear urine  Ext: trace edema, skin wrinkled, no cyanosis  Skin: Warm. No rashes appreciated.       DATA:    CBC:   Lab Results   Component Value Date    WBC 10.3 03/15/2021    RBC 4.05 03/15/2021    HGB 9.1 03/15/2021    HCT 30.0 03/15/2021    MCV 74.0 03/15/2021    MCH 22.4 03/15/2021    MCHC 30.3 03/15/2021    RDW 26.3 03/15/2021     03/15/2021    MPV 9.0 03/15/2021     BMP:    Lab Results   Component Value Date     03/15/2021    K 3.9 03/15/2021    K 3.7 03/04/2021     03/15/2021    CO2 27 03/15/2021    BUN 56 03/15/2021    LABALBU 2.9 03/11/2021    CREATININE 1.3 03/15/2021    CALCIUM 8.9 03/15/2021    GFRAA 48 03/15/2021    LABGLOM 40 03/15/2021    GLUCOSE 136 03/15/2021       IMPRESSION/RECOMMENDATIONS    YESENIA on CKD stage 3 recurrent   Base-line creatinine around 1  Creatinine on admission was 1.1 and at base-line, peak creatinine low 2s, improving and is stable in low 1s  baseline Scr 1.1-1.2-adm Scr 1.1-2.3 today  -multifactorial-relative IV depletion/diminished perfusion in the setting of ACEI-less likely contrast given exposure > 5 days prior while of lasix and ACEI, did receive one dose of NSAIDs/hemodynamic due to A.fib/RVR/hypotension-all factors combined-progression to ATI. -non-oliguric, likely pre-renal with element of ATN  -peak SCr 2.4-improved to low 1s      Hypernatremia  Keep on D5W    CKD stage 3: baseline Scr 1.1-1.3  -due to YESENIA/presumed HTN NS    Anemia: adm hgb 5.8-s/p RPBC  -work up revealed colonic mass-s/p right hemicolectomy  -iron deficient-s/p venofer  -epogen per Oncology    Colonic mass: s/p hemicolectomy-path shows invasive colonic adenocarcinoma  -per Oncology  -plan to start outpatient chemo    A.fib with RVR: unable to get IV amiodarone due to no IV access or PO due to nausea/vomiting-per EP    CAD: recent stent-on ASA/statin/Bilinta on hold    HTN: recent hypotension  -lasix and lisinopril on hold  Start CCB as needed    dCHF: lasix as needed    Plan  Remove harris, voiding trial  Start D5W      Thank you for allowing me to participate in the care of this patient. I will continue to follow along. Please call with questions.     Capri Epperson MD

## 2021-03-15 NOTE — PROGRESS NOTES
Physical Therapy  Facility/Department: [address]  Daily Treatment Note  NAME: Jevon Chen  : 1940  MRN: [id number]    Date of Service: 3/15/2021    Discharge Recommendations: Jevon Chen scored a 15/24 on the AM-PAC short mobility form. Current research shows that an AM-PAC score of 17 or less is typically not associated with a discharge to the patient's home setting. Based on the patient's AM-PAC score and their current functional mobility deficits, it is recommended that the patient have 3-5 sessions per week of Physical Therapy at d/c to increase the patient's independence. Please see assessment section for further patient specific details. If patient discharges prior to next session this note will serve as a discharge summary. Please see below for the latest assessment towards goals. PT reports having assistance available at home and expresses interest in home D/C only. If pt continues to refuse the above recommendations, then would recommend the below therapy services:  [address] [place] [address]: LEVEL 3 SAFETY     - Initial home health evaluation to occur within 24-48 hours, in patient home   - Therapy evaluations in home within 24-48 hours of discharge; including DME and home safety   - Frontload therapy 5 days, then 3x a week   - Therapy to evaluate if patient has [address] needs for personal care   -  evaluation within 24-48 hours, includes evaluation of resources and insurance to determine [place], [place], LTC, and Medicaid options        PT Equipment Recommendations  Equipment Needed: No    Assessment   Body structures, Functions, Activity limitations: Decreased functional mobility ; Decreased strength;Decreased endurance;Decreased balance  Assessment: Pt demonstrates improvement overall this date in completion of transfers and increased ambulation tolerance compared to previous session.  However, pt continues to need additional assistance for transfers with fatigue and multiple standing rest breaks for fatigue and SOB despite short distance ambulation. Pt would benefit from skilled PT services to promote safe return to PLOF. Treatment Diagnosis: impaired gait, transfers, and balance  Prognosis: Good  Clinical Presentation: evolving  PT Education: Goals;PT Role;Plan of Care;Transfer Training;Functional Mobility Training;Gait Training;General Safety  Patient Education: d/c recommendations--pt verbalizing understanding  Barriers to Learning: none  REQUIRES PT FOLLOW UP: Yes  Activity Tolerance  Activity Tolerance: Patient Tolerated treatment well;Patient limited by endurance     Patient Diagnosis(es): The encounter diagnosis was Anemia, unspecified type. has a past medical history of Anemia, CAD (coronary artery disease), CKD (chronic kidney disease), Hyperlipidemia, and Hypertension. has a past surgical history that includes fracture surgery; Cystocopy (11/21/13); Upper gastrointestinal endoscopy (N/A, 3/5/2021); Colonoscopy (N/A, 3/5/2021); hemicolectomy (Right, 3/8/2021); and Cholecystectomy, laparoscopic (N/A, 3/8/2021). Restrictions  Restrictions/Precautions  Restrictions/Precautions: Fall Risk, Modified Diet(High fall risk, clear liquid)  Required Braces or Orthoses?: No  Position Activity Restriction  Other position/activity restrictions: Sohail Cartwright is a 68 y.o. female on [employer] MD service who was admitted on 3/3/2021 for anemia. She presented to the ED as advised by her PCP after routine blood work showed low hemoglobin. Hgb drawn in ED was 5.8, she received 3 units pRBCs, hgb 9.3 today. She had been feeling well at that time, no dizziness, lightheadedness, SOB or chest pain. Iron studies showed deficiency. She has been having bradycardia overnight, cardiology has been consulted.  She has a history of CAD with recent stent placement, recently started on Brilinta late 2020. 3/8:LAPAROSCOPIC RIGHT COLON RESECTION, LAPAROSCOPIC CHOLECYSTECTOMY due to Malignant neoplasm of ascending colon  Subjective   General  Chart Reviewed: Yes  Response To Previous Treatment: Patient with no complaints from previous session. Family / Caregiver Present: No  Subjective  Subjective: Pt reporting denies pain this date. General Comment  Comments: Pt sitting up in recliner upon arrival.  Pain Screening  Patient Currently in Pain: Denies  Vital Signs  Patient Currently in Pain: Denies       Orientation  Orientation  Overall Orientation Status: Within Functional Limits  Cognition      Objective   Bed mobility  Supine to Sit: Unable to assess  Comment: Pt sitting in bedside chair upon arrival and left seated in chair following mobility  Transfers  Sit to Stand: Contact guard assistance;Minimal Assistance(CGA initial stand from recliner, increased to min A for second stand)  Stand to sit: Contact guard assistance  Ambulation  Ambulation?: Yes  Ambulation 1  Surface: level tile  Device: Rolling Walker  Other Apparatus: O2(2L)  Assistance: Contact guard assistance  Quality of Gait: flexed trunk  Gait Deviations: Slow Bibiana; Shuffles;Decreased step length;Decreased step height  Distance: ~40 ft  Comments: Pt requiring 2 short standing rest breaks due to fatigue and increased work of breathing. SpO2 reading at 98% immediately following return to seated in recliner  Stairs/Curb  Stairs?: No     Balance  Posture: Fair  Sitting - Static: Good;-  Sitting - Dynamic: Good;-  Standing - Static: Fair;+  Standing - Dynamic: Fair;+            Comment: Pt coming to stand for a second time following ambulation due to brief being wet despite placement of Pickard catheter. RN notified. PT assisting with brief chage. Pt completing pericare with in stance with CGA.               G-Code     OutComes Score                                                     AM-PAC Score  AM-PAC Inpatient Mobility Raw Score : 15 (03/15/21 1037)  AM-PAC Inpatient T-Scale Score : 39.45 (03/15/21 1037)  Mobility Inpatient CMS

## 2021-03-15 NOTE — PROGRESS NOTES
[facility]        Progress Note    Patient Name: Tanja Bustos  MRN: [id number]  YOB: 1940  Date of Evaluation: 3/15/2021    Subjective:  No acute events overnight  Denies pain  Denies nausea or vomiting, tolerating clear liquids  Passing flatus and stool  Up to chair at this time    Post-Operative Day #7      Vital Signs:  Patient Vitals for the past 24 hrs:   BP Temp Temp src Pulse Resp SpO2   03/15/21 0855 -- -- -- -- -- 99 %   03/15/21 0746 103/70 97.6 °F (36.4 °C) Axillary 84 18 99 %   03/15/21 0445 (!) 163/77 98.1 °F (36.7 °C) Oral 80 18 99 %   03/15/21 0057 (!) 164/71 98.3 °F (36.8 °C) Oral 83 18 98 %   21 123/66 97.5 °F (36.4 °C) Oral 77 16 96 %   21 -- -- -- -- 18 98 %   21 1630 (!) 144/73 97.7 °F (36.5 °C) Axillary 85 16 97 %      TEMPERATURE HISTORY 24H: Temp (24hrs), Av.8 °F (36.6 °C), Min:97.5 °F (36.4 °C), Max:98.3 °F (36.8 °C)    BLOOD PRESSURE HISTORY: Systolic (39WBJ), WCS:162 , Min:103 , ZEZ:490    Diastolic (30PYZ), XFE:57, Min:66, Max:83      Intake/Output:  I/O last 3 completed shifts: In: 65 [P.O.:840; I.V.:10]  Out: 1600 [Urine:1100; Emesis/NG output:500]  No intake/output data recorded.   Drain/tube Output:       Physical Exam:  General: awake, alert, oriented to  person, place, time  Abdomen: soft, mildly distended, active bowel sounds, appropriate incisional tenderness  Skin/Wound: healing well, no drainage, no erythema, well approximated    Labs:  CBC:    Recent Labs     21  0459 21  0542 03/15/21  0514   WBC 5.4 9.0 10.3   HGB 8.8* 9.3* 9.1*   HCT 28.7* 31.1* 30.0*    309 317     BMP:    Recent Labs     21  0459 21  0542 03/15/21  0514   * 150* 152*   K 4.2 4.0 3.9   * 115* 114*   CO2 24 23 27   BUN 64* 61* 56*   CREATININE 1.7* 1.3* 1.3*   GLUCOSE 123* 117* 136*     Hepatic:    No results for input(s): AST, ALT, ALB, BILITOT, ALKPHOS in the last 72 hours. Amylase:  No results found for: AMYLASE  Lipase:    Lab Results   Component Value Date    LIPASE 25.0 11/20/2020      Mag:    Lab Results   Component Value Date    MG 2.50 03/14/2021    MG 2.10 03/04/2021     Phos:     Lab Results   Component Value Date    PHOS 2.8 03/14/2021    PHOS 3.8 05/29/2015      Coags:   Lab Results   Component Value Date    PROTIME 12.7 03/08/2021    INR 1.09 03/08/2021    APTT 30.4 03/03/2021       Cultures:  Anaerobic culture  No results found for: LABANAE  Fungus stain  No results found for requested labs within last 30 days. Gram stain  No results found for requested labs within last 30 days. Organism  No results found for: [facility]  Surgical culture  No results found for: CXSURG  Blood culture 1  No results found for requested labs within last 30 days. Blood culture 2  No results found for requested labs within last 30 days. Fecal occult  Results in Past 30 Days  Result Component Current Result Ref Range Previous Result Ref Range   Occult Blood Diagnostic Result: Negative  Normal range: Negative   (3/3/2021)  Not in Time Range      GI bacterial pathogens by PCR  No results found for requested labs within last 30 days. C. difficile  No results found for requested labs within last 30 days. Urine culture  No results found for: LABURIN    Pathology:  OR 3/8/2021--FINAL DIAGNOSIS:     Right colon and small bowel, segmental resection:   - Invasive colonic adenocarcinoma, moderately differentiated. - Margins not involved. - One pericolonic lymph nodes positive for metastatic carcinoma (1/16). Gallbladder, cholecystectomy:   - Chronic cholecystitis, moderate. - Cholelithiasis. - Cholesterolosis.      Procedure:  Right hemicolectomy   Tumor Site: Ileocecal valve   Tumor Size: Greatest dimension (centimeter): 4.2        Additional dimensions (centimeters): 3.7 x 0.8   Macroscopic tumor perforation: Not identified   Histologic Type: Adenocarcinoma Histologic Grade: G2: Moderately   Tumor Extension: Tumor invades through muscularis propria into subserosal   adipose tissue. MARGINS   All margins are uninvolved by invasive carcinoma, high grade dysplasia /   intramucosal carcinoma, and low grade dysplasia   Margins examined: Proximal, distal, and mesenteric    + Distance of invasive carcinoma from closest margin (millimeters or   centimeters): 40 mm    + Specify closest margin: Distal     Treatment Effect (applicable to carcinomas treated with neoadjuvant   therapy): No known pre surgical treatment   Lymph-Vascular Invasion: Not identified   Perineural Invasion: Not identified     + Tumor Budding (Note I)             Low score (0-4)   Type of Polyp in Which Invasive Carcinoma Arose: Not applicable   Tumor deposits: Absent     Regional lymph nodes     Number of Lymph nodes Involved: 1     Number of Lymph Nodes Examined: 16     Pathologic Stage Classification (pTNM, AJCC 8th Edition)     Primary Tumor (pT):      pT 3: Tumor invades through muscularis propria into pericolonic fat     Regional Lymph Nodes (pN):     pN 1a: Metastasis in 1 lymph node     + Additional pathologic findings: Histologically unremarkable appendix. + Ancillary studies: Not applicable     Imaging:  I have personally reviewed the following films:    No results found.     Scheduled Meds:   famotidine (PEPCID) injection  20 mg Intravenous Daily    amiodarone  200 mg Oral TID    lidocaine   Topical Once    sodium chloride flush  10 mL Intravenous 2 times per day    enoxaparin  40 mg Subcutaneous Daily    aspirin  81 mg Oral Daily    carvedilol  3.125 mg Oral BID WC    rosuvastatin  20 mg Oral Nightly     Continuous Infusions:   dextrose 50 mL/hr at 03/15/21 1124    sodium chloride      sodium chloride       PRN Meds:.phenol, metoprolol, sodium chloride flush, sodium chloride flush, HYDROmorphone, perflutren lipid microspheres, sodium chloride, potassium chloride **OR** potassium alternative oral replacement **OR** potassium chloride, promethazine **OR** ondansetron, magnesium hydroxide, acetaminophen **OR** acetaminophen, hydrALAZINE, sodium chloride, sodium chloride      Assessment:  68 y.o. female admitted with   1. Anemia, unspecified type      Ms. Shante Teran is a 68 y.o. female who presents with   Status-post laparoscopic right colon resection and cholecystectomy on 3/8/2021 for colon mass, chronic cholecystitis with cholelithiasis   Anemia  Acute kidney injury  Hypertension  CAD  Paroxysmal atrial fibrillation  Medical coagulopathy, on Brilinta  Ileus      Plan:  1. Remove Pickard when able, defer to Nephrology  2. Advance to low fiber diet as tolerated; monitor bowel function--passing stool  3. IV hydration and electrolyte correction per Nephrology  4. Activity as tolerated, ambulate TID, up to chair for all meals--PT/OT following  5. Pulmonary toilet, incentive spirometry  6. PRN analgesics and antiemetics--minimizing narcotics as tolerated, transition to PO  7. Okay to resume anticoagulants from a surgical perspective  8. Management of medical comorbid etiologies per primary team and consulting services  9. Disposition: Anticipate discharge home from a surgical perspective in the next 24-48 hours    EDUCATION:  Educated patient on plan of care and disease process--all questions answered. Plans discussed with patient and nursing. Reviewed and discussed with Dr. Sara Landers.       Signed:  JIMENA Klein - CNP  3/15/2021 1:10 PM     Doing well  Tolerating liquids and having bowel function  Ambulating fairly well  Advance to general diet  Aiming for discharge home tomorrow

## 2021-03-15 NOTE — PROGRESS NOTES
[facility]   Electrophysiology Progress Note     Date: 3/15/2021  Admit Date: 3/3/2021     Reason for consultation: Atrial fibrillation    Chief Complaint:   Chief Complaint   Patient presents with    Abnormal Lab     pt sent in by her MD for low H/H       History of Present Illness: History obtained from patient and medical record. Melany Landau is a 68 y.o. female with a past medical history of HTN, HLD, CKD< FIDEL, and CAD. Pt presented to hospital by recommendation of her PCP for anemia. Her hemoglobin was 5.8 and was she received multiple units of blood. GI completed colonoscopy/EGD with no gross bleeding found, however, she was found to have a mass in her colon. She had a recent stent placed and has been on ASA/Brilenta. She was found to be in AF with RVR on admission, thus EP was consulted    Interval Hx: Today, she is being seen for follow up. Pt is feeling better, her NG tube is out. She is tolerating liquids well. She is currently in AF with controlled rate. Her BP is stable. Her kidney function continues to improve and she wants the harris catheter out. She is hopeful to be discharged home tomorrow. Patient seen and examined. Clinical notes reviewed. Telemetry reviewed. No new complaints today. No major events overnight. Denies having chest pain, palpitations, shortness of breath, orthopnea/PND, cough, or dizziness at the time of this visit. Allergies:  No Known Allergies    Home Meds:  Prior to Visit Medications    Medication Sig Taking? Authorizing Provider   furosemide (LASIX) 20 MG tablet Take 1 tablet by mouth daily as needed (swelling or shortness of breath) Yes JIMENA Butterfield CNP   HYDROcodone-acetaminophen (NORCO) 5-325 MG per tablet Take 1 tablet by mouth 2 times daily.  Yes Historical Provider, MD   rosuvastatin (CRESTOR) 20 MG tablet Take 1 tablet by mouth nightly Yes JIMENA Butterfield CNP   aspirin 81 MG chewable tablet Take 1 tablet by mouth daily Yes Dread Belden, APRN - CNP   lisinopril (PRINIVIL;ZESTRIL) 2.5 MG tablet Take 1 tablet by mouth daily Yes Dread Belden, APRN - CNP   carvedilol (COREG) 3.125 MG tablet Take 1 tablet by mouth 2 times daily (with meals) Yes Dread Belden, APRN - CNP   ticagrelor (BRILINTA) 90 MG TABS tablet Take 1 tablet by mouth 2 times daily Yes Dread Belden, APRN - CNP      Scheduled Meds:   famotidine (PEPCID) injection  20 mg Intravenous Daily    amiodarone  200 mg Oral TID    oxymetazoline  2 spray Each Nostril Once    lidocaine   Topical Once    sodium chloride flush  10 mL Intravenous 2 times per day    enoxaparin  40 mg Subcutaneous Daily    aspirin  81 mg Oral Daily    carvedilol  3.125 mg Oral BID WC    rosuvastatin  20 mg Oral Nightly     Continuous Infusions:   sodium chloride      sodium chloride       PRN Meds:phenol, metoprolol, sodium chloride flush, sodium chloride flush, HYDROmorphone, perflutren lipid microspheres, sodium chloride, potassium chloride **OR** potassium alternative oral replacement **OR** potassium chloride, promethazine **OR** ondansetron, magnesium hydroxide, acetaminophen **OR** acetaminophen, hydrALAZINE, sodium chloride, sodium chloride     Past Medical History:  Past Medical History:   Diagnosis Date    Anemia     CAD (coronary artery disease) 11/2020    Stent    CKD (chronic kidney disease)     Hyperlipidemia     Hypertension       Past Surgical History:    has a past surgical history that includes fracture surgery; Cystocopy (11/21/13); Upper gastrointestinal endoscopy (N/A, 3/5/2021); Colonoscopy (N/A, 3/5/2021); hemicolectomy (Right, 3/8/2021); and Cholecystectomy, laparoscopic (N/A, 3/8/2021). Social History:  Reviewed. reports that she has never smoked. She has never used smokeless tobacco. She reports that she does not drink alcohol or use drugs. Family History:  Reviewed. family history includes Heart Disease in her father.      Review of Systems:  · Constitutional: Negative for fever, night sweats, chills, weight changes, or weakness  · Skin: Negative for rash, dry skin, pruritus, bruising, bleeding, blood clots, or changes in skin pigment  · HEENT: Negative for vision changes, ringing in the ears, sore throat, dysphagia, or swollen lymph nodes  · Respiratory: Reviewed in HPI  · Cardiovascular: Reviewed in HPI  · Gastrointestinal: Positive for N/V and abdominal pain. · Genito-Urinary: Negative for dysuria, incontinence, urgency, or hematuria  · Musculoskeletal: Positive for weakness. Negative for joint swelling, muscle pain, or injuries  · Neurological/Psych: Negative for confusion, seizures, headaches, balance issues or TIA-like symptoms. No anxiety, depression, or insomnia    Physical Examination:  Vitals:    03/15/21 0855   BP:    Pulse:    Resp:    Temp:    SpO2: 99%      In: 840 [P.O.:840]  Out: 780    Wt Readings from Last 3 Encounters:   03/12/21 229 lb 8 oz (104.1 kg)   03/01/21 235 lb 9.6 oz (106.9 kg)   11/30/20 229 lb (103.9 kg)       Intake/Output Summary (Last 24 hours) at 3/15/2021 0953  Last data filed at 3/15/2021 0656  Gross per 24 hour   Intake 840 ml   Output 830 ml   Net 10 ml       Telemetry: Personally Reviewed  - AF, rate 70s  · Constitutional: Cooperative and in moderate distress  · Skin: Warm and pink; no pallor, cyanosis, bruising, or clubbing  · HEENT: Symmetric and normocephalic. PERRL, EOM intact. Conjunctiva pink with clear sclera. Mucus membranes pink and moist. Teeth intact. Thyroid smooth without nodules or goiter. · Cardiovascular: Regular rate and irregular rhythm. S1/S2 present without murmurs, rubs, or gallops. Peripheral pulses 2+, capillary refill < 3 seconds. No elevation of JVP. No peripheral edema  · Respiratory: Respirations symmetric and unlabored. Lungs diminished to auscultation bilaterally, no wheezing, crackles, or rhonchi. On 2L of oxygen  · Gastrointestinal: Abdomen soft and round.  Bowel sounds normoactive in all quadrants without tenderness or masses. + Pickard catheter  · Musculoskeletal: Bilateral upper and lower extremity strength 5/5 with full ROM  · Neurologic/Psych: Awake and orientated to person, place and time. Calm affect, appropriate mood    Pertinent labs, diagnostic, device, and imaging results reviewed as a part of this visit    Labs:    BMP:   Recent Labs     03/13/21 0459 03/14/21  0542 03/15/21  0514   * 150* 152*   K 4.2 4.0 3.9   * 115* 114*   CO2 24 23 27   PHOS  --  2.8  --    BUN 64* 61* 56*   CREATININE 1.7* 1.3* 1.3*   MG  --  2.50*  --      Estimated Creatinine Clearance: 39 mL/min (A) (based on SCr of 1.3 mg/dL (H)). CBC:   Recent Labs     03/13/21 0459 03/14/21  0542 03/15/21  0514   WBC 5.4 9.0 10.3   HGB 8.8* 9.3* 9.1*   HCT 28.7* 31.1* 30.0*   MCV 74.7* 74.1* 74.0*    309 317     Thyroid: No results found for: TSH, D3JCCGS, E5TWGYZ, THYROIDAB  Lipids:   Lab Results   Component Value Date    CHOL 155 11/21/2020    HDL 38 11/21/2020    TRIG 121 11/21/2020     LFTS:   Lab Results   Component Value Date    ALT 8 03/11/2021    AST 16 03/11/2021    ALKPHOS 79 03/11/2021    PROT 6.1 03/11/2021    AGRATIO 0.9 03/11/2021    BILITOT 0.5 03/11/2021     Cardiac Enzymes:   Lab Results   Component Value Date    CKTOTAL 41 03/11/2021    TROPONINI <0.01 03/10/2021    TROPONINI 0.03 11/20/2020     Coags:   Lab Results   Component Value Date    PROTIME 12.7 03/08/2021    INR 1.09 03/08/2021       ECG: 3/6/21  Atrial fibrillation    ECHO: 3/6/21   Normal left ventricle size, and systolic function with an estimated ejection fraction of 55-60%. Mild concentric left ventricular hypertrophy is present. No regional wall motion abnormalities are seen. Mild tricuspid regurgitation, RVSP is estimated at 38 mmhg.     Stress Test: None    Cath: 11/20/20  Findings:  Artery Findings/Result  LM Normal  LAD 95% mid  Cx OM2 50%  RI NA  RCA 20% prox, 20% mid  LVEDP 25  LVG 55%     Intervention:         PCI of LAD with [id number] REBECA (PD with 3.75NC)     Post Cath Dx:       Severe , nonobstructive otherwise    CT Chest: 3/5/21  Within the chest, small mediastinal nodes are seen, slightly increased   compared to prior, either reactive or metastatic.  Small pleural effusions   are seen, compatible with mild fluid overload.       Stable lobular pulmonary nodule in the left lower lobe.       Nodular wall thickening of the colon on the right, compatible with the given   history of colon mass.  Small mesenteric node is seen in this region,, likely   early desiree metastasis       Increase in size of ovarian-adnexal masses on the right and left.  Consider   pelvic ultrasound for further characterization.       Tiny nonobstructing right renal stone     NM Biliary: 3/11/21  No evident bile leak following cholecystectomy.  Slight reduction in the   amount of subhepatic fluid present since the earlier CT study 5 hours ago.         CT Abd: 3/11/21  New fluid collection in the gallbladder fossa with adjacent small amount of   fluid around the liver.  This may be residual fluid from the recent surgery   or related to biliary leak.  Consider HIDA scan evaluation       There are 2-3 tiny gallstones within the gallbladder fossa fluid collection,   measuring 3 mm and less in size.       Increased size of mild-moderate right pleural effusion with increased   adjacent compressive atelectasis of the right lung base. Problem List:   Patient Active Problem List    Diagnosis Date Noted    Atrial fibrillation ([address] 03/10/2021    Colonic mass     Diastolic dysfunction     Hypokalemia 2021    Malignant neoplasm of ascending colon ([address] 2021    Anemia 2021    CAD (coronary artery disease) 2021    High cholesterol 2021    Hypertension 01/10/2014        Assessment and Plan:     1.  Paroxysmal Atrial Fibrillation  - Currently in AF, rate discussed. I have discussed the above stated plan with patient and the nurse. The patient verbalized understanding and agreed with the plan. The patient was seen for >35 minutes. I reviewed interval history, physical exam, review of data including labs, imaging, development and implementation of treatment plan and coordination of complex care. Thank you for allowing to us to participate in the care of [address] [place] [address].     Seth Nickerson, JIMENA-CNP  [facility]   Office: [phone]

## 2021-03-15 NOTE — PLAN OF CARE
Problem: Falls - Risk of:  Goal: Absence of physical injury  Description: Absence of physical injury  3/15/2021 [id number] by Jarod Rider RN  Outcome: Ongoing  3/14/2021 1827 by Jeremy Jovel RN  Outcome: Ongoing

## 2021-03-15 NOTE — PROGRESS NOTES
Progress Note - Dr. Mario Staton - Internal Medicine  PCP: Antonio Zuniga MD [address] [place] [address] Melisa Ortega 80 [phone]    Hospital Day: 12  Code Status: Full Code  Current Diet: DIET CLEAR LIQUID;        CC: follow up on medical issues    Subjective: Jevon Chen is a 68 y.o. female. She denies problems    Doing much better  NG out, tolerating clears OK  Pt is out of bed in chair    Therapy scores recommending SNF    She denies chest pain, denies shortness of breath, denies nausea,  denies emesis. 10 system Review of Systems is reviewed with patient, and pertinent positives are noted in HPI above . Otherwise, Review of systems is negative. I have reviewed the patient's medical and social history in detail and updated the computerized patient record. To recap: She  has a past medical history of Anemia, CAD (coronary artery disease), CKD (chronic kidney disease), Hyperlipidemia, and Hypertension. . She  has a past surgical history that includes fracture surgery; Cystocopy (11/21/13); Upper gastrointestinal endoscopy (N/A, 3/5/2021); Colonoscopy (N/A, 3/5/2021); hemicolectomy (Right, 3/8/2021); and Cholecystectomy, laparoscopic (N/A, 3/8/2021). . She  reports that she has never smoked. She has never used smokeless tobacco. She reports that she does not drink alcohol or use drugs. .        Active Hospital Problems    Diagnosis Date Noted    Atrial fibrillation ([facility] [address] [I48.91] 03/10/2021    Colonic mass [Y06.18]     Diastolic dysfunction [L07.05] 03/07/2021    Hypokalemia [E87.6] 03/07/2021    Malignant neoplasm of ascending colon (HCC) [C18.2] 03/07/2021    Anemia [D64.9] 03/03/2021    CAD (coronary artery disease) [I25.10] 03/03/2021    High cholesterol [E78.00] 03/03/2021    Hypertension [I10] 01/10/2014       Current Facility-Administered Medications: famotidine (PEPCID) injection 20 mg, 20 mg, Intravenous, Daily  amiodarone (CORDARONE) tablet 200 mg, 200 mg, Oral, TID  oxymetazoline (AFRIN) 0.05 % nasal spray 2 spray, 2 spray, Each Nostril, Once  lidocaine (XYLOCAINE) 2 % jelly, , Topical, Once  phenol 1.4 % mouth spray 1 spray, 1 spray, Mouth/Throat, Q2H PRN  metoprolol (LOPRESSOR) injection 5 mg, 5 mg, Intravenous, Q8H PRN  sodium chloride flush 0.9 % injection 10 mL, 10 mL, Intravenous, 2 times per day  sodium chloride flush 0.9 % injection 10 mL, 10 mL, Intravenous, PRN  sodium chloride flush 0.9 % injection 10 mL, 10 mL, Intravenous, PRN  enoxaparin (LOVENOX) injection 40 mg, 40 mg, Subcutaneous, Daily  HYDROmorphone (DILAUDID) injection 1 mg, 1 mg, Intravenous, Q3H PRN  aspirin EC tablet 81 mg, 81 mg, Oral, Daily  perflutren lipid microspheres (DEFINITY) injection 1.65 mg, 1.5 mL, Intravenous, ONCE PRN  0.9 % sodium chloride infusion, , Intravenous, PRN  carvedilol (COREG) tablet 3.125 mg, 3.125 mg, Oral, BID WC  rosuvastatin (CRESTOR) tablet 20 mg, 20 mg, Oral, Nightly  potassium chloride (KLOR-CON M) extended release tablet 40 mEq, 40 mEq, Oral, PRN **OR** potassium bicarb-citric acid (EFFER-K) effervescent tablet 40 mEq, 40 mEq, Oral, PRN **OR** potassium chloride 10 mEq/100 mL IVPB (Peripheral Line), 10 mEq, Intravenous, PRN  promethazine (PHENERGAN) tablet 12.5 mg, 12.5 mg, Oral, Q6H PRN **OR** ondansetron (ZOFRAN) injection 4 mg, 4 mg, Intravenous, Q6H PRN  magnesium hydroxide (MILK OF MAGNESIA) 400 MG/5ML suspension 30 mL, 30 mL, Oral, Daily PRN  acetaminophen (TYLENOL) tablet 650 mg, 650 mg, Oral, Q6H PRN **OR** acetaminophen (TYLENOL) suppository 650 mg, 650 mg, Rectal, Q6H PRN  hydrALAZINE (APRESOLINE) injection 10 mg, 10 mg, Intravenous, Q6H PRN  0.9 % sodium chloride bolus, 500 mL, Intravenous, PRN  0.9 % sodium chloride infusion, , Intravenous, PRN         Objective:  /70   Pulse 84   Temp 97.6 °F (36.4 °C) (Axillary)   Resp 18   Ht 5' (1.524 m)   Wt 229 lb 8 oz (104.1 kg)   SpO2 99%   BMI 44.82 kg/m²      Patient Vitals for the past 24 hrs: Demographics   Patient Name        Amy Scott   Date of Study       03/06/2021  Gender                 Female   Patient Number      [id number]  Date of Birth          1940   Visit Number        [id number]   Age                    68 year(s)   Accession Number    [id number]  Room Number            7961   Corporate ID        [id number]    Sonographer            Kwasi Teague RDCS,                                                         RVT   Ordering Physician              Interpreting Physician Antoinette Pacheco MD,                                                         [facility] [place], [facility] [address]   Procedure Type of Study   TTE procedure:ECHOCARDIOGRAM COMPLETE 2D W DOPPLER W COLOR. Procedure Date Date: 03/06/2021 Start: 09:12 AM Study Location: [facility] - Echo Lab Technical Quality: Good visualization Additional Indications:NSTEMI, Leg edema, CAD. Patient Status: Routine Height: 60 inches Weight: 220 pounds BSA: 1.94 m2 BMI: 42.97 kg/m2 BP: 134/75 mmHg  Conclusions   Summary  Normal left ventricle size, and systolic function with an estimated ejection  fraction of 55-60%. Mild concentric left ventricular hypertrophy is present. No regional wall motion abnormalities are seen. Mild tricuspid regurgitation, RVSP is estimated at 38 mmhg. Signature   ------------------------------------------------------------------  Electronically signed by Antoinette Pacheco MD, [facility] [place], [address]  (Interpreting physician) on 03/06/2021 at 12:12 PM  ------------------------------------------------------------------   Findings   Left Ventricle  Normal left ventricle size, and systolic function with an estimated ejection  fraction of 55-60%. Mild concentric left ventricular hypertrophy is present. No regional wall motion abnormalities are seen. Grade I diastolic dysfunction with normal LV filling pressures. Mitral Valve  Calcification of the mitral valve noted. No evidence of mitral regurgitation.    Left Atrium  The left atrium is normal in size. Aortic Valve  The aortic valve is structurally normal. There is no significant aortic  valve regurgitation or stenosis. Aorta  The aortic root is normal in size. Right Ventricle  The right ventricle is normal in size and function. Tricuspid Valve  The tricuspid valve is normal in structure. Mild tricuspid regurgitation, RVSP is estimated at 38 mmhg. Right Atrium  The right atrial size is normal.   Pulmonic Valve  The pulmonic valve is not well visualized. Mild pulmonic regurgitation present. Pericardial Effusion  No pericardial effusion noted. Pleural Effusion  No pleural effusion. Miscellaneous  The inferior vena cava appears normal in size with normal respiratory  variation.   M-Mode/2D Measurements (cm)   LV Diastolic Dimension: 4.5 cm  LV Systolic Dimension: 1.73 cm  LV Septum Diastolic: 9.06 cm  LV PW Diastolic: 1.5 cm         AO Root Dimension: 3.1 cm  RV Diastolic Dimension: 9.48 cm LA Dimension: 3.4 cm                                  LA Area: 11.7 cm2                                  LA volume/Index: 21.6 ml /11 ml/m2  Doppler Measurements   AV Peak Velocity: 194 cm/s     MV Peak E-Wave: 98.5 cm/s  AV Peak Gradient: 15.05 mmHg   MV Peak A-Wave: 89.5 cm/s  AV Mean Gradient: 9 mmHg       MV E/A Ratio: 1.1  LVOT Peak Velocity: 122 cm/s   MV Mean Gradient: 3 mmHg                                 MV Max P mmHg  TR Velocity:290 cm/s           MV Vmax:125 cm/s  TR Gradient:33.64 mmHg         MV VTI:51.9 cm/s   E' Septal Velocity: 6.2 cm/s   MV Deceleration Time: 215 msec  E' Lateral Velocity: 5.98 cm/s  PV Peak Velocity: 123 cm/s  PV Peak Gradient: 6.05 mmHg   Aortic Valve   Peak Velocity: 194 cm/s   Mean Velocity: 141 cm/s  Peak Gradient: 15.05 mmHg Mean Gradient: 9 mmHg  AV VTI: 43.8 cm  Aorta   Aortic Root: 3.1 cm      Ct Abdomen Pelvis Wo Contrast Additional Contrast? None    Result Date: 3/11/2021  EXAMINATION: CT OF THE ABDOMEN AND PELVIS WITHOUT CONTRAST 3/11/2021 12:05 pm TECHNIQUE: CT of the abdomen and pelvis was performed without the administration of intravenous contrast. Multiplanar reformatted images are provided for review. Dose modulation, iterative reconstruction, and/or weight based adjustment of the mA/kV was utilized to reduce the radiation dose to as low as reasonably achievable. COMPARISON: CT of the chest abdomen and pelvis March 5, 2020 HISTORY: ORDERING SYSTEM PROVIDED HISTORY: RUQ pain TECHNOLOGIST PROVIDED HISTORY: Reason for exam:->RUQ pain Additional Contrast?->None Reason for Exam: RUQ pain Acuity: Unknown Type of Exam: Unknown FINDINGS: Lower Chest: Increased mild-moderate right pleural effusion with adjacent compressive atelectasis. Unchanged trace amount of left pleural fluid. Organs: In the gallbladder fossa there is a fluid collection measuring 4.2 x 2.3 by 2.4 cm. Along the inferior-lateral aspect of the fluid collection a few tiny curvilinear/rounded high-density foci are present measuring 3 mm and less in size best shown on coronal image 90. Cholecystectomy clips noted in the expected location. There is a small amount of fluid along the inferior margin of the right hepatic lobe anterior laterally. Small amount of fluid between the diaphragm and liver also present. No pancreatitis. No ureteral stone or hydronephrosis. 2 mm right renal stone again noted. GI/Bowel: There is new fluid-filled small bowel dilation involving proximal to mid small bowel loops measuring up to 3 cm. There is distal small bowel collapse extending to the surgical site. Oral contrast within the colon from the oral contrast given for the comparison. No oral contrast within small bowel at this time. Pelvis: No acute abnormality of the pelvis. Normal appearance of the bladder. Peritoneum/Retroperitoneum: Small amount of free fluid in the right upper quadrant as discussed. No free air. Bones/Soft Tissues: Expected postsurgical changes of the abdominal wall.   No acute osseous findings. New fluid collection in the gallbladder fossa with adjacent small amount of fluid around the liver. This may be residual fluid from the recent surgery or related to biliary leak. Consider HIDA scan evaluation There are 2-3 tiny gallstones within the gallbladder fossa fluid collection, measuring 3 mm and less in size. Increased size of mild-moderate right pleural effusion with increased adjacent compressive atelectasis of the right lung base. Xr Chest (2 Vw)    Result Date: 3/1/2021  EXAMINATION: TWO XRAY VIEWS OF THE CHEST 3/1/2021 2:58 pm COMPARISON: None. HISTORY: ORDERING SYSTEM PROVIDED HISTORY: EUBANKS (dyspnea on exertion) TECHNOLOGIST PROVIDED HISTORY: Reason for exam:->3-4wk hx of EUBANKS and productive cough FINDINGS: Cardiomegaly. Pulmonary vascular congestion. Ill-defined peripheral pulmonary opacities. No pneumothorax. No pleural effusion. Ill-defined bilateral pulmonary opacities could represent pulmonary edema possibly related to congestive heart failure or atypical pneumonia     Nm Hepatobiliary    Result Date: 3/11/2021  EXAMINATION: NUCLEAR MEDICINE HEPATOBILIARY SCINTIGRAPHY (HIDA SCAN). 3/11/2021 2:40 pm TECHNIQUE: Approximately 5.77 [id number] Mebrofenin (Choletec) was administered IV. Then, dynamic images of the abdomen were obtained in the anterior projection for 60 mins. A right lateral view was also obtained at 60 mins. COMPARISON: CT abdomen and pelvis 03/11/2021. HISTORY: ORDERING SYSTEM PROVIDED HISTORY: R/o bile leak s/p renu TECHNOLOGIST PROVIDED HISTORY: Reason for exam:->R/o bile leak s/p renu Reason for Exam: R/O Bile Leak Acuity: Acute Type of Exam: Ongoing FINDINGS: Prompt, homogenous uptake by the liver is noted with normal appearance of radiotracer excretion into the biliary system. Clearance of bloodpool activity appears appropriate. During the 1st 35 minutes of the study, the common duct is normal in size and appearance.   Beginning at 40 minutes, accumulation of radio activity which overlies the common duct is seen. .  This accumulation most likely corresponds to the proximal duodenum as the abnormal fluid collection on the CT scan lies more laterally. Gallbladder is surgically absent. Delayed planar images as well as SPECT images were obtained due to the confusing appearance on the initial images. These latter images do not show any accumulation of activity in the subhepatic region. The only activity is seen within the small bowel confirming that there is no bile leak present. The CT images also show a slight decrease in the amount of fluid in the gallbladder fossa. No evident bile leak following cholecystectomy. Slight reduction in the amount of subhepatic fluid present since the earlier CT study 5 hours ago. Xr Chest Portable    Result Date: 3/12/2021  EXAMINATION: ONE XRAY VIEW OF THE CHEST 3/12/2021 6:06 pm COMPARISON: 1 March 2021 HISTORY: ORDERING SYSTEM PROVIDED HISTORY: ng tube placement TECHNOLOGIST PROVIDED HISTORY: Reason for exam:->ng tube placement Reason for Exam: ng tube placement Acuity: Unknown Type of Exam: Unknown FINDINGS: AP portable view of the chest time stamped at 1752 hours overlying cardiac monitoring electrodes are noted. Intestinal tube extends to the distal stomach. Heart is stable, mildly enlarged. Elevated hemidiaphragm is noted. There is left perihilar stranding likely atelectasis. No extrapleural air. Intestinal tube terminates in the distal stomach. Other findings as above. Ct Chest Abdomen Pelvis Wo Contrast    Result Date: 3/5/2021  EXAMINATION: CT OF THE CHEST, ABDOMEN, AND PELVIS WITHOUT CONTRAST 3/5/2021 4:20 pm TECHNIQUE: CT of the chest, abdomen and pelvis was performed without the administration of intravenous contrast. Multiplanar reformatted images are provided for review.  Dose modulation, iterative reconstruction, and/or weight based adjustment of the mA/kV was utilized to reduce the radiation dose to as low as reasonably achievable. COMPARISON: Chest CT 2014 2013 HISTORY: ORDERING SYSTEM PROVIDED HISTORY: Colon mass, r/o mets TECHNOLOGIST PROVIDED HISTORY: Reason for exam:->Colon mass, r/o mets Additional Contrast?->Oral Reason for Exam: Colon mass, r/o mets Acuity: Unknown Type of Exam: Unknown FINDINGS: Chest: Mediastinum: 7 mm nodule seen in right lobe of thyroid gland. No specific imaging follow-up recommended based on size. coronary artery calcification is seen. Small mediastinal nodes are noted. Right paratracheal node measures 1.4 cm in short axis, previously 10 mm. Lungs/pleura: Mosaic attenuation is seen throughout the lungs, suggesting small airways disease or air trapping. Trace pleural effusions are seen bilaterally. There is adjacent consolidation seen in the lung bases. 1.8 x 1.2 cm nodule is seen in the left lower lobe Soft Tissues/Bones: Degenerative changes are seen in the spine. Degenerative changes are seen in the shoulder joints. Abdomen/Pelvis: Organs: No splenomegaly Adrenal glands appear normal. There is rotation of the kidney anteriorly, incidentally noted. .  No stones noted 2 mm stone seen in the right kidney. No hydronephrosis noted. No intrahepatic ductal dilatation. No perihepatic fluid Gallbladder appears normal No peripancreatic inflammatory change GI/Bowel: No significant small bowel distention noted. Mild stool load seen in the colon. Scattered colonic diverticula are seen. There is focal wall thickening of the colon on the right, extending over a length of 3.7 cm. There is surrounding injection the Nadja colonic fat. Small pericolonic lymph node is seen in the right upper quadrant mesentery measuring 8 mm. Pelvis: No free fluid seen within the pelvis. Pickard catheter is seen in the bladder Left adnexal mass is seen measuring 4.4 cm x 3.5 cm previously 3.9 cm by 3.3 cm.   Right adnexal nodule measures 3.2 x 3.9 cm, previously 2.5 x 3.6 cm

## 2021-03-15 NOTE — PLAN OF CARE
Problem: Falls - Risk of:  Goal: Will remain free from falls  3/14/2021 2326 by Roxanne Metzger  Outcome: Ongoing     Problem: Pain:  Goal: Pain level will decrease  3/14/2021 2326 by Roxanne Metzger  Outcome: Ongoing     Problem: Pain:  Goal: Control of acute pain  3/14/2021 2326 by Roxanne Metzger  Outcome: Ongoing     Problem: Musculor/Skeletal Functional Status  Goal: Highest potential functional level  3/14/2021 2326 by Roxanne Metzger  Outcome: Ongoing     Problem: Nutrition  Goal: Optimal nutrition therapy  3/14/2021 2326 by Roxanne Metzger  Outcome: Ongoing     Problem: Skin Integrity:  Goal: Will show no infection signs and symptoms  3/14/2021 2326 by Roxanne Metzger  Outcome: Ongoing     Problem: Skin Integrity:  Goal: Absence of new skin breakdown  3/14/2021 2326 by Roxanne Metzger  Outcome: Ongoing

## 2021-03-15 NOTE — PROGRESS NOTES
9170  Last data filed at 3/15/2021 0656  Gross per 24 hour   Intake 850 ml   Output 1150 ml   Net -300 ml      Wt Readings from Last 3 Encounters:   03/12/21 229 lb 8 oz (104.1 kg)   03/01/21 235 lb 9.6 oz (106.9 kg)   11/30/20 229 lb (103.9 kg)       Conscious alert oriented. No pallor or icterus  Respiratory efforts are normal.  Abdomen mild distention noted  Extremities no edema  Neuro no focal deficits noted    Labs and Tests:  CBC:   Recent Labs     03/13/21  0459 03/14/21  0542 03/15/21  0514   WBC 5.4 9.0 10.3   HGB 8.8* 9.3* 9.1*    309 317     BMP:    Recent Labs     03/13/21  0459 03/14/21  0542 03/15/21  0514   * 150* 152*   K 4.2 4.0 3.9   * 115* 114*   CO2 24 23 27   BUN 64* 61* 56*   CREATININE 1.7* 1.3* 1.3*   GLUCOSE 123* 117* 136*     Hepatic:   No results for input(s): AST, ALT, ALB, BILITOT, ALKPHOS in the last 72 hours. ASSESSMENT AND PLAN    Principal Problem:    Anemia  Active Problems:    Hypertension    CAD (coronary artery disease)    High cholesterol    Diastolic dysfunction    Hypokalemia    Malignant neoplasm of ascending colon (HCC)    Atrial fibrillation (HCC)    Colonic mass  Resolved Problems:    * No resolved hospital problems. *      Ascending colon cancer   -Colon mass found on colonoscopy 3/5/2021  -s/p right hemicolectomy 3/8/21  -CEA is normal and CT scans were negative for metastatic disease.  -Pathology consistent with invasive colonic adenocarcinoma, 1/16 lymph node involvement.    -Plan will be for chemotherapy as an outpatient.           Anemia  - Iron deficiency due to GI blood loss. - S/p 3 doses of Venofer on 3/6/21.  - Vit B12 wnl.  - hgb 9.1 today. No indication to transfuse.         CAD  - s/p stent placement       YESENIA: Renal following.  Creatinine better    Postop nausea and vomiting, Chronic cholecystitis: Managed by surgery    Mark Spring MD

## 2021-03-15 NOTE — CARE COORDINATION
Met with patient and [facility].   MD, Please complete & sign the e-ANUP under the discharge instructions, AVS/Prescriptions, and or medical necessity note for assistance with discharge planning, Thank you, Andrea Quiles, MSTREASURE, [phone]

## 2021-03-16 VITALS
TEMPERATURE: 97.6 F | BODY MASS INDEX: 42.15 KG/M2 | RESPIRATION RATE: 18 BRPM | HEIGHT: 60 IN | DIASTOLIC BLOOD PRESSURE: 71 MMHG | HEART RATE: 77 BPM | WEIGHT: 214.7 LBS | OXYGEN SATURATION: 97 % | SYSTOLIC BLOOD PRESSURE: 145 MMHG

## 2021-03-16 DIAGNOSIS — Z91.89 AT RISK FOR SLEEP APNEA: Primary | ICD-10-CM

## 2021-03-16 LAB
ANION GAP SERPL CALCULATED.3IONS-SCNC: 7 MMOL/L (ref 3–16)
BASOPHILS ABSOLUTE: 0 K/UL (ref 0–0.2)
BASOPHILS RELATIVE PERCENT: 0.2 %
BUN BLDV-MCNC: 43 MG/DL (ref 7–20)
CALCIUM SERPL-MCNC: 8.9 MG/DL (ref 8.3–10.6)
CHLORIDE BLD-SCNC: 111 MMOL/L (ref 99–110)
CO2: 28 MMOL/L (ref 21–32)
CREAT SERPL-MCNC: 1.2 MG/DL (ref 0.6–1.2)
EOSINOPHILS ABSOLUTE: 0.1 K/UL (ref 0–0.6)
EOSINOPHILS RELATIVE PERCENT: 1.3 %
GFR AFRICAN AMERICAN: 53
GFR NON-AFRICAN AMERICAN: 43
GLUCOSE BLD-MCNC: 148 MG/DL (ref 70–99)
HCT VFR BLD CALC: 29.2 % (ref 36–48)
HEMOGLOBIN: 8.9 G/DL (ref 12–16)
LYMPHOCYTES ABSOLUTE: 2.1 K/UL (ref 1–5.1)
LYMPHOCYTES RELATIVE PERCENT: 21 %
MCH RBC QN AUTO: 22.5 PG (ref 26–34)
MCHC RBC AUTO-ENTMCNC: 30.5 G/DL (ref 31–36)
MCV RBC AUTO: 73.9 FL (ref 80–100)
MONOCYTES ABSOLUTE: 0.9 K/UL (ref 0–1.3)
MONOCYTES RELATIVE PERCENT: 9 %
NEUTROPHILS ABSOLUTE: 6.9 K/UL (ref 1.7–7.7)
NEUTROPHILS RELATIVE PERCENT: 68.5 %
PDW BLD-RTO: 26.5 % (ref 12.4–15.4)
PLATELET # BLD: 311 K/UL (ref 135–450)
PMV BLD AUTO: 8.9 FL (ref 5–10.5)
POTASSIUM SERPL-SCNC: 3.9 MMOL/L (ref 3.5–5.1)
RBC # BLD: 3.96 M/UL (ref 4–5.2)
SODIUM BLD-SCNC: 146 MMOL/L (ref 136–145)
WBC # BLD: 10.1 K/UL (ref 4–11)

## 2021-03-16 PROCEDURE — 6370000000 HC RX 637 (ALT 250 FOR IP): Performed by: NURSE PRACTITIONER

## 2021-03-16 PROCEDURE — 6360000002 HC RX W HCPCS: Performed by: NURSE PRACTITIONER

## 2021-03-16 PROCEDURE — 2500000003 HC RX 250 WO HCPCS: Performed by: INTERNAL MEDICINE

## 2021-03-16 PROCEDURE — APPNB30 APP NON BILLABLE TIME 0-30 MINS: Performed by: NURSE PRACTITIONER

## 2021-03-16 PROCEDURE — 99233 SBSQ HOSP IP/OBS HIGH 50: CPT | Performed by: NURSE PRACTITIONER

## 2021-03-16 PROCEDURE — APPSS15 APP SPLIT SHARED TIME 0-15 MINUTES: Performed by: NURSE PRACTITIONER

## 2021-03-16 PROCEDURE — 36415 COLL VENOUS BLD VENIPUNCTURE: CPT

## 2021-03-16 PROCEDURE — 6370000000 HC RX 637 (ALT 250 FOR IP): Performed by: SURGERY

## 2021-03-16 PROCEDURE — 80048 BASIC METABOLIC PNL TOTAL CA: CPT

## 2021-03-16 PROCEDURE — 2580000003 HC RX 258: Performed by: INTERNAL MEDICINE

## 2021-03-16 PROCEDURE — 85025 COMPLETE CBC W/AUTO DIFF WBC: CPT

## 2021-03-16 PROCEDURE — 99024 POSTOP FOLLOW-UP VISIT: CPT | Performed by: SURGERY

## 2021-03-16 RX ORDER — HYDROCODONE BITARTRATE AND ACETAMINOPHEN 5; 325 MG/1; MG/1
1 TABLET ORAL 2 TIMES DAILY
Qty: 28 TABLET | Refills: 0 | Status: ON HOLD | OUTPATIENT
Start: 2021-03-16 | End: 2021-04-05 | Stop reason: HOSPADM

## 2021-03-16 RX ORDER — AMIODARONE HYDROCHLORIDE 200 MG/1
TABLET ORAL
Qty: 30 TABLET | Refills: 2 | Status: SHIPPED | OUTPATIENT
Start: 2021-03-16 | End: 2021-07-07

## 2021-03-16 RX ADMIN — Medication 10 ML: at 09:22

## 2021-03-16 RX ADMIN — AMIODARONE HYDROCHLORIDE 200 MG: 200 TABLET ORAL at 09:22

## 2021-03-16 RX ADMIN — ENOXAPARIN SODIUM 40 MG: 40 INJECTION SUBCUTANEOUS at 09:21

## 2021-03-16 RX ADMIN — CARVEDILOL 3.12 MG: 3.12 TABLET, FILM COATED ORAL at 09:21

## 2021-03-16 RX ADMIN — FAMOTIDINE 20 MG: 10 INJECTION, SOLUTION INTRAVENOUS at 09:21

## 2021-03-16 RX ADMIN — ASPIRIN 81 MG: 81 TABLET, COATED ORAL at 09:21

## 2021-03-16 RX ADMIN — AMIODARONE HYDROCHLORIDE 200 MG: 200 TABLET ORAL at 15:10

## 2021-03-16 ASSESSMENT — PAIN SCALES - GENERAL
PAINLEVEL_OUTOF10: 0
PAINLEVEL_OUTOF10: 0

## 2021-03-16 NOTE — PROGRESS NOTES
Pt seen and examined  Feels good  Wants to go home  (declines SNF - though pt/ot recommend it)  Tolerating breakfast    D/c order placed  meds reviewed, rx printed    Full d/c summ to follow  Time today >32 min

## 2021-03-16 NOTE — PROGRESS NOTES
Comprehensive Nutrition Assessment    Type and Reason for Visit:  Reassess    Nutrition Recommendations/Plan:   1. Continue current diet  2. RD ordered Magic cups BID  3. Encourage po    Nutrition Assessment:  Pt is s/p lap renu with right colon resection. Pt has now been advanced to a low fiber diet and tolerating it. Pt denies any current n/v. Pt is interested in trying magic cups. RD to order supplement BID. Pt denies any needs at this time. RD educated pt on low fiber diet and provided handout prior to leaving. Malnutrition Assessment:  Malnutrition Status: At risk for malnutrition (Comment)    Context:  Acute Illness     Findings of the 6 clinical characteristics of malnutrition:  Energy Intake:  Mild decrease in energy intake (Comment)(d/t NPO status following surgery)  Weight Loss:  No significant weight loss     Body Fat Loss:  No significant body fat loss     Muscle Mass Loss:  No significant muscle mass loss    Fluid Accumulation:  1 - Mild Extremities   Strength:  Not Performed    Estimated Daily Nutrient Needs:  Energy (kcal):  776-1455 cals (8-15 cals/97kg); Weight Used for Energy Requirements:  Current     Protein (g):  90 gms (2.0 gms/45kg IBW); Weight Used for Protein Requirements:  Ideal        Fluid (ml/day):   ; Method Used for Fluid Requirements:  1 ml/kcal      Nutrition Related Findings:  Generalized nonpitting edema. I/O's: -10.2L. Na 146H, Gluc 148H, BUN 43H      Wounds:  Surgical Incision       Current Nutrition Therapies:    DIET LOW FIBER;   Dietary Nutrition Supplements: Frozen Oral Supplement    Anthropometric Measures:  · Height: 5' (152.4 cm)  · Current Body Weight: 214 lb (97.1 kg)   · Ideal Body Weight: 100 lbs; % Ideal Body Weight 220 %   · BMI: 41.8  · BMI Categories: Obese Class 3 (BMI 40.0 or greater)       Nutrition Diagnosis:   · Increased nutrient needs related to increase demand for energy/nutrients as evidenced by wounds    Nutrition Interventions:   Food and/or Nutrient Delivery:  Continue Current Diet, Start Oral Nutrition Supplement  Nutrition Education/Counseling:  Education completed(Low fiber)   Coordination of Nutrition Care:  Continue to monitor while inpatient    Goals:  New goal- po to remain greater than 50% at meals and supp       Nutrition Monitoring and Evaluation:   Behavioral-Environmental Outcomes:  None Identified   Food/Nutrient Intake Outcomes:  Food and Nutrient Intake, Supplement Intake  Physical Signs/Symptoms Outcomes:  Weight, Skin     Discharge Planning:    No discharge needs at this time     Electronically signed by Darlin Jules RD, CNSC, LD on 3/16/21 at 12:21 PM EDT    Contact: [phone]

## 2021-03-16 NOTE — PROGRESS NOTES
Nephrology Progress Note  [phone]  [phone]   [email or web address]        Reason for Consult:  YESENIA    HISTORY OF PRESENT ILLNESS:                This is a patient with significant past medical history of YESENIA in 2015, peak Scr 1.8, HTN, CAD-s/p recent stent on ASA and Brilinta,  DCHF,  HTN, A.fib,  sent to ER for anemia, hgb was 5.8, s/p PRBC. She had A.fib with RVR at admission and EP consulted. She had EGD/colonoscopy done which revealed colon mass, CT scan C/A/P with contrast was negative for metastatic disease. She underwent laparoscopic right hemicolectomy on 3/8/21. EBL minimal.  She has received IV lasix on 3/4-ketorolac on 3/9, on lisinopril,-stopped on 3/9  -hospital course complicated by YESENIA     Subjective/Interval history    Pt seen and examined  Hypernatremia persistent but better  Off D5W since this AM  Creatinine better  Possible discharge today  No chest pains or sob  No fevers/chills      PHYSICAL EXAM:    Vitals:    BP (!) 145/71   Pulse 77   Temp 97.6 °F (36.4 °C) (Oral)   Resp 18   Ht 5' (1.524 m)   Wt 214 lb 11.2 oz (97.4 kg)   SpO2 97%   BMI 41.93 kg/m²   I/O last 3 completed shifts:  In: -   Out: 300 [Urine:300]  I/O this shift:  In: -   Out: 100 [Urine:100]    Physical Exam:  Gen: no distress, sitting in chair   HEENT: normocephalic, atraumatic, no palor  CV: S1 S2 normal, regular rhythm  Lungs: clear on anterior exam ,fair air entry   Abd: soft, laparoscopic incision C/D/I, harris + with clear urine  Ext: trace edema, skin wrinkled, no cyanosis  Skin: Warm. No rashes appreciated.       DATA:    CBC:   Lab Results   Component Value Date    WBC 10.1 03/16/2021    RBC 3.96 03/16/2021    HGB 8.9 03/16/2021    HCT 29.2 03/16/2021    MCV 73.9 03/16/2021    MCH 22.5 03/16/2021    MCHC 30.5 03/16/2021    RDW 26.5 03/16/2021     03/16/2021    MPV 8.9 03/16/2021     BMP:    Lab Results   Component Value Date     03/16/2021    K 3.9 03/16/2021    K 3.7 03/04/2021     03/16/2021    CO2 28 03/16/2021    BUN 43 03/16/2021    LABALBU 2.9 03/11/2021    CREATININE 1.2 03/16/2021    CALCIUM 8.9 03/16/2021    GFRAA 53 03/16/2021    LABGLOM 43 03/16/2021    GLUCOSE 148 03/16/2021       IMPRESSION/RECOMMENDATIONS    YESENIA on CKD stage 3 recurrent   Base-line creatinine around 1  Creatinine on admission was 1.1 and at base-line, peak creatinine low 2s, improving and is stable in low 1s  baseline Scr 1.1-1.2-adm Scr 1.1-2.3 today  -multifactorial-relative IV depletion/diminished perfusion in the setting of ACEI-less likely contrast given exposure > 5 days prior while of lasix and ACEI, did receive one dose of NSAIDs/hemodynamic due to A.fib/RVR/hypotension-all factors combined-progression to ATI. -non-oliguric, likely pre-renal with element of ATN  -peak SCr 2.4-improved to low 1s  Pickard removed and voiding well    Hypernatremia improving  Counseled on fluid intake    CKD stage 3: baseline Scr 1.1-1.3  -due to YESENIA/presumed HTN NS    Anemia: adm hgb 5.8-s/p RPBC  -work up revealed colonic mass-s/p right hemicolectomy  -iron deficient-s/p venofer  -epogen per Oncology    Colonic mass: s/p hemicolectomy-path shows invasive colonic adenocarcinoma  -per Oncology  -plan to start outpatient chemo    A.fib with RVR: unable to get IV amiodarone due to no IV access or PO due to nausea/vomiting-per EP    CAD: recent stent-on ASA/statin/Bilinta on hold    HTN: recent hypotension  -lasix and lisinopril on hold  Start CCB as needed    dCHF: lasix as needed    Plan  Ok to discharge from renal perspective  Will arrange follow-up as out-pt      Thank you for allowing me to participate in the care of this patient. I will continue to follow along. Please call with questions.     Jameson Erickson MD

## 2021-03-16 NOTE — PLAN OF CARE
Nutrition Problem #1: Increased nutrient needs  Intervention: Food and/or Nutrient Delivery: Continue Current Diet, Start Oral Nutrition Supplement  Nutritional Goals: New goal- po to remain greater than 50% at meals and supp

## 2021-03-16 NOTE — ADT AUTH CERT
Anemia, Iron Deficiency or Unspecified - Care Day 13 (3/15/2021) by Yanelis Hoffman RN       Review Status Review Entered   Completed 3/16/2021 10:58      Criteria Review      Care Day: 13 Care Date: 3/15/2021 Level of Care: Inpatient Floor    Guideline Day 2    Level Of Care    (X) Floor to discharge    3/16/2021 10:58 AM EDT by Yudi White      MS    Clinical Status    (X) * Hemodynamic stability    (X) * Mental status at baseline    (X) * Active blood loss absent    (X) * Signs and symptoms of anemia absent or improved    (X) * Hgb/Hct level stable and acceptable for next level of care    (X) * Etiology of anemia requiring inpatient care absent    3/16/2021 10:58 AM EDT by Yudi White      Ascending colon cancer   -Colon mass found on colonoscopy 3/5/2021  -s/p right hemicolectomy 3/8/21  -CEA is normal and CT scans were negative for metastatic disease.  -Pathology consistent with invasive colonic adenocarcinoma, 1/16 lymph node involvemen    ( ) * Discharge plans and education understood    Activity    (X) * Ambulatory or acceptable for next level of care [G]    3/16/2021 10:58 AM EDT by Yudi White      Ambulate patient    Routes    (X) * Oral hydration, medications, and diet    3/16/2021 10:58 AM EDT by Yudi White      Low fiber diet    Interventions    (X) Hgb/Hct    3/16/2021 10:58 AM EDT by Yudi White      3/15/2021 05:14  Hemoglobin Quant: 9.1 (L)  Hematocrit: 30.0 (L)    Medications    (X) Possible iron or micronutrients    (X) Possible hemopoietic stimulating agents    * Milestone   Additional Notes   3/15      Oncology      Feels much better. NG tube is out. Abdominal pain has improved significantly. Had bowel movement yesterday.    Passing flatus      ASSESSMENT AND PLAN       Principal Problem:     Anemia   Active Problems:     Hypertension     CAD (coronary artery disease)     High cholesterol     Diastolic dysfunction     Hypokalemia     Malignant neoplasm of ascending colon ([facility] [address]     Atrial fibrillation ([facility] [address]     Colonic mass   Resolved Problems:     * No resolved hospital problems. *           Ascending colon cancer    -Colon mass found on colonoscopy 3/5/2021   -s/p right hemicolectomy 3/8/21   -CEA is normal and CT scans were negative for metastatic disease.   -Pathology consistent with invasive colonic adenocarcinoma, 1/16 lymph node involvement.     -Plan will be for chemotherapy as an outpatient.                Anemia   - Iron deficiency due to GI blood loss.    - S/p 3 doses of Venofer on 3/6/21.   - Vit B12 wnl.   - hgb 9.1 today. No indication to transfuse.            CAD   - s/p stent placement           YESENIA: Renal following. Creatinine better       Postop nausea and vomiting, Chronic cholecystitis: Managed by surgery    -----------------------   IM   Doing much better   NG out, tolerating clears OK   Pt is out of bed in chair       Therapy scores recommending SNF      /70   Pulse 84   Temp 97.6 °F (36.4 °C) (Axillary)   Resp 18   Ht 5' (1.524 m)   Wt 229 lb 8 oz (104.1 kg)   SpO2 99%   BMI 44.82 kg/m²    General appearance: alert, appears stated age and cooperative   Head: Normocephalic, without obvious abnormality, atraumatic   Nose: Nares normal. Septum midline. Mucosa normal. No drainage or sinus tenderness. Lungs: clear to auscultation bilaterally   Heart: irreg irreg   Abdomen: soft, appropriately tender, bs+   Extremities: extremities normal, atraumatic, no cyanosis or edema       Assessment and Plan:   Principal Problem:     Anemia -Established problem. Stable.  hgb 9.1   Plan: No indication for transfusion. Cont to monitor h/h to assess progression of anemia.  Recommend ferrous sulfate or MVI as outpatient. Active Problems:     Hypertension -Established problem. Stable.  103/70   Plan: cont same meds     CAD (coronary artery disease)     High cholesterol     Diastolic dysfunction     Hypokalemia -Established problem.  Stable.  K 3.9 Plan: Continue present orders/plan.      Malignant neoplasm of ascending colon ([facility] [address] -Established problem. Stable.  S/p resection   Plan: slow post op recovery. Plan for outpt tx of cancer     Atrial fibrillation ([facility] [address] -Established problem. Stable.  In fib today on tele   Plan: Continue present orders/plan.      Colonic mass       Disp - if cont to improve, poss d/c in next 1-2d?  Will d/w surgery.  Therapy scores rec SNF   -----------------------   Neph   Pt seen and examined   Hypernatremia persistent   Creatinine stable   BPs fairly controlled   Has been afebrile   Denies abdominal pain or nausea/emesis   Good urine out-put   Started PO now      IMPRESSION/RECOMMENDATIONS       YESENIA on CKD stage 3 recurrent    Base-line creatinine around 1   Creatinine on admission was 1.1 and at base-line, peak creatinine low 2s, improving and is stable in low 1s   baseline Scr 1.1-1.2-adm Scr 1.1-2.3 today   -multifactorial-relative IV depletion/diminished perfusion in the setting of ACEI-less likely contrast given exposure > 5 days prior while of lasix and ACEI, did receive one dose of NSAIDs/hemodynamic due to A.fib/RVR/hypotension-all factors combined-progression to ATI.     -non-oliguric, likely pre-renal with element of ATN   -peak SCr 2.4-improved to low 1s         Hypernatremia   Keep on D5W       CKD stage 3: baseline Scr 1.1-1.3   -due to YESENIA/presumed HTN NS       Anemia: adm hgb 5.8-s/p RPBC   -work up revealed colonic mass-s/p right hemicolectomy   -iron deficient-s/p venofer   -epogen per Oncology       Colonic mass: s/p hemicolectomy-path shows invasive colonic adenocarcinoma   -per Oncology   -plan to start outpatient chemo      A.fib with RVR: unable to get IV amiodarone due to no IV access or PO due to nausea/vomiting-per EP       CAD: recent stent-on ASA/statin/Bilinta on hold       HTN: recent hypotension   -lasix and lisinopril on hold   Start CCB as needed       dCHF: lasix as needed       Plan   Remove harris, voiding trial   Start D5W    ---------------------   EP      Assessment and Plan:        1. Paroxysmal Atrial Fibrillation   - Currently in AF, rate controlled   - Continue coreg 3.125 mg BID   - Limited AAD options given marginal BP and CAD: Continue PO amiodarone loadin mg TID x10 days, then 200 mg daily. Will give one time IV dose bolus dose now to help with loading       - [id number] score:5 ; [id number] Vasc score and anticoagulation discussed. High risk for stroke and thromboembolism. Anticoagulation is recommended. Risk of bleeding was discussed.   ~ No AC due to anemia and recent surgeries. Will monitor H/H as outpatient and start once Hgb stable and >10       - Afib risk factors including age, HTN, obesity, inactivity and SAHRA were discussed with patient. Risk factor modification recommended                   - Poor candidate for invasive procedures due to acute issues and inability to take [facility]               - May consider for SAIDA ligation with Watchman device if she has recurrent bleeding issues long term       2. Bradycardia, At Risk for Sleep Apnea               - Noted at night time while sleeping               - Overnight pulse oximetry notes desaturations                           ~ Will benefit from outpatient sleep study       3. LBBB with aberrancy               - Noted on EKG               - No recurrence       4. Colon Mass, Abd pain               - S/p hemicolectomy surgery (3/8/21)                           ~ Invasive colonic adenocarcinoma                - Plans for outpatient chemotherapy               - Management per general surgery       5. CAD   - Hx of PCI to LAD ()   - Stable   - No complaints of angina   - Resume medical therapy once able to take PO                           ~ Brilinta on hold (Discussed with Dr. Tg Boykin; will stop with initiation of anti-coagulant)       6. HTN   - Stable   - Continue current medications       7.  Anemia               - Stable but low s/p Topical Once   · sodium chloride flush 10 mL Intravenous 2 times per day   · enoxaparin 40 mg Subcutaneous Daily   · aspirin 81 mg Oral Daily   · carvedilol 3.125 mg Oral BID WC   · rosuvastatin 20 mg Oral Nightly       amiodarone (CORDARONE) 150 mg in dextrose 5 % 100 mL bolus x1   Continuous Infusions:   · dextrose 50 mL/hr

## 2021-03-16 NOTE — PROGRESS NOTES
[address] and Laparoscopic Surgery        Progress Note    Patient Name: Jose Coleman  MRN: [id number]  YOB: 1940  Date of Evaluation: 3/16/2021    Subjective:  No acute events overnight  Denies pain, has not required any analgesics in several days  Denies nausea or vomiting, tolerating low fiber diet  Passing flatus and stool  Up to chair at this time    Post-Operative Day #8      Vital Signs:  Patient Vitals for the past 24 hrs:   BP Temp Temp src Pulse Resp SpO2 Weight   21 0926 -- -- -- -- -- -- 214 lb 11.2 oz (97.4 kg)   21 0915 (!) 145/71 97.6 °F (36.4 °C) Oral 77 18 97 % --   21 0022 (!) 142/70 97.7 °F (36.5 °C) Oral 80 18 99 % --   03/15/21 2118 127/74 97.8 °F (36.6 °C) Oral 84 18 99 % --   03/15/21 1633 (!) 152/74 98 °F (36.7 °C) Oral 77 16 99 % --      TEMPERATURE HISTORY 24H: Temp (24hrs), Av.8 °F (36.6 °C), Min:97.6 °F (36.4 °C), Max:98 °F (36.7 °C)    BLOOD PRESSURE HISTORY: Systolic (34HWQ), KIC:364 , Min:103 , OLR:013    Diastolic (82IWC), RLX:37, Min:70, Max:79      Intake/Output:  I/O last 3 completed shifts:  In: -   Out: 300 [Urine:300]  I/O this shift:  In: -   Out: 100 [Urine:100]  Drain/tube Output:       Physical Exam:  General: awake, alert, oriented to  person, place, time  Abdomen: soft, mildly distended, active bowel sounds, appropriate incisional tenderness  Skin/Wound: healing well, no drainage, no erythema, well approximated    Labs:  CBC:    Recent Labs     21  0542 03/15/21  0514 21  0436   WBC 9.0 10.3 10.1   HGB 9.3* 9.1* 8.9*   HCT 31.1* 30.0* 29.2*    317 311     BMP:    Recent Labs     21  0542 03/15/21  0514 21  0436   * 152* 146*   K 4.0 3.9 3.9   * 114* 111*   CO2 23 27 28   BUN 61* 56* 43*   CREATININE 1.3* 1.3* 1.2   GLUCOSE 117* 136* 148*     Hepatic:    No results for input(s): AST, ALT, ALB, BILITOT, ALKPHOS in the last 72 hours.   Amylase:  No results found for: AMYLASE  Lipase:    Lab Results   Component Value Date    LIPASE 25.0 11/20/2020      Mag:    Lab Results   Component Value Date    MG 2.50 03/14/2021    MG 2.10 03/04/2021     Phos:     Lab Results   Component Value Date    PHOS 2.8 03/14/2021    PHOS 3.8 05/29/2015      Coags:   Lab Results   Component Value Date    PROTIME 12.7 03/08/2021    INR 1.09 03/08/2021    APTT 30.4 03/03/2021       Cultures:  Anaerobic culture  No results found for: LABANAE  Fungus stain  No results found for requested labs within last 30 days. Gram stain  No results found for requested labs within last 30 days. Organism  No results found for: [facility]  Surgical culture  No results found for: CXSURG  Blood culture 1  No results found for requested labs within last 30 days. Blood culture 2  No results found for requested labs within last 30 days. Fecal occult  Results in Past 30 Days  Result Component Current Result Ref Range Previous Result Ref Range   Occult Blood Diagnostic Result: Negative  Normal range: Negative   (3/3/2021)  Not in Time Range      GI bacterial pathogens by PCR  No results found for requested labs within last 30 days. C. difficile  No results found for requested labs within last 30 days. Urine culture  No results found for: LABURIN    Pathology:  OR 3/8/2021--FINAL DIAGNOSIS:     Right colon and small bowel, segmental resection:   - Invasive colonic adenocarcinoma, moderately differentiated. - Margins not involved. - One pericolonic lymph nodes positive for metastatic carcinoma (1/16). Gallbladder, cholecystectomy:   - Chronic cholecystitis, moderate. - Cholelithiasis. - Cholesterolosis.      Procedure:  Right hemicolectomy   Tumor Site: Ileocecal valve   Tumor Size: Greatest dimension (centimeter): 4.2        Additional dimensions (centimeters): 3.7 x 0.8   Macroscopic tumor perforation: Not identified   Histologic Type: Adenocarcinoma   Histologic Grade: G2: Moderately   Tumor Extension: Tumor invades through muscularis propria into subserosal   adipose tissue. MARGINS   All margins are uninvolved by invasive carcinoma, high grade dysplasia /   intramucosal carcinoma, and low grade dysplasia   Margins examined: Proximal, distal, and mesenteric    + Distance of invasive carcinoma from closest margin (millimeters or   centimeters): 40 mm    + Specify closest margin: Distal     Treatment Effect (applicable to carcinomas treated with neoadjuvant   therapy): No known pre surgical treatment   Lymph-Vascular Invasion: Not identified   Perineural Invasion: Not identified     + Tumor Budding (Note I)             Low score (0-4)   Type of Polyp in Which Invasive Carcinoma Arose: Not applicable   Tumor deposits: Absent     Regional lymph nodes     Number of Lymph nodes Involved: 1     Number of Lymph Nodes Examined: 16     Pathologic Stage Classification (pTNM, AJCC 8th Edition)     Primary Tumor (pT):      pT 3: Tumor invades through muscularis propria into pericolonic fat     Regional Lymph Nodes (pN):     pN 1a: Metastasis in 1 lymph node     + Additional pathologic findings: Histologically unremarkable appendix. + Ancillary studies: Not applicable     Imaging:  I have personally reviewed the following films:    No results found.     Scheduled Meds:   famotidine (PEPCID) injection  20 mg Intravenous Daily    amiodarone  200 mg Oral TID    lidocaine   Topical Once    sodium chloride flush  10 mL Intravenous 2 times per day    enoxaparin  40 mg Subcutaneous Daily    aspirin  81 mg Oral Daily    carvedilol  3.125 mg Oral BID WC    rosuvastatin  20 mg Oral Nightly     Continuous Infusions:   dextrose 50 mL/hr at 03/15/21 1124    sodium chloride      sodium chloride       PRN Meds:.phenol, metoprolol, sodium chloride flush, sodium chloride flush, HYDROmorphone, perflutren lipid microspheres, sodium chloride, potassium chloride **OR** potassium alternative oral replacement **OR** potassium chloride, promethazine **OR** ondansetron, magnesium hydroxide, acetaminophen **OR** acetaminophen, hydrALAZINE, sodium chloride, sodium chloride      Assessment:  68 y.o. female admitted with   1. Anemia, unspecified type    2. Malignant neoplasm of ascending colon ([facility] [address]      Ms. Donte Augustine is a 68 y.o. female who presents with   Status-post laparoscopic right colon resection and cholecystectomy on 3/8/2021 for colon mass, chronic cholecystitis with cholelithiasis   Anemia  Acute kidney injury  Hypertension  CAD  Paroxysmal atrial fibrillation  Medical coagulopathy, on Brilinta  Ileus      Plan:  1. Low fiber diet as tolerated; monitor bowel function--passing stool  2. IV hydration and electrolyte correction per Nephrology  3. Activity as tolerated, ambulate TID, up to chair for all meals--PT/OT following  4. Pulmonary toilet, incentive spirometry  5. PRN analgesics and antiemetics--minimizing narcotics as tolerated, transition to PO  6. Okay to resume anticoagulants from a surgical perspective  7. Management of medical comorbid etiologies per primary team and consulting services  8. Disposition: Okay for discharge home from a surgical perspective; follow up with Dr. Magi Hensley in 2 weeks    EDUCATION:  Educated patient on plan of care and disease process--all questions answered. Plans discussed with patient and nursing. Reviewed and discussed with Dr. Magi Hensley.       Signed:  JIMENA Laughlin CNP  3/16/2021 12:18 PM     Doing well  Okay for discharge home today from surgical standpoint

## 2021-03-16 NOTE — PROGRESS NOTES
Data- discharge order received, pt verbalized agreement to discharge, needs for [facility] with [facility] [address] reviewed and signed by MD, to be completed by RN. Action- AVS prepared, discharge instructions prepared and given to pt,  medication information packet given r/t NEW or CHANGED prescriptions, pt verbalized understanding further self-review. D/C instruction summary: Diet- low fiber, Activity- as tolerated, follow up with Primary Care Physician Grant Clemens -[phone] appointment to be scheduled by pt, immunizations reviewed, medications prescriptions filled in outpatient pharmacy. Inpatient surgical procedural reviewed: lap renu and colon resection. Contact information provided to above agencies used. Response- Case Management/ reported faxing completed ANUP and AVS to needed HHC/DME services stated above. Pt belongings gathered, IV removed, pt dressed. Disposition is home with HHC/DME as stated above, transported with son, taken to lobby via w/c with this RN, no complications. What Type Of Note Output Would You Prefer (Optional)?: Standard Output How Severe Is Your Skin Lesion?: mild Has Your Skin Lesion Been Treated?: not been treated Is This A New Presentation, Or A Follow-Up?: Skin Lesion

## 2021-03-16 NOTE — PROGRESS NOTES
[facility]   Electrophysiology Progress Note     Date: 3/16/2021  Admit Date: 3/3/2021     Reason for consultation: Atrial fibrillation    Chief Complaint:   Chief Complaint   Patient presents with    Abnormal Lab     pt sent in by her MD for low H/H       History of Present Illness: History obtained from patient and medical record. Gena Blake is a 68 y.o. female with a past medical history of HTN, HLD, CKD< FIDEL, and CAD. Pt presented to hospital by recommendation of her PCP for anemia. Her hemoglobin was 5.8 and was she received multiple units of blood. GI completed colonoscopy/EGD with no gross bleeding found, however, she was found to have a mass in her colon. She had a recent stent placed and has been on ASA/Brilenta. She was found to be in AF with RVR on admission, thus EP was consulted    Interval Hx: Today, she is being seen for follow up. She feels well. Pt is up in chair and ambulating in room without issue. She remains in controlled atrial fibrillation. No further GIB, but her H/H remains low. Plans for d/c home today. We discussed need for sleep study as outpatient    Patient seen and examined. Clinical notes reviewed. Telemetry reviewed. No new complaints today. No major events overnight. Denies having chest pain, palpitations, shortness of breath, orthopnea/PND, cough, or dizziness at the time of this visit. Allergies:  No Known Allergies    Home Meds:  Prior to Visit Medications    Medication Sig Taking? Authorizing Provider   HYDROcodone-acetaminophen (NORCO) 5-325 MG per tablet Take 1 tablet by mouth 2 times daily for 14 days.  Yes Epifanio Tobias MD   amiodarone (CORDARONE) 200 MG tablet 200mg tid x 7d (thru 3/23/21) then 200mg qd thereafter Yes Epifanio Tobias MD   furosemide (LASIX) 20 MG tablet Take 1 tablet by mouth daily as needed (swelling or shortness of breath) Yes JIMENA Dooley - CNP   rosuvastatin (CRESTOR) 20 MG tablet Take 1 tablet by mouth nightly Yes JIMENA Marquez CNP   aspirin 81 MG chewable tablet Take 1 tablet by mouth daily Yes JIMENA Marquez CNP   lisinopril (PRINIVIL;ZESTRIL) 2.5 MG tablet Take 1 tablet by mouth daily Yes JIMENA Marquez CNP   carvedilol (COREG) 3.125 MG tablet Take 1 tablet by mouth 2 times daily (with meals) Yes JIMENA Marquez CNP   ticagrelor (BRILINTA) 90 MG TABS tablet Take 1 tablet by mouth 2 times daily Yes JIMENA Marquez CNP      Scheduled Meds:   famotidine (PEPCID) injection  20 mg Intravenous Daily    amiodarone  200 mg Oral TID    lidocaine   Topical Once    sodium chloride flush  10 mL Intravenous 2 times per day    enoxaparin  40 mg Subcutaneous Daily    aspirin  81 mg Oral Daily    carvedilol  3.125 mg Oral BID WC    rosuvastatin  20 mg Oral Nightly     Continuous Infusions:   dextrose 50 mL/hr at 03/15/21 1124    sodium chloride      sodium chloride       PRN Meds:phenol, metoprolol, sodium chloride flush, sodium chloride flush, HYDROmorphone, perflutren lipid microspheres, sodium chloride, potassium chloride **OR** potassium alternative oral replacement **OR** potassium chloride, promethazine **OR** ondansetron, magnesium hydroxide, acetaminophen **OR** acetaminophen, hydrALAZINE, sodium chloride, sodium chloride     Past Medical History:  Past Medical History:   Diagnosis Date    Anemia     CAD (coronary artery disease) 11/2020    Stent    CKD (chronic kidney disease)     Hyperlipidemia     Hypertension       Past Surgical History:    has a past surgical history that includes fracture surgery; Cystocopy (11/21/13); Upper gastrointestinal endoscopy (N/A, 3/5/2021); Colonoscopy (N/A, 3/5/2021); hemicolectomy (Right, 3/8/2021); and Cholecystectomy, laparoscopic (N/A, 3/8/2021). Social History:  Reviewed. reports that she has never smoked. She has never used smokeless tobacco. She reports that she does not drink alcohol or use drugs.      Family History:  Reviewed. family history includes Heart Disease in her father. Review of Systems:  · Constitutional: Negative for fever, night sweats, chills, weight changes, or weakness  · Skin: Negative for rash, dry skin, pruritus, bruising, bleeding, blood clots, or changes in skin pigment  · HEENT: Negative for vision changes, ringing in the ears, sore throat, dysphagia, or swollen lymph nodes  · Respiratory: Reviewed in HPI  · Cardiovascular: Reviewed in HPI  · Gastrointestinal: Positive for N/V and abdominal pain. · Genito-Urinary: Negative for dysuria, incontinence, urgency, or hematuria  · Musculoskeletal: Positive for weakness. Negative for joint swelling, muscle pain, or injuries  · Neurological/Psych: Negative for confusion, seizures, headaches, balance issues or TIA-like symptoms. No anxiety, depression, or insomnia    Physical Examination:  Vitals:    03/16/21 0915   BP: (!) 145/71   Pulse: 77   Resp: 18   Temp: 97.6 °F (36.4 °C)   SpO2: 97%      In: -   Out: 300    Wt Readings from Last 3 Encounters:   03/16/21 214 lb 11.2 oz (97.4 kg)   03/01/21 235 lb 9.6 oz (106.9 kg)   11/30/20 229 lb (103.9 kg)       Intake/Output Summary (Last 24 hours) at 3/16/2021 1013  Last data filed at 3/16/2021 0358  Gross per 24 hour   Intake --   Output 300 ml   Net -300 ml       Telemetry: Personally Reviewed  - AF, rate 70s  · Constitutional: Cooperative and in moderate distress  · Skin: Warm and pink; no pallor, cyanosis, bruising, or clubbing  · HEENT: Symmetric and normocephalic. PERRL, EOM intact. Conjunctiva pink with clear sclera. Mucus membranes pink and moist. Teeth intact. Thyroid smooth without nodules or goiter. · Cardiovascular: Regular rate and irregular rhythm. S1/S2 present without murmurs, rubs, or gallops. Peripheral pulses 2+, capillary refill < 3 seconds. No elevation of JVP. No peripheral edema  · Respiratory: Respirations symmetric and unlabored.  Lungs diminished to auscultation bilaterally, no wheezing, crackles, or rhonchi. On 2L of oxygen  · Gastrointestinal: Abdomen soft and round. Bowel sounds normoactive in all quadrants without tenderness or masses. + Pickard catheter  · Musculoskeletal: Bilateral upper and lower extremity strength 5/5 with full ROM  · Neurologic/Psych: Awake and orientated to person, place and time. Calm affect, appropriate mood    Pertinent labs, diagnostic, device, and imaging results reviewed as a part of this visit    Labs:    BMP:   Recent Labs     03/14/21  0542 03/15/21  0514 03/16/21  0436   * 152* 146*   K 4.0 3.9 3.9   * 114* 111*   CO2 23 27 28   PHOS 2.8  --   --    BUN 61* 56* 43*   CREATININE 1.3* 1.3* 1.2   MG 2.50*  --   --      Estimated Creatinine Clearance: 40 mL/min (based on SCr of 1.2 mg/dL). CBC:   Recent Labs     03/14/21  0542 03/15/21  0514 03/16/21  0436   WBC 9.0 10.3 10.1   HGB 9.3* 9.1* 8.9*   HCT 31.1* 30.0* 29.2*   MCV 74.1* 74.0* 73.9*    317 311     Thyroid: No results found for: TSH, L1USTTG, [id number], THYROIDAB  Lipids:   Lab Results   Component Value Date    CHOL 155 11/21/2020    HDL 38 11/21/2020    TRIG 121 11/21/2020     LFTS:   Lab Results   Component Value Date    ALT 8 03/11/2021    AST 16 03/11/2021    ALKPHOS 79 03/11/2021    PROT 6.1 03/11/2021    AGRATIO 0.9 03/11/2021    BILITOT 0.5 03/11/2021     Cardiac Enzymes:   Lab Results   Component Value Date    CKTOTAL 41 03/11/2021    TROPONINI <0.01 03/10/2021    TROPONINI 0.03 11/20/2020     Coags:   Lab Results   Component Value Date    PROTIME 12.7 03/08/2021    INR 1.09 03/08/2021       ECG: 3/6/21  Atrial fibrillation    ECHO: 3/6/21   Normal left ventricle size, and systolic function with an estimated ejection fraction of 55-60%. Mild concentric left ventricular hypertrophy is present. No regional wall motion abnormalities are seen. Mild tricuspid regurgitation, RVSP is estimated at 38 mmhg.     Stress Test: None    Cath: 01/10/2014        Assessment and Plan:     1. Paroxysmal Atrial Fibrillation  - Currently in AF, rate controlled  - Continue coreg 3.125 mg BID  - Limited AAD options given marginal BP and CAD: Continue PO amiodarone loadin mg TID x10 days, then 200 mg daily.    ~ TSH 2.06, ALT 8, AST 16 (3/21)    - [id number] score:5 ; [id number] Vasc score and anticoagulation discussed. High risk for stroke and thromboembolism. Anticoagulation is recommended. Risk of bleeding was discussed.  ~ No AC due to anemia and recent surgeries. Will monitor H/H as outpatient and start once Hgb stable and >10    - Afib risk factors including age, HTN, obesity, inactivity and SAHRA were discussed with patient. Risk factor modification recommended     - Poor candidate for invasive procedures due to acute issues and inability to take [facility]. Will plan to start [address] as outpatient once Hgb stable and possible DCCV after 3-4 weeks of adequate anti-coagulation     - May consider for SAIDA ligation with Watchman device if she has recurrent bleeding issues long term    2. Bradycardia, At Risk for Sleep Apnea   - Noted at night time while sleeping   - Overnight pulse oximetry notes desaturations    ~ Will benefit from outpatient sleep study. Referral given    3. LBBB with aberrancy   - Noted on EKG   - No recurrence    4. Colon Mass, Abd pain   - S/p hemicolectomy surgery (3/8/21)    ~ Invasive colonic adenocarcinoma    - Plans for outpatient chemotherapy in 3-4 weeks   - Management per general surgery    5. CAD  - Hx of PCI to LAD ()  - Stable  - No complaints of angina  - On ASA, BB, statin    ~ Brilinta on hold due to anemia (Discussed with Dr. Sania Frank; will stop with initiation of anti-coagulant)    6. HTN  - Stable  - Continue current medications    7. Anemia   - Stable but low s/p surgery    ~ 8.9/29.2 this AM    - No recurrent bleeding   - Hem/onco following    8.  YESENIA   - Resolved   - Monitor UO   - Nephrology following    Mliss Yevgeniy for discharge from EP standpoint. Will schedule one week CHF follow up and 4-6 weeks with EP NP. EP will sign off. Please call if there are any questions. Thank you for consult. All pertinent information and plan of care discussed with the EP physician. All questions and concerns were addressed to the patient. Alternatives to my treatment were discussed. I have discussed the above stated plan with patient and the nurse. The patient verbalized understanding and agreed with the plan. The patient was seen for >35 minutes. I reviewed interval history, physical exam, review of data including labs, imaging, development and implementation of treatment plan and coordination of complex care. More than 50% of the time was devoted to giving the patient detailed instructions instructions on addressing diet, regular exercise, weight control, smoking abstention, medication compliance, and stress minimization. Patient was provided written and verbal instructions regarding risk factor modification. Thank you for allowing to us to participate in the care of [address] [place] [address].     Concha Sanchez, JIMENA-JAY  [address]   Office: [phone]

## 2021-03-16 NOTE — PROGRESS NOTES
CLINICAL PHARMACY NOTE: MEDS TO [address] [place] [address] Drive Select Patient?: No  Total # of Prescriptions Filled: 2   The following medications were delivered to the patient:  · Hydrocodone 5/325mg  · Amiodarone 200mg  Total # of Interventions Completed: 0  Time Spent (min): 30    Additional Documentation:  Medications delivered- patient signed  Celestina Castro

## 2021-03-16 NOTE — DISCHARGE SUMMARY
[facility] -- Physician Discharge Summary     Kartik Simeon  1940  MRN: [id number]    Admit Date: 3/3/2021  Discharge Date: No discharge date for patient encounter. Attending MD: Juan Mcintyre MD  Discharging MD: Juan Mcintyre MD  PCP: Grant Clemens MD [address] [place] [address] [facility] [phone]    Admission Diagnosis: Anemia [D64.9]  DISCHARGE DIAGNOSIS: colon CA    Full Hospital Problem List:  Active Hospital Problems    Diagnosis Date Noted    Atrial fibrillation [facility]) [I48.91] 03/10/2021    Colonic mass [I44.49]     Diastolic dysfunction [I60.16] 03/07/2021    Hypokalemia [E87.6] 03/07/2021    Malignant neoplasm of ascending colon ([facility] [address] [C18.2] 03/07/2021    Anemia [D64.9] 03/03/2021    CAD (coronary artery disease) [I25.10] 03/03/2021    High cholesterol [E78.00] 03/03/2021    Hypertension [I10] 01/10/2014           Hospital Course:  68 y. o. female with past medical history of hypertension and history of CAD with stent in her heart recently started on Brilinta back in late 2020 who presents to the ED with complaint of abnormal labs.  Patient had routine blood work drawn by PCP and states she was told she had low hemoglobin.  Patient states she is unsure what hemoglobin actually was. Earnest Oven states she feels fine.  Denies lightheadedness, dizziness, palpitations, headache, chest pain, shortness of breath, abdominal pain, nausea/vomiting, urinary symptoms or changes in bowel movements.  Denies any dark black or melenic stools.      Found to be profoundly anemic, microcytic  Occult stool neg in ER  Pt is on multiple blood thinners per cards     To be admiitted  Heme/Onc and Cards asked to see  Given persistent anemia, GI also consulted    Per endoscopy results per Dr Nico Sal possible short mauro's, otherwise normal  Colon mass prox ascending colon explains anemia; smaller polyp not removed.     General surgery then consulted  Date of Procedure: 3/8/2021     Pre-Op Diagnosis: COLON MASS, chronic cholecystitis with cholelithiasis     Post-Op Diagnosis: Same       Procedure(s):  LAPAROSCOPIC RIGHT COLON RESECTION  LAPAROSCOPIC CHOLECYSTECTOMY    Oncology consulted  -CEA is normal and CT scans were negative for metastatic disease.  -Pathology consistent with invasive colonic adenocarcinoma, 1/16 lymph node involvement.    -Plan will be for chemotherapy as an outpatient. Pt placed on Amio per Cards for AFib    Lisinopril and lasix stopped by renal  To be seen as outpt to decide when to resume    Medically stable for discharge on 3/16    Consults made during Hospitalization:  IP CONSULT TO INTERNAL MEDICINE  IP CONSULT TO CARDIOLOGY  IP CONSULT TO HEM/ONC  IP CONSULT TO GI  IP CONSULT TO GENERAL SURGERY  IP CONSULT TO PHARMACY  IP CONSULT TO NEPHROLOGY  IP CONSULT TO SOCIAL WORK    Treatment team at time of Discharge: Treatment Team: Attending Provider: Juan Mcintyre MD; Consulting Physician: Juan Mcintyre MD; Consulting Physician: James Ware MD; Consulting Physician: Agustin Moore MD; Utilization Reviewer: Charity Leal RN; Consulting Physician: Corinna Alex MD; Surgeon: Corinna Alex MD; Utilization Reviewer: Rafaela John RN; Consulting Physician: Prudence Ramirez MD; Respiratory Therapist (Day): Anna Marie Shin RCP;  Registered Nurse: Adalberto Landry RN    Imaging Results:  Echo Complete    Result Date: 3/6/2021  Transthoracic Echocardiography Report (TTE)  Demographics   Patient Name        Mirna Jimenez   Date of Study       03/06/2021  Gender                 Female   Patient Number      [id number]  Date of Birth          1940   Visit Number        [id number]   Age                    68 year(s)   Accession Number    [id number]  Room Number            1700   Corporate ID        [id number]    [address] RV[address]   Ordering Physician              Interpreting Physician Joyce Ibrahim MD,                                                         [facility] [place], [address]  Procedure Type of Study   TTE procedure:ECHOCARDIOGRAM COMPLETE 2D W DOPPLER W COLOR. Procedure Date Date: 03/06/2021 Start: 09:12 AM Study Location: [facility] - Echo Lab Technical Quality: Good visualization Additional Indications:NSTEMI, Leg edema, CAD. Patient Status: Routine Height: 60 inches Weight: 220 pounds BSA: 1.94 m2 BMI: 42.97 kg/m2 BP: 134/75 mmHg  Conclusions   Summary  Normal left ventricle size, and systolic function with an estimated ejection  fraction of 55-60%. Mild concentric left ventricular hypertrophy is present. No regional wall motion abnormalities are seen. Mild tricuspid regurgitation, RVSP is estimated at 38 mmhg. Signature   ------------------------------------------------------------------  Electronically signed by Joyce Ibrahim MD, [facility] [place], [address]  (Interpreting physician) on 03/06/2021 at 12:12 PM  ------------------------------------------------------------------   Findings   Left Ventricle  Normal left ventricle size, and systolic function with an estimated ejection  fraction of 55-60%. Mild concentric left ventricular hypertrophy is present. No regional wall motion abnormalities are seen. Grade I diastolic dysfunction with normal LV filling pressures. Mitral Valve  Calcification of the mitral valve noted. No evidence of mitral regurgitation. Left Atrium  The left atrium is normal in size. Aortic Valve  The aortic valve is structurally normal. There is no significant aortic  valve regurgitation or stenosis. Aorta  The aortic root is normal in size. Right Ventricle  The right ventricle is normal in size and function. Tricuspid Valve  The tricuspid valve is normal in structure. Mild tricuspid regurgitation, RVSP is estimated at 38 mmhg.    Right Atrium  The right atrial size is normal.   Pulmonic Valve  The pulmonic valve is not well visualized. Mild pulmonic regurgitation present. Pericardial Effusion  No pericardial effusion noted. Pleural Effusion  No pleural effusion. Miscellaneous  The inferior vena cava appears normal in size with normal respiratory  variation. M-Mode/2D Measurements (cm)   LV Diastolic Dimension: 4.5 cm  LV Systolic Dimension: 2.65 cm  LV Septum Diastolic: 8.49 cm  LV PW Diastolic: 1.5 cm         AO Root Dimension: 3.1 cm  RV Diastolic Dimension: 2.02 cm LA Dimension: 3.4 cm                                  LA Area: 11.7 cm2                                  LA volume/Index: 21.6 ml /11 ml/m2  Doppler Measurements   AV Peak Velocity: 194 cm/s     MV Peak E-Wave: 98.5 cm/s  AV Peak Gradient: 15.05 mmHg   MV Peak A-Wave: 89.5 cm/s  AV Mean Gradient: 9 mmHg       MV E/A Ratio: 1.1  LVOT Peak Velocity: 122 cm/s   MV Mean Gradient: 3 mmHg                                 MV Max P mmHg  TR Velocity:290 cm/s           MV Vmax:125 cm/s  TR Gradient:33.64 mmHg         MV VTI:51.9 cm/s   E' Septal Velocity: 6.2 cm/s   MV Deceleration Time: 215 msec  E' Lateral Velocity: 5.98 cm/s  PV Peak Velocity: 123 cm/s  PV Peak Gradient: 6.05 mmHg   Aortic Valve   Peak Velocity: 194 cm/s   Mean Velocity: 141 cm/s  Peak Gradient: 15.05 mmHg Mean Gradient: 9 mmHg  AV VTI: 43.8 cm  Aorta   Aortic Root: 3.1 cm      Ct Abdomen Pelvis Wo Contrast Additional Contrast? None    Result Date: 3/11/2021  EXAMINATION: CT OF THE ABDOMEN AND PELVIS WITHOUT CONTRAST 3/11/2021 12:05 pm TECHNIQUE: CT of the abdomen and pelvis was performed without the administration of intravenous contrast. Multiplanar reformatted images are provided for review. Dose modulation, iterative reconstruction, and/or weight based adjustment of the mA/kV was utilized to reduce the radiation dose to as low as reasonably achievable.  COMPARISON: CT of the chest abdomen and pelvis 2020 HISTORY: ORDERING SYSTEM PROVIDED HISTORY: RUQ pain TECHNOLOGIST PROVIDED HISTORY: Reason for exam:->RUQ pain Additional Contrast?->None Reason for Exam: RUQ pain Acuity: Unknown Type of Exam: Unknown FINDINGS: Lower Chest: Increased mild-moderate right pleural effusion with adjacent compressive atelectasis. Unchanged trace amount of left pleural fluid. Organs: In the gallbladder fossa there is a fluid collection measuring 4.2 x 2.3 by 2.4 cm. Along the inferior-lateral aspect of the fluid collection a few tiny curvilinear/rounded high-density foci are present measuring 3 mm and less in size best shown on coronal image 90. Cholecystectomy clips noted in the expected location. There is a small amount of fluid along the inferior margin of the right hepatic lobe anterior laterally. Small amount of fluid between the diaphragm and liver also present. No pancreatitis. No ureteral stone or hydronephrosis. 2 mm right renal stone again noted. GI/Bowel: There is new fluid-filled small bowel dilation involving proximal to mid small bowel loops measuring up to 3 cm. There is distal small bowel collapse extending to the surgical site. Oral contrast within the colon from the oral contrast given for the comparison. No oral contrast within small bowel at this time. Pelvis: No acute abnormality of the pelvis. Normal appearance of the bladder. Peritoneum/Retroperitoneum: Small amount of free fluid in the right upper quadrant as discussed. No free air. Bones/Soft Tissues: Expected postsurgical changes of the abdominal wall. No acute osseous findings. New fluid collection in the gallbladder fossa with adjacent small amount of fluid around the liver. This may be residual fluid from the recent surgery or related to biliary leak. Consider HIDA scan evaluation There are 2-3 tiny gallstones within the gallbladder fossa fluid collection, measuring 3 mm and less in size.  Increased size of mild-moderate right pleural effusion with increased adjacent compressive atelectasis of the right lung base. Xr Chest (2 Vw)    Result Date: 3/1/2021  EXAMINATION: TWO XRAY VIEWS OF THE CHEST 3/1/2021 2:58 pm COMPARISON: None. HISTORY: ORDERING SYSTEM PROVIDED HISTORY: EUBANKS (dyspnea on exertion) TECHNOLOGIST PROVIDED HISTORY: Reason for exam:->3-4wk hx of EUBANKS and productive cough FINDINGS: Cardiomegaly. Pulmonary vascular congestion. Ill-defined peripheral pulmonary opacities. No pneumothorax. No pleural effusion. Ill-defined bilateral pulmonary opacities could represent pulmonary edema possibly related to congestive heart failure or atypical pneumonia     Nm Hepatobiliary    Result Date: 3/11/2021  EXAMINATION: NUCLEAR MEDICINE HEPATOBILIARY SCINTIGRAPHY (HIDA SCAN). 3/11/2021 2:40 pm TECHNIQUE: Approximately 1.02 [id number] Mebrofenin (Choletec) was administered IV. Then, dynamic images of the abdomen were obtained in the anterior projection for 60 mins. A right lateral view was also obtained at 60 mins. COMPARISON: CT abdomen and pelvis 03/11/2021. HISTORY: ORDERING SYSTEM PROVIDED HISTORY: R/o bile leak s/p renu TECHNOLOGIST PROVIDED HISTORY: Reason for exam:->R/o bile leak s/p renu Reason for Exam: R/O Bile Leak Acuity: Acute Type of Exam: Ongoing FINDINGS: Prompt, homogenous uptake by the liver is noted with normal appearance of radiotracer excretion into the biliary system. Clearance of bloodpool activity appears appropriate. During the 1st 35 minutes of the study, the common duct is normal in size and appearance. Beginning at 40 minutes, accumulation of radio activity which overlies the common duct is seen. .  This accumulation most likely corresponds to the proximal duodenum as the abnormal fluid collection on the CT scan lies more laterally. Gallbladder is surgically absent. Delayed planar images as well as SPECT images were obtained due to the confusing appearance on the initial images.   These latter images do not show any accumulation of activity in the subhepatic based on size. coronary artery calcification is seen. Small mediastinal nodes are noted. Right paratracheal node measures 1.4 cm in short axis, previously 10 mm. Lungs/pleura: Mosaic attenuation is seen throughout the lungs, suggesting small airways disease or air trapping. Trace pleural effusions are seen bilaterally. There is adjacent consolidation seen in the lung bases. 1.8 x 1.2 cm nodule is seen in the left lower lobe Soft Tissues/Bones: Degenerative changes are seen in the spine. Degenerative changes are seen in the shoulder joints. Abdomen/Pelvis: Organs: No splenomegaly Adrenal glands appear normal. There is rotation of the kidney anteriorly, incidentally noted. .  No stones noted 2 mm stone seen in the right kidney. No hydronephrosis noted. No intrahepatic ductal dilatation. No perihepatic fluid Gallbladder appears normal No peripancreatic inflammatory change GI/Bowel: No significant small bowel distention noted. Mild stool load seen in the colon. Scattered colonic diverticula are seen. There is focal wall thickening of the colon on the right, extending over a length of 3.7 cm. There is surrounding injection the Nadja colonic fat. Small pericolonic lymph node is seen in the right upper quadrant mesentery measuring 8 mm. Pelvis: No free fluid seen within the pelvis. Pickard catheter is seen in the bladder Left adnexal mass is seen measuring 4.4 cm x 3.5 cm previously 3.9 cm by 3.3 cm. Right adnexal nodule measures 3.2 x 3.9 cm, previously 2.5 x 3.6 cm Peritoneum/Retroperitoneum: Small retroperitoneal nodes are seen. Small lymph nodes are seen along the iliac chains. No aortic aneurysm. Atherosclerotic change is seen in aorta and iliacs. Bones/Soft Tissues: Spurring is seen in the spine. Spurring is seen in the hips. Tiny periumbilical hernia containing fat is seen     Within the chest, small mediastinal nodes are seen, slightly increased compared to prior, either reactive or metastatic.   Small pleural effusions are seen, compatible with mild fluid overload. Stable lobular pulmonary nodule in the left lower lobe. Nodular wall thickening of the colon on the right, compatible with the given history of colon mass. Small mesenteric node is seen in this region,, likely early desiree metastasis Increase in size of ovarian-adnexal masses on the right and left. Consider pelvic ultrasound for further characterization. Tiny nonobstructing right renal stone         Discharge Exam:  See progress note from date of discharge    Disposition: home    Condition: stable    Discharge Medications:   Jina Vega   Home Medication Instructions CQY:[id number]    Printed on:03/16/21 1211   Medication Information                      amiodarone (CORDARONE) 200 MG tablet  200mg tid x 7d (thru 3/23/21) then 200mg qd thereafter             aspirin 81 MG chewable tablet  Take 1 tablet by mouth daily             carvedilol (COREG) 3.125 MG tablet  Take 1 tablet by mouth 2 times daily (with meals)             HYDROcodone-acetaminophen (NORCO) 5-325 MG per tablet  Take 1 tablet by mouth 2 times daily for 14 days. rosuvastatin (CRESTOR) 20 MG tablet  Take 1 tablet by mouth nightly             ticagrelor (BRILINTA) 90 MG TABS tablet  Take 1 tablet by mouth 2 times daily                 Allergies:  No Known Allergies    Follow up Instructions: Follow-up with PCP: Nehemiah Rainey MD in 2 wk .       Total time spent on day of discharge including face-to-face visit, examination, documentation, counseling, preparation of discharge plans and followup, and discharge medicine reconciliation and presciptions is 33 minutes    Signed:  Michael Fischer MD  3/16/2021

## 2021-03-22 ENCOUNTER — OFFICE VISIT (OUTPATIENT)
Dept: CARDIOLOGY CLINIC | Age: 81
End: 2021-03-22
Payer: MEDICARE

## 2021-03-22 ENCOUNTER — HOSPITAL ENCOUNTER (OUTPATIENT)
Age: 81
Discharge: HOME | End: 2021-03-22
Payer: MEDICARE

## 2021-03-22 ENCOUNTER — TELEPHONE (OUTPATIENT)
Dept: CARDIOLOGY CLINIC | Age: 81
End: 2021-03-22

## 2021-03-22 DIAGNOSIS — Z79.899 HIGH RISK MEDICATION USE: ICD-10-CM

## 2021-03-22 DIAGNOSIS — R60.0 LOWER EXTREMITY EDEMA: ICD-10-CM

## 2021-03-22 DIAGNOSIS — I10 ESSENTIAL HYPERTENSION: ICD-10-CM

## 2021-03-22 DIAGNOSIS — I48.0 PAF (PAROXYSMAL ATRIAL FIBRILLATION) (HCC): ICD-10-CM

## 2021-03-22 DIAGNOSIS — I50.32 CHRONIC HEART FAILURE WITH PRESERVED EJECTION FRACTION (HCC): ICD-10-CM

## 2021-03-22 DIAGNOSIS — R06.09 DOE (DYSPNEA ON EXERTION): ICD-10-CM

## 2021-03-22 DIAGNOSIS — E78.5 DYSLIPIDEMIA: ICD-10-CM

## 2021-03-22 DIAGNOSIS — I25.10 CORONARY ARTERY DISEASE INVOLVING NATIVE CORONARY ARTERY OF NATIVE HEART WITHOUT ANGINA PECTORIS: Primary | ICD-10-CM

## 2021-03-22 LAB
ANION GAP SERPL CALCULATED.3IONS-SCNC: 11 MMOL/L (ref 3–16)
BUN BLDV-MCNC: 16 MG/DL (ref 7–20)
CALCIUM SERPL-MCNC: 8.4 MG/DL (ref 8.3–10.6)
CHLORIDE BLD-SCNC: 105 MMOL/L (ref 99–110)
CO2: 23 MMOL/L (ref 21–32)
CREAT SERPL-MCNC: 1.3 MG/DL (ref 0.6–1.2)
GFR AFRICAN AMERICAN: 48
GFR NON-AFRICAN AMERICAN: 40
GLUCOSE BLD-MCNC: 99 MG/DL (ref 70–99)
HCT VFR BLD CALC: 28.9 % (ref 36–48)
HEMOGLOBIN: 8.7 G/DL (ref 12–16)
MCH RBC QN AUTO: 22.5 PG (ref 26–34)
MCHC RBC AUTO-ENTMCNC: 30 G/DL (ref 31–36)
MCV RBC AUTO: 74.8 FL (ref 80–100)
PDW BLD-RTO: 26.7 % (ref 12.4–15.4)
PLATELET # BLD: 378 K/UL (ref 135–450)
PMV BLD AUTO: 9.6 FL (ref 5–10.5)
POTASSIUM SERPL-SCNC: 4 MMOL/L (ref 3.5–5.1)
PRO-BNP: 692 PG/ML (ref 0–449)
RBC # BLD: 3.87 M/UL (ref 4–5.2)
SODIUM BLD-SCNC: 139 MMOL/L (ref 136–145)
WBC # BLD: 13.9 K/UL (ref 4–11)

## 2021-03-22 PROCEDURE — 80048 BASIC METABOLIC PNL TOTAL CA: CPT

## 2021-03-22 PROCEDURE — 85027 COMPLETE CBC AUTOMATED: CPT

## 2021-03-22 PROCEDURE — 83880 ASSAY OF NATRIURETIC PEPTIDE: CPT

## 2021-03-22 PROCEDURE — 36415 COLL VENOUS BLD VENIPUNCTURE: CPT

## 2021-03-22 PROCEDURE — 99214 OFFICE O/P EST MOD 30 MIN: CPT | Performed by: NURSE PRACTITIONER

## 2021-03-22 NOTE — TELEPHONE ENCOUNTER
PT saw Hussain Morris today and needs to see EPNP any time between 4/13 - 4/27. Can she be worked in with either [facility] or [facility]? She then needs to be scheduled with [facility]CE 2 weeks after (TAVR pt). Pls call to advise. Thank you.

## 2021-03-22 NOTE — PROGRESS NOTES
[facility]     Outpatient Follow Up Note    CHIEF COMPLAINT / HPI: Hospital Follow Up secondary to coronary artery disease    Hospital record has been reviewed  Hospital Course progressed as follows per discharge summary:     Hospital Course:  68 y. o. female with past medical history of hypertension and history of CAD with stent in her heart recently started on Brilinta back in late 2020 who presents to the ED with complaint of abnormal labs.  Patient had routine blood work drawn by PCP and states she was told she had low hemoglobin.  Patient states she is unsure what hemoglobin actually was. Ila Stephens states she feels fine.  Denies lightheadedness, dizziness, palpitations, headache, chest pain, shortness of breath, abdominal pain, nausea/vomiting, urinary symptoms or changes in bowel movements.  Denies any dark black or melenic stools.      Found to be profoundly anemic, microcytic  Occult stool neg in ER  Pt is on multiple blood thinners per cards     To be admiitted  Heme/Onc and Cards asked to see  Given persistent anemia, GI also consulted     Per endoscopy results per Dr Dallas Adams possible short mauro's, otherwise normal  Colon mass prox ascending colon explains anemia; smaller polyp not removed. Oncology consulted  -CEA is normal and CT scans were negative for metastatic disease.  -Pathology consistent with invasive colonic adenocarcinoma, 1/16 lymph node involvement.    -Plan will be for chemotherapy as an outpatient.      Pt placed on Amio per Cards for AFib     Lisinopril and lasix stopped by renal  To be seen as outpt to decide when to resume     Jose Coleman is 68 y.o. female who presents today for a routine follow up after a recent hospitalization related to the above mentioned issues. Subjective: Jose Coleman has a significant past medical history of CKD, HTN, anemia, STEMI s/p PCI of LAD with REBECA 11/2020 in addition to what is mentioned above.      Since the time of discharge, the patient admits their symptoms have improved. Today, she denies significant chest pain. There is no SOB at rest. Has EUBANKS but this has improved some. She is now just needing to gain her strength back. The patient denies orthopnea/PND. Plans to have a sleep study once she feels more recovered. Has some LE edema, this is chronic and a little worse than baseline but she plans to wrap them which usually helps edema resolve. Her weight is down 3lbs since hospital d/c. The patient is not experiencing palpitations or dizziness. Pt asymptomatic with atrial fibrillation. In general, she feels good but is hoping to get her strength back after d/c. She is working with home PT. She is having trouble eating, food goes right through her in regards to diarrhea. Plans to see Magi Hensley 4/1 for post op visit. Home -115/. Logs vitals with home care vitals. With regard to medication therapy the patient has been compliant with prescribed regimen. They have tolerated therapy to date. Past Medical History:   Diagnosis Date    Anemia     CAD (coronary artery disease) 11/2020    Stent    CKD (chronic kidney disease)     Hyperlipidemia     Hypertension      Social History:    Social History     Tobacco Use   Smoking Status Never Smoker   Smokeless Tobacco Never Used   Tobacco Comment     was a heavy smoker     Current Medications:  Current Outpatient Medications   Medication Sig Dispense Refill    HYDROcodone-acetaminophen (NORCO) 5-325 MG per tablet Take 1 tablet by mouth 2 times daily for 14 days.  28 tablet 0    amiodarone (CORDARONE) 200 MG tablet 200mg tid x 7d (thru 3/23/21) then 200mg qd thereafter 30 tablet 2    rosuvastatin (CRESTOR) 20 MG tablet Take 1 tablet by mouth nightly 30 tablet 2    aspirin 81 MG chewable tablet Take 1 tablet by mouth daily 30 tablet 3    carvedilol (COREG) 3.125 MG tablet Take 1 tablet by mouth 2 times daily (with meals) 60 tablet 3    ticagrelor (BRILINTA) 90 MG TABS tablet Take 1 tablet by mouth 2 times daily 60 tablet 3     No current facility-administered medications for this visit. REVIEW OF SYSTEMS:   CONSTITUTIONAL: No major weight gain or loss, night sweats, fever, fatigue, or weakness. HEENT: No new vision difficulties or ringing in the ears. RESPIRATORY: No new SOB, PND, orthopnea or cough. CARDIOVASCULAR: See HPI  GI: No N/V/D, constipation, or abdominal pain. No black/tarry stools  : No urinary urgency, incontinence, or hematuria. SKIN: No cyanosis or skin lesions. MUSCULOSKELETAL: No new muscle or joint pain. NEUROLOGICAL: No syncope or TIA-like symptoms. PSYCHIATRIC: No anxiety, pain, insomnia or depression    Objective:   PHYSICAL EXAM:      There were no vitals filed for this visit. VITALS:  There were no vitals taken for this visit. CONSTITUTIONAL: Cooperative, no apparent distress, and appears well nourished / developed  NEUROLOGIC:  Awake and orientated to person, place, and time. PSYCH: Calm affect. SKIN: Warm and dry. HEENT: Sclera non-icteric, normocephalic, neck supple. RESPIRATORY:  No increased work of breathing and clear to auscultation, no crackles or wheezing. CARDIOVASCULAR:  Regular rate and rhythm without murmur. Normal S1 and S2. No gallops or rubs. Normal PMI. No elevation of JVP. Normal carotid pulses with no bruits. GI:  Normal bowel sounds. Non-distended. Non-tender to palpation  EXT: No edema. No calf tenderness. Pulses are present bilaterally.     DATA:    Lab Results   Component Value Date    ALT 8 (L) 03/11/2021    AST 16 03/11/2021    ALKPHOS 79 03/11/2021    BILITOT 0.5 03/11/2021     Lab Results   Component Value Date    CREATININE 1.2 03/16/2021    BUN 43 (H) 03/16/2021     (H) 03/16/2021    K 3.9 03/16/2021     (H) 03/16/2021    CO2 28 03/16/2021     No results found for: TSH, M5DGIJM, M5WLVXX, THYROIDAB  Lab Results   Component Value Date    WBC 10.1 2021    HGB 8.9 (L) 2021    HCT 29.2 (L) 2021    MCV 73.9 (L) 2021     2021     No components found for: CHLPL  Lab Results   Component Value Date    TRIG 121 2020     Lab Results   Component Value Date    HDL 38 (L) 2020     Lab Results   Component Value Date    LDLCALC 93 2020     Lab Results   Component Value Date    LABVLDL 24 2020     Radiology Review:  Pertinent images / reports were reviewed as a part of this visit and reveals the following:    Last Echo: 3/6/21   Summary   Normal left ventricle size, and systolic function with an estimated ejection   fraction of 55-60%. Mild concentric left ventricular hypertrophy is present. No regional wall motion abnormalities are seen. Mild tricuspid regurgitation, RVSP is estimated at 38 mmhg. Cath: 20  Findings:  Artery Findings/Result   LM Normal   LAD 95% mid   Cx OM2 50%   RI NA   RCA 20% prox, 20% mid   LVEDP 25   LVG 55%      Intervention:         PCI of LAD with 3.4Y97Wjqfeb REBECA (PD with 3.75NC)     Post Cath Dx:       Severe , nonobstructive otherwise    Assessment:     1. Coronary artery disease   ~ stable ; no c/o angina   ~ NSTEMI s/p [facility] : PCI of LAD with REBECA, residual nonobstructive disease to OM2 and RCA. LVEF 55%  ~ on aspirin, brilinta, coreg, Lipitor   ~ noted plan to stop Brilinta once [facility] initiated      2. Atrial fibrillation   ~ found during recent hosp admit   ~ not on Tixers d/t anemia, consider watchman   ~ on amiodarone   ~ to f/u with EP in about 1 mo   ~ regular to ausculation  during OV      3. Dyslipidemia   ~ LDL 93 (2020), LFTs ok 3/2021  ~ lipitor 40     3. Hypertension   ~ controlled      4. EUBANKS  ~ Improving    ~ Echo 3/2021: EF 55-60%, no regional wall motion abnormalities      5. BLE edema   ~ chronic; +1 pitting  ~ improves with compression   ~ lasix on hold with YESENIA    6.  GIB/ colon cancer   ~ EGD with mauro's, otherwise normal   ~ colonoscopy with colon mass explaining anemia. patho consistent with invasive colonic adenocarcinoma with lymph node involvement   ~ s/p hemicolectomy surgery 3/8/21  ~ plan for chemo as OP       7. Bradycardia   ~ noted overnight during hospital admit   ~ recommend sleep study which pt plans to do once recovered some from recent hospital admission     8. CKD   ~ sees Dr. Gene Collier 4/13    Patient  is stable since hospital discharge. Plan:     1. Continue current medications   2. BMP, CBC, BNP today   3. Educated on daily wts, salt/fluid restriction, home BP monitoring, bobo hose  4. Follow up in 6 weeks    Follow up in 1 mo with EP as previously recommended     I have addressed the patient's cardiac risk factors and adjusted pharmacologic treatment as needed. In addition, I have reinforced the need for patient directed risk factor modification. Further evaluation will be based upon the patient's clinical course and testing results. All questions and concerns were addressed to the patient. Alternatives to my treatment were discussed. The patient verbalizes understanding not to stop medications without discussing with us. The patient is not currently smoking. The risks related to smoking were reviewed with the patient. Recommend maintaining a smoke-free lifestyle. Patient is on a beta-blocker  Patient is not on an ACE / ARB; neg CHF   Patient is on a statin    Dual Antiplatelet therapy has been recommended / prescribed for this patient. Education conducted on adverse reactions including bleeding were discussed. Daily weights, low sodium diet were discussed. Patient instructed to call the office with a weight gain: 3lbs over night and/or 5lbs in one week, swelling, shortness of breath, orthopnea, and/or PND. Discussed exercise: 30min of sustained cardiovascular exercise most days of the week   Discussed Low saturated fat / Cholesterol diet.      Thank you for allowing us to participate in the care of [facility].     Conor BUTTERFIELD  [facility]   Office: [phone]    Documentation of today's visit sent to PCP

## 2021-03-22 NOTE — PATIENT INSTRUCTIONS
Continue current medications     Labs today     Weigh yourself daily in the Morning. Record weights and date in a diary. Call if you have a weight gain of 3lbs in a day and/or 5-7lbs in 1 week. Call if you start noticing increased shortness of breath and/or swelling. Limit salt intake to 2g (2,000mg) / day. Limit fluid intake to 64oz / day (this includes all liquids; coffee, water, soup, popsicle, ect.). Monitor home blood pressure. Goal is less than 140/80 but to remain greater than 100/50. Heart rate to remain >55 beats per minute. Create a log and bring to office visits.       Wrap lower extremities to help with swelling     Follow up with Dr. Erskine Mcardle in 6 weeks   Follow up with GRANT NP in 1 month

## 2021-03-23 NOTE — PROGRESS NOTES
[facility]   Electrophysiology  Emy De La Garza, APRN-CNP  Attending EP: Dr. Audrey Villafuerte    Date: 4/16/2021  I had the privilege of visiting Julienne Wallace in the office. Chief Complaint:   Chief Complaint   Patient presents with    Atrial Fibrillation     History of Present Illness: History obtained from patient and medical record. Julienne Wallace is 68 y.o. female with a past medical history of HTN, HLD, CKD, PAF, dCHF, and CAD.     In March of 2021, pt presented to hospital by recommendation of her PCP for anemia. Her hemoglobin was 5.8 and was she received multiple units of blood. GI completed colonoscopy/EGD with no gross bleeding found, however, she was found to have a mass in her colon. She underwent surgical bowel resection. During admission, she developed AF with RVR and was started on amiodarone.     -Interval history: Today, Julienne Wallace is being seen for hospital follow up. Nikunj is in a wheel chair and her daughter is present for the visit. She is feeling better overall. She remains in sinus rhythm on EKG today. Her blood pressure is 116/72. They monitor her vitals at home and report similar readings. Her legs are swollen. She just saw her nephrologist and plans to start torsemide this weekend. No orthopnea. She does admit to mild SOB with activity  Pt remains on antibiotics due to an abdominal abscess, but is hopeful that she will stop soon. Pt plans to start chemotherapy in June. She continues to progress and become stronger at home. She has a home care nurse and therapy that come a few days per week. Denies having chest pain, palpitations, shortness of breath, orthopnea/PND, cough, or dizziness at the time of this visit. With regard to medication therapy the patient has been compliant with prescribed regimen. They have tolerated therapy to date. Allergies:   Allergies   Allergen Reactions    Acetomenaphthone (Menadiol Diacetate) [Menadiol Sodium Diphosphate]      Upset stomach     Lisinopril-Hydrochlorothiazide Other (See Comments)     Home Medications:  Prior to Visit Medications    Medication Sig Taking? Authorizing Provider   potassium chloride (KLOR-CON M) 20 MEQ extended release tablet TAKE 1 TABLET BY MOUTH ONCE DAILY Yes Historical Provider, MD   BRRHINA 90 MG TABS tablet Take 1 tablet by mouth twice daily Yes JIMENA Guan CNP   carvedilol (COREG) 3.125 MG tablet TAKE 1 TABLET BY MOUTH TWICE DAILY WITH MEALS Yes JIMENA Guan CNP   amoxicillin-clavulanate (AUGMENTIN) 875-125 MG per tablet Take 1 tablet by mouth every 12 hours for 10 days  Patient taking differently: Take 1 tablet by mouth every 12 hours Expires 04/16/2021 Yes Sonia Julian MD   torsemide (DEMADEX) 20 MG tablet Take 1 tablet by mouth daily Yes Sonia Julian MD   amiodarone (CORDARONE) 200 MG tablet 200mg tid x 7d (thru 3/23/21) then 200mg qd thereafter Yes Sonia Julian MD   rosuvastatin (CRESTOR) 20 MG tablet Take 1 tablet by mouth nightly Yes JIMENA Guan CNP      Past Medical History:  Past Medical History:   Diagnosis Date    Anemia     CAD (coronary artery disease) 11/2020    Stent    CKD (chronic kidney disease)     Hyperlipidemia     Hypertension      Past Surgical History:    has a past surgical history that includes fracture surgery; Cystocopy (11/21/13); Upper gastrointestinal endoscopy (N/A, 3/5/2021); Colonoscopy (N/A, 3/5/2021); hemicolectomy (Right, 3/8/2021); and Cholecystectomy, laparoscopic (N/A, 3/8/2021). Social History:  Reviewed. reports that she has never smoked. She has never used smokeless tobacco. She reports that she does not drink alcohol or use drugs. Family History:  Reviewed. family history includes Heart Disease in her father.      Review of System:  · Constitutional: Negative for fever, night sweats, chills, weight changes, or weakness  · Skin: Negative for rash, dry skin, pruritus, bruising, bleeding, blood clots, or changes in skin pigment  · HEENT: Negative for vision changes, ringing in the ears, sore throat, dysphagia, or swollen lymph nodes  · Respiratory: Reviewed in HPI  · Cardiovascular: Reviewed in HPI  · Gastrointestinal: Negative for abdominal pain, N/V/D, constipation, or black/tarry stools  · Genito-Urinary: Negative for dysuria, incontinence, urgency, or hematuria  · Musculoskeletal: Negative for joint swelling, muscle pain, or injuries  · Neurological/Psych: Negative for confusion, seizures, dizziness, headaches, balance issues or TIA-like symptoms. No anxiety, depression, or insomnia    Physical Examination:  Vitals:    04/16/21 0955   BP: 116/72   Pulse: 64   SpO2: 97%      Wt Readings from Last 3 Encounters:   04/16/21 209 lb 3.2 oz (94.9 kg)   04/15/21 209 lb (94.8 kg)   04/07/21 214 lb (97.1 kg)     Constitutional: Cooperative and in no apparent distress, and appears well nourished  Skin: Warm and pink; no pallor, cyanosis, bruising, or clubbing  HEENT: Symmetric and normocephalic. PERRL, EOM intact. Conjunctiva pink with clear sclera. Mucus membranes pink and moist. Teeth intact. Thyroid smooth without nodules or goiter  Respiratory: Respirations symmetric and unlabored. Lungs clear to auscultation bilaterally, no wheezing, rhonchi, or crackles  Cardiovascular:  Regular rate and rhythm. S1/S2 present without murmurs, rubs, or gallops. Peripheral pulses 2+, capillary refill < 3 seconds. No elevation of JVP. 1+ pitting edema  Gastrointestinal: Abdomen soft and round. Bowel sounds normoactive in all quadrants without tenderness or masses. Musculoskeletal: Bilateral upper and lower extremity strength 5/5 with full ROM. Neurological/Psych: Awake and orientated to person, place and time. Calm affect, appropriate mood.      Pertinent labs, diagnostic, device, and imaging results reviewed as a part of this visit    LABS    CBC:   Lab Results   Component Value Date    WBC 9.0 04/07/2021    HGB 8.2 (L) 04/07/2021    HCT 26.9 (L) 2021    MCV 71.9 (L) 2021     2021     BMP:   Lab Results   Component Value Date    CREATININE 1.8 (H) 2021    BUN 16 2021     2021    K 3.0 (L) 2021     2021    CO2 30 2021     Estimated Creatinine Clearance: 27 mL/min (A) (based on SCr of 1.8 mg/dL (H)). Thyroid: No results found for: TSH, V2ZGDZW, G5BLPER, THYROIDAB  Lipid Panel:   Lab Results   Component Value Date    CHOL 155 2020    HDL 38 2020    TRIG 121 2020     LFTs:  Lab Results   Component Value Date    ALT 6 (L) 2021    AST 10 (L) 2021    ALKPHOS 103 2021    BILITOT 0.4 2021     Coags:   Lab Results   Component Value Date    PROTIME 13.7 (H) 2021    INR 1.18 (H) 2021    APTT 27.8 2021       EC2021  - NSR, rate 64    ECG: 3/6/21  Atrial fibrillation     ECHO: 3/6/21   Normal left ventricle size, and systolic function with an estimated ejection fraction of 55-60%. Mild concentric left ventricular hypertrophy is present.   No regional wall motion abnormalities are seen. Mild tricuspid regurgitation, RVSP is estimated at 38 mmhg.     Stress Test: None     Cath: 20  Artery   Findings/Result  LM       Normal  LAD     95% mid  Cx        OM2 50%  RI         NA  RCA     20% prox, 20% mid  LVEDP            25  LVG     55%     Intervention:         PCI of LAD with 3.8O28Podbwy REBECA (PD with 3.75NC)     Post Cath Dx:       Severe , nonobstructive otherwise    Assessment:    1. Paroxysmal Atrial Fibrillation   - Currently in NSR   - Continue coreg 3.125 mg BID   - Due to significant CAD, anti-arrhythmic therapies are limited to amiodarone. I have discussed with patient side effects of amiodarone including but not limited to thyroid disease, discoloration of skin and cornea and pulmonary fibrosis.  Patient would like to continue with it.     ~ Continue amio 200 mg QD for now               ~ TSH 2.06, ALT 8, AST 16 8. 2/26.9 (4/7/21)               - No recurrent bleeding    9. CKD   - Stable    ~ Creatinine mid 1's   - Followed by nephrology    Plan:  1. No medication changes for now  2. Complete sleep study. [phone]  3. Call office if any issues    F/U: Follow-up with EP in 3 months  -Call [facility] at [phone] with any questions    Diet & Exercise:   The patient is counseled to follow a low salt diet to assure blood pressure remains controlled for cardiovascular risk factor modification   The patient is counseled to avoid excess caffeine, and energy drinks as this may exacerbated ectopy and arrhythmia   The patient is counseled to lose weight to control cardiovascular risk factors   Exercise program discussed: To improve overall cardiovascular health, the patient is instructed to increase cardiovascular related activities with a goal of 150 min/week of moderate level activity or 10,000 steps per day. Encouraged to perform as much activity as tolerated    I have addressed the patient's cardiac risk factors and adjusted pharmacologic treatment as needed. In addition, I have reinforced the need for patient directed risk factor modification. I independently reviewed the ECG    All questions and concerns were addressed with the patient. Alternatives to treatment were discussed. Thank you for allowing to us to participate in the care of [address] [place] [address]. Time  30-39 minutes spent preparing to see patient including reviewing patient history/prior tests/prior consults, performing a medical exam, counseling and educating patient/family/caregiver, ordering medications/tests/procedures, referring and communicating with PCPs and other pertinent consultants, documenting information in the EMR, independently interpreting results and communicating to family and coordination of patient care.     JIMENA Araujo-CNP  [facility]   Office: [phone]

## 2021-03-24 VITALS
DIASTOLIC BLOOD PRESSURE: 52 MMHG | HEART RATE: 60 BPM | HEIGHT: 60 IN | BODY MASS INDEX: 41.43 KG/M2 | WEIGHT: 211 LBS | OXYGEN SATURATION: 98 % | SYSTOLIC BLOOD PRESSURE: 138 MMHG

## 2021-03-24 DIAGNOSIS — D64.9 ANEMIA, UNSPECIFIED TYPE: Primary | ICD-10-CM

## 2021-03-30 ENCOUNTER — TELEPHONE (OUTPATIENT)
Dept: CARDIOLOGY CLINIC | Age: 81
End: 2021-03-30

## 2021-03-30 ENCOUNTER — APPOINTMENT (OUTPATIENT)
Dept: CT IMAGING | Age: 81
DRG: 862 | End: 2021-03-30
Payer: MEDICARE

## 2021-03-30 ENCOUNTER — HOSPITAL ENCOUNTER (INPATIENT)
Age: 81
LOS: 8 days | Discharge: HOME | DRG: 862 | End: 2021-04-07
Attending: EMERGENCY MEDICINE | Admitting: INTERNAL MEDICINE
Payer: MEDICARE

## 2021-03-30 ENCOUNTER — APPOINTMENT (OUTPATIENT)
Dept: GENERAL RADIOLOGY | Age: 81
DRG: 862 | End: 2021-03-30
Payer: MEDICARE

## 2021-03-30 ENCOUNTER — TELEPHONE (OUTPATIENT)
Dept: OTHER | Facility: CLINIC | Age: 81
End: 2021-03-30

## 2021-03-30 DIAGNOSIS — A41.9 SEVERE SEPSIS (HCC): ICD-10-CM

## 2021-03-30 DIAGNOSIS — R53.81 MALAISE: ICD-10-CM

## 2021-03-30 DIAGNOSIS — T81.43XA POSTOPERATIVE INTRA-ABDOMINAL ABSCESS: Primary | ICD-10-CM

## 2021-03-30 DIAGNOSIS — R65.20 SEVERE SEPSIS (HCC): ICD-10-CM

## 2021-03-30 DIAGNOSIS — N17.9 AKI (ACUTE KIDNEY INJURY) (HCC): ICD-10-CM

## 2021-03-30 DIAGNOSIS — Z20.822 PERSON UNDER INVESTIGATION FOR COVID-19: ICD-10-CM

## 2021-03-30 PROBLEM — K65.1 INTRA-ABDOMINAL ABSCESS (HCC): Status: ACTIVE | Noted: 2021-03-30

## 2021-03-30 PROBLEM — K63.0 ABSCESS OF INTESTINE: Status: ACTIVE | Noted: 2021-03-30

## 2021-03-30 LAB
A/G RATIO: 0.7 (ref 1.1–2.2)
ALBUMIN SERPL-MCNC: 2.5 G/DL (ref 3.4–5)
ALP BLD-CCNC: 117 U/L (ref 40–129)
ALT SERPL-CCNC: 9 U/L (ref 10–40)
ANION GAP SERPL CALCULATED.3IONS-SCNC: 15 MMOL/L (ref 3–16)
APTT: 27.8 SEC (ref 24.2–36.2)
AST SERPL-CCNC: 14 U/L (ref 15–37)
BASOPHILS ABSOLUTE: 0 K/UL (ref 0–0.2)
BASOPHILS RELATIVE PERCENT: 0.1 %
BILIRUB SERPL-MCNC: 0.5 MG/DL (ref 0–1)
BUN BLDV-MCNC: 38 MG/DL (ref 7–20)
CALCIUM SERPL-MCNC: 8.4 MG/DL (ref 8.3–10.6)
CHLORIDE BLD-SCNC: 97 MMOL/L (ref 99–110)
CO2: 18 MMOL/L (ref 21–32)
CREAT SERPL-MCNC: 3.2 MG/DL (ref 0.6–1.2)
EOSINOPHILS ABSOLUTE: 0.1 K/UL (ref 0–0.6)
EOSINOPHILS RELATIVE PERCENT: 0.4 %
GFR AFRICAN AMERICAN: 17
GFR NON-AFRICAN AMERICAN: 14
GLOBULIN: 3.5 G/DL
GLUCOSE BLD-MCNC: 136 MG/DL (ref 70–99)
HCT VFR BLD CALC: 28.2 % (ref 36–48)
HEMOGLOBIN: 8.4 G/DL (ref 12–16)
INR BLD: 1.18 (ref 0.86–1.14)
LACTIC ACID, SEPSIS: 1 MMOL/L (ref 0.4–1.9)
LACTIC ACID, SEPSIS: 2.1 MMOL/L (ref 0.4–1.9)
LYMPHOCYTES ABSOLUTE: 1.1 K/UL (ref 1–5.1)
LYMPHOCYTES RELATIVE PERCENT: 6.7 %
MCH RBC QN AUTO: 22 PG (ref 26–34)
MCHC RBC AUTO-ENTMCNC: 29.9 G/DL (ref 31–36)
MCV RBC AUTO: 73.4 FL (ref 80–100)
MONOCYTES ABSOLUTE: 1.1 K/UL (ref 0–1.3)
MONOCYTES RELATIVE PERCENT: 6.8 %
NEUTROPHILS ABSOLUTE: 14 K/UL (ref 1.7–7.7)
NEUTROPHILS RELATIVE PERCENT: 86 %
PDW BLD-RTO: 26.9 % (ref 12.4–15.4)
PLATELET # BLD: 246 K/UL (ref 135–450)
PMV BLD AUTO: 9 FL (ref 5–10.5)
POTASSIUM SERPL-SCNC: 3.8 MMOL/L (ref 3.5–5.1)
PRO-BNP: ABNORMAL PG/ML (ref 0–449)
PROTHROMBIN TIME: 13.7 SEC (ref 10–13.2)
RBC # BLD: 3.84 M/UL (ref 4–5.2)
SODIUM BLD-SCNC: 130 MMOL/L (ref 136–145)
TOTAL PROTEIN: 6 G/DL (ref 6.4–8.2)
TROPONIN: <0.01 NG/ML
WBC # BLD: 16.3 K/UL (ref 4–11)

## 2021-03-30 PROCEDURE — 87040 BLOOD CULTURE FOR BACTERIA: CPT

## 2021-03-30 PROCEDURE — 85025 COMPLETE CBC W/AUTO DIFF WBC: CPT

## 2021-03-30 PROCEDURE — U0003 INFECTIOUS AGENT DETECTION BY NUCLEIC ACID (DNA OR RNA); SEVERE ACUTE RESPIRATORY SYNDROME CORONAVIRUS 2 (SARS-COV-2) (CORONAVIRUS DISEASE [COVID-19]), AMPLIFIED PROBE TECHNIQUE, MAKING USE OF HIGH THROUGHPUT TECHNOLOGIES AS DESCRIBED BY CMS-2020-01-R: HCPCS

## 2021-03-30 PROCEDURE — 83605 ASSAY OF LACTIC ACID: CPT

## 2021-03-30 PROCEDURE — 83880 ASSAY OF NATRIURETIC PEPTIDE: CPT

## 2021-03-30 PROCEDURE — 96367 TX/PROPH/DG ADDL SEQ IV INF: CPT

## 2021-03-30 PROCEDURE — 80053 COMPREHEN METABOLIC PANEL: CPT

## 2021-03-30 PROCEDURE — U0005 INFEC AGEN DETEC AMPLI PROBE: HCPCS

## 2021-03-30 PROCEDURE — 85610 PROTHROMBIN TIME: CPT

## 2021-03-30 PROCEDURE — 93005 ELECTROCARDIOGRAM TRACING: CPT | Performed by: PHYSICIAN ASSISTANT

## 2021-03-30 PROCEDURE — 71046 X-RAY EXAM CHEST 2 VIEWS: CPT

## 2021-03-30 PROCEDURE — 85730 THROMBOPLASTIN TIME PARTIAL: CPT

## 2021-03-30 PROCEDURE — 6360000002 HC RX W HCPCS: Performed by: PHYSICIAN ASSISTANT

## 2021-03-30 PROCEDURE — 1200000000 HC SEMI PRIVATE

## 2021-03-30 PROCEDURE — 74176 CT ABD & PELVIS W/O CONTRAST: CPT

## 2021-03-30 PROCEDURE — 84484 ASSAY OF TROPONIN QUANT: CPT

## 2021-03-30 PROCEDURE — 96365 THER/PROPH/DIAG IV INF INIT: CPT

## 2021-03-30 PROCEDURE — 36415 COLL VENOUS BLD VENIPUNCTURE: CPT

## 2021-03-30 PROCEDURE — 99283 EMERGENCY DEPT VISIT LOW MDM: CPT

## 2021-03-30 PROCEDURE — 2580000003 HC RX 258: Performed by: PHYSICIAN ASSISTANT

## 2021-03-30 RX ORDER — ONDANSETRON 2 MG/ML
4 INJECTION INTRAMUSCULAR; INTRAVENOUS EVERY 6 HOURS PRN
Status: DISCONTINUED | OUTPATIENT
Start: 2021-03-30 | End: 2021-04-07 | Stop reason: HOSPADM

## 2021-03-30 RX ORDER — SODIUM CHLORIDE 9 MG/ML
INJECTION, SOLUTION INTRAVENOUS CONTINUOUS
Status: DISCONTINUED | OUTPATIENT
Start: 2021-03-30 | End: 2021-04-02

## 2021-03-30 RX ORDER — 0.9 % SODIUM CHLORIDE 0.9 %
30 INTRAVENOUS SOLUTION INTRAVENOUS ONCE
Status: COMPLETED | OUTPATIENT
Start: 2021-03-30 | End: 2021-03-30

## 2021-03-30 RX ORDER — HYDROCODONE BITARTRATE AND ACETAMINOPHEN 5; 325 MG/1; MG/1
1 TABLET ORAL 2 TIMES DAILY
Status: DISCONTINUED | OUTPATIENT
Start: 2021-03-30 | End: 2021-03-30 | Stop reason: ALTCHOICE

## 2021-03-30 RX ORDER — POTASSIUM CHLORIDE 7.45 MG/ML
10 INJECTION INTRAVENOUS PRN
Status: DISCONTINUED | OUTPATIENT
Start: 2021-03-30 | End: 2021-03-30

## 2021-03-30 RX ORDER — POTASSIUM CHLORIDE 20 MEQ/1
40 TABLET, EXTENDED RELEASE ORAL PRN
Status: DISCONTINUED | OUTPATIENT
Start: 2021-03-30 | End: 2021-03-30

## 2021-03-30 RX ORDER — SODIUM CHLORIDE 0.9 % (FLUSH) 0.9 %
10 SYRINGE (ML) INJECTION EVERY 12 HOURS SCHEDULED
Status: DISCONTINUED | OUTPATIENT
Start: 2021-03-30 | End: 2021-04-07 | Stop reason: HOSPADM

## 2021-03-30 RX ORDER — ACETAMINOPHEN 325 MG/1
650 TABLET ORAL EVERY 4 HOURS PRN
Status: DISCONTINUED | OUTPATIENT
Start: 2021-03-30 | End: 2021-04-07 | Stop reason: HOSPADM

## 2021-03-30 RX ORDER — 0.9 % SODIUM CHLORIDE 0.9 %
500 INTRAVENOUS SOLUTION INTRAVENOUS PRN
Status: DISCONTINUED | OUTPATIENT
Start: 2021-03-30 | End: 2021-04-07 | Stop reason: HOSPADM

## 2021-03-30 RX ORDER — ASPIRIN 81 MG/1
TABLET, CHEWABLE ORAL
Qty: 90 TABLET | Refills: 3 | Status: SHIPPED | OUTPATIENT
Start: 2021-03-30 | End: 2021-04-16 | Stop reason: ALTCHOICE

## 2021-03-30 RX ORDER — CARVEDILOL 3.12 MG/1
3.12 TABLET ORAL 2 TIMES DAILY WITH MEALS
Status: DISCONTINUED | OUTPATIENT
Start: 2021-03-31 | End: 2021-04-07 | Stop reason: HOSPADM

## 2021-03-30 RX ORDER — HYDROCODONE BITARTRATE AND ACETAMINOPHEN 5; 325 MG/1; MG/1
1 TABLET ORAL EVERY 4 HOURS PRN
Status: DISCONTINUED | OUTPATIENT
Start: 2021-03-30 | End: 2021-04-07 | Stop reason: HOSPADM

## 2021-03-30 RX ORDER — SODIUM CHLORIDE 0.9 % (FLUSH) 0.9 %
10 SYRINGE (ML) INJECTION PRN
Status: DISCONTINUED | OUTPATIENT
Start: 2021-03-30 | End: 2021-04-07 | Stop reason: HOSPADM

## 2021-03-30 RX ORDER — MORPHINE SULFATE 4 MG/ML
4 INJECTION, SOLUTION INTRAMUSCULAR; INTRAVENOUS
Status: DISCONTINUED | OUTPATIENT
Start: 2021-03-30 | End: 2021-04-07 | Stop reason: HOSPADM

## 2021-03-30 RX ORDER — AMIODARONE HYDROCHLORIDE 200 MG/1
200 TABLET ORAL DAILY
Status: DISCONTINUED | OUTPATIENT
Start: 2021-03-31 | End: 2021-04-07 | Stop reason: HOSPADM

## 2021-03-30 RX ORDER — SODIUM CHLORIDE 9 MG/ML
25 INJECTION, SOLUTION INTRAVENOUS PRN
Status: DISCONTINUED | OUTPATIENT
Start: 2021-03-30 | End: 2021-04-07 | Stop reason: HOSPADM

## 2021-03-30 RX ORDER — ROSUVASTATIN CALCIUM 20 MG/1
20 TABLET, COATED ORAL NIGHTLY
Status: DISCONTINUED | OUTPATIENT
Start: 2021-03-30 | End: 2021-04-07 | Stop reason: HOSPADM

## 2021-03-30 RX ORDER — MORPHINE SULFATE 2 MG/ML
2 INJECTION, SOLUTION INTRAMUSCULAR; INTRAVENOUS
Status: DISCONTINUED | OUTPATIENT
Start: 2021-03-30 | End: 2021-04-07 | Stop reason: HOSPADM

## 2021-03-30 RX ORDER — PROMETHAZINE HYDROCHLORIDE 25 MG/1
12.5 TABLET ORAL EVERY 6 HOURS PRN
Status: DISCONTINUED | OUTPATIENT
Start: 2021-03-30 | End: 2021-04-07 | Stop reason: HOSPADM

## 2021-03-30 RX ORDER — HYDROCODONE BITARTRATE AND ACETAMINOPHEN 5; 325 MG/1; MG/1
2 TABLET ORAL EVERY 4 HOURS PRN
Status: DISCONTINUED | OUTPATIENT
Start: 2021-03-30 | End: 2021-04-07 | Stop reason: HOSPADM

## 2021-03-30 RX ORDER — HYDRALAZINE HYDROCHLORIDE 20 MG/ML
10 INJECTION INTRAMUSCULAR; INTRAVENOUS EVERY 6 HOURS PRN
Status: DISCONTINUED | OUTPATIENT
Start: 2021-03-30 | End: 2021-04-07 | Stop reason: HOSPADM

## 2021-03-30 RX ORDER — ASPIRIN 81 MG/1
81 TABLET, CHEWABLE ORAL DAILY
Status: DISCONTINUED | OUTPATIENT
Start: 2021-03-31 | End: 2021-04-07 | Stop reason: HOSPADM

## 2021-03-30 RX ADMIN — SODIUM CHLORIDE 1365 ML: 9 INJECTION, SOLUTION INTRAVENOUS at 18:16

## 2021-03-30 RX ADMIN — CEFEPIME HYDROCHLORIDE 1000 MG: 1 INJECTION, POWDER, FOR SOLUTION INTRAMUSCULAR; INTRAVENOUS at 18:33

## 2021-03-30 RX ADMIN — VANCOMYCIN HYDROCHLORIDE 1000 MG: 1 INJECTION, POWDER, LYOPHILIZED, FOR SOLUTION INTRAVENOUS at 19:25

## 2021-03-30 ASSESSMENT — ENCOUNTER SYMPTOMS
DIARRHEA: 0
ABDOMINAL PAIN: 0
COUGH: 1
RHINORRHEA: 0
VOMITING: 0
EYE PAIN: 0
BACK PAIN: 0
NAUSEA: 0
EYE REDNESS: 0
SHORTNESS OF BREATH: 0

## 2021-03-30 NOTE — TELEPHONE ENCOUNTER
Daughter wants to speak to [facility] about her b/p . Her b/p has been running low , it has been  91/38 in her right arm and  112/78 in the left arm . Daughter states patient feels fine accept that she is cold then hot , then sweating , then freezing .  Asking to speak to [facility]PK[facility]

## 2021-03-30 NOTE — ED PROVIDER NOTES
I independently performed a history and physical on [employer]. All diagnostic, treatment, and disposition decisions were made by myself in conjunction with the advanced practice provider. I have participated in the medical decision making and directed the treatment plan and disposition of the patient. For further details of [facility] emergency department encounter, please see the advanced practice provider's documentation. CHIEF COMPLAINT  Chief Complaint   Patient presents with    Hypotension     Pt reports low BP, 93/41. Briefly, Mustapha Sheffield is a 68 y.o. female  who presents to the ED complaining of hypotension checked at home. She does not feel lightheaded or dizzy. She is having a cough that sounds wet on my exam but is not producing sputum per patient. No fever at all. No pain anywhere at all actually either. She was admitted earlier this month and had a cholecystectomy for chronic cholecystitis and also R hemicolectomy with appendectomy due to non-metastatic adenocarcinoma with lymph node involvement. She plans to get chemo as an outpatient but has not started it. Her main complaint is malaise onset today. Reports new incisional redness today as well which was not there yesterday. FOCUSED PHYSICAL EXAMINATION  BP (!) 110/38   Pulse 66   Temp 98.1 °F (36.7 °C)   Resp 17   Ht 5' (1.524 m)   Wt 212 lb 12.8 oz (96.5 kg)   SpO2 98%   BMI 41.56 kg/m²    Focused physical examination notable for no acute distress, well-appearing, well-nourished, normal speech and mentation without obvious facial droop, no obvious rash. No obvious cranial nerve deficits on my initial exam. Pale. Dry MM. Abd soft. R anterior upper abdominal wall incision is dry/intact and not open or dehisced but is red tender warm and fluctuant. No generalized peritonitis.  RRR. Scattered mild rhonchi and cough noted. 2+ BLE pitting nontender edema.     The 12 lead EKG was interpreted by me as

## 2021-03-30 NOTE — TELEPHONE ENCOUNTER
Spoke to pt and her daughter over the phone. Usually her BP has been running   avg 120-140/ more recently. Sunday 128/66  Mon 108/46  This AM 96/84  No fevers. (does complain of being intermittently hot then cold)    Oxygen 96% on 2L of oxygen. Using  oxygen. On room air with walking oxygen is 93%. Recovered to RA to 96%. Pt reports her BP 86/44 at 1330. She has EUBANKS that significantly worsened this AM. Denies any pain. No CP, palpations, clamminess, dizziness, orthopnea, edema. Wt stable. No swelling. She took her pulse this AM and it was irregular and that's when her BP was noted to be low. HR at that time was 66. Her rhythm became regular an hour ago. Over phone pt rechecked BP and it was 89/37, pulse 67. She has been drinking a lot of water and Pedialyte and it has not improved BP. She is noted to have a congestive cough that started a couple days ago, mucus clear. No new medications. Pt was initially planning on having home health nurse visit this evening with an OV with me in AM. Pt and daughter now decided they will proceed to ER.

## 2021-03-30 NOTE — TELEPHONE ENCOUNTER
HHN calling to report this patient still having low b/p 88/36, 90/40 and SOB. HHN reports that patient is using her husbands oxygen and that it is helping a lot so she doesn't think she needs to go to the ER .  Please call HHN and daughter (see previous message) to advise

## 2021-03-30 NOTE — ED PROVIDER NOTES
[address] [facility]        Pt Name: Andrey Beebe  MRN: [id number]  [place] 1940  Date of evaluation: 3/30/2021  Provider: Dana Esteves PA-C  PCP: Althea Rapp MD     I have seen and evaluated this patient with my supervising physician Chu Landry MD.    [address] [place] [address]       Chief Complaint   Patient presents with    Hypotension     Pt reports low BP, 93/41. HISTORY OF PRESENT ILLNESS   (Location, Timing/Onset, Context/Setting, Quality, Duration, Modifying Factors, Severity, Associated Signs and Symptoms)  Note limiting factors. Andrey Beebe is a 68 y.o. female who presents to the emergency department today for evaluation for hypotension. The patient has a history of coronary artery disease, chronic kidney disease, hypertension and hyperlipidemia. The patient was admitted on 3/3/2021 for anemia, and on 3/6/2021, the patient states that she did undergo a right hemicolectomy as well as a cholecystectomy. Patient states that she has been doing well at home. The patient states that yesterday she started with a cough which is occasionally productive of white sputum, she denies any hemoptysis. The patient states that she started to feel generally fatigued today, and she states that the blood pressure at home was running low so she came to the emergency room for further care and evaluation. The patient arrives to the ED, she does not have any chest pain, or shortness of breath. She states that she has noticed some lower leg edema however she denies any weight gain. She states that she is actually had a 20 pound weight loss. She denies any history of CHF. The patient denies any abdominal pain, nausea, vomiting or diarrhea. She has not had any fever or chills.   She states that she has had a dry cough since yesterday, and she states her  at home was recently diagnosed pneumonia, and she states that she has not had any known exposures with any other sick contacts, she states that she has not received her Covid vaccine yet. Nursing Notes were all reviewed and agreed with or any disagreements were addressed in the HPI. REVIEW OF SYSTEMS    (2-9 systems for level 4, 10 or more for level 5)     Review of Systems   Constitutional: Negative for activity change, appetite change, chills and fever. HENT: Negative for congestion and rhinorrhea. Respiratory: Positive for cough. Negative for shortness of breath. Cardiovascular: Negative for chest pain. Gastrointestinal: Negative for abdominal pain, diarrhea, nausea and vomiting. Genitourinary: Negative for difficulty urinating, dysuria and hematuria. Neurological: Negative for weakness and numbness. Positives and Pertinent negatives as per HPI. Except as noted above in the ROS, all other systems were reviewed and negative.        PAST MEDICAL HISTORY     Past Medical History:   Diagnosis Date    Anemia     CAD (coronary artery disease) 11/2020    Stent    CKD (chronic kidney disease)     Hyperlipidemia     Hypertension          SURGICAL HISTORY     Past Surgical History:   Procedure Laterality Date    CHOLECYSTECTOMY, LAPAROSCOPIC N/A 3/8/2021    LAPAROSCOPIC CHOLECYSTECTOMY performed by Richelle Dee MD at [facility] COLONOSCOPY N/A 3/5/2021    COLONOSCOPY WITH BIOPSY performed by Sandor Sesay MD at [address] [place] [address]  11/21/13    w/ bladder biopsy    FRACTURE SURGERY      right wrist    HEMICOLECTOMY Right 3/8/2021    LAPAROSCOPIC RIGHT COLON RESECTION performed by Richelle Dee MD at [facility] 3/5/2021    EGD BIOPSY performed by Sandor Sesay MD at [facility]       Previous Medications    AMIODARONE (CORDARONE) 200 MG TABLET    200mg tid x 7d (thru 3/23/21) then 200mg qd thereafter    ASPIRIN 81 MG CHEWABLE TABLET    Take 1 tablet by mouth daily    CARVEDILOL (COREG) 3.125 MG TABLET    Take 1 tablet by mouth 2 times daily (with meals)    HYDROCODONE-ACETAMINOPHEN (NORCO) 5-325 MG PER TABLET    Take 1 tablet by mouth 2 times daily for 14 days. ROSUVASTATIN (CRESTOR) 20 MG TABLET    Take 1 tablet by mouth nightly    TICAGRELOR (BRILINTA) 90 MG TABS TABLET    Take 1 tablet by mouth 2 times daily         ALLERGIES     Acetomenaphthone (menadiol diacetate) [menadiol sodium diphosphate] and Lisinopril-hydrochlorothiazide    FAMILYHISTORY       Family History   Problem Relation Age of Onset    Heart Disease Father           SOCIAL HISTORY       Social History     Tobacco Use    Smoking status: Never Smoker    Smokeless tobacco: Never Used    Tobacco comment:  was a heavy smoker   Substance Use Topics    Alcohol use: No    Drug use: No       SCREENINGS             PHYSICAL EXAM    (up to 7 for level 4, 8 or more for level 5)     ED Triage Vitals [03/30/21 1641]   BP Temp Temp src Pulse Resp SpO2 Height Weight   (!) 93/41 98.1 °F (36.7 °C) -- 66 17 98 % -- 212 lb 12.8 oz (96.5 kg)       Physical Exam  Vitals signs and nursing note reviewed. Constitutional:       Appearance: She is well-developed. She is not diaphoretic. HENT:      Head: Normocephalic and atraumatic. Right Ear: External ear normal.      Left Ear: External ear normal.      Nose: Nose normal.   Eyes:      General:         Right eye: No discharge. Left eye: No discharge. Neck:      Musculoskeletal: Normal range of motion and neck supple. Trachea: No tracheal deviation. Cardiovascular:      Rate and Rhythm: Normal rate and regular rhythm. Heart sounds: No murmur. Comments: 2+ pitting edema to bilateral lower leg  Pulmonary:      Effort: Pulmonary effort is normal. No respiratory distress. Breath sounds: No wheezing or rales.       Comments: Diminished aeration to bilateral bases  Abdominal:      General: Bowel sounds are normal. There is Abnormal; Notable for the following components:    Pro-BNP 16,764 (*)     All other components within normal limits    Narrative:     Performed at:  [facility]  [address] [facility], [address]   Phone [phone]   LACTATE, SEPSIS - Abnormal; Notable for the following components:    Lactic Acid, Sepsis 2.1 (*)     All other components within normal limits    Narrative:     Performed at:  [facility]  [address] [facility], [address]   Phone [phone]   CULTURE, BLOOD 2   CULTURE, BLOOD 1   TROPONIN    Narrative:     Performed at:  [facility]  [address] [facility], [address] [facility] [place]   Phone [phone]   APTT    Narrative:     Performed at:  [facility]  [address] [facility], [address] [employer]   Phone [phone]   URINE RT REFLEX TO CULTURE   LACTATE, SEPSIS   COVID-19       All other labs were within normal range or not returned as of this dictation. EKG: All EKG's are interpreted by the Emergency Department Physician in the absence of a cardiologist.  Please see their note for interpretation of EKG. RADIOLOGY:   Non-plain film images such as CT, Ultrasound and MRI are read by the radiologist. Plain radiographic images are visualized and preliminarily interpreted by the ED Provider with the below findings:        Interpretation per the Radiologist below, if available at the time of this note:    CT CHEST ABDOMEN PELVIS WO CONTRAST   Final Result   Chest CT: No acute abnormality detected. Abdomen and pelvis CT: Interval development of a large gas and fluid   collection within the right abdominal wall measuring up to 12 cm, most   compatible with an abscess.       There is intraperitoneal communication of that collection, and the ileocolic   anastomosis is very close to the intraperitoneal component, and therefore it   would be difficult to entirely exclude a communication between bowel and that   collection. XR CHEST (2 VW)   Final Result   No significant finding in the chest.           Xr Chest (2 Vw)    Result Date: 3/30/2021  EXAMINATION: TWO XRAY VIEWS OF THE CHEST 3/30/2021 5:22 pm COMPARISON: 03/12/2021 radiograph HISTORY: ORDERING SYSTEM PROVIDED HISTORY: Chest Discomfort TECHNOLOGIST PROVIDED HISTORY: Reason for exam:->Chest Discomfort Reason for Exam: Hypotension (Pt reports low BP, 93/41. ) Acuity: Acute Type of Exam: Initial FINDINGS: The heart is mildly enlarged. Mediastinum and pulmonary vascularity are normal.  Enteric tube has been removed since prior radiograph. There is improved aeration of the lungs. No acute airspace abnormality with lucency suggesting underlying COPD. No significant skeletal finding. No significant finding in the chest.           PROCEDURES   Unless otherwise noted below, none     Procedures    CRITICAL CARE TIME   Critical Care  There was a high probability of life-threatening clinical deterioration in the patient's condition requiring my urgent intervention. Total critical care time with the patient was 60 minutes excluding separately reportable procedures.   Critical care required due to patients severe sepsis, requiring 30 mL/kg fluid bolus, broad-spectrum antibiotics, extensive work-up, surgery consultation, and mission to hospitalist      CONSULTS:  Donna Pillai and DIFFERENTIAL DIAGNOSIS/MDM:   Vitals:    Vitals:    03/30/21 1641 03/30/21 1749 03/30/21 1939   BP: (!) 93/41  (!) 110/38   Pulse: 66     Resp: 17     Temp: 98.1 °F (36.7 °C)     SpO2: 98%     Weight: 212 lb 12.8 oz (96.5 kg)     Height:  5' (1.524 m)        Patient was given the following medications:  Medications   vancomycin 1000 mg IVPB in 250 mL D5W addavial (1,000 mg Intravenous New Bag 3/30/21 1925)   0.9 % sodium chloride bolus (1,365 mLs Intravenous New Severe sepsis ([facility] [address]    3. Malaise    4. Person under investigation for COVID-19    5. YESENIA (acute kidney injury) [facility])          DISPOSITION/PLAN   DISPOSITION Decision To Admit 03/30/2021 07:48:46 PM      PATIENT REFERREDTO:  No follow-up provider specified.     DISCHARGE MEDICATIONS:  New Prescriptions    No medications on file       DISCONTINUED MEDICATIONS:  Discontinued Medications    No medications on file              (Please note that portions of this note were completed with a voice recognition program.  Efforts were made to edit the dictations but occasionally words are mis-transcribed.)    Juan Pedraza PA-C (electronically signed)           Juan Pedraza PA-C  03/30/21 2006

## 2021-03-30 NOTE — TELEPHONE ENCOUNTER
Please advise on medication refill of asa -  Last office visit 03/22/2021  Next office visit 07/07/2021

## 2021-03-30 NOTE — TELEPHONE ENCOUNTER
Writer contacted [facility] ED provider to inform of 30 day readmission risk. Writer's attempt to contact ED provider was unsuccessful.

## 2021-03-31 LAB
A/G RATIO: 0.7 (ref 1.1–2.2)
ALBUMIN SERPL-MCNC: 2.1 G/DL (ref 3.4–5)
ALP BLD-CCNC: 103 U/L (ref 40–129)
ALT SERPL-CCNC: 6 U/L (ref 10–40)
AMYLASE: 17 U/L (ref 25–115)
ANION GAP SERPL CALCULATED.3IONS-SCNC: 9 MMOL/L (ref 3–16)
AST SERPL-CCNC: 10 U/L (ref 15–37)
BASOPHILS ABSOLUTE: 0 K/UL (ref 0–0.2)
BASOPHILS RELATIVE PERCENT: 0.1 %
BILIRUB SERPL-MCNC: 0.4 MG/DL (ref 0–1)
BILIRUBIN DIRECT: <0.2 MG/DL (ref 0–0.3)
BILIRUBIN, INDIRECT: NORMAL MG/DL (ref 0–1)
BUN BLDV-MCNC: 39 MG/DL (ref 7–20)
C-REACTIVE PROTEIN: 177.4 MG/L (ref 0–5.1)
CALCIUM SERPL-MCNC: 7.9 MG/DL (ref 8.3–10.6)
CHLORIDE BLD-SCNC: 103 MMOL/L (ref 99–110)
CO2: 22 MMOL/L (ref 21–32)
CREAT SERPL-MCNC: 2.8 MG/DL (ref 0.6–1.2)
D DIMER: 2989 NG/ML DDU (ref 0–229)
EKG ATRIAL RATE: 67 BPM
EKG DIAGNOSIS: NORMAL
EKG P AXIS: 56 DEGREES
EKG P-R INTERVAL: 208 MS
EKG Q-T INTERVAL: 466 MS
EKG QRS DURATION: 164 MS
EKG QTC CALCULATION (BAZETT): 492 MS
EKG R AXIS: -42 DEGREES
EKG T AXIS: 90 DEGREES
EKG VENTRICULAR RATE: 67 BPM
EOSINOPHILS ABSOLUTE: 0.1 K/UL (ref 0–0.6)
EOSINOPHILS RELATIVE PERCENT: 0.7 %
GFR AFRICAN AMERICAN: 20
GFR NON-AFRICAN AMERICAN: 16
GLOBULIN: 3.1 G/DL
GLUCOSE BLD-MCNC: 115 MG/DL (ref 70–99)
HCT VFR BLD CALC: 23.7 % (ref 36–48)
HCT VFR BLD CALC: 23.9 % (ref 36–48)
HEMOGLOBIN: 7.1 G/DL (ref 12–16)
HEMOGLOBIN: 7.2 G/DL (ref 12–16)
LACTIC ACID: 1 MMOL/L (ref 0.4–2)
LYMPHOCYTES ABSOLUTE: 1 K/UL (ref 1–5.1)
LYMPHOCYTES RELATIVE PERCENT: 8.8 %
MCH RBC QN AUTO: 22 PG (ref 26–34)
MCHC RBC AUTO-ENTMCNC: 30 G/DL (ref 31–36)
MCV RBC AUTO: 73.3 FL (ref 80–100)
MONOCYTES ABSOLUTE: 1 K/UL (ref 0–1.3)
MONOCYTES RELATIVE PERCENT: 8.3 %
NEUTROPHILS ABSOLUTE: 9.4 K/UL (ref 1.7–7.7)
NEUTROPHILS RELATIVE PERCENT: 82.1 %
PDW BLD-RTO: 26.7 % (ref 12.4–15.4)
PLATELET # BLD: 206 K/UL (ref 135–450)
PMV BLD AUTO: 8.4 FL (ref 5–10.5)
POTASSIUM REFLEX MAGNESIUM: 3.7 MMOL/L (ref 3.5–5.1)
RBC # BLD: 3.23 M/UL (ref 4–5.2)
SARS-COV-2, PCR: NOT DETECTED
SODIUM BLD-SCNC: 134 MMOL/L (ref 136–145)
TOTAL PROTEIN: 5.2 G/DL (ref 6.4–8.2)
WBC # BLD: 11.4 K/UL (ref 4–11)

## 2021-03-31 PROCEDURE — 6360000002 HC RX W HCPCS: Performed by: INTERNAL MEDICINE

## 2021-03-31 PROCEDURE — 6370000000 HC RX 637 (ALT 250 FOR IP): Performed by: INTERNAL MEDICINE

## 2021-03-31 PROCEDURE — 93010 ELECTROCARDIOGRAM REPORT: CPT | Performed by: INTERNAL MEDICINE

## 2021-03-31 PROCEDURE — 87186 SC STD MICRODIL/AGAR DIL: CPT

## 2021-03-31 PROCEDURE — 36415 COLL VENOUS BLD VENIPUNCTURE: CPT

## 2021-03-31 PROCEDURE — 87077 CULTURE AEROBIC IDENTIFY: CPT

## 2021-03-31 PROCEDURE — 99024 POSTOP FOLLOW-UP VISIT: CPT | Performed by: SURGERY

## 2021-03-31 PROCEDURE — 2580000003 HC RX 258: Performed by: INTERNAL MEDICINE

## 2021-03-31 PROCEDURE — APPSS15 APP SPLIT SHARED TIME 0-15 MINUTES: Performed by: NURSE PRACTITIONER

## 2021-03-31 PROCEDURE — 85379 FIBRIN DEGRADATION QUANT: CPT

## 2021-03-31 PROCEDURE — 6370000000 HC RX 637 (ALT 250 FOR IP): Performed by: NURSE PRACTITIONER

## 2021-03-31 PROCEDURE — 87070 CULTURE OTHR SPECIMN AEROBIC: CPT

## 2021-03-31 PROCEDURE — 87205 SMEAR GRAM STAIN: CPT

## 2021-03-31 PROCEDURE — 80053 COMPREHEN METABOLIC PANEL: CPT

## 2021-03-31 PROCEDURE — 1200000000 HC SEMI PRIVATE

## 2021-03-31 PROCEDURE — 85025 COMPLETE CBC W/AUTO DIFF WBC: CPT

## 2021-03-31 PROCEDURE — 85018 HEMOGLOBIN: CPT

## 2021-03-31 PROCEDURE — 83605 ASSAY OF LACTIC ACID: CPT

## 2021-03-31 PROCEDURE — 85014 HEMATOCRIT: CPT

## 2021-03-31 PROCEDURE — 82150 ASSAY OF AMYLASE: CPT

## 2021-03-31 PROCEDURE — 86140 C-REACTIVE PROTEIN: CPT

## 2021-03-31 PROCEDURE — APPNB30 APP NON BILLABLE TIME 0-30 MINS: Performed by: NURSE PRACTITIONER

## 2021-03-31 RX ORDER — LIDOCAINE HYDROCHLORIDE 10 MG/ML
5 INJECTION, SOLUTION EPIDURAL; INFILTRATION; INTRACAUDAL; PERINEURAL ONCE
Status: DISCONTINUED | OUTPATIENT
Start: 2021-03-31 | End: 2021-04-07 | Stop reason: HOSPADM

## 2021-03-31 RX ORDER — SODIUM HYPOCHLORITE 5 MG/ML
SOLUTION TOPICAL DAILY
Status: DISCONTINUED | OUTPATIENT
Start: 2021-03-31 | End: 2021-03-31

## 2021-03-31 RX ADMIN — HYOSCYAMINE SULFATE: 16 SOLUTION at 22:15

## 2021-03-31 RX ADMIN — ROSUVASTATIN 20 MG: 20 TABLET, FILM COATED ORAL at 22:15

## 2021-03-31 RX ADMIN — CEFEPIME HYDROCHLORIDE 1000 MG: 1 INJECTION, POWDER, FOR SOLUTION INTRAMUSCULAR; INTRAVENOUS at 06:28

## 2021-03-31 RX ADMIN — ENOXAPARIN SODIUM 30 MG: 30 INJECTION SUBCUTANEOUS at 09:39

## 2021-03-31 RX ADMIN — TICAGRELOR 90 MG: 90 TABLET ORAL at 01:22

## 2021-03-31 RX ADMIN — SODIUM CHLORIDE: 9 INJECTION, SOLUTION INTRAVENOUS at 06:28

## 2021-03-31 RX ADMIN — MORPHINE SULFATE 4 MG: 4 INJECTION, SOLUTION INTRAMUSCULAR; INTRAVENOUS at 22:15

## 2021-03-31 RX ADMIN — SODIUM CHLORIDE: 9 INJECTION, SOLUTION INTRAVENOUS at 22:14

## 2021-03-31 RX ADMIN — CARVEDILOL 3.12 MG: 3.12 TABLET, FILM COATED ORAL at 09:39

## 2021-03-31 RX ADMIN — ROSUVASTATIN 20 MG: 20 TABLET, FILM COATED ORAL at 01:21

## 2021-03-31 RX ADMIN — TICAGRELOR 90 MG: 90 TABLET ORAL at 22:15

## 2021-03-31 RX ADMIN — SODIUM CHLORIDE: 9 INJECTION, SOLUTION INTRAVENOUS at 01:22

## 2021-03-31 RX ADMIN — AMIODARONE HYDROCHLORIDE 200 MG: 200 TABLET ORAL at 09:39

## 2021-03-31 RX ADMIN — TICAGRELOR 90 MG: 90 TABLET ORAL at 09:39

## 2021-03-31 RX ADMIN — Medication 10 ML: at 09:44

## 2021-03-31 RX ADMIN — CARVEDILOL 3.12 MG: 3.12 TABLET, FILM COATED ORAL at 18:16

## 2021-03-31 RX ADMIN — ASPIRIN 81 MG: 81 TABLET, CHEWABLE ORAL at 09:39

## 2021-03-31 RX ADMIN — MORPHINE SULFATE 4 MG: 4 INJECTION, SOLUTION INTRAMUSCULAR; INTRAVENOUS at 14:37

## 2021-03-31 ASSESSMENT — PAIN SCALES - GENERAL
PAINLEVEL_OUTOF10: 7
PAINLEVEL_OUTOF10: 0
PAINLEVEL_OUTOF10: 0

## 2021-03-31 ASSESSMENT — PAIN DESCRIPTION - DESCRIPTORS
DESCRIPTORS: STABBING
DESCRIPTORS: STABBING

## 2021-03-31 ASSESSMENT — PAIN DESCRIPTION - PAIN TYPE: TYPE: SURGICAL PAIN

## 2021-03-31 NOTE — PROGRESS NOTES
Progress Note - Dr. Rema Zimmer - Internal Medicine  PCP: Benedicto Self  [address] [place] [address] [facility]  [phone]    Hospital Day: 1  Code Status: Full Code  Current Diet: DIET GENERAL;        CC: follow up on medical issues    Subjective: Denia Talamantes is a 68 y.o. female. She denies problems    About the same  Creat better to 2.8  Wbc down to 11.4 (is now on vanc/cefepime)    She denies chest pain, denies shortness of breath, denies nausea,  denies emesis. 10 system Review of Systems is reviewed with patient, and pertinent positives are noted in HPI above . Otherwise, Review of systems is negative. I have reviewed the patient's medical and social history in detail and updated the computerized patient record. To recap: She  has a past medical history of Anemia, CAD (coronary artery disease), CKD (chronic kidney disease), Hyperlipidemia, and Hypertension. . She  has a past surgical history that includes fracture surgery; Cystocopy (11/21/13); Upper gastrointestinal endoscopy (N/A, 3/5/2021); Colonoscopy (N/A, 3/5/2021); hemicolectomy (Right, 3/8/2021); and Cholecystectomy, laparoscopic (N/A, 3/8/2021). . She  reports that she has never smoked. She has never used smokeless tobacco. She reports that she does not drink alcohol or use drugs. .        Active Hospital Problems    Diagnosis Date Noted    YESENIA (acute kidney injury) ([facility] [address] [N17.9] 03/30/2021    Abscess of intestine [K63.0] 03/30/2021    Intra-abdominal abscess ([facility] [address] [K65.1] 03/30/2021    Malignant neoplasm of ascending colon ([facility] [address] [C18.2] 03/07/2021    Anemia [D64.9] 03/03/2021    Hypertension [I10] 01/10/2014       Current Facility-Administered Medications: aspirin chewable tablet 81 mg, 81 mg, Oral, Daily  carvedilol (COREG) tablet 3.125 mg, 3.125 mg, Oral, BID WC  ticagrelor (BRILINTA) tablet 90 mg, 90 mg, Oral, BID  rosuvastatin (CRESTOR) tablet 20 mg, 20 mg, Oral, Nightly  amiodarone (CORDARONE) tablet 200 mg, 200 108/42 -- -- 68 19 98 % -- --   03/30/21 1939 (!) 110/38 -- -- -- -- -- -- --   03/30/21 1930 (!) 110/38 -- -- 76 29 98 % -- --   03/30/21 1830 (!) 96/32 -- -- 66 22 97 % -- --   03/30/21 1815 (!) 111/41 -- -- 67 22 99 % -- --   03/30/21 1800 (!) 96/43 -- -- 68 22 -- -- --   03/30/21 1749 -- -- -- -- -- -- 5' (1.524 m) --   03/30/21 1641 (!) 93/41 98.1 °F (36.7 °C) -- 66 17 98 % -- 212 lb 12.8 oz (96.5 kg)     Patient Vitals for the past 96 hrs (Last 3 readings):   Weight   03/31/21 0553 220 lb 14.4 oz (100.2 kg)   03/30/21 1641 212 lb 12.8 oz (96.5 kg)           Intake/Output Summary (Last 24 hours) at 3/31/2021 0849  Last data filed at 3/30/2021 1919  Gross per 24 hour   Intake 50 ml   Output --   Net 50 ml         Physical Exam:   BP (!) 123/46   Pulse 73   Temp 96.4 °F (35.8 °C) (Oral)   Resp (!) 92   Ht 5' (1.524 m)   Wt 220 lb 14.4 oz (100.2 kg)   SpO2 96%   BMI 43.14 kg/m²   General appearance: alert, appears stated age and cooperative  Head: Normocephalic, without obvious abnormality, atraumatic  Lungs: clear to auscultation bilaterally  Heart: regular rate and rhythm, S1, S2 normal, no murmur, click, rub or gallop  Abdomen: soft, mod tender, no rebound  Extremities: extremities normal, atraumatic, no cyanosis or edema    Labs:  Lab Results   Component Value Date    WBC 11.4 (H) 03/31/2021    HGB 7.1 (L) 03/31/2021    HCT 23.7 (L) 03/31/2021     03/31/2021    CHOL 155 11/21/2020    TRIG 121 11/21/2020    HDL 38 (L) 11/21/2020    ALT 6 (L) 03/31/2021    AST 10 (L) 03/31/2021     (L) 03/31/2021    K 3.7 03/31/2021     03/31/2021    CREATININE 2.8 (H) 03/31/2021    BUN 39 (H) 03/31/2021    CO2 22 03/31/2021    INR 1.18 (H) 03/30/2021    LABMICR YES 03/11/2021     Lab Results   Component Value Date    CKTOTAL 41 03/11/2021    TROPONINI <0.01 03/30/2021       Recent Imaging Results are Reviewed:  Echo Complete    Result Date: 3/6/2021  Transthoracic Echocardiography Report (TTE) in size. Aortic Valve  The aortic valve is structurally normal. There is no significant aortic  valve regurgitation or stenosis. Aorta  The aortic root is normal in size. Right Ventricle  The right ventricle is normal in size and function. Tricuspid Valve  The tricuspid valve is normal in structure. Mild tricuspid regurgitation, RVSP is estimated at 38 mmhg. Right Atrium  The right atrial size is normal.   Pulmonic Valve  The pulmonic valve is not well visualized. Mild pulmonic regurgitation present. Pericardial Effusion  No pericardial effusion noted. Pleural Effusion  No pleural effusion. Miscellaneous  The inferior vena cava appears normal in size with normal respiratory  variation.   M-Mode/2D Measurements (cm)   LV Diastolic Dimension: 4.5 cm  LV Systolic Dimension: 0.37 cm  LV Septum Diastolic: 3.08 cm  LV PW Diastolic: 1.5 cm         AO Root Dimension: 3.1 cm  RV Diastolic Dimension: 8.34 cm LA Dimension: 3.4 cm                                  LA Area: 11.7 cm2                                  LA volume/Index: 21.6 ml /11 ml/m2  Doppler Measurements   AV Peak Velocity: 194 cm/s     MV Peak E-Wave: 98.5 cm/s  AV Peak Gradient: 15.05 mmHg   MV Peak A-Wave: 89.5 cm/s  AV Mean Gradient: 9 mmHg       MV E/A Ratio: 1.1  LVOT Peak Velocity: 122 cm/s   MV Mean Gradient: 3 mmHg                                 MV Max P mmHg  TR Velocity:290 cm/s           MV Vmax:125 cm/s  TR Gradient:33.64 mmHg         MV VTI:51.9 cm/s   E' Septal Velocity: 6.2 cm/s   MV Deceleration Time: 215 msec  E' Lateral Velocity: 5.98 cm/s  PV Peak Velocity: 123 cm/s  PV Peak Gradient: 6.05 mmHg   Aortic Valve   Peak Velocity: 194 cm/s   Mean Velocity: 141 cm/s  Peak Gradient: 15.05 mmHg Mean Gradient: 9 mmHg  AV VTI: 43.8 cm  Aorta   Aortic Root: 3.1 cm      Ct Abdomen Pelvis Wo Contrast Additional Contrast? None    Result Date: 3/11/2021  EXAMINATION: CT OF THE ABDOMEN AND PELVIS WITHOUT CONTRAST 3/11/2021 12:05 pm TECHNIQUE: CT of the abdomen and pelvis was performed without the administration of intravenous contrast. Multiplanar reformatted images are provided for review. Dose modulation, iterative reconstruction, and/or weight based adjustment of the mA/kV was utilized to reduce the radiation dose to as low as reasonably achievable. COMPARISON: CT of the chest abdomen and pelvis March 5, 2020 HISTORY: ORDERING SYSTEM PROVIDED HISTORY: RUQ pain TECHNOLOGIST PROVIDED HISTORY: Reason for exam:->RUQ pain Additional Contrast?->None Reason for Exam: RUQ pain Acuity: Unknown Type of Exam: Unknown FINDINGS: Lower Chest: Increased mild-moderate right pleural effusion with adjacent compressive atelectasis. Unchanged trace amount of left pleural fluid. Organs: In the gallbladder fossa there is a fluid collection measuring 4.2 x 2.3 by 2.4 cm. Along the inferior-lateral aspect of the fluid collection a few tiny curvilinear/rounded high-density foci are present measuring 3 mm and less in size best shown on coronal image 90. Cholecystectomy clips noted in the expected location. There is a small amount of fluid along the inferior margin of the right hepatic lobe anterior laterally. Small amount of fluid between the diaphragm and liver also present. No pancreatitis. No ureteral stone or hydronephrosis. 2 mm right renal stone again noted. GI/Bowel: There is new fluid-filled small bowel dilation involving proximal to mid small bowel loops measuring up to 3 cm. There is distal small bowel collapse extending to the surgical site. Oral contrast within the colon from the oral contrast given for the comparison. No oral contrast within small bowel at this time. Pelvis: No acute abnormality of the pelvis. Normal appearance of the bladder. Peritoneum/Retroperitoneum: Small amount of free fluid in the right upper quadrant as discussed. No free air. Bones/Soft Tissues: Expected postsurgical changes of the abdominal wall.   No acute osseous findings. New fluid collection in the gallbladder fossa with adjacent small amount of fluid around the liver. This may be residual fluid from the recent surgery or related to biliary leak. Consider HIDA scan evaluation There are 2-3 tiny gallstones within the gallbladder fossa fluid collection, measuring 3 mm and less in size. Increased size of mild-moderate right pleural effusion with increased adjacent compressive atelectasis of the right lung base. Xr Chest (2 Vw)    Result Date: 3/30/2021  EXAMINATION: TWO XRAY VIEWS OF THE CHEST 3/30/2021 5:22 pm COMPARISON: 03/12/2021 radiograph HISTORY: ORDERING SYSTEM PROVIDED HISTORY: Chest Discomfort TECHNOLOGIST PROVIDED HISTORY: Reason for exam:->Chest Discomfort Reason for Exam: Hypotension (Pt reports low BP, 93/41. ) Acuity: Acute Type of Exam: Initial FINDINGS: The heart is mildly enlarged. Mediastinum and pulmonary vascularity are normal.  Enteric tube has been removed since prior radiograph. There is improved aeration of the lungs. No acute airspace abnormality with lucency suggesting underlying COPD. No significant skeletal finding. No significant finding in the chest.     Xr Chest (2 Vw)    Result Date: 3/1/2021  EXAMINATION: TWO XRAY VIEWS OF THE CHEST 3/1/2021 2:58 pm COMPARISON: None. HISTORY: ORDERING SYSTEM PROVIDED HISTORY: EUBANKS (dyspnea on exertion) TECHNOLOGIST PROVIDED HISTORY: Reason for exam:->3-4wk hx of EUBANKS and productive cough FINDINGS: Cardiomegaly. Pulmonary vascular congestion. Ill-defined peripheral pulmonary opacities. No pneumothorax. No pleural effusion. Ill-defined bilateral pulmonary opacities could represent pulmonary edema possibly related to congestive heart failure or atypical pneumonia     Nm Hepatobiliary    Result Date: 3/11/2021  EXAMINATION: NUCLEAR MEDICINE HEPATOBILIARY SCINTIGRAPHY (HIDA SCAN). 3/11/2021 2:40 pm TECHNIQUE: Approximately 4.45 [id number] Mebrofenin (Choletec) was administered IV. extends to the distal stomach. Heart is stable, mildly enlarged. Elevated hemidiaphragm is noted. There is left perihilar stranding likely atelectasis. No extrapleural air. Intestinal tube terminates in the distal stomach. Other findings as above. Ct Chest Abdomen Pelvis Wo Contrast    Result Date: 3/30/2021  EXAMINATION: CT OF THE CHEST, ABDOMEN, AND PELVIS WITHOUT CONTRAST 3/30/2021 3:26 pm TECHNIQUE: CT of the chest, abdomen and pelvis was performed without the administration of intravenous contrast. Multiplanar reformatted images are provided for review. Dose modulation, iterative reconstruction, and/or weight based adjustment of the mA/kV was utilized to reduce the radiation dose to as low as reasonably achievable. COMPARISON: Abdomen and pelvis CT, 03/11/2021 CT chest abdomen pelvis, 03/05/2021 HISTORY: ORDERING SYSTEM PROVIDED HISTORY: r/o penumonia, recent surgery TECHNOLOGIST PROVIDED HISTORY: Reason for exam:->r/o penumonia, recent surgery Additional Contrast?->None Decision Support Exception->Emergency Medical Condition (MA) Reason for Exam: r/o penumonia, recent surgery Acuity: Acute Type of Exam: Initial FINDINGS: Chest: Mediastinum: Visualized thyroid is unremarkable. Mildly enlarged mediastinal lymph nodes have decreased in size when compared to the previous exam, and are most compatible with reactive lymph nodes. Esophagus is unremarkable. Noncontrast imaging of the cardiac chambers, thoracic aorta, and pulmonary arteries is unremarkable. Lungs/pleura: Calcified lesion in the left lower lobe is again seen, compatible with old granulomatous disease, found as far back as 2014, and requires no specific follow-up. A focal infiltrate is not detected. The airways are patent. No pneumothorax. No pleural effusion. Soft Tissues/Bones: Visualized extra thoracic soft tissues are unremarkable. No acute or suspicious bony abnormality is identified.   Severe degenerative disease within the glenohumeral joints is noted bilaterally, especially on the right. Abdomen/Pelvis: Lack of intravenous contrast limits evaluation of the solid abdominal viscera, the hollow abdominal viscera, and the vascular structures. Organs: Having said that, no acute hepatic abnormality is identified. The gallbladder is surgically absent. Noncontrast imaging of the spleen, adrenals, and pancreas is unremarkable. Nonobstructing nephrolithiasis is noted within the right kidney. GI/Bowel: There has been development of a very large gas and fluid collection within the right abdominal wall measuring 12 cm maximally (series 2, image 132). There is evidence of extension of this collection into the peritoneal cavity (as visualized on axial images 140 to through 154. The ileocolic anastomosis is very close to this collection within the peritoneum. It would be difficult to entirely exclude communication between bowel and this collection. The patient status post partial colectomy. The remainder of the large bowel is unremarkable. No evidence of small bowel obstruction. There is no evidence of bowel obstruction. Stomach and duodenal sweep are unremarkable. Pelvis: Ovaries are prominent bilaterally, but those are stable dating back to 2013. No suspicious adnexal lesions are found. Uterus unremarkable. Urinary bladder is unremarkable. Peritoneum/Retroperitoneum: Abdominal aorta normal in caliber. No retroperitoneal lymphadenopathy. Bones/Soft Tissues: No osteolytic or osteoblastic bone lesions are seen. No acute bony abnormalities are detected. Chest CT: No acute abnormality detected. Abdomen and pelvis CT: Interval development of a large gas and fluid collection within the right abdominal wall measuring up to 12 cm, most compatible with an abscess.  There is intraperitoneal communication of that collection, and the ileocolic anastomosis is very close to the intraperitoneal component, and therefore it would be difficult to entirely exclude a communication between bowel and that collection. Ct Chest Abdomen Pelvis Wo Contrast    Result Date: 3/5/2021  EXAMINATION: CT OF THE CHEST, ABDOMEN, AND PELVIS WITHOUT CONTRAST 3/5/2021 4:20 pm TECHNIQUE: CT of the chest, abdomen and pelvis was performed without the administration of intravenous contrast. Multiplanar reformatted images are provided for review. Dose modulation, iterative reconstruction, and/or weight based adjustment of the mA/kV was utilized to reduce the radiation dose to as low as reasonably achievable. COMPARISON: Chest CT 2014 2013 HISTORY: ORDERING SYSTEM PROVIDED HISTORY: Colon mass, r/o mets TECHNOLOGIST PROVIDED HISTORY: Reason for exam:->Colon mass, r/o mets Additional Contrast?->Oral Reason for Exam: Colon mass, r/o mets Acuity: Unknown Type of Exam: Unknown FINDINGS: Chest: Mediastinum: 7 mm nodule seen in right lobe of thyroid gland. No specific imaging follow-up recommended based on size. coronary artery calcification is seen. Small mediastinal nodes are noted. Right paratracheal node measures 1.4 cm in short axis, previously 10 mm. Lungs/pleura: Mosaic attenuation is seen throughout the lungs, suggesting small airways disease or air trapping. Trace pleural effusions are seen bilaterally. There is adjacent consolidation seen in the lung bases. 1.8 x 1.2 cm nodule is seen in the left lower lobe Soft Tissues/Bones: Degenerative changes are seen in the spine. Degenerative changes are seen in the shoulder joints. Abdomen/Pelvis: Organs: No splenomegaly Adrenal glands appear normal. There is rotation of the kidney anteriorly, incidentally noted. .  No stones noted 2 mm stone seen in the right kidney. No hydronephrosis noted. No intrahepatic ductal dilatation. No perihepatic fluid Gallbladder appears normal No peripancreatic inflammatory change GI/Bowel: No significant small bowel distention noted. Mild stool load seen in the colon.   Scattered colonic to see    Abscess of intestine -Established problem. Stable. Seen on CT.  Poss communication  Plan: await surg eval    Intra-abdominal abscess ([facility] [address]            Sidney Favors  3/31/2021

## 2021-03-31 NOTE — CARE COORDINATION
Discharge Planning Assessment  Readmission risk score 32%  RN discharge planner met with patient/ (and family member) to discuss reason for admission, current living situation, and potential needs at the time of discharge    Demographics/Insurance verified Yes    Current type of dwelling:house    Patient from ECF/SW confirmed with:n/a    Living arrangements:with spouse and granddaughter    Level of function/Support: independent, drives    PCP:FELIPE Reynolds    Last Visit to PCP:3/2/2021    DME:lauryne    Active with any community resources/agencies/skilled home care: CM verified with Shonda/ [facility] that patient is active with service for nursing only at this time (last DC patient had nurse-PT and OT)    Medication compliance issues:none noted    Financial issues that could impact healthcare:not identified      Tentative discharge plan: patient and family had declined skilled placement on last discharge 2 weeks ago, CM will follow up once therapy evaluations are in place    *Discussed and provided facilities of choice if transition to a skilled nursing facility is required at the time of discharge      *Discussed with patient and/or family that on the day of discharge home tentative time of discharge will be between 10 AM and noon.     Transportation at the time of discharge:family likely to transport home    JESS LakeN, CCM, RN  [facility]  [phone]

## 2021-03-31 NOTE — PROGRESS NOTES
4 Eyes Skin Assessment     NAME:  Yesica Jimenez  YOB: 1940  MEDICAL RECORD NUMBER:  [id number]    The patient is being assess for  Admission    I agree that 2 RN's have performed a thorough Head to Toe Skin Assessment on the patient. ALL assessment sites listed below have been assessed. Areas assessed by both nurses:    Head, Face, Ears, Shoulders, Back, Chest, Arms, Elbows, Hands, Sacrum. Buttock, Coccyx, Ischium and Legs. Feet and Heels        Does the Patient have a Wound? Yes wound(s) were present on assessment.  LDA wound assessment was Initiated and completed        Sukhwinder Prevention initiated:  No   Wound Care Orders initiated:  No    Pressure Injury (Stage 3,4, Unstageable, DTI, NWPT, and Complex wounds) if present place consult order under [de-identified] No    New and Established Ostomies if present place consult order under : No      Nurse 1 eSignature: Electronically signed by Mary Gaspar RN on 3/31/21 at 6:35 AM EDT    **SHARE this note so that the co-signing nurse is able to place an eSignature**    Nurse 2 eSignature: Electronically signed by Rupinder Leach RN on 3/31/21 at 7:22 AM EDT

## 2021-03-31 NOTE — CONSULTS
Nephrology Consult Note  [phone]  [phone]   [email or web address].        Reason for Consult:  YESENIA  HISTORY OF PRESENT ILLNESS:      The patient is a 68 y.o. female with significant past medical history of CKD stage IIIb, coronary artery disease,  hypertension, mixed hyperlipidemia , atrial Fibrillation was in the hospital in the early part of March and underwent a right hemicolectomy a laparoscopic cholecystectomy. At that time she was being investigated for anemia and found to have a right colonic mass. She was operated on by Dr. Asia Perkins and discharged home. .  She now presents with 2 to 3 days history of progressive fatigue and tenderness over the wound of abdomen. Denies any foul-smelling discharge or fever or chills or rigors.   There is no dysuria gross hematuria  Her baseline creatinine is 1.3 creatinine on admission was 3.2  She was running low blood pressures on admission  She was started on IV antibiotics after cultures were obtained and IV fluids  Dr. Asia Perkins was consulted and the plan is that there is abdominal wall abscess and incision and drainage will be done            Past Medical History:        Diagnosis Date    Anemia     CAD (coronary artery disease) 11/2020    Stent    CKD (chronic kidney disease)     Hyperlipidemia     Hypertension        Past Surgical History:        Procedure Laterality Date    CHOLECYSTECTOMY, LAPAROSCOPIC N/A 3/8/2021    LAPAROSCOPIC CHOLECYSTECTOMY performed by Rossi Michaud MD at [address] [place] [address] N/A 3/5/2021    COLONOSCOPY WITH BIOPSY performed by Robert Gore MD at [address] [place] [address]  11/21/13    w/ bladder biopsy    FRACTURE SURGERY      right wrist    HEMICOLECTOMY Right 3/8/2021    LAPAROSCOPIC RIGHT COLON RESECTION performed by Rossi Michaud MD at [address] 3/5/2021    EGD BIOPSY performed by Robert Gore MD at [address] [place] [address]       Current Medications:    No current facility-administered medications on file prior to encounter.       Current Outpatient Medications on File Prior to Encounter   Medication Sig Dispense Refill    aspirin 81 MG chewable tablet 1 tab daily 90 tablet 3    rosuvastatin (CRESTOR) 20 MG tablet Take 1 tablet by mouth nightly 30 tablet 2    carvedilol (COREG) 3.125 MG tablet Take 1 tablet by mouth 2 times daily (with meals) 60 tablet 3    ticagrelor (BRILINTA) 90 MG TABS tablet Take 1 tablet by mouth 2 times daily 60 tablet 3    amiodarone (CORDARONE) 200 MG tablet 200mg tid x 7d (thru 3/23/21) then 200mg qd thereafter 30 tablet 2       Allergies:  Acetomenaphthone (menadiol diacetate) [menadiol sodium diphosphate] and Lisinopril-hydrochlorothiazide    Social History:    Social History     Socioeconomic History    Marital status:      Spouse name: Not on file    Number of children: Not on file    Years of education: Not on file    Highest education level: Not on file   Occupational History    Not on file   Social Needs    Financial resource strain: Not on file    Food insecurity     Worry: Not on file     Inability: Not on file    Transportation needs     Medical: Not on file     Non-medical: Not on file   Tobacco Use    Smoking status: Never Smoker    Smokeless tobacco: Never Used    Tobacco comment:  was a heavy smoker   Substance and Sexual Activity    Alcohol use: No    Drug use: No    Sexual activity: Not Currently   Lifestyle    Physical activity     Days per week: Not on file     Minutes per session: Not on file    Stress: Not on file   Relationships    Social connections     Talks on phone: Not on file     Gets together: Not on file     Attends Hindu service: Not on file     Active member of club or organization: Not on file     Attends meetings of clubs or organizations: Not on file     Relationship status: Not on file    Intimate partner violence     Fear of current or ex partner: Not on file Emotionally abused: Not on file     Physically abused: Not on file     Forced sexual activity: Not on file   Other Topics Concern    Not on file   Social History Narrative    Not on file       Family History:       Problem Relation Age of Onset    Heart Disease Father          REVIEW OF SYSTEMS:      10 pt ROS done, relevant features as in [place], rest negative       PHYSICAL EXAM:    Vitals:    BP (!) 135/57   Pulse 65   Temp 96.4 °F (35.8 °C) (Oral)   Resp 22   Ht 5' (1.524 m)   Wt 220 lb 14.4 oz (100.2 kg)   SpO2 95%   BMI 43.14 kg/m²   I/O last 3 completed shifts: In: 48 [IV Piggyback:50]  Out: -   No intake/output data recorded. Physical Exam:  Gen:  alert, oriented x 3  Foul smell in the room   PERRL , EOM +  Pallor +, No icterus  JVP not raised   CV: RRR no murmur or rub . Lungs:B/ L air entry, Normal breath sounds   Abd: soft, bowel sounds + , No organomegaly , LSCS Scar, Dressing Right abdomen, redness of skin   Ext: Trace leg  edema, Skin: Warm.   No rash  Neuro: nonfocal.      DATA:    CBC with Differential:    Lab Results   Component Value Date    WBC 11.4 03/31/2021    RBC 3.23 03/31/2021    HGB 7.1 03/31/2021    HCT 23.7 03/31/2021     03/31/2021    MCV 73.3 03/31/2021    MCH 22.0 03/31/2021    MCHC 30.0 03/31/2021    RDW 26.7 03/31/2021    LYMPHOPCT 8.8 03/31/2021    MONOPCT 8.3 03/31/2021    BASOPCT 0.1 03/31/2021    MONOSABS 1.0 03/31/2021    LYMPHSABS 1.0 03/31/2021    EOSABS 0.1 03/31/2021    BASOSABS 0.0 03/31/2021     CMP:    Lab Results   Component Value Date     03/31/2021    K 3.7 03/31/2021     03/31/2021    CO2 22 03/31/2021    BUN 39 03/31/2021    CREATININE 2.8 03/31/2021    GFRAA 20 03/31/2021    AGRATIO 0.7 03/31/2021    LABGLOM 16 03/31/2021    GLUCOSE 115 03/31/2021    PROT 5.2 03/31/2021    LABALBU 2.1 03/31/2021    CALCIUM 7.9 03/31/2021    BILITOT 0.4 03/31/2021    ALKPHOS 103 03/31/2021    AST 10 03/31/2021    ALT 6 03/31/2021     Phosphorus:    Lab Results   Component Value Date    PHOS 2.8 03/14/2021     Uric Acid:  No results found for: LABURIC, URICACID  Troponin:    Lab Results   Component Value Date    TROPONINI <0.01 03/30/2021     U/A:    Lab Results   Component Value Date    COLORU NESSA 03/11/2021    PROTEINU 100 03/11/2021    PHUR 5.0 03/11/2021    WBCUA 12 03/11/2021    RBCUA 20 03/11/2021    CLARITYU TURBID 03/11/2021    SPECGRAV 1.023 03/11/2021    LEUKOCYTESUR TRACE 03/11/2021    UROBILINOGEN 1.0 03/11/2021    BILIRUBINUR SMALL 03/11/2021    BLOODU LARGE 03/11/2021    GLUCOSEU Negative 03/11/2021           IMPRESSION/RECOMMENDATIONS:      1. YESENIA   Pre Renal vs ATN   Check fena   IV fluids    2. CKD stage 3    Sees my partner Dr Cristela Doherty     3. Abdominal  wall abcess   On antibiotics    For I and D    Thank you for allowing me to participate in the care of this patient. I will continue to follow along. Please call with questions.     Ani Brenner MD  3/31/2021  [facility]

## 2021-03-31 NOTE — H&P
History and Physical  Dr. Stevenson Rang  3/30/2021    PCP: Jeannette Cedillo MD    Cc:   Chief Complaint   Patient presents with    Hypotension     Pt reports low BP, 93/41. HPI:  Alec Metzger is a 68 y.o. female who has a past medical history of Anemia, CAD (coronary artery disease), CKD (chronic kidney disease), Hyperlipidemia, and Hypertension. Patient presents with YESENIA (acute kidney injury) ([facility] [place] [address] HPI     68 y.o. female who presents to the emergency department today for evaluation for hypotension. The patient has a history of coronary artery disease, chronic kidney disease, hypertension and hyperlipidemia. The patient was admitted on 3/3/2021 for anemia, and on 3/6/2021, the patient states that she did undergo a right hemicolectomy as well as a cholecystectomy. Patient states that she has been doing well at home. The patient states that yesterday she started with a cough which is occasionally productive of white sputum, she denies any hemoptysis. The patient states that she started to feel generally fatigued today, and she states that the blood pressure at home was running low so she came to the emergency room for further care and evaluation. The patient arrives to the ED, she does not have any chest pain, or shortness of breath. She states that she has noticed some lower leg edema however she denies any weight gain. She states that she is actually had a 20 pound weight loss. She denies any history of CHF. The patient denies any abdominal pain, nausea, vomiting or diarrhea. She has not had any fever or chills.   She states that she has had a dry cough since yesterday, and she states her  at home was recently diagnosed pneumonia, and she states that she has not had any known exposures with any other sick contacts, she states that she has not received her Covid vaccine yet      CT ABd from ER reviewed on computer    Abdomen and pelvis CT: Interval development of a large gas and fluid collection within the right abdominal wall measuring up to 12 cm, most   compatible with an abscess. There is intraperitoneal communication of that collection, and the ileocolic   anastomosis is very close to the intraperitoneal component, and therefore it   would be difficult to entirely exclude a communication between bowel and that   collection. Case has been discussed with surgery  Per Dr Natalie Galan, admit, NPO, iv vanc/cefepime. They will eval in morning    Problem list of hospitalization thus far: Active Hospital Problems    Diagnosis    [facility] [N17.9]    Abscess of intestine [K63.0]    Malignant neoplasm of ascending colon ([facility] [C18.2]    Anemia [D64.9]    Hypertension [I10]         Review of Systems: (1 system for EPF, 2-9 for detailed, 10+ for comprehensive)  Review of Systems   Constitutional: Positive for fatigue. Negative for chills and fever. HENT: Negative for ear discharge and ear pain. Eyes: Negative for pain and redness. Respiratory: Positive for cough. Negative for shortness of breath. Cardiovascular: Positive for leg swelling. Negative for chest pain. Gastrointestinal: Negative for nausea and vomiting. Endocrine: Negative for polydipsia and polyphagia. Genitourinary: Negative for flank pain and hematuria. Musculoskeletal: Negative for back pain and myalgias. Skin: Negative for pallor. Allergic/Immunologic: Negative for food allergies. Neurological: Negative for speech difficulty and light-headedness. Hematological: Does not bruise/bleed easily. Psychiatric/Behavioral: Negative for decreased concentration. The patient is not nervous/anxious.             Past Medical History:   Past Medical History:   Diagnosis Date    Anemia     CAD (coronary artery disease) 11/2020    Stent    CKD (chronic kidney disease)     Hyperlipidemia     Hypertension        Past Surgical History:   Past Surgical History:   Procedure Laterality Date  CHOLECYSTECTOMY, LAPAROSCOPIC N/A 3/8/2021    LAPAROSCOPIC CHOLECYSTECTOMY performed by Tita Hayden MD at [address] COLONOSCOPY N/A 3/5/2021    COLONOSCOPY WITH BIOPSY performed by Zenon Aquion MD at [address]  11/21/13    w/ bladder biopsy    FRACTURE SURGERY      right wrist    HEMICOLECTOMY Right 3/8/2021    LAPAROSCOPIC RIGHT COLON RESECTION performed by Tita Hayden MD at [address] N/A 3/5/2021    EGD BIOPSY performed by Zenon Aquino MD at [address] History:   Social History     Tobacco History     Smoking Status  Never Smoker    Smokeless Tobacco Use  Never Used    Tobacco Comment   was a heavy smoker          Alcohol History     Alcohol Use Status  No          Drug Use     Drug Use Status  No          Sexual Activity     Sexually Active  Not Currently                Fam History:   Family History   Problem Relation Age of Onset    Heart Disease Father        PFSH: The above PMHx, PSHx, SocHx, FamHx has been reviewed by myself. (1 area for detailed, 2-3 for comprehensive)      Code Status: Prior    Meds - following list of home medications fromelectronic chart has been reviewed by myself  Prior to Admission medications    Medication Sig Start Date End Date Taking? Authorizing Provider   HYDROcodone-acetaminophen (NORCO) 5-325 MG per tablet Take 1 tablet by mouth 2 times daily for 14 days.   Patient not taking: Reported on 3/22/2021 3/16/21 3/30/21  Sergio Duckworth MD   amiodarone (CORDARONE) 200 MG tablet 200mg tid x 7d (thru 3/23/21) then 200mg qd thereafter 3/16/21   Segrio Duckworth MD   rosuvastatin (CRESTOR) 20 MG tablet Take 1 tablet by mouth nightly 11/30/20   JIMENA Grey - CNP   aspirin 81 MG chewable tablet Take 1 tablet by mouth daily 11/22/20   JIMENA Grey - CNP   carvedilol (COREG) 3.125 MG tablet Take 1 tablet by mouth 2 times daily (with meals) 11/21/20 JIMENA Cox - CNP   ticagrelor (BRILINTA) 90 MG TABS tablet Take 1 tablet by mouth 2 times daily 11/21/20   JIMENA Cox - JAY         Allergies   Allergen Reactions    Acetomenaphthone (Menadiol Diacetate) [Menadiol Sodium Diphosphate]      Upset stomach     Lisinopril-Hydrochlorothiazide Other (See Comments)             EXAM: (2-7 system for EPF/Detailed, ?8 for Comprehensive)  BP (!) 110/38   Pulse 66   Temp 98.1 °F (36.7 °C)   Resp 17   Ht 5' (1.524 m)   Wt 212 lb 12.8 oz (96.5 kg)   SpO2 98%   BMI 41.56 kg/m²   Constitutional: vitals as above: alert, appears stated age and cooperative  Psychiatric: normal insight and judgment, oriented to person, place, time, and general circumstances  Head: Normocephalic, without obvious abnormality, atraumatic  Eyes:lids and lashes normal, conjunctivae and sclerae normal and pupils equal, round, reactive to light and accomodation  EMNT: external ears normal, lips normal  Neck: no JVD, supple, symmetrical, trachea midline and thyroid not enlarged, symmetric, no tenderness/mass/nodules   Respiratory: clear to auscultation and percussion bilaterally with normal respiratory effort  Cardiovascular: normal rate, regular rhythm, normal S1 and S2 and no carotid bruits  Gastrointestinal: soft, mod tender, non-distended, normal bowel sounds,   Lymphatic:   Extremities: trace edema bialt  Skin:No rashes or nodules noted.   Neurologic:    LABS:  Labs Reviewed   CBC WITH AUTO DIFFERENTIAL - Abnormal; Notable for the following components:       Result Value    WBC 16.3 (*)     RBC 3.84 (*)     Hemoglobin 8.4 (*)     Hematocrit 28.2 (*)     MCV 73.4 (*)     MCH 22.0 (*)     MCHC 29.9 (*)     RDW 26.9 (*)     Neutrophils Absolute 14.0 (*)     All other components within normal limits    Narrative:     Performed at:  [facility] [address], [place], [address]   Phone [phone]   COMPREHENSIVE METABOLIC PANEL - Abnormal; Notable for the following components:    Sodium 130 (*)     Chloride 97 (*)     CO2 18 (*)     Glucose 136 (*)     BUN 38 (*)     CREATININE 3.2 (*)     GFR Non- 14 (*)     GFR  17 (*)     Total Protein 6.0 (*)     Albumin 2.5 (*)     Albumin/Globulin Ratio 0.7 (*)     ALT 9 (*)     AST 14 (*)     All other components within normal limits    Narrative:     Performed at:  [facility] [address], [facility], [address]   Phone [phone]   PROTIME-INR - Abnormal; Notable for the following components:    Protime 13.7 (*)     INR 1.18 (*)     All other components within normal limits    Narrative:     Performed at:  [facility] [address], [facility], [facility] [address]   Phone [phone]   BRAIN NATRIURETIC PEPTIDE - Abnormal; Notable for the following components:    Pro-BNP 16,764 (*)     All other components within normal limits    Narrative:     Performed at:  [facility] [address], [facility], [facility] [place] [employer]   Phone [phone]   LACTATE, SEPSIS - Abnormal; Notable for the following components:    Lactic Acid, Sepsis 2.1 (*)     All other components within normal limits    Narrative:     Performed at:  [facility] [address], [facility], [facility] [place] [employer]   Phone [phone]   CULTURE, BLOOD 2   CULTURE, BLOOD 1   TROPONIN    Narrative:     Performed at:  [facility] [address], [facility], [address] [employer]   Phone [phone]   APTT    Narrative:     Performed at:  [facility] [address], [facility], [facility] [place]   Phone [phone]   URINE RT REFLEX TO CULTURE   LACTATE, SEPSIS   COVID-19         IMAGING:  Imaging results from the ER have been reviewed in the computerized chart.   Echo Complete    Result Date: 3/6/2021  Transthoracic Echocardiography Report (TTE)  Demographics Patient Name        Noemi Mcrae   Date of Study       03/06/2021  Gender                 Female   Patient Number      [id number]  Date of Birth          1940   Visit Number        [id number]   Age                    68 year(s)   Accession Number    [id number]  Room Number            6476   Corporate ID        [id number]    Sonographer            Tahir Cristobal RD,                                                         RVT   Ordering Physician              Interpreting Physician Lisy Jaime MD,                                                         [facility]  Procedure Type of Study   TTE procedure:ECHOCARDIOGRAM COMPLETE 2D W DOPPLER W COLOR. Procedure Date Date: 03/06/2021 Start: 09:12 AM Study Location: [facility] - Echo Lab Technical Quality: Good visualization Additional Indications:NSTEMI, Leg edema, CAD. Patient Status: Routine Height: 60 inches Weight: 220 pounds BSA: 1.94 m2 BMI: 42.97 kg/m2 BP: 134/75 mmHg  Conclusions   Summary  Normal left ventricle size, and systolic function with an estimated ejection  fraction of 55-60%. Mild concentric left ventricular hypertrophy is present. No regional wall motion abnormalities are seen. Mild tricuspid regurgitation, RVSP is estimated at 38 mmhg. Signature   ------------------------------------------------------------------  Electronically signed by Lisy Jaime MD, [facility] Demar Tomas  (Interpreting physician) on 03/06/2021 at 12:12 PM  ------------------------------------------------------------------   Findings   Left Ventricle  Normal left ventricle size, and systolic function with an estimated ejection  fraction of 55-60%. Mild concentric left ventricular hypertrophy is present. No regional wall motion abnormalities are seen. Grade I diastolic dysfunction with normal LV filling pressures. Mitral Valve  Calcification of the mitral valve noted. No evidence of mitral regurgitation. Left Atrium  The left atrium is normal in size. Aortic Valve  The aortic valve is structurally normal. There is no significant aortic  valve regurgitation or stenosis. Aorta  The aortic root is normal in size. Right Ventricle  The right ventricle is normal in size and function. Tricuspid Valve  The tricuspid valve is normal in structure. Mild tricuspid regurgitation, RVSP is estimated at 38 mmhg. Right Atrium  The right atrial size is normal.   Pulmonic Valve  The pulmonic valve is not well visualized. Mild pulmonic regurgitation present. Pericardial Effusion  No pericardial effusion noted. Pleural Effusion  No pleural effusion. Miscellaneous  The inferior vena cava appears normal in size with normal respiratory  variation.   M-Mode/2D Measurements (cm)   LV Diastolic Dimension: 4.5 cm  LV Systolic Dimension: 6.51 cm  LV Septum Diastolic: 4.18 cm  LV PW Diastolic: 1.5 cm         AO Root Dimension: 3.1 cm  RV Diastolic Dimension: 0.33 cm LA Dimension: 3.4 cm                                  LA Area: 11.7 cm2                                  LA volume/Index: 21.6 ml /11 ml/m2  Doppler Measurements   AV Peak Velocity: 194 cm/s     MV Peak E-Wave: 98.5 cm/s  AV Peak Gradient: 15.05 mmHg   MV Peak A-Wave: 89.5 cm/s  AV Mean Gradient: 9 mmHg       MV E/A Ratio: 1.1  LVOT Peak Velocity: 122 cm/s   MV Mean Gradient: 3 mmHg                                 MV Max P mmHg  TR Velocity:290 cm/s           MV Vmax:125 cm/s  TR Gradient:33.64 mmHg         MV VTI:51.9 cm/s   E' Septal Velocity: 6.2 cm/s   MV Deceleration Time: 215 msec  E' Lateral Velocity: 5.98 cm/s  PV Peak Velocity: 123 cm/s  PV Peak Gradient: 6.05 mmHg   Aortic Valve   Peak Velocity: 194 cm/s   Mean Velocity: 141 cm/s  Peak Gradient: 15.05 mmHg Mean Gradient: 9 mmHg  AV VTI: 43.8 cm  Aorta   Aortic Root: 3.1 cm      Ct Abdomen Pelvis Wo Contrast Additional Contrast? None    Result Date: 3/11/2021  EXAMINATION: CT OF THE ABDOMEN AND PELVIS WITHOUT CONTRAST 3/11/2021 12:05 pm TECHNIQUE: CT of the abdomen and pelvis was performed without the administration of intravenous contrast. Multiplanar reformatted images are provided for review. Dose modulation, iterative reconstruction, and/or weight based adjustment of the mA/kV was utilized to reduce the radiation dose to as low as reasonably achievable. COMPARISON: CT of the chest abdomen and pelvis March 5, 2020 HISTORY: ORDERING SYSTEM PROVIDED HISTORY: RUQ pain TECHNOLOGIST PROVIDED HISTORY: Reason for exam:->RUQ pain Additional Contrast?->None Reason for Exam: RUQ pain Acuity: Unknown Type of Exam: Unknown FINDINGS: Lower Chest: Increased mild-moderate right pleural effusion with adjacent compressive atelectasis. Unchanged trace amount of left pleural fluid. Organs: In the gallbladder fossa there is a fluid collection measuring 4.2 x 2.3 by 2.4 cm. Along the inferior-lateral aspect of the fluid collection a few tiny curvilinear/rounded high-density foci are present measuring 3 mm and less in size best shown on coronal image 90. Cholecystectomy clips noted in the expected location. There is a small amount of fluid along the inferior margin of the right hepatic lobe anterior laterally. Small amount of fluid between the diaphragm and liver also present. No pancreatitis. No ureteral stone or hydronephrosis. 2 mm right renal stone again noted. GI/Bowel: There is new fluid-filled small bowel dilation involving proximal to mid small bowel loops measuring up to 3 cm. There is distal small bowel collapse extending to the surgical site. Oral contrast within the colon from the oral contrast given for the comparison. No oral contrast within small bowel at this time. Pelvis: No acute abnormality of the pelvis. Normal appearance of the bladder. Peritoneum/Retroperitoneum: Small amount of free fluid in the right upper quadrant as discussed. No free air. Bones/Soft Tissues: Expected postsurgical changes of the abdominal wall. No acute osseous findings.      New fluid collection in the gallbladder fossa with adjacent small amount of fluid around the liver. This may be residual fluid from the recent surgery or related to biliary leak. Consider HIDA scan evaluation There are 2-3 tiny gallstones within the gallbladder fossa fluid collection, measuring 3 mm and less in size. Increased size of mild-moderate right pleural effusion with increased adjacent compressive atelectasis of the right lung base. Xr Chest (2 Vw)    Result Date: 3/30/2021  EXAMINATION: TWO XRAY VIEWS OF THE CHEST 3/30/2021 5:22 pm COMPARISON: 03/12/2021 radiograph HISTORY: ORDERING SYSTEM PROVIDED HISTORY: Chest Discomfort TECHNOLOGIST PROVIDED HISTORY: Reason for exam:->Chest Discomfort Reason for Exam: Hypotension (Pt reports low BP, 93/41. ) Acuity: Acute Type of Exam: Initial FINDINGS: The heart is mildly enlarged. Mediastinum and pulmonary vascularity are normal.  Enteric tube has been removed since prior radiograph. There is improved aeration of the lungs. No acute airspace abnormality with lucency suggesting underlying COPD. No significant skeletal finding. No significant finding in the chest.     Xr Chest (2 Vw)    Result Date: 3/1/2021  EXAMINATION: TWO XRAY VIEWS OF THE CHEST 3/1/2021 2:58 pm COMPARISON: None. HISTORY: ORDERING SYSTEM PROVIDED HISTORY: EUBANKS (dyspnea on exertion) TECHNOLOGIST PROVIDED HISTORY: Reason for exam:->3-4wk hx of EUBANKS and productive cough FINDINGS: Cardiomegaly. Pulmonary vascular congestion. Ill-defined peripheral pulmonary opacities. No pneumothorax. No pleural effusion. Ill-defined bilateral pulmonary opacities could represent pulmonary edema possibly related to congestive heart failure or atypical pneumonia     Nm Hepatobiliary    Result Date: 3/11/2021  EXAMINATION: NUCLEAR MEDICINE HEPATOBILIARY SCINTIGRAPHY (HIDA SCAN). 3/11/2021 2:40 pm TECHNIQUE: Approximately 5.08 [id number] Mebrofenin (Choletec) was administered IV.   Then, dynamic images distal stomach. Heart is stable, mildly enlarged. Elevated hemidiaphragm is noted. There is left perihilar stranding likely atelectasis. No extrapleural air. Intestinal tube terminates in the distal stomach. Other findings as above. Ct Chest Abdomen Pelvis Wo Contrast    Result Date: 3/30/2021  EXAMINATION: CT OF THE CHEST, ABDOMEN, AND PELVIS WITHOUT CONTRAST 3/30/2021 3:26 pm TECHNIQUE: CT of the chest, abdomen and pelvis was performed without the administration of intravenous contrast. Multiplanar reformatted images are provided for review. Dose modulation, iterative reconstruction, and/or weight based adjustment of the mA/kV was utilized to reduce the radiation dose to as low as reasonably achievable. COMPARISON: Abdomen and pelvis CT, 03/11/2021 CT chest abdomen pelvis, 03/05/2021 HISTORY: ORDERING SYSTEM PROVIDED HISTORY: r/o penumonia, recent surgery TECHNOLOGIST PROVIDED HISTORY: Reason for exam:->r/o penumonia, recent surgery Additional Contrast?->None Decision Support Exception->Emergency Medical Condition (MA) Reason for Exam: r/o penumonia, recent surgery Acuity: Acute Type of Exam: Initial FINDINGS: Chest: Mediastinum: Visualized thyroid is unremarkable. Mildly enlarged mediastinal lymph nodes have decreased in size when compared to the previous exam, and are most compatible with reactive lymph nodes. Esophagus is unremarkable. Noncontrast imaging of the cardiac chambers, thoracic aorta, and pulmonary arteries is unremarkable. Lungs/pleura: Calcified lesion in the left lower lobe is again seen, compatible with old granulomatous disease, found as far back as 2014, and requires no specific follow-up. A focal infiltrate is not detected. The airways are patent. No pneumothorax. No pleural effusion. Soft Tissues/Bones: Visualized extra thoracic soft tissues are unremarkable. No acute or suspicious bony abnormality is identified.   Severe degenerative disease within the glenohumeral joints is noted bilaterally, especially on the right. Abdomen/Pelvis: Lack of intravenous contrast limits evaluation of the solid abdominal viscera, the hollow abdominal viscera, and the vascular structures. Organs: Having said that, no acute hepatic abnormality is identified. The gallbladder is surgically absent. Noncontrast imaging of the spleen, adrenals, and pancreas is unremarkable. Nonobstructing nephrolithiasis is noted within the right kidney. GI/Bowel: There has been development of a very large gas and fluid collection within the right abdominal wall measuring 12 cm maximally (series 2, image 132). There is evidence of extension of this collection into the peritoneal cavity (as visualized on axial images 140 to through 154. The ileocolic anastomosis is very close to this collection within the peritoneum. It would be difficult to entirely exclude communication between bowel and this collection. The patient status post partial colectomy. The remainder of the large bowel is unremarkable. No evidence of small bowel obstruction. There is no evidence of bowel obstruction. Stomach and duodenal sweep are unremarkable. Pelvis: Ovaries are prominent bilaterally, but those are stable dating back to 2013. No suspicious adnexal lesions are found. Uterus unremarkable. Urinary bladder is unremarkable. Peritoneum/Retroperitoneum: Abdominal aorta normal in caliber. No retroperitoneal lymphadenopathy. Bones/Soft Tissues: No osteolytic or osteoblastic bone lesions are seen. No acute bony abnormalities are detected. Chest CT: No acute abnormality detected. Abdomen and pelvis CT: Interval development of a large gas and fluid collection within the right abdominal wall measuring up to 12 cm, most compatible with an abscess.  There is intraperitoneal communication of that collection, and the ileocolic anastomosis is very close to the intraperitoneal component, and therefore it would be difficult to entirely exclude a communication between bowel and that collection. Ct Chest Abdomen Pelvis Wo Contrast    Result Date: 3/5/2021  EXAMINATION: CT OF THE CHEST, ABDOMEN, AND PELVIS WITHOUT CONTRAST 3/5/2021 4:20 pm TECHNIQUE: CT of the chest, abdomen and pelvis was performed without the administration of intravenous contrast. Multiplanar reformatted images are provided for review. Dose modulation, iterative reconstruction, and/or weight based adjustment of the mA/kV was utilized to reduce the radiation dose to as low as reasonably achievable. COMPARISON: Chest CT 2014 2013 HISTORY: ORDERING SYSTEM PROVIDED HISTORY: Colon mass, r/o mets TECHNOLOGIST PROVIDED HISTORY: Reason for exam:->Colon mass, r/o mets Additional Contrast?->Oral Reason for Exam: Colon mass, r/o mets Acuity: Unknown Type of Exam: Unknown FINDINGS: Chest: Mediastinum: 7 mm nodule seen in right lobe of thyroid gland. No specific imaging follow-up recommended based on size. coronary artery calcification is seen. Small mediastinal nodes are noted. Right paratracheal node measures 1.4 cm in short axis, previously 10 mm. Lungs/pleura: Mosaic attenuation is seen throughout the lungs, suggesting small airways disease or air trapping. Trace pleural effusions are seen bilaterally. There is adjacent consolidation seen in the lung bases. 1.8 x 1.2 cm nodule is seen in the left lower lobe Soft Tissues/Bones: Degenerative changes are seen in the spine. Degenerative changes are seen in the shoulder joints. Abdomen/Pelvis: Organs: No splenomegaly Adrenal glands appear normal. There is rotation of the kidney anteriorly, incidentally noted. .  No stones noted 2 mm stone seen in the right kidney. No hydronephrosis noted. No intrahepatic ductal dilatation. No perihepatic fluid Gallbladder appears normal No peripancreatic inflammatory change GI/Bowel: No significant small bowel distention noted. Mild stool load seen in the colon. Scattered colonic diverticula are seen.  There is focal wall thickening of the colon on the right, extending over a length of 3.7 cm. There is surrounding injection the Nadja colonic fat. Small pericolonic lymph node is seen in the right upper quadrant mesentery measuring 8 mm. Pelvis: No free fluid seen within the pelvis. Pickard catheter is seen in the bladder Left adnexal mass is seen measuring 4.4 cm x 3.5 cm previously 3.9 cm by 3.3 cm. Right adnexal nodule measures 3.2 x 3.9 cm, previously 2.5 x 3.6 cm Peritoneum/Retroperitoneum: Small retroperitoneal nodes are seen. Small lymph nodes are seen along the iliac chains. No aortic aneurysm. Atherosclerotic change is seen in aorta and iliacs. Bones/Soft Tissues: Spurring is seen in the spine. Spurring is seen in the hips. Tiny periumbilical hernia containing fat is seen     Within the chest, small mediastinal nodes are seen, slightly increased compared to prior, either reactive or metastatic. Small pleural effusions are seen, compatible with mild fluid overload. Stable lobular pulmonary nodule in the left lower lobe. Nodular wall thickening of the colon on the right, compatible with the given history of colon mass. Small mesenteric node is seen in this region,, likely early desiree metastasis Increase in size of ovarian-adnexal masses on the right and left. Consider pelvic ultrasound for further characterization. Tiny nonobstructing right renal stone         EKG: from ER, interpreted by self, normal sinus rhythm at 67. No acute st elevation noted. No TWI seen. Comparison made to EKG in old chart dated 3/10/2021, there is significant difference as older study is in AFib          MEDICAL DECISION MAKING:    Principal Problem:    YESENIA (acute kidney injury) ([facility] [address] -New Problem to me. Creat 3.2. Baseline is 1.2-1.3. Seen by Dr Krzysztof Taylor as outpt. Could be related to atn from hypotension  Plan: admit, ivf ordered. Ask renal to see. Active Problems:    Hypertension -Established problem. Stable.   Some hypotension reported, but BP ok now  Plan: hold parameters on bp meds. ivf ordered    Anemia -Established problem. Stable. Plan: No indication for transfusion. Cont to monitor h/h to assess progression of anemia. Recommend ferrous sulfate or MVI as outpatient. Malignant neoplasm of ascending colon ([address] - Established problem. Stable. S/p recent hemicolectomy  Plan: surgery consulted    Abscess of intestine -New Problem to me. Plan: iv vanc and cefepime per surgery recs          Diagnoses as listed above, designated as new or established and plan outlined for each. Data Reviewed:   (1) Lab tests were reviewed or ordered. (1) Radiology tests were reviewed or ordered. (1) Medical test (Echo, EKG, PFT/bayron) were ordered. (1)History was not obtained from someone other than patient  (1) Old records  were reviewed - see HPI/MDM for pertinent details if review done. (2) Case wasdiscussed with another health care provider: Aminta Spurling [facility] PA  (2) Imaging was viewed by myself. (2) EKG  was viewed by myself. The patient isbeing placed in inpatient status with the expectation of requiring a hospital stay spanning at least two midnights for care and treatment of the problems noted in the problem list.  This determination is also based on thepatients comorbidities and past medical history, the severity and timing of the signs and symptoms upon presentation.         Electronically signed by: Shaw Cummings 3/30/2021

## 2021-03-31 NOTE — PROGRESS NOTES
[address] [facility] Laparoscopic [facility]        Progress Note    Patient Name: Shruthi Yu  MRN: [id number]  YOB: 1940  Date of Evaluation: 3/31/2021    Subjective:  Presented to the ED yesterday for fatigue and low BP at home  She denies abdominal pain  No fevers or chills  No diarrhea  No nausea or vomiting, but appetite has not been great    Post-Operative Day #23      Vital Signs:  Patient Vitals for the past 24 hrs:   BP Temp Temp src Pulse Resp SpO2 Height Weight   21 0915 (!) 135/57 96.4 °F (35.8 °C) Oral 65 22 95 % -- --   21 0900 -- -- -- 66 -- -- -- --   21 0553 (!) 123/46 96.4 °F (35.8 °C) Oral 73 (!) 92 -- -- 220 lb 14.4 oz (100.2 kg)   21 0125 (!) 121/56 98.5 °F (36.9 °C) Oral 69 21 96 % -- --   21 (!) 115/44 98.2 °F (36.8 °C) Oral 68 22 95 % -- --   21 (!) 110/40 -- -- 68 21 97 % -- --   21 (!) 108/40 -- -- 69 (!) 31 96 % -- --   21 (!) 116/42 -- -- 70 23 97 % -- --   21 (!) 112/40 -- -- 69 30 96 % -- --   21 (!) 108/39 -- -- 68 28 96 % -- --   21 (!) 108/38 -- -- 68 22 96 % -- --   21 (!) 108/42 -- -- 68 19 98 % -- --   21 (!) 110/38 -- -- -- -- -- -- --   21 (!) 110/38 -- -- 76 29 98 % -- --   21 1830 (!) 96/32 -- -- 66 22 97 % -- --   21 1815 (!) 11141 -- -- 67 22 99 % -- --   21 1800 (!) 9643 -- -- 68 22 -- -- --   21 1749 -- -- -- -- -- -- 5' (1.524 m) --   21 1641 (!) 93 98.1 °F (36.7 °C) -- 66 17 98 % -- 212 lb 12.8 oz (96.5 kg)      TEMPERATURE HISTORY 24H: Temp (24hrs), Av.5 °F (36.4 °C), Min:96.4 °F (35.8 °C), Max:98.5 °F (36.9 °C)    BLOOD PRESSURE HISTORY: Systolic (93QIY), WKN:032 , Min:93 , SVU:047    Diastolic (20QTG), BLH:87, Min:32, Max:57      Intake/Output:  I/O last 3 completed shifts: In: 48 [IV Piggyback:50]  Out: -   No intake/output data recorded.   Drain/tube Output: Physical Exam:  General: awake, alert, oriented to  person, place, time  Abdomen: soft, obese, incisional tenderness only  Skin/Wound: right upper quadrant incision with small area of purulent drainage, surrounding erythema--opened per Dr. Chanda Neil at bedside with large amount of foul smelling, purulent drainage, wound packed with NS wet-to-dry dressing    Labs:  CBC:    Recent Labs     03/30/21  1717 03/31/21  0617 03/31/21  1304   WBC 16.3* 11.4*  --    HGB 8.4* 7.1* 7.2*   HCT 28.2* 23.7* 23.9*    206  --      BMP:    Recent Labs     03/30/21 1717 03/31/21  0617   * 134*   K 3.8 3.7   CL 97* 103   CO2 18* 22   BUN 38* 39*   CREATININE 3.2* 2.8*   GLUCOSE 136* 115*     Hepatic:    Recent Labs     03/30/21 1717 03/31/21  0617   AST 14* 10*   ALT 9* 6*   BILITOT 0.5 0.4   ALKPHOS 117 103     Amylase:    Lab Results   Component Value Date    AMYLASE 17 03/31/2021     Lipase:    Lab Results   Component Value Date    LIPASE 25.0 11/20/2020      Mag:    Lab Results   Component Value Date    MG 2.50 03/14/2021    MG 2.10 03/04/2021     Phos:     Lab Results   Component Value Date    PHOS 2.8 03/14/2021    PHOS 3.8 05/29/2015      Coags:   Lab Results   Component Value Date    PROTIME 13.7 03/30/2021    INR 1.18 03/30/2021    APTT 27.8 03/30/2021       Cultures:  Anaerobic culture  No results found for: LABANAE  Fungus stain  No results found for requested labs within last 30 days. Gram stain  No results found for requested labs within last 30 days. Organism  No results found for: [facility]  Surgical culture  No results found for: CXSURG  Blood culture 1  No results found for requested labs within last 30 days. Blood culture 2  No results found for requested labs within last 30 days.      Fecal occult  Results in Past 30 Days  Result Component Current Result Ref Range Previous Result Ref Range   Occult Blood Diagnostic Result: Negative  Normal range: Negative   (3/3/2021)  Not in Time Range GI bacterial pathogens by PCR  No results found for requested labs within last 30 days. C. difficile  No results found for requested labs within last 30 days. Urine culture  No results found for: LABURIN    Pathology:  OR 3/8/2021--FINAL DIAGNOSIS:     Right colon and small bowel, segmental resection:   - Invasive colonic adenocarcinoma, moderately differentiated. - Margins not involved. - One pericolonic lymph nodes positive for metastatic carcinoma (1/16). Gallbladder, cholecystectomy:   - Chronic cholecystitis, moderate. - Cholelithiasis. - Cholesterolosis. Procedure:  Right hemicolectomy   Tumor Site: Ileocecal valve   Tumor Size: Greatest dimension (centimeter): 4.2        Additional dimensions (centimeters): 3.7 x 0.8   Macroscopic tumor perforation: Not identified   Histologic Type: Adenocarcinoma   Histologic Grade: G2: Moderately   Tumor Extension: Tumor invades through muscularis propria into subserosal   adipose tissue. MARGINS   All margins are uninvolved by invasive carcinoma, high grade dysplasia /   intramucosal carcinoma, and low grade dysplasia   Margins examined: Proximal, distal, and mesenteric    + Distance of invasive carcinoma from closest margin (millimeters or   centimeters): 40 mm    + Specify closest margin: Distal     Treatment Effect (applicable to carcinomas treated with neoadjuvant   therapy): No known pre surgical treatment   Lymph-Vascular Invasion: Not identified   Perineural Invasion: Not identified     + Tumor Budding (Note I)             Low score (0-4)   Type of Polyp in Which Invasive Carcinoma Arose: Not applicable   Tumor deposits: Absent     Regional lymph nodes     Number of Lymph nodes Involved: 1     Number of Lymph Nodes Examined: 16     Pathologic Stage Classification (pTNM, AJCC 8th Edition)     Primary Tumor (pT):      pT 3: Tumor invades through muscularis propria into pericolonic fat     Regional Lymph Nodes (pN):     pN 1a:  Metastasis in 1 lymph node     + Additional pathologic findings: Histologically unremarkable appendix. + Ancillary studies: Not applicable     Imaging:  I have personally reviewed the following films:    Xr Chest (2 Vw)    Result Date: 3/30/2021  EXAMINATION: TWO XRAY VIEWS OF THE CHEST 3/30/2021 5:22 pm COMPARISON: 03/12/2021 radiograph HISTORY: ORDERING SYSTEM PROVIDED HISTORY: Chest Discomfort TECHNOLOGIST PROVIDED HISTORY: Reason for exam:->Chest Discomfort Reason for Exam: Hypotension (Pt reports low BP, 93/41. ) Acuity: Acute Type of Exam: Initial FINDINGS: The heart is mildly enlarged. Mediastinum and pulmonary vascularity are normal.  Enteric tube has been removed since prior radiograph. There is improved aeration of the lungs. No acute airspace abnormality with lucency suggesting underlying COPD. No significant skeletal finding. No significant finding in the chest.     Ct Chest Abdomen Pelvis Wo Contrast    Result Date: 3/30/2021  EXAMINATION: CT OF THE CHEST, ABDOMEN, AND PELVIS WITHOUT CONTRAST 3/30/2021 3:26 pm TECHNIQUE: CT of the chest, abdomen and pelvis was performed without the administration of intravenous contrast. Multiplanar reformatted images are provided for review. Dose modulation, iterative reconstruction, and/or weight based adjustment of the mA/kV was utilized to reduce the radiation dose to as low as reasonably achievable. COMPARISON: Abdomen and pelvis CT, 03/11/2021 CT chest abdomen pelvis, 03/05/2021 HISTORY: ORDERING SYSTEM PROVIDED HISTORY: r/o penumonia, recent surgery TECHNOLOGIST PROVIDED HISTORY: Reason for exam:->r/o penumonia, recent surgery Additional Contrast?->None Decision Support Exception->Emergency Medical Condition (MA) Reason for Exam: r/o penumonia, recent surgery Acuity: Acute Type of Exam: Initial FINDINGS: Chest: Mediastinum: Visualized thyroid is unremarkable.   Mildly enlarged mediastinal lymph nodes have decreased in size when compared to the previous exam, and are most compatible with reactive lymph nodes. Esophagus is unremarkable. Noncontrast imaging of the cardiac chambers, thoracic aorta, and pulmonary arteries is unremarkable. Lungs/pleura: Calcified lesion in the left lower lobe is again seen, compatible with old granulomatous disease, found as far back as 2014, and requires no specific follow-up. A focal infiltrate is not detected. The airways are patent. No pneumothorax. No pleural effusion. Soft Tissues/Bones: Visualized extra thoracic soft tissues are unremarkable. No acute or suspicious bony abnormality is identified. Severe degenerative disease within the glenohumeral joints is noted bilaterally, especially on the right. Abdomen/Pelvis: Lack of intravenous contrast limits evaluation of the solid abdominal viscera, the hollow abdominal viscera, and the vascular structures. Organs: Having said that, no acute hepatic abnormality is identified. The gallbladder is surgically absent. Noncontrast imaging of the spleen, adrenals, and pancreas is unremarkable. Nonobstructing nephrolithiasis is noted within the right kidney. GI/Bowel: There has been development of a very large gas and fluid collection within the right abdominal wall measuring 12 cm maximally (series 2, image 132). There is evidence of extension of this collection into the peritoneal cavity (as visualized on axial images 140 to through 154. The ileocolic anastomosis is very close to this collection within the peritoneum. It would be difficult to entirely exclude communication between bowel and this collection. The patient status post partial colectomy. The remainder of the large bowel is unremarkable. No evidence of small bowel obstruction. There is no evidence of bowel obstruction. Stomach and duodenal sweep are unremarkable. Pelvis: Ovaries are prominent bilaterally, but those are stable dating back to 2013. No suspicious adnexal lesions are found. Uterus unremarkable.  Urinary bladder cholecystectomy on 3/8/2021 for colon mass, chronic cholecystitis with cholelithiasis   Acute kidney injury  Anemia  Hypertension  CAD  Paroxysmal atrial fibrillation  Medical coagulopathy, on Brilinta      Plan:  1. Bedside incision and drainage of abscess, cultures obtained, local care with wet-to-dry dressings BID with Dakins  2. General diet as tolerated; monitor bowel function  3. IV hydration until PO intake is adequate; monitor and correct electrolytes  4. Antibiotics--on cefepime and vancomycin  5. Activity as tolerated, ambulate TID, up to chair for all meals--PT/OT evaluation and treatment  6. Pulmonary toilet, incentive spirometry  7. PRN analgesics and antiemetics--minimizing narcotics as tolerated, transition to PO  8. DVT prophylaxis with Lovenox and SCD's; on Brilinta--okay to continue from a surgical perspective  9. Management of medical comorbid etiologies per primary team and consulting services    EDUCATION:  Educated patient on plan of care and disease process--all questions answered. Plans discussed with patient and nursing. Reviewed and discussed with Dr. Vitaly Lux.       Signed:  JIMENA Pizano - CNP  3/31/2021 2:47 PM     68-year-old female status post recent right colon resection  Patient examined  CAT scan review  Doubt enterocutaneous fistula  Large abscess drain  Wound care orders written  Continue IV antibiotics  Will follow

## 2021-03-31 NOTE — CARE COORDINATION
[facility]    Received referral for homecare services. Currently active with [facility] for services. Will follow patient for homecare services at discharge. Should Pt DC over weekend, fax face sheet, order, ANUP, and H&P to [facility]. Christen Post Electronically signed by Celestina Saavedra LPN on 7/64/98 at 2:36 AM EDT          [facility] Transition Nurse  [phone]

## 2021-04-01 PROBLEM — N39.0 UTI (URINARY TRACT INFECTION): Status: ACTIVE | Noted: 2021-04-01

## 2021-04-01 LAB
AMYLASE: 21 U/L (ref 25–115)
ANION GAP SERPL CALCULATED.3IONS-SCNC: 9 MMOL/L (ref 3–16)
BASOPHILS ABSOLUTE: 0 K/UL (ref 0–0.2)
BASOPHILS RELATIVE PERCENT: 0.2 %
BILIRUBIN URINE: NEGATIVE
BLOOD, URINE: ABNORMAL
BUN BLDV-MCNC: 37 MG/DL (ref 7–20)
CALCIUM SERPL-MCNC: 7.7 MG/DL (ref 8.3–10.6)
CHLORIDE BLD-SCNC: 108 MMOL/L (ref 99–110)
CLARITY: ABNORMAL
CO2: 20 MMOL/L (ref 21–32)
COLOR: YELLOW
COMMENT UA: ABNORMAL
CREAT SERPL-MCNC: 2.4 MG/DL (ref 0.6–1.2)
CREATININE URINE: 98.7 MG/DL (ref 28–259)
EOSINOPHILS ABSOLUTE: 0.2 K/UL (ref 0–0.6)
EOSINOPHILS RELATIVE PERCENT: 2 %
EPITHELIAL CELLS, UA: 2 /HPF (ref 0–5)
FINE CASTS, UA: ABNORMAL /LPF (ref 0–2)
GFR AFRICAN AMERICAN: 24
GFR NON-AFRICAN AMERICAN: 19
GLUCOSE BLD-MCNC: 102 MG/DL (ref 70–99)
GLUCOSE URINE: NEGATIVE MG/DL
HCT VFR BLD CALC: 25.1 % (ref 36–48)
HEMOGLOBIN: 7.5 G/DL (ref 12–16)
KETONES, URINE: NEGATIVE MG/DL
LEUKOCYTE ESTERASE, URINE: ABNORMAL
LYMPHOCYTES ABSOLUTE: 1.1 K/UL (ref 1–5.1)
LYMPHOCYTES RELATIVE PERCENT: 12.3 %
MCH RBC QN AUTO: 22.4 PG (ref 26–34)
MCHC RBC AUTO-ENTMCNC: 29.9 G/DL (ref 31–36)
MCV RBC AUTO: 75 FL (ref 80–100)
MICROSCOPIC EXAMINATION: YES
MONOCYTES ABSOLUTE: 0.7 K/UL (ref 0–1.3)
MONOCYTES RELATIVE PERCENT: 7.8 %
NEUTROPHILS ABSOLUTE: 6.7 K/UL (ref 1.7–7.7)
NEUTROPHILS RELATIVE PERCENT: 77.7 %
NITRITE, URINE: NEGATIVE
PDW BLD-RTO: 27 % (ref 12.4–15.4)
PH UA: 5.5 (ref 5–8)
PLATELET # BLD: 222 K/UL (ref 135–450)
PMV BLD AUTO: 8.1 FL (ref 5–10.5)
POTASSIUM SERPL-SCNC: 3.8 MMOL/L (ref 3.5–5.1)
PROTEIN PROTEIN: 48 MG/DL
PROTEIN UA: 30 MG/DL
RBC # BLD: 3.35 M/UL (ref 4–5.2)
RBC UA: >100 /HPF (ref 0–4)
SODIUM BLD-SCNC: 137 MMOL/L (ref 136–145)
SODIUM URINE: 25 MMOL/L
SPECIFIC GRAVITY UA: 1.01 (ref 1–1.03)
URINE REFLEX TO CULTURE: YES
URINE TYPE: ABNORMAL
UROBILINOGEN, URINE: 1 E.U./DL
VANCOMYCIN RANDOM: 6.5 UG/ML
WBC # BLD: 8.6 K/UL (ref 4–11)
WBC UA: 163 /HPF (ref 0–5)

## 2021-04-01 PROCEDURE — 80202 ASSAY OF VANCOMYCIN: CPT

## 2021-04-01 PROCEDURE — 97166 OT EVAL MOD COMPLEX 45 MIN: CPT

## 2021-04-01 PROCEDURE — 82150 ASSAY OF AMYLASE: CPT

## 2021-04-01 PROCEDURE — 84156 ASSAY OF PROTEIN URINE: CPT

## 2021-04-01 PROCEDURE — 97116 GAIT TRAINING THERAPY: CPT

## 2021-04-01 PROCEDURE — 6370000000 HC RX 637 (ALT 250 FOR IP): Performed by: INTERNAL MEDICINE

## 2021-04-01 PROCEDURE — 97530 THERAPEUTIC ACTIVITIES: CPT

## 2021-04-01 PROCEDURE — 99024 POSTOP FOLLOW-UP VISIT: CPT | Performed by: SURGERY

## 2021-04-01 PROCEDURE — 6370000000 HC RX 637 (ALT 250 FOR IP): Performed by: NURSE PRACTITIONER

## 2021-04-01 PROCEDURE — 85025 COMPLETE CBC W/AUTO DIFF WBC: CPT

## 2021-04-01 PROCEDURE — 81001 URINALYSIS AUTO W/SCOPE: CPT

## 2021-04-01 PROCEDURE — 97162 PT EVAL MOD COMPLEX 30 MIN: CPT

## 2021-04-01 PROCEDURE — 2580000003 HC RX 258: Performed by: INTERNAL MEDICINE

## 2021-04-01 PROCEDURE — 82570 ASSAY OF URINE CREATININE: CPT

## 2021-04-01 PROCEDURE — 6360000002 HC RX W HCPCS: Performed by: INTERNAL MEDICINE

## 2021-04-01 PROCEDURE — 97535 SELF CARE MNGMENT TRAINING: CPT

## 2021-04-01 PROCEDURE — 1200000000 HC SEMI PRIVATE

## 2021-04-01 PROCEDURE — 84300 ASSAY OF URINE SODIUM: CPT

## 2021-04-01 PROCEDURE — 80048 BASIC METABOLIC PNL TOTAL CA: CPT

## 2021-04-01 PROCEDURE — 87086 URINE CULTURE/COLONY COUNT: CPT

## 2021-04-01 RX ADMIN — SODIUM CHLORIDE: 9 INJECTION, SOLUTION INTRAVENOUS at 06:23

## 2021-04-01 RX ADMIN — VANCOMYCIN HYDROCHLORIDE 1000 MG: 1 INJECTION, POWDER, LYOPHILIZED, FOR SOLUTION INTRAVENOUS at 11:35

## 2021-04-01 RX ADMIN — MORPHINE SULFATE 4 MG: 4 INJECTION, SOLUTION INTRAMUSCULAR; INTRAVENOUS at 23:15

## 2021-04-01 RX ADMIN — CARVEDILOL 3.12 MG: 3.12 TABLET, FILM COATED ORAL at 08:34

## 2021-04-01 RX ADMIN — SODIUM CHLORIDE: 9 INJECTION, SOLUTION INTRAVENOUS at 22:31

## 2021-04-01 RX ADMIN — ONDANSETRON 4 MG: 2 INJECTION INTRAMUSCULAR; INTRAVENOUS at 09:40

## 2021-04-01 RX ADMIN — AMIODARONE HYDROCHLORIDE 200 MG: 200 TABLET ORAL at 08:34

## 2021-04-01 RX ADMIN — ASPIRIN 81 MG: 81 TABLET, CHEWABLE ORAL at 08:34

## 2021-04-01 RX ADMIN — TICAGRELOR 90 MG: 90 TABLET ORAL at 08:34

## 2021-04-01 RX ADMIN — HYOSCYAMINE SULFATE: 16 SOLUTION at 10:57

## 2021-04-01 RX ADMIN — ENOXAPARIN SODIUM 30 MG: 30 INJECTION SUBCUTANEOUS at 08:33

## 2021-04-01 RX ADMIN — CEFEPIME HYDROCHLORIDE 1000 MG: 1 INJECTION, POWDER, FOR SOLUTION INTRAMUSCULAR; INTRAVENOUS at 06:22

## 2021-04-01 RX ADMIN — HYOSCYAMINE SULFATE: 16 SOLUTION at 22:25

## 2021-04-01 RX ADMIN — CEFEPIME HYDROCHLORIDE 1000 MG: 1 INJECTION, POWDER, FOR SOLUTION INTRAMUSCULAR; INTRAVENOUS at 17:57

## 2021-04-01 RX ADMIN — ROSUVASTATIN 20 MG: 20 TABLET, FILM COATED ORAL at 22:24

## 2021-04-01 RX ADMIN — MORPHINE SULFATE 4 MG: 4 INJECTION, SOLUTION INTRAMUSCULAR; INTRAVENOUS at 10:57

## 2021-04-01 RX ADMIN — TICAGRELOR 90 MG: 90 TABLET ORAL at 22:24

## 2021-04-01 ASSESSMENT — PAIN SCALES - GENERAL
PAINLEVEL_OUTOF10: 7
PAINLEVEL_OUTOF10: 7
PAINLEVEL_OUTOF10: 0

## 2021-04-01 ASSESSMENT — PAIN DESCRIPTION - DESCRIPTORS: DESCRIPTORS: CONSTANT

## 2021-04-01 ASSESSMENT — PAIN DESCRIPTION - FREQUENCY: FREQUENCY: CONTINUOUS

## 2021-04-01 ASSESSMENT — PAIN DESCRIPTION - PAIN TYPE: TYPE: CHRONIC PAIN

## 2021-04-01 NOTE — PROGRESS NOTES
Physician Progress Note      PATIENT:               Stiven Collier  CSN #:                  [id number]  :                       1940  ADMIT DATE:       3/30/2021 4:30 PM  [address] [place] DATE:  RESPONDING  PROVIDER #:        Brielle Esquivel MD          QUERY TEXT:    Pt admitted with Abscess of Intestine. Pt noted to have Elevated WBC's and   hypothermic temperatures. If possible, please document in the progress notes   and discharge summary if you are evaluating and /or treating any of the   following: The medical record reflects the following:  Risk Factors: Abscess of intestine, Peritoneal Abscess  Clinical Indicators: WBC 16.3, Temperature 96.4, RR 22-31  Treatment: IV antibiotics- Cefepime, Vancomycin  Options provided:  -- Sepsis, present on admission  -- Sepsis, present on admission, now resolved  -- Sepsis, not present on admission  -- No Sepsis, Abscess of intestine only  -- Sepsis was ruled out  -- Other - I will add my own diagnosis  -- Disagree - Not applicable / Not valid  -- Disagree - Clinically unable to determine / Unknown  -- Refer to Clinical Documentation Reviewer    PROVIDER RESPONSE TEXT:    This patient has sepsis which was present on admission. Query created by: Nathalie Cano on 3/31/2021 3:16 PM      QUERY TEXT:    Pt admitted with Abscess of intestine and Intra-abdominal abscess. Pt noted   on 3/6/21 to have had a right hemicolectomy as well as a cholecystectomy. If   possible, please document in progress notes and discharge summary:    The medical record reflects the following:  Risk Factors: Recent procedure of right hemicolectomy as well as a   cholecystectomy on 3/6/21  Clinical Indicators: 3/30 H&P documented \"S/p recent hemicolectomy\" and   \"Abscess of intestine. \" 3/31 MD note documented \"  Intra-abdominal abscess. \"  Treatment: IV antibiotics- Cefepime, Vancomycin; Surgery consult  Options provided:  -- Abscess as an postoperative complication  -- Abscess as an expected/inherent condition that occurred postoperatively and   not a complication  -- Abscess related to other incidental risk factor (please specify) occurring   following surgery and not a complication, Please document incidental risk   factor. -- Other - I will add my own diagnosis  -- Disagree - Not applicable / Not valid  -- Disagree - Clinically unable to determine / Unknown  -- Refer to Clinical Documentation Reviewer    PROVIDER RESPONSE TEXT:    Patient has Abscess as a postoperative complication.     Query created by: Shayla Warren on 3/31/2021 3:25 PM      Electronically signed by:  Melvin Munoz MD 4/1/2021 7:43 AM

## 2021-04-01 NOTE — PROGRESS NOTES
Physical Therapy    Facility/Department: [address] ORTHO/NEURO NURSING  Initial Assessment    NAME: Hedy Willis  : 1940  MRN: [id number]    Date of Service: 2021    Discharge Recommendations: Hedy Willis scored a 14/24 on the AM-PAC short mobility form. Current research shows that an AM-PAC score of 17 or less is typically not associated with a discharge to the patient's home setting. Based on the patient's AM-PAC score and their current functional mobility deficits, it is recommended that the patient have 3-5 sessions per week of Physical Therapy at d/c to increase the patient's independence. Please see assessment section for further patient specific details. If patient discharges prior to next session this note will serve as a discharge summary. Please see below for the latest assessment towards goals. PT Equipment Recommendations  Equipment Needed: No    Assessment   Body structures, Functions, Activity limitations: Decreased functional mobility ; Decreased strength;Decreased endurance;Decreased balance  Assessment: Pt presents with decreased functional mobility and decreased endurance secondary to YESENIA. Pt required mod A in order to complete bed mobility tasks and min A to maintain dynamic seated balance. Pt was able to complete sit<>stand transfers with CGA however she required min A to ambulate 60 ft with RW in order to prevent LOB due to R knee buckling and feelings of SOB. Pt would continue to benefit from skilled therapy in order to increase functional mobility, improve endurance and safely return to PLOF.   Treatment Diagnosis: decreased functional mobility, decreased endurance  Prognosis: Good  Decision Making: Medium Complexity  History: CAD, hyperlipidemia, HTN, anemia, CKD, R wrist fx surgery  Clinical Presentation: evolving  PT Education: PT Role;Plan of Care;General Safety;Gait Training;Functional Mobility Training  Patient Education: Pt educated on pursed lip breathing and diagphragmatic breathing techniques. Discharge plans discussed. Pt verbalized understanding  Barriers to Learning: none  REQUIRES PT FOLLOW UP: Yes  Activity Tolerance  Activity Tolerance: Patient Tolerated treatment well;Patient limited by fatigue  Activity Tolerance: Pt limited by SOB. Pt O2 was closely monitored during treatment, the lowest reading during treatment was 85% during ambulation and took ~30 seconds to recover with cueing on pursed lip and diaphragmatic breathing       Patient Diagnosis(es): The primary encounter diagnosis was Postoperative intra-abdominal abscess. Diagnoses of Severe sepsis ([facility] [address] Malaise, Person under investigation for COVID-19, and YESENIA (acute kidney injury) ([facility] [address] were also pertinent to this visit. has a past medical history of Anemia, CAD (coronary artery disease), CKD (chronic kidney disease), Hyperlipidemia, and Hypertension. has a past surgical history that includes fracture surgery; Cystocopy (11/21/13); Upper gastrointestinal endoscopy (N/A, 3/5/2021); Colonoscopy (N/A, 3/5/2021); hemicolectomy (Right, 3/8/2021); and Cholecystectomy, laparoscopic (N/A, 3/8/2021). Restrictions  Restrictions/Precautions  Restrictions/Precautions: Fall Risk(moderate fall risk)  Vision/Hearing  Vision: Impaired  Vision Exceptions: Wears glasses at all times  Hearing: Within functional limits     Subjective  General  Chart Reviewed: Yes  Patient assessed for rehabilitation services?: Yes  Additional Pertinent Hx: CAD, hyperlipidemia, HTN, anemia, CKD, R wrist fx surgery  Response To Previous Treatment: Not applicable  Family / Caregiver Present: No  Diagnosis: YESENIA  Follows Commands: Within Functional Limits  General Comment  Comments: Pt supine in bed upon arrival. Agreeable to therapy  Subjective  Subjective: Pt reports no pain at this time, however she reports she feels SOB.  Nurse notified  Pain Screening  Vital Signs  Patient Currently in Pain: No Orientation  Orientation  Overall Orientation Status: Within Functional Limits  Social/Functional History  Social/Functional History  Lives With: Spouse, Other (comment)(lives with  and 31 yo granddaughter)  Type of Home: House  Home Layout: One level  Home Access: Ramped entrance  Bathroom Shower/Tub: Walk-in shower, Shower chair with back  [employer]: Grab bars in shower, Shower chair, Hand-held shower, Toilet raiser, Grab bars around toilet  Bathroom Accessibility: Accessible  Home Equipment: Rolling walker, Wheelchair-manual, Cane(uses the [address] [place] [address])  Receives Help From: Family(granddaughter cooks meals, daughter cleans the home)  ADL Assistance: Independent  Ambulation Assistance: Independent  Transfer Assistance: Independent  Active : Yes  Occupation: Retired  Leisure & Hobbies: reading  Additional Comments: pt reports no falls in the past 6 months  Cognition   Cognition  Overall Cognitive Status: WFL    Objective     Observation/Palpation  Posture: Fair(thoracic kyphosis, forward head, rounded shoulders)  Edema: B LE pitting edema    AROM RLE (degrees)  RLE AROM: WFL  AROM LLE (degrees)  LLE AROM : WFL  Strength RLE  Strength RLE: Exception  Comment: gross MMT 3+/5  Strength LLE  Strength LLE: Exception  Comment: gross MMT 3+/5  Tone RLE  RLE Tone: Normotonic  Tone LLE  LLE Tone: Normotonic  Sensation  Overall Sensation Status: WFL  Bed mobility  Supine to Sit: Moderate assistance  Scooting: Stand by assistance  Transfers  Sit to Stand: Contact guard assistance  Stand to sit: Contact guard assistance  Ambulation  Ambulation?: Yes  Ambulation 1  Surface: level tile  Device: Rolling Walker  Assistance: Minimal assistance  Quality of Gait: Pt ambulates with B trendelenburg gait pattern, B knee varus, decreased velocity and occasional R knee popping and buckling  Gait Deviations: Slow Bibiana; Increased ADRIAN; Decreased step height  Distance: Pt was able to ambulate 60 ft with RW and min A to correct occasional loss of balance due to R knee giving out. Pt required multiple standing rest breaks due to complaints of feeling SOB. Stairs/Curb  Stairs?: No     Balance  Posture: Fair(thoracic kyphosis, forward head, rounded shoulders)  Sitting - Static: Good  Sitting - Dynamic: Fair(Min A to maintain dynamic seated balance)  Standing - Static: Fair;+(with RW)  Standing - Dynamic: Fair;-(with RW)        Plan   Plan  Times per week: 3-5x  Times per day: Daily  Current Treatment Recommendations: Strengthening, Balance Training, Functional Mobility Training, Gait Training, Endurance Training, Equipment Evaluation, Education, & procurement, Patient/Caregiver Education & Training, Safety Education & Training  Safety Devices  Type of devices: All fall risk precautions in place, Call light within reach, Chair alarm in place, Gait belt, Patient at risk for falls, Left in chair, Nurse notified  Restraints  Initially in place: No    G-Code       OutComes Score                                                  AM-PAC Score  AM-PAC Inpatient Mobility Raw Score : 14 (04/01/21 1041)  AM-PAC Inpatient T-Scale Score : 38.1 (04/01/21 1041)  Mobility Inpatient CMS 0-100% Score: 61.29 (04/01/21 1041)  Mobility Inpatient CMS G-Code Modifier : CL (04/01/21 1041)          Goals  Short term goals  Time Frame for Short term goals: upon discharge  Short term goal 1: Pt will be able to complete bed mobility with SBA  Short term goal 2: Pt will be able to complete sit<>stand transfers with SBA  Short term goal 3: Pt will be able to ambulate 75 ft with LRAD and SBA       Therapy Time   Individual Concurrent Group Co-treatment   Time In 0830         Time Out 0908         Minutes 38              Timed Code Treatment Minutes:   23    Total Treatment Minutes:  [address] [facility], [facility], [id number]  PT present and directed evaluation with supervision. Agrees with above note.

## 2021-04-01 NOTE — PROGRESS NOTES
Clinical Pharmacy Note: Pharmacy to Dose Vancomcyin    Vancomycin Day: 3  Current Dosing: dosing by levels due to YESENIA; 1000 mg x 1       Recent Labs     03/31/21  0617 04/01/21  0437   BUN 39* 37*       Recent Labs     03/31/21  0617 04/01/21  0437   CREATININE 2.8* 2.4*       Recent Labs     03/31/21  0617 04/01/21  0437   WBC 11.4* 8.6         Intake/Output Summary (Last 24 hours) at 4/1/2021 1101  Last data filed at 3/31/2021 2230  Gross per 24 hour   Intake 360 ml   Output 250 ml   Net 110 ml         Ht Readings from Last 1 Encounters:   03/30/21 5' (1.524 m)        Wt Readings from Last 1 Encounters:   03/31/21 220 lb 14.4 oz (100.2 kg)         Body mass index is 43.14 kg/m². Estimated Creatinine Clearance: 21 mL/min (A) (based on SCr of 2.4 mg/dL (H)). Random: 6.5    Assessment/Plan:  Vancomycin level is subtherapeutic. Level was drawn appropriately in respect to last dose given. A vancomycin random level has been ordered for 4/2 with AM labs. Will Re-dose vancomycin dose 1000 mg once today. Changes in regimen will be determined based on culture results, renal function, and clinical response. Pharmacy will continue to monitor and adjust regimen as necessary.     Thank you for the consult,    Veena Gunderson PharmD  PGY1 Pharmacy Resident  [id number]    4/1/2021

## 2021-04-01 NOTE — PROGRESS NOTES
Occupational Therapy   Occupational Therapy Initial Assessment  Date: 2021   Patient Name: Yesica Jimenez  MRN: [id number]     : 1940    Date of Service: 2021    Discharge Recommendations: Yesica Jimenez scored a 16/24 on the AM-PAC ADL Inpatient form. Current research shows that an AM-PAC score of 17 or less is typically not associated with a discharge to the patient's home setting. Based on the patient's AM-PAC score and their current ADL deficits, it is recommended that the patient have 3-5 sessions per week of Occupational Therapy at d/c to increase the patient's independence. Please see assessment section for further patient specific details. If patient discharges prior to next session this note will serve as a discharge summary. Please see below for the latest assessment towards goals. OT Equipment Recommendations  Equipment Needed: Yes(Refer to next level of care)    Assessment   Performance deficits / Impairments: Decreased functional mobility ; Decreased balance;Decreased safe awareness;Decreased ADL status; Decreased endurance;Decreased strength  Assessment: Patient presents with the above defiicts impacting occupational performance, skilled OT services needed to address deficits to facilitate safe return to PLOF  Treatment Diagnosis: YESENIA  Prognosis: Fair  Decision Making: Medium Complexity  History: CAD, HLD, anemia, CKD  Exam: As stated above  Assistance / Modification: RW  OT Education: OT Role;Plan of Care;Energy Conservation;Transfer Training;Equipment  Patient Education: Patient educated on OT role, plan of care, transfer training, energy conservation, breathing exercises, equipment use and patient verbalized understanding  REQUIRES OT FOLLOW UP: Yes  Activity Tolerance  Activity Tolerance: Patient Tolerated treatment well;Patient limited by fatigue  Activity Tolerance: Patient tolerated tretament well, however with several episodes of SOB.   Patient required ~5-6 standing rest breaks. SPO2 dropped from 92% to 85% following functional mobility tasks. Patient reported this is new for her and typically is more active. Safety Devices  Safety Devices in place: Yes  Type of devices: All fall risk precautions in place;Call light within reach; Chair alarm in place;Gait belt;Patient at risk for falls; Left in chair;Nurse notified           Patient Diagnosis(es): The primary encounter diagnosis was Postoperative intra-abdominal abscess. Diagnoses of Severe sepsis ([facility] [address] Malaise, Person under investigation for COVID-19, and YESENIA (acute kidney injury) ([facility] [address] were also pertinent to this visit. has a past medical history of Anemia, CAD (coronary artery disease), CKD (chronic kidney disease), Hyperlipidemia, and Hypertension. has a past surgical history that includes fracture surgery; Cystocopy (11/21/13); Upper gastrointestinal endoscopy (N/A, 3/5/2021); Colonoscopy (N/A, 3/5/2021); hemicolectomy (Right, 3/8/2021); and Cholecystectomy, laparoscopic (N/A, 3/8/2021).     Treatment Diagnosis: YESENIA      Restrictions  Restrictions/Precautions  Restrictions/Precautions: Fall Risk(moderate fall risk)    Subjective   General  Chart Reviewed: Yes  Patient assessed for rehabilitation services?: Yes  Family / Caregiver Present: No  Diagnosis: YESENIA  Subjective  Subjective: Patient supine in bed with nursing present upon arrival, agreeable to therapy  Patient Currently in Pain: Yes  Pain Assessment  Pain Assessment: 0-10  Pain Level: 7  Patient's Stated Pain Goal: No pain  Pain Type: Chronic pain  Pain Descriptors: Constant  Pain Frequency: Continuous  Pre Treatment Pain Screening  Intervention List: Patient able to continue with treatment;Nurse/Physician notified  Vital Signs  Pulse: 85  Heart Rate Source: Monitor  BP: 136/81  BP Location: Right upper arm  Patient Position: Up in chair  Patient Currently in Pain: Yes  Oxygen Therapy  SpO2: 95 %  Pulse Oximeter Device Mode: Intermittent  Pulse Oximeter Device Location: Finger  O2 Device: None (Room air)    Social/Functional History  Social/Functional History  Lives With: Spouse, Other (comment)(lives with  and 33 yo granddaughter)  Type of Home: House  Home Layout: One level  Home Access: Ramped entrance  Bathroom Shower/Tub: Walk-in shower, Shower chair with back  [employer]: Grab bars in shower, Shower chair, Hand-held shower, Toilet raiser, Grab bars around toilet  Bathroom Accessibility: Accessible  Home Equipment: Rolling walker, Wheelchair-manual, Cane(uses the RW)  Receives Help From: Family(granddaughter cooks meals, daughter cleans the home)  ADL Assistance: Independent  Ambulation Assistance: Independent  Transfer Assistance: Independent  Active : Yes  Occupation: Retired  Leisure & Hobbies: reading  Additional Comments: pt reports no falls in the past 6 months      Objective      Observation/Palpation  Posture: Fair(thoracic kyphosis, forward head, rounded shoulders)  Edema: B LE pitting edema  Balance  Sitting Balance: Minimal assistance([place] for dynamic sitting balance, and CGA for static sitting balance)  Standing Balance: Minimal assistance  Standing Balance  Time: ~10 minutes  Activity: Ambulating from room and through hallway, ~60ft  Functional Mobility  Functional - Mobility Device: Rolling Walker  Activity: Other  Assist Level: Minimal assistance  Functional Mobility Comments: Patient with 2 knee kennedi, 5-6 30 second to 1 minute standing rest breaks. Patient's initial SPO2 was 92% and following functional mobility tasks SPO2 dropped to 85%, it took ~30 seconds to 1 minute to reach 90%. Nurse notified.   ADL  UE Dressing: Minimal assistance([place] for donning gown threading lines through sleeves)  Additional Comments: Patient declined any further ADLs  Tone RUE  RUE Tone: Normotonic  Tone LUE  LUE Tone: Normotonic  Coordination  Movements Are Fluid And Coordinated: Yes     Bed mobility  Rolling to Right: Moderate assistance  Supine to Sit: Moderate assistance  Scooting: Moderate assistance  Comment: Patient with HOB elevated and with use of handheld assist for bed mobility tasks  Transfers  Sit to stand: Minimal assistance([place] for sit to stand)  Stand to sit: Minimal assistance([place] for stand to sit)  Transfer Comments: Patient with verbal cueing needed for walker management and breathing exercises for when feeling SOB  Vision - Basic Assessment  Prior Vision: Wears glasses all the time  Visual History: No significant visual history  Patient Visual Report: No visual complaint reported.   Cognition  Overall Cognitive Status: WFL  Perception  Overall Perceptual Status: WFL     Sensation  Overall Sensation Status: WFL      LUE AROM (degrees)  LUE AROM : WFL  Left Hand AROM (degrees)  Left Hand AROM: WFL  RUE AROM (degrees)  RUE AROM : WFL  Right Hand AROM (degrees)  Right Hand AROM: WFL  LUE Strength  Gross LUE Strength: WFL  RUE Strength  Gross RUE Strength: WFL      Plan   Plan  Times per week: 3-5x/wk  Times per day: Daily  Current Treatment Recommendations: Balance Training, Functional Mobility Training, Endurance Training, Patient/Caregiver Education & Training, Self-Care / ADL, Safety Education & Training, Pain Management    AM-PAC Score   AM-[facility] Inpatient Daily Activity Raw Score: 16 (04/01/21 1056)  AM-PAC Inpatient ADL T-Scale Score : 35.96 (04/01/21 1056)  ADL Inpatient CMS 0-100% Score: 53.32 (04/01/21 1056)  ADL Inpatient CMS G-Code Modifier : CK (04/01/21 1056)    Goals  Short term goals  Time Frame for Short term goals: STG=Discharge  Short term goal 1: Patient will complete functional transfers with mod I  Short term goal 2: Patient will complete functional mobility tasks with CGA with 1-2 standing rest breaks  Short term goal 3: Patient will complete bed mobility with CGA  Short term goal 4: Patient will complete LB dressing tasks with mod I  Long term goals  Time Frame for Long term goals : LTG=STG       Therapy Time   Individual Concurrent Group Co-treatment   Time In       0830   Time Out       0908   Minutes       38     Timed Code Treatment Minutes:   23 minutes      Total Treatment Minutes:  38 minutes          Sudhakar De Guzman, OTR/L [id number]

## 2021-04-01 NOTE — PROGRESS NOTES
Progress Note - Dr. [place] - Internal Medicine  PCP: Juaquin Barney  [address] [place] [address] [facility]  [phone]    Hospital Day: 2  Code Status: Full Code  Current Diet: DIET GENERAL;        CC: follow up on medical issues    Subjective: Reginaldo Will is a 68 y.o. female. She admits to problems    abd discomfort, but better than yest  Abscess is draining   case d/w dr Britton Cárdenas; will need a few more days of abx/drainage    Pt also with UTI  Should be covered with current abx    She denies chest pain, denies shortness of breath, denies nausea,  denies emesis. 10 system Review of Systems is reviewed with patient, and pertinent positives are noted in HPI above . Otherwise, Review of systems is negative. I have reviewed the patient's medical and social history in detail and updated the computerized patient record. To recap: She  has a past medical history of Anemia, CAD (coronary artery disease), CKD (chronic kidney disease), Hyperlipidemia, and Hypertension. . She  has a past surgical history that includes fracture surgery; Cystocopy (11/21/13); Upper gastrointestinal endoscopy (N/A, 3/5/2021); Colonoscopy (N/A, 3/5/2021); hemicolectomy (Right, 3/8/2021); and Cholecystectomy, laparoscopic (N/A, 3/8/2021). . She  reports that she has never smoked. She has never used smokeless tobacco. She reports that she does not drink alcohol or use drugs. .        Active Hospital Problems    Diagnosis Date Noted    UTI (urinary tract infection) [N39.0] 04/01/2021    Postoperative intra-abdominal abscess [T81.43XA]     YESENIA (acute kidney injury) ([facility] [address] [N17.9] 03/30/2021    Abscess of intestine [K63.0] 03/30/2021    Intra-abdominal abscess ([facility] [address] [facility] [K65.1] 03/30/2021    Malignant neoplasm of ascending colon ([facility] [address] [facility] [C18.2] 03/07/2021    Anemia [D64.9] 03/03/2021    Hypertension [I10] 01/10/2014       Current Facility-Administered Medications: cefepime (MAXIPIME) 1000 mg IVPB minibag, 1,000 mg, Intravenous, Q24H  lidocaine PF 1 % injection 5 mL, 5 mL, Intradermal, Once  Sodium Hypochlorite (DAKINS) 0.25 % external solution, , Irrigation, BID  aspirin chewable tablet 81 mg, 81 mg, Oral, Daily  carvedilol (COREG) tablet 3.125 mg, 3.125 mg, Oral, BID WC  ticagrelor (BRILINTA) tablet 90 mg, 90 mg, Oral, BID  rosuvastatin (CRESTOR) tablet 20 mg, 20 mg, Oral, Nightly  amiodarone (CORDARONE) tablet 200 mg, 200 mg, Oral, Daily  0.9 % sodium chloride infusion, , Intravenous, Continuous  sodium chloride flush 0.9 % injection 10 mL, 10 mL, Intravenous, 2 times per day  sodium chloride flush 0.9 % injection 10 mL, 10 mL, Intravenous, PRN  0.9 % sodium chloride infusion, 25 mL, Intravenous, PRN  acetaminophen (TYLENOL) tablet 650 mg, 650 mg, Oral, Q4H PRN  HYDROcodone-acetaminophen (NORCO) 5-325 MG per tablet 1 tablet, 1 tablet, Oral, Q4H PRN **OR** HYDROcodone-acetaminophen (NORCO) 5-325 MG per tablet 2 tablet, 2 tablet, Oral, Q4H PRN  morphine (PF) injection 2 mg, 2 mg, Intravenous, Q2H PRN **OR** morphine injection 4 mg, 4 mg, Intravenous, Q2H PRN  promethazine (PHENERGAN) tablet 12.5 mg, 12.5 mg, Oral, Q6H PRN **OR** ondansetron (ZOFRAN) injection 4 mg, 4 mg, Intravenous, Q6H PRN  enoxaparin (LOVENOX) injection 30 mg, 30 mg, Subcutaneous, Daily  hydrALAZINE (APRESOLINE) injection 10 mg, 10 mg, Intravenous, Q6H PRN  0.9 % sodium chloride bolus, 500 mL, Intravenous, PRN  vancomycin (VANCOCIN) intermittent dosing (placeholder), , Other, RX Placeholder         Objective:  /72   Pulse 66   Temp 97.5 °F (36.4 °C) (Oral)   Resp 18   Ht 5' (1.524 m)   Wt 220 lb 14.4 oz (100.2 kg)   SpO2 91%   BMI 43.14 kg/m²      Patient Vitals for the past 24 hrs:   BP Temp Temp src Pulse Resp SpO2   04/01/21 0730 139/72 97.5 °F (36.4 °C) Oral 66 18 91 %   04/01/21 0030 100/68 97.3 °F (36.3 °C) Oral 83 18 94 %   03/31/21 2230 126/76 97.4 °F (36.3 °C) Oral 62 18 94 %   03/31/21 1816 (!) 131/58 -- -- 75 -- --   03/31/21 0989 (!) 135/57 96.4 °F (35.8 °C) Oral 65 22 95 %   03/31/21 0900 -- -- -- 66 -- --     Patient Vitals for the past 96 hrs (Last 3 readings):   Weight   03/31/21 0553 220 lb 14.4 oz (100.2 kg)   03/30/21 1641 212 lb 12.8 oz (96.5 kg)           Intake/Output Summary (Last 24 hours) at 4/1/2021 0831  Last data filed at 3/31/2021 2230  Gross per 24 hour   Intake 360 ml   Output 250 ml   Net 110 ml         Physical Exam:   /72   Pulse 66   Temp 97.5 °F (36.4 °C) (Oral)   Resp 18   Ht 5' (1.524 m)   Wt 220 lb 14.4 oz (100.2 kg)   SpO2 91%   BMI 43.14 kg/m²   General appearance: alert, appears stated age and cooperative  Head: Normocephalic, without obvious abnormality, atraumatic  Lungs: clear to auscultation bilaterally  Heart: regular rate and rhythm, S1, S2 normal, no murmur, click, rub or gallop  Abdomen: mildly tender, +Drain  Extremities: extremities normal, atraumatic, no cyanosis or edema    Labs:  Lab Results   Component Value Date    WBC 8.6 04/01/2021    HGB 7.5 (L) 04/01/2021    HCT 25.1 (L) 04/01/2021     04/01/2021    CHOL 155 11/21/2020    TRIG 121 11/21/2020    HDL 38 (L) 11/21/2020    ALT 6 (L) 03/31/2021    AST 10 (L) 03/31/2021     04/01/2021    K 3.8 04/01/2021     04/01/2021    CREATININE 2.4 (H) 04/01/2021    BUN 37 (H) 04/01/2021    CO2 20 (L) 04/01/2021    INR 1.18 (H) 03/30/2021    LABMICR YES 04/01/2021     Lab Results   Component Value Date    CKTOTAL 41 03/11/2021    TROPONINI <0.01 03/30/2021       Recent Imaging Results are Reviewed:  Echo Complete    Result Date: 3/6/2021  Transthoracic Echocardiography Report (TTE)  Demographics   Patient Name        Daniel Ocampo   Date of Study       03/06/2021  Gender                 Female   Patient Number      [id number]  Date of Birth          1940   Visit Number        [id number]   Age                    68 year(s)   Accession Number    [id number]  Room Number            4144   Corporate ID        [id number] Sonographer            Demetrio Ramirez,                                                         RVT   Ordering Physician              Interpreting Physician Leti Cross MD,                                                         [facility] [place], [address]  Procedure Type of Study   TTE procedure:ECHOCARDIOGRAM COMPLETE 2D W DOPPLER W COLOR. Procedure Date Date: 03/06/2021 Start: 09:12 AM Study Location: [facility] - Echo Lab Technical Quality: Good visualization Additional Indications:NSTEMI, Leg edema, CAD. Patient Status: Routine Height: 60 inches Weight: 220 pounds BSA: 1.94 m2 BMI: 42.97 kg/m2 BP: 134/75 mmHg  Conclusions   Summary  Normal left ventricle size, and systolic function with an estimated ejection  fraction of 55-60%. Mild concentric left ventricular hypertrophy is present. No regional wall motion abnormalities are seen. Mild tricuspid regurgitation, RVSP is estimated at 38 mmhg. Signature   ------------------------------------------------------------------  Electronically signed by Leti Cross MD, [facility] [place], [address]  (Interpreting physician) on 03/06/2021 at 12:12 PM  ------------------------------------------------------------------   Findings   Left Ventricle  Normal left ventricle size, and systolic function with an estimated ejection  fraction of 55-60%. Mild concentric left ventricular hypertrophy is present. No regional wall motion abnormalities are seen. Grade I diastolic dysfunction with normal LV filling pressures. Mitral Valve  Calcification of the mitral valve noted. No evidence of mitral regurgitation. Left Atrium  The left atrium is normal in size. Aortic Valve  The aortic valve is structurally normal. There is no significant aortic  valve regurgitation or stenosis. Aorta  The aortic root is normal in size. Right Ventricle  The right ventricle is normal in size and function. Tricuspid Valve  The tricuspid valve is normal in structure.   Mild tricuspid regurgitation, RVSP is estimated at 38 mmhg. Right Atrium  The right atrial size is normal.   Pulmonic Valve  The pulmonic valve is not well visualized. Mild pulmonic regurgitation present. Pericardial Effusion  No pericardial effusion noted. Pleural Effusion  No pleural effusion. Miscellaneous  The inferior vena cava appears normal in size with normal respiratory  variation. M-Mode/2D Measurements (cm)   LV Diastolic Dimension: 4.5 cm  LV Systolic Dimension: 7.93 cm  LV Septum Diastolic: 9.05 cm  LV PW Diastolic: 1.5 cm         AO Root Dimension: 3.1 cm  RV Diastolic Dimension: 4.67 cm LA Dimension: 3.4 cm                                  LA Area: 11.7 cm2                                  LA volume/Index: 21.6 ml /11 ml/m2  Doppler Measurements   AV Peak Velocity: 194 cm/s     MV Peak E-Wave: 98.5 cm/s  AV Peak Gradient: 15.05 mmHg   MV Peak A-Wave: 89.5 cm/s  AV Mean Gradient: 9 mmHg       MV E/A Ratio: 1.1  LVOT Peak Velocity: 122 cm/s   MV Mean Gradient: 3 mmHg                                 MV Max P mmHg  TR Velocity:290 cm/s           MV Vmax:125 cm/s  TR Gradient:33.64 mmHg         MV VTI:51.9 cm/s   E' Septal Velocity: 6.2 cm/s   MV Deceleration Time: 215 msec  E' Lateral Velocity: 5.98 cm/s  PV Peak Velocity: 123 cm/s  PV Peak Gradient: 6.05 mmHg   Aortic Valve   Peak Velocity: 194 cm/s   Mean Velocity: 141 cm/s  Peak Gradient: 15.05 mmHg Mean Gradient: 9 mmHg  AV VTI: 43.8 cm  Aorta   Aortic Root: 3.1 cm      Ct Abdomen Pelvis Wo Contrast Additional Contrast? None    Result Date: 3/11/2021  EXAMINATION: CT OF THE ABDOMEN AND PELVIS WITHOUT CONTRAST 3/11/2021 12:05 pm TECHNIQUE: CT of the abdomen and pelvis was performed without the administration of intravenous contrast. Multiplanar reformatted images are provided for review. Dose modulation, iterative reconstruction, and/or weight based adjustment of the mA/kV was utilized to reduce the radiation dose to as low as reasonably achievable.  COMPARISON: CT of the of mild-moderate right pleural effusion with increased adjacent compressive atelectasis of the right lung base. Xr Chest (2 Vw)    Result Date: 3/30/2021  EXAMINATION: TWO XRAY VIEWS OF THE CHEST 3/30/2021 5:22 pm COMPARISON: 03/12/2021 radiograph HISTORY: ORDERING SYSTEM PROVIDED HISTORY: Chest Discomfort TECHNOLOGIST PROVIDED HISTORY: Reason for exam:->Chest Discomfort Reason for Exam: Hypotension (Pt reports low BP, 93/41. ) Acuity: Acute Type of Exam: Initial FINDINGS: The heart is mildly enlarged. Mediastinum and pulmonary vascularity are normal.  Enteric tube has been removed since prior radiograph. There is improved aeration of the lungs. No acute airspace abnormality with lucency suggesting underlying COPD. No significant skeletal finding. No significant finding in the chest.     Nm Hepatobiliary    Result Date: 3/11/2021  EXAMINATION: NUCLEAR MEDICINE HEPATOBILIARY SCINTIGRAPHY (HIDA SCAN). 3/11/2021 2:40 pm TECHNIQUE: Approximately 6.61 [id number] Mebrofenin (Choletec) was administered IV. Then, dynamic images of the abdomen were obtained in the anterior projection for 60 mins. A right lateral view was also obtained at 60 mins. COMPARISON: CT abdomen and pelvis 03/11/2021. HISTORY: ORDERING SYSTEM PROVIDED HISTORY: R/o bile leak s/p renu TECHNOLOGIST PROVIDED HISTORY: Reason for exam:->R/o bile leak s/p renu Reason for Exam: R/O Bile Leak Acuity: Acute Type of Exam: Ongoing FINDINGS: Prompt, homogenous uptake by the liver is noted with normal appearance of radiotracer excretion into the biliary system. Clearance of bloodpool activity appears appropriate. During the 1st 35 minutes of the study, the common duct is normal in size and appearance. Beginning at 40 minutes, accumulation of radio activity which overlies the common duct is seen. .  This accumulation most likely corresponds to the proximal duodenum as the abnormal fluid collection on the CT scan lies more laterally. TECHNOLOGIST PROVIDED HISTORY: Reason for exam:->r/o penumonia, recent surgery Additional Contrast?->None Decision Support Exception->Emergency Medical Condition (MA) Reason for Exam: r/o penumonia, recent surgery Acuity: Acute Type of Exam: Initial FINDINGS: Chest: Mediastinum: Visualized thyroid is unremarkable. Mildly enlarged mediastinal lymph nodes have decreased in size when compared to the previous exam, and are most compatible with reactive lymph nodes. Esophagus is unremarkable. Noncontrast imaging of the cardiac chambers, thoracic aorta, and pulmonary arteries is unremarkable. Lungs/pleura: Calcified lesion in the left lower lobe is again seen, compatible with old granulomatous disease, found as far back as 2014, and requires no specific follow-up. A focal infiltrate is not detected. The airways are patent. No pneumothorax. No pleural effusion. Soft Tissues/Bones: Visualized extra thoracic soft tissues are unremarkable. No acute or suspicious bony abnormality is identified. Severe degenerative disease within the glenohumeral joints is noted bilaterally, especially on the right. Abdomen/Pelvis: Lack of intravenous contrast limits evaluation of the solid abdominal viscera, the hollow abdominal viscera, and the vascular structures. Organs: Having said that, no acute hepatic abnormality is identified. The gallbladder is surgically absent. Noncontrast imaging of the spleen, adrenals, and pancreas is unremarkable. Nonobstructing nephrolithiasis is noted within the right kidney. GI/Bowel: There has been development of a very large gas and fluid collection within the right abdominal wall measuring 12 cm maximally (series 2, image 132). There is evidence of extension of this collection into the peritoneal cavity (as visualized on axial images 140 to through 154. The ileocolic anastomosis is very close to this collection within the peritoneum.   It would be difficult to entirely exclude communication between bowel and this collection. The patient status post partial colectomy. The remainder of the large bowel is unremarkable. No evidence of small bowel obstruction. There is no evidence of bowel obstruction. Stomach and duodenal sweep are unremarkable. Pelvis: Ovaries are prominent bilaterally, but those are stable dating back to 2013. No suspicious adnexal lesions are found. Uterus unremarkable. Urinary bladder is unremarkable. Peritoneum/Retroperitoneum: Abdominal aorta normal in caliber. No retroperitoneal lymphadenopathy. Bones/Soft Tissues: No osteolytic or osteoblastic bone lesions are seen. No acute bony abnormalities are detected. Chest CT: No acute abnormality detected. Abdomen and pelvis CT: Interval development of a large gas and fluid collection within the right abdominal wall measuring up to 12 cm, most compatible with an abscess. There is intraperitoneal communication of that collection, and the ileocolic anastomosis is very close to the intraperitoneal component, and therefore it would be difficult to entirely exclude a communication between bowel and that collection. Ct Chest Abdomen Pelvis Wo Contrast    Result Date: 3/5/2021  EXAMINATION: CT OF THE CHEST, ABDOMEN, AND PELVIS WITHOUT CONTRAST 3/5/2021 4:20 pm TECHNIQUE: CT of the chest, abdomen and pelvis was performed without the administration of intravenous contrast. Multiplanar reformatted images are provided for review. Dose modulation, iterative reconstruction, and/or weight based adjustment of the mA/kV was utilized to reduce the radiation dose to as low as reasonably achievable. COMPARISON: Chest CT 2014 2013 HISTORY: ORDERING SYSTEM PROVIDED HISTORY: Colon mass, r/o mets TECHNOLOGIST PROVIDED HISTORY: Reason for exam:->Colon mass, r/o mets Additional Contrast?->Oral Reason for Exam: Colon mass, r/o mets Acuity: Unknown Type of Exam: Unknown FINDINGS: Chest: Mediastinum: 7 mm nodule seen in right lobe of thyroid gland.   No specific imaging follow-up recommended based on size. coronary artery calcification is seen. Small mediastinal nodes are noted. Right paratracheal node measures 1.4 cm in short axis, previously 10 mm. Lungs/pleura: Mosaic attenuation is seen throughout the lungs, suggesting small airways disease or air trapping. Trace pleural effusions are seen bilaterally. There is adjacent consolidation seen in the lung bases. 1.8 x 1.2 cm nodule is seen in the left lower lobe Soft Tissues/Bones: Degenerative changes are seen in the spine. Degenerative changes are seen in the shoulder joints. Abdomen/Pelvis: Organs: No splenomegaly Adrenal glands appear normal. There is rotation of the kidney anteriorly, incidentally noted. .  No stones noted 2 mm stone seen in the right kidney. No hydronephrosis noted. No intrahepatic ductal dilatation. No perihepatic fluid Gallbladder appears normal No peripancreatic inflammatory change GI/Bowel: No significant small bowel distention noted. Mild stool load seen in the colon. Scattered colonic diverticula are seen. There is focal wall thickening of the colon on the right, extending over a length of 3.7 cm. There is surrounding injection the Nadja colonic fat. Small pericolonic lymph node is seen in the right upper quadrant mesentery measuring 8 mm. Pelvis: No free fluid seen within the pelvis. Pickard catheter is seen in the bladder Left adnexal mass is seen measuring 4.4 cm x 3.5 cm previously 3.9 cm by 3.3 cm. Right adnexal nodule measures 3.2 x 3.9 cm, previously 2.5 x 3.6 cm Peritoneum/Retroperitoneum: Small retroperitoneal nodes are seen. Small lymph nodes are seen along the iliac chains. No aortic aneurysm. Atherosclerotic change is seen in aorta and iliacs. Bones/Soft Tissues: Spurring is seen in the spine. Spurring is seen in the hips.   Tiny periumbilical hernia containing fat is seen     Within the chest, small mediastinal nodes are seen, slightly increased compared to prior,

## 2021-04-01 NOTE — PROGRESS NOTES
Nephrology Consult Note  [phone]   [email or web address]        Reason for Consult:  YESENIA  HISTORY OF PRESENT ILLNESS:      The patient is a 68 y.o. female with significant past medical history of CKD stage IIIb, coronary artery disease,  hypertension, mixed hyperlipidemia , atrial Fibrillation was in the hospital in the early part of March and underwent a right hemicolectomy a laparoscopic cholecystectomy. At that time she was being investigated for anemia and found to have a right colonic mass. She was operated on by Dr. Edwin Farias and discharged home. .  She now presents with 2 to 3 days history of progressive fatigue and tenderness over the wound of abdomen. Denies any foul-smelling discharge or fever or chills or rigors. There is no dysuria gross hematuria  Her baseline creatinine is 1.3 creatinine on admission was 3.2  She was running low blood pressures on admission  She was started on IV antibiotics after cultures were obtained and IV fluids  Dr. Edwin Farias was consulted and the plan is that there is abdominal wall abscess and incision and drainage will be done      Interval  History:    Post I and D 3/31  Pain +    PHYSICAL EXAM:    Vitals:    /81   Pulse 85   Temp 97.5 °F (36.4 °C) (Oral)   Resp 18   Ht 5' (1.524 m)   Wt 220 lb 14.4 oz (100.2 kg)   SpO2 95%   BMI 43.14 kg/m²   I/O last 3 completed shifts: In: 360 [P.O.:360]  Out: 250 [Urine:250]  No intake/output data recorded. Physical Exam:  Gen:  alert, oriented x 3  Foul smell in the room   PERRL , EOM +  Pallor +, No icterus  JVP not raised   CV: RRR no murmur or rub . Lungs:B/ L air entry, Normal breath sounds   Abd: soft, bowel sounds + , No organomegaly , LSCS Scar, Dressing Right abdomen, redness of skin   Ext: Trace leg  edema, Skin: Warm.   No rash  Neuro: nonfocal.      DATA:    CBC with Differential:    Lab Results   Component Value Date    WBC 8.6 04/01/2021    RBC 3.35 04/01/2021    HGB 7.5 04/01/2021    HCT 25.1 04/01/2021     04/01/2021    MCV 75.0 04/01/2021    MCH 22.4 04/01/2021    MCHC 29.9 04/01/2021    RDW 27.0 04/01/2021    LYMPHOPCT 12.3 04/01/2021    MONOPCT 7.8 04/01/2021    BASOPCT 0.2 04/01/2021    MONOSABS 0.7 04/01/2021    LYMPHSABS 1.1 04/01/2021    EOSABS 0.2 04/01/2021    BASOSABS 0.0 04/01/2021     CMP:    Lab Results   Component Value Date     04/01/2021    K 3.8 04/01/2021    K 3.7 03/31/2021     04/01/2021    CO2 20 04/01/2021    BUN 37 04/01/2021    CREATININE 2.4 04/01/2021    GFRAA 24 04/01/2021    AGRATIO 0.7 03/31/2021    LABGLOM 19 04/01/2021    GLUCOSE 102 04/01/2021    PROT 5.2 03/31/2021    LABALBU 2.1 03/31/2021    CALCIUM 7.7 04/01/2021    BILITOT 0.4 03/31/2021    ALKPHOS 103 03/31/2021    AST 10 03/31/2021    ALT 6 03/31/2021     Phosphorus:    Lab Results   Component Value Date    PHOS 2.8 03/14/2021     Uric Acid:  No results found for: LABURIC, URICACID  Troponin:    Lab Results   Component Value Date    TROPONINI <0.01 03/30/2021     U/A:    Lab Results   Component Value Date    COLORU YELLOW 04/01/2021    PROTEINU 30 04/01/2021    PHUR 5.5 04/01/2021    WBCUA 163 04/01/2021    RBCUA >100 04/01/2021    CLARITYU TURBID 04/01/2021    SPECGRAV 1.014 04/01/2021    LEUKOCYTESUR LARGE 04/01/2021    UROBILINOGEN 1.0 04/01/2021    BILIRUBINUR Negative 04/01/2021    BLOODU LARGE 04/01/2021    GLUCOSEU Negative 04/01/2021           IMPRESSION/RECOMMENDATIONS:      1. YESENIA   Pre Renal vs ATN   feNa < 1 ( 3/11/21) , current - p    IV fluids   Cr lower at 2.4 ( was 2.8, 3.2)     2. CKD stage 3    Sees my partner Dr Cherylene Large     3. Abdominal  wall abcess   On antibiotics    For I and D    4. Renal osteodystrophy    Ca 7.7 , Phos 2.8     Thank you for allowing me to participate in the care of this patient. I will continue to follow along. Please call with questions.     Jessika Grady MD  4/1/2021  [facility]

## 2021-04-01 NOTE — PROGRESS NOTES
Peyton Correa is a 68 y.o. female patient.     Current Facility-Administered Medications   Medication Dose Route Frequency Provider Last Rate Last Admin    cefepime (MAXIPIME) 1000 mg IVPB minibag  1,000 mg Intravenous Q12H Sidney Rivera MD        lidocaine PF 1 % injection 5 mL  5 mL Intradermal Once Ok MD Trena        Sodium Hypochlorite (DAKINS) 0.25 % external solution   Irrigation BID JIMENA Canales - CNP   Given at 04/01/21 1057    aspirin chewable tablet 81 mg  81 mg Oral Daily Sidney Rivera MD   81 mg at 04/01/21 0834    carvedilol (COREG) tablet 3.125 mg  3.125 mg Oral BID WC Sidney Rivera MD   3.125 mg at 04/01/21 1997    ticagrelor (BRILINTA) tablet 90 mg  90 mg Oral BID Sidney Rivera MD   90 mg at 04/01/21 0288    rosuvastatin (CRESTOR) tablet 20 mg  20 mg Oral Nightly Sidney Rivera MD   20 mg at 03/31/21 2215    amiodarone (CORDARONE) tablet 200 mg  200 mg Oral Daily Sidney Rivera MD   200 mg at 04/01/21 0834    0.9 % sodium chloride infusion   Intravenous Continuous Sidney Rivera  mL/hr at 04/01/21 0623 New Bag at 04/01/21 0623    sodium chloride flush 0.9 % injection 10 mL  10 mL Intravenous 2 times per day Sidney Rivera MD   10 mL at 03/31/21 0944    sodium chloride flush 0.9 % injection 10 mL  10 mL Intravenous PRN Sidney Rivera MD        0.9 % sodium chloride infusion  25 mL Intravenous PRN Sidney Rivera MD        acetaminophen (TYLENOL) tablet 650 mg  650 mg Oral Q4H PRN Sidney Rivera MD        HYDROcodone-acetaminophen [facility]) 5-325 MG per tablet 1 tablet  1 tablet Oral Q4H PRN Sidney Rivera MD        Or    HYDROcodone-acetaminophen [facility]) 5-325 MG per tablet 2 tablet  2 tablet Oral Q4H PRN Sidney Rivera MD        morphine (PF) injection 2 mg  2 mg Intravenous Q2H PRN Sidney Rivera MD        Or    morphine injection 4 mg  4 mg Intravenous Q2H PRN Sidney Rivera MD   4 mg at 04/01/21 1057    promethazine (PHENERGAN) tablet 12.5 mg  12.5 mg Oral Q6H PRN Corina Castano MD        Or    ondansetron [facility]) injection 4 mg  4 mg Intravenous Q6H PRN Corina Castano MD   4 mg at 04/01/21 0940    enoxaparin (LOVENOX) injection 30 mg  30 mg Subcutaneous Daily Corina Castano MD   30 mg at 04/01/21 7627    hydrALAZINE (APRESOLINE) injection 10 mg  10 mg Intravenous Q6H PRN Corina Castano MD        0.9 % sodium chloride bolus  500 mL Intravenous PRN Corina Castano MD        vancomycin [facility]) intermittent dosing (placeholder)   Other RX Placeholder Corina Castano MD   Stopped at 03/31/21 1059     Allergies   Allergen Reactions    Acetomenaphthone (Menadiol Diacetate) [Menadiol Sodium Diphosphate]      Upset stomach     Lisinopril-Hydrochlorothiazide Other (See Comments)     Principal Problem:    YESENIA (acute kidney injury) ([facility] [address]  Active Problems:    Hypertension    Anemia    Malignant neoplasm of ascending colon ([facility] [address]    Abscess of intestine    Intra-abdominal abscess ([facility] [address]    Postoperative intra-abdominal abscess    UTI (urinary tract infection)  Resolved Problems:    * No resolved hospital problems. *    Blood pressure (!) 103/57, pulse 59, temperature 97.5 °F (36.4 °C), temperature source Oral, resp. rate 18, height 5' (1.524 m), weight 220 lb 14.4 oz (100.2 kg), SpO2 93 %. Subjective:  Symptoms:  Improved. Diet:  Adequate intake. Activity level: Normal.    Pain:  She complains of pain that is mild. Objective:  General Appearance:  Comfortable. Vital signs: (most recent): Blood pressure (!) 103/57, pulse 59, temperature 97.5 °F (36.4 °C), temperature source Oral, resp. rate 18, height 5' (1.524 m), weight 220 lb 14.4 oz (100.2 kg), SpO2 93 %. Output: Producing urine. Lungs:  Normal effort and normal respiratory rate. Abdomen: Abdomen is soft and non-distended. (Focal right upper quadrant abdominal tenderness).       Assessment & Plan 81-year-old female status post incision and drainage of large right upper quadrant abdominal wall abscess. Doing better today. There has been only serosanguineous drainage from the wound. Continue dressing changes and IV antibiotics.       Cristel Cowan MD  4/1/2021

## 2021-04-02 LAB
AMYLASE: 29 U/L (ref 25–115)
ANION GAP SERPL CALCULATED.3IONS-SCNC: 9 MMOL/L (ref 3–16)
BUN BLDV-MCNC: 33 MG/DL (ref 7–20)
CALCIUM SERPL-MCNC: 7.9 MG/DL (ref 8.3–10.6)
CHLORIDE BLD-SCNC: 112 MMOL/L (ref 99–110)
CO2: 19 MMOL/L (ref 21–32)
CREAT SERPL-MCNC: 2.1 MG/DL (ref 0.6–1.2)
GFR AFRICAN AMERICAN: 28
GFR NON-AFRICAN AMERICAN: 23
GLUCOSE BLD-MCNC: 110 MG/DL (ref 70–99)
POTASSIUM SERPL-SCNC: 4 MMOL/L (ref 3.5–5.1)
SODIUM BLD-SCNC: 140 MMOL/L (ref 136–145)
URINE CULTURE, ROUTINE: NORMAL
VANCOMYCIN RANDOM: 12.1 UG/ML

## 2021-04-02 PROCEDURE — 80048 BASIC METABOLIC PNL TOTAL CA: CPT

## 2021-04-02 PROCEDURE — 99024 POSTOP FOLLOW-UP VISIT: CPT | Performed by: SURGERY

## 2021-04-02 PROCEDURE — 2580000003 HC RX 258: Performed by: INTERNAL MEDICINE

## 2021-04-02 PROCEDURE — 6370000000 HC RX 637 (ALT 250 FOR IP): Performed by: NURSE PRACTITIONER

## 2021-04-02 PROCEDURE — 6370000000 HC RX 637 (ALT 250 FOR IP): Performed by: INTERNAL MEDICINE

## 2021-04-02 PROCEDURE — 2700000000 HC OXYGEN THERAPY PER DAY

## 2021-04-02 PROCEDURE — 80202 ASSAY OF VANCOMYCIN: CPT

## 2021-04-02 PROCEDURE — 82150 ASSAY OF AMYLASE: CPT

## 2021-04-02 PROCEDURE — APPNB30 APP NON BILLABLE TIME 0-30 MINS: Performed by: NURSE PRACTITIONER

## 2021-04-02 PROCEDURE — APPSS15 APP SPLIT SHARED TIME 0-15 MINUTES: Performed by: NURSE PRACTITIONER

## 2021-04-02 PROCEDURE — 1200000000 HC SEMI PRIVATE

## 2021-04-02 PROCEDURE — 94760 N-INVAS EAR/PLS OXIMETRY 1: CPT

## 2021-04-02 PROCEDURE — 6360000002 HC RX W HCPCS: Performed by: INTERNAL MEDICINE

## 2021-04-02 RX ADMIN — MORPHINE SULFATE 4 MG: 4 INJECTION, SOLUTION INTRAMUSCULAR; INTRAVENOUS at 10:16

## 2021-04-02 RX ADMIN — ASPIRIN 81 MG: 81 TABLET, CHEWABLE ORAL at 08:41

## 2021-04-02 RX ADMIN — ROSUVASTATIN 20 MG: 20 TABLET, FILM COATED ORAL at 21:39

## 2021-04-02 RX ADMIN — Medication 10 ML: at 21:52

## 2021-04-02 RX ADMIN — TICAGRELOR 90 MG: 90 TABLET ORAL at 21:39

## 2021-04-02 RX ADMIN — CARVEDILOL 3.12 MG: 3.12 TABLET, FILM COATED ORAL at 17:45

## 2021-04-02 RX ADMIN — ENOXAPARIN SODIUM 30 MG: 30 INJECTION SUBCUTANEOUS at 08:41

## 2021-04-02 RX ADMIN — TICAGRELOR 90 MG: 90 TABLET ORAL at 08:41

## 2021-04-02 RX ADMIN — HYOSCYAMINE SULFATE: 16 SOLUTION at 21:41

## 2021-04-02 RX ADMIN — VANCOMYCIN HYDROCHLORIDE 1000 MG: 1 INJECTION, POWDER, LYOPHILIZED, FOR SOLUTION INTRAVENOUS at 11:43

## 2021-04-02 RX ADMIN — AMIODARONE HYDROCHLORIDE 200 MG: 200 TABLET ORAL at 08:42

## 2021-04-02 RX ADMIN — CEFEPIME HYDROCHLORIDE 1000 MG: 1 INJECTION, POWDER, FOR SOLUTION INTRAMUSCULAR; INTRAVENOUS at 05:38

## 2021-04-02 RX ADMIN — CEFEPIME HYDROCHLORIDE 1000 MG: 1 INJECTION, POWDER, FOR SOLUTION INTRAMUSCULAR; INTRAVENOUS at 17:46

## 2021-04-02 RX ADMIN — HYOSCYAMINE SULFATE: 16 SOLUTION at 11:30

## 2021-04-02 ASSESSMENT — PAIN SCALES - GENERAL: PAINLEVEL_OUTOF10: 0

## 2021-04-02 NOTE — PROGRESS NOTES
Shift assessment and AM vitals complete, VSS. AM meds given. Abd dressing changed this AM by BASILIO Calles and is CDI. Pt stil having productive cough, IS encouraged. The care plan and education has been reviewed and mutually agreed upon with the patient. Patient remains free from falls or accidental injury. Room free from clutter. All fall precautions in place. Bed in lowest position with wheels locked and alarm on, call light and belongings within reach, and door open.

## 2021-04-02 NOTE — CARE COORDINATION
Met w/pt regarding PT/OT recommendations for SNF. Pt still refusing SNF at this time-stated she has plenty of help at home. Aware that pt is active w/[facility].   Electronically signed by GLORIA Lorenzo on 4/2/2021 at 8:37 AM

## 2021-04-02 NOTE — PROGRESS NOTES
[address] [phone] was active with this pt prior to admit. Discharge planner notified. Will follow.

## 2021-04-02 NOTE — DISCHARGE INSTR - COC
Continuity of Care Form    Patient Name: Torres Middleton   :  1940  MRN:  [id number]    Admit date:  3/30/2021  Discharge date:  ***    Code Status Order: Full Code   Advance Directives:   Advance Care Flowsheet Documentation       Date/Time Healthcare Directive Type of Healthcare Directive Copy in [address] Agent's Name Healthcare Agent's Phone Number    21 8075  Yes, patient has an advance directive for healthcare treatment  Living will  No, copy requested from clinic  --  --  --            Admitting Physician:  Amisha Ballard MD  PCP: Varun Nazario MD    Discharging Nurse: [facility] Unit/Room#: 4VG-1791/7429-14  Discharging Unit Phone Number: ***    Emergency Contact:   Extended Emergency Contact Information  Primary Emergency Contact: Raine Evans  Address: [address], [address] Phone: [phone]  Relation: Spouse  Secondary Emergency Contact: [address] Phone: [phone]  Relation: Child    Past Surgical History:  Past Surgical History:   Procedure Laterality Date    CHOLECYSTECTOMY, LAPAROSCOPIC N/A 3/8/2021    LAPAROSCOPIC CHOLECYSTECTOMY performed by Roberta Graham MD at [address] [place] [address] 3/5/2021    COLONOSCOPY WITH BIOPSY performed by Hollie Clay MD at [address] [place] [address]  13    w/ bladder biopsy    FRACTURE SURGERY      right wrist    HEMICOLECTOMY Right 3/8/2021    LAPAROSCOPIC RIGHT COLON RESECTION performed by Roberta Graham MD at [facility] 3/5/2021    EGD BIOPSY performed by Hollie Clay MD at [address] [place] [address]       Immunization History: There is no immunization history on file for this patient.     Active Problems:  Patient Active Problem List   Diagnosis Code    Hypertension I10    Anemia D64.9    CAD (coronary artery disease) I25.10    High cholesterol E90.80    Diastolic dysfunction I51.89    Hypokalemia E87.6    Malignant neoplasm of ascending colon (HCC) C18.2    Atrial fibrillation (HCC) I48.91    Colonic mass K63.89    YESENIA (acute kidney injury) (HCC) N17.9    Abscess of intestine K63.0    Intra-abdominal abscess (HCC) K65.1    Postoperative intra-abdominal abscess T81.43XA    UTI (urinary tract infection) N39.0       Isolation/Infection:   Isolation            No Isolation          Patient Infection Status       Infection Onset Added Last Indicated Last Indicated By Review Planned Expiration Resolved Resolved By    None active    Resolved    COVID-19 Rule Out 21 COVID-19 (Ordered)   21 Rule-Out Test Resulted            Nurse Assessment:  Last Vital Signs: /68   Pulse 66   Temp 97.5 °F (36.4 °C) (Oral)   Resp 17   Ht 5' (1.524 m)   Wt 220 lb 14.4 oz (100.2 kg)   SpO2 96%   BMI 43.14 kg/m²     Last documented pain score (0-10 scale): Pain Level: 7  Last Weight:   Wt Readings from Last 1 Encounters:   21 220 lb 14.4 oz (100.2 kg)     Mental Status:  {IP PT MENTAL STATUS:}    IV Access:  { ANUP IV ACCESS:176322321}    Nursing Mobility/ADLs:  Walking   {[facility] DME MDTY:855350110}  Transfer  {[facility] DME WUJ}  Bathing  {[facility] DME MCBY:498957549}  Dressing  {[facility] DME AASF:709401730}  Toileting  {[facility] DME WUEW:730776041}  Feeding  {[facility] DME PYMT:714666787}  Med Admin  {[facility] DME PWWO:364832797}  Med Delivery   { ANUP MED Delivery:573960911}    Wound Care Documentation and Therapy:        Elimination:  Continence:   · Bowel: {YES / QX:61650}  · Bladder: {YES / L}  Urinary Catheter: {Urinary Catheter:635718027}   Colostomy/Ileostomy/Ileal Conduit: {YES / NO:65635}       Date of Last BM: ***    Intake/Output Summary (Last 24 hours) at 2021 0945  Last data filed at 2021 2215  Gross per 24 hour   Intake 240 ml   Output --   Net 240 ml     I/O last 3 completed shifts:   In: 240 [P.O.:240]  Out: -     Safety Concerns:     [address] Safety Concerns:445783520}    Impairments/Disabilities:      { ANUP Impairments/Disabilities:805623313}    Nutrition Therapy:  Current Nutrition Therapy:   [address] [facility] Diet List:357295599}    Routes of Feeding: {[facility]P DME Other Feedings:725532232}  Liquids: {Slp liquid thickness:71454}  Daily Fluid Restriction: {CHP DME Yes amt example:230524084}  Last Modified Barium Swallow with Video (Video Swallowing Test): {Done Not Done PFNL:177917224}    Treatments at the Time of Hospital Discharge:   Respiratory Treatments: ***  Oxygen Therapy:  {Therapy; copd oxygen:54537}  Ventilator:    {[facility] Vent BTIP:958067692}    Rehab Therapies: Physical Therapy, Occupational Therapy and Nursing  Weight Bearing Status/Restrictions: [address]  Weight Bearin}  Other Medical Equipment (for information only, NOT a DME order):  {EQUIPMENT:954523807}  Other Treatments:   HOME HEALTH CARE: LEVEL 3 SAFETY     Home health agency to establish plan of care for patient over 60 day period   [address] Initial home SN evaluation visit to occur within 24-48 hours for:   medication management   VS and clinical assessment   S&S chronic disease exacerbation education + when to contact MD / NP   care coordination   Medication Reconciliation during 1st SN visit     PT/OT/Speech    Evaluations in home within 24-48 hours of discharge to include DME and home safety   Amy Reardon Frontload therapy 5 days, then 3x a week    OT to evaluate if patient has [address] needs for personal care       evaluation within 24-48 hours to evaluate resources & insurance for potential AL, [place], LTC, and Medicaid options       Palliative Care referral within 5 days of hospital discharge  PCP Visit scheduled within 3 - 7 days of hospital discharge      Telehealth-Homecare Vitals(If patient is agreeable and meets guidelines)      Patient's personal belongings (please select all that are sent with patient):  {CHP DME Belongings:134907837}    RN SIGNATURE: {Esignature:137588330}    CASE MANAGEMENT/SOCIAL WORK SECTION    Inpatient Status Date: 03/30/21    Readmission Risk Assessment Score:  Readmission Risk              Risk of Unplanned Readmission:        34           Discharging to Facility/ Agency     Name: Anastasiia Evans will call for Appointment  Phone: [phone]  Fax: [phone]    / signature: Electronically signed by GLORIA Hernandez on 4/2/2021 at 12:40 PM      PHYSICIAN SECTION    Prognosis: Guarded    Condition at Discharge: Stable    Rehab Potential (if transferring to Rehab): Good    Recommended Labs or Other Treatments After Discharge: Yvrose. COVER WITH DSD AND TAPE. DSG. TO BE CHANGED DAILY. TEACH CARE GIVER HOW TO CHANGE DSG. Physician Certification: I certify the above information and transfer of Reginaldo Will  is necessary for the continuing treatment of the diagnosis listed and that she requires Home Care for greater 30 days.      Update Admission H&P: No change in H&P    PHYSICIAN SIGNATURE:  Electronically signed by Helen Vivar MD on 4/5/21 at 8:51 AM EDT

## 2021-04-02 NOTE — PLAN OF CARE
Problem: Skin Integrity:  Goal: Will show no infection signs and symptoms  Description: Will show no infection signs and symptoms  Outcome: Ongoing  Goal: Absence of new skin breakdown  Description: Absence of new skin breakdown  Outcome: Ongoing  Goal: Skin integrity will be maintained  Description: Skin integrity will be maintained  Outcome: Ongoing  Goal: Skin integrity will improve  Description: Skin integrity will improve  Outcome: Ongoing     Problem: Pain:  Description: Pain management should include both nonpharmacologic and pharmacologic interventions.   Goal: Pain level will decrease  Description: Pain level will decrease  Outcome: Ongoing  Goal: Control of acute pain  Description: Control of acute pain  Outcome: Ongoing  Goal: Control of chronic pain  Description: Control of chronic pain  Outcome: Ongoing     Problem: Falls - Risk of:  Goal: Will remain free from falls  Description: Will remain free from falls  Outcome: Ongoing  Goal: Absence of physical injury  Description: Absence of physical injury  Outcome: Ongoing     Problem: Falls - Risk of:  Goal: Will remain free from falls  Description: Will remain free from falls  Outcome: Ongoing  Goal: Absence of physical injury  Description: Absence of physical injury  Outcome: Ongoing     Problem: Physical Regulation:  Goal: Complications related to the disease process, condition or treatment will be avoided or minimized  Description: Complications related to the disease process, condition or treatment will be avoided or minimized  Outcome: Ongoing     Problem: Tissue Perfusion:  Goal: Ability to maintain adequate tissue perfusion will improve  Description: Ability to maintain adequate tissue perfusion will improve  Outcome: Ongoing

## 2021-04-02 NOTE — ADT AUTH CERT
Renal Failure, Acute - Care Day 3 (4/1/2021) by Olu Silva RN       Review Status Review Entered   Completed 4/2/2021 08:44      Criteria Review      Care Day: 3 Care Date: 4/1/2021 Level of Care: Inpatient Floor    Guideline Day 2    Level Of Care    (X) Floor    4/2/2021 8:44 AM EDT by Rocío El      Med Surg status    Clinical Status    ( ) * Electrolyte abnormalities absent or improved    4/2/2021 8:44 AM EDT by Rocío El      4/1/2021 04:37  CO2: 20 (L)  BUN: 37 (H)  Creatinine: 2.4 (H)  Anion Gap: 9  GFR Non-: 19 (A)  GFR : 24 (A)    ( ) * Acid-base abnormalities absent or improved    (X) * Hypotension absent    4/2/2021 8:44 AM EDT by Rocío El      100/68, 139/72, 136/81-lowesat /57    Routes    (X) Parenteral or oral hydration    4/2/2021 8:44 AM EDT by Rocío El      IVF /hr    (X) Parenteral or oral medications    4/2/2021 8:44 AM EDT by Rocío El      IV Cefepime 1000mg x2  IV Vanco 1000mg x1  PRN IV Morphine 4mg x2  PRN IV Zofran 4mg x1    Interventions    (X) Monitor electrolytes, renal function tests, acid-base, and volume status    4/2/2021 8:44 AM EDT by Rocío El      see narrative notes for results    Medications    (X) Possible medical therapies    4/2/2021 8:44 AM EDT by Rocío El      IV Cefepime 1000mg x2  IV Vanco 1000mg x1  PRN IV Morphine 4mg x2  PRN IV Zofran 4mg x1    * Milestone   Additional Notes   4/1-Care Day 3      /72   Pulse 66   Temp 97.5 °F (36.4 °C) (Oral)   Resp 18   Ht 5' (1.524 m)   Wt 220 lb 14.4 oz (100.2 kg)   SpO2 91%   BMI 43.14 kg/m²          WBC 8.6 04/01/2021      HGB 7.5 (L) 04/01/2021     HCT 25.1 (L) 04/01/2021      04/01/2021     CHOL 155 11/21/2020     TRIG 121 11/21/2020     HDL 38 (L) 11/21/2020     ALT 6 (L) 03/31/2021     AST 10 (L) 03/31/2021      04/01/2021     K 3.8 04/01/2021      04/01/2021     CREATININE 2.4 (H) 04/01/2021   BUN 37 (H) 04/01/2021     CO2 20 (L) 04/01/2021     INR 1.18 (H) 03/30/2021     LABMICR YES 04/01/2021             Intermed Note:   She admits to problems   abd discomfort, but better than yest   Abscess is draining    case d/w dr Jj Kelly; will need a few more days of abx/drainage   Pt also with UTI   Should be covered with current abx    She denies chest pain, denies shortness of breath, denies nausea,  denies emesis.    Assessment and Plan:   Principal Problem:     YESENIA (acute kidney injury) ([address] -Established problem. Stable.  Creat 2.4   Plan: cont to improve; cont fluids   Active Problems:     Hypertension -Established problem. Stable. [id number]   Plan: stay on same meds     Anemia -Established problem. Stable.  Hgb 7.5   Plan: stay on same meds     Malignant neoplasm of ascending colon ([address] -Established problem. Stable.     Plan: Continue present orders/plan.      Abscess of intestine -Established problem. Stable.  Drain in.   Plan: cont iv abx, cont drain per arriaga     Intra-abdominal abscess ([address]     Postoperative intra-abdominal abscess     UTI (urinary tract infection) -Established problem. Stable.     Plan: cx pending  Cont iv abx      +++++++++++++++++++   Gen Surg Note:    Subjective:   Symptoms:  Improved.     Diet:  Adequate intake.     Activity level: Normal.     Pain:  She complains of pain that is mild.     Objective:   General Appearance:  Comfortable   Assessment & Plan 72-year-old female status post incision and drainage of large right upper quadrant abdominal wall abscess.  Doing better today. Raudel Ramón has been only serosanguineous drainage from the wound.  Continue dressing changes and IV antibiotics.        +++++++++++++++++++++++   Nephrology Note:   Interval  History:     Post I and D 3/31   Pain +   Physical Exam:   Gen:  alert, oriented x 3   Foul smell in the room    PERRL , EOM +   Pallor +, No icterus   JVP not raised    CV: RRR no murmur or rub .    Lungs:B/ L air entry, Normal breath sounds    Abd: soft, bowel sounds + , No organomegaly , LSCS Scar, Dressing Right abdomen, redness of skin    Ext: Trace leg  edema, Skin: Warm.  No rash   Neuro: nonfocal.   IMPRESSION/RECOMMENDATIONS:         1. YESENIA               Pre Renal vs ATN               feNa < 1 ( 3/11/21) , current - p                IV fluids               Cr lower at 2.4 ( was 2.8, 3.2)        2. CKD stage 3                Sees my partner Dr Alma Fierro        3.  Abdominal  wall abcess               On antibiotics                For I and D       4. Renal osteodystrophy                Ca 7.7 , Phos 2.8               Renal Failure, Acute - Care Day 2 (3/31/2021) by Franklin Andrew RN       Review Status Review Entered   Completed 4/1/2021 17:14      Criteria Review      Care Day: 2 Care Date: 3/31/2021 Level of Care: Inpatient Floor    Guideline Day 2    Level Of Care    (X) Floor    4/1/2021 5:13 PM EDT by Haylie Rapp      med surg    Clinical Status    ( ) * Electrolyte abnormalities absent or improved    (X) * Acid-base abnormalities absent or improved    4/1/2021 5:13 PM EDT by Haylie Rapp      absent    (X) * Hypotension absent    4/1/2021 5:13 PM EDT by Haylie Rapp      75  131/58   97.4   22  95  %  ra    Routes    (X) Parenteral or oral hydration    4/1/2021 5:13 PM EDT by Haylie Rapp      oral    (X) Parenteral or oral medications    4/1/2021 5:13 PM EDT by Haylie Rapp      both    Interventions    (X) Monitor electrolytes, renal function tests, acid-base, and volume status    4/1/2021 5:13 PM EDT by Haylie Rapp      crp -177.4, na-134, , bun/creat- 39/2.8, alb- 2.1    Medications    (X) Possible medical therapies    4/1/2021 5:13 PM EDT by Haylie Rapp      iv cefepime 1 g q 12 , ivf 150 hr    * Milestone   Additional Notes   3-31-21      75  131/58   97.4   22  95  %  ra       crp -177.4, na-134, , bun/creat- 39/2.8, alb- 2.1      Iv cefepime 1 g q 12 , lovenox 30 mg sc qd , ivf 150 hr , iv morphine 4 mg x 2          Per phy- General appearance: alert, appears stated age and cooperative   Head: Normocephalic, without obvious abnormality, atraumatic   Lungs: clear to auscultation bilaterally   Heart: regular rate and rhythm, S1, S2 normal, no murmur, click, rub or gallop   Abdomen: soft, mod tender, no rebound   Extremities: extremities normal, atraumatic, no cyanosis or edema   Assessment and Plan:   Principal Problem:     YESENIA (acute kidney injury) ([address] -Established problem. Improving.  creat 2.8   Plan: cont fluids, cont to tx infection   Active Problems:     Hypertension -Established problem. Stable.  123/46   Plan: cont same meds     Anemia -Established problem. Dropping. Hgb 7.1   Plan: No indication for transfusion (transfuse if under 7). Cont to monitor h/h to assess progression of anemia.  Recommend ferrous sulfate or MVI as outpatient.      Malignant neoplasm of ascending colon ([address]   Plan: surgery to see     Abscess of intestine -Established problem. Stable.  Seen on CT. Poss communication   Plan: await surg eval     Intra-abdominal abscess ([address]         Per nephrol- IMPRESSION/RECOMMENDATIONS:         1. YESENIA               Pre Renal vs ATN               Check fena               IV fluids    2. CKD stage 3                Sees my partner Dr Prince Temple     3. Abdominal  wall abcess               On antibiotics                For I and D          Per surg - Assessment:   68 y.o. female admitted with    1. Postoperative intra-abdominal abscess    2. Severe sepsis ([address]    3. Malaise    4. Person under investigation for COVID-19    5. YESENIA (acute kidney injury) [facility])        Ms. Rafael Herbert is a 68 y.o. female who presents with    Incisional, abdominal wall abscess status-post bedside incision and drainage on 3/31/2021   Status-post laparoscopic right colon resection and cholecystectomy on 3/8/2021 for colon mass, chronic cholecystitis with cholelithiasis    Acute kidney injury   Anemia   Hypertension CAD   Paroxysmal atrial fibrillation   Medical coagulopathy, on Brilinta           Plan:   1. Bedside incision and drainage of abscess, cultures obtained, local care with wet-to-dry dressings BID with Dakins   2. General diet as tolerated; monitor bowel function   3. IV hydration until PO intake is adequate; monitor and correct electrolytes   4. Antibiotics--on cefepime and vancomycin   5. Activity as tolerated, ambulate TID, up to chair for all meals--PT/OT evaluation and treatment   6. Pulmonary toilet, incentive spirometry   7. PRN analgesics and antiemetics--minimizing narcotics as tolerated, transition to PO   8. DVT prophylaxis with Lovenox and SCD's; on Brilinta--okay to continue from a surgical perspective   9.  Management of medical comorbid etiologies per primary team and consulting services

## 2021-04-02 NOTE — PROGRESS NOTES
Nephrology Consult Note  [phone]   [email or web address].        Reason for Consult:  YESENIA  HISTORY OF PRESENT ILLNESS:      The patient is a 68 y.o. female with significant past medical history of CKD stage IIIb, coronary artery disease,  hypertension, mixed hyperlipidemia , atrial Fibrillation was in the hospital in the early part of March and underwent a right hemicolectomy a laparoscopic cholecystectomy. At that time she was being investigated for anemia and found to have a right colonic mass. She was operated on by Dr. Natalie Galan and discharged home. .  She now presents with 2 to 3 days history of progressive fatigue and tenderness over the wound of abdomen. Denies any foul-smelling discharge or fever or chills or rigors. There is no dysuria gross hematuria  Her baseline creatinine is 1.3 creatinine on admission was 3.2  She was running low blood pressures on admission  She was started on IV antibiotics after cultures were obtained and IV fluids  Dr. Natalie Galan was consulted and the plan is that there is abdominal wall abscess and incision and drainage will be done      Interval  History:    Post I and D 3/31  Pain better  Hands swollen     PHYSICAL EXAM:    Vitals:    /68   Pulse 66   Temp 97.5 °F (36.4 °C) (Oral)   Resp 17   Ht 5' (1.524 m)   Wt 220 lb 14.4 oz (100.2 kg)   SpO2 96%   BMI 43.14 kg/m²   I/O last 3 completed shifts: In: 240 [P.O.:240]  Out: -   No intake/output data recorded. Physical Exam:  Gen:  alert, oriented x 3  Foul smell in the room   PERRL , EOM +  Pallor +, No icterus  JVP not raised   CV: RRR no murmur or rub . Lungs:B/ L air entry, Normal breath sounds   Abd: soft, bowel sounds + , No organomegaly , LSCS Scar, Dressing Right abdomen, redness of skin   Ext: Trace leg  Edema,Hand edema +   Skin: Warm.   No rash  Neuro: nonfocal.      DATA:    CBC with Differential:    Lab Results   Component Value Date    WBC 8.6 04/01/2021    RBC 3.35 04/01/2021    HGB 7.5 04/01/2021    HCT 25.1 04/01/2021     04/01/2021    MCV 75.0 04/01/2021    MCH 22.4 04/01/2021    MCHC 29.9 04/01/2021    RDW 27.0 04/01/2021    LYMPHOPCT 12.3 04/01/2021    MONOPCT 7.8 04/01/2021    BASOPCT 0.2 04/01/2021    MONOSABS 0.7 04/01/2021    LYMPHSABS 1.1 04/01/2021    EOSABS 0.2 04/01/2021    BASOSABS 0.0 04/01/2021     CMP:    Lab Results   Component Value Date     04/02/2021    K 4.0 04/02/2021    K 3.7 03/31/2021     04/02/2021    CO2 19 04/02/2021    BUN 33 04/02/2021    CREATININE 2.1 04/02/2021    GFRAA 28 04/02/2021    AGRATIO 0.7 03/31/2021    LABGLOM 23 04/02/2021    GLUCOSE 110 04/02/2021    PROT 5.2 03/31/2021    LABALBU 2.1 03/31/2021    CALCIUM 7.9 04/02/2021    BILITOT 0.4 03/31/2021    ALKPHOS 103 03/31/2021    AST 10 03/31/2021    ALT 6 03/31/2021     Phosphorus:    Lab Results   Component Value Date    PHOS 2.8 03/14/2021     Uric Acid:  No results found for: LABURIC, URICACID  Troponin:    Lab Results   Component Value Date    TROPONINI <0.01 03/30/2021     U/A:    Lab Results   Component Value Date    COLORU YELLOW 04/01/2021    PROTEINU 30 04/01/2021    PHUR 5.5 04/01/2021    WBCUA 163 04/01/2021    RBCUA >100 04/01/2021    CLARITYU TURBID 04/01/2021    SPECGRAV 1.014 04/01/2021    LEUKOCYTESUR LARGE 04/01/2021    UROBILINOGEN 1.0 04/01/2021    BILIRUBINUR Negative 04/01/2021    BLOODU LARGE 04/01/2021    GLUCOSEU Negative 04/01/2021           IMPRESSION/RECOMMENDATIONS:      1. YESENIA   Pre Renal vs ATN   feNa < 1 ( 3/11/21) , repeat feNa < 1, U na 25    Reduce IV fluid rate to 50 ml/hr   Cr lower at 2. 1( was  2.4 2.8, 3.2)     2. CKD stage 3    Sees my partner Dr Bandar Perez    Baseline Cr 1.2- 1.3     3. Abdominal  wall abcess   On antibiotics    Post  I and D    4. Renal osteodystrophy    Ca 7.7 , Phos 2.8   5. Monitor renal function    Thank you for allowing me to participate in the care of this patient. I will continue to follow along.   Please call with questions.     Kaylin Salazar MD  4/2/2021  [facility]

## 2021-04-02 NOTE — PROGRESS NOTES
(11/21/13); Upper gastrointestinal endoscopy (N/A, 3/5/2021); Colonoscopy (N/A, 3/5/2021); hemicolectomy (Right, 3/8/2021); and Cholecystectomy, laparoscopic (N/A, 3/8/2021). . She  reports that she has never smoked. She has never used smokeless tobacco. She reports that she does not drink alcohol or use drugs. .        Active Hospital Problems    Diagnosis Date Noted    UTI (urinary tract infection) [N39.0] 04/01/2021    Postoperative intra-abdominal abscess [T81.43XA]     YESENIA (acute kidney injury) ([facility] [address] [N17.9] 03/30/2021    Abscess of intestine [K63.0] 03/30/2021    Intra-abdominal abscess ([facility] [address] [K65.1] 03/30/2021    Malignant neoplasm of ascending colon ([facility] [address] [C18.2] 03/07/2021    Anemia [D64.9] 03/03/2021    Hypertension [I10] 01/10/2014       Current Facility-Administered Medications: cefepime (MAXIPIME) 1000 mg IVPB minibag, 1,000 mg, Intravenous, Q12H  lidocaine PF 1 % injection 5 mL, 5 mL, Intradermal, Once  Sodium Hypochlorite (DAKINS) 0.25 % external solution, , Irrigation, BID  aspirin chewable tablet 81 mg, 81 mg, Oral, Daily  carvedilol (COREG) tablet 3.125 mg, 3.125 mg, Oral, BID WC  ticagrelor (BRILINTA) tablet 90 mg, 90 mg, Oral, BID  rosuvastatin (CRESTOR) tablet 20 mg, 20 mg, Oral, Nightly  amiodarone (CORDARONE) tablet 200 mg, 200 mg, Oral, Daily  sodium chloride flush 0.9 % injection 10 mL, 10 mL, Intravenous, 2 times per day  sodium chloride flush 0.9 % injection 10 mL, 10 mL, Intravenous, PRN  0.9 % sodium chloride infusion, 25 mL, Intravenous, PRN  acetaminophen (TYLENOL) tablet 650 mg, 650 mg, Oral, Q4H PRN  HYDROcodone-acetaminophen (NORCO) 5-325 MG per tablet 1 tablet, 1 tablet, Oral, Q4H PRN **OR** HYDROcodone-acetaminophen (NORCO) 5-325 MG per tablet 2 tablet, 2 tablet, Oral, Q4H PRN  morphine (PF) injection 2 mg, 2 mg, Intravenous, Q2H PRN **OR** morphine injection 4 mg, 4 mg, Intravenous, Q2H PRN  promethazine (PHENERGAN) tablet 12.5 mg, 12.5 mg, Oral, Q6H PRN **OR** ondansetron [facility]) injection 4 mg, 4 mg, Intravenous, Q6H PRN  enoxaparin (LOVENOX) injection 30 mg, 30 mg, Subcutaneous, Daily  hydrALAZINE (APRESOLINE) injection 10 mg, 10 mg, Intravenous, Q6H PRN  0.9 % sodium chloride bolus, 500 mL, Intravenous, PRN  vancomycin (VANCOCIN) intermittent dosing (placeholder), , Other, RX Placeholder         Objective:  /68   Pulse 66   Temp 97.5 °F (36.4 °C) (Oral)   Resp 17   Ht 5' (1.524 m)   Wt 220 lb 14.4 oz (100.2 kg)   SpO2 96%   BMI 43.14 kg/m²      Patient Vitals for the past 24 hrs:   BP Temp Temp src Pulse Resp SpO2   04/02/21 0800 125/68 97.5 °F (36.4 °C) Oral 66 17 96 %   04/02/21 0330 134/63 97.8 °F (36.6 °C) Oral 66 18 94 %   04/02/21 0012 (!) 118/56 97.6 °F (36.4 °C) Oral 66 16 (!) 88 %   04/01/21 2215 (!) 147/69 97.5 °F (36.4 °C) Oral 99 16 91 %   04/01/21 1756 107/63 -- -- 98 -- --   04/01/21 1748 104/63 97.7 °F (36.5 °C) Oral 104 16 92 %   04/01/21 1415 124/60 97.4 °F (36.3 °C) Oral 62 18 91 %   04/01/21 1130 (!) 103/57 97.5 °F (36.4 °C) Oral 59 18 93 %   04/01/21 0945 136/81 -- -- 85 -- 95 %     Patient Vitals for the past 96 hrs (Last 3 readings):   Weight   03/31/21 0553 220 lb 14.4 oz (100.2 kg)   03/30/21 1641 212 lb 12.8 oz (96.5 kg)           Intake/Output Summary (Last 24 hours) at 4/2/2021 0831  Last data filed at 4/1/2021 2215  Gross per 24 hour   Intake 240 ml   Output --   Net 240 ml         Physical Exam:   /68   Pulse 66   Temp 97.5 °F (36.4 °C) (Oral)   Resp 17   Ht 5' (1.524 m)   Wt 220 lb 14.4 oz (100.2 kg)   SpO2 96%   BMI 43.14 kg/m²   General appearance: alert, appears stated age and cooperative  Head: Normocephalic, without obvious abnormality, atraumatic  Lungs: clear to auscultation bilaterally  Heart: regular rate and rhythm, S1, S2 normal, no murmur, click, rub or gallop  Abdomen: soft, non-tender; bowel sounds normal; no masses,  no organomegaly  Extremities: extremities normal, atraumatic, no cyanosis or edema    Labs:  Lab Results   Component Value Date    WBC 8.6 04/01/2021    HGB 7.5 (L) 04/01/2021    HCT 25.1 (L) 04/01/2021     04/01/2021    CHOL 155 11/21/2020    TRIG 121 11/21/2020    HDL 38 (L) 11/21/2020    ALT 6 (L) 03/31/2021    AST 10 (L) 03/31/2021     04/01/2021    K 3.8 04/01/2021     04/01/2021    CREATININE 2.4 (H) 04/01/2021    BUN 37 (H) 04/01/2021    CO2 20 (L) 04/01/2021    INR 1.18 (H) 03/30/2021    LABMICR YES 04/01/2021     Lab Results   Component Value Date    CKTOTAL 41 03/11/2021    TROPONINI <0.01 03/30/2021       Recent Imaging Results are Reviewed:  Echo Complete    Result Date: 3/6/2021  Transthoracic Echocardiography Report (TTE)  Demographics   Patient Name        Dwayne Donohue   Date of Study       03/06/2021  Gender                 Female   Patient Number      [id number]  Date of Birth          1940   Visit Number        [id number]   Age                    68 year(s)   Accession Number    [id number]  Room Number            4197   Corporate ID        [id number]    Sonographer            Jannette Steele RD,                                                         RVT   Ordering Physician              Interpreting Physician Tripp Lopez MD,                                                         [facility] [place], [address]  Procedure Type of Study   TTE procedure:ECHOCARDIOGRAM COMPLETE 2D W DOPPLER W COLOR. Procedure Date Date: 03/06/2021 Start: 09:12 AM Study Location: [facility] - Echo Lab Technical Quality: Good visualization Additional Indications:NSTEMI, Leg edema, CAD. Patient Status: Routine Height: 60 inches Weight: 220 pounds BSA: 1.94 m2 BMI: 42.97 kg/m2 BP: 134/75 mmHg  Conclusions   Summary  Normal left ventricle size, and systolic function with an estimated ejection  fraction of 55-60%. Mild concentric left ventricular hypertrophy is present. No regional wall motion abnormalities are seen. Mild tricuspid regurgitation, RVSP is estimated at 38 mmhg. LVOT Peak Velocity: 122 cm/s   MV Mean Gradient: 3 mmHg                                 MV Max P mmHg  TR Velocity:290 cm/s           MV Vmax:125 cm/s  TR Gradient:33.64 mmHg         MV VTI:51.9 cm/s   E' Septal Velocity: 6.2 cm/s   MV Deceleration Time: 215 msec  E' Lateral Velocity: 5.98 cm/s  PV Peak Velocity: 123 cm/s  PV Peak Gradient: 6.05 mmHg   Aortic Valve   Peak Velocity: 194 cm/s   Mean Velocity: 141 cm/s  Peak Gradient: 15.05 mmHg Mean Gradient: 9 mmHg  AV VTI: 43.8 cm  Aorta   Aortic Root: 3.1 cm      Ct Abdomen Pelvis Wo Contrast Additional Contrast? None    Result Date: 3/11/2021  EXAMINATION: CT OF THE ABDOMEN AND PELVIS WITHOUT CONTRAST 3/11/2021 12:05 pm TECHNIQUE: CT of the abdomen and pelvis was performed without the administration of intravenous contrast. Multiplanar reformatted images are provided for review. Dose modulation, iterative reconstruction, and/or weight based adjustment of the mA/kV was utilized to reduce the radiation dose to as low as reasonably achievable. COMPARISON: CT of the chest abdomen and pelvis 2020 HISTORY: ORDERING SYSTEM PROVIDED HISTORY: RUQ pain TECHNOLOGIST PROVIDED HISTORY: Reason for exam:->RUQ pain Additional Contrast?->None Reason for Exam: RUQ pain Acuity: Unknown Type of Exam: Unknown FINDINGS: Lower Chest: Increased mild-moderate right pleural effusion with adjacent compressive atelectasis. Unchanged trace amount of left pleural fluid. Organs: In the gallbladder fossa there is a fluid collection measuring 4.2 x 2.3 by 2.4 cm. Along the inferior-lateral aspect of the fluid collection a few tiny curvilinear/rounded high-density foci are present measuring 3 mm and less in size best shown on coronal image 90. Cholecystectomy clips noted in the expected location. There is a small amount of fluid along the inferior margin of the right hepatic lobe anterior laterally. Small amount of fluid between the diaphragm and liver also present.  No pancreatitis. No ureteral stone or hydronephrosis. 2 mm right renal stone again noted. GI/Bowel: There is new fluid-filled small bowel dilation involving proximal to mid small bowel loops measuring up to 3 cm. There is distal small bowel collapse extending to the surgical site. Oral contrast within the colon from the oral contrast given for the comparison. No oral contrast within small bowel at this time. Pelvis: No acute abnormality of the pelvis. Normal appearance of the bladder. Peritoneum/Retroperitoneum: Small amount of free fluid in the right upper quadrant as discussed. No free air. Bones/Soft Tissues: Expected postsurgical changes of the abdominal wall. No acute osseous findings. New fluid collection in the gallbladder fossa with adjacent small amount of fluid around the liver. This may be residual fluid from the recent surgery or related to biliary leak. Consider HIDA scan evaluation There are 2-3 tiny gallstones within the gallbladder fossa fluid collection, measuring 3 mm and less in size. Increased size of mild-moderate right pleural effusion with increased adjacent compressive atelectasis of the right lung base. Xr Chest (2 Vw)    Result Date: 3/30/2021  EXAMINATION: TWO XRAY VIEWS OF THE CHEST 3/30/2021 5:22 pm COMPARISON: 03/12/2021 radiograph HISTORY: ORDERING SYSTEM PROVIDED HISTORY: Chest Discomfort TECHNOLOGIST PROVIDED HISTORY: Reason for exam:->Chest Discomfort Reason for Exam: Hypotension (Pt reports low BP, 93/41. ) Acuity: Acute Type of Exam: Initial FINDINGS: The heart is mildly enlarged. Mediastinum and pulmonary vascularity are normal.  Enteric tube has been removed since prior radiograph. There is improved aeration of the lungs. No acute airspace abnormality with lucency suggesting underlying COPD. No significant skeletal finding.      No significant finding in the chest.     Nm Hepatobiliary    Result Date: 3/11/2021  EXAMINATION: NUCLEAR MEDICINE HEPATOBILIARY SCINTIGRAPHY (HIDA SCAN). 3/11/2021 2:40 pm TECHNIQUE: Approximately 4.97 [id number] Mebrofenin (Choletec) was administered IV. Then, dynamic images of the abdomen were obtained in the anterior projection for 60 mins. A right lateral view was also obtained at 60 mins. COMPARISON: CT abdomen and pelvis 03/11/2021. HISTORY: ORDERING SYSTEM PROVIDED HISTORY: R/o bile leak s/p renu TECHNOLOGIST PROVIDED HISTORY: Reason for exam:->R/o bile leak s/p renu Reason for Exam: R/O Bile Leak Acuity: Acute Type of Exam: Ongoing FINDINGS: Prompt, homogenous uptake by the liver is noted with normal appearance of radiotracer excretion into the biliary system. Clearance of bloodpool activity appears appropriate. During the 1st 35 minutes of the study, the common duct is normal in size and appearance. Beginning at 40 minutes, accumulation of radio activity which overlies the common duct is seen. .  This accumulation most likely corresponds to the proximal duodenum as the abnormal fluid collection on the CT scan lies more laterally. Gallbladder is surgically absent. Delayed planar images as well as SPECT images were obtained due to the confusing appearance on the initial images. These latter images do not show any accumulation of activity in the subhepatic region. The only activity is seen within the small bowel confirming that there is no bile leak present. The CT images also show a slight decrease in the amount of fluid in the gallbladder fossa. No evident bile leak following cholecystectomy. Slight reduction in the amount of subhepatic fluid present since the earlier CT study 5 hours ago.      Xr Chest Portable    Result Date: 3/12/2021  EXAMINATION: ONE XRAY VIEW OF THE CHEST 3/12/2021 6:06 pm COMPARISON: 1 March 2021 HISTORY: ORDERING SYSTEM PROVIDED HISTORY: ng tube placement TECHNOLOGIST PROVIDED HISTORY: Reason for exam:->ng tube placement Reason for Exam: ng tube placement Acuity: Unknown Type of Exam: Unknown FINDINGS: AP portable view of the chest time stamped at 1752 hours overlying cardiac monitoring electrodes are noted. Intestinal tube extends to the distal stomach. Heart is stable, mildly enlarged. Elevated hemidiaphragm is noted. There is left perihilar stranding likely atelectasis. No extrapleural air. Intestinal tube terminates in the distal stomach. Other findings as above. Ct Chest Abdomen Pelvis Wo Contrast    Result Date: 3/30/2021  EXAMINATION: CT OF THE CHEST, ABDOMEN, AND PELVIS WITHOUT CONTRAST 3/30/2021 3:26 pm TECHNIQUE: CT of the chest, abdomen and pelvis was performed without the administration of intravenous contrast. Multiplanar reformatted images are provided for review. Dose modulation, iterative reconstruction, and/or weight based adjustment of the mA/kV was utilized to reduce the radiation dose to as low as reasonably achievable. COMPARISON: Abdomen and pelvis CT, 03/11/2021 CT chest abdomen pelvis, 03/05/2021 HISTORY: ORDERING SYSTEM PROVIDED HISTORY: r/o penumonia, recent surgery TECHNOLOGIST PROVIDED HISTORY: Reason for exam:->r/o penumonia, recent surgery Additional Contrast?->None Decision Support Exception->Emergency Medical Condition (MA) Reason for Exam: r/o penumonia, recent surgery Acuity: Acute Type of Exam: Initial FINDINGS: Chest: Mediastinum: Visualized thyroid is unremarkable. Mildly enlarged mediastinal lymph nodes have decreased in size when compared to the previous exam, and are most compatible with reactive lymph nodes. Esophagus is unremarkable. Noncontrast imaging of the cardiac chambers, thoracic aorta, and pulmonary arteries is unremarkable. Lungs/pleura: Calcified lesion in the left lower lobe is again seen, compatible with old granulomatous disease, found as far back as 2014, and requires no specific follow-up. A focal infiltrate is not detected. The airways are patent. No pneumothorax. No pleural effusion.  Soft Tissues/Bones: Visualized extra thoracic soft tissues are unremarkable. No acute or suspicious bony abnormality is identified. Severe degenerative disease within the glenohumeral joints is noted bilaterally, especially on the right. Abdomen/Pelvis: Lack of intravenous contrast limits evaluation of the solid abdominal viscera, the hollow abdominal viscera, and the vascular structures. Organs: Having said that, no acute hepatic abnormality is identified. The gallbladder is surgically absent. Noncontrast imaging of the spleen, adrenals, and pancreas is unremarkable. Nonobstructing nephrolithiasis is noted within the right kidney. GI/Bowel: There has been development of a very large gas and fluid collection within the right abdominal wall measuring 12 cm maximally (series 2, image 132). There is evidence of extension of this collection into the peritoneal cavity (as visualized on axial images 140 to through 154. The ileocolic anastomosis is very close to this collection within the peritoneum. It would be difficult to entirely exclude communication between bowel and this collection. The patient status post partial colectomy. The remainder of the large bowel is unremarkable. No evidence of small bowel obstruction. There is no evidence of bowel obstruction. Stomach and duodenal sweep are unremarkable. Pelvis: Ovaries are prominent bilaterally, but those are stable dating back to 2013. No suspicious adnexal lesions are found. Uterus unremarkable. Urinary bladder is unremarkable. Peritoneum/Retroperitoneum: Abdominal aorta normal in caliber. No retroperitoneal lymphadenopathy. Bones/Soft Tissues: No osteolytic or osteoblastic bone lesions are seen. No acute bony abnormalities are detected. Chest CT: No acute abnormality detected. Abdomen and pelvis CT: Interval development of a large gas and fluid collection within the right abdominal wall measuring up to 12 cm, most compatible with an abscess.  There is intraperitoneal communication of that collection, and the ileocolic anastomosis is very close to the intraperitoneal component, and therefore it would be difficult to entirely exclude a communication between bowel and that collection. Ct Chest Abdomen Pelvis Wo Contrast    Result Date: 3/5/2021  EXAMINATION: CT OF THE CHEST, ABDOMEN, AND PELVIS WITHOUT CONTRAST 3/5/2021 4:20 pm TECHNIQUE: CT of the chest, abdomen and pelvis was performed without the administration of intravenous contrast. Multiplanar reformatted images are provided for review. Dose modulation, iterative reconstruction, and/or weight based adjustment of the mA/kV was utilized to reduce the radiation dose to as low as reasonably achievable. COMPARISON: Chest CT 2014 2013 HISTORY: ORDERING SYSTEM PROVIDED HISTORY: Colon mass, r/o mets TECHNOLOGIST PROVIDED HISTORY: Reason for exam:->Colon mass, r/o mets Additional Contrast?->Oral Reason for Exam: Colon mass, r/o mets Acuity: Unknown Type of Exam: Unknown FINDINGS: Chest: Mediastinum: 7 mm nodule seen in right lobe of thyroid gland. No specific imaging follow-up recommended based on size. coronary artery calcification is seen. Small mediastinal nodes are noted. Right paratracheal node measures 1.4 cm in short axis, previously 10 mm. Lungs/pleura: Mosaic attenuation is seen throughout the lungs, suggesting small airways disease or air trapping. Trace pleural effusions are seen bilaterally. There is adjacent consolidation seen in the lung bases. 1.8 x 1.2 cm nodule is seen in the left lower lobe Soft Tissues/Bones: Degenerative changes are seen in the spine. Degenerative changes are seen in the shoulder joints. Abdomen/Pelvis: Organs: No splenomegaly Adrenal glands appear normal. There is rotation of the kidney anteriorly, incidentally noted. .  No stones noted 2 mm stone seen in the right kidney. No hydronephrosis noted. No intrahepatic ductal dilatation.   No perihepatic fluid Gallbladder appears normal No peripancreatic inflammatory change GI/Bowel: No significant small bowel distention noted. Mild stool load seen in the colon. Scattered colonic diverticula are seen. There is focal wall thickening of the colon on the right, extending over a length of 3.7 cm. There is surrounding injection the Nadja colonic fat. Small pericolonic lymph node is seen in the right upper quadrant mesentery measuring 8 mm. Pelvis: No free fluid seen within the pelvis. Pickard catheter is seen in the bladder Left adnexal mass is seen measuring 4.4 cm x 3.5 cm previously 3.9 cm by 3.3 cm. Right adnexal nodule measures 3.2 x 3.9 cm, previously 2.5 x 3.6 cm Peritoneum/Retroperitoneum: Small retroperitoneal nodes are seen. Small lymph nodes are seen along the iliac chains. No aortic aneurysm. Atherosclerotic change is seen in aorta and iliacs. Bones/Soft Tissues: Spurring is seen in the spine. Spurring is seen in the hips. Tiny periumbilical hernia containing fat is seen     Within the chest, small mediastinal nodes are seen, slightly increased compared to prior, either reactive or metastatic. Small pleural effusions are seen, compatible with mild fluid overload. Stable lobular pulmonary nodule in the left lower lobe. Nodular wall thickening of the colon on the right, compatible with the given history of colon mass. Small mesenteric node is seen in this region,, likely early desiree metastasis Increase in size of ovarian-adnexal masses on the right and left. Consider pelvic ultrasound for further characterization. Tiny nonobstructing right renal stone       Assessment and Plan:  Principal Problem:    YESENIA (acute kidney injury) ([address] -Established problem. Stable. Creat 2.4  Plan: cont fluids, cont to follow  Active Problems:    Hypertension -Established problem. Stable. 125/68  Plan: stay on same meds    Anemia - Established problem. Stable.  hgb 7.5  Plan: No indication for transfusion. Cont to monitor h/h to assess progression of anemia.   Recommend ferrous sulfate or MVI as outpatient. Malignant neoplasm of ascending colon ([address] -Established problem. Stable. Plan: per onc    Abscess of intestine -Established problem. Stable. Drain in  Plan: cont drainage. Cont empiric iv abx    Intra-abdominal abscess ([address]  Plan: cont drainage.  Cont empiric iv abx    Postoperative intra-abdominal abscess    UTI (urinary tract infection)  Plan: cont empiric abx            Sidney Rivera  4/2/2021

## 2021-04-02 NOTE — PROGRESS NOTES
Shift assessment complete see flow sheets meds per orders see eMAR. Patient alert and oriented x4, moist productive cough noted, coughing up some mucous. PRN pain meds prior to dressing change this evening, tolerated well. Patient oxygen desaturating at rest, back into mid 90s when awake after coughing. Incentive spirometer use encouraged, placed patient on 2L O2 while sleeping for tonight. The care plan and education has been reviewed and mutually agreed upon with the patient. Patient remains free from falls. All fall precautions in place. Yellow blanket at bedside, yellow bracelet on patient. SAFE sign on door. Bed and chair alarms being used. Bed in lowest position. Will monitor.      Electronically signed by Esa Pollard RN on 4/2/2021 at 4:42 AM

## 2021-04-02 NOTE — PROGRESS NOTES
Clinical Pharmacy Note: Pharmacy to Dose Vancomcyin    Vancomycin Day: 4  Current Dosing: dosing by level due to YESENIA, 1000 mg x 2       Recent Labs     04/01/21  0437 04/02/21  0429   BUN 37* 33*       Recent Labs     04/01/21  0437 04/02/21  0429   CREATININE 2.4* 2.1*       Recent Labs     03/31/21  0617 04/01/21  0437   WBC 11.4* 8.6         Intake/Output Summary (Last 24 hours) at 4/2/2021 0930  Last data filed at 4/1/2021 2215  Gross per 24 hour   Intake 240 ml   Output --   Net 240 ml         Ht Readings from Last 1 Encounters:   03/30/21 5' (1.524 m)        Wt Readings from Last 1 Encounters:   03/31/21 220 lb 14.4 oz (100.2 kg)         Body mass index is 43.14 kg/m². Estimated Creatinine Clearance: 23 mL/min (A) (based on SCr of 2.1 mg/dL (H)). Random: 12.1    Assessment/Plan:  Vancomycin level is therapeutic. Level was drawn appropriately in respect to last dose given. A vancomycin trough has been ordered for 4/5 at 1100. Will Increase vancomycin dose to 1000 mg Q24H  Changes in regimen will be determined based on culture results, renal function, and clinical response. Pharmacy will continue to monitor and adjust regimen as necessary.     Thank you for the consult,    Yuliya Smith, PharmD  PGY1 Pharmacy Resident  [id number]    4/2/2021

## 2021-04-02 NOTE — PROGRESS NOTES
[facility]        Progress Note    Patient Name: Stephy Malone  MRN: [id number]  YOB: 1940  Date of Evaluation: 2021    Subjective:  No acute events overnight  Pain controlled, only burning pain with re-packing of dressing  No nausea or vomiting, appetite fair but gradually improving  Passing flatus  Resting in bed at this time, reports ambulating in halls earlier    Post-Operative Day #25      Vital Signs:  Patient Vitals for the past 24 hrs:   BP Temp Temp src Pulse Resp SpO2   21 1115 127/67 97.5 °F (36.4 °C) Oral 67 18 97 %   21 0800 125/68 97.5 °F (36.4 °C) Oral 66 17 96 %   21 0330 134/63 97.8 °F (36.6 °C) Oral 66 18 94 %   21 0012 (!) 118/56 97.6 °F (36.4 °C) Oral 66 16 (!) 88 %   21 2215 (!) 147/69 97.5 °F (36.4 °C) Oral 99 16 91 %   21 1756 107/63 -- -- 98 -- --   21 1748 104/63 97.7 °F (36.5 °C) Oral 104 16 92 %      TEMPERATURE HISTORY 24H: Temp (24hrs), Av.6 °F (36.4 °C), Min:97.5 °F (36.4 °C), Max:97.8 °F (36.6 °C)    BLOOD PRESSURE HISTORY: Systolic (96PVJ), SHAH:363 , Min:103 , JJC:167    Diastolic (73JIB), TEM:40, Min:56, Max:81      Intake/Output:  I/O last 3 completed shifts: In: 240 [P.O.:240]  Out: -   No intake/output data recorded.   Drain/tube Output:       Physical Exam:  General: awake, alert, oriented to  person, place, time  Abdomen: soft, obese, incisional tenderness only  Skin/Wound: right upper quadrant incision open wound bed generally clean with small amount of purulent/brown/tan drainage along base/laterally, surrounding erythema greatly improved--dressing changed    Labs:  CBC:    Recent Labs     21  1717 21  0617 21  1304 21  0437   WBC 16.3* 11.4*  --  8.6   HGB 8.4* 7.1* 7.2* 7.5*   HCT 28.2* 23.7* 23.9* 25.1*    206  --  222     BMP:    Recent Labs     21  0617 21  0437 21  0429   * 137 140   K 3.7 3.8 4.0    108 112*   CO2 22 20* 19*   BUN 39* 37* 33*   CREATININE 2.8* 2.4* 2.1*   GLUCOSE 115* 102* 110*     Hepatic:    Recent Labs     03/30/21  1717 03/31/21  0617   AST 14* 10*   ALT 9* 6*   BILITOT 0.5 0.4   ALKPHOS 117 103     Amylase:    Lab Results   Component Value Date    AMYLASE 29 04/02/2021    AMYLASE 21 04/01/2021    AMYLASE 17 03/31/2021     Lipase:    Lab Results   Component Value Date    LIPASE 25.0 11/20/2020      Mag:    Lab Results   Component Value Date    MG 2.50 03/14/2021    MG 2.10 03/04/2021     Phos:     Lab Results   Component Value Date    PHOS 2.8 03/14/2021    PHOS 3.8 05/29/2015      Coags:   Lab Results   Component Value Date    PROTIME 13.7 03/30/2021    INR 1.18 03/30/2021    APTT 27.8 03/30/2021       Cultures:  Anaerobic culture  No results found for: LABANAE  Fungus stain  No results found for requested labs within last 30 days. Gram stain  Results in Past 30 Days  Result Component Current Result Ref Range Previous Result Ref Range   Gram Stain Result 3+ WBC's (Polymorphonuclear)  3+ Gram positive cocci  2+ Gram positive rods  3+ Gram negative rods   (A) (3/31/2021)  Not in Time Range      Organism  Lab Results   Component Value Date/Time    ORG Klebsiella pneumoniae (A) 03/31/2021 02:52 PM    ORG Proteus mirabilis (A) 03/31/2021 02:52 PM    ORG Enterococcus faecalis (A) 03/31/2021 02:52 PM     Surgical culture  No results found for: CXSURG  Blood culture 1  Results in Past 30 Days  Result Component Current Result Ref Range Previous Result Ref Range   Blood Culture, Routine No Growth to date. Any change in status will be called. (3/30/2021)  Not in Time Range      Blood culture 2  Results in Past 30 Days  Result Component Current Result Ref Range Previous Result Ref Range   Culture, Blood 2 No Growth to date. Any change in status will be called. (3/30/2021)  Not in Time Range      Fecal occult  No results found for requested labs within last 30 days.      GI bacterial pathogens by ambulate TID, up to chair for all meals--PT/OT following  6. Pulmonary toilet, incentive spirometry  7. PRN analgesics and antiemetics--minimizing narcotics as tolerated, transition to PO  8. DVT prophylaxis with Lovenox and SCD's; on Brilinta--okay to continue from a surgical perspective  9. Management of medical comorbid etiologies per primary team and consulting services  10. Disposition: Discharge planning; social work following for home care needs    EDUCATION:  Educated patient on plan of care and disease process--all questions answered. Plans discussed with patient and nursing. Reviewed and discussed with Dr. Jassi Everett.       Signed:  JIMENA Cruz - CNP  4/2/2021 2:20 PM     Right upper quadrant abdominal wound improving  Renal function improving  Continue current dressing changes and IV antibiotics  Okay for discharge home from surgical standpoint

## 2021-04-03 LAB
ANION GAP SERPL CALCULATED.3IONS-SCNC: 9 MMOL/L (ref 3–16)
ANISOCYTOSIS: ABNORMAL
BANDED NEUTROPHILS RELATIVE PERCENT: 14 % (ref 0–7)
BASOPHILS ABSOLUTE: 0 K/UL (ref 0–0.2)
BASOPHILS RELATIVE PERCENT: 0 %
BLOOD CULTURE, ROUTINE: NORMAL
BUN BLDV-MCNC: 27 MG/DL (ref 7–20)
BURR CELLS: ABNORMAL
CALCIUM SERPL-MCNC: 8 MG/DL (ref 8.3–10.6)
CHLORIDE BLD-SCNC: 110 MMOL/L (ref 99–110)
CO2: 19 MMOL/L (ref 21–32)
CREAT SERPL-MCNC: 2 MG/DL (ref 0.6–1.2)
CULTURE, BLOOD 2: NORMAL
EOSINOPHILS ABSOLUTE: 0.1 K/UL (ref 0–0.6)
EOSINOPHILS RELATIVE PERCENT: 2 %
GFR AFRICAN AMERICAN: 29
GFR NON-AFRICAN AMERICAN: 24
GLUCOSE BLD-MCNC: 105 MG/DL (ref 70–99)
GRAM STAIN RESULT: ABNORMAL
HCT VFR BLD CALC: 26.6 % (ref 36–48)
HEMOGLOBIN: 7.8 G/DL (ref 12–16)
LYMPHOCYTES ABSOLUTE: 1.1 K/UL (ref 1–5.1)
LYMPHOCYTES RELATIVE PERCENT: 17 %
MCH RBC QN AUTO: 22.1 PG (ref 26–34)
MCHC RBC AUTO-ENTMCNC: 29.2 G/DL (ref 31–36)
MCV RBC AUTO: 75.6 FL (ref 80–100)
METAMYELOCYTES RELATIVE PERCENT: 1 %
MICROCYTES: ABNORMAL
MONOCYTES ABSOLUTE: 0.3 K/UL (ref 0–1.3)
MONOCYTES RELATIVE PERCENT: 4 %
NEUTROPHILS ABSOLUTE: 4.9 K/UL (ref 1.7–7.7)
NEUTROPHILS RELATIVE PERCENT: 62 %
ORGANISM: ABNORMAL
PDW BLD-RTO: 26.1 % (ref 12.4–15.4)
PLATELET # BLD: 270 K/UL (ref 135–450)
PLATELET SLIDE REVIEW: ADEQUATE
PMV BLD AUTO: 7.9 FL (ref 5–10.5)
POTASSIUM SERPL-SCNC: 4 MMOL/L (ref 3.5–5.1)
RBC # BLD: 3.52 M/UL (ref 4–5.2)
SLIDE REVIEW: ABNORMAL
SMUDGE CELLS: PRESENT
SODIUM BLD-SCNC: 138 MMOL/L (ref 136–145)
TOXIC GRANULATION: PRESENT
WBC # BLD: 6.3 K/UL (ref 4–11)
WOUND/ABSCESS: ABNORMAL

## 2021-04-03 PROCEDURE — 2580000003 HC RX 258: Performed by: INTERNAL MEDICINE

## 2021-04-03 PROCEDURE — 6370000000 HC RX 637 (ALT 250 FOR IP): Performed by: INTERNAL MEDICINE

## 2021-04-03 PROCEDURE — 6370000000 HC RX 637 (ALT 250 FOR IP): Performed by: NURSE PRACTITIONER

## 2021-04-03 PROCEDURE — 80048 BASIC METABOLIC PNL TOTAL CA: CPT

## 2021-04-03 PROCEDURE — 6360000002 HC RX W HCPCS: Performed by: INTERNAL MEDICINE

## 2021-04-03 PROCEDURE — 85025 COMPLETE CBC W/AUTO DIFF WBC: CPT

## 2021-04-03 PROCEDURE — 2700000000 HC OXYGEN THERAPY PER DAY

## 2021-04-03 PROCEDURE — 36415 COLL VENOUS BLD VENIPUNCTURE: CPT

## 2021-04-03 PROCEDURE — 1200000000 HC SEMI PRIVATE

## 2021-04-03 RX ADMIN — CARVEDILOL 3.12 MG: 3.12 TABLET, FILM COATED ORAL at 08:15

## 2021-04-03 RX ADMIN — ENOXAPARIN SODIUM 30 MG: 30 INJECTION SUBCUTANEOUS at 08:25

## 2021-04-03 RX ADMIN — CEFEPIME HYDROCHLORIDE 1000 MG: 1 INJECTION, POWDER, FOR SOLUTION INTRAMUSCULAR; INTRAVENOUS at 05:55

## 2021-04-03 RX ADMIN — VANCOMYCIN HYDROCHLORIDE 1000 MG: 1 INJECTION, POWDER, LYOPHILIZED, FOR SOLUTION INTRAVENOUS at 12:45

## 2021-04-03 RX ADMIN — AMIODARONE HYDROCHLORIDE 200 MG: 200 TABLET ORAL at 08:15

## 2021-04-03 RX ADMIN — CARVEDILOL 3.12 MG: 3.12 TABLET, FILM COATED ORAL at 16:44

## 2021-04-03 RX ADMIN — HYOSCYAMINE SULFATE: 16 SOLUTION at 23:06

## 2021-04-03 RX ADMIN — HYOSCYAMINE SULFATE: 16 SOLUTION at 08:29

## 2021-04-03 RX ADMIN — CEFEPIME HYDROCHLORIDE 1000 MG: 1 INJECTION, POWDER, FOR SOLUTION INTRAMUSCULAR; INTRAVENOUS at 17:50

## 2021-04-03 RX ADMIN — Medication 10 ML: at 22:19

## 2021-04-03 RX ADMIN — ASPIRIN 81 MG: 81 TABLET, CHEWABLE ORAL at 08:15

## 2021-04-03 RX ADMIN — Medication 10 ML: at 08:29

## 2021-04-03 RX ADMIN — ROSUVASTATIN 20 MG: 20 TABLET, FILM COATED ORAL at 22:18

## 2021-04-03 RX ADMIN — TICAGRELOR 90 MG: 90 TABLET ORAL at 22:25

## 2021-04-03 RX ADMIN — TICAGRELOR 90 MG: 90 TABLET ORAL at 08:28

## 2021-04-03 ASSESSMENT — PAIN SCALES - GENERAL: PAINLEVEL_OUTOF10: 0

## 2021-04-03 ASSESSMENT — PAIN SCALES - WONG BAKER: WONGBAKER_NUMERICALRESPONSE: 0

## 2021-04-03 NOTE — PROGRESS NOTES
[facility]        Progress Note    Patient Name: Lucas Rubio  MRN: [id number]  YOB: 1940  Date of Evaluation: 4/3/2021    Subjective: Tolerating diet    Post-Operative Day #26      Vital Signs:  Patient Vitals for the past 24 hrs:   BP Temp Temp src Pulse Resp SpO2   21 0813 122/63 97.4 °F (36.3 °C) Oral 70 18 94 %   21 0426 137/63 97.7 °F (36.5 °C) Oral 71 18 91 %   21 0343 -- -- -- 69 -- --   21 0003 121/72 97.4 °F (36.3 °C) Oral 65 18 92 %   21 128/70 97.7 °F (36.5 °C) Oral 67 20 94 %   21 1530 (!) 124/57 97.2 °F (36.2 °C) Oral 67 20 95 %   21 1115 127/67 97.5 °F (36.4 °C) Oral 67 18 97 %      TEMPERATURE HISTORY 24H: Temp (24hrs), Av.5 °F (36.4 °C), Min:97.2 °F (36.2 °C), Max:97.7 °F (36.5 °C)    BLOOD PRESSURE HISTORY: Systolic (70BSK), FAX:018 , Min:118 , XST:812    Diastolic (40KQR), GC, Min:56, Max:72      Intake/Output:  I/O last 3 completed shifts: In: 480 [P.O.:480]  Out: 1250 [Urine:1250]  No intake/output data recorded. Drain/tube Output:       Physical Exam:  General: awake, alert, oriented to  person, place, time  Abdomen: soft, obese, incisional tenderness only  Skin/Wound: right upper quadrant incision open wound bed clean with SS drainage    Labs:  CBC:    Recent Labs     21  1304 21  0437 21  0512   WBC  --  8.6 6.3   HGB 7.2* 7.5* 7.8*   HCT 23.9* 25.1* 26.6*   PLT  --  222 270     BMP:    Recent Labs     21  0437 21  0429 21  0512    140 138   K 3.8 4.0 4.0    112* 110   CO2 20* 19* 19*   BUN 37* 33* 27*   CREATININE 2.4* 2.1* 2.0*   GLUCOSE 102* 110* 105*     Hepatic:    No results for input(s): AST, ALT, ALB, BILITOT, ALKPHOS in the last 72 hours.   Amylase:    Lab Results   Component Value Date    AMYLASE 29 2021    AMYLASE 21 2021    AMYLASE 17 2021     Lipase:    Lab Results   Component Value Date    LIPASE 25.0 11/20/2020      Mag:    Lab Results   Component Value Date    MG 2.50 03/14/2021    MG 2.10 03/04/2021     Phos:     Lab Results   Component Value Date    PHOS 2.8 03/14/2021    PHOS 3.8 05/29/2015      Coags:   Lab Results   Component Value Date    PROTIME 13.7 03/30/2021    INR 1.18 03/30/2021    APTT 27.8 03/30/2021       Cultures:  Anaerobic culture  No results found for: LABANAE  Fungus stain  No results found for requested labs within last 30 days. Gram stain  Results in Past 30 Days  Result Component Current Result Ref Range Previous Result Ref Range   Gram Stain Result 3+ WBC's (Polymorphonuclear)  3+ Gram positive cocci  2+ Gram positive rods  3+ Gram negative rods   (A) (3/31/2021)  Not in Time Range      Organism  Lab Results   Component Value Date/Time    ORG Klebsiella pneumoniae (A) 03/31/2021 02:52 PM    ORG Proteus mirabilis (A) 03/31/2021 02:52 PM    ORG Enterococcus faecalis (A) 03/31/2021 02:52 PM     Surgical culture  No results found for: CXSURG  Blood culture 1  Results in Past 30 Days  Result Component Current Result Ref Range Previous Result Ref Range   Blood Culture, Routine No Growth to date. Any change in status will be called. (3/30/2021)  Not in Time Range      Blood culture 2  Results in Past 30 Days  Result Component Current Result Ref Range Previous Result Ref Range   Culture, Blood 2 No Growth to date. Any change in status will be called. (3/30/2021)  Not in Time Range      Fecal occult  No results found for requested labs within last 30 days. GI bacterial pathogens by PCR  No results found for requested labs within last 30 days. C. difficile  No results found for requested labs within last 30 days. Urine culture  Lab Results   Component Value Date    LABURIN  04/01/2021     <10,000 CFU/ml mixed skin/urogenital aria.  No further workup       Pathology:  OR 3/8/2021--FINAL DIAGNOSIS:     Right colon and small bowel, segmental resection:   - Invasive colonic adenocarcinoma, moderately differentiated. - Margins not involved. - One pericolonic lymph nodes positive for metastatic carcinoma (1/16). Gallbladder, cholecystectomy:   - Chronic cholecystitis, moderate. - Cholelithiasis. - Cholesterolosis. Procedure:  Right hemicolectomy   Tumor Site: Ileocecal valve   Tumor Size: Greatest dimension (centimeter): 4.2        Additional dimensions (centimeters): 3.7 x 0.8   Macroscopic tumor perforation: Not identified   Histologic Type: Adenocarcinoma   Histologic Grade: G2: Moderately   Tumor Extension: Tumor invades through muscularis propria into subserosal   adipose tissue. MARGINS   All margins are uninvolved by invasive carcinoma, high grade dysplasia /   intramucosal carcinoma, and low grade dysplasia   Margins examined: Proximal, distal, and mesenteric    + Distance of invasive carcinoma from closest margin (millimeters or   centimeters): 40 mm    + Specify closest margin: Distal     Treatment Effect (applicable to carcinomas treated with neoadjuvant   therapy): No known pre surgical treatment   Lymph-Vascular Invasion: Not identified   Perineural Invasion: Not identified     + Tumor Budding (Note I)             Low score (0-4)   Type of Polyp in Which Invasive Carcinoma Arose: Not applicable   Tumor deposits: Absent     Regional lymph nodes     Number of Lymph nodes Involved: 1     Number of Lymph Nodes Examined: 16     Pathologic Stage Classification (pTNM, AJCC 8th Edition)     Primary Tumor (pT):      pT 3: Tumor invades through muscularis propria into pericolonic fat     Regional Lymph Nodes (pN):     pN 1a: Metastasis in 1 lymph node     + Additional pathologic findings: Histologically unremarkable appendix. + Ancillary studies: Not applicable     Imaging:  I have personally reviewed the following films:    No results found.     Scheduled Meds:   vancomycin  1,000 mg Intravenous Q24H    cefepime  1,000 mg Intravenous Q12H    lidocaine PF  5 mL Intradermal Once    Sodium Hypochlorite   Irrigation BID    aspirin  81 mg Oral Daily    carvedilol  3.125 mg Oral BID WC    ticagrelor  90 mg Oral BID    rosuvastatin  20 mg Oral Nightly    amiodarone  200 mg Oral Daily    sodium chloride flush  10 mL Intravenous 2 times per day    enoxaparin  30 mg Subcutaneous Daily     Continuous Infusions:   sodium chloride       PRN Meds:.sodium chloride flush, sodium chloride, acetaminophen, HYDROcodone 5 mg - acetaminophen **OR** HYDROcodone 5 mg - acetaminophen, morphine **OR** morphine, promethazine **OR** ondansetron, hydrALAZINE, sodium chloride      Assessment:  68 y.o. female admitted with   1. Postoperative intra-abdominal abscess    2. Severe sepsis ([facility] [address]    3. Malaise    4. Person under investigation for COVID-19    5. YESENIA (acute kidney injury) ([facility] [address]      Ms. Moy Esquivel is a 68 y.o. female who presents with   Incisional, abdominal wall abscess status-post bedside incision and drainage on 3/31/2021  Status-post laparoscopic right colon resection and cholecystectomy on 3/8/2021 for colon mass, chronic cholecystitis with cholelithiasis   Acute kidney injury  Urinary tract infection  Anemia  Hypertension  CAD  Paroxysmal atrial fibrillation  Medical coagulopathy, on Brilinta      Plan:  - continue packing changes twice daily  - culture sensitivities are resulted  - OK to DC from surgery standpoint    Mary Mendiola MD  General Surgery  04/03/21  10:12 AM

## 2021-04-03 NOTE — PROGRESS NOTES
Message sent to Dr. Jyotsna Arreola: Per nephrology's note, would like continuous fluids to be running at 50ml. Okay to restart continuous fluids? Please advise. Awaiting response.

## 2021-04-03 NOTE — PROGRESS NOTES
Am assessment complete, vss, alert and oriented, up In the chair this am, fall precautions in place, am medications given, wet to dry dressing changed this am, clean, and intact, went over plan of care, all questions answered, will continue to monitor.  Chris Gonzalez

## 2021-04-03 NOTE — PLAN OF CARE
Problem: Skin Integrity:  Goal: Will show no infection signs and symptoms  Description: Will show no infection signs and symptoms  Outcome: Ongoing  Goal: Absence of new skin breakdown  Description: Absence of new skin breakdown  Outcome: Ongoing  Goal: Skin integrity will be maintained  Description: Skin integrity will be maintained  Outcome: Ongoing  Goal: Skin integrity will improve  Description: Skin integrity will improve  Outcome: Ongoing     Problem: Pain:  Goal: Pain level will decrease  Description: Pain level will decrease  Outcome: Ongoing  Goal: Control of acute pain  Description: Control of acute pain  Outcome: Ongoing  Goal: Control of chronic pain  Description: Control of chronic pain  Outcome: Ongoing     Problem: Falls - Risk of:  Goal: Will remain free from falls  Description: Will remain free from falls  Outcome: Ongoing  Note: Fall precautions in place: bed locked and in lowest position, bed alarm on, 2/4 side rails raised, non-slip socks on, call light and overhead table within reach, patient knows when to appropriately call out for help.      Goal: Absence of physical injury  Description: Absence of physical injury  Outcome: Ongoing     Problem: Physical Regulation:  Goal: Complications related to the disease process, condition or treatment will be avoided or minimized  Description: Complications related to the disease process, condition or treatment will be avoided or minimized  Outcome: Ongoing     Problem: Tissue Perfusion:  Goal: Ability to maintain adequate tissue perfusion will improve  Description: Ability to maintain adequate tissue perfusion will improve  Outcome: Ongoing

## 2021-04-03 NOTE — PROGRESS NOTES
Routine VSS. Purewick changed at this time. New canister attached to continuous suction. Output recorded under flow sheets. Will continue to monitor.

## 2021-04-03 NOTE — PROGRESS NOTES
Nephrology Consult Note  [phone]  [phone]   [email or web address]        Reason for Consult:  YESENIA  HISTORY OF PRESENT ILLNESS:      The patient is a 68 y.o. female with significant past medical history of CKD stage IIIb, coronary artery disease,  hypertension, mixed hyperlipidemia , atrial Fibrillation was in the hospital in the early part of March and underwent a right hemicolectomy a laparoscopic cholecystectomy. At that time she was being investigated for anemia and found to have a right colonic mass. She was operated on by Dr. Radhames Beasley and discharged home. .  She now presents with 2 to 3 days history of progressive fatigue and tenderness over the wound of abdomen. Denies any foul-smelling discharge or fever or chills or rigors. There is no dysuria gross hematuria  Her baseline creatinine is 1.3 creatinine on admission was 3.2  She was running low blood pressures on admission  She was started on IV antibiotics after cultures were obtained and IV fluids  Dr. Radhames Beasley was consulted and the plan is that there is abdominal wall abscess and incision and drainage will be done      Interval  History:    Feels better  . ROS No CP/SOB/EUBANKS . Poor appetite . ROS neg for rest of the system. No family present     PHYSICAL EXAM:    Vitals:    /63   Pulse 70   Temp 97.4 °F (36.3 °C) (Oral)   Resp 18   Ht 5' (1.524 m)   Wt 220 lb 14.4 oz (100.2 kg)   SpO2 94%   BMI 43.14 kg/m²   I/O last 3 completed shifts: In: 480 [P.O.:480]  Out: 1250 [Urine:1250]  No intake/output data recorded. Physical Exam:  Gen:  alert, oriented x 3  Foul smell in the room   PERRL , EOM +  Pallor +, No icterus  JVP not raised   CV: RRR no murmur or rub . Lungs:B/ L air entry, Normal breath sounds   Abd: soft, bowel sounds + , No organomegaly , LSCS Scar, Dressing Right abdomen, redness of skin   Ext: Trace leg  Edema,Hand edema +   Skin: Warm.   No rash  Neuro: nonfocal.      DATA:    CBC with Differential:    Lab Results   Component Value Date    WBC 6.3 04/03/2021    RBC 3.52 04/03/2021    HGB 7.8 04/03/2021    HCT 26.6 04/03/2021     04/03/2021    MCV 75.6 04/03/2021    MCH 22.1 04/03/2021    MCHC 29.2 04/03/2021    RDW 26.1 04/03/2021    BANDSPCT 14 04/03/2021    METASPCT 1 04/03/2021    LYMPHOPCT 17.0 04/03/2021    MONOPCT 4.0 04/03/2021    BASOPCT 0.0 04/03/2021    MONOSABS 0.3 04/03/2021    LYMPHSABS 1.1 04/03/2021    EOSABS 0.1 04/03/2021    BASOSABS 0.0 04/03/2021     CMP:    Lab Results   Component Value Date     04/03/2021    K 4.0 04/03/2021    K 3.7 03/31/2021     04/03/2021    CO2 19 04/03/2021    BUN 27 04/03/2021    CREATININE 2.0 04/03/2021    GFRAA 29 04/03/2021    AGRATIO 0.7 03/31/2021    LABGLOM 24 04/03/2021    GLUCOSE 105 04/03/2021    PROT 5.2 03/31/2021    LABALBU 2.1 03/31/2021    CALCIUM 8.0 04/03/2021    BILITOT 0.4 03/31/2021    ALKPHOS 103 03/31/2021    AST 10 03/31/2021    ALT 6 03/31/2021     Phosphorus:    Lab Results   Component Value Date    PHOS 2.8 03/14/2021     Uric Acid:  No results found for: LABURIC, URICACID  Troponin:    Lab Results   Component Value Date    TROPONINI <0.01 03/30/2021     U/A:    Lab Results   Component Value Date    COLORU YELLOW 04/01/2021    PROTEINU 30 04/01/2021    PHUR 5.5 04/01/2021    WBCUA 163 04/01/2021    RBCUA >100 04/01/2021    CLARITYU TURBID 04/01/2021    SPECGRAV 1.014 04/01/2021    LEUKOCYTESUR LARGE 04/01/2021    UROBILINOGEN 1.0 04/01/2021    BILIRUBINUR Negative 04/01/2021    BLOODU LARGE 04/01/2021    GLUCOSEU Negative 04/01/2021           IMPRESSION/RECOMMENDATIONS:      1. YESENIA   Pre Renal vs ATN   Ok to dc IVF . Cr stable at 2. 1( was  2.4 2.8, 3.2)     2. CKD stage 3    Sees my partner Dr Emelia White    Baseline Cr 1.2- 1.3     3. Abdominal  wall abcess   On antibiotics    Post  I and D    4. Renal osteodystrophy    Reviewed . 5. Monitor renal function   Advised to F/u with Dr Emelia White in 1-2 wks post dc .       Thank you for allowing me to participate in the care of this patient. I will continue to follow along. Please call with questions.     Kathy Gasca MD. Mindy Never   4/3/2021  [facility]

## 2021-04-03 NOTE — PROGRESS NOTES
Patient's head to toe assessment complete at this time. Routine VSS. Patient resting comfortably in bed with respirations even and unlabored. Patient denies pain at this time. Patient is awake, alert, and oriented x4. All nightly medications given per MAR. Patient's abdominal dressing changed, irrigated with Dakins, and a wet-to dry dressing added. Patient states that she has a burning sensation when Dakins is applied to wound. Patient tolerated fairly well. Patient currently on 2L supplemental oxygen due to desaturations when coughing. No other needs expressed at this time, call light within reach. Will continue to monitor. Fall precautions in place: bed locked and in lowest position, bed alarm on, 2/4 side rails raised, non-slip socks on, overhead table within reach, patient knows when to appropriately call out for help.

## 2021-04-03 NOTE — PROGRESS NOTES
Dr Ayala Hidalgo paged to notify about patient going in to afib few times then back to NSR, rate is staying in the 60's.

## 2021-04-03 NOTE — PROGRESS NOTES
Patient stated that her nose started to bleed throughout the night. Humidification added to oxygen at this time. Will continue to monitor.

## 2021-04-03 NOTE — PROGRESS NOTES
Progress Note - Dr. Ty Harmon - Internal Medicine  PCP: Arturo Donaldson  [address] [facility] [address] [phone]    Hospital Day: 4  Code Status: Full Code  Current Diet: DIET GENERAL;        CC: follow up on medical issues    Subjective: Trinh Kim is a 68 y.o. female. She denies problems    cx updated    Susceptibility    Klebsiella pneumoniae (1)    Antibiotic Interpretation KHALIF Status    amoxicillin-clavulanate Sensitive <=8/4 mcg/mL     ampicillin Resistant >16 mcg/mL     ceFAZolin Sensitive <=2 mcg/mL     cefepime Sensitive <=2 mcg/mL     cefTRIAXone Sensitive <=1 mcg/mL     cefuroxime Sensitive <=4 mcg/mL     ciprofloxacin Sensitive <=1 mcg/mL     ertapenem Sensitive <=0.5 mcg/mL     gentamicin Sensitive <=4 mcg/mL     meropenem Sensitive <=1 mcg/mL     piperacillin-tazobactam Sensitive <=16 mcg/mL     trimethoprim-sulfamethoxazole Sensitive <=2/38 mcg/mL     Proteus mirabilis (2)    Antibiotic Interpretation KHALIF Status    ampicillin Sensitive <=8 mcg/mL     ceFAZolin Sensitive <=2 mcg/mL     cefepime Sensitive <=2 mcg/mL     cefTRIAXone Sensitive <=1 mcg/mL     cefuroxime Sensitive <=4 mcg/mL     ciprofloxacin Sensitive <=1 mcg/mL     ertapenem Sensitive <=0.5 mcg/mL     gentamicin Sensitive <=4 mcg/mL     meropenem Sensitive <=1 mcg/mL     piperacillin-tazobactam Sensitive <=16 mcg/mL     trimethoprim-sulfamethoxazole Sensitive <=2/38 mcg/mL     Enterococcus  faecalis (3)    Antibiotic Interpretation KHALIF Status    ampicillin Sensitive <=2 mcg/mL     vancomycin Sensitive 2 mcg/mL             She denies chest pain, denies shortness of breath, denies nausea,  denies emesis. 10 system Review of Systems is reviewed with patient, and pertinent positives are noted in HPI above . Otherwise, Review of systems is negative. I have reviewed the patient's medical and social history in detail and updated the computerized patient record.   To recap: She  has a past medical history of Anemia, CAD (coronary artery disease), CKD (chronic kidney disease), Hyperlipidemia, and Hypertension. . She  has a past surgical history that includes fracture surgery; Cystocopy (11/21/13); Upper gastrointestinal endoscopy (N/A, 3/5/2021); Colonoscopy (N/A, 3/5/2021); hemicolectomy (Right, 3/8/2021); and Cholecystectomy, laparoscopic (N/A, 3/8/2021). . She  reports that she has never smoked. She has never used smokeless tobacco. She reports that she does not drink alcohol or use drugs. .        Active Hospital Problems    Diagnosis Date Noted    UTI (urinary tract infection) [N39.0] 04/01/2021    Postoperative intra-abdominal abscess [T81.43XA]     YESENIA (acute kidney injury) ([facility] [address] [N17.9] 03/30/2021    Abscess of intestine [K63.0] 03/30/2021    Intra-abdominal abscess ([facility] [address] [K65.1] 03/30/2021    Malignant neoplasm of ascending colon ([facility] [address] [C18.2] 03/07/2021    Anemia [D64.9] 03/03/2021    Hypertension [I10] 01/10/2014       Current Facility-Administered Medications: vancomycin 1000 mg IVPB in 250 mL D5W addavial, 1,000 mg, Intravenous, Q24H  cefepime (MAXIPIME) 1000 mg IVPB minibag, 1,000 mg, Intravenous, Q12H  lidocaine PF 1 % injection 5 mL, 5 mL, Intradermal, Once  Sodium Hypochlorite (DAKINS) 0.25 % external solution, , Irrigation, BID  aspirin chewable tablet 81 mg, 81 mg, Oral, Daily  carvedilol (COREG) tablet 3.125 mg, 3.125 mg, Oral, BID WC  ticagrelor (BRILINTA) tablet 90 mg, 90 mg, Oral, BID  rosuvastatin (CRESTOR) tablet 20 mg, 20 mg, Oral, Nightly  amiodarone (CORDARONE) tablet 200 mg, 200 mg, Oral, Daily  sodium chloride flush 0.9 % injection 10 mL, 10 mL, Intravenous, 2 times per day  sodium chloride flush 0.9 % injection 10 mL, 10 mL, Intravenous, PRN  0.9 % sodium chloride infusion, 25 mL, Intravenous, PRN  acetaminophen (TYLENOL) tablet 650 mg, 650 mg, Oral, Q4H PRN  HYDROcodone-acetaminophen (NORCO) 5-325 MG per tablet 1 tablet, 1 tablet, Oral, Q4H PRN **OR** HYDROcodone-acetaminophen (NORCO) 5-325 MG per tablet 2 tablet, 2 tablet, Oral, Q4H PRN  morphine (PF) injection 2 mg, 2 mg, Intravenous, Q2H PRN **OR** morphine injection 4 mg, 4 mg, Intravenous, Q2H PRN  promethazine (PHENERGAN) tablet 12.5 mg, 12.5 mg, Oral, Q6H PRN **OR** ondansetron (ZOFRAN) injection 4 mg, 4 mg, Intravenous, Q6H PRN  enoxaparin (LOVENOX) injection 30 mg, 30 mg, Subcutaneous, Daily  hydrALAZINE (APRESOLINE) injection 10 mg, 10 mg, Intravenous, Q6H PRN  0.9 % sodium chloride bolus, 500 mL, Intravenous, PRN         Objective:  /63   Pulse 70   Temp 97.4 °F (36.3 °C) (Oral)   Resp 18   Ht 5' (1.524 m)   Wt 220 lb 14.4 oz (100.2 kg)   SpO2 94%   BMI 43.14 kg/m²      Patient Vitals for the past 24 hrs:   BP Temp Temp src Pulse Resp SpO2   04/03/21 0813 122/63 97.4 °F (36.3 °C) Oral 70 18 94 %   04/03/21 0426 137/63 97.7 °F (36.5 °C) Oral 71 18 91 %   04/03/21 0343 -- -- -- 69 -- --   04/03/21 0003 121/72 97.4 °F (36.3 °C) Oral 65 18 92 %   04/02/21 2023 128/70 97.7 °F (36.5 °C) Oral 67 20 94 %   04/02/21 1530 (!) 124/57 97.2 °F (36.2 °C) Oral 67 20 95 %   04/02/21 1115 127/67 97.5 °F (36.4 °C) Oral 67 18 97 %     Patient Vitals for the past 96 hrs (Last 3 readings):   Weight   03/31/21 0553 220 lb 14.4 oz (100.2 kg)   03/30/21 1641 212 lb 12.8 oz (96.5 kg)           Intake/Output Summary (Last 24 hours) at 4/3/2021 0840  Last data filed at 4/3/2021 0556  Gross per 24 hour   Intake 480 ml   Output 1250 ml   Net -770 ml         Physical Exam:   /63   Pulse 70   Temp 97.4 °F (36.3 °C) (Oral)   Resp 18   Ht 5' (1.524 m)   Wt 220 lb 14.4 oz (100.2 kg)   SpO2 94%   BMI 43.14 kg/m²   General appearance: alert, appears stated age and cooperative  Head: Normocephalic, without obvious abnormality, atraumatic  Lungs: clear to auscultation bilaterally  Heart: regular rate and rhythm, S1, S2 normal, no murmur, click, rub or gallop  Abdomen: soft, non-tender; bowel sounds normal; no masses,  no organomegaly  Extremities: extremities normal, atraumatic, no cyanosis or edema    Labs:  Lab Results   Component Value Date    WBC 6.3 04/03/2021    HGB 7.8 (L) 04/03/2021    HCT 26.6 (L) 04/03/2021     04/03/2021    CHOL 155 11/21/2020    TRIG 121 11/21/2020    HDL 38 (L) 11/21/2020    ALT 6 (L) 03/31/2021    AST 10 (L) 03/31/2021     04/03/2021    K 4.0 04/03/2021     04/03/2021    CREATININE 2.0 (H) 04/03/2021    BUN 27 (H) 04/03/2021    CO2 19 (L) 04/03/2021    INR 1.18 (H) 03/30/2021    LABMICR YES 04/01/2021     Lab Results   Component Value Date    CKTOTAL 41 03/11/2021    TROPONINI <0.01 03/30/2021       Recent Imaging Results are Reviewed:  Echo Complete    Result Date: 3/6/2021  Transthoracic Echocardiography Report (TTE)  Demographics   Patient Name        Melia Peterson   Date of Study       03/06/2021  Gender                 Female   Patient Number      [id number]  Date of Birth          1940   Visit Number        [id number]   Age                    68 year(s)   Accession Number    [id number]  Room Number            8003   Corporate ID        [id number]    Sonographer            Ila De Leon RDCS,                                                         T   Ordering Physician              Interpreting Physician David Pierre MD,                                                         [facility] [place], [address]  Procedure Type of Study   TTE procedure:ECHOCARDIOGRAM COMPLETE 2D W DOPPLER W COLOR. Procedure Date Date: 03/06/2021 Start: 09:12 AM Study Location: [facility] - Echo Lab Technical Quality: Good visualization Additional Indications:NSTEMI, Leg edema, CAD. Patient Status: Routine Height: 60 inches Weight: 220 pounds BSA: 1.94 m2 BMI: 42.97 kg/m2 BP: 134/75 mmHg  Conclusions   Summary  Normal left ventricle size, and systolic function with an estimated ejection  fraction of 55-60%. Mild concentric left ventricular hypertrophy is present.   No regional wall motion abnormalities are seen. Mild tricuspid regurgitation, RVSP is estimated at 38 mmhg. Signature   ------------------------------------------------------------------  Electronically signed by Lisy Jaime MD, [facility] [place], [address]  (Interpreting physician) on 03/06/2021 at 12:12 PM  ------------------------------------------------------------------   Findings   Left Ventricle  Normal left ventricle size, and systolic function with an estimated ejection  fraction of 55-60%. Mild concentric left ventricular hypertrophy is present. No regional wall motion abnormalities are seen. Grade I diastolic dysfunction with normal LV filling pressures. Mitral Valve  Calcification of the mitral valve noted. No evidence of mitral regurgitation. Left Atrium  The left atrium is normal in size. Aortic Valve  The aortic valve is structurally normal. There is no significant aortic  valve regurgitation or stenosis. Aorta  The aortic root is normal in size. Right Ventricle  The right ventricle is normal in size and function. Tricuspid Valve  The tricuspid valve is normal in structure. Mild tricuspid regurgitation, RVSP is estimated at 38 mmhg. Right Atrium  The right atrial size is normal.   Pulmonic Valve  The pulmonic valve is not well visualized. Mild pulmonic regurgitation present. Pericardial Effusion  No pericardial effusion noted. Pleural Effusion  No pleural effusion. Miscellaneous  The inferior vena cava appears normal in size with normal respiratory  variation.   M-Mode/2D Measurements (cm)   LV Diastolic Dimension: 4.5 cm  LV Systolic Dimension: 6.84 cm  LV Septum Diastolic: 7.02 cm  LV PW Diastolic: 1.5 cm         AO Root Dimension: 3.1 cm  RV Diastolic Dimension: 0.71 cm LA Dimension: 3.4 cm                                  LA Area: 11.7 cm2                                  LA volume/Index: 21.6 ml /11 ml/m2  Doppler Measurements   AV Peak Velocity: 194 cm/s     MV Peak E-Wave: 98.5 cm/s  AV Peak Gradient: 15.05 mmHg   MV Peak A-Wave: 89.5 cm/s  AV Mean Gradient: 9 mmHg       MV E/A Ratio: 1.1  LVOT Peak Velocity: 122 cm/s   MV Mean Gradient: 3 mmHg                                 MV Max P mmHg  TR Velocity:290 cm/s           MV Vmax:125 cm/s  TR Gradient:33.64 mmHg         MV VTI:51.9 cm/s   E' Septal Velocity: 6.2 cm/s   MV Deceleration Time: 215 msec  E' Lateral Velocity: 5.98 cm/s  PV Peak Velocity: 123 cm/s  PV Peak Gradient: 6.05 mmHg   Aortic Valve   Peak Velocity: 194 cm/s   Mean Velocity: 141 cm/s  Peak Gradient: 15.05 mmHg Mean Gradient: 9 mmHg  AV VTI: 43.8 cm  Aorta   Aortic Root: 3.1 cm      Ct Abdomen Pelvis Wo Contrast Additional Contrast? None    Result Date: 3/11/2021  EXAMINATION: CT OF THE ABDOMEN AND PELVIS WITHOUT CONTRAST 3/11/2021 12:05 pm TECHNIQUE: CT of the abdomen and pelvis was performed without the administration of intravenous contrast. Multiplanar reformatted images are provided for review. Dose modulation, iterative reconstruction, and/or weight based adjustment of the mA/kV was utilized to reduce the radiation dose to as low as reasonably achievable. COMPARISON: CT of the chest abdomen and pelvis 2020 HISTORY: ORDERING SYSTEM PROVIDED HISTORY: RUQ pain TECHNOLOGIST PROVIDED HISTORY: Reason for exam:->RUQ pain Additional Contrast?->None Reason for Exam: RUQ pain Acuity: Unknown Type of Exam: Unknown FINDINGS: Lower Chest: Increased mild-moderate right pleural effusion with adjacent compressive atelectasis. Unchanged trace amount of left pleural fluid. Organs: In the gallbladder fossa there is a fluid collection measuring 4.2 x 2.3 by 2.4 cm. Along the inferior-lateral aspect of the fluid collection a few tiny curvilinear/rounded high-density foci are present measuring 3 mm and less in size best shown on coronal image 90. Cholecystectomy clips noted in the expected location. There is a small amount of fluid along the inferior margin of the right hepatic lobe anterior laterally.   Small amount of fluid between the diaphragm and liver also present. No pancreatitis. No ureteral stone or hydronephrosis. 2 mm right renal stone again noted. GI/Bowel: There is new fluid-filled small bowel dilation involving proximal to mid small bowel loops measuring up to 3 cm. There is distal small bowel collapse extending to the surgical site. Oral contrast within the colon from the oral contrast given for the comparison. No oral contrast within small bowel at this time. Pelvis: No acute abnormality of the pelvis. Normal appearance of the bladder. Peritoneum/Retroperitoneum: Small amount of free fluid in the right upper quadrant as discussed. No free air. Bones/Soft Tissues: Expected postsurgical changes of the abdominal wall. No acute osseous findings. New fluid collection in the gallbladder fossa with adjacent small amount of fluid around the liver. This may be residual fluid from the recent surgery or related to biliary leak. Consider HIDA scan evaluation There are 2-3 tiny gallstones within the gallbladder fossa fluid collection, measuring 3 mm and less in size. Increased size of mild-moderate right pleural effusion with increased adjacent compressive atelectasis of the right lung base. Xr Chest (2 Vw)    Result Date: 3/30/2021  EXAMINATION: TWO XRAY VIEWS OF THE CHEST 3/30/2021 5:22 pm COMPARISON: 03/12/2021 radiograph HISTORY: ORDERING SYSTEM PROVIDED HISTORY: Chest Discomfort TECHNOLOGIST PROVIDED HISTORY: Reason for exam:->Chest Discomfort Reason for Exam: Hypotension (Pt reports low BP, 93/41. ) Acuity: Acute Type of Exam: Initial FINDINGS: The heart is mildly enlarged. Mediastinum and pulmonary vascularity are normal.  Enteric tube has been removed since prior radiograph. There is improved aeration of the lungs. No acute airspace abnormality with lucency suggesting underlying COPD. No significant skeletal finding.      No significant finding in the chest.     Nm Hepatobiliary    Result Exam: ng tube placement Acuity: Unknown Type of Exam: Unknown FINDINGS: AP portable view of the chest time stamped at 1752 hours overlying cardiac monitoring electrodes are noted. Intestinal tube extends to the distal stomach. Heart is stable, mildly enlarged. Elevated hemidiaphragm is noted. There is left perihilar stranding likely atelectasis. No extrapleural air. Intestinal tube terminates in the distal stomach. Other findings as above. Ct Chest Abdomen Pelvis Wo Contrast    Result Date: 3/30/2021  EXAMINATION: CT OF THE CHEST, ABDOMEN, AND PELVIS WITHOUT CONTRAST 3/30/2021 3:26 pm TECHNIQUE: CT of the chest, abdomen and pelvis was performed without the administration of intravenous contrast. Multiplanar reformatted images are provided for review. Dose modulation, iterative reconstruction, and/or weight based adjustment of the mA/kV was utilized to reduce the radiation dose to as low as reasonably achievable. COMPARISON: Abdomen and pelvis CT, 03/11/2021 CT chest abdomen pelvis, 03/05/2021 HISTORY: ORDERING SYSTEM PROVIDED HISTORY: r/o penumonia, recent surgery TECHNOLOGIST PROVIDED HISTORY: Reason for exam:->r/o penumonia, recent surgery Additional Contrast?->None Decision Support Exception->Emergency Medical Condition (MA) Reason for Exam: r/o penumonia, recent surgery Acuity: Acute Type of Exam: Initial FINDINGS: Chest: Mediastinum: Visualized thyroid is unremarkable. Mildly enlarged mediastinal lymph nodes have decreased in size when compared to the previous exam, and are most compatible with reactive lymph nodes. Esophagus is unremarkable. Noncontrast imaging of the cardiac chambers, thoracic aorta, and pulmonary arteries is unremarkable. Lungs/pleura: Calcified lesion in the left lower lobe is again seen, compatible with old granulomatous disease, found as far back as 2014, and requires no specific follow-up. A focal infiltrate is not detected. The airways are patent. No pneumothorax.   No pleural effusion. Soft Tissues/Bones: Visualized extra thoracic soft tissues are unremarkable. No acute or suspicious bony abnormality is identified. Severe degenerative disease within the glenohumeral joints is noted bilaterally, especially on the right. Abdomen/Pelvis: Lack of intravenous contrast limits evaluation of the solid abdominal viscera, the hollow abdominal viscera, and the vascular structures. Organs: Having said that, no acute hepatic abnormality is identified. The gallbladder is surgically absent. Noncontrast imaging of the spleen, adrenals, and pancreas is unremarkable. Nonobstructing nephrolithiasis is noted within the right kidney. GI/Bowel: There has been development of a very large gas and fluid collection within the right abdominal wall measuring 12 cm maximally (series 2, image 132). There is evidence of extension of this collection into the peritoneal cavity (as visualized on axial images 140 to through 154. The ileocolic anastomosis is very close to this collection within the peritoneum. It would be difficult to entirely exclude communication between bowel and this collection. The patient status post partial colectomy. The remainder of the large bowel is unremarkable. No evidence of small bowel obstruction. There is no evidence of bowel obstruction. Stomach and duodenal sweep are unremarkable. Pelvis: Ovaries are prominent bilaterally, but those are stable dating back to 2013. No suspicious adnexal lesions are found. Uterus unremarkable. Urinary bladder is unremarkable. Peritoneum/Retroperitoneum: Abdominal aorta normal in caliber. No retroperitoneal lymphadenopathy. Bones/Soft Tissues: No osteolytic or osteoblastic bone lesions are seen. No acute bony abnormalities are detected. Chest CT: No acute abnormality detected.  Abdomen and pelvis CT: Interval development of a large gas and fluid collection within the right abdominal wall measuring up to 12 cm, most compatible with fluid Gallbladder appears normal No peripancreatic inflammatory change GI/Bowel: No significant small bowel distention noted. Mild stool load seen in the colon. Scattered colonic diverticula are seen. There is focal wall thickening of the colon on the right, extending over a length of 3.7 cm. There is surrounding injection the Nadja colonic fat. Small pericolonic lymph node is seen in the right upper quadrant mesentery measuring 8 mm. Pelvis: No free fluid seen within the pelvis. Pickard catheter is seen in the bladder Left adnexal mass is seen measuring 4.4 cm x 3.5 cm previously 3.9 cm by 3.3 cm. Right adnexal nodule measures 3.2 x 3.9 cm, previously 2.5 x 3.6 cm Peritoneum/Retroperitoneum: Small retroperitoneal nodes are seen. Small lymph nodes are seen along the iliac chains. No aortic aneurysm. Atherosclerotic change is seen in aorta and iliacs. Bones/Soft Tissues: Spurring is seen in the spine. Spurring is seen in the hips. Tiny periumbilical hernia containing fat is seen     Within the chest, small mediastinal nodes are seen, slightly increased compared to prior, either reactive or metastatic. Small pleural effusions are seen, compatible with mild fluid overload. Stable lobular pulmonary nodule in the left lower lobe. Nodular wall thickening of the colon on the right, compatible with the given history of colon mass. Small mesenteric node is seen in this region,, likely early desiree metastasis Increase in size of ovarian-adnexal masses on the right and left. Consider pelvic ultrasound for further characterization. Tiny nonobstructing right renal stone       Assessment and Plan:  Principal Problem:    YESENIA (acute kidney injury) ([address] -Established problem. Improving Creat 2.0  Plan: cont fluids, cont to follow  Active Problems:    Hypertension -Established problem. Stable. 122/63  Plan: stay on same meds    Anemia - Established problem. Stable.  hgb 7.8  Plan: No indication for transfusion.  Cont to monitor h/h to assess progression of anemia. Recommend ferrous sulfate or MVI as outpatient. Malignant neoplasm of ascending colon ([address] -Established problem. Stable. Plan: per onc    Abscess of intestine -Established problem. Stable. Drain in  Plan: cont drainage. Cont empiric iv abx    Intra-abdominal abscess ([address]  Plan: cont drainage. Cont empiric iv abx    Postoperative intra-abdominal abscess    UTI (urinary tract infection)  Plan: cont empiric abx      Disp - pt refusing snf. So will have to set up home care.   Will d/w surgery if they need iv abx on d/c          Sharon Lopez  4/3/2021

## 2021-04-04 ENCOUNTER — APPOINTMENT (OUTPATIENT)
Dept: GENERAL RADIOLOGY | Age: 81
DRG: 862 | End: 2021-04-04
Payer: MEDICARE

## 2021-04-04 LAB
ANION GAP SERPL CALCULATED.3IONS-SCNC: 7 MMOL/L (ref 3–16)
ANISOCYTOSIS: ABNORMAL
BANDED NEUTROPHILS RELATIVE PERCENT: 6 % (ref 0–7)
BASOPHILS ABSOLUTE: 0 K/UL (ref 0–0.2)
BASOPHILS RELATIVE PERCENT: 0 %
BUN BLDV-MCNC: 24 MG/DL (ref 7–20)
CALCIUM SERPL-MCNC: 8.1 MG/DL (ref 8.3–10.6)
CHLORIDE BLD-SCNC: 107 MMOL/L (ref 99–110)
CO2: 22 MMOL/L (ref 21–32)
CREAT SERPL-MCNC: 1.8 MG/DL (ref 0.6–1.2)
EOSINOPHILS ABSOLUTE: 0.1 K/UL (ref 0–0.6)
EOSINOPHILS RELATIVE PERCENT: 1 %
GFR AFRICAN AMERICAN: 33
GFR NON-AFRICAN AMERICAN: 27
GLUCOSE BLD-MCNC: 108 MG/DL (ref 70–99)
HCT VFR BLD CALC: 25.6 % (ref 36–48)
HEMOGLOBIN: 7.6 G/DL (ref 12–16)
HYPOCHROMIA: ABNORMAL
LYMPHOCYTES ABSOLUTE: 1.1 K/UL (ref 1–5.1)
LYMPHOCYTES RELATIVE PERCENT: 16 %
MCH RBC QN AUTO: 22.2 PG (ref 26–34)
MCHC RBC AUTO-ENTMCNC: 29.7 G/DL (ref 31–36)
MCV RBC AUTO: 74.8 FL (ref 80–100)
MICROCYTES: ABNORMAL
MONOCYTES ABSOLUTE: 0.2 K/UL (ref 0–1.3)
MONOCYTES RELATIVE PERCENT: 3 %
MYELOCYTE PERCENT: 1 %
NEUTROPHILS ABSOLUTE: 5.3 K/UL (ref 1.7–7.7)
NEUTROPHILS RELATIVE PERCENT: 73 %
PDW BLD-RTO: 25.7 % (ref 12.4–15.4)
PLATELET # BLD: 270 K/UL (ref 135–450)
PLATELET SLIDE REVIEW: ADEQUATE
PMV BLD AUTO: 7.7 FL (ref 5–10.5)
POIKILOCYTES: ABNORMAL
POTASSIUM SERPL-SCNC: 4.1 MMOL/L (ref 3.5–5.1)
RBC # BLD: 3.42 M/UL (ref 4–5.2)
SLIDE REVIEW: ABNORMAL
SODIUM BLD-SCNC: 136 MMOL/L (ref 136–145)
TOXIC GRANULATION: PRESENT
WBC # BLD: 6.6 K/UL (ref 4–11)

## 2021-04-04 PROCEDURE — 6370000000 HC RX 637 (ALT 250 FOR IP): Performed by: STUDENT IN AN ORGANIZED HEALTH CARE EDUCATION/TRAINING PROGRAM

## 2021-04-04 PROCEDURE — 2700000000 HC OXYGEN THERAPY PER DAY

## 2021-04-04 PROCEDURE — 36415 COLL VENOUS BLD VENIPUNCTURE: CPT

## 2021-04-04 PROCEDURE — 94760 N-INVAS EAR/PLS OXIMETRY 1: CPT

## 2021-04-04 PROCEDURE — 85025 COMPLETE CBC W/AUTO DIFF WBC: CPT

## 2021-04-04 PROCEDURE — 6370000000 HC RX 637 (ALT 250 FOR IP): Performed by: NURSE PRACTITIONER

## 2021-04-04 PROCEDURE — 6360000002 HC RX W HCPCS: Performed by: INTERNAL MEDICINE

## 2021-04-04 PROCEDURE — 80048 BASIC METABOLIC PNL TOTAL CA: CPT

## 2021-04-04 PROCEDURE — 1200000000 HC SEMI PRIVATE

## 2021-04-04 PROCEDURE — 2580000003 HC RX 258: Performed by: INTERNAL MEDICINE

## 2021-04-04 PROCEDURE — 6370000000 HC RX 637 (ALT 250 FOR IP): Performed by: INTERNAL MEDICINE

## 2021-04-04 PROCEDURE — 71045 X-RAY EXAM CHEST 1 VIEW: CPT

## 2021-04-04 RX ORDER — AMOXICILLIN AND CLAVULANATE POTASSIUM 875; 125 MG/1; MG/1
1 TABLET, FILM COATED ORAL EVERY 12 HOURS SCHEDULED
Status: DISCONTINUED | OUTPATIENT
Start: 2021-04-04 | End: 2021-04-07 | Stop reason: HOSPADM

## 2021-04-04 RX ORDER — ALBUTEROL SULFATE 2.5 MG/3ML
2.5 SOLUTION RESPIRATORY (INHALATION) EVERY 6 HOURS PRN
Status: DISCONTINUED | OUTPATIENT
Start: 2021-04-04 | End: 2021-04-07 | Stop reason: HOSPADM

## 2021-04-04 RX ORDER — ONDANSETRON 4 MG/1
4 TABLET, ORALLY DISINTEGRATING ORAL ONCE
Status: COMPLETED | OUTPATIENT
Start: 2021-04-04 | End: 2021-04-04

## 2021-04-04 RX ADMIN — AMOXICILLIN AND CLAVULANATE POTASSIUM 1 TABLET: 875; 125 TABLET, FILM COATED ORAL at 13:45

## 2021-04-04 RX ADMIN — AMIODARONE HYDROCHLORIDE 200 MG: 200 TABLET ORAL at 07:55

## 2021-04-04 RX ADMIN — ROSUVASTATIN 20 MG: 20 TABLET, FILM COATED ORAL at 21:28

## 2021-04-04 RX ADMIN — TICAGRELOR 90 MG: 90 TABLET ORAL at 21:27

## 2021-04-04 RX ADMIN — ONDANSETRON 4 MG: 4 TABLET, ORALLY DISINTEGRATING ORAL at 17:26

## 2021-04-04 RX ADMIN — HYOSCYAMINE SULFATE: 16 SOLUTION at 21:28

## 2021-04-04 RX ADMIN — HYOSCYAMINE SULFATE: 16 SOLUTION at 10:15

## 2021-04-04 RX ADMIN — CEFEPIME HYDROCHLORIDE 1000 MG: 1 INJECTION, POWDER, FOR SOLUTION INTRAMUSCULAR; INTRAVENOUS at 06:53

## 2021-04-04 RX ADMIN — ENOXAPARIN SODIUM 30 MG: 30 INJECTION SUBCUTANEOUS at 07:55

## 2021-04-04 RX ADMIN — TICAGRELOR 90 MG: 90 TABLET ORAL at 07:54

## 2021-04-04 RX ADMIN — ASPIRIN 81 MG: 81 TABLET, CHEWABLE ORAL at 07:55

## 2021-04-04 RX ADMIN — CARVEDILOL 3.12 MG: 3.12 TABLET, FILM COATED ORAL at 07:55

## 2021-04-04 ASSESSMENT — PAIN SCALES - GENERAL: PAINLEVEL_OUTOF10: 0

## 2021-04-04 ASSESSMENT — PAIN SCALES - WONG BAKER
WONGBAKER_NUMERICALRESPONSE: 0

## 2021-04-04 NOTE — PROGRESS NOTES
[facility]        Progress Note    Patient Name: Andrey Beebe  MRN: [id number]  YOB: 1940  Date of Evaluation: 2021    Subjective: Tolerating diet  On oxygen  No abdominal pain    Post-Operative Day #27      Vital Signs:  Patient Vitals for the past 24 hrs:   BP Temp Temp src Pulse Resp SpO2   21 0858 (!) 147/64 98 °F (36.7 °C) Oral 78 18 94 %   21 0745 (!) 170/77 97.9 °F (36.6 °C) Oral 77 18 95 %   21 0600 -- -- -- 68 -- --   21 0400 (!) 151/69 97.3 °F (36.3 °C) Oral 63 20 95 %   21 0225 -- -- -- 62 -- --   21 0025 138/72 97.2 °F (36.2 °C) Oral 61 20 96 %   21 2200 -- -- -- 62 -- --   21 2145 135/70 97.8 °F (36.6 °C) Oral 59 16 95 %   21 1801 121/67 98 °F (36.7 °C) Axillary 64 16 95 %   21 1644 136/63 -- -- 60 -- --   21 1327 121/63 97.9 °F (36.6 °C) Oral 68 16 94 %      TEMPERATURE HISTORY 24H: Temp (24hrs), Av.7 °F (36.5 °C), Min:97.2 °F (36.2 °C), Max:98 °F (36.7 °C)    BLOOD PRESSURE HISTORY: Systolic (35DDH), VSD:161 , Min:121 , UEL:367    Diastolic (27WAM), CLK:14, Min:63, Max:77      Intake/Output:  I/O last 3 completed shifts: In: 500 [P.O.:500]  Out: 550 [Urine:550]  I/O this shift:  In: -   Out: 300 [Urine:300]  Drain/tube Output:       Physical Exam:  General: awake, alert, oriented to  person, place, time  Abdomen: soft, obese, incisional tenderness only  Skin/Wound: right upper quadrant incision open wound bed clean with SS drainage    Labs:  CBC:    Recent Labs     21  0512 21  0630   WBC 6.3 6.6   HGB 7.8* 7.6*   HCT 26.6* 25.6*    270     BMP:    Recent Labs     21  0429 21  0512 21  0630    138 136   K 4.0 4.0 4.1   * 110 107   CO2 19* 19* 22   BUN 33* 27* 24*   CREATININE 2.1* 2.0* 1.8*   GLUCOSE 110* 105* 108*     Hepatic:    No results for input(s): AST, ALT, ALB, BILITOT, ALKPHOS in the last 72 hours.   Amylase: Lab Results   Component Value Date    AMYLASE 29 04/02/2021    AMYLASE 21 04/01/2021    AMYLASE 17 03/31/2021     Lipase:    Lab Results   Component Value Date    LIPASE 25.0 11/20/2020      Mag:    Lab Results   Component Value Date    MG 2.50 03/14/2021    MG 2.10 03/04/2021     Phos:     Lab Results   Component Value Date    PHOS 2.8 03/14/2021    PHOS 3.8 05/29/2015      Coags:   Lab Results   Component Value Date    PROTIME 13.7 03/30/2021    INR 1.18 03/30/2021    APTT 27.8 03/30/2021       Cultures:  Anaerobic culture  No results found for: LABANAE  Fungus stain  No results found for requested labs within last 30 days. Gram stain  Results in Past 30 Days  Result Component Current Result Ref Range Previous Result Ref Range   Gram Stain Result 3+ WBC's (Polymorphonuclear)  3+ Gram positive cocci  2+ Gram positive rods  3+ Gram negative rods   (A) (3/31/2021)  Not in Time Range      Organism  Lab Results   Component Value Date/Time    ORG Klebsiella pneumoniae (A) 03/31/2021 02:52 PM    ORG Proteus mirabilis (A) 03/31/2021 02:52 PM    ORG Enterococcus faecalis (A) 03/31/2021 02:52 PM     Surgical culture  No results found for: CXSURG  Blood culture 1  Results in Past 30 Days  Result Component Current Result Ref Range Previous Result Ref Range   Blood Culture, Routine No Growth after 4 days of incubation. (3/30/2021)  Not in Time Range      Blood culture 2  Results in Past 30 Days  Result Component Current Result Ref Range Previous Result Ref Range   Culture, Blood 2 No Growth after 4 days of incubation. (3/30/2021)  Not in Time Range      Fecal occult  No results found for requested labs within last 30 days. GI bacterial pathogens by PCR  No results found for requested labs within last 30 days. C. difficile  No results found for requested labs within last 30 days. Urine culture  Lab Results   Component Value Date    LABURIN  04/01/2021     <10,000 CFU/ml mixed skin/urogenital aria.  No further found.    Scheduled Meds:   vancomycin  1,000 mg Intravenous Q24H    cefepime  1,000 mg Intravenous Q12H    lidocaine PF  5 mL Intradermal Once    Sodium Hypochlorite   Irrigation BID    aspirin  81 mg Oral Daily    carvedilol  3.125 mg Oral BID WC    ticagrelor  90 mg Oral BID    rosuvastatin  20 mg Oral Nightly    amiodarone  200 mg Oral Daily    sodium chloride flush  10 mL Intravenous 2 times per day    enoxaparin  30 mg Subcutaneous Daily     Continuous Infusions:   sodium chloride       PRN Meds:.sodium chloride flush, sodium chloride, acetaminophen, HYDROcodone 5 mg - acetaminophen **OR** HYDROcodone 5 mg - acetaminophen, morphine **OR** morphine, promethazine **OR** ondansetron, hydrALAZINE, sodium chloride      Assessment:  68 y.o. female admitted with   1. Postoperative intra-abdominal abscess    2. Severe sepsis ([address]    3. Malaise    4. Person under investigation for COVID-19    5. YESENIA (acute kidney injury) ([address]      Ms. Dara Angeles is a 68 y.o. female who presents with   Incisional, abdominal wall abscess status-post bedside incision and drainage on 3/31/2021  Status-post laparoscopic right colon resection and cholecystectomy on 3/8/2021 for colon mass, chronic cholecystitis with cholelithiasis   Acute kidney injury  Urinary tract infection  Anemia  Hypertension  CAD  Paroxysmal atrial fibrillation  Medical coagulopathy, on Brilinta      Plan:  - continue packing changes twice daily  - culture sensitivities are resulted   Will switch to augmentin for 7 days  - will get CXR and order breathing tx for productive cough  - should be able to go home by tomorrow as long as respiratory status is ok   Will defer to primary team    Nima Kearns MD  General Surgery  04/04/21  10:18 AM

## 2021-04-04 NOTE — PROGRESS NOTES
Differential:    Lab Results   Component Value Date    WBC 6.6 04/04/2021    RBC 3.42 04/04/2021    HGB 7.6 04/04/2021    HCT 25.6 04/04/2021     04/04/2021    MCV 74.8 04/04/2021    MCH 22.2 04/04/2021    MCHC 29.7 04/04/2021    RDW 25.7 04/04/2021    BANDSPCT 6 04/04/2021    METASPCT 1 04/03/2021    LYMPHOPCT 16.0 04/04/2021    MONOPCT 3.0 04/04/2021    MYELOPCT 1 04/04/2021    BASOPCT 0.0 04/04/2021    MONOSABS 0.2 04/04/2021    LYMPHSABS 1.1 04/04/2021    EOSABS 0.1 04/04/2021    BASOSABS 0.0 04/04/2021     CMP:    Lab Results   Component Value Date     04/04/2021    K 4.1 04/04/2021    K 3.7 03/31/2021     04/04/2021    CO2 22 04/04/2021    BUN 24 04/04/2021    CREATININE 1.8 04/04/2021    GFRAA 33 04/04/2021    AGRATIO 0.7 03/31/2021    LABGLOM 27 04/04/2021    GLUCOSE 108 04/04/2021    PROT 5.2 03/31/2021    LABALBU 2.1 03/31/2021    CALCIUM 8.1 04/04/2021    BILITOT 0.4 03/31/2021    ALKPHOS 103 03/31/2021    AST 10 03/31/2021    ALT 6 03/31/2021     Phosphorus:    Lab Results   Component Value Date    PHOS 2.8 03/14/2021     Uric Acid:  No results found for: LABURIC, URICACID  Troponin:    Lab Results   Component Value Date    TROPONINI <0.01 03/30/2021     U/A:    Lab Results   Component Value Date    COLORU YELLOW 04/01/2021    PROTEINU 30 04/01/2021    PHUR 5.5 04/01/2021    WBCUA 163 04/01/2021    RBCUA >100 04/01/2021    CLARITYU TURBID 04/01/2021    SPECGRAV 1.014 04/01/2021    LEUKOCYTESUR LARGE 04/01/2021    UROBILINOGEN 1.0 04/01/2021    BILIRUBINUR Negative 04/01/2021    BLOODU LARGE 04/01/2021    GLUCOSEU Negative 04/01/2021           IMPRESSION/RECOMMENDATIONS:      1. YESENIA   Pre Renal vs ATN   Ok to dc IVF . Renal function improving , Cr 1.8 mg/dl ( was  2.4 2.8, 3.2)     2. CKD stage 3    Sees my partner Dr Bandar Perez    Baseline Cr 1.2- 1.3     3. Abdominal  wall abcess   On antibiotics    Post  I and D    4. Renal osteodystrophy    Reviewed .    5. Monitor renal function   Advised to F/u with Dr Claudean Clas in 1-2 wks post dc . Thank you for allowing me to participate in the care of this patient. I will continue to follow along. Please call with questions.     Ailin Wren MD. [address]   4/4/2021  [facility] Yes

## 2021-04-04 NOTE — PROGRESS NOTES
Progress Note - Dr. Lita Castro - Internal Medicine  PCP: Trell Beckwith  [address] [place] [address] [facility]  [phone]    Hospital Day: 5  Code Status: Full Code  Current Diet: DIET GENERAL;        CC: follow up on medical issues    Subjective: Madai High is a 68 y.o. female. She denies problems  Drain still in; fairly decent output still      cx results reviewed    Susceptibility    Klebsiella pneumoniae (1)    Antibiotic Interpretation KHALIF Status    amoxicillin-clavulanate Sensitive <=8/4 mcg/mL     ampicillin Resistant >16 mcg/mL     ceFAZolin Sensitive <=2 mcg/mL     cefepime Sensitive <=2 mcg/mL     cefTRIAXone Sensitive <=1 mcg/mL     cefuroxime Sensitive <=4 mcg/mL     ciprofloxacin Sensitive <=1 mcg/mL     ertapenem Sensitive <=0.5 mcg/mL     gentamicin Sensitive <=4 mcg/mL     meropenem Sensitive <=1 mcg/mL     piperacillin-tazobactam Sensitive <=16 mcg/mL     trimethoprim-sulfamethoxazole Sensitive <=2/38 mcg/mL     Proteus mirabilis (2)    Antibiotic Interpretation KHALIF Status    ampicillin Sensitive <=8 mcg/mL     ceFAZolin Sensitive <=2 mcg/mL     cefepime Sensitive <=2 mcg/mL     cefTRIAXone Sensitive <=1 mcg/mL     cefuroxime Sensitive <=4 mcg/mL     ciprofloxacin Sensitive <=1 mcg/mL     ertapenem Sensitive <=0.5 mcg/mL     gentamicin Sensitive <=4 mcg/mL     meropenem Sensitive <=1 mcg/mL     piperacillin-tazobactam Sensitive <=16 mcg/mL     trimethoprim-sulfamethoxazole Sensitive <=2/38 mcg/mL     Enterococcus  faecalis (3)    Antibiotic Interpretation KHALIF Status    ampicillin Sensitive <=2 mcg/mL     vancomycin Sensitive 2 mcg/mL             She denies chest pain, denies shortness of breath, denies nausea,  denies emesis. 10 system Review of Systems is reviewed with patient, and pertinent positives are noted in HPI above . Otherwise, Review of systems is negative.      I have reviewed the patient's medical and social history in detail and updated the computerized patient record. To recap: She  has a past medical history of Anemia, CAD (coronary artery disease), CKD (chronic kidney disease), Hyperlipidemia, and Hypertension. . She  has a past surgical history that includes fracture surgery; Cystocopy (11/21/13); Upper gastrointestinal endoscopy (N/A, 3/5/2021); Colonoscopy (N/A, 3/5/2021); hemicolectomy (Right, 3/8/2021); and Cholecystectomy, laparoscopic (N/A, 3/8/2021). . She  reports that she has never smoked. She has never used smokeless tobacco. She reports that she does not drink alcohol or use drugs. .        Active Hospital Problems    Diagnosis Date Noted    UTI (urinary tract infection) [N39.0] 04/01/2021    Postoperative intra-abdominal abscess [T81.43XA]     YESENIA (acute kidney injury) ([facility] [N17.9] 03/30/2021    Abscess of intestine [K63.0] 03/30/2021    Intra-abdominal abscess ([facility] [K65.1] 03/30/2021    Malignant neoplasm of ascending colon ([facility] [C18.2] 03/07/2021    Anemia [D64.9] 03/03/2021    Hypertension [I10] 01/10/2014       Current Facility-Administered Medications: vancomycin 1000 mg IVPB in 250 mL D5W addavial, 1,000 mg, Intravenous, Q24H  cefepime (MAXIPIME) 1000 mg IVPB minibag, 1,000 mg, Intravenous, Q12H  lidocaine PF 1 % injection 5 mL, 5 mL, Intradermal, Once  Sodium Hypochlorite (DAKINS) 0.25 % external solution, , Irrigation, BID  aspirin chewable tablet 81 mg, 81 mg, Oral, Daily  carvedilol (COREG) tablet 3.125 mg, 3.125 mg, Oral, BID WC  ticagrelor (BRILINTA) tablet 90 mg, 90 mg, Oral, BID  rosuvastatin (CRESTOR) tablet 20 mg, 20 mg, Oral, Nightly  amiodarone (CORDARONE) tablet 200 mg, 200 mg, Oral, Daily  sodium chloride flush 0.9 % injection 10 mL, 10 mL, Intravenous, 2 times per day  sodium chloride flush 0.9 % injection 10 mL, 10 mL, Intravenous, PRN  0.9 % sodium chloride infusion, 25 mL, Intravenous, PRN  acetaminophen (TYLENOL) tablet 650 mg, 650 mg, Oral, Q4H PRN  HYDROcodone-acetaminophen (NORCO) 5-325 MG per tablet 1 tablet, 1 tablet, Oral, Q4H PRN **OR** HYDROcodone-acetaminophen (NORCO) 5-325 MG per tablet 2 tablet, 2 tablet, Oral, Q4H PRN  morphine (PF) injection 2 mg, 2 mg, Intravenous, Q2H PRN **OR** morphine injection 4 mg, 4 mg, Intravenous, Q2H PRN  promethazine (PHENERGAN) tablet 12.5 mg, 12.5 mg, Oral, Q6H PRN **OR** ondansetron (ZOFRAN) injection 4 mg, 4 mg, Intravenous, Q6H PRN  enoxaparin (LOVENOX) injection 30 mg, 30 mg, Subcutaneous, Daily  hydrALAZINE (APRESOLINE) injection 10 mg, 10 mg, Intravenous, Q6H PRN  0.9 % sodium chloride bolus, 500 mL, Intravenous, PRN         Objective:  BP (!) 147/64   Pulse 78   Temp 97.9 °F (36.6 °C) (Oral)   Resp 18   Ht 5' (1.524 m)   Wt 220 lb 14.4 oz (100.2 kg)   SpO2 94%   BMI 43.14 kg/m²      Patient Vitals for the past 24 hrs:   BP Temp Temp src Pulse Resp SpO2   04/04/21 0858 (!) 147/64 -- -- 78 18 94 %   04/04/21 0745 (!) 170/77 97.9 °F (36.6 °C) Oral 77 18 95 %   04/04/21 0600 -- -- -- 68 -- --   04/04/21 0400 (!) 151/69 97.3 °F (36.3 °C) Oral 63 20 95 %   04/04/21 0225 -- -- -- 62 -- --   04/04/21 0025 138/72 97.2 °F (36.2 °C) Oral 61 20 96 %   04/03/21 2200 -- -- -- 62 -- --   04/03/21 2145 135/70 97.8 °F (36.6 °C) Oral 59 16 95 %   04/03/21 1801 121/67 98 °F (36.7 °C) Axillary 64 16 95 %   04/03/21 1644 136/63 -- -- 60 -- --   04/03/21 1327 121/63 97.9 °F (36.6 °C) Oral 68 16 94 %     No data found.         Intake/Output Summary (Last 24 hours) at 4/4/2021 0953  Last data filed at 4/4/2021 0758  Gross per 24 hour   Intake 350 ml   Output 850 ml   Net -500 ml         Physical Exam:   BP (!) 147/64   Pulse 78   Temp 97.9 °F (36.6 °C) (Oral)   Resp 18   Ht 5' (1.524 m)   Wt 220 lb 14.4 oz (100.2 kg)   SpO2 94%   BMI 43.14 kg/m²   General appearance: alert, appears stated age and cooperative  Head: Normocephalic, without obvious abnormality, atraumatic  Lungs: clear to auscultation bilaterally  Heart: regular rate and rhythm, S1, S2 normal, no murmur, click, rub or left ventricular hypertrophy is present. No regional wall motion abnormalities are seen. Mild tricuspid regurgitation, RVSP is estimated at 38 mmhg. Signature   ------------------------------------------------------------------  Electronically signed by Kaela Stephen MD, [address] [facility], [address]  (Interpreting physician) on 03/06/2021 at 12:12 PM  ------------------------------------------------------------------   Findings   Left Ventricle  Normal left ventricle size, and systolic function with an estimated ejection  fraction of 55-60%. Mild concentric left ventricular hypertrophy is present. No regional wall motion abnormalities are seen. Grade I diastolic dysfunction with normal LV filling pressures. Mitral Valve  Calcification of the mitral valve noted. No evidence of mitral regurgitation. Left Atrium  The left atrium is normal in size. Aortic Valve  The aortic valve is structurally normal. There is no significant aortic  valve regurgitation or stenosis. Aorta  The aortic root is normal in size. Right Ventricle  The right ventricle is normal in size and function. Tricuspid Valve  The tricuspid valve is normal in structure. Mild tricuspid regurgitation, RVSP is estimated at 38 mmhg. Right Atrium  The right atrial size is normal.   Pulmonic Valve  The pulmonic valve is not well visualized. Mild pulmonic regurgitation present. Pericardial Effusion  No pericardial effusion noted. Pleural Effusion  No pleural effusion. Miscellaneous  The inferior vena cava appears normal in size with normal respiratory  variation.   M-Mode/2D Measurements (cm)   LV Diastolic Dimension: 4.5 cm  LV Systolic Dimension: 8.84 cm  LV Septum Diastolic: 9.84 cm  LV PW Diastolic: 1.5 cm         AO Root Dimension: 3.1 cm  RV Diastolic Dimension: 9.60 cm LA Dimension: 3.4 cm                                  LA Area: 11.7 cm2                                  LA volume/Index: 21.6 ml /11 ml/m2  Doppler Measurements   AV Peak Velocity: 194 cm/s     MV Peak E-Wave: 98.5 cm/s  AV Peak Gradient: 15.05 mmHg   MV Peak A-Wave: 89.5 cm/s  AV Mean Gradient: 9 mmHg       MV E/A Ratio: 1.1  LVOT Peak Velocity: 122 cm/s   MV Mean Gradient: 3 mmHg                                 MV Max P mmHg  TR Velocity:290 cm/s           MV Vmax:125 cm/s  TR Gradient:33.64 mmHg         MV VTI:51.9 cm/s   E' Septal Velocity: 6.2 cm/s   MV Deceleration Time: 215 msec  E' Lateral Velocity: 5.98 cm/s  PV Peak Velocity: 123 cm/s  PV Peak Gradient: 6.05 mmHg   Aortic Valve   Peak Velocity: 194 cm/s   Mean Velocity: 141 cm/s  Peak Gradient: 15.05 mmHg Mean Gradient: 9 mmHg  AV VTI: 43.8 cm  Aorta   Aortic Root: 3.1 cm      Ct Abdomen Pelvis Wo Contrast Additional Contrast? None    Result Date: 3/11/2021  EXAMINATION: CT OF THE ABDOMEN AND PELVIS WITHOUT CONTRAST 3/11/2021 12:05 pm TECHNIQUE: CT of the abdomen and pelvis was performed without the administration of intravenous contrast. Multiplanar reformatted images are provided for review. Dose modulation, iterative reconstruction, and/or weight based adjustment of the mA/kV was utilized to reduce the radiation dose to as low as reasonably achievable. COMPARISON: CT of the chest abdomen and pelvis 2020 HISTORY: ORDERING SYSTEM PROVIDED HISTORY: RUQ pain TECHNOLOGIST PROVIDED HISTORY: Reason for exam:->RUQ pain Additional Contrast?->None Reason for Exam: RUQ pain Acuity: Unknown Type of Exam: Unknown FINDINGS: Lower Chest: Increased mild-moderate right pleural effusion with adjacent compressive atelectasis. Unchanged trace amount of left pleural fluid. Organs: In the gallbladder fossa there is a fluid collection measuring 4.2 x 2.3 by 2.4 cm. Along the inferior-lateral aspect of the fluid collection a few tiny curvilinear/rounded high-density foci are present measuring 3 mm and less in size best shown on coronal image 90. Cholecystectomy clips noted in the expected location.   There is a small amount of fluid along the inferior margin of the right hepatic lobe anterior laterally. Small amount of fluid between the diaphragm and liver also present. No pancreatitis. No ureteral stone or hydronephrosis. 2 mm right renal stone again noted. GI/Bowel: There is new fluid-filled small bowel dilation involving proximal to mid small bowel loops measuring up to 3 cm. There is distal small bowel collapse extending to the surgical site. Oral contrast within the colon from the oral contrast given for the comparison. No oral contrast within small bowel at this time. Pelvis: No acute abnormality of the pelvis. Normal appearance of the bladder. Peritoneum/Retroperitoneum: Small amount of free fluid in the right upper quadrant as discussed. No free air. Bones/Soft Tissues: Expected postsurgical changes of the abdominal wall. No acute osseous findings. New fluid collection in the gallbladder fossa with adjacent small amount of fluid around the liver. This may be residual fluid from the recent surgery or related to biliary leak. Consider HIDA scan evaluation There are 2-3 tiny gallstones within the gallbladder fossa fluid collection, measuring 3 mm and less in size. Increased size of mild-moderate right pleural effusion with increased adjacent compressive atelectasis of the right lung base. Xr Chest (2 Vw)    Result Date: 3/30/2021  EXAMINATION: TWO XRAY VIEWS OF THE CHEST 3/30/2021 5:22 pm COMPARISON: 03/12/2021 radiograph HISTORY: ORDERING SYSTEM PROVIDED HISTORY: Chest Discomfort TECHNOLOGIST PROVIDED HISTORY: Reason for exam:->Chest Discomfort Reason for Exam: Hypotension (Pt reports low BP, 93/41. ) Acuity: Acute Type of Exam: Initial FINDINGS: The heart is mildly enlarged. Mediastinum and pulmonary vascularity are normal.  Enteric tube has been removed since prior radiograph. There is improved aeration of the lungs. No acute airspace abnormality with lucency suggesting underlying COPD.   No significant skeletal finding. No significant finding in the chest.     Nm Hepatobiliary    Result Date: 3/11/2021  EXAMINATION: NUCLEAR MEDICINE HEPATOBILIARY SCINTIGRAPHY (HIDA SCAN). 3/11/2021 2:40 pm TECHNIQUE: Approximately 1.20 [id number] Mebrofenin (Choletec) was administered IV. Then, dynamic images of the abdomen were obtained in the anterior projection for 60 mins. A right lateral view was also obtained at 60 mins. COMPARISON: CT abdomen and pelvis 03/11/2021. HISTORY: ORDERING SYSTEM PROVIDED HISTORY: R/o bile leak s/p renu TECHNOLOGIST PROVIDED HISTORY: Reason for exam:->R/o bile leak s/p renu Reason for Exam: R/O Bile Leak Acuity: Acute Type of Exam: Ongoing FINDINGS: Prompt, homogenous uptake by the liver is noted with normal appearance of radiotracer excretion into the biliary system. Clearance of bloodpool activity appears appropriate. During the 1st 35 minutes of the study, the common duct is normal in size and appearance. Beginning at 40 minutes, accumulation of radio activity which overlies the common duct is seen. .  This accumulation most likely corresponds to the proximal duodenum as the abnormal fluid collection on the CT scan lies more laterally. Gallbladder is surgically absent. Delayed planar images as well as SPECT images were obtained due to the confusing appearance on the initial images. These latter images do not show any accumulation of activity in the subhepatic region. The only activity is seen within the small bowel confirming that there is no bile leak present. The CT images also show a slight decrease in the amount of fluid in the gallbladder fossa. No evident bile leak following cholecystectomy. Slight reduction in the amount of subhepatic fluid present since the earlier CT study 5 hours ago.      Xr Chest Portable    Result Date: 3/12/2021  EXAMINATION: ONE XRAY VIEW OF THE CHEST 3/12/2021 6:06 pm COMPARISON: 1 March 2021 HISTORY: ORDERING SYSTEM PROVIDED HISTORY: ng tube placement TECHNOLOGIST PROVIDED HISTORY: Reason for exam:->ng tube placement Reason for Exam: ng tube placement Acuity: Unknown Type of Exam: Unknown FINDINGS: AP portable view of the chest time stamped at 1752 hours overlying cardiac monitoring electrodes are noted. Intestinal tube extends to the distal stomach. Heart is stable, mildly enlarged. Elevated hemidiaphragm is noted. There is left perihilar stranding likely atelectasis. No extrapleural air. Intestinal tube terminates in the distal stomach. Other findings as above. Ct Chest Abdomen Pelvis Wo Contrast    Result Date: 3/30/2021  EXAMINATION: CT OF THE CHEST, ABDOMEN, AND PELVIS WITHOUT CONTRAST 3/30/2021 3:26 pm TECHNIQUE: CT of the chest, abdomen and pelvis was performed without the administration of intravenous contrast. Multiplanar reformatted images are provided for review. Dose modulation, iterative reconstruction, and/or weight based adjustment of the mA/kV was utilized to reduce the radiation dose to as low as reasonably achievable. COMPARISON: Abdomen and pelvis CT, 03/11/2021 CT chest abdomen pelvis, 03/05/2021 HISTORY: ORDERING SYSTEM PROVIDED HISTORY: r/o penumonia, recent surgery TECHNOLOGIST PROVIDED HISTORY: Reason for exam:->r/o penumonia, recent surgery Additional Contrast?->None Decision Support Exception->Emergency Medical Condition (MA) Reason for Exam: r/o penumonia, recent surgery Acuity: Acute Type of Exam: Initial FINDINGS: Chest: Mediastinum: Visualized thyroid is unremarkable. Mildly enlarged mediastinal lymph nodes have decreased in size when compared to the previous exam, and are most compatible with reactive lymph nodes. Esophagus is unremarkable. Noncontrast imaging of the cardiac chambers, thoracic aorta, and pulmonary arteries is unremarkable.  Lungs/pleura: Calcified lesion in the left lower lobe is again seen, compatible with old granulomatous disease, found as far back as 2014, and requires no specific follow-up. A focal infiltrate is not detected. The airways are patent. No pneumothorax. No pleural effusion. Soft Tissues/Bones: Visualized extra thoracic soft tissues are unremarkable. No acute or suspicious bony abnormality is identified. Severe degenerative disease within the glenohumeral joints is noted bilaterally, especially on the right. Abdomen/Pelvis: Lack of intravenous contrast limits evaluation of the solid abdominal viscera, the hollow abdominal viscera, and the vascular structures. Organs: Having said that, no acute hepatic abnormality is identified. The gallbladder is surgically absent. Noncontrast imaging of the spleen, adrenals, and pancreas is unremarkable. Nonobstructing nephrolithiasis is noted within the right kidney. GI/Bowel: There has been development of a very large gas and fluid collection within the right abdominal wall measuring 12 cm maximally (series 2, image 132). There is evidence of extension of this collection into the peritoneal cavity (as visualized on axial images 140 to through 154. The ileocolic anastomosis is very close to this collection within the peritoneum. It would be difficult to entirely exclude communication between bowel and this collection. The patient status post partial colectomy. The remainder of the large bowel is unremarkable. No evidence of small bowel obstruction. There is no evidence of bowel obstruction. Stomach and duodenal sweep are unremarkable. Pelvis: Ovaries are prominent bilaterally, but those are stable dating back to 2013. No suspicious adnexal lesions are found. Uterus unremarkable. Urinary bladder is unremarkable. Peritoneum/Retroperitoneum: Abdominal aorta normal in caliber. No retroperitoneal lymphadenopathy. Bones/Soft Tissues: No osteolytic or osteoblastic bone lesions are seen. No acute bony abnormalities are detected. Chest CT: No acute abnormality detected.  Abdomen and pelvis CT: Interval development of a large gas and fluid collection within the right abdominal wall measuring up to 12 cm, most compatible with an abscess. There is intraperitoneal communication of that collection, and the ileocolic anastomosis is very close to the intraperitoneal component, and therefore it would be difficult to entirely exclude a communication between bowel and that collection. Ct Chest Abdomen Pelvis Wo Contrast    Result Date: 3/5/2021  EXAMINATION: CT OF THE CHEST, ABDOMEN, AND PELVIS WITHOUT CONTRAST 3/5/2021 4:20 pm TECHNIQUE: CT of the chest, abdomen and pelvis was performed without the administration of intravenous contrast. Multiplanar reformatted images are provided for review. Dose modulation, iterative reconstruction, and/or weight based adjustment of the mA/kV was utilized to reduce the radiation dose to as low as reasonably achievable. COMPARISON: Chest CT 2014 2013 HISTORY: ORDERING SYSTEM PROVIDED HISTORY: Colon mass, r/o mets TECHNOLOGIST PROVIDED HISTORY: Reason for exam:->Colon mass, r/o mets Additional Contrast?->Oral Reason for Exam: Colon mass, r/o mets Acuity: Unknown Type of Exam: Unknown FINDINGS: Chest: Mediastinum: 7 mm nodule seen in right lobe of thyroid gland. No specific imaging follow-up recommended based on size. coronary artery calcification is seen. Small mediastinal nodes are noted. Right paratracheal node measures 1.4 cm in short axis, previously 10 mm. Lungs/pleura: Mosaic attenuation is seen throughout the lungs, suggesting small airways disease or air trapping. Trace pleural effusions are seen bilaterally. There is adjacent consolidation seen in the lung bases. 1.8 x 1.2 cm nodule is seen in the left lower lobe Soft Tissues/Bones: Degenerative changes are seen in the spine. Degenerative changes are seen in the shoulder joints. Abdomen/Pelvis: Organs: No splenomegaly Adrenal glands appear normal. There is rotation of the kidney anteriorly, incidentally noted. .  No stones noted 2 mm stone seen in the right kidney. No hydronephrosis noted. No intrahepatic ductal dilatation. No perihepatic fluid Gallbladder appears normal No peripancreatic inflammatory change GI/Bowel: No significant small bowel distention noted. Mild stool load seen in the colon. Scattered colonic diverticula are seen. There is focal wall thickening of the colon on the right, extending over a length of 3.7 cm. There is surrounding injection the Nadja colonic fat. Small pericolonic lymph node is seen in the right upper quadrant mesentery measuring 8 mm. Pelvis: No free fluid seen within the pelvis. Pickard catheter is seen in the bladder Left adnexal mass is seen measuring 4.4 cm x 3.5 cm previously 3.9 cm by 3.3 cm. Right adnexal nodule measures 3.2 x 3.9 cm, previously 2.5 x 3.6 cm Peritoneum/Retroperitoneum: Small retroperitoneal nodes are seen. Small lymph nodes are seen along the iliac chains. No aortic aneurysm. Atherosclerotic change is seen in aorta and iliacs. Bones/Soft Tissues: Spurring is seen in the spine. Spurring is seen in the hips. Tiny periumbilical hernia containing fat is seen     Within the chest, small mediastinal nodes are seen, slightly increased compared to prior, either reactive or metastatic. Small pleural effusions are seen, compatible with mild fluid overload. Stable lobular pulmonary nodule in the left lower lobe. Nodular wall thickening of the colon on the right, compatible with the given history of colon mass. Small mesenteric node is seen in this region,, likely early desiree metastasis Increase in size of ovarian-adnexal masses on the right and left. Consider pelvic ultrasound for further characterization. Tiny nonobstructing right renal stone       Assessment and Plan:  Principal Problem:    YESENIA (acute kidney injury) ([address] -Established problem. Improving Creat 1.8  Plan: cont fluids, cont to follow  Active Problems:    Hypertension -Established problem. Stable.   147/64  Plan: stay on same meds    Anemia - Established problem. Stable.  hgb 7.6  Plan: No indication for transfusion. Cont to monitor h/h to assess progression of anemia. Recommend ferrous sulfate or MVI as outpatient. Malignant neoplasm of ascending colon ([address] -Established problem. Stable. Plan: per onc    Abscess of intestine -Established problem. Stable. Drain in  Plan: cont drainage. Cont empiric iv abx    Intra-abdominal abscess ([address]  Plan: cont drainage. Cont empiric iv abx    Postoperative intra-abdominal abscess    UTI (urinary tract infection)  Plan: cont empiric abx      Disp - pt refusing snf. So will have to set up home care. Hopefully home tomorrow?           Haley Hastings  4/4/2021

## 2021-04-04 NOTE — PROGRESS NOTES
Shift assessment complete am medications given dressing to abd CDI patient anxious today,  discussed POC The care plan and education has been reviewed and mutually agreed upon with the patient.

## 2021-04-05 LAB
ANION GAP SERPL CALCULATED.3IONS-SCNC: 8 MMOL/L (ref 3–16)
BUN BLDV-MCNC: 21 MG/DL (ref 7–20)
CALCIUM SERPL-MCNC: 8.1 MG/DL (ref 8.3–10.6)
CHLORIDE BLD-SCNC: 109 MMOL/L (ref 99–110)
CO2: 20 MMOL/L (ref 21–32)
CREAT SERPL-MCNC: 1.6 MG/DL (ref 0.6–1.2)
CREATININE URINE: 36.9 MG/DL (ref 28–259)
GFR AFRICAN AMERICAN: 38
GFR NON-AFRICAN AMERICAN: 31
GLUCOSE BLD-MCNC: 102 MG/DL (ref 70–99)
GLUCOSE BLD-MCNC: 118 MG/DL (ref 70–99)
PERFORMED ON: ABNORMAL
POTASSIUM SERPL-SCNC: 3.6 MMOL/L (ref 3.5–5.1)
SODIUM BLD-SCNC: 137 MMOL/L (ref 136–145)
SODIUM URINE: 91 MMOL/L

## 2021-04-05 PROCEDURE — 97530 THERAPEUTIC ACTIVITIES: CPT

## 2021-04-05 PROCEDURE — 94680 O2 UPTK RST&XERS DIR SIMPLE: CPT

## 2021-04-05 PROCEDURE — 80048 BASIC METABOLIC PNL TOTAL CA: CPT

## 2021-04-05 PROCEDURE — 36415 COLL VENOUS BLD VENIPUNCTURE: CPT

## 2021-04-05 PROCEDURE — 6370000000 HC RX 637 (ALT 250 FOR IP): Performed by: STUDENT IN AN ORGANIZED HEALTH CARE EDUCATION/TRAINING PROGRAM

## 2021-04-05 PROCEDURE — 6360000002 HC RX W HCPCS: Performed by: INTERNAL MEDICINE

## 2021-04-05 PROCEDURE — APPNB30 APP NON BILLABLE TIME 0-30 MINS: Performed by: NURSE PRACTITIONER

## 2021-04-05 PROCEDURE — 6370000000 HC RX 637 (ALT 250 FOR IP): Performed by: INTERNAL MEDICINE

## 2021-04-05 PROCEDURE — 6370000000 HC RX 637 (ALT 250 FOR IP): Performed by: NURSE PRACTITIONER

## 2021-04-05 PROCEDURE — 84300 ASSAY OF URINE SODIUM: CPT

## 2021-04-05 PROCEDURE — 82570 ASSAY OF URINE CREATININE: CPT

## 2021-04-05 PROCEDURE — 1200000000 HC SEMI PRIVATE

## 2021-04-05 PROCEDURE — 97535 SELF CARE MNGMENT TRAINING: CPT

## 2021-04-05 RX ORDER — FUROSEMIDE 10 MG/ML
20 INJECTION INTRAMUSCULAR; INTRAVENOUS ONCE
Status: COMPLETED | OUTPATIENT
Start: 2021-04-05 | End: 2021-04-05

## 2021-04-05 RX ORDER — HYDROCODONE BITARTRATE AND ACETAMINOPHEN 5; 325 MG/1; MG/1
1 TABLET ORAL EVERY 8 HOURS PRN
Qty: 21 TABLET | Refills: 0 | Status: SHIPPED | OUTPATIENT
Start: 2021-04-05 | End: 2021-04-07

## 2021-04-05 RX ORDER — AMOXICILLIN AND CLAVULANATE POTASSIUM 875; 125 MG/1; MG/1
1 TABLET, FILM COATED ORAL EVERY 12 HOURS SCHEDULED
Qty: 20 TABLET | Refills: 0 | Status: SHIPPED | OUTPATIENT
Start: 2021-04-05 | End: 2021-04-07

## 2021-04-05 RX ORDER — FUROSEMIDE 10 MG/ML
40 INJECTION INTRAMUSCULAR; INTRAVENOUS ONCE
Status: DISCONTINUED | OUTPATIENT
Start: 2021-04-05 | End: 2021-04-05

## 2021-04-05 RX ADMIN — CARVEDILOL 3.12 MG: 3.12 TABLET, FILM COATED ORAL at 08:19

## 2021-04-05 RX ADMIN — AMOXICILLIN AND CLAVULANATE POTASSIUM 1 TABLET: 875; 125 TABLET, FILM COATED ORAL at 08:18

## 2021-04-05 RX ADMIN — ROSUVASTATIN 20 MG: 20 TABLET, FILM COATED ORAL at 20:59

## 2021-04-05 RX ADMIN — AMOXICILLIN AND CLAVULANATE POTASSIUM 1 TABLET: 875; 125 TABLET, FILM COATED ORAL at 20:59

## 2021-04-05 RX ADMIN — TICAGRELOR 90 MG: 90 TABLET ORAL at 20:58

## 2021-04-05 RX ADMIN — ENOXAPARIN SODIUM 30 MG: 30 INJECTION SUBCUTANEOUS at 08:19

## 2021-04-05 RX ADMIN — AMOXICILLIN AND CLAVULANATE POTASSIUM 1 TABLET: 875; 125 TABLET, FILM COATED ORAL at 01:48

## 2021-04-05 RX ADMIN — TICAGRELOR 90 MG: 90 TABLET ORAL at 08:19

## 2021-04-05 RX ADMIN — FUROSEMIDE 20 MG: 10 INJECTION, SOLUTION INTRAMUSCULAR; INTRAVENOUS at 09:50

## 2021-04-05 RX ADMIN — AMIODARONE HYDROCHLORIDE 200 MG: 200 TABLET ORAL at 08:19

## 2021-04-05 RX ADMIN — ASPIRIN 81 MG: 81 TABLET, CHEWABLE ORAL at 08:18

## 2021-04-05 RX ADMIN — HYOSCYAMINE SULFATE: 16 SOLUTION at 20:59

## 2021-04-05 RX ADMIN — FUROSEMIDE 20 MG: 10 INJECTION, SOLUTION INTRAMUSCULAR; INTRAVENOUS at 10:24

## 2021-04-05 RX ADMIN — HYOSCYAMINE SULFATE: 16 SOLUTION at 08:20

## 2021-04-05 ASSESSMENT — PAIN SCALES - WONG BAKER
WONGBAKER_NUMERICALRESPONSE: 0
WONGBAKER_NUMERICALRESPONSE: 0

## 2021-04-05 NOTE — PROGRESS NOTES
Physical Therapy  Facility/Department: [address] ORTHO/NEURO NURSING  Daily Treatment Note  NAME: Bee Deshpande  : 1940  MRN: [id number]    Date of Service: 2021    Discharge Recommendations: Bee Deshpande scored a 15/24 on the AM-PAC short mobility form. Current research shows that an AM-PAC score of 17 or less is typically not associated with a discharge to the patient's home setting. Based on the patient's AM-PAC score and their current functional mobility deficits, it is recommended that the patient have 3-5 sessions per week of Physical Therapy at d/c to increase the patient's independence. Please see assessment section for further patient specific details. If patient discharges prior to next session this note will serve as a discharge summary. Please see below for the latest assessment towards goals. If pt continues to decline SNF placement, recommend home with 24 hour supervision and HHPT. HOME HEALTH CARE: LEVEL 3 SAFETY     - Initial home health evaluation to occur within 24-48 hours, in patient home   - Therapy evaluations in home within 24-48 hours of discharge; including DME and home safety   - Frontload therapy 5 days, then 3x a week   - Therapy to evaluate if patient has [address] needs for personal care   -  evaluation within 24-48 hours, includes evaluation of resources and insurance to determine [place], [place], LTC, and Medicaid options         PT Equipment Recommendations  Equipment Needed: No    Assessment   Body structures, Functions, Activity limitations: Decreased functional mobility ; Decreased strength;Decreased endurance;Decreased balance  Assessment: Pt now requiring O2 to maintain saturation levels at 90% or above. Increased assistance needed for STS transfers (though baseline pt uses a lift chair). Ambulation endurance limited to ~20 ft with a standing rest break due to SOB. Continued skilled PT to promote return toward PLOF.   Treatment Diagnosis: decreased functional mobility, decreased endurance  Prognosis: Good  PT Education: PT Role;Plan of Care;General Safety;Gait Training;Functional Mobility Training  Patient Education: Pt educated on pursed lip breathing and diagphragmatic breathing techniques. Discharge plans discussed. Pt verbalized understanding  Barriers to Learning: none  REQUIRES PT FOLLOW UP: Yes  Activity Tolerance  Activity Tolerance: Patient limited by endurance; Patient limited by fatigue     Patient Diagnosis(es): The primary encounter diagnosis was Postoperative intra-abdominal abscess. Diagnoses of Severe sepsis ([facility] [address] Malaise, Person under investigation for COVID-19, and YESENIA (acute kidney injury) ([facility] [address] were also pertinent to this visit. has a past medical history of Anemia, CAD (coronary artery disease), CKD (chronic kidney disease), Hyperlipidemia, and Hypertension. has a past surgical history that includes fracture surgery; Cystocopy (11/21/13); Upper gastrointestinal endoscopy (N/A, 3/5/2021); Colonoscopy (N/A, 3/5/2021); hemicolectomy (Right, 3/8/2021); and Cholecystectomy, laparoscopic (N/A, 3/8/2021). Restrictions  Restrictions/Precautions  Restrictions/Precautions: Fall Risk(moderate fall risk)  Subjective   General  Chart Reviewed: Yes  Additional Pertinent Hx: CAD, hyperlipidemia, HTN, anemia, CKD, R wrist fx surgery  Response To Previous Treatment: Patient with no complaints from previous session. Family / Caregiver Present: Yes(dtg)  Subjective  Subjective: agreed to therapy session, really wanting to discharge home  General Comment  Comments: sitting in chair at arrival, on 1.5L O2 at rest with 93% saturation  Pain Screening  Patient Currently in Pain: Denies  Vital Signs  Patient Currently in Pain: Denies       Orientation     Cognition      Objective   Bed mobility  Comment: not observed  Transfers  Sit to Stand:  Moderate Assistance;Minimal Assistance(difficulty due to feet sliding fwd and lacking anterior trunk lean (pt uses lift chair at home))  Stand to sit: Contact guard assistance  Ambulation  Ambulation?: Yes  Ambulation 1  Surface: level tile  Device: Rolling Walker  Quality of Gait: no knee buckling, B knee varus and [place]  Gait Deviations: Slow Bibiana; Increased ADRIAN; Decreased step height  Distance: 20 ft x 2  Comments: brief standing rest break due to SOB after 10ft for each bout of ambulation;  O2 saturation 90-91% with mobility, once seated did drop to 87% briefly;  cued for PLB  Stairs/Curb  Stairs?: No     Balance  Posture: Fair  Sitting - Static: Good  Sitting - Dynamic: Fair  Standing - Static: Good;-  Standing - Dynamic: Fair                           G-Code     OutComes Score                                                     AM-PAC Score  AM-PAC Inpatient Mobility Raw Score : 15 (04/05/21 1008)  AM-PAC Inpatient T-Scale Score : 39.45 (04/05/21 1008)  Mobility Inpatient CMS 0-100% Score: 57.7 (04/05/21 1008)  Mobility Inpatient CMS G-Code Modifier : CK (04/05/21 1008)          Goals-- no goals met   Short term goals  Time Frame for Short term goals: upon discharge  Short term goal 1: Pt will be able to complete bed mobility with SBA  Short term goal 2: Pt will be able to complete sit<>stand transfers with SBA  Short term goal 3: Pt will be able to ambulate 75 ft with LRAD and SBA    Plan    Plan  Times per week: 3-5x  Times per day: Daily  Current Treatment Recommendations: Strengthening, Balance Training, Functional Mobility Training, Gait Training, Endurance Training, Equipment Evaluation, Education, & procurement, Patient/Caregiver Education & Training, Safety Education & Training  Safety Devices  Type of devices:  All fall risk precautions in place, Call light within reach, Chair alarm in place, Gait belt, Patient at risk for falls, Left in chair, Nurse notified  Restraints  Initially in place: No     Therapy Time   Individual Concurrent Group Co-treatment   Time In 0831         Time Out 0900 Minutes 29         Timed Code Treatment Minutes: Earlene 91, [id number]

## 2021-04-05 NOTE — PROGRESS NOTES
[facility]        Progress Note    Patient Name: Janna Higginbotham  MRN: [id number]  YOB: 1940  Date of Evaluation: 2021    Subjective: Tolerating diet  On oxygen  No abdominal pain      Vital Signs:  Patient Vitals for the past 24 hrs:   BP Temp Temp src Pulse Resp SpO2   21 0814 133/72 97.4 °F (36.3 °C) Oral 62 18 92 %   21 0530 (!) 143/66 97.2 °F (36.2 °C) Oral 65 18 92 %   21 0046 (!) 155/60 97.3 °F (36.3 °C) Axillary 65 18 92 %   21 2115 (!) 151/78 97.8 °F (36.6 °C) Oral 66 18 95 %   21 1815 132/82 -- -- 60 -- --   21 1700 -- -- -- -- -- 94 %   21 1313 (!) 152/58 98.1 °F (36.7 °C) Oral 60 16 94 %      TEMPERATURE HISTORY 24H: Temp (24hrs), Av.6 °F (36.4 °C), Min:97.2 °F (36.2 °C), Max:98.1 °F (36.7 °C)    BLOOD PRESSURE HISTORY: Systolic (03YEX), CNC:497 , Min:132 , LCR:131    Diastolic (13WOG), JHX:27, Min:58, Max:82      Intake/Output:  I/O last 3 completed shifts: In: 480 [P.O.:480]  Out: 1450 [Urine:1450]  I/O this shift:  In: 300 [P.O.:300]  Out: -   Drain/tube Output:       Physical Exam:  General: awake, alert, oriented to  person, place, time  Abdomen: soft, obese, incisional tenderness only  Skin/Wound: right upper quadrant incision open wound bed clean with SS drainage    Labs:  CBC:    Recent Labs     21  0512 21  0630   WBC 6.3 6.6   HGB 7.8* 7.6*   HCT 26.6* 25.6*    270     BMP:    Recent Labs     21  0512 21  0630 21  0457    136 137   K 4.0 4.1 3.6    107 109   CO2 19* 22 20*   BUN 27* 24* 21*   CREATININE 2.0* 1.8* 1.6*   GLUCOSE 105* 108* 102*     Hepatic:    No results for input(s): AST, ALT, ALB, BILITOT, ALKPHOS in the last 72 hours.   Amylase:    Lab Results   Component Value Date    AMYLASE 29 2021    AMYLASE 21 2021    AMYLASE 17 2021     Lipase:    Lab Results   Component Value Date    LIPASE 25.0 2020 Mag:    Lab Results   Component Value Date    MG 2.50 03/14/2021    MG 2.10 03/04/2021     Phos:     Lab Results   Component Value Date    PHOS 2.8 03/14/2021    PHOS 3.8 05/29/2015      Coags:   Lab Results   Component Value Date    PROTIME 13.7 03/30/2021    INR 1.18 03/30/2021    APTT 27.8 03/30/2021       Cultures:  Anaerobic culture  No results found for: LABANAE  Fungus stain  No results found for requested labs within last 30 days. Gram stain  Results in Past 30 Days  Result Component Current Result Ref Range Previous Result Ref Range   Gram Stain Result 3+ WBC's (Polymorphonuclear)  3+ Gram positive cocci  2+ Gram positive rods  3+ Gram negative rods   (A) (3/31/2021)  Not in Time Range      Organism  Lab Results   Component Value Date/Time    ORG Klebsiella pneumoniae (A) 03/31/2021 02:52 PM    ORG Proteus mirabilis (A) 03/31/2021 02:52 PM    ORG Enterococcus faecalis (A) 03/31/2021 02:52 PM     Surgical culture  No results found for: CXSURG  Blood culture 1  Results in Past 30 Days  Result Component Current Result Ref Range Previous Result Ref Range   Blood Culture, Routine No Growth after 4 days of incubation. (3/30/2021)  Not in Time Range      Blood culture 2  Results in Past 30 Days  Result Component Current Result Ref Range Previous Result Ref Range   Culture, Blood 2 No Growth after 4 days of incubation. (3/30/2021)  Not in Time Range      Fecal occult  No results found for requested labs within last 30 days. GI bacterial pathogens by PCR  No results found for requested labs within last 30 days. C. difficile  No results found for requested labs within last 30 days. Urine culture  Lab Results   Component Value Date    LABURIN  04/01/2021     <10,000 CFU/ml mixed skin/urogenital aria. No further workup       Pathology:  OR 3/8/2021--FINAL DIAGNOSIS:     Right colon and small bowel, segmental resection:   - Invasive colonic adenocarcinoma, moderately differentiated.    - Margins not involved. - One pericolonic lymph nodes positive for metastatic carcinoma (1/16). Gallbladder, cholecystectomy:   - Chronic cholecystitis, moderate. - Cholelithiasis. - Cholesterolosis. Procedure:  Right hemicolectomy   Tumor Site: Ileocecal valve   Tumor Size: Greatest dimension (centimeter): 4.2        Additional dimensions (centimeters): 3.7 x 0.8   Macroscopic tumor perforation: Not identified   Histologic Type: Adenocarcinoma   Histologic Grade: G2: Moderately   Tumor Extension: Tumor invades through muscularis propria into subserosal   adipose tissue. MARGINS   All margins are uninvolved by invasive carcinoma, high grade dysplasia /   intramucosal carcinoma, and low grade dysplasia   Margins examined: Proximal, distal, and mesenteric    + Distance of invasive carcinoma from closest margin (millimeters or   centimeters): 40 mm    + Specify closest margin: Distal     Treatment Effect (applicable to carcinomas treated with neoadjuvant   therapy): No known pre surgical treatment   Lymph-Vascular Invasion: Not identified   Perineural Invasion: Not identified     + Tumor Budding (Note I)             Low score (0-4)   Type of Polyp in Which Invasive Carcinoma Arose: Not applicable   Tumor deposits: Absent     Regional lymph nodes     Number of Lymph nodes Involved: 1     Number of Lymph Nodes Examined: 16     Pathologic Stage Classification (pTNM, AJCC 8th Edition)     Primary Tumor (pT):      pT 3: Tumor invades through muscularis propria into pericolonic fat     Regional Lymph Nodes (pN):     pN 1a: Metastasis in 1 lymph node     + Additional pathologic findings: Histologically unremarkable appendix. + Ancillary studies: Not applicable     Imaging:  I have personally reviewed the following films:    No results found.     Scheduled Meds:   furosemide  20 mg Intravenous Once    amoxicillin-clavulanate  1 tablet Oral 2 times per day    lidocaine PF  5 mL Intradermal Once    Sodium Hypochlorite Irrigation BID    aspirin  81 mg Oral Daily    carvedilol  3.125 mg Oral BID WC    ticagrelor  90 mg Oral BID    rosuvastatin  20 mg Oral Nightly    amiodarone  200 mg Oral Daily    sodium chloride flush  10 mL Intravenous 2 times per day    enoxaparin  30 mg Subcutaneous Daily     Continuous Infusions:   sodium chloride       PRN Meds:.albuterol, sodium chloride flush, sodium chloride, acetaminophen, HYDROcodone 5 mg - acetaminophen **OR** HYDROcodone 5 mg - acetaminophen, morphine **OR** morphine, promethazine **OR** ondansetron, hydrALAZINE, sodium chloride      Assessment:  68 y.o. female admitted with   1. Postoperative intra-abdominal abscess    2. Severe sepsis ([facility] [address]    3. Malaise    4. Person under investigation for COVID-19    5. YESENIA (acute kidney injury) ([facility] [address]      Ms. aMmta Diaz is a 68 y.o. female who presents with   Incisional, abdominal wall abscess status-post bedside incision and drainage on 3/31/2021  Status-post laparoscopic right colon resection and cholecystectomy on 3/8/2021 for colon mass, chronic cholecystitis with cholelithiasis   Acute kidney injury  Urinary tract infection  Anemia  Hypertension  CAD  Paroxysmal atrial fibrillation  Medical coagulopathy, on Brilinta      Plan:  - continue packing changes twice daily  - culture sensitivities are resulted   augmentin for 7 days  - OK to DC from surgery standpoint once cleared by primary team  - f/u with Dr. Reji Kebede in 1 week; can do packing changes with saline soaked kerlix daily at home    Conrado Harrison MD  General Surgery  04/05/21  9:28 AM

## 2021-04-05 NOTE — CARE COORDINATION
Referral to Martin with [facility] for home O2 needs.     Star More MSW, [address]

## 2021-04-05 NOTE — PROGRESS NOTES
Shift assessment complete, see flowsheet. Patient in bed, no s/s of distress, respirations even and unlabored, VSS, dressing to abd changed, tolerated well. The care plan and education has been reviewed and mutually agreed upon with the patient. All needs attended. Fall precautions in place, call light within reach. Will continue to monitor.

## 2021-04-05 NOTE — PROGRESS NOTES
Home Oxygen Evaluation              O2 sat on room air at rest =96%       O2 sat on room air with exertion = 93%

## 2021-04-05 NOTE — PROGRESS NOTES
patient, and pertinent positives are noted in HPI above . Otherwise, Review of systems is negative. I have reviewed the patient's medical and social history in detail and updated the computerized patient record. To recap: She  has a past medical history of Anemia, CAD (coronary artery disease), CKD (chronic kidney disease), Hyperlipidemia, and Hypertension. . She  has a past surgical history that includes fracture surgery; Cystocopy (11/21/13); Upper gastrointestinal endoscopy (N/A, 3/5/2021); Colonoscopy (N/A, 3/5/2021); hemicolectomy (Right, 3/8/2021); and Cholecystectomy, laparoscopic (N/A, 3/8/2021). . She  reports that she has never smoked. She has never used smokeless tobacco. She reports that she does not drink alcohol or use drugs. .        Active Hospital Problems    Diagnosis Date Noted    UTI (urinary tract infection) [N39.0] 04/01/2021    Postoperative intra-abdominal abscess [T81.43XA]     YESENIA (acute kidney injury) ([facility] [N17.9] 03/30/2021    Abscess of intestine [K63.0] 03/30/2021    Intra-abdominal abscess ([facility] [facility] [K65.1] 03/30/2021    Malignant neoplasm of ascending colon ([facility] [C18.2] 03/07/2021    Anemia [D64.9] 03/03/2021    Hypertension [I10] 01/10/2014       Current Facility-Administered Medications: albuterol (PROVENTIL) nebulizer solution 2.5 mg, 2.5 mg, Nebulization, Q6H PRN  amoxicillin-clavulanate (AUGMENTIN) 875-125 MG per tablet 1 tablet, 1 tablet, Oral, 2 times per day  lidocaine PF 1 % injection 5 mL, 5 mL, Intradermal, Once  Sodium Hypochlorite (DAKINS) 0.25 % external solution, , Irrigation, BID  aspirin chewable tablet 81 mg, 81 mg, Oral, Daily  carvedilol (COREG) tablet 3.125 mg, 3.125 mg, Oral, BID WC  ticagrelor (BRILINTA) tablet 90 mg, 90 mg, Oral, BID  rosuvastatin (CRESTOR) tablet 20 mg, 20 mg, Oral, Nightly  amiodarone (CORDARONE) tablet 200 mg, 200 mg, Oral, Daily  sodium chloride flush 0.9 % injection 10 mL, 10 mL, Intravenous, 2 times per day  sodium chloride flush 0.9 % injection 10 mL, 10 mL, Intravenous, PRN  0.9 % sodium chloride infusion, 25 mL, Intravenous, PRN  acetaminophen (TYLENOL) tablet 650 mg, 650 mg, Oral, Q4H PRN  HYDROcodone-acetaminophen (NORCO) 5-325 MG per tablet 1 tablet, 1 tablet, Oral, Q4H PRN **OR** HYDROcodone-acetaminophen (NORCO) 5-325 MG per tablet 2 tablet, 2 tablet, Oral, Q4H PRN  morphine (PF) injection 2 mg, 2 mg, Intravenous, Q2H PRN **OR** morphine injection 4 mg, 4 mg, Intravenous, Q2H PRN  promethazine (PHENERGAN) tablet 12.5 mg, 12.5 mg, Oral, Q6H PRN **OR** ondansetron (ZOFRAN) injection 4 mg, 4 mg, Intravenous, Q6H PRN  enoxaparin (LOVENOX) injection 30 mg, 30 mg, Subcutaneous, Daily  hydrALAZINE (APRESOLINE) injection 10 mg, 10 mg, Intravenous, Q6H PRN  0.9 % sodium chloride bolus, 500 mL, Intravenous, PRN         Objective:  /72   Pulse 62   Temp 97.4 °F (36.3 °C) (Oral)   Resp 18   Ht 5' (1.524 m)   Wt 220 lb 14.4 oz (100.2 kg)   SpO2 92%   BMI 43.14 kg/m²      Patient Vitals for the past 24 hrs:   BP Temp Temp src Pulse Resp SpO2   04/05/21 0814 133/72 97.4 °F (36.3 °C) Oral 62 18 92 %   04/05/21 0530 (!) 143/66 97.2 °F (36.2 °C) Oral 65 18 92 %   04/05/21 0046 (!) 155/60 97.3 °F (36.3 °C) Axillary 65 18 92 %   04/04/21 2115 (!) 151/78 97.8 °F (36.6 °C) Oral 66 18 95 %   04/04/21 1815 132/82 -- -- 60 -- --   04/04/21 1700 -- -- -- -- -- 94 %   04/04/21 1313 (!) 152/58 98.1 °F (36.7 °C) Oral 60 16 94 %   04/04/21 0858 (!) 147/64 98 °F (36.7 °C) Oral 78 18 94 %     No data found.         Intake/Output Summary (Last 24 hours) at 4/5/2021 0844  Last data filed at 4/5/2021 0837  Gross per 24 hour   Intake 780 ml   Output 1150 ml   Net -370 ml         Physical Exam:   /72   Pulse 62   Temp 97.4 °F (36.3 °C) (Oral)   Resp 18   Ht 5' (1.524 m)   Wt 220 lb 14.4 oz (100.2 kg)   SpO2 92%   BMI 43.14 kg/m²   General appearance: alert, appears stated age and cooperative  Head: Normocephalic, without obvious abnormality, atraumatic  Lungs: clear to auscultation bilaterally  Heart: regular rate and rhythm, S1, S2 normal, no murmur, click, rub or gallop  Abdomen: soft, non-tender; bowel sounds normal; no masses,  no organomegaly drain in  Extremities: extremities normal, atraumatic, no cyanosis or edema    Labs:  Lab Results   Component Value Date    WBC 6.6 04/04/2021    HGB 7.6 (L) 04/04/2021    HCT 25.6 (L) 04/04/2021     04/04/2021    CHOL 155 11/21/2020    TRIG 121 11/21/2020    HDL 38 (L) 11/21/2020    ALT 6 (L) 03/31/2021    AST 10 (L) 03/31/2021     04/05/2021    K 3.6 04/05/2021     04/05/2021    CREATININE 1.6 (H) 04/05/2021    BUN 21 (H) 04/05/2021    CO2 20 (L) 04/05/2021    INR 1.18 (H) 03/30/2021    LABMICR YES 04/01/2021     Lab Results   Component Value Date    CKTOTAL 41 03/11/2021    TROPONINI <0.01 03/30/2021       Recent Imaging Results are Reviewed:  Echo Complete    Result Date: 3/6/2021  Transthoracic Echocardiography Report (TTE)  Demographics   Patient Name        Jovanna Carroll   Date of Study       03/06/2021  Gender                 Female   Patient Number      [id number]  Date of Birth          1940   Visit Number        [id number]   Age                    68 year(s)   Accession Number    [id number]  Room Number            3454   Corporate ID        [id number]    Sonographer            Vanda Nelson RDCS,                                                         RVT   Ordering Physician              Interpreting Physician Bernardo Penny MD,                                                         [facility], [facility]  Procedure Type of Study   TTE procedure:ECHOCARDIOGRAM COMPLETE 2D W DOPPLER W COLOR. Procedure Date Date: 03/06/2021 Start: 09:12 AM Study Location: [facility] - Echo Lab Technical Quality: Good visualization Additional Indications:NSTEMI, Leg edema, CAD.  Patient Status: Routine Height: 60 inches Weight: 220 pounds BSA: 1.94 m2 BMI: 42.97 kg/m2 BP: 134/75 mmHg Conclusions   Summary  Normal left ventricle size, and systolic function with an estimated ejection  fraction of 55-60%. Mild concentric left ventricular hypertrophy is present. No regional wall motion abnormalities are seen. Mild tricuspid regurgitation, RVSP is estimated at 38 mmhg. Signature   ------------------------------------------------------------------  Electronically signed by Deepthi Strong MD, [address] [facility], [address]  (Interpreting physician) on 03/06/2021 at 12:12 PM  ------------------------------------------------------------------   Findings   Left Ventricle  Normal left ventricle size, and systolic function with an estimated ejection  fraction of 55-60%. Mild concentric left ventricular hypertrophy is present. No regional wall motion abnormalities are seen. Grade I diastolic dysfunction with normal LV filling pressures. Mitral Valve  Calcification of the mitral valve noted. No evidence of mitral regurgitation. Left Atrium  The left atrium is normal in size. Aortic Valve  The aortic valve is structurally normal. There is no significant aortic  valve regurgitation or stenosis. Aorta  The aortic root is normal in size. Right Ventricle  The right ventricle is normal in size and function. Tricuspid Valve  The tricuspid valve is normal in structure. Mild tricuspid regurgitation, RVSP is estimated at 38 mmhg. Right Atrium  The right atrial size is normal.   Pulmonic Valve  The pulmonic valve is not well visualized. Mild pulmonic regurgitation present. Pericardial Effusion  No pericardial effusion noted. Pleural Effusion  No pleural effusion. Miscellaneous  The inferior vena cava appears normal in size with normal respiratory  variation.   M-Mode/2D Measurements (cm)   LV Diastolic Dimension: 4.5 cm  LV Systolic Dimension: 1.17 cm  LV Septum Diastolic: 3.39 cm  LV PW Diastolic: 1.5 cm         AO Root Dimension: 3.1 cm  RV Diastolic Dimension: 5.54 cm LA Dimension: 3.4 cm LA Area: 11.7 cm2                                  LA volume/Index: 21.6 ml /11 ml/m2  Doppler Measurements   AV Peak Velocity: 194 cm/s     MV Peak E-Wave: 98.5 cm/s  AV Peak Gradient: 15.05 mmHg   MV Peak A-Wave: 89.5 cm/s  AV Mean Gradient: 9 mmHg       MV E/A Ratio: 1.1  LVOT Peak Velocity: 122 cm/s   MV Mean Gradient: 3 mmHg                                 MV Max P mmHg  TR Velocity:290 cm/s           MV Vmax:125 cm/s  TR Gradient:33.64 mmHg         MV VTI:51.9 cm/s   E' Septal Velocity: 6.2 cm/s   MV Deceleration Time: 215 msec  E' Lateral Velocity: 5.98 cm/s  PV Peak Velocity: 123 cm/s  PV Peak Gradient: 6.05 mmHg   Aortic Valve   Peak Velocity: 194 cm/s   Mean Velocity: 141 cm/s  Peak Gradient: 15.05 mmHg Mean Gradient: 9 mmHg  AV VTI: 43.8 cm  Aorta   Aortic Root: 3.1 cm      Ct Abdomen Pelvis Wo Contrast Additional Contrast? None    Result Date: 3/11/2021  EXAMINATION: CT OF THE ABDOMEN AND PELVIS WITHOUT CONTRAST 3/11/2021 12:05 pm TECHNIQUE: CT of the abdomen and pelvis was performed without the administration of intravenous contrast. Multiplanar reformatted images are provided for review. Dose modulation, iterative reconstruction, and/or weight based adjustment of the mA/kV was utilized to reduce the radiation dose to as low as reasonably achievable. COMPARISON: CT of the chest abdomen and pelvis 2020 HISTORY: ORDERING SYSTEM PROVIDED HISTORY: RUQ pain TECHNOLOGIST PROVIDED HISTORY: Reason for exam:->RUQ pain Additional Contrast?->None Reason for Exam: RUQ pain Acuity: Unknown Type of Exam: Unknown FINDINGS: Lower Chest: Increased mild-moderate right pleural effusion with adjacent compressive atelectasis. Unchanged trace amount of left pleural fluid. Organs: In the gallbladder fossa there is a fluid collection measuring 4.2 x 2.3 by 2.4 cm.   Along the inferior-lateral aspect of the fluid collection a few tiny curvilinear/rounded high-density foci are present measuring 3 mm and less in size best shown on coronal image 90. Cholecystectomy clips noted in the expected location. There is a small amount of fluid along the inferior margin of the right hepatic lobe anterior laterally. Small amount of fluid between the diaphragm and liver also present. No pancreatitis. No ureteral stone or hydronephrosis. 2 mm right renal stone again noted. GI/Bowel: There is new fluid-filled small bowel dilation involving proximal to mid small bowel loops measuring up to 3 cm. There is distal small bowel collapse extending to the surgical site. Oral contrast within the colon from the oral contrast given for the comparison. No oral contrast within small bowel at this time. Pelvis: No acute abnormality of the pelvis. Normal appearance of the bladder. Peritoneum/Retroperitoneum: Small amount of free fluid in the right upper quadrant as discussed. No free air. Bones/Soft Tissues: Expected postsurgical changes of the abdominal wall. No acute osseous findings. New fluid collection in the gallbladder fossa with adjacent small amount of fluid around the liver. This may be residual fluid from the recent surgery or related to biliary leak. Consider HIDA scan evaluation There are 2-3 tiny gallstones within the gallbladder fossa fluid collection, measuring 3 mm and less in size. Increased size of mild-moderate right pleural effusion with increased adjacent compressive atelectasis of the right lung base. Xr Chest (2 Vw)    Result Date: 3/30/2021  EXAMINATION: TWO XRAY VIEWS OF THE CHEST 3/30/2021 5:22 pm COMPARISON: 03/12/2021 radiograph HISTORY: ORDERING SYSTEM PROVIDED HISTORY: Chest Discomfort TECHNOLOGIST PROVIDED HISTORY: Reason for exam:->Chest Discomfort Reason for Exam: Hypotension (Pt reports low BP, 93/41. ) Acuity: Acute Type of Exam: Initial FINDINGS: The heart is mildly enlarged. Mediastinum and pulmonary vascularity are normal.  Enteric tube has been removed since prior radiograph.   There is ONE XRAY VIEW OF THE CHEST 3/12/2021 6:06 pm COMPARISON: 1 March 2021 HISTORY: ORDERING SYSTEM PROVIDED HISTORY: ng tube placement TECHNOLOGIST PROVIDED HISTORY: Reason for exam:->ng tube placement Reason for Exam: ng tube placement Acuity: Unknown Type of Exam: Unknown FINDINGS: AP portable view of the chest time stamped at 1752 hours overlying cardiac monitoring electrodes are noted. Intestinal tube extends to the distal stomach. Heart is stable, mildly enlarged. Elevated hemidiaphragm is noted. There is left perihilar stranding likely atelectasis. No extrapleural air. Intestinal tube terminates in the distal stomach. Other findings as above. Ct Chest Abdomen Pelvis Wo Contrast    Result Date: 3/30/2021  EXAMINATION: CT OF THE CHEST, ABDOMEN, AND PELVIS WITHOUT CONTRAST 3/30/2021 3:26 pm TECHNIQUE: CT of the chest, abdomen and pelvis was performed without the administration of intravenous contrast. Multiplanar reformatted images are provided for review. Dose modulation, iterative reconstruction, and/or weight based adjustment of the mA/kV was utilized to reduce the radiation dose to as low as reasonably achievable. COMPARISON: Abdomen and pelvis CT, 03/11/2021 CT chest abdomen pelvis, 03/05/2021 HISTORY: ORDERING SYSTEM PROVIDED HISTORY: r/o penumonia, recent surgery TECHNOLOGIST PROVIDED HISTORY: Reason for exam:->r/o penumonia, recent surgery Additional Contrast?->None Decision Support Exception->Emergency Medical Condition (MA) Reason for Exam: r/o penumonia, recent surgery Acuity: Acute Type of Exam: Initial FINDINGS: Chest: Mediastinum: Visualized thyroid is unremarkable. Mildly enlarged mediastinal lymph nodes have decreased in size when compared to the previous exam, and are most compatible with reactive lymph nodes. Esophagus is unremarkable. Noncontrast imaging of the cardiac chambers, thoracic aorta, and pulmonary arteries is unremarkable.  Lungs/pleura: Calcified lesion in the left lower lobe is again seen, compatible with old granulomatous disease, found as far back as 2014, and requires no specific follow-up. A focal infiltrate is not detected. The airways are patent. No pneumothorax. No pleural effusion. Soft Tissues/Bones: Visualized extra thoracic soft tissues are unremarkable. No acute or suspicious bony abnormality is identified. Severe degenerative disease within the glenohumeral joints is noted bilaterally, especially on the right. Abdomen/Pelvis: Lack of intravenous contrast limits evaluation of the solid abdominal viscera, the hollow abdominal viscera, and the vascular structures. Organs: Having said that, no acute hepatic abnormality is identified. The gallbladder is surgically absent. Noncontrast imaging of the spleen, adrenals, and pancreas is unremarkable. Nonobstructing nephrolithiasis is noted within the right kidney. GI/Bowel: There has been development of a very large gas and fluid collection within the right abdominal wall measuring 12 cm maximally (series 2, image 132). There is evidence of extension of this collection into the peritoneal cavity (as visualized on axial images 140 to through 154. The ileocolic anastomosis is very close to this collection within the peritoneum. It would be difficult to entirely exclude communication between bowel and this collection. The patient status post partial colectomy. The remainder of the large bowel is unremarkable. No evidence of small bowel obstruction. There is no evidence of bowel obstruction. Stomach and duodenal sweep are unremarkable. Pelvis: Ovaries are prominent bilaterally, but those are stable dating back to 2013. No suspicious adnexal lesions are found. Uterus unremarkable. Urinary bladder is unremarkable. Peritoneum/Retroperitoneum: Abdominal aorta normal in caliber. No retroperitoneal lymphadenopathy. Bones/Soft Tissues: No osteolytic or osteoblastic bone lesions are seen.   No acute bony abnormalities are detected. Chest CT: No acute abnormality detected. Abdomen and pelvis CT: Interval development of a large gas and fluid collection within the right abdominal wall measuring up to 12 cm, most compatible with an abscess. There is intraperitoneal communication of that collection, and the ileocolic anastomosis is very close to the intraperitoneal component, and therefore it would be difficult to entirely exclude a communication between bowel and that collection. Ct Chest Abdomen Pelvis Wo Contrast    Result Date: 3/5/2021  EXAMINATION: CT OF THE CHEST, ABDOMEN, AND PELVIS WITHOUT CONTRAST 3/5/2021 4:20 pm TECHNIQUE: CT of the chest, abdomen and pelvis was performed without the administration of intravenous contrast. Multiplanar reformatted images are provided for review. Dose modulation, iterative reconstruction, and/or weight based adjustment of the mA/kV was utilized to reduce the radiation dose to as low as reasonably achievable. COMPARISON: Chest CT 2014 2013 HISTORY: ORDERING SYSTEM PROVIDED HISTORY: Colon mass, r/o mets TECHNOLOGIST PROVIDED HISTORY: Reason for exam:->Colon mass, r/o mets Additional Contrast?->Oral Reason for Exam: Colon mass, r/o mets Acuity: Unknown Type of Exam: Unknown FINDINGS: Chest: Mediastinum: 7 mm nodule seen in right lobe of thyroid gland. No specific imaging follow-up recommended based on size. coronary artery calcification is seen. Small mediastinal nodes are noted. Right paratracheal node measures 1.4 cm in short axis, previously 10 mm. Lungs/pleura: Mosaic attenuation is seen throughout the lungs, suggesting small airways disease or air trapping. Trace pleural effusions are seen bilaterally. There is adjacent consolidation seen in the lung bases. 1.8 x 1.2 cm nodule is seen in the left lower lobe Soft Tissues/Bones: Degenerative changes are seen in the spine. Degenerative changes are seen in the shoulder joints.  Abdomen/Pelvis: Organs: No splenomegaly Adrenal glands appear normal. There is rotation of the kidney anteriorly, incidentally noted. .  No stones noted 2 mm stone seen in the right kidney. No hydronephrosis noted. No intrahepatic ductal dilatation. No perihepatic fluid Gallbladder appears normal No peripancreatic inflammatory change GI/Bowel: No significant small bowel distention noted. Mild stool load seen in the colon. Scattered colonic diverticula are seen. There is focal wall thickening of the colon on the right, extending over a length of 3.7 cm. There is surrounding injection the Nadja colonic fat. Small pericolonic lymph node is seen in the right upper quadrant mesentery measuring 8 mm. Pelvis: No free fluid seen within the pelvis. Pickard catheter is seen in the bladder Left adnexal mass is seen measuring 4.4 cm x 3.5 cm previously 3.9 cm by 3.3 cm. Right adnexal nodule measures 3.2 x 3.9 cm, previously 2.5 x 3.6 cm Peritoneum/Retroperitoneum: Small retroperitoneal nodes are seen. Small lymph nodes are seen along the iliac chains. No aortic aneurysm. Atherosclerotic change is seen in aorta and iliacs. Bones/Soft Tissues: Spurring is seen in the spine. Spurring is seen in the hips. Tiny periumbilical hernia containing fat is seen     Within the chest, small mediastinal nodes are seen, slightly increased compared to prior, either reactive or metastatic. Small pleural effusions are seen, compatible with mild fluid overload. Stable lobular pulmonary nodule in the left lower lobe. Nodular wall thickening of the colon on the right, compatible with the given history of colon mass. Small mesenteric node is seen in this region,, likely early desiree metastasis Increase in size of ovarian-adnexal masses on the right and left. Consider pelvic ultrasound for further characterization. Tiny nonobstructing right renal stone       Assessment and Plan:  Principal Problem:    YESENIA (acute kidney injury) ([address] -Established problem.  Improving Creat 1.6  Plan: hold fluids in light of hypoxia, poss effusions,  One dose of lasix. cont to follow - see what renal wants to do  Active Problems:    Hypertension -Established problem. Stable. 133/72  Plan: stay on same meds    Anemia - Established problem. Stable.  hgb pending this am. Was 7/6 on4/4  Plan: No indication for transfusion. Cont to monitor h/h to assess progression of anemia. Recommend ferrous sulfate or MVI as outpatient. Malignant neoplasm of ascending colon ([address] -Established problem. Stable. Plan: per onc    Abscess of intestine -Established problem. Stable. Drain in  Plan: cont drainage. Cont empiric iv abx    Intra-abdominal abscess ([address]  Plan: cont drainage. Cont empiric iv abx    Postoperative intra-abdominal abscess    UTI (urinary tract infection)  Plan: cont empiric abx      Disp - pt refusing snf. So will have to set up home care. Hopefully home today/tomorrow? However, given persistent o2 requirment, will see if we can wean off.   Ask pt/ot to see pt again as I really don't know if she can care for self at home          Ozzy Gamble  4/5/2021

## 2021-04-05 NOTE — DISCHARGE SUMMARY
[facility] Emergency Department -- Physician Discharge Summary     Radha Frank  1940  MRN: [id number]    Admit Date: 3/30/2021  Discharge Date: No discharge date for patient encounter. Attending MD: Carey Pate MD  Discharging MD: Carey Pate MD  PCP: Winda Collet, MD [address] [place] [address] [facility] [phone]    Admission Diagnosis: Intra-abdominal abscess [facility]) [K65.1]  DISCHARGE DIAGNOSIS: same    Full Hospital Problem List:  Active Hospital Problems    Diagnosis Date Noted    UTI (urinary tract infection) [N39.0] 04/01/2021    Postoperative intra-abdominal abscess [T81.43XA]     YESENIA (acute kidney injury) ([facility] [address] [N17.9] 03/30/2021    Abscess of intestine [K63.0] 03/30/2021    Intra-abdominal abscess ([facility] [address] [K65.1] 03/30/2021    Malignant neoplasm of ascending colon ([facility] [address] [C18.2] 22/04/8113    Diastolic dysfunction [Y10.90] 03/07/2021    Anemia [D64.9] 03/03/2021    Hypertension [I10] 01/10/2014           Hospital Course:  68 y. o. female who presents to the emergency department today for evaluation for hypotension.  The patient has a history of coronary artery disease, chronic kidney disease, hypertension and hyperlipidemia.  The patient was admitted on 3/3/2021 for anemia, and on 3/6/2021, the patient states that she did undergo a right hemicolectomy as well as a cholecystectomy. Reinaldo Nicole states that she has been doing well at home.  The patient states that yesterday she started with a cough which is occasionally productive of white sputum, she denies any hemoptysis.  The patient states that she started to feel generally fatigued today, and she states that the blood pressure at home was running low so she came to the emergency room for further care and evaluation.  The patient arrives to the ED, she does not have any chest pain, or shortness of breath.  She states that she has noticed some lower leg edema however she denies any weight gain. Mo Cohen states that she is actually had a 20 pound weight loss. Natanael Coles denies any history of CHF.  The patient denies any abdominal pain, nausea, vomiting or diarrhea.  She has not had any fever or chills.  She states that she has had a dry cough since yesterday, and she states her  at home was recently diagnosed pneumonia, and she states that she has not had any known exposures with any other sick contacts, she states that she has not received her Covid vaccine yet        CT ABd from ER reviewed on computer    Abdomen and pelvis CT: Interval development of a large gas and fluid   collection within the right abdominal wall measuring up to 12 cm, most   compatible with an abscess. There is intraperitoneal communication of that collection, and the ileocolic   anastomosis is very close to the intraperitoneal component, and therefore it   would be difficult to entirely exclude a communication between bowel and that   collection.      Case has been discussed with surgery  Per Dr Han Expose, admit, NPO, iv vanc/cefepime. Plan:  1.  Bedside incision and drainage of abscess, cultures obtained, local care with wet-to-dry dressings BID with Dakins    Cultures come back  Susceptibility     Klebsiella pneumoniae (1)             Antibiotic Interpretation KHALIF Status     amoxicillin-clavulanate Sensitive <=8/4 mcg/mL       ampicillin Resistant >16 mcg/mL       ceFAZolin Sensitive <=2 mcg/mL       cefepime Sensitive <=2 mcg/mL       cefTRIAXone Sensitive <=1 mcg/mL       cefuroxime Sensitive <=4 mcg/mL       ciprofloxacin Sensitive <=1 mcg/mL       ertapenem Sensitive <=0.5 mcg/mL       gentamicin Sensitive <=4 mcg/mL       meropenem Sensitive <=1 mcg/mL       piperacillin-tazobactam Sensitive <=16 mcg/mL       trimethoprim-sulfamethoxazole Sensitive <=2/38 mcg/mL       Proteus mirabilis (2)             Antibiotic Interpretation KHALIF Status     ampicillin Sensitive <=8 mcg/mL       ceFAZolin Sensitive <=2 mcg/mL       cefepime Sensitive <=2 mcg/mL       cefTRIAXone Sensitive <=1 mcg/mL       cefuroxime Sensitive <=4 mcg/mL       ciprofloxacin Sensitive <=1 mcg/mL       ertapenem Sensitive <=0.5 mcg/mL       gentamicin Sensitive <=4 mcg/mL       meropenem Sensitive <=1 mcg/mL       piperacillin-tazobactam Sensitive <=16 mcg/mL       trimethoprim-sulfamethoxazole Sensitive <=2/38 mcg/mL       Enterococcus  faecalis (3)             Antibiotic Interpretation KHALIF Status     ampicillin Sensitive <=2 mcg/mL       vancomycin Sensitive 2 mcg/mL                    Pt cleared by surgery for d/c home on po augmentin    PT/OT rec SNF but pt again adamantly refuses it  She is at fairly high risk of recurrent admission      Consults made during Hospitalization:  [address] [place] [address] CONSULT TO NEPHROLOGY  IP CONSULT TO SOCIAL WORK  IP CONSULT TO HOME CARE NEEDS    Treatment team at time of Discharge: Treatment Team: Attending Provider: Jaspreet Weber MD; Consulting Physician: Vick Markham MD; Consulting Physician: Jaspreet Weber MD; Consulting Physician: Kaitlyn Torres MD; Registered Nurse:  Mimi Riddle RN; Respiratory Therapist (Day): Sahil Alejandre RCP; Utilization Reviewer: Daniel Barclay RN    Imaging Results:  Echo Complete    Result Date: 3/6/2021  Transthoracic Echocardiography Report (TTE)  Demographics   Patient Name        Adalberto Hope   Date of Study       03/06/2021  Gender                 Female   Patient Number      [id number]  Date of Birth          1940   Visit Number        [id number]   Age                    68 year(s)   Accession Number    [id number]  Room Number            1416   Corporate ID        [id number]    Sonographer            Kimberley Wells RDCS,                                                         RVT   Ordering Physician              Interpreting Physician Hedy Montoya MD,                                                         [facility] [place], [facility] [place]   Procedure Type of Study   TTE procedure:ECHOCARDIOGRAM COMPLETE 2D W DOPPLER W COLOR. Procedure Date Date: 03/06/2021 Start: 09:12 AM Study Location: [facility] - Echo Lab Technical Quality: Good visualization Additional Indications:NSTEMI, Leg edema, CAD. Patient Status: Routine Height: 60 inches Weight: 220 pounds BSA: 1.94 m2 BMI: 42.97 kg/m2 BP: 134/75 mmHg  Conclusions   Summary  Normal left ventricle size, and systolic function with an estimated ejection  fraction of 55-60%. Mild concentric left ventricular hypertrophy is present. No regional wall motion abnormalities are seen. Mild tricuspid regurgitation, RVSP is estimated at 38 mmhg. Signature   ------------------------------------------------------------------  Electronically signed by Gene Marion MD, [facility] [place], [address]  (Interpreting physician) on 03/06/2021 at 12:12 PM  ------------------------------------------------------------------   Findings   Left Ventricle  Normal left ventricle size, and systolic function with an estimated ejection  fraction of 55-60%. Mild concentric left ventricular hypertrophy is present. No regional wall motion abnormalities are seen. Grade I diastolic dysfunction with normal LV filling pressures. Mitral Valve  Calcification of the mitral valve noted. No evidence of mitral regurgitation. Left Atrium  The left atrium is normal in size. Aortic Valve  The aortic valve is structurally normal. There is no significant aortic  valve regurgitation or stenosis. Aorta  The aortic root is normal in size. Right Ventricle  The right ventricle is normal in size and function. Tricuspid Valve  The tricuspid valve is normal in structure. Mild tricuspid regurgitation, RVSP is estimated at 38 mmhg. Right Atrium  The right atrial size is normal.   Pulmonic Valve  The pulmonic valve is not well visualized. Mild pulmonic regurgitation present. Pericardial Effusion  No pericardial effusion noted. Pleural Effusion  No pleural effusion.    Miscellaneous  The inferior vena cava appears normal in size with normal respiratory  variation. M-Mode/2D Measurements (cm)   LV Diastolic Dimension: 4.5 cm  LV Systolic Dimension: 1.32 cm  LV Septum Diastolic: 7.87 cm  LV PW Diastolic: 1.5 cm         AO Root Dimension: 3.1 cm  RV Diastolic Dimension: 6.91 cm LA Dimension: 3.4 cm                                  LA Area: 11.7 cm2                                  LA volume/Index: 21.6 ml /11 ml/m2  Doppler Measurements   AV Peak Velocity: 194 cm/s     MV Peak E-Wave: 98.5 cm/s  AV Peak Gradient: 15.05 mmHg   MV Peak A-Wave: 89.5 cm/s  AV Mean Gradient: 9 mmHg       MV E/A Ratio: 1.1  LVOT Peak Velocity: 122 cm/s   MV Mean Gradient: 3 mmHg                                 MV Max P mmHg  TR Velocity:290 cm/s           MV Vmax:125 cm/s  TR Gradient:33.64 mmHg         MV VTI:51.9 cm/s   E' Septal Velocity: 6.2 cm/s   MV Deceleration Time: 215 msec  E' Lateral Velocity: 5.98 cm/s  PV Peak Velocity: 123 cm/s  PV Peak Gradient: 6.05 mmHg   Aortic Valve   Peak Velocity: 194 cm/s   Mean Velocity: 141 cm/s  Peak Gradient: 15.05 mmHg Mean Gradient: 9 mmHg  AV VTI: 43.8 cm  Aorta   Aortic Root: 3.1 cm      Ct Abdomen Pelvis Wo Contrast Additional Contrast? None    Result Date: 3/11/2021  EXAMINATION: CT OF THE ABDOMEN AND PELVIS WITHOUT CONTRAST 3/11/2021 12:05 pm TECHNIQUE: CT of the abdomen and pelvis was performed without the administration of intravenous contrast. Multiplanar reformatted images are provided for review. Dose modulation, iterative reconstruction, and/or weight based adjustment of the mA/kV was utilized to reduce the radiation dose to as low as reasonably achievable.  COMPARISON: CT of the chest abdomen and pelvis 2020 HISTORY: ORDERING SYSTEM PROVIDED HISTORY: RUQ pain TECHNOLOGIST PROVIDED HISTORY: Reason for exam:->RUQ pain Additional Contrast?->None Reason for Exam: RUQ pain Acuity: Unknown Type of Exam: Unknown FINDINGS: Lower Chest: Increased mild-moderate right pleural effusion with adjacent compressive atelectasis. Unchanged trace amount of left pleural fluid. Organs: In the gallbladder fossa there is a fluid collection measuring 4.2 x 2.3 by 2.4 cm. Along the inferior-lateral aspect of the fluid collection a few tiny curvilinear/rounded high-density foci are present measuring 3 mm and less in size best shown on coronal image 90. Cholecystectomy clips noted in the expected location. There is a small amount of fluid along the inferior margin of the right hepatic lobe anterior laterally. Small amount of fluid between the diaphragm and liver also present. No pancreatitis. No ureteral stone or hydronephrosis. 2 mm right renal stone again noted. GI/Bowel: There is new fluid-filled small bowel dilation involving proximal to mid small bowel loops measuring up to 3 cm. There is distal small bowel collapse extending to the surgical site. Oral contrast within the colon from the oral contrast given for the comparison. No oral contrast within small bowel at this time. Pelvis: No acute abnormality of the pelvis. Normal appearance of the bladder. Peritoneum/Retroperitoneum: Small amount of free fluid in the right upper quadrant as discussed. No free air. Bones/Soft Tissues: Expected postsurgical changes of the abdominal wall. No acute osseous findings. New fluid collection in the gallbladder fossa with adjacent small amount of fluid around the liver. This may be residual fluid from the recent surgery or related to biliary leak. Consider HIDA scan evaluation There are 2-3 tiny gallstones within the gallbladder fossa fluid collection, measuring 3 mm and less in size. Increased size of mild-moderate right pleural effusion with increased adjacent compressive atelectasis of the right lung base.      Xr Chest (2 Vw)    Result Date: 3/30/2021  EXAMINATION: TWO XRAY VIEWS OF THE CHEST 3/30/2021 5:22 pm COMPARISON: 03/12/2021 radiograph HISTORY: ORDERING SYSTEM PROVIDED HISTORY: Chest Discomfort TECHNOLOGIST PROVIDED HISTORY: Reason for exam:->Chest Discomfort Reason for Exam: Hypotension (Pt reports low BP, 93/41. ) Acuity: Acute Type of Exam: Initial FINDINGS: The heart is mildly enlarged. Mediastinum and pulmonary vascularity are normal.  Enteric tube has been removed since prior radiograph. There is improved aeration of the lungs. No acute airspace abnormality with lucency suggesting underlying COPD. No significant skeletal finding. No significant finding in the chest.     Nm Hepatobiliary    Result Date: 3/11/2021  EXAMINATION: NUCLEAR MEDICINE HEPATOBILIARY SCINTIGRAPHY (HIDA SCAN). 3/11/2021 2:40 pm TECHNIQUE: Approximately 0.79 [id number] Mebrofenin (Choletec) was administered IV. Then, dynamic images of the abdomen were obtained in the anterior projection for 60 mins. A right lateral view was also obtained at 60 mins. COMPARISON: CT abdomen and pelvis 03/11/2021. HISTORY: ORDERING SYSTEM PROVIDED HISTORY: R/o bile leak s/p renu TECHNOLOGIST PROVIDED HISTORY: Reason for exam:->R/o bile leak s/p renu Reason for Exam: R/O Bile Leak Acuity: Acute Type of Exam: Ongoing FINDINGS: Prompt, homogenous uptake by the liver is noted with normal appearance of radiotracer excretion into the biliary system. Clearance of bloodpool activity appears appropriate. During the 1st 35 minutes of the study, the common duct is normal in size and appearance. Beginning at 40 minutes, accumulation of radio activity which overlies the common duct is seen. .  This accumulation most likely corresponds to the proximal duodenum as the abnormal fluid collection on the CT scan lies more laterally. Gallbladder is surgically absent. Delayed planar images as well as SPECT images were obtained due to the confusing appearance on the initial images. These latter images do not show any accumulation of activity in the subhepatic region.   The only activity is seen within the small bowel confirming that there is no bile leak present. The CT images also show a slight decrease in the amount of fluid in the gallbladder fossa. No evident bile leak following cholecystectomy. Slight reduction in the amount of subhepatic fluid present since the earlier CT study 5 hours ago. Xr Chest Portable    Result Date: 4/4/2021  EXAMINATION: ONE XRAY VIEW OF THE CHEST 4/4/2021 11:54 am COMPARISON: 03/30/2021 HISTORY: ORDERING SYSTEM PROVIDED HISTORY: cough TECHNOLOGIST PROVIDED HISTORY: Reason for exam:->cough Reason for Exam: cough Acuity: Unknown Type of Exam: Unknown FINDINGS: Cardiac silhouette is enlarged. No pneumothorax. Moderate right and small left pleural effusions. Hazy opacity throughout both lungs. No acute bony abnormality. Findings as above which may be related to edema or infection. Xr Chest Portable    Result Date: 3/12/2021  EXAMINATION: ONE XRAY VIEW OF THE CHEST 3/12/2021 6:06 pm COMPARISON: 1 March 2021 HISTORY: ORDERING SYSTEM PROVIDED HISTORY: ng tube placement TECHNOLOGIST PROVIDED HISTORY: Reason for exam:->ng tube placement Reason for Exam: ng tube placement Acuity: Unknown Type of Exam: Unknown FINDINGS: AP portable view of the chest time stamped at 1752 hours overlying cardiac monitoring electrodes are noted. Intestinal tube extends to the distal stomach. Heart is stable, mildly enlarged. Elevated hemidiaphragm is noted. There is left perihilar stranding likely atelectasis. No extrapleural air. Intestinal tube terminates in the distal stomach. Other findings as above. Ct Chest Abdomen Pelvis Wo Contrast    Result Date: 3/30/2021  EXAMINATION: CT OF THE CHEST, ABDOMEN, AND PELVIS WITHOUT CONTRAST 3/30/2021 3:26 pm TECHNIQUE: CT of the chest, abdomen and pelvis was performed without the administration of intravenous contrast. Multiplanar reformatted images are provided for review.  Dose modulation, iterative reconstruction, and/or weight based adjustment of the mA/kV was utilized to reduce the radiation dose to as low as reasonably achievable. COMPARISON: Abdomen and pelvis CT, 03/11/2021 CT chest abdomen pelvis, 03/05/2021 HISTORY: ORDERING SYSTEM PROVIDED HISTORY: r/o penumonia, recent surgery TECHNOLOGIST PROVIDED HISTORY: Reason for exam:->r/o penumonia, recent surgery Additional Contrast?->None Decision Support Exception->Emergency Medical Condition (MA) Reason for Exam: r/o penumonia, recent surgery Acuity: Acute Type of Exam: Initial FINDINGS: Chest: Mediastinum: Visualized thyroid is unremarkable. Mildly enlarged mediastinal lymph nodes have decreased in size when compared to the previous exam, and are most compatible with reactive lymph nodes. Esophagus is unremarkable. Noncontrast imaging of the cardiac chambers, thoracic aorta, and pulmonary arteries is unremarkable. Lungs/pleura: Calcified lesion in the left lower lobe is again seen, compatible with old granulomatous disease, found as far back as 2014, and requires no specific follow-up. A focal infiltrate is not detected. The airways are patent. No pneumothorax. No pleural effusion. Soft Tissues/Bones: Visualized extra thoracic soft tissues are unremarkable. No acute or suspicious bony abnormality is identified. Severe degenerative disease within the glenohumeral joints is noted bilaterally, especially on the right. Abdomen/Pelvis: Lack of intravenous contrast limits evaluation of the solid abdominal viscera, the hollow abdominal viscera, and the vascular structures. Organs: Having said that, no acute hepatic abnormality is identified. The gallbladder is surgically absent. Noncontrast imaging of the spleen, adrenals, and pancreas is unremarkable. Nonobstructing nephrolithiasis is noted within the right kidney. GI/Bowel: There has been development of a very large gas and fluid collection within the right abdominal wall measuring 12 cm maximally (series 2, image 132).   There is evidence of extension of this collection into the peritoneal cavity (as visualized on axial images 140 to through 154. The ileocolic anastomosis is very close to this collection within the peritoneum. It would be difficult to entirely exclude communication between bowel and this collection. The patient status post partial colectomy. The remainder of the large bowel is unremarkable. No evidence of small bowel obstruction. There is no evidence of bowel obstruction. Stomach and duodenal sweep are unremarkable. Pelvis: Ovaries are prominent bilaterally, but those are stable dating back to . No suspicious adnexal lesions are found. Uterus unremarkable. Urinary bladder is unremarkable. Peritoneum/Retroperitoneum: Abdominal aorta normal in caliber. No retroperitoneal lymphadenopathy. Bones/Soft Tissues: No osteolytic or osteoblastic bone lesions are seen. No acute bony abnormalities are detected. Chest CT: No acute abnormality detected. Abdomen and pelvis CT: Interval development of a large gas and fluid collection within the right abdominal wall measuring up to 12 cm, most compatible with an abscess. There is intraperitoneal communication of that collection, and the ileocolic anastomosis is very close to the intraperitoneal component, and therefore it would be difficult to entirely exclude a communication between bowel and that collection.          Discharge Exam:  See progress note from day of discharge    Disposition: home    Condition: stable    Discharge Medications:   [facility]   Home Medication Instructions LK    Printed on:21 5806   Medication Information                      amiodarone (CORDARONE) 200 MG tablet  200mg tid x 7d (thru 3/23/21) then 200mg qd thereafter             amoxicillin-clavulanate (AUGMENTIN) 875-125 MG per tablet  Take 1 tablet by mouth every 12 hours for 10 days             aspirin 81 MG chewable tablet  1 tab daily             carvedilol (COREG) 3.125 MG tablet  Take 1 tablet by mouth 2 times daily (with meals)             HYDROcodone-acetaminophen (NORCO) 5-325 MG per tablet  Take 1 tablet by mouth every 8 hours as needed for Pain for up to 7 days. rosuvastatin (CRESTOR) 20 MG tablet  Take 1 tablet by mouth nightly             ticagrelor (BRILINTA) 90 MG TABS tablet  Take 1 tablet by mouth 2 times daily                 Allergies: Allergies   Allergen Reactions    Acetomenaphthone (Menadiol Diacetate) [Menadiol Sodium Diphosphate]      Upset stomach     Lisinopril-Hydrochlorothiazide Other (See Comments)       Follow up Instructions: Follow-up with PCP: Michelet Ellison MD in 2 wk .       Total time spent on day of discharge including face-to-face visit, examination, documentation, counseling, preparation of discharge plans and followup, and discharge medicine reconciliation and presciptions is 36 minutes    Signed:  Joss Hutchison MD  4/5/2021

## 2021-04-05 NOTE — PLAN OF CARE
Problem: Skin Integrity:  Goal: Will show no infection signs and symptoms  Description: Will show no infection signs and symptoms  4/5/2021 0722 by Sabrina Rawls RN  Outcome: Ongoing  4/5/2021 0135 by Delonte Willett RN  Outcome: Ongoing  Goal: Absence of new skin breakdown  Description: Absence of new skin breakdown  4/5/2021 0722 by Sabrina Rawls RN  Outcome: Ongoing  4/5/2021 0135 by Delonte Willett RN  Outcome: Ongoing  Goal: Skin integrity will be maintained  Description: Skin integrity will be maintained  4/5/2021 0722 by Sabrina Rawls RN  Outcome: Ongoing  4/5/2021 0135 by Delonte Willett RN  Outcome: Ongoing  Goal: Skin integrity will improve  Description: Skin integrity will improve  4/5/2021 0722 by Sabrina Rawls RN  Outcome: Ongoing  4/5/2021 0135 by Delonte Willett RN  Outcome: Ongoing     Problem: Pain:  Goal: Pain level will decrease  Description: Pain level will decrease  4/5/2021 0722 by Sabrina Rawls RN  Outcome: Ongoing  4/5/2021 0135 by Delonte Willett RN  Outcome: Ongoing  Goal: Control of acute pain  Description: Control of acute pain  4/5/2021 0722 by Sabrina Rawls RN  Outcome: Ongoing  4/5/2021 0135 by Delonte Willett RN  Outcome: Ongoing  Goal: Control of chronic pain  Description: Control of chronic pain  4/5/2021 0722 by Sabrina Rawls RN  Outcome: Ongoing  4/5/2021 0135 by Delonte Willett RN  Outcome: Ongoing     Problem: Falls - Risk of:  Goal: Will remain free from falls  Description: Will remain free from falls  4/5/2021 0722 by Sabrina Rawls RN  Outcome: Ongoing  4/5/2021 0135 by Delonte Willett RN  Outcome: Ongoing  Goal: Absence of physical injury  Description: Absence of physical injury  4/5/2021 0722 by Sabrina Rawls RN  Outcome: Ongoing  4/5/2021 0135 by Delonte Willett RN  Outcome: Ongoing     Problem: Physical Regulation:  Goal: Complications related to the disease process, condition or treatment will be avoided or minimized  Description: Complications related to the disease process, condition or treatment will be avoided or minimized  4/5/2021 [id number] by Estella Fox RN  Outcome: Ongoing  4/5/2021 0135 by Lawanda Shah RN  Outcome: Ongoing     Problem: Tissue Perfusion:  Goal: Ability to maintain adequate tissue perfusion will improve  Description: Ability to maintain adequate tissue perfusion will improve  4/5/2021 0722 by Estella Fox RN  Outcome: Ongoing  4/5/2021 0135 by Lawanda Shah RN  Outcome: Ongoing

## 2021-04-05 NOTE — PROGRESS NOTES
Nephrology Consult Note  [phone]  [phone]   [email or web address].        Reason for Consult:  YESENIA  HISTORY OF PRESENT ILLNESS:      The patient is a 68 y.o. female with significant past medical history of CKD stage IIIb, coronary artery disease,  hypertension, mixed hyperlipidemia , atrial Fibrillation was in the hospital in the early part of March and underwent a right hemicolectomy a laparoscopic cholecystectomy. At that time she was being investigated for anemia and found to have a right colonic mass. She was operated on by Dr. Mohit Bocnaegra and discharged home. .  She now presents with 2 to 3 days history of progressive fatigue and tenderness over the wound of abdomen. Denies any foul-smelling discharge or fever or chills or rigors. There is no dysuria gross hematuria  Her baseline creatinine is 1.3 creatinine on admission was 3.2  She was running low blood pressures on admission  She was started on IV antibiotics after cultures were obtained and IV fluids  Dr. Mohit Bocanegra was consulted and the plan is that there is abdominal wall abscess and incision and drainage will be done      Interval  History:    -pt seen and examined  -PMSHx and meds reviewed  -No family at bedside    No acute events ON  BP stable  Had SOB ON-given lasix 20mg IV    ROS No CP/SOB/EUBANKS    ROS neg for rest of the system. No family present     PHYSICAL EXAM:    Vitals:    BP (!) 143/66   Pulse 65   Temp 97.2 °F (36.2 °C) (Oral)   Resp 18   Ht 5' (1.524 m)   Wt 220 lb 14.4 oz (100.2 kg)   SpO2 92%   BMI 43.14 kg/m²   I/O last 3 completed shifts: In: 480 [P.O.:480]  Out: 1450 [Urine:1450]  No intake/output data recorded. Physical Exam:  Gen:  alert, oriented x 3  Foul smell in the room   PERRL , EOM +  Pallor +, No icterus  JVP not raised   CV: RRR no murmur or rub .   Lungs:B/ L air entry, Normal breath sounds   Abd: soft, bowel sounds + , distended, soft, LSCS Scar, Dressing Right abdomen, redness of skin   Ext: +1 Edema,Hand edema +   Skin: Warm. No rash  Neuro: nonfocal.      DATA:    CBC with Differential:    Lab Results   Component Value Date    WBC 6.6 04/04/2021    RBC 3.42 04/04/2021    HGB 7.6 04/04/2021    HCT 25.6 04/04/2021     04/04/2021    MCV 74.8 04/04/2021    MCH 22.2 04/04/2021    MCHC 29.7 04/04/2021    RDW 25.7 04/04/2021    BANDSPCT 6 04/04/2021    METASPCT 1 04/03/2021    LYMPHOPCT 16.0 04/04/2021    MONOPCT 3.0 04/04/2021    MYELOPCT 1 04/04/2021    BASOPCT 0.0 04/04/2021    MONOSABS 0.2 04/04/2021    LYMPHSABS 1.1 04/04/2021    EOSABS 0.1 04/04/2021    BASOSABS 0.0 04/04/2021     CMP:    Lab Results   Component Value Date     04/05/2021    K 3.6 04/05/2021    K 3.7 03/31/2021     04/05/2021    CO2 20 04/05/2021    BUN 21 04/05/2021    CREATININE 1.6 04/05/2021    GFRAA 38 04/05/2021    AGRATIO 0.7 03/31/2021    LABGLOM 31 04/05/2021    GLUCOSE 102 04/05/2021    PROT 5.2 03/31/2021    LABALBU 2.1 03/31/2021    CALCIUM 8.1 04/05/2021    BILITOT 0.4 03/31/2021    ALKPHOS 103 03/31/2021    AST 10 03/31/2021    ALT 6 03/31/2021     Phosphorus:    Lab Results   Component Value Date    PHOS 2.8 03/14/2021     Uric Acid:  No results found for: LABURIC, URICACID  Troponin:    Lab Results   Component Value Date    TROPONINI <0.01 03/30/2021     U/A:    Lab Results   Component Value Date    COLORU YELLOW 04/01/2021    PROTEINU 30 04/01/2021    PHUR 5.5 04/01/2021    WBCUA 163 04/01/2021    RBCUA >100 04/01/2021    CLARITYU TURBID 04/01/2021    SPECGRAV 1.014 04/01/2021    LEUKOCYTESUR LARGE 04/01/2021    UROBILINOGEN 1.0 04/01/2021    BILIRUBINUR Negative 04/01/2021    BLOODU LARGE 04/01/2021    GLUCOSEU Negative 04/01/2021           IMPRESSION/RECOMMENDATIONS:      1.  YESENIA: baseline variable due to recurrent YESENIA-prior to 3/21-baseline Cr 1.2-1.3-peak Cr 3.2  -pre Renal vs ATN  -improved with IVF-now hypervolemic-given lasix 20mg IV last night  -will continue lasix for volume management  -anticipate new baseline to be higher given recurrent YESENIA on CKD    2. CKD stage 3: presumed HTN NS/YESENIA on CKD  -baseline Scr 1.6-follow up will be arrange  -continue lasix    3. Abdominal  wall abscess: post op-recent history of right hemicolectomy for colonic mass  -on augmentin    4. A.fib with RVR: on amiodarone    5. CAD: s/p stent-on brilinta/ASA/statin/BB    6. HTN: on carvedilol  -lasix a sabove    7. Anemia: hgb stable  -defer JAMIE to oncology    8. Invasive colonic adenocarcinoma: plan for outpatient chemotherapy per oncology  -will need close monitoring of renal function    Not stable for discharge today-continue diuresis    Thank you for allowing me to participate in the care of this patient. I will continue to follow along. Please call with questions.     Prema Dasilva MD  4/5/2021  [facility]

## 2021-04-05 NOTE — PROGRESS NOTES
Occupational Therapy  Facility/Department: [address] ORTHO/NEURO NURSING  Daily Treatment Note  NAME: Torres Middleton  : 1940  MRN: [id number]    Date of Service: 2021    Discharge Recommendations: Torres Middleton scored a 16/24 on the AM-PAC ADL Inpatient form. Current research shows that an AM-PAC score of 17 or less is typically not associated with a discharge to the patient's home setting. Based on the patient's AM-PAC score and their current ADL deficits, it is recommended that the patient have 3-5 sessions per week of Occupational Therapy at d/c to increase the patient's independence. During session pt only able to tolerate standing ~1 min at a time. Pt had 1 LOB in the bathroom. Please see assessment section for further patient specific details. If pt continues to decline SNF placement, recommend home with 24 hour supervision and HHOT. HOME HEALTH CARE: LEVEL 3 SAFETY     - Initial home health evaluation to occur within 24-48 hours, in patient home   - Therapy evaluations in home within 24-48 hours of discharge; including DME and home safety   - Frontload therapy 5 days, then 3x a week   - Therapy to evaluate if patient has [address] needs for personal care   -  evaluation within 24-48 hours, includes evaluation of resources and insurance to determine [place], [place], LTC, and Medicaid options   \  If patient discharges prior to next session this note will serve as a discharge summary. Please see below for the latest assessment towards goals. Assessment   Performance deficits / Impairments: Decreased functional mobility ; Decreased balance;Decreased safe awareness;Decreased ADL status; Decreased endurance;Decreased strength  Assessment: Patient presents with the above deficits impacting ccupational performance, pt becomes very SOB frequently and struggles with hand placement, and RW mgt. Pt had a LOB during the session.  Pt would beneift from continued skilled OT services to address deficits and have a safe return to PLOF. Treatment Diagnosis: YESENIA  Prognosis: Fair  History: CAD, HLD, anemia, CKD  Exam: As stated above  Assistance / Modification: RW  OT Education: Energy Conservation;Transfer Training;Equipment;ADL Adaptive Strategies;Precautions;Orientation  Patient Education: Patient educated on OT role, plan of care, transfer training, energy conservation, breathing exercises, equipment use and patient verbalized understanding  REQUIRES OT FOLLOW UP: Yes  Activity Tolerance  Activity Tolerance: Patient Tolerated treatment well;Patient limited by fatigue  Activity Tolerance: Pt demonstarted several episodes of SOB during functional mobility. SP02 dropped from 96% to 92% after ambulation. Pt expressed this was not her norm and she can usually get around w/o any trouble. Safety Devices  Safety Devices in place: Yes  Type of devices: All fall risk precautions in place;Call light within reach; Chair alarm in place;Gait belt;Patient at risk for falls; Left in chair;Nurse notified         Patient Diagnosis(es): The primary encounter diagnosis was Postoperative intra-abdominal abscess. Diagnoses of Severe sepsis ([facility] [address] Malaise, Person under investigation for COVID-19, and YESENIA (acute kidney injury) ([facility] [address] were also pertinent to this visit. has a past medical history of Anemia, CAD (coronary artery disease), CKD (chronic kidney disease), Hyperlipidemia, and Hypertension. has a past surgical history that includes fracture surgery; Cystocopy (11/21/13); Upper gastrointestinal endoscopy (N/A, 3/5/2021); Colonoscopy (N/A, 3/5/2021); hemicolectomy (Right, 3/8/2021); and Cholecystectomy, laparoscopic (N/A, 3/8/2021).     Restrictions  Restrictions/Precautions  Restrictions/Precautions: Fall Risk  Subjective   General  Chart Reviewed: Yes  Patient assessed for rehabilitation services?: Yes  Response to previous treatment: Patient with no complaints from previous session  Family / Caregiver Present: No  Diagnosis: YESENIA  Subjective  Subjective: Pt in recliner upon arrival, agreeable to treatment  Pain Assessment  Pain Assessment: 0-10  Pain Level: 0  Pre Treatment Pain Screening  Intervention List: Patient able to continue with treatment  Vital Signs  Patient Currently in Pain: Denies   Orientation  Orientation  Overall Orientation Status: Within Functional Limits  Orientation Level: Oriented X4  Objective    ADL  Equipment Provided: Sock aid;Reacher(Pt required demonstration for AE, demonstrated understanding of use of AE)  Grooming: Contact guard assistance;Setup(Oral care in stance at sink, washed face and combed hair seated in chair next to sink)  UE Bathing: Stand by assistance;Verbal cueing;Setup(Washed arms and chest seated in a chair by the sink.)  UE Dressing: Contact guard assistance;Setup(Donning and Rafael Gonzalez gown while seated in a chair by the sink.)  LE Dressing: Min A ;Verbal cueing(don pullup; SBA Donning and doffing socks while seated at recliner.)  Toileting: Mod A (D bottom hygiene, pt reported having a bottom anders at home to assist with toileting)  Comment: Bleeding noted in crease under R breast, and L breast crease very red. RN notified. Balance  Sitting Balance: Stand by assistance(.)  Standing Balance: Minimal assistance  Standing Balance  Time: ~2-3 minutes total; unable to stand longer than 1 min at a time  Activity: Ambulating to/from bathroom, toileting, stance at sink to brush teeth  Comment: Recliner>chair by sink>recliner, pt required ~2-3 breaks to complete activity  Functional Mobility  Functional - Mobility Device: Rolling Walker  Activity: To/from bathroom  Assist Level: Minimal assistance  Functional Mobility Comments: Patient's initial SPO2 was 96% and a BP of 126/60,  after functional mobility tasks SPO2 dropped to 92% and BP up to 137/63.   Toilet Transfers  Equipment Used: Standard toilet  Toilet Transfer: Minimal assistance(Verbal cuing for hand placement, walker mgt, and use of L hand rail.)  Toilet Transfers Comments: When standing for bottom hygiene, pt attempted to lean on walker shifting her weight to the front and nearly falling d/t walker flipping up. Pt was instructed to use L grab bar to help stabolize herself. Bed mobility  Comment: No addressed, pt was in recliner upon arrival and returned to recliner at end of session. Transfers  Sit to stand: Minimal assistance(Pt needed assistance getting up and balanced)  Stand to sit: Minimal assistance(Required verbal cuing for hand placement and positioning of trunk and feet before sitting)  Transfer Comments: Pt needed verbal cuing for walker mgt, breathing exercises, hand placement and postioning of body during transfers. Pt showed understanding of instructions. Cognition  Overall Cognitive Status: Exceptions  Arousal/Alertness: Appropriate responses to stimuli  Following Commands:  Follows multistep commands with increased time  Attention Span: Appears intact  Memory: Appears intact  Safety Judgement: Decreased awareness of need for assistance  Problem Solving: Assistance required to implement solutions  Insights: Decreased awareness of deficits  Initiation: Requires cues for some  Sequencing: Requires cues for some     Perception  Overall Perceptual Status: [facility]     Plan   Plan  Times per week: 3-5x/wk  Times per day: Daily  Current Treatment Recommendations: Balance Training, Functional Mobility Training, Endurance Training, Patient/Caregiver Education & Training, Self-Care / ADL, Safety Education & Training, Pain Management    AM-PAC Score        -[facility] Inpatient Daily Activity Raw Score: 16 (04/05/21 1221)  AM-PAC Inpatient ADL T-Scale Score : 35.96 (04/05/21 1221)  ADL Inpatient CMS 0-100% Score: 53.32 (04/05/21 1221)  ADL Inpatient CMS G-Code Modifier : CK (04/05/21 1221)    Goals  Short term goals  Time Frame for Short term goals: STG=Discharge  Short term goal 1: Patient will complete functional transfers with mod I--- Min A 4/5  Short term goal 2: Patient will complete functional mobility tasks with CGA with 1-2 standing rest breaks---1 Minute in stance at a time with CGA and ~2-3 rest breaks 4/5  Short term goal 3: Patient will complete bed mobility with CGA--- Not addressed 4/5  Short term goal 4: Patient will complete LB dressing tasks with mod I---SBA 4/5  Long term goals  Time Frame for Long term goals : LTG=STG       Therapy Time   Individual Concurrent Group Co-treatment   Time In 1034         Time Out 1130         Minutes 56               Timed Code Treatment Minutes:  56 Minutes   Total Treatment Minutes:  72 Lelee Catalino Miller, OT   Jason Mock S/DANIEL   I have directly observed this treatment and have read and approve this note.    Evin Loja, OTR/L, [id number]

## 2021-04-05 NOTE — PROGRESS NOTES
Morning assessment complete. Pt found without oxygen on in chair, pt states it was irritating her. O2 sat 88% on room air, educated patient we like it to be above 90%, placed on 1.5L and came up to 92%. Belia Hollins removed so patient ambulates as much as possible. Lung sounds clear but has moist/productive cough. Updated Dr. Wilner Armstrong on condition and results of chest x-ray. Dressing to abdomen changed by Dr. Clayton Borden, stated he would like to see her in outpatient wound care clinic next week and packing can be completed with normal saline at discharge. Bowel sounds active. Other lap sites clean dry and intact. Noted BLE swelling. Encouraged IS use. Daughter at bedside. Denies further needs. Pt demonstrated how to reach nurse with call light. Call light in reach. Will continue to monitor. The care plan and education has been reviewed and mutually agreed upon with the patient. 1009 per Dr. Urban Fay pt is not ready for discharge, noted order for IV lasix order but 20 mg was just given per Dr. Wilner Armstrong will verify order. 1343 noted open/red/moist areas under bilateral breasts, worse under R side, inner dry applied and encouraged patient to keep in place.

## 2021-04-05 NOTE — PLAN OF CARE
Problem: Skin Integrity:  Goal: Will show no infection signs and symptoms  Description: Will show no infection signs and symptoms  Outcome: Ongoing  Goal: Absence of new skin breakdown  Description: Absence of new skin breakdown  Outcome: Ongoing  Goal: Skin integrity will be maintained  Description: Skin integrity will be maintained  Outcome: Ongoing  Goal: Skin integrity will improve  Description: Skin integrity will improve  Outcome: Ongoing     Problem: Pain:  Goal: Pain level will decrease  Description: Pain level will decrease  Outcome: Ongoing  Goal: Control of acute pain  Description: Control of acute pain  Outcome: Ongoing  Goal: Control of chronic pain  Description: Control of chronic pain  Outcome: Ongoing     Problem: Falls - Risk of:  Goal: Will remain free from falls  Description: Will remain free from falls  Outcome: Ongoing  Goal: Absence of physical injury  Description: Absence of physical injury  Outcome: Ongoing     Problem: Physical Regulation:  Goal: Complications related to the disease process, condition or treatment will be avoided or minimized  Description: Complications related to the disease process, condition or treatment will be avoided or minimized  Outcome: Ongoing     Problem: Tissue Perfusion:  Goal: Ability to maintain adequate tissue perfusion will improve  Description: Ability to maintain adequate tissue perfusion will improve  Outcome: Ongoing

## 2021-04-06 LAB
ANION GAP SERPL CALCULATED.3IONS-SCNC: 7 MMOL/L (ref 3–16)
BASOPHILS ABSOLUTE: 0 K/UL (ref 0–0.2)
BASOPHILS RELATIVE PERCENT: 0.5 %
BUN BLDV-MCNC: 19 MG/DL (ref 7–20)
CALCIUM SERPL-MCNC: 7.8 MG/DL (ref 8.3–10.6)
CHLORIDE BLD-SCNC: 106 MMOL/L (ref 99–110)
CO2: 25 MMOL/L (ref 21–32)
CREAT SERPL-MCNC: 1.6 MG/DL (ref 0.6–1.2)
EOSINOPHILS ABSOLUTE: 0.4 K/UL (ref 0–0.6)
EOSINOPHILS RELATIVE PERCENT: 5.1 %
GFR AFRICAN AMERICAN: 38
GFR NON-AFRICAN AMERICAN: 31
GLUCOSE BLD-MCNC: 107 MG/DL (ref 70–99)
HCT VFR BLD CALC: 25.5 % (ref 36–48)
HEMOGLOBIN: 7.8 G/DL (ref 12–16)
LYMPHOCYTES ABSOLUTE: 1.2 K/UL (ref 1–5.1)
LYMPHOCYTES RELATIVE PERCENT: 15 %
MAGNESIUM: 1.8 MG/DL (ref 1.8–2.4)
MCH RBC QN AUTO: 22.3 PG (ref 26–34)
MCHC RBC AUTO-ENTMCNC: 30.5 G/DL (ref 31–36)
MCV RBC AUTO: 73.1 FL (ref 80–100)
MONOCYTES ABSOLUTE: 0.5 K/UL (ref 0–1.3)
MONOCYTES RELATIVE PERCENT: 6.3 %
NEUTROPHILS ABSOLUTE: 5.8 K/UL (ref 1.7–7.7)
NEUTROPHILS RELATIVE PERCENT: 73.1 %
PDW BLD-RTO: 26.6 % (ref 12.4–15.4)
PHOSPHORUS: 2.6 MG/DL (ref 2.5–4.9)
PLATELET # BLD: 279 K/UL (ref 135–450)
PMV BLD AUTO: 7.5 FL (ref 5–10.5)
POTASSIUM SERPL-SCNC: 3.2 MMOL/L (ref 3.5–5.1)
RBC # BLD: 3.48 M/UL (ref 4–5.2)
SODIUM BLD-SCNC: 138 MMOL/L (ref 136–145)
WBC # BLD: 8 K/UL (ref 4–11)

## 2021-04-06 PROCEDURE — 6370000000 HC RX 637 (ALT 250 FOR IP): Performed by: NURSE PRACTITIONER

## 2021-04-06 PROCEDURE — 6360000002 HC RX W HCPCS: Performed by: INTERNAL MEDICINE

## 2021-04-06 PROCEDURE — 85025 COMPLETE CBC W/AUTO DIFF WBC: CPT

## 2021-04-06 PROCEDURE — 99223 1ST HOSP IP/OBS HIGH 75: CPT | Performed by: INTERNAL MEDICINE

## 2021-04-06 PROCEDURE — 6370000000 HC RX 637 (ALT 250 FOR IP): Performed by: INTERNAL MEDICINE

## 2021-04-06 PROCEDURE — 83735 ASSAY OF MAGNESIUM: CPT

## 2021-04-06 PROCEDURE — 2580000003 HC RX 258: Performed by: INTERNAL MEDICINE

## 2021-04-06 PROCEDURE — 84100 ASSAY OF PHOSPHORUS: CPT

## 2021-04-06 PROCEDURE — 97530 THERAPEUTIC ACTIVITIES: CPT

## 2021-04-06 PROCEDURE — APPNB30 APP NON BILLABLE TIME 0-30 MINS: Performed by: NURSE PRACTITIONER

## 2021-04-06 PROCEDURE — 80048 BASIC METABOLIC PNL TOTAL CA: CPT

## 2021-04-06 PROCEDURE — 6370000000 HC RX 637 (ALT 250 FOR IP): Performed by: STUDENT IN AN ORGANIZED HEALTH CARE EDUCATION/TRAINING PROGRAM

## 2021-04-06 PROCEDURE — 1200000000 HC SEMI PRIVATE

## 2021-04-06 RX ORDER — POTASSIUM CHLORIDE 7.45 MG/ML
10 INJECTION INTRAVENOUS PRN
Status: DISCONTINUED | OUTPATIENT
Start: 2021-04-06 | End: 2021-04-06

## 2021-04-06 RX ORDER — TORSEMIDE 20 MG/1
20 TABLET ORAL DAILY
Status: DISCONTINUED | OUTPATIENT
Start: 2021-04-06 | End: 2021-04-07 | Stop reason: HOSPADM

## 2021-04-06 RX ORDER — FUROSEMIDE 10 MG/ML
20 INJECTION INTRAMUSCULAR; INTRAVENOUS ONCE
Status: COMPLETED | OUTPATIENT
Start: 2021-04-06 | End: 2021-04-06

## 2021-04-06 RX ORDER — POTASSIUM CHLORIDE 20 MEQ/1
40 TABLET, EXTENDED RELEASE ORAL ONCE
Status: DISCONTINUED | OUTPATIENT
Start: 2021-04-06 | End: 2021-04-07 | Stop reason: HOSPADM

## 2021-04-06 RX ORDER — POTASSIUM CHLORIDE 20 MEQ/1
40 TABLET, EXTENDED RELEASE ORAL PRN
Status: DISCONTINUED | OUTPATIENT
Start: 2021-04-06 | End: 2021-04-06

## 2021-04-06 RX ADMIN — TICAGRELOR 90 MG: 90 TABLET ORAL at 08:42

## 2021-04-06 RX ADMIN — TORSEMIDE 20 MG: 20 TABLET ORAL at 10:44

## 2021-04-06 RX ADMIN — ROSUVASTATIN 20 MG: 20 TABLET, FILM COATED ORAL at 22:09

## 2021-04-06 RX ADMIN — CARVEDILOL 3.12 MG: 3.12 TABLET, FILM COATED ORAL at 07:53

## 2021-04-06 RX ADMIN — PROMETHAZINE HYDROCHLORIDE 12.5 MG: 25 TABLET ORAL at 10:44

## 2021-04-06 RX ADMIN — Medication 10 ML: at 22:11

## 2021-04-06 RX ADMIN — ENOXAPARIN SODIUM 30 MG: 30 INJECTION SUBCUTANEOUS at 07:52

## 2021-04-06 RX ADMIN — HYOSCYAMINE SULFATE: 16 SOLUTION at 22:10

## 2021-04-06 RX ADMIN — HYOSCYAMINE SULFATE: 16 SOLUTION at 07:52

## 2021-04-06 RX ADMIN — AMOXICILLIN AND CLAVULANATE POTASSIUM 1 TABLET: 875; 125 TABLET, FILM COATED ORAL at 07:52

## 2021-04-06 RX ADMIN — POTASSIUM BICARBONATE 40 MEQ: 782 TABLET, EFFERVESCENT ORAL at 07:52

## 2021-04-06 RX ADMIN — FUROSEMIDE 20 MG: 10 INJECTION, SOLUTION INTRAMUSCULAR; INTRAVENOUS at 12:45

## 2021-04-06 RX ADMIN — AMOXICILLIN AND CLAVULANATE POTASSIUM 1 TABLET: 875; 125 TABLET, FILM COATED ORAL at 22:09

## 2021-04-06 RX ADMIN — AMIODARONE HYDROCHLORIDE 200 MG: 200 TABLET ORAL at 07:52

## 2021-04-06 RX ADMIN — ASPIRIN 81 MG: 81 TABLET, CHEWABLE ORAL at 07:52

## 2021-04-06 RX ADMIN — TICAGRELOR 90 MG: 90 TABLET ORAL at 23:05

## 2021-04-06 ASSESSMENT — PAIN SCALES - WONG BAKER: WONGBAKER_NUMERICALRESPONSE: 0

## 2021-04-06 ASSESSMENT — PAIN SCALES - GENERAL
PAINLEVEL_OUTOF10: 0
PAINLEVEL_OUTOF10: 0

## 2021-04-06 NOTE — PROGRESS NOTES
[address] and Laparoscopic Surgery        Progress Note    Patient Name: Andi Montez  MRN: [id number]  YOB: 1940  Date of Evaluation: 2021    Subjective:  Emesis from PO potassium  On O2  Bradycardia overnight      Vital Signs:  Patient Vitals for the past 24 hrs:   BP Temp Temp src Pulse Resp SpO2   21 1145 (!) 145/70 -- Oral 57 18 96 %   21 0730 (!) 146/72 97.5 °F (36.4 °C) Oral 65 16 96 %   21 0615 132/70 97.8 °F (36.6 °C) Oral 70 18 94 %   21 0134 130/73 97.3 °F (36.3 °C) Oral 72 18 95 %   21 2100 126/62 97.5 °F (36.4 °C) Oral 80 18 95 %   21 1742 (!) 112/52 97.7 °F (36.5 °C) Oral 95 18 95 %   21 1223 (!) 91/50 97.5 °F (36.4 °C) Oral 87 18 95 %      TEMPERATURE HISTORY 24H: Temp (24hrs), Av.6 °F (36.4 °C), Min:97.3 °F (36.3 °C), Max:97.8 °F (36.6 °C)    BLOOD PRESSURE HISTORY: Systolic (37LYH), MBU:892 , Min:91 , VGX:786    Diastolic (01BSG), FFT:12, Min:50, Max:73      Intake/Output:  I/O last 3 completed shifts: In: 2393 [P.O.:1495]  Out: 2550 [Urine:2550]  I/O this shift:  In: -   Out: 450 [Urine:450]  Drain/tube Output:       Physical Exam:  General: awake, alert, oriented to  person, place, time  Abdomen: soft, obese, incisional tenderness only  Skin/Wound: right upper quadrant incision open wound bed clean with SS drainage    Labs:  CBC:    Recent Labs     21  0630 21  0538   WBC 6.6 8.0   HGB 7.6* 7.8*   HCT 25.6* 25.5*    279     BMP:    Recent Labs     21  0630 21  0457 21  0538    137 138   K 4.1 3.6 3.2*    109 106   CO2 22 20* 25   BUN 24* 21* 19   CREATININE 1.8* 1.6* 1.6*   GLUCOSE 108* 102* 107*     Hepatic:    No results for input(s): AST, ALT, ALB, BILITOT, ALKPHOS in the last 72 hours.   Amylase:    Lab Results   Component Value Date    AMYLASE 29 2021    AMYLASE 21 2021    AMYLASE 17 2021     Lipase:    Lab Results   Component Value Date differentiated. - Margins not involved. - One pericolonic lymph nodes positive for metastatic carcinoma (1/16). Gallbladder, cholecystectomy:   - Chronic cholecystitis, moderate. - Cholelithiasis. - Cholesterolosis. Procedure:  Right hemicolectomy   Tumor Site: Ileocecal valve   Tumor Size: Greatest dimension (centimeter): 4.2        Additional dimensions (centimeters): 3.7 x 0.8   Macroscopic tumor perforation: Not identified   Histologic Type: Adenocarcinoma   Histologic Grade: G2: Moderately   Tumor Extension: Tumor invades through muscularis propria into subserosal   adipose tissue. MARGINS   All margins are uninvolved by invasive carcinoma, high grade dysplasia /   intramucosal carcinoma, and low grade dysplasia   Margins examined: Proximal, distal, and mesenteric    + Distance of invasive carcinoma from closest margin (millimeters or   centimeters): 40 mm    + Specify closest margin: Distal     Treatment Effect (applicable to carcinomas treated with neoadjuvant   therapy): No known pre surgical treatment   Lymph-Vascular Invasion: Not identified   Perineural Invasion: Not identified     + Tumor Budding (Note I)             Low score (0-4)   Type of Polyp in Which Invasive Carcinoma Arose: Not applicable   Tumor deposits: Absent     Regional lymph nodes     Number of Lymph nodes Involved: 1     Number of Lymph Nodes Examined: 16     Pathologic Stage Classification (pTNM, AJCC 8th Edition)     Primary Tumor (pT):      pT 3: Tumor invades through muscularis propria into pericolonic fat     Regional Lymph Nodes (pN):     pN 1a: Metastasis in 1 lymph node     + Additional pathologic findings: Histologically unremarkable appendix. + Ancillary studies: Not applicable     Imaging:  I have personally reviewed the following films:    No results found.     Scheduled Meds:   torsemide  20 mg Oral Daily    potassium chloride  40 mEq Oral Once    furosemide  20 mg Intravenous Once    amoxicillin-clavulanate  1 tablet Oral 2 times per day    lidocaine PF  5 mL Intradermal Once    Sodium Hypochlorite   Irrigation BID    aspirin  81 mg Oral Daily    carvedilol  3.125 mg Oral BID WC    ticagrelor  90 mg Oral BID    rosuvastatin  20 mg Oral Nightly    amiodarone  200 mg Oral Daily    sodium chloride flush  10 mL Intravenous 2 times per day    enoxaparin  30 mg Subcutaneous Daily     Continuous Infusions:   sodium chloride       PRN Meds:.albuterol, sodium chloride flush, sodium chloride, acetaminophen, HYDROcodone 5 mg - acetaminophen **OR** HYDROcodone 5 mg - acetaminophen, morphine **OR** morphine, promethazine **OR** ondansetron, hydrALAZINE, sodium chloride      Assessment:  68 y.o. female admitted with   1. Postoperative intra-abdominal abscess    2. Severe sepsis ([facility] [address]    3. Malaise    4. Person under investigation for COVID-19    5. YESENIA (acute kidney injury) ([facility] [address]      Ms. Jasson Keller is a 68 y.o. female who presents with   Incisional, abdominal wall abscess status-post bedside incision and drainage on 3/31/2021  Status-post laparoscopic right colon resection and cholecystectomy on 3/8/2021 for colon mass, chronic cholecystitis with cholelithiasis   Acute kidney injury  Urinary tract infection  Anemia  Hypertension  CAD  Paroxysmal atrial fibrillation  Medical coagulopathy, on Brilinta      Plan:  - continue packing changes twice daily  - culture sensitivities are resulted   augmentin for 7 days  - OK to DC from surgery standpoint once cleared by primary team  - f/u with Dr. Deysi Metcalf in 1 week; can do packing changes with saline soaked kerlix daily at home    Ava Serrano MD  General Surgery  04/06/21  12:05 PM

## 2021-04-06 NOTE — PROGRESS NOTES
Nephrology Consult Note  [phone]   [email or web address].        Reason for Consult:  YESENIA  HISTORY OF PRESENT ILLNESS:      The patient is a 68 y.o. female with significant past medical history of CKD stage IIIb, coronary artery disease,  hypertension, mixed hyperlipidemia , atrial Fibrillation was in the hospital in the early part of March and underwent a right hemicolectomy a laparoscopic cholecystectomy. At that time she was being investigated for anemia and found to have a right colonic mass. She was operated on by Dr. Asia Perkins and discharged home. .  She now presents with 2 to 3 days history of progressive fatigue and tenderness over the wound of abdomen. Denies any foul-smelling discharge or fever or chills or rigors. There is no dysuria gross hematuria  Her baseline creatinine is 1.3 creatinine on admission was 3.2  She was running low blood pressures on admission  She was started on IV antibiotics after cultures were obtained and IV fluids  Dr. Asia Perkins was consulted and the plan is that there is abdominal wall abscess and incision and drainage will be done      Interval  History:    -pt seen and examined  -PMSHx and meds reviewed  -No family at bedside    No acute events ON  BP stable  Oxygen requirement stable  Had SOB, given lasix  SCr stable today  Oxygen requirement is lower    ROS No CP/SOB/EUBANKS    ROS neg for rest of the system. No family present     PHYSICAL EXAM:    Vitals:    BP (!) 146/72   Pulse 65   Temp 97.5 °F (36.4 °C) (Oral)   Resp 16   Ht 5' (1.524 m)   Wt 220 lb 14.4 oz (100.2 kg)   SpO2 96%   BMI 43.14 kg/m²   I/O last 3 completed shifts: In: 1526 [P.O.:1495]  Out: 2550 [Urine:2550]  I/O this shift:  In: -   Out: 450 [Urine:450]    Physical Exam:  Gen:  alert, oriented x 3  Foul smell in the room   PERRL , EOM +  Pallor +, No icterus  JVP not raised   CV: RRR no murmur or rub .   Lungs:B/ L air entry, Normal breath sounds   Abd: soft, bowel sounds + , distended, soft, LSCS Scar, Dressing Right abdomen, redness of skin   Ext: +1  Edema,Hand edema +   Skin: Warm. No rash  Neuro: nonfocal.      DATA:    CBC with Differential:    Lab Results   Component Value Date    WBC 8.0 04/06/2021    RBC 3.48 04/06/2021    HGB 7.8 04/06/2021    HCT 25.5 04/06/2021     04/06/2021    MCV 73.1 04/06/2021    MCH 22.3 04/06/2021    MCHC 30.5 04/06/2021    RDW 26.6 04/06/2021    BANDSPCT 6 04/04/2021    METASPCT 1 04/03/2021    LYMPHOPCT 15.0 04/06/2021    MONOPCT 6.3 04/06/2021    MYELOPCT 1 04/04/2021    BASOPCT 0.5 04/06/2021    MONOSABS 0.5 04/06/2021    LYMPHSABS 1.2 04/06/2021    EOSABS 0.4 04/06/2021    BASOSABS 0.0 04/06/2021     CMP:    Lab Results   Component Value Date     04/06/2021    K 3.2 04/06/2021    K 3.7 03/31/2021     04/06/2021    CO2 25 04/06/2021    BUN 19 04/06/2021    CREATININE 1.6 04/06/2021    GFRAA 38 04/06/2021    AGRATIO 0.7 03/31/2021    LABGLOM 31 04/06/2021    GLUCOSE 107 04/06/2021    PROT 5.2 03/31/2021    LABALBU 2.1 03/31/2021    CALCIUM 7.8 04/06/2021    BILITOT 0.4 03/31/2021    ALKPHOS 103 03/31/2021    AST 10 03/31/2021    ALT 6 03/31/2021     Phosphorus:    Lab Results   Component Value Date    PHOS 2.8 03/14/2021     Uric Acid:  No results found for: LABURIC, URICACID  Troponin:    Lab Results   Component Value Date    TROPONINI <0.01 03/30/2021     U/A:    Lab Results   Component Value Date    COLORU YELLOW 04/01/2021    PROTEINU 30 04/01/2021    PHUR 5.5 04/01/2021    WBCUA 163 04/01/2021    RBCUA >100 04/01/2021    CLARITYU TURBID 04/01/2021    SPECGRAV 1.014 04/01/2021    LEUKOCYTESUR LARGE 04/01/2021    UROBILINOGEN 1.0 04/01/2021    BILIRUBINUR Negative 04/01/2021    BLOODU LARGE 04/01/2021    GLUCOSEU Negative 04/01/2021           IMPRESSION/RECOMMENDATIONS:      1.  YESENIA: baseline variable due to recurrent YESENIA-prior to 3/21-baseline Cr 1.2-1.3-peak Cr 3.2  -pre Renal vs ATN  -improved with IVF-now hypervolemic-given lasix 20mg IV last night  -had good response to lasix yesterday, oxygen requirement is lower  -will repeat IV lasix while hospitalized and start torsemide 20mg at discharge  -SCr stable today, 1.6-likely new baseline    2. CKD stage 3: presumed HTN NS/YESENIA on CKD  -baseline Scr 1.6-follow up will be arrange  -give IV lasix  -start torsemide daily in am    Edema: repeat lasix today, minimal proteinuria, ECHO 3/3/21 showed EF 55-60%, grad 1 ddx    3. Abdominal  wall abscess: post op-recent history of right hemicolectomy for colonic mass  -on augmentin  -per surgery    4. A.fib with RVR: on amiodarone    5. CAD: s/p stent-on brilinta/ASA/statin/BB    6. HTN: on carvedilol  -IV lasix as above    7. Anemia: hgb stable  -defer JAMIE to oncology    8.  Invasive colonic adenocarcinoma: plan for outpatient chemotherapy per oncology  -will need close monitoring of renal function    Norma Bond MD  4/6/2021  [facility]

## 2021-04-06 NOTE — CONSULTS
[facility]   Electrophysiology Consultation   Date: 4/6/2021  Reason for Consultation: Bradycardia  Consult Requesting Physician: Haley Hastings MD   Chief Complaint   Patient presents with    Hypotension     Pt reports low BP, 93/41. CC: I feel fine  HPI: Andrey Beebe is a 68 y.o. female with multiple comorbidities including chronic kidney disease, hypertension, hyperlipidemia, paroxysmal atrial fibrillation, who was initially evaluated for anemia and found to have a right colonic mass. She underwent right hemicolectomy and laparoscopic cholecystectomy. She presented back to the hospital complaining of progressive fatigue and tenderness over the wound of the abdomen. She was also running low blood pressure on admission. She was treated with IV antibiotics therapy and IV fluid. She had abdominal wall abscess status post drainage on 3/31/2021. Patient has history of paroxysmal atrial fibrillation with rapid ventricular rate on prior admissions. She was treated with amiodarone therapy. She was anemic and was not a good candidate for anticoagulation. She has left bundle branch block. She is now in sinus rhythm. It was noted that she is bradycardic at night. Her carvedilol dose has been held last night due to bradycardia. Patient also reports low blood pressure. Her son at bedside and gives further history. He states that they check her blood pressure at home. They have occasionally noted low blood pressure. Assessment and plan:     -Sinus bradycardia   Asymptomatic, mostly noted at night during sleep. Patient is morbidly obese. EP has seen this patient before and recommended sleep study. Asymptomatic bradycardia does not require any intervention.      Patient has presented with low blood pressure on admission and her son also reports occasional low blood pressure at home   Recommend holding carvedilol.    -Paroxysmal atrial fibrillation   This has been controlled °C)   SpO2: 90%      In: 240 [P.O.:240]  Out: 650    Wt Readings from Last 3 Encounters:   21 220 lb 14.4 oz (100.2 kg)   21 211 lb (95.7 kg)   21 214 lb 11.2 oz (97.4 kg)     Temp  Av.6 °F (36.4 °C)  Min: 97.3 °F (36.3 °C)  Max: 97.8 °F (36.6 °C)  Pulse  Av.1  Min: 57  Max: 95  BP  Min: 112/52  Max: 146/72  SpO2  Av.3 %  Min: 90 %  Max: 96 %    Intake/Output Summary (Last 24 hours) at 2021 1557  Last data filed at 2021 0906  Gross per 24 hour   Intake 920 ml   Output 1800 ml   Net -880 ml       I independently reviewed all cardiac tracing from cardiac telemetry. · Constitutional: Oriented. No distress. · Head: Normocephalic and atraumatic. · Mouth/Throat: Oropharynx is clear and moist.   · Eyes: Conjunctivae pale. EOM are normal.   · Neck: Neck supple. No JVD present. · Cardiovascular: Normal rate, regular rhythm, S1&S2. · Pulmonary/Chest: Bilateral respiratory sounds. No rhonchi. · Abdominal: Soft. Dressing clean  · Musculoskeletal: No tenderness.  + Edema    · Lymphadenopathy: Has no cervical adenopathy. · Neurological: Alert and oriented. Follows command, No Gross deficit   · Skin: Skin is warm, No rash noted.    · Psychiatric: Has a normal behavior       Scheduled Meds:   torsemide  20 mg Oral Daily    potassium chloride  40 mEq Oral Once    amoxicillin-clavulanate  1 tablet Oral 2 times per day    lidocaine PF  5 mL Intradermal Once    Sodium Hypochlorite   Irrigation BID    aspirin  81 mg Oral Daily    carvedilol  3.125 mg Oral BID WC    ticagrelor  90 mg Oral BID    rosuvastatin  20 mg Oral Nightly    amiodarone  200 mg Oral Daily    sodium chloride flush  10 mL Intravenous 2 times per day    enoxaparin  30 mg Subcutaneous Daily     Continuous Infusions:   sodium chloride       PRN Meds:.albuterol, sodium chloride flush, sodium chloride, acetaminophen, HYDROcodone 5 mg - acetaminophen **OR** HYDROcodone 5 mg - acetaminophen, morphine **OR** morphine, promethazine **OR** ondansetron, hydrALAZINE, sodium chloride     Review of System:  [x] Full ROS obtained and negative except as mentioned in HPI    Prior to Admission medications    Medication Sig Start Date End Date Taking? Authorizing Provider   HYDROcodone-acetaminophen (NORCO) 5-325 MG per tablet Take 1 tablet by mouth every 8 hours as needed for Pain for up to 7 days.  4/5/21 4/12/21 Yes Shawn Lake MD   amoxicillin-clavulanate (AUGMENTIN) 620-696 MG per tablet Take 1 tablet by mouth every 12 hours for 10 days 4/5/21 4/15/21 Yes Shawn Lake MD   aspirin 81 MG chewable tablet 1 tab daily 3/30/21  Yes Selena Frizzle, APRN - CNP   rosuvastatin (CRESTOR) 20 MG tablet Take 1 tablet by mouth nightly 11/30/20  Yes Selena Frizzle, APRN - CNP   carvedilol (COREG) 3.125 MG tablet Take 1 tablet by mouth 2 times daily (with meals) 11/21/20  Yes Selena Frizzle, APRN - CNP   ticagrelor (BRILINTA) 90 MG TABS tablet Take 1 tablet by mouth 2 times daily 11/21/20  Yes Selena Frizzle, APRN - CNP   amiodarone (CORDARONE) 200 MG tablet 200mg tid x 7d (thru 3/23/21) then 200mg qd thereafter 3/16/21   Shawn Lake MD       Past Medical History:   Diagnosis Date    Anemia     CAD (coronary artery disease) 11/2020    Stent    CKD (chronic kidney disease)     Hyperlipidemia     Hypertension         Past Surgical History:   Procedure Laterality Date    CHOLECYSTECTOMY, LAPAROSCOPIC N/A 3/8/2021    [facility]2021    COLONOSCOPY WITH BIOPSY performed by Lawyer Chucho MD at [facility]  11/21/13    w/ bladder biopsy    FRACTURE SURGERY      right wrist    HEMICOLECTOMY Right 3/8/2021    LAPAROSCOPIC RIGHT COLON RESECTION performed by Nasir Temple MD at [facility] N/A 3/5/2021    EGD BIOPSY performed by Lawyer Chucho MD at [address] [facility]

## 2021-04-06 NOTE — PROGRESS NOTES
Physical Therapy  Facility/Department: [address] ORTHO/NEURO NURSING  Daily Treatment Note  NAME: Kathya Falcon  : 1940  MRN: [id number]    Date of Service: 2021    Discharge Recommendations: Kathya Falcon scored a 16/24 on the AM-PAC short mobility form. Current research shows that an AM-PAC score of 17 or less is typically not associated with a discharge to the patient's home setting. Based on the patient's AM-PAC score and their current functional mobility deficits, it is recommended that the patient have 3-5 sessions per week of Physical Therapy at d/c to increase the patient's independence. Please see assessment section for further patient specific details. If patient discharges prior to next session this note will serve as a discharge summary. Please see below for the latest assessment towards goals. If discharged home, recommend HHPT. HOME HEALTH CARE: LEVEL 3 SAFETY     - Initial home health evaluation to occur within 24-48 hours, in patient home   - Therapy evaluations in home within 24-48 hours of discharge; including DME and home safety   - Frontload therapy 5 days, then 3x a week   - Therapy to evaluate if patient has [address] needs for personal care   -  evaluation within 24-48 hours, includes evaluation of resources and insurance to determine [place], [place], LTC, and Medicaid options         PT Equipment Recommendations  Equipment Needed: No    Assessment   Body structures, Functions, Activity limitations: Decreased functional mobility ; Decreased strength;Decreased endurance;Decreased balance  Assessment: Mobility performed on RA today with improved transfers and ambulation endurance. Desaturation briefly to 88% with mobility. Continued skilled PT to promote return towards PLOF.   Treatment Diagnosis: decreased functional mobility, decreased endurance  Prognosis: Good  PT Education: PT Role;Plan of Care;General Safety;Gait Training;Functional Mobility Training  Patient Education: reviewed PLB, verbalized understanding  Barriers to Learning: none  REQUIRES PT FOLLOW UP: Yes  Activity Tolerance  Activity Tolerance: Patient Tolerated treatment well  Activity Tolerance: Less SOB with  mobility. Patient Diagnosis(es): The primary encounter diagnosis was Postoperative intra-abdominal abscess. Diagnoses of Severe sepsis ([facility] [address] Malaise, Person under investigation for COVID-19, and YESENIA (acute kidney injury) ([facility] [address] were also pertinent to this visit. has a past medical history of Anemia, CAD (coronary artery disease), CKD (chronic kidney disease), Hyperlipidemia, and Hypertension. has a past surgical history that includes fracture surgery; Cystocopy (11/21/13); Upper gastrointestinal endoscopy (N/A, 3/5/2021); Colonoscopy (N/A, 3/5/2021); hemicolectomy (Right, 3/8/2021); and Cholecystectomy, laparoscopic (N/A, 3/8/2021). Restrictions  Restrictions/Precautions  Restrictions/Precautions: Fall Risk  Subjective   General  Chart Reviewed: Yes  Additional Pertinent Hx: CAD, hyperlipidemia, HTN, anemia, CKD, R wrist fx surgery  Response To Previous Treatment: Patient with no complaints from previous session. Family / Caregiver Present: No  Subjective  Subjective: feeling much better than this morning, agreed to session  General Comment  Comments: sitting in chair at arrival, on 1.5L but nsg stated to try to work with pt on RA.   97% on RA after O2 removed for a few minutes while at rest.  Pain Screening  Patient Currently in Pain: Denies  Vital Signs  Patient Currently in Pain: Denies       Orientation     Cognition      Objective   Bed mobility  Comment: not observed  Transfers  Sit to Stand: Contact guard assistance;Stand by assistance  Stand to sit: Contact guard assistance;Stand by assistance  Comment: From chair x 3 reps, from commode x 1 rep  Ambulation  Ambulation?: Yes  Ambulation 1  Surface: level tile  Device: Rolling Walker  Assistance: Contact guard assistance;Stand by assistance  Quality of Gait: no knee buckling, B knee varus and [place]  Gait Deviations: Slow Bibiana; Increased ADRIAN; Decreased step height  Distance: 40 ft x 2, 20 ft x 2  Comments: Increased endurance without need for rest break. Ambulated on RA. O2 briefly dropped to 88% with mobility, recovered quickly with PLB cueing. Stairs/Curb  Stairs?: No     Balance  Posture: Fair  Sitting - Static: Good  Sitting - Dynamic: Fair  Standing - Static: Good;-  Standing - Dynamic: Fair            Comment: Pt able to manage clothing and pericare while standing for toileting and SBA. SBA  to wash hands. G-Code     OutComes Score                                                     AM-PAC Score  AM-PAC Inpatient Mobility Raw Score : 16 (04/06/21 1347)  AM-PAC Inpatient T-Scale Score : 40.78 (04/06/21 1347)  Mobility Inpatient CMS 0-100% Score: 54.16 (04/06/21 1347)  Mobility Inpatient CMS G-Code Modifier : CK (04/06/21 1347)          Goals--  No goals met   Short term goals  Time Frame for Short term goals: upon discharge  Short term goal 1: Pt will be able to complete bed mobility with SBA  Short term goal 2: Pt will be able to complete sit<>stand transfers with SBA  Short term goal 3: Pt will be able to ambulate 75 ft with LRAD and SBA    Plan    Plan  Times per week: 3-5x  Times per day: Daily  Current Treatment Recommendations: Strengthening, Balance Training, Functional Mobility Training, Gait Training, Endurance Training, Equipment Evaluation, Education, & procurement, Patient/Caregiver Education & Training, Safety Education & Training  Safety Devices  Type of devices:  All fall risk precautions in place, Call light within reach, Chair alarm in place, Gait belt, Patient at risk for falls, Left in chair, Nurse notified  Restraints  Initially in place: No     Therapy Time   Individual Concurrent Group Co-treatment   Time In 1323         Time Out 1347         Minutes 24         Timed Code Treatment Minutes: [address] [facility], [id number]

## 2021-04-06 NOTE — PROGRESS NOTES
Progress Note - Dr. Tyra Gutiérrez - Internal Medicine  PCP: Rod Aranda  [address] [place] [address] Geri Ortega 80 [phone]    Hospital Day: 7  Code Status: Full Code  Current Diet: DIET GENERAL;        CC: follow up on medical issues    Subjective: Elian Nj is a 68 y.o. female. she remains on 0.5L o2  desats with movement around room but better than yest    Remains on lasix per renal    Noted to have  Bradycardia overnight, as low as the 30s  Cards consulted for eval          She denies chest pain, denies shortness of breath, denies nausea,  denies emesis. 10 system Review of Systems is reviewed with patient, and pertinent positives are noted in HPI above . Otherwise, Review of systems is negative. I have reviewed the patient's medical and social history in detail and updated the computerized patient record. To recap: She  has a past medical history of Anemia, CAD (coronary artery disease), CKD (chronic kidney disease), Hyperlipidemia, and Hypertension. . She  has a past surgical history that includes fracture surgery; Cystocopy (11/21/13); Upper gastrointestinal endoscopy (N/A, 3/5/2021); Colonoscopy (N/A, 3/5/2021); hemicolectomy (Right, 3/8/2021); and Cholecystectomy, laparoscopic (N/A, 3/8/2021). . She  reports that she has never smoked. She has never used smokeless tobacco. She reports that she does not drink alcohol or use drugs. .        Active Hospital Problems    Diagnosis Date Noted    UTI (urinary tract infection) [N39.0] 04/01/2021    Postoperative intra-abdominal abscess [T81.43XA]     YESENIA (acute kidney injury) ([facility] [N17.9] 03/30/2021    Abscess of intestine [K63.0] 03/30/2021    Intra-abdominal abscess ([facility] [facility] [K65.1] 03/30/2021    Malignant neoplasm of ascending colon ([facility] [facility] [C18.2] 81/36/3555    Diastolic dysfunction [Z15.86] 03/07/2021    Anemia [D64.9] 03/03/2021    Hypertension [I10] 01/10/2014       Current Facility-Administered Medications: potassium chloride (KLOR-CON M) extended release tablet 40 mEq, 40 mEq, Oral, PRN **OR** potassium bicarb-citric acid (EFFER-K) effervescent tablet 40 mEq, 40 mEq, Oral, PRN **OR** potassium chloride 10 mEq/100 mL IVPB (Peripheral Line), 10 mEq, Intravenous, PRN  albuterol (PROVENTIL) nebulizer solution 2.5 mg, 2.5 mg, Nebulization, Q6H PRN  amoxicillin-clavulanate (AUGMENTIN) 875-125 MG per tablet 1 tablet, 1 tablet, Oral, 2 times per day  lidocaine PF 1 % injection 5 mL, 5 mL, Intradermal, Once  Sodium Hypochlorite (DAKINS) 0.25 % external solution, , Irrigation, BID  aspirin chewable tablet 81 mg, 81 mg, Oral, Daily  carvedilol (COREG) tablet 3.125 mg, 3.125 mg, Oral, BID WC  ticagrelor (BRILINTA) tablet 90 mg, 90 mg, Oral, BID  rosuvastatin (CRESTOR) tablet 20 mg, 20 mg, Oral, Nightly  amiodarone (CORDARONE) tablet 200 mg, 200 mg, Oral, Daily  sodium chloride flush 0.9 % injection 10 mL, 10 mL, Intravenous, 2 times per day  sodium chloride flush 0.9 % injection 10 mL, 10 mL, Intravenous, PRN  0.9 % sodium chloride infusion, 25 mL, Intravenous, PRN  acetaminophen (TYLENOL) tablet 650 mg, 650 mg, Oral, Q4H PRN  HYDROcodone-acetaminophen (NORCO) 5-325 MG per tablet 1 tablet, 1 tablet, Oral, Q4H PRN **OR** HYDROcodone-acetaminophen (NORCO) 5-325 MG per tablet 2 tablet, 2 tablet, Oral, Q4H PRN  morphine (PF) injection 2 mg, 2 mg, Intravenous, Q2H PRN **OR** morphine injection 4 mg, 4 mg, Intravenous, Q2H PRN  promethazine (PHENERGAN) tablet 12.5 mg, 12.5 mg, Oral, Q6H PRN **OR** ondansetron (ZOFRAN) injection 4 mg, 4 mg, Intravenous, Q6H PRN  enoxaparin (LOVENOX) injection 30 mg, 30 mg, Subcutaneous, Daily  hydrALAZINE (APRESOLINE) injection 10 mg, 10 mg, Intravenous, Q6H PRN  0.9 % sodium chloride bolus, 500 mL, Intravenous, PRN         Objective:  BP (!) 146/72   Pulse 65   Temp 97.5 °F (36.4 °C) (Oral)   Resp 16   Ht 5' (1.524 m)   Wt 220 lb 14.4 oz (100.2 kg)   SpO2 96%   BMI 43.14 kg/m²      Patient Vitals for the past 24 hrs:   BP Temp Temp src Pulse Resp SpO2   04/06/21 0730 (!) 146/72 97.5 °F (36.4 °C) Oral 65 16 96 %   04/06/21 0615 132/70 97.8 °F (36.6 °C) Oral 70 18 94 %   04/06/21 0134 130/73 97.3 °F (36.3 °C) Oral 72 18 95 %   04/05/21 2100 126/62 97.5 °F (36.4 °C) Oral 80 18 95 %   04/05/21 1742 (!) 112/52 97.7 °F (36.5 °C) Oral 95 18 95 %   04/05/21 1223 (!) 91/50 97.5 °F (36.4 °C) Oral 87 18 95 %     No data found.         Intake/Output Summary (Last 24 hours) at 4/6/2021 0844  Last data filed at 4/6/2021 0615  Gross per 24 hour   Intake 1195 ml   Output 2550 ml   Net -1355 ml         Physical Exam:   BP (!) 146/72   Pulse 65   Temp 97.5 °F (36.4 °C) (Oral)   Resp 16   Ht 5' (1.524 m)   Wt 220 lb 14.4 oz (100.2 kg)   SpO2 96%   BMI 43.14 kg/m²   General appearance: alert, appears stated age and cooperative  Head: Normocephalic, without obvious abnormality, atraumatic  Lungs: clear to auscultation bilaterally  Heart: regular rate and rhythm, S1, S2 normal, no murmur, click, rub or gallop  Abdomen: soft, non-tender; bowel sounds normal; no masses,  no organomegaly drain in  Extremities: extremities normal, atraumatic, no cyanosis or edema    Labs:  Lab Results   Component Value Date    WBC 8.0 04/06/2021    HGB 7.8 (L) 04/06/2021    HCT 25.5 (L) 04/06/2021     04/06/2021    CHOL 155 11/21/2020    TRIG 121 11/21/2020    HDL 38 (L) 11/21/2020    ALT 6 (L) 03/31/2021    AST 10 (L) 03/31/2021     04/06/2021    K 3.2 (L) 04/06/2021     04/06/2021    CREATININE 1.6 (H) 04/06/2021    BUN 19 04/06/2021    CO2 25 04/06/2021    INR 1.18 (H) 03/30/2021    LABMICR YES 04/01/2021     Lab Results   Component Value Date    CKTOTAL 41 03/11/2021    TROPONINI <0.01 03/30/2021       Recent Imaging Results are Reviewed:  Echo Complete    Result Date: 3/6/2021  Transthoracic Echocardiography Report (TTE)  Demographics   Patient Name        Migel Essex   Date of Study       03/06/2021  Gender Female   Patient Number      [id number]  Date of Birth          1940   Visit Number        [id number]   Age                    68 year(s)   Accession Number    [id number]  Room Number            9172   Corporate ID        [id number]    Sonographer            GRACIA ChangT   Ordering Physician              Interpreting Physician Jenae Martinez MD,                                                         [facility], [address]  Procedure Type of Study   TTE procedure:ECHOCARDIOGRAM COMPLETE 2D W DOPPLER W COLOR. Procedure Date Date: 03/06/2021 Start: 09:12 AM Study Location: [facility] - Echo Lab Technical Quality: Good visualization Additional Indications:NSTEMI, Leg edema, CAD. Patient Status: Routine Height: 60 inches Weight: 220 pounds BSA: 1.94 m2 BMI: 42.97 kg/m2 BP: 134/75 mmHg  Conclusions   Summary  Normal left ventricle size, and systolic function with an estimated ejection  fraction of 55-60%. Mild concentric left ventricular hypertrophy is present. No regional wall motion abnormalities are seen. Mild tricuspid regurgitation, RVSP is estimated at 38 mmhg. Signature   ------------------------------------------------------------------  Electronically signed by Jenae Martinez MD, [facility], [address]  (Interpreting physician) on 03/06/2021 at 12:12 PM  ------------------------------------------------------------------   Findings   Left Ventricle  Normal left ventricle size, and systolic function with an estimated ejection  fraction of 55-60%. Mild concentric left ventricular hypertrophy is present. No regional wall motion abnormalities are seen. Grade I diastolic dysfunction with normal LV filling pressures. Mitral Valve  Calcification of the mitral valve noted. No evidence of mitral regurgitation. Left Atrium  The left atrium is normal in size.    Aortic Valve  The aortic valve is structurally normal. There is no significant aortic  valve regurgitation or stenosis. Aorta  The aortic root is normal in size. Right Ventricle  The right ventricle is normal in size and function. Tricuspid Valve  The tricuspid valve is normal in structure. Mild tricuspid regurgitation, RVSP is estimated at 38 mmhg. Right Atrium  The right atrial size is normal.   Pulmonic Valve  The pulmonic valve is not well visualized. Mild pulmonic regurgitation present. Pericardial Effusion  No pericardial effusion noted. Pleural Effusion  No pleural effusion. Miscellaneous  The inferior vena cava appears normal in size with normal respiratory  variation.   M-Mode/2D Measurements (cm)   LV Diastolic Dimension: 4.5 cm  LV Systolic Dimension: 3.89 cm  LV Septum Diastolic: 3.56 cm  LV PW Diastolic: 1.5 cm         AO Root Dimension: 3.1 cm  RV Diastolic Dimension: 8.33 cm LA Dimension: 3.4 cm                                  LA Area: 11.7 cm2                                  LA volume/Index: 21.6 ml /11 ml/m2  Doppler Measurements   AV Peak Velocity: 194 cm/s     MV Peak E-Wave: 98.5 cm/s  AV Peak Gradient: 15.05 mmHg   MV Peak A-Wave: 89.5 cm/s  AV Mean Gradient: 9 mmHg       MV E/A Ratio: 1.1  LVOT Peak Velocity: 122 cm/s   MV Mean Gradient: 3 mmHg                                 MV Max P mmHg  TR Velocity:290 cm/s           MV Vmax:125 cm/s  TR Gradient:33.64 mmHg         MV VTI:51.9 cm/s   E' Septal Velocity: 6.2 cm/s   MV Deceleration Time: 215 msec  E' Lateral Velocity: 5.98 cm/s  PV Peak Velocity: 123 cm/s  PV Peak Gradient: 6.05 mmHg   Aortic Valve   Peak Velocity: 194 cm/s   Mean Velocity: 141 cm/s  Peak Gradient: 15.05 mmHg Mean Gradient: 9 mmHg  AV VTI: 43.8 cm  Aorta   Aortic Root: 3.1 cm      Ct Abdomen Pelvis Wo Contrast Additional Contrast? None    Result Date: 3/11/2021  EXAMINATION: CT OF THE ABDOMEN AND PELVIS WITHOUT CONTRAST 3/11/2021 12:05 pm TECHNIQUE: CT of the abdomen and pelvis was performed without the administration of intravenous contrast. Multiplanar reformatted images are provided for review. Dose modulation, iterative reconstruction, and/or weight based adjustment of the mA/kV was utilized to reduce the radiation dose to as low as reasonably achievable. COMPARISON: CT of the chest abdomen and pelvis March 5, 2020 HISTORY: ORDERING SYSTEM PROVIDED HISTORY: RUQ pain TECHNOLOGIST PROVIDED HISTORY: Reason for exam:->RUQ pain Additional Contrast?->None Reason for Exam: RUQ pain Acuity: Unknown Type of Exam: Unknown FINDINGS: Lower Chest: Increased mild-moderate right pleural effusion with adjacent compressive atelectasis. Unchanged trace amount of left pleural fluid. Organs: In the gallbladder fossa there is a fluid collection measuring 4.2 x 2.3 by 2.4 cm. Along the inferior-lateral aspect of the fluid collection a few tiny curvilinear/rounded high-density foci are present measuring 3 mm and less in size best shown on coronal image 90. Cholecystectomy clips noted in the expected location. There is a small amount of fluid along the inferior margin of the right hepatic lobe anterior laterally. Small amount of fluid between the diaphragm and liver also present. No pancreatitis. No ureteral stone or hydronephrosis. 2 mm right renal stone again noted. GI/Bowel: There is new fluid-filled small bowel dilation involving proximal to mid small bowel loops measuring up to 3 cm. There is distal small bowel collapse extending to the surgical site. Oral contrast within the colon from the oral contrast given for the comparison. No oral contrast within small bowel at this time. Pelvis: No acute abnormality of the pelvis. Normal appearance of the bladder. Peritoneum/Retroperitoneum: Small amount of free fluid in the right upper quadrant as discussed. No free air. Bones/Soft Tissues: Expected postsurgical changes of the abdominal wall. No acute osseous findings.      New fluid collection in the gallbladder fossa with adjacent small amount of fluid around the liver.  This may be residual fluid from the recent surgery or related to biliary leak. Consider HIDA scan evaluation There are 2-3 tiny gallstones within the gallbladder fossa fluid collection, measuring 3 mm and less in size. Increased size of mild-moderate right pleural effusion with increased adjacent compressive atelectasis of the right lung base. Xr Chest (2 Vw)    Result Date: 3/30/2021  EXAMINATION: TWO XRAY VIEWS OF THE CHEST 3/30/2021 5:22 pm COMPARISON: 03/12/2021 radiograph HISTORY: ORDERING SYSTEM PROVIDED HISTORY: Chest Discomfort TECHNOLOGIST PROVIDED HISTORY: Reason for exam:->Chest Discomfort Reason for Exam: Hypotension (Pt reports low BP, 93/41. ) Acuity: Acute Type of Exam: Initial FINDINGS: The heart is mildly enlarged. Mediastinum and pulmonary vascularity are normal.  Enteric tube has been removed since prior radiograph. There is improved aeration of the lungs. No acute airspace abnormality with lucency suggesting underlying COPD. No significant skeletal finding. No significant finding in the chest.     Nm Hepatobiliary    Result Date: 3/11/2021  EXAMINATION: NUCLEAR MEDICINE HEPATOBILIARY SCINTIGRAPHY (HIDA SCAN). 3/11/2021 2:40 pm TECHNIQUE: Approximately 5.82 [id number] Mebrofenin (Choletec) was administered IV. Then, dynamic images of the abdomen were obtained in the anterior projection for 60 mins. A right lateral view was also obtained at 60 mins. COMPARISON: CT abdomen and pelvis 03/11/2021. HISTORY: ORDERING SYSTEM PROVIDED HISTORY: R/o bile leak s/p renu TECHNOLOGIST PROVIDED HISTORY: Reason for exam:->R/o bile leak s/p renu Reason for Exam: R/O Bile Leak Acuity: Acute Type of Exam: Ongoing FINDINGS: Prompt, homogenous uptake by the liver is noted with normal appearance of radiotracer excretion into the biliary system. Clearance of bloodpool activity appears appropriate. During the 1st 35 minutes of the study, the common duct is normal in size and appearance. Beginning at 40 minutes, accumulation of radio activity which overlies the common duct is seen. .  This accumulation most likely corresponds to the proximal duodenum as the abnormal fluid collection on the CT scan lies more laterally. Gallbladder is surgically absent. Delayed planar images as well as SPECT images were obtained due to the confusing appearance on the initial images. These latter images do not show any accumulation of activity in the subhepatic region. The only activity is seen within the small bowel confirming that there is no bile leak present. The CT images also show a slight decrease in the amount of fluid in the gallbladder fossa. No evident bile leak following cholecystectomy. Slight reduction in the amount of subhepatic fluid present since the earlier CT study 5 hours ago. Xr Chest Portable    Result Date: 3/12/2021  EXAMINATION: ONE XRAY VIEW OF THE CHEST 3/12/2021 6:06 pm COMPARISON: 1 March 2021 HISTORY: ORDERING SYSTEM PROVIDED HISTORY: ng tube placement TECHNOLOGIST PROVIDED HISTORY: Reason for exam:->ng tube placement Reason for Exam: ng tube placement Acuity: Unknown Type of Exam: Unknown FINDINGS: AP portable view of the chest time stamped at 1752 hours overlying cardiac monitoring electrodes are noted. Intestinal tube extends to the distal stomach. Heart is stable, mildly enlarged. Elevated hemidiaphragm is noted. There is left perihilar stranding likely atelectasis. No extrapleural air. Intestinal tube terminates in the distal stomach. Other findings as above. Ct Chest Abdomen Pelvis Wo Contrast    Result Date: 3/30/2021  EXAMINATION: CT OF THE CHEST, ABDOMEN, AND PELVIS WITHOUT CONTRAST 3/30/2021 3:26 pm TECHNIQUE: CT of the chest, abdomen and pelvis was performed without the administration of intravenous contrast. Multiplanar reformatted images are provided for review.  Dose modulation, iterative reconstruction, and/or weight based adjustment of the mA/kV was utilized to reduce the radiation dose to as low as reasonably achievable. COMPARISON: Abdomen and pelvis CT, 03/11/2021 CT chest abdomen pelvis, 03/05/2021 HISTORY: ORDERING SYSTEM PROVIDED HISTORY: r/o penumonia, recent surgery TECHNOLOGIST PROVIDED HISTORY: Reason for exam:->r/o penumonia, recent surgery Additional Contrast?->None Decision Support Exception->Emergency Medical Condition (MA) Reason for Exam: r/o penumonia, recent surgery Acuity: Acute Type of Exam: Initial FINDINGS: Chest: Mediastinum: Visualized thyroid is unremarkable. Mildly enlarged mediastinal lymph nodes have decreased in size when compared to the previous exam, and are most compatible with reactive lymph nodes. Esophagus is unremarkable. Noncontrast imaging of the cardiac chambers, thoracic aorta, and pulmonary arteries is unremarkable. Lungs/pleura: Calcified lesion in the left lower lobe is again seen, compatible with old granulomatous disease, found as far back as 2014, and requires no specific follow-up. A focal infiltrate is not detected. The airways are patent. No pneumothorax. No pleural effusion. Soft Tissues/Bones: Visualized extra thoracic soft tissues are unremarkable. No acute or suspicious bony abnormality is identified. Severe degenerative disease within the glenohumeral joints is noted bilaterally, especially on the right. Abdomen/Pelvis: Lack of intravenous contrast limits evaluation of the solid abdominal viscera, the hollow abdominal viscera, and the vascular structures. Organs: Having said that, no acute hepatic abnormality is identified. The gallbladder is surgically absent. Noncontrast imaging of the spleen, adrenals, and pancreas is unremarkable. Nonobstructing nephrolithiasis is noted within the right kidney. GI/Bowel: There has been development of a very large gas and fluid collection within the right abdominal wall measuring 12 cm maximally (series 2, image 132).   There is evidence of extension of this collection into the peritoneal cavity (as visualized on axial images 140 to through 154. The ileocolic anastomosis is very close to this collection within the peritoneum. It would be difficult to entirely exclude communication between bowel and this collection. The patient status post partial colectomy. The remainder of the large bowel is unremarkable. No evidence of small bowel obstruction. There is no evidence of bowel obstruction. Stomach and duodenal sweep are unremarkable. Pelvis: Ovaries are prominent bilaterally, but those are stable dating back to 2013. No suspicious adnexal lesions are found. Uterus unremarkable. Urinary bladder is unremarkable. Peritoneum/Retroperitoneum: Abdominal aorta normal in caliber. No retroperitoneal lymphadenopathy. Bones/Soft Tissues: No osteolytic or osteoblastic bone lesions are seen. No acute bony abnormalities are detected. Chest CT: No acute abnormality detected. Abdomen and pelvis CT: Interval development of a large gas and fluid collection within the right abdominal wall measuring up to 12 cm, most compatible with an abscess. There is intraperitoneal communication of that collection, and the ileocolic anastomosis is very close to the intraperitoneal component, and therefore it would be difficult to entirely exclude a communication between bowel and that collection. Ct Chest Abdomen Pelvis Wo Contrast    Result Date: 3/5/2021  EXAMINATION: CT OF THE CHEST, ABDOMEN, AND PELVIS WITHOUT CONTRAST 3/5/2021 4:20 pm TECHNIQUE: CT of the chest, abdomen and pelvis was performed without the administration of intravenous contrast. Multiplanar reformatted images are provided for review. Dose modulation, iterative reconstruction, and/or weight based adjustment of the mA/kV was utilized to reduce the radiation dose to as low as reasonably achievable.  COMPARISON: Chest CT 2014 2013 HISTORY: ORDERING SYSTEM PROVIDED HISTORY: Colon mass, r/o mets TECHNOLOGIST PROVIDED HISTORY: Reason for exam:->Colon mass, r/o mets Additional Contrast?->Oral Reason for Exam: Colon mass, r/o mets Acuity: Unknown Type of Exam: Unknown FINDINGS: Chest: Mediastinum: 7 mm nodule seen in right lobe of thyroid gland. No specific imaging follow-up recommended based on size. coronary artery calcification is seen. Small mediastinal nodes are noted. Right paratracheal node measures 1.4 cm in short axis, previously 10 mm. Lungs/pleura: Mosaic attenuation is seen throughout the lungs, suggesting small airways disease or air trapping. Trace pleural effusions are seen bilaterally. There is adjacent consolidation seen in the lung bases. 1.8 x 1.2 cm nodule is seen in the left lower lobe Soft Tissues/Bones: Degenerative changes are seen in the spine. Degenerative changes are seen in the shoulder joints. Abdomen/Pelvis: Organs: No splenomegaly Adrenal glands appear normal. There is rotation of the kidney anteriorly, incidentally noted. .  No stones noted 2 mm stone seen in the right kidney. No hydronephrosis noted. No intrahepatic ductal dilatation. No perihepatic fluid Gallbladder appears normal No peripancreatic inflammatory change GI/Bowel: No significant small bowel distention noted. Mild stool load seen in the colon. Scattered colonic diverticula are seen. There is focal wall thickening of the colon on the right, extending over a length of 3.7 cm. There is surrounding injection the Nadja colonic fat. Small pericolonic lymph node is seen in the right upper quadrant mesentery measuring 8 mm. Pelvis: No free fluid seen within the pelvis. Pickard catheter is seen in the bladder Left adnexal mass is seen measuring 4.4 cm x 3.5 cm previously 3.9 cm by 3.3 cm. Right adnexal nodule measures 3.2 x 3.9 cm, previously 2.5 x 3.6 cm Peritoneum/Retroperitoneum: Small retroperitoneal nodes are seen. Small lymph nodes are seen along the iliac chains. No aortic aneurysm.  Atherosclerotic change is seen in aorta and iliacs. Bones/Soft Tissues: Spurring is seen in the spine. Spurring is seen in the hips. Tiny periumbilical hernia containing fat is seen     Within the chest, small mediastinal nodes are seen, slightly increased compared to prior, either reactive or metastatic. Small pleural effusions are seen, compatible with mild fluid overload. Stable lobular pulmonary nodule in the left lower lobe. Nodular wall thickening of the colon on the right, compatible with the given history of colon mass. Small mesenteric node is seen in this region,, likely early desiree metastasis Increase in size of ovarian-adnexal masses on the right and left. Consider pelvic ultrasound for further characterization. Tiny nonobstructing right renal stone       Assessment and Plan:  Principal Problem:    YESENIA (acute kidney injury) ([address] -Established problem. Improving Creat 1.6  Plan: hold fluids in light of hypoxia, poss effusions,  One dose of lasix. cont to follow - see what renal wants to do  Active Problems:    Hypertension -Established problem. Stable. 146/72  Plan: stay on same meds    Anemia - Established problem. Stable.  hgb 7.8 this morning. Plan: No indication for transfusion. Cont to monitor h/h to assess progression of anemia. Recommend ferrous sulfate or MVI as outpatient. Malignant neoplasm of ascending colon ([address] -Established problem. Stable. Plan: per onc    Abscess of intestine -Established problem. Stable. Drain in  Plan: cont drainage. Cont empiric iv abx    Intra-abdominal abscess ([address]  Plan: cont drainage. Cont empiric iv abx    Postoperative intra-abdominal abscess    UTI (urinary tract infection)  Plan: cont empiric abx      Disp - pt refusing snf. So will have to set up home care. Hopefully home today/tomorrow? Try to wean off o2.            Haley Hastings  4/6/2021

## 2021-04-06 NOTE — PROGRESS NOTES
Physical Therapy      Pt feeling sick to her stomach after taking her pills. Asked PT to return a bit later. Will follow-up as schedule permits.    Page Adrienne, DPT [id number]

## 2021-04-06 NOTE — PLAN OF CARE
Problem: Skin Integrity:  Goal: Will show no infection signs and symptoms  Description: Will show no infection signs and symptoms  4/6/2021 0706 by Octavio Garcia RN  Outcome: Ongoing  4/6/2021 0058 by Aldo Munroe RN  Outcome: Ongoing  Goal: Absence of new skin breakdown  Description: Absence of new skin breakdown  4/6/2021 0706 by Octavio Garcia RN  Outcome: Ongoing  4/6/2021 0058 by Aldo Munroe RN  Outcome: Ongoing  Goal: Skin integrity will be maintained  Description: Skin integrity will be maintained  4/6/2021 0706 by Octavio Garcia RN  Outcome: Ongoing  4/6/2021 0058 by Aldo Munroe RN  Outcome: Ongoing  Goal: Skin integrity will improve  Description: Skin integrity will improve  4/6/2021 0706 by Octavio Garcia RN  Outcome: Ongoing  4/6/2021 0058 by Aldo Munroe RN  Outcome: Ongoing     Problem: Pain:  Goal: Pain level will decrease  Description: Pain level will decrease  4/6/2021 0706 by Octavio Garcia RN  Outcome: Ongoing  4/6/2021 0058 by Aldo Munroe RN  Outcome: Ongoing  Goal: Control of acute pain  Description: Control of acute pain  4/6/2021 0706 by Octavio Garcia RN  Outcome: Ongoing  4/6/2021 0058 by Aldo Munroe RN  Outcome: Ongoing  Goal: Control of chronic pain  Description: Control of chronic pain  4/6/2021 0706 by Octavio Garcia RN  Outcome: Ongoing  4/6/2021 0058 by Aldo Munroe RN  Outcome: Ongoing     Problem: Falls - Risk of:  Goal: Will remain free from falls  Description: Will remain free from falls  4/6/2021 0706 by Octavio Garcia RN  Outcome: Ongoing  4/6/2021 0058 by Aldo Munroe RN  Outcome: Ongoing  Goal: Absence of physical injury  Description: Absence of physical injury  4/6/2021 0706 by Octavio Garcia RN  Outcome: Ongoing  4/6/2021 0058 by Aldo Munroe RN  Outcome: Ongoing     Problem: Physical Regulation:  Goal: Complications related to the disease process, condition or treatment will be avoided or minimized  Description: Complications related to the disease process, condition or treatment will be avoided or minimized  4/6/2021 0706 by Ziggy Bowman RN  Outcome: Ongoing  4/6/2021 0058 by Belkis Kerr RN  Outcome: Ongoing     Problem: Tissue Perfusion:  Goal: Ability to maintain adequate tissue perfusion will improve  Description: Ability to maintain adequate tissue perfusion will improve  4/6/2021 0706 by Ziggy Bowman RN  Outcome: Ongoing  4/6/2021 0058 by Belkis Kerr RN  Outcome: Ongoing

## 2021-04-07 VITALS
HEIGHT: 60 IN | TEMPERATURE: 97.2 F | BODY MASS INDEX: 42.01 KG/M2 | SYSTOLIC BLOOD PRESSURE: 108 MMHG | OXYGEN SATURATION: 96 % | HEART RATE: 77 BPM | WEIGHT: 214 LBS | DIASTOLIC BLOOD PRESSURE: 62 MMHG | RESPIRATION RATE: 16 BRPM

## 2021-04-07 LAB
ANION GAP SERPL CALCULATED.3IONS-SCNC: 8 MMOL/L (ref 3–16)
ANISOCYTOSIS: ABNORMAL
BASOPHILS ABSOLUTE: 0 K/UL (ref 0–0.2)
BASOPHILS RELATIVE PERCENT: 0.5 %
BUN BLDV-MCNC: 16 MG/DL (ref 7–20)
BURR CELLS: ABNORMAL
CALCIUM SERPL-MCNC: 8 MG/DL (ref 8.3–10.6)
CHLORIDE BLD-SCNC: 104 MMOL/L (ref 99–110)
CO2: 30 MMOL/L (ref 21–32)
CREAT SERPL-MCNC: 1.8 MG/DL (ref 0.6–1.2)
EOSINOPHILS ABSOLUTE: 0.3 K/UL (ref 0–0.6)
EOSINOPHILS RELATIVE PERCENT: 3.1 %
GFR AFRICAN AMERICAN: 33
GFR NON-AFRICAN AMERICAN: 27
GLUCOSE BLD-MCNC: 116 MG/DL (ref 70–99)
HCT VFR BLD CALC: 26.9 % (ref 36–48)
HEMOGLOBIN: 8.2 G/DL (ref 12–16)
LYMPHOCYTES ABSOLUTE: 1.3 K/UL (ref 1–5.1)
LYMPHOCYTES RELATIVE PERCENT: 15 %
MCH RBC QN AUTO: 22 PG (ref 26–34)
MCHC RBC AUTO-ENTMCNC: 30.5 G/DL (ref 31–36)
MCV RBC AUTO: 71.9 FL (ref 80–100)
MICROCYTES: ABNORMAL
MONOCYTES ABSOLUTE: 0.6 K/UL (ref 0–1.3)
MONOCYTES RELATIVE PERCENT: 6.4 %
NEUTROPHILS ABSOLUTE: 6.7 K/UL (ref 1.7–7.7)
NEUTROPHILS RELATIVE PERCENT: 75 %
PDW BLD-RTO: 27 % (ref 12.4–15.4)
PLATELET # BLD: 269 K/UL (ref 135–450)
PLATELET SLIDE REVIEW: ADEQUATE
PMV BLD AUTO: 7.3 FL (ref 5–10.5)
POLYCHROMASIA: ABNORMAL
POTASSIUM SERPL-SCNC: 3 MMOL/L (ref 3.5–5.1)
RBC # BLD: 3.74 M/UL (ref 4–5.2)
SLIDE REVIEW: ABNORMAL
SODIUM BLD-SCNC: 142 MMOL/L (ref 136–145)
TOXIC GRANULATION: PRESENT
WBC # BLD: 9 K/UL (ref 4–11)

## 2021-04-07 PROCEDURE — 2580000003 HC RX 258: Performed by: INTERNAL MEDICINE

## 2021-04-07 PROCEDURE — 6370000000 HC RX 637 (ALT 250 FOR IP): Performed by: INTERNAL MEDICINE

## 2021-04-07 PROCEDURE — 85025 COMPLETE CBC W/AUTO DIFF WBC: CPT

## 2021-04-07 PROCEDURE — 6360000002 HC RX W HCPCS: Performed by: INTERNAL MEDICINE

## 2021-04-07 PROCEDURE — 80048 BASIC METABOLIC PNL TOTAL CA: CPT

## 2021-04-07 PROCEDURE — 6370000000 HC RX 637 (ALT 250 FOR IP): Performed by: NURSE PRACTITIONER

## 2021-04-07 PROCEDURE — 36415 COLL VENOUS BLD VENIPUNCTURE: CPT

## 2021-04-07 PROCEDURE — APPNB30 APP NON BILLABLE TIME 0-30 MINS: Performed by: NURSE PRACTITIONER

## 2021-04-07 PROCEDURE — 6370000000 HC RX 637 (ALT 250 FOR IP): Performed by: STUDENT IN AN ORGANIZED HEALTH CARE EDUCATION/TRAINING PROGRAM

## 2021-04-07 RX ORDER — TORSEMIDE 20 MG/1
20 TABLET ORAL DAILY
Qty: 30 TABLET | Refills: 1 | Status: SHIPPED | OUTPATIENT
Start: 2021-04-07

## 2021-04-07 RX ORDER — AMOXICILLIN AND CLAVULANATE POTASSIUM 875; 125 MG/1; MG/1
1 TABLET, FILM COATED ORAL EVERY 12 HOURS SCHEDULED
Qty: 20 TABLET | Refills: 0 | Status: SHIPPED | OUTPATIENT
Start: 2021-04-07 | End: 2021-04-17

## 2021-04-07 RX ORDER — HYDROCODONE BITARTRATE AND ACETAMINOPHEN 5; 325 MG/1; MG/1
1 TABLET ORAL EVERY 8 HOURS PRN
Qty: 21 TABLET | Refills: 0 | Status: SHIPPED | OUTPATIENT
Start: 2021-04-07 | End: 2021-04-14

## 2021-04-07 RX ORDER — HYDROCODONE BITARTRATE AND ACETAMINOPHEN 5; 325 MG/1; MG/1
1 TABLET ORAL EVERY 8 HOURS PRN
Qty: 21 TABLET | Refills: 0 | Status: SHIPPED | OUTPATIENT
Start: 2021-04-07 | End: 2021-04-07

## 2021-04-07 RX ORDER — POTASSIUM CHLORIDE 20 MEQ/1
40 TABLET, EXTENDED RELEASE ORAL
Status: DISCONTINUED | OUTPATIENT
Start: 2021-04-07 | End: 2021-04-07 | Stop reason: HOSPADM

## 2021-04-07 RX ADMIN — TICAGRELOR 90 MG: 90 TABLET ORAL at 09:17

## 2021-04-07 RX ADMIN — ASPIRIN 81 MG: 81 TABLET, CHEWABLE ORAL at 09:04

## 2021-04-07 RX ADMIN — TORSEMIDE 20 MG: 20 TABLET ORAL at 09:05

## 2021-04-07 RX ADMIN — Medication 10 ML: at 09:17

## 2021-04-07 RX ADMIN — ENOXAPARIN SODIUM 30 MG: 30 INJECTION SUBCUTANEOUS at 09:05

## 2021-04-07 RX ADMIN — AMOXICILLIN AND CLAVULANATE POTASSIUM 1 TABLET: 875; 125 TABLET, FILM COATED ORAL at 09:04

## 2021-04-07 RX ADMIN — HYOSCYAMINE SULFATE: 16 SOLUTION at 09:17

## 2021-04-07 RX ADMIN — AMIODARONE HYDROCHLORIDE 200 MG: 200 TABLET ORAL at 09:05

## 2021-04-07 RX ADMIN — POTASSIUM CHLORIDE 40 MEQ: 1500 TABLET, EXTENDED RELEASE ORAL at 09:17

## 2021-04-07 NOTE — CARE COORDINATION
Patient discharging with [facility] Order/ AVS faxed and agency notifed. No other SW needs at this time.     All discharge needs met per case management    Luciana Calderon MSW, [address]

## 2021-04-07 NOTE — PROGRESS NOTES
Nephrology ProgressNote  [phone]   [email or web address].        Reason for Consult:  YESENIA  HISTORY OF PRESENT ILLNESS:      The patient is a 68 y.o. female with significant past medical history of CKD stage IIIb, coronary artery disease,  hypertension, mixed hyperlipidemia , atrial Fibrillation was in the hospital in the early part of March and underwent a right hemicolectomy a laparoscopic cholecystectomy. At that time she was being investigated for anemia and found to have a right colonic mass. She was operated on by Dr. Kacey Richardson and discharged home. .  She now presents with 2 to 3 days history of progressive fatigue and tenderness over the wound of abdomen. Denies any foul-smelling discharge or fever or chills or rigors. There is no dysuria gross hematuria  Her baseline creatinine is 1.3 creatinine on admission was 3.2  She was running low blood pressures on admission  She was started on IV antibiotics after cultures were obtained and IV fluids  Dr. Kacey Richardson was consulted and the plan is that there is abdominal wall abscess and incision and drainage will be done      Interval  History:    -pt seen and examined  -PMSHx and meds reviewed  -family at bedside    No acute events ON  BP stable  Off oxygen  UO is good  She is quite anxious to go home    ROS No CP/SOB/EUBANKS    ROS neg for rest of the system. PHYSICAL EXAM:    Vitals:    /70   Pulse 80   Temp 97.7 °F (36.5 °C) (Oral)   Resp 18   Ht 5' (1.524 m)   Wt 214 lb (97.1 kg)   SpO2 92%   BMI 41.79 kg/m²   I/O last 3 completed shifts: In: 550 [P.O.:540; I.V.:10]  Out: 1325 [Urine:1325]  No intake/output data recorded. Physical Exam:  Gen:  alert, oriented x 3  Foul smell in the room   PERRL , EOM +  Pallor +, No icterus  JVP not raised   CV: RRR no murmur or rub .   Lungs:B/ L air entry, Normal breath sounds   Abd: soft, bowel sounds + , distended, soft, LSCS Scar, Dressing Right abdomen, redness of skin   Ext: +1

## 2021-04-07 NOTE — PROGRESS NOTES
VSS - afebrile. Pt is alert and oriented x 4 with no history of falls. Assessment completed as charted. Bed is in lowest position with 2/4 bed rails raised, bed alarm turned on, wheels locked and call light within reach - patient wearing non-skid socks and verbalizes understanding to call out for assistance. No further requests at this time. Will continue to monitor.      Vitals:    04/06/21 2114   BP: 137/88   Pulse: 75   Resp: 22   Temp: 97.7 °F (36.5 °C)   SpO2: 94%

## 2021-04-07 NOTE — PROGRESS NOTES
[address] and Laparoscopic Surgery        Progress Note    Patient Name: Madai High  MRN: [id number]  YOB: 1940  Date of Evaluation: 2021    Subjective:  Denies pain  Tolerating diet      Vital Signs:  Patient Vitals for the past 24 hrs:   BP Temp Temp src Pulse Resp SpO2 Weight   21 0609 -- -- -- -- -- -- 214 lb (97.1 kg)   21 0140 137/70 97.7 °F (36.5 °C) Oral 80 18 92 % --   21 2114 137/88 97.7 °F (36.5 °C) Oral 75 22 94 % --   21 1552 135/66 97.6 °F (36.4 °C) Oral 66 16 90 % --   21 1345 -- -- -- -- -- 93 % --   21 1145 (!) 145/70 97.8 °F (36.6 °C) Oral 57 18 96 % --      TEMPERATURE HISTORY 24H: Temp (24hrs), Av.7 °F (36.5 °C), Min:97.6 °F (36.4 °C), Max:97.8 °F (36.6 °C)    BLOOD PRESSURE HISTORY: Systolic (36FVB), PRU:567 , Min:130 , VLN:757    Diastolic (04PLF), PED:93, Min:66, Max:88      Intake/Output:  I/O last 3 completed shifts: In: 550 [P.O.:540; I.V.:10]  Out: 1325 [Urine:1325]  No intake/output data recorded. Drain/tube Output:       Physical Exam:  General: awake, alert, oriented to  person, place, time  Abdomen: soft, obese, incisional tenderness only  Skin/Wound: right upper quadrant incision open wound bed clean with SS drainage    Labs:  CBC:    Recent Labs     21  0538 21  0723   WBC 8.0 9.0   HGB 7.8* 8.2*   HCT 25.5* 26.9*    269     BMP:    Recent Labs     21  0457 21  0538 21  0723    138 142   K 3.6 3.2* 3.0*    106 104   CO2 20* 25 30   BUN 21* 19 16   CREATININE 1.6* 1.6* 1.8*   GLUCOSE 102* 107* 116*     Hepatic:    No results for input(s): AST, ALT, ALB, BILITOT, ALKPHOS in the last 72 hours.   Amylase:    Lab Results   Component Value Date    AMYLASE 29 2021    AMYLASE 21 2021    AMYLASE 17 2021     Lipase:    Lab Results   Component Value Date    LIPASE 25.0 2020      Mag:    Lab Results   Component Value Date    MG 1.80 04/06/2021    MG 2.50 03/14/2021     Phos:     Lab Results   Component Value Date    PHOS 2.6 04/06/2021    PHOS 2.8 03/14/2021      Coags:   Lab Results   Component Value Date    PROTIME 13.7 03/30/2021    INR 1.18 03/30/2021    APTT 27.8 03/30/2021       Cultures:  Anaerobic culture  No results found for: LABANAE  Fungus stain  No results found for requested labs within last 30 days. Gram stain  Results in Past 30 Days  Result Component Current Result Ref Range Previous Result Ref Range   Gram Stain Result 3+ WBC's (Polymorphonuclear)  3+ Gram positive cocci  2+ Gram positive rods  3+ Gram negative rods   (A) (3/31/2021)  Not in Time Range      Organism  Lab Results   Component Value Date/Time    ORG Klebsiella pneumoniae (A) 03/31/2021 02:52 PM    ORG Proteus mirabilis (A) 03/31/2021 02:52 PM    ORG Enterococcus faecalis (A) 03/31/2021 02:52 PM     Surgical culture  No results found for: CXSURG  Blood culture 1  Results in Past 30 Days  Result Component Current Result Ref Range Previous Result Ref Range   Blood Culture, Routine No Growth after 4 days of incubation. (3/30/2021)  Not in Time Range      Blood culture 2  Results in Past 30 Days  Result Component Current Result Ref Range Previous Result Ref Range   Culture, Blood 2 No Growth after 4 days of incubation. (3/30/2021)  Not in Time Range      Fecal occult  No results found for requested labs within last 30 days. GI bacterial pathogens by PCR  No results found for requested labs within last 30 days. C. difficile  No results found for requested labs within last 30 days. Urine culture  Lab Results   Component Value Date    LABURIN  04/01/2021     <10,000 CFU/ml mixed skin/urogenital aria. No further workup       Pathology:  OR 3/8/2021--FINAL DIAGNOSIS:     Right colon and small bowel, segmental resection:   - Invasive colonic adenocarcinoma, moderately differentiated. - Margins not involved.    - One pericolonic lymph nodes positive for metastatic carcinoma (1/16). Gallbladder, cholecystectomy:   - Chronic cholecystitis, moderate. - Cholelithiasis. - Cholesterolosis. Procedure:  Right hemicolectomy   Tumor Site: Ileocecal valve   Tumor Size: Greatest dimension (centimeter): 4.2        Additional dimensions (centimeters): 3.7 x 0.8   Macroscopic tumor perforation: Not identified   Histologic Type: Adenocarcinoma   Histologic Grade: G2: Moderately   Tumor Extension: Tumor invades through muscularis propria into subserosal   adipose tissue. MARGINS   All margins are uninvolved by invasive carcinoma, high grade dysplasia /   intramucosal carcinoma, and low grade dysplasia   Margins examined: Proximal, distal, and mesenteric    + Distance of invasive carcinoma from closest margin (millimeters or   centimeters): 40 mm    + Specify closest margin: Distal     Treatment Effect (applicable to carcinomas treated with neoadjuvant   therapy): No known pre surgical treatment   Lymph-Vascular Invasion: Not identified   Perineural Invasion: Not identified     + Tumor Budding (Note I)             Low score (0-4)   Type of Polyp in Which Invasive Carcinoma Arose: Not applicable   Tumor deposits: Absent     Regional lymph nodes     Number of Lymph nodes Involved: 1     Number of Lymph Nodes Examined: 16     Pathologic Stage Classification (pTNM, AJCC 8th Edition)     Primary Tumor (pT):      pT 3: Tumor invades through muscularis propria into pericolonic fat     Regional Lymph Nodes (pN):     pN 1a: Metastasis in 1 lymph node     + Additional pathologic findings: Histologically unremarkable appendix. + Ancillary studies: Not applicable     Imaging:  I have personally reviewed the following films:    No results found.     Scheduled Meds:   potassium chloride  40 mEq Oral Daily with breakfast    torsemide  20 mg Oral Daily    potassium chloride  40 mEq Oral Once    amoxicillin-clavulanate  1 tablet Oral 2 times per day    lidocaine PF  5 mL Intradermal Once    Sodium Hypochlorite   Irrigation BID    aspirin  81 mg Oral Daily    carvedilol  3.125 mg Oral BID WC    ticagrelor  90 mg Oral BID    rosuvastatin  20 mg Oral Nightly    amiodarone  200 mg Oral Daily    sodium chloride flush  10 mL Intravenous 2 times per day    enoxaparin  30 mg Subcutaneous Daily     Continuous Infusions:   sodium chloride       PRN Meds:.albuterol, sodium chloride flush, sodium chloride, acetaminophen, HYDROcodone 5 mg - acetaminophen **OR** HYDROcodone 5 mg - acetaminophen, morphine **OR** morphine, promethazine **OR** ondansetron, hydrALAZINE, sodium chloride      Assessment:  68 y.o. female admitted with   1. Postoperative intra-abdominal abscess    2. Severe sepsis ([facility] [address]    3. Malaise    4. Person under investigation for COVID-19    5. YESENIA (acute kidney injury) ([facility] [address]      Ms. Kaylyn Gilliland is a 68 y.o. female who presents with   Incisional, abdominal wall abscess status-post bedside incision and drainage on 3/31/2021  Status-post laparoscopic right colon resection and cholecystectomy on 3/8/2021 for colon mass, chronic cholecystitis with cholelithiasis   Acute kidney injury  Urinary tract infection  Anemia  Hypertension  CAD  Paroxysmal atrial fibrillation  Medical coagulopathy, on Brilinta      Plan:  - continue packing changes twice daily  - culture sensitivities are resulted   augmentin for 7 days  - OK to DC from surgery standpoint once cleared by primary team  - f/u with Dr. Asya Merino in 1 week; can do packing changes with saline soaked kerlix daily at home    Margaux Harris MD  General Surgery  04/07/21  11:00 AM

## 2021-04-07 NOTE — PROGRESS NOTES
RN received call patient ready for dc; pt taken to dc lounge per RN via w/c; all belongings sent with patient; pt dc'ed to home via private vehicle with family, pt ambulatory with steady gait from w/c to car with assistance; no apparent distress noted at time of dc.

## 2021-04-08 NOTE — ADT AUTH CERT
Renal Failure, Acute - Care Day 8 (4/6/2021) by Leslie Chacon RN       Review Status Review Entered   Completed 4/8/2021 15:49      Criteria Review      Care Day: 8 Care Date: 4/6/2021 Level of Care: Inpatient Floor    Guideline Day 2    Level Of Care    (X) Floor    4/8/2021 3:49 PM EDT by Mena Holland      ms    Clinical Status    ( ) * Electrolyte abnormalities absent or improved    ( ) * Acid-base abnormalities absent or improved    (X) * Hypotension absent    4/8/2021 3:49 PM EDT by Mena Holland      VS:   97.7 (36.5)  22  75  137/88 90% RA    Routes    (X) Parenteral or oral hydration    4/8/2021 3:49 PM EDT by Mena Holland      po hydration    (X) Parenteral or oral medications    4/8/2021 3:49 PM EDT by Mena Holland      MEDS:  amiodarone 200mg po qd; augmentin 875-125mg bid; aspirin 81mg po qd; coreg po 3.125mg po bid; lovenox 30mg sq qd; lasix IV 20mg x1; crestor po 20mg qd; brilinta po 90mg bid; demedex po 20mg qd; phenergan po 12.5mg x1; effer-k 40 meq x1;    ( ) Renal diet as tolerated    4/8/2021 3:49 PM EDT by Mena Holland      DIET:  general diet    Interventions    (X) Monitor electrolytes, renal function tests, acid-base, and volume status    4/8/2021 3:49 PM EDT by Mena Holland      yes    Medications    (X) Possible medical therapies    * Milestone   Additional Notes   4/6/21            LABS:   4/6/2021 05:38   Sodium: 138   Potassium: 3.2 (L)   Chloride: 106   CO2: 25   BUN: 19   Creatinine: 1.6 (H)   Anion Gap: 7   GFR Non-: 31 (A)   GFR : 38 (A)   Magnesium: 1.80   Glucose: 107 (H)   Calcium: 7.8 (L)   Phosphorus: 2.6   WBC: 8.0   RBC: 3.48 (L)   Hemoglobin Quant: 7.8 (L)   Hematocrit: 25.5 (L)   Platelet Count: 877               IM:   Subjective:     Stefania John is a 68 y.o. female.       she remains on 0.5L o2   desats with movement around room but better than yest       Remains on lasix per renal       Noted to have  Bradycardia overnight, as low as the 30s   Cards consulted for eval                   She denies chest pain, denies shortness of breath, denies nausea,  denies emesis. General appearance: alert, appears stated age and cooperative   Head: Normocephalic, without obvious abnormality, atraumatic   Lungs: clear to auscultation bilaterally   Heart: regular rate and rhythm, S1, S2 normal, no murmur, click, rub or gallop   Abdomen: soft, non-tender; bowel sounds normal; no masses,  no organomegaly drain in   Extremities: extremities normal, atraumatic, no cyanosis or edema   Assessment and Plan:   Principal Problem:     YESENIA (acute kidney injury) ([address] -Established problem. Improving Creat 1.6   Plan: hold fluids in light of hypoxia, poss effusions,  One dose of lasix.  cont to follow - see what renal wants to do   Active Problems:     Hypertension -Established problem. Stable. [id number]   Plan: stay on same meds     Anemia - Established problem. Stable.  hgb 7.8 this morning. Plan: No indication for transfusion. Cont to monitor h/h to assess progression of anemia.  Recommend ferrous sulfate or MVI as outpatient.      Malignant neoplasm of ascending colon ([facility] [address] -Established problem. Stable.     Plan: per onc     Abscess of intestine -Established problem. Stable.  Drain in   Plan: cont drainage. Cont empiric iv abx     Intra-abdominal abscess ([address]   Plan: cont drainage. Cont empiric iv abx     Postoperative intra-abdominal abscess     UTI (urinary tract infection)   Plan: cont empiric abx           Disp - pt refusing snf. So will have to set up home care. [facility] home today/tomorrow? Radhames Colbert to wean off o2. NEPHRO:      IMPRESSION/RECOMMENDATIONS:         1.  YESENIA: baseline variable due to recurrent YESENIA-prior to 3/21-baseline Cr 1.2-1.3-peak Cr 3.2   -pre Renal vs ATN   -improved with IVF-now hypervolemic-given lasix 20mg IV last night   -had good response to lasix yesterday, oxygen requirement is lower   -will monitoring                   She is not a good candidate for anticoagulation due to anemia, history of recent surgery and hemicolectomy.        -Left bundle branch block               Old               EF if normal.        - CAD s/p PCI in the past               On statin.                On antiplatelet therapy.                Needs follow up with interventional cardiology.        - YESENIA on CKD                Improved with IV fluid.                Now hypervolemic, on lasix.             Follow up creainine.        - Abdominal wall abscess:                On antibiotic therapy               S/p drainage.                       - Anemia: Hgb stable.

## 2021-04-15 ENCOUNTER — OFFICE VISIT (OUTPATIENT)
Dept: SURGERY | Age: 81
End: 2021-04-15

## 2021-04-15 VITALS — BODY MASS INDEX: 40.82 KG/M2 | WEIGHT: 209 LBS | DIASTOLIC BLOOD PRESSURE: 68 MMHG | SYSTOLIC BLOOD PRESSURE: 112 MMHG

## 2021-04-15 DIAGNOSIS — L24.A9 DRAINAGE FROM WOUND: Primary | ICD-10-CM

## 2021-04-15 DIAGNOSIS — C18.2 MALIGNANT NEOPLASM OF ASCENDING COLON (HCC): ICD-10-CM

## 2021-04-15 PROCEDURE — 99024 POSTOP FOLLOW-UP VISIT: CPT | Performed by: SURGERY

## 2021-04-15 NOTE — PROGRESS NOTES
Subjective:      Patient ID: Reginaldo Will is a 68 y.o. female. HPI    Review of Systems    Objective:   Physical Exam    Assessment:      70-year-old female status post lap renu and laparoscopic right colon resection. She is doing fairly well at this point time. The only issue is that a portion of the extraction wound is open. There is some dark tan drainage from a deep portion of the wound located laterally. The drainage is not necessarily consistent with enteric contents. The wound is otherwise very clean with granulation tissue. New cultures were taken. Plan:      Finish course of Augmentin. Continue current dressing changes. We will make a decision about further antibiotics once the culture results are available. Follow-up in 2 weeks.         Lisa Peng MD

## 2021-04-16 ENCOUNTER — OFFICE VISIT (OUTPATIENT)
Dept: CARDIOLOGY CLINIC | Age: 81
End: 2021-04-16
Payer: MEDICARE

## 2021-04-16 VITALS
SYSTOLIC BLOOD PRESSURE: 116 MMHG | BODY MASS INDEX: 41.07 KG/M2 | WEIGHT: 209.2 LBS | HEART RATE: 64 BPM | DIASTOLIC BLOOD PRESSURE: 72 MMHG | OXYGEN SATURATION: 97 % | HEIGHT: 60 IN

## 2021-04-16 DIAGNOSIS — I25.10 CORONARY ARTERY DISEASE INVOLVING NATIVE CORONARY ARTERY OF NATIVE HEART WITHOUT ANGINA PECTORIS: ICD-10-CM

## 2021-04-16 DIAGNOSIS — I51.89 DIASTOLIC DYSFUNCTION: ICD-10-CM

## 2021-04-16 DIAGNOSIS — I48.0 PAF (PAROXYSMAL ATRIAL FIBRILLATION) (HCC): Primary | ICD-10-CM

## 2021-04-16 DIAGNOSIS — Z91.89 AT RISK FOR SLEEP APNEA: ICD-10-CM

## 2021-04-16 DIAGNOSIS — D50.0 IRON DEFICIENCY ANEMIA DUE TO CHRONIC BLOOD LOSS: ICD-10-CM

## 2021-04-16 DIAGNOSIS — I10 ESSENTIAL HYPERTENSION: ICD-10-CM

## 2021-04-16 PROBLEM — N17.9 AKI (ACUTE KIDNEY INJURY) (HCC): Status: RESOLVED | Noted: 2021-03-30 | Resolved: 2021-04-16

## 2021-04-16 PROCEDURE — 93000 ELECTROCARDIOGRAM COMPLETE: CPT | Performed by: NURSE PRACTITIONER

## 2021-04-16 PROCEDURE — 99214 OFFICE O/P EST MOD 30 MIN: CPT | Performed by: NURSE PRACTITIONER

## 2021-04-16 RX ORDER — POTASSIUM CHLORIDE 20 MEQ/1
TABLET, EXTENDED RELEASE ORAL
COMMUNITY
Start: 2021-04-08 | End: 2021-11-12 | Stop reason: ALTCHOICE

## 2021-04-16 NOTE — PATIENT INSTRUCTIONS
1. No medication changes for now  2. Complete sleep study. [phone]  3.  Call office if any issues

## 2021-04-20 LAB
GRAM STAIN RESULT: ABNORMAL
ORGANISM: ABNORMAL
WOUND/ABSCESS: ABNORMAL

## 2021-04-21 ENCOUNTER — HOSPITAL ENCOUNTER (OUTPATIENT)
Age: 81
Setting detail: SPECIMEN
Discharge: HOME | End: 2021-04-21
Payer: MEDICARE

## 2021-04-21 LAB
ANION GAP SERPL CALCULATED.3IONS-SCNC: 9 MMOL/L (ref 3–16)
BASOPHILS ABSOLUTE: 0.1 K/UL (ref 0–0.2)
BASOPHILS RELATIVE PERCENT: 1.4 %
BUN BLDV-MCNC: 16 MG/DL (ref 7–20)
CALCIUM SERPL-MCNC: 8.4 MG/DL (ref 8.3–10.6)
CHLORIDE BLD-SCNC: 107 MMOL/L (ref 99–110)
CO2: 24 MMOL/L (ref 21–32)
CREAT SERPL-MCNC: 1.5 MG/DL (ref 0.6–1.2)
EOSINOPHILS ABSOLUTE: 0.4 K/UL (ref 0–0.6)
EOSINOPHILS RELATIVE PERCENT: 8 %
GFR AFRICAN AMERICAN: 41
GFR NON-AFRICAN AMERICAN: 34
GLUCOSE BLD-MCNC: 98 MG/DL (ref 70–99)
HCT VFR BLD CALC: 26.9 % (ref 36–48)
HEMOGLOBIN: 8 G/DL (ref 12–16)
HYPOCHROMIA: ABNORMAL
LYMPHOCYTES ABSOLUTE: 1.7 K/UL (ref 1–5.1)
LYMPHOCYTES RELATIVE PERCENT: 31.2 %
MCH RBC QN AUTO: 23 PG (ref 26–34)
MCHC RBC AUTO-ENTMCNC: 29.9 G/DL (ref 31–36)
MCV RBC AUTO: 76.8 FL (ref 80–100)
MICROCYTES: ABNORMAL
MONOCYTES ABSOLUTE: 0.7 K/UL (ref 0–1.3)
MONOCYTES RELATIVE PERCENT: 11.8 %
NEUTROPHILS ABSOLUTE: 2.7 K/UL (ref 1.7–7.7)
NEUTROPHILS RELATIVE PERCENT: 47.6 %
OVALOCYTES: ABNORMAL
PDW BLD-RTO: 29.5 % (ref 12.4–15.4)
PLATELET # BLD: 209 K/UL (ref 135–450)
PLATELET SLIDE REVIEW: ADEQUATE
PMV BLD AUTO: 8.9 FL (ref 5–10.5)
POLYCHROMASIA: ABNORMAL
POTASSIUM SERPL-SCNC: 5.2 MMOL/L (ref 3.5–5.1)
RBC # BLD: 3.5 M/UL (ref 4–5.2)
SLIDE REVIEW: ABNORMAL
SODIUM BLD-SCNC: 140 MMOL/L (ref 136–145)
TEAR DROP CELLS: ABNORMAL
WBC # BLD: 5.6 K/UL (ref 4–11)

## 2021-04-21 PROCEDURE — 80048 BASIC METABOLIC PNL TOTAL CA: CPT

## 2021-04-21 PROCEDURE — 85025 COMPLETE CBC W/AUTO DIFF WBC: CPT

## 2021-04-21 PROCEDURE — 36415 COLL VENOUS BLD VENIPUNCTURE: CPT

## 2021-04-26 ENCOUNTER — HOSPITAL ENCOUNTER (OUTPATIENT)
Age: 81
Discharge: HOME | End: 2021-04-26
Payer: MEDICARE

## 2021-04-26 ENCOUNTER — HOSPITAL ENCOUNTER (OUTPATIENT)
Dept: GENERAL RADIOLOGY | Age: 81
Discharge: HOME | End: 2021-04-26
Payer: MEDICARE

## 2021-04-26 DIAGNOSIS — R05.9 COUGH: ICD-10-CM

## 2021-04-26 PROCEDURE — 71046 X-RAY EXAM CHEST 2 VIEWS: CPT

## 2021-04-29 ENCOUNTER — OFFICE VISIT (OUTPATIENT)
Dept: SURGERY | Age: 81
End: 2021-04-29

## 2021-04-29 VITALS — WEIGHT: 209 LBS | DIASTOLIC BLOOD PRESSURE: 70 MMHG | SYSTOLIC BLOOD PRESSURE: 124 MMHG | BODY MASS INDEX: 40.82 KG/M2

## 2021-04-29 DIAGNOSIS — C18.2 MALIGNANT NEOPLASM OF ASCENDING COLON (HCC): Primary | ICD-10-CM

## 2021-04-29 PROCEDURE — 99024 POSTOP FOLLOW-UP VISIT: CPT | Performed by: SURGERY

## 2021-04-29 NOTE — PROGRESS NOTES
Subjective:      Patient ID: Torres Middleton is a 68 y.o. female. HPI    Review of Systems    Objective:   Physical Exam  Wound clean  Assessment:      47-year-old female status post laparoscopic cholecystectomy and laparoscopic right colon resection. She continues to improve. She still has an open right upper quadrant abdominal wound at the extraction site. The wound is clean and is filling in nicely. Plan:      Continue wet-to-dry dressing changes. Follow-up in 2 weeks.         Roberta Graham MD

## 2021-05-01 PROBLEM — N39.0 UTI (URINARY TRACT INFECTION): Status: RESOLVED | Noted: 2021-04-01 | Resolved: 2021-05-01

## 2021-05-06 ENCOUNTER — HOSPITAL ENCOUNTER (OUTPATIENT)
Age: 81
Discharge: HOME | End: 2021-05-06
Payer: MEDICARE

## 2021-05-06 LAB
ALBUMIN SERPL-MCNC: 3 G/DL (ref 3.4–5)
ANION GAP SERPL CALCULATED.3IONS-SCNC: 11 MMOL/L (ref 3–16)
BACTERIA: ABNORMAL /HPF
BILIRUBIN URINE: ABNORMAL
BLOOD, URINE: ABNORMAL
BUN BLDV-MCNC: 19 MG/DL (ref 7–20)
CALCIUM SERPL-MCNC: 8.2 MG/DL (ref 8.3–10.6)
CHLORIDE BLD-SCNC: 105 MMOL/L (ref 99–110)
CLARITY: ABNORMAL
CO2: 23 MMOL/L (ref 21–32)
COLOR: ABNORMAL
CREAT SERPL-MCNC: 1.4 MG/DL (ref 0.6–1.2)
EPITHELIAL CELLS, UA: 3 /HPF (ref 0–5)
GFR AFRICAN AMERICAN: 44
GFR NON-AFRICAN AMERICAN: 36
GLUCOSE BLD-MCNC: 120 MG/DL (ref 70–99)
GLUCOSE URINE: NEGATIVE MG/DL
HCT VFR BLD CALC: 26.9 % (ref 36–48)
HEMOGLOBIN: 8.4 G/DL (ref 12–16)
KETONES, URINE: NEGATIVE MG/DL
LEUKOCYTE ESTERASE, URINE: ABNORMAL
MAGNESIUM: 1.9 MG/DL (ref 1.8–2.4)
MCH RBC QN AUTO: 24.4 PG (ref 26–34)
MCHC RBC AUTO-ENTMCNC: 31.3 G/DL (ref 31–36)
MCV RBC AUTO: 77.7 FL (ref 80–100)
MICROSCOPIC EXAMINATION: YES
NITRITE, URINE: NEGATIVE
PARATHYROID HORMONE INTACT: 84.6 PG/ML (ref 14–72)
PDW BLD-RTO: 28.6 % (ref 12.4–15.4)
PH UA: 6 (ref 5–8)
PHOSPHORUS: 3.3 MG/DL (ref 2.5–4.9)
PLATELET # BLD: 282 K/UL (ref 135–450)
PLATELET SLIDE REVIEW: ADEQUATE
PMV BLD AUTO: 8.7 FL (ref 5–10.5)
POTASSIUM SERPL-SCNC: 3.7 MMOL/L (ref 3.5–5.1)
PROTEIN UA: 100 MG/DL
RBC # BLD: 3.46 M/UL (ref 4–5.2)
RBC UA: 102 /HPF (ref 0–4)
SLIDE REVIEW: ABNORMAL
SODIUM BLD-SCNC: 139 MMOL/L (ref 136–145)
SPECIFIC GRAVITY UA: 1.02 (ref 1–1.03)
URINE TYPE: ABNORMAL
UROBILINOGEN, URINE: 0.2 E.U./DL
WBC # BLD: 13.2 K/UL (ref 4–11)
WBC UA: 97 /HPF (ref 0–5)

## 2021-05-06 PROCEDURE — 83970 ASSAY OF PARATHORMONE: CPT

## 2021-05-06 PROCEDURE — 80069 RENAL FUNCTION PANEL: CPT

## 2021-05-06 PROCEDURE — 82306 VITAMIN D 25 HYDROXY: CPT

## 2021-05-06 PROCEDURE — 83735 ASSAY OF MAGNESIUM: CPT

## 2021-05-06 PROCEDURE — 85027 COMPLETE CBC AUTOMATED: CPT

## 2021-05-06 PROCEDURE — 81001 URINALYSIS AUTO W/SCOPE: CPT

## 2021-05-07 LAB — VITAMIN D 25-HYDROXY: 9.1 NG/ML

## 2021-05-13 ENCOUNTER — HOSPITAL ENCOUNTER (OUTPATIENT)
Age: 81
Discharge: HOME | End: 2021-05-13
Payer: MEDICARE

## 2021-05-13 ENCOUNTER — OFFICE VISIT (OUTPATIENT)
Dept: SURGERY | Age: 81
End: 2021-05-13

## 2021-05-13 VITALS — BODY MASS INDEX: 38.28 KG/M2 | DIASTOLIC BLOOD PRESSURE: 70 MMHG | WEIGHT: 196 LBS | SYSTOLIC BLOOD PRESSURE: 130 MMHG

## 2021-05-13 DIAGNOSIS — C18.2 MALIGNANT NEOPLASM OF ASCENDING COLON (HCC): Primary | ICD-10-CM

## 2021-05-13 LAB
ALBUMIN SERPL-MCNC: 2.8 G/DL (ref 3.4–5)
ANION GAP SERPL CALCULATED.3IONS-SCNC: 11 MMOL/L (ref 3–16)
BACTERIA: ABNORMAL /HPF
BILIRUBIN URINE: NEGATIVE
BLOOD, URINE: ABNORMAL
BUN BLDV-MCNC: 18 MG/DL (ref 7–20)
CALCIUM SERPL-MCNC: 8.2 MG/DL (ref 8.3–10.6)
CHLORIDE BLD-SCNC: 100 MMOL/L (ref 99–110)
CLARITY: ABNORMAL
CO2: 25 MMOL/L (ref 21–32)
COLOR: YELLOW
CREAT SERPL-MCNC: 2.2 MG/DL (ref 0.6–1.2)
EPITHELIAL CELLS, UA: 2 /HPF (ref 0–5)
GFR AFRICAN AMERICAN: 26
GFR NON-AFRICAN AMERICAN: 22
GLUCOSE BLD-MCNC: 107 MG/DL (ref 70–99)
GLUCOSE URINE: NEGATIVE MG/DL
HYALINE CASTS: 4 /LPF (ref 0–8)
KETONES, URINE: NEGATIVE MG/DL
LEUKOCYTE ESTERASE, URINE: ABNORMAL
MICROSCOPIC EXAMINATION: YES
NITRITE, URINE: NEGATIVE
PH UA: 6 (ref 5–8)
PHOSPHORUS: 3.5 MG/DL (ref 2.5–4.9)
POTASSIUM SERPL-SCNC: 3.1 MMOL/L (ref 3.5–5.1)
PROTEIN UA: NEGATIVE MG/DL
RBC UA: 28 /HPF (ref 0–4)
SODIUM BLD-SCNC: 136 MMOL/L (ref 136–145)
SPECIFIC GRAVITY UA: 1.01 (ref 1–1.03)
URINE TYPE: ABNORMAL
UROBILINOGEN, URINE: 0.2 E.U./DL
WBC UA: 11 /HPF (ref 0–5)

## 2021-05-13 PROCEDURE — 99024 POSTOP FOLLOW-UP VISIT: CPT | Performed by: SURGERY

## 2021-05-13 PROCEDURE — 87086 URINE CULTURE/COLONY COUNT: CPT

## 2021-05-13 PROCEDURE — 85027 COMPLETE CBC AUTOMATED: CPT

## 2021-05-13 PROCEDURE — 87077 CULTURE AEROBIC IDENTIFY: CPT

## 2021-05-13 PROCEDURE — 87186 SC STD MICRODIL/AGAR DIL: CPT

## 2021-05-13 PROCEDURE — 81001 URINALYSIS AUTO W/SCOPE: CPT

## 2021-05-13 PROCEDURE — 80069 RENAL FUNCTION PANEL: CPT

## 2021-05-14 LAB
HCT VFR BLD CALC: 25.8 % (ref 36–48)
HEMOGLOBIN: 8.2 G/DL (ref 12–16)
MCH RBC QN AUTO: 24.6 PG (ref 26–34)
MCHC RBC AUTO-ENTMCNC: 31.8 G/DL (ref 31–36)
MCV RBC AUTO: 77.4 FL (ref 80–100)
PDW BLD-RTO: 27.3 % (ref 12.4–15.4)
PLATELET # BLD: ABNORMAL K/UL (ref 135–450)
PLATELET SLIDE REVIEW: ABNORMAL
PMV BLD AUTO: ABNORMAL FL (ref 5–10.5)
RBC # BLD: 3.33 M/UL (ref 4–5.2)
SLIDE REVIEW: ABNORMAL
WBC # BLD: 9.2 K/UL (ref 4–11)

## 2021-05-15 LAB
ORGANISM: ABNORMAL
URINE CULTURE, ROUTINE: ABNORMAL

## 2021-05-20 NOTE — PROGRESS NOTES
Subjective:      Patient ID: Oseas Tinsley is a 68 y.o. female. HPI    Review of Systems    Objective:   Physical Exam  Wound clean with significant tunneling  Assessment:      72-year-old female status post laparoscopic cholecystectomy and laparoscopic right colon resection. Doing fairly well she still has an open wound. Wound is progressing well. There is some new brown drainage which raises the question of a possible fistula. Plan:      Continue Dakin's wet-to-dry dressing changes. Follow-up in 2 weeks.         Carrol Lopez MD

## 2021-05-21 ENCOUNTER — HOSPITAL ENCOUNTER (OUTPATIENT)
Age: 81
Setting detail: SPECIMEN
Discharge: HOME | End: 2021-05-21
Payer: MEDICARE

## 2021-05-21 LAB
BASOPHILS ABSOLUTE: 0 K/UL (ref 0–0.2)
BASOPHILS RELATIVE PERCENT: 0.2 %
EOSINOPHILS ABSOLUTE: 0.2 K/UL (ref 0–0.6)
EOSINOPHILS RELATIVE PERCENT: 1.3 %
HCT VFR BLD CALC: 26.9 % (ref 36–48)
HEMOGLOBIN: 8.3 G/DL (ref 12–16)
LYMPHOCYTES ABSOLUTE: 2.2 K/UL (ref 1–5.1)
LYMPHOCYTES RELATIVE PERCENT: 16.5 %
MCH RBC QN AUTO: 24 PG (ref 26–34)
MCHC RBC AUTO-ENTMCNC: 30.7 G/DL (ref 31–36)
MCV RBC AUTO: 78.1 FL (ref 80–100)
MONOCYTES ABSOLUTE: 0.8 K/UL (ref 0–1.3)
MONOCYTES RELATIVE PERCENT: 5.7 %
NEUTROPHILS ABSOLUTE: 10.1 K/UL (ref 1.7–7.7)
NEUTROPHILS RELATIVE PERCENT: 76.3 %
PDW BLD-RTO: 25.5 % (ref 12.4–15.4)
PLATELET # BLD: 391 K/UL (ref 135–450)
PMV BLD AUTO: 7.5 FL (ref 5–10.5)
RBC # BLD: 3.44 M/UL (ref 4–5.2)
WBC # BLD: 13.2 K/UL (ref 4–11)

## 2021-05-21 PROCEDURE — 36415 COLL VENOUS BLD VENIPUNCTURE: CPT

## 2021-05-21 PROCEDURE — 85025 COMPLETE CBC W/AUTO DIFF WBC: CPT

## 2021-05-27 ENCOUNTER — OFFICE VISIT (OUTPATIENT)
Dept: SURGERY | Age: 81
End: 2021-05-27

## 2021-05-27 VITALS — DIASTOLIC BLOOD PRESSURE: 70 MMHG | SYSTOLIC BLOOD PRESSURE: 130 MMHG | BODY MASS INDEX: 36.91 KG/M2 | WEIGHT: 189 LBS

## 2021-05-27 DIAGNOSIS — C18.2 MALIGNANT NEOPLASM OF ASCENDING COLON (HCC): Primary | ICD-10-CM

## 2021-05-27 PROCEDURE — 99024 POSTOP FOLLOW-UP VISIT: CPT | Performed by: SURGERY

## 2021-05-27 NOTE — PROGRESS NOTES
Wound continues to improve  Much less drainage than at last visit  Continue current dressing changes  Follow up in 2 weeks

## 2021-06-08 NOTE — PROGRESS NOTES
Date    WBC 13.2 (H) 2021    HGB 8.3 (L) 2021    HCT 26.9 (L) 2021    MCV 78.1 (L) 2021     2021     BMP:   Lab Results   Component Value Date    CREATININE 2.2 (H) 2021    BUN 18 2021     2021    K 3.1 (L) 2021     2021    CO2 25 2021     Estimated Creatinine Clearance: 21 mL/min (A) (based on SCr of 2.2 mg/dL (H)). Thyroid: No results found for: TSH, T7YOMCG, J8HYPZM, THYROIDAB  Lipid Panel:   Lab Results   Component Value Date    CHOL 155 2020    HDL 38 2020    TRIG 121 2020     LFTs:  Lab Results   Component Value Date    ALT 6 (L) 2021    AST 10 (L) 2021    ALKPHOS 103 2021    BILITOT 0.4 2021     Coags:   Lab Results   Component Value Date    PROTIME 13.7 (H) 2021    INR 1.18 (H) 2021    APTT 27.8 2021       EC2021  - Sinus bradycardia with LBBB    ECG: 3/6/21  Atrial fibrillation     ECHO: 3/6/21   Normal left ventricle size, and systolic function with an estimated ejection fraction of 55-60%. Mild concentric left ventricular hypertrophy is present.   No regional wall motion abnormalities are seen. Mild tricuspid regurgitation, RVSP is estimated at 38 mmhg.     Stress Test: None     Cath: 20  Artery   Findings/Result  LM       Normal  LAD     95% mid  Cx        OM2 50%  RI         NA  RCA     20% prox, 20% mid  LVEDP            25  LVG     55%     Intervention:         PCI of LAD with 3.8H21Yiamor REBECA (PD with 3.75NC)     Post Cath Dx:       Severe , nonobstructive otherwise    Assessment:    1. Paroxysmal Atrial Fibrillation   - Currently in NSR   - Continue coreg 3.125 mg BID   - Due to significant CAD, anti-arrhythmic therapies are limited to amiodarone. I have discussed with patient side effects of amiodarone including but not limited to thyroid disease, discoloration of skin and cornea and pulmonary fibrosis.  Patient would like to continue with it.     ~ Continue amio; reduce to 100 mg daily               ~ TSH 2.06, ALT 8, AST 16 (3/21)      - [id number] score:5 ; [id number] Vasc score and anticoagulation discussed. High risk for stroke and thromboembolism. Anticoagulation is recommended. Risk of bleeding was discussed.    ~ No AC due to anemia/GIB. May consider starting if recurrent AF noted and Hgb stable (>9-10 consistently)      - Afib risk factors including age, HTN, obesity, inactivity and SAHRA were discussed with patient. Risk factor modification recommended       - Treatment options including cardioversion, rate control strategy, antiarrhythmics, anticoagulation and possible ablation were discussed with patient. Risks, benefits and alternative of each treatment options were explained. All questions answered      - Monitor ordered by Dr. Carline Mckinley to assess for recurrent AF              ~ If recurrent AF, will likely need to consider for SAIDA ligation with Watchman device as she is likely poor candidate for long term anti-coagulation given her GI issues     2. LBBB with aberrancy              - Noted on prior EKG              - No recurrence   - Discussed s/s of worsening AV block and need for possible PPM in future if symptomatic     3. At Risk for Sleep Apnea   - Multiple risk factors   - Discussed long term risks of untreated sleep apnea   - Referral to sleep medicine (visit scheduled in a few weeks)    4. CAD   - Hx of PCI to LAD (11/20)   - Stable   - No complaints of angina   - On ASA, BB, statin (Brilinta being stopped)   - Followed by Dr. Carline Mckinley    5. Chronic systolic heart failure (NYHA Class II)  - Appears compensated   ~ EF 55-60% per echo (3/21)  - Continue with coreg 3.125 mg BID, torsemide 20 mg QD   ~ No ACE/ARB due to CKD  - Aggressive medical therapy with risk factor modification  - Discussed with patient importance of monitoring weight, low sodium diet and fluid restriction    6.  HTN   - Stable   - Continue current ordering medications/tests/procedures, referring and communicating with PCPs and other pertinent consultants, documenting information in the EMR, independently interpreting results and communicating to family and coordination of patient care.     Alex Hardin, JIMENA-CNP  [facility]   Office: [phone]

## 2021-06-11 ENCOUNTER — OFFICE VISIT (OUTPATIENT)
Dept: SURGERY | Age: 81
End: 2021-06-11

## 2021-06-11 VITALS — DIASTOLIC BLOOD PRESSURE: 70 MMHG | BODY MASS INDEX: 36.72 KG/M2 | WEIGHT: 188 LBS | SYSTOLIC BLOOD PRESSURE: 130 MMHG

## 2021-06-11 DIAGNOSIS — C18.2 MALIGNANT NEOPLASM OF ASCENDING COLON (HCC): Primary | ICD-10-CM

## 2021-06-11 PROCEDURE — 99024 POSTOP FOLLOW-UP VISIT: CPT | Performed by: SURGERY

## 2021-06-11 NOTE — PROGRESS NOTES
Wound smaller and very clean  Apply PSO and a dry bandage  Follow-up in 2 weeks if the wound has not completely healed

## 2021-06-19 DIAGNOSIS — E78.00 HIGH BLOOD CHOLESTEROL: ICD-10-CM

## 2021-06-19 DIAGNOSIS — I48.0 PAROXYSMAL ATRIAL FIBRILLATION (HCC): ICD-10-CM

## 2021-06-19 DIAGNOSIS — Z86.79 HX OF CORONARY ARTERY DISEASE: Primary | ICD-10-CM

## 2021-06-21 RX ORDER — ROSUVASTATIN CALCIUM 20 MG/1
20 TABLET, COATED ORAL NIGHTLY
Qty: 30 TABLET | Refills: 0 | Status: SHIPPED | OUTPATIENT
Start: 2021-06-21 | End: 2021-10-28

## 2021-06-30 NOTE — PROGRESS NOTES
[address]    H+P // CONSULT // OUTPATIENT VISIT // Krystin Bray VISIT     Referring Doctor Suleman Anders MD   Encounter Type Followup     CHIEF COMPLAINT     Visit Type Chronic   Symptoms None   Problems CAD, pAFIB, HTN, CHOL     HISTORY OF PRESENT ILLNESS     GEN - Doing well. No new concerns. CAD - Denies cp, sob, dizziness, syncope, palpitations. HTN - Ambulatory BP readings in good range. No HA or dizziness. CHOL - Last cholesterol reviewed and in good range. Tolerating statin without side effects. MED - Compliant with CV meds listed below without notable side effects. HISTORY/ALLERGIES/ROS     MedHx:   has a past medical history of Anemia, CAD (coronary artery disease), CKD (chronic kidney disease), Hyperlipidemia, and Hypertension. SurgHx:  has a past surgical history that includes fracture surgery; Cystocopy (11/21/13); Upper gastrointestinal endoscopy (N/A, 3/5/2021); Colonoscopy (N/A, 3/5/2021); hemicolectomy (Right, 3/8/2021); and Cholecystectomy, laparoscopic (N/A, 3/8/2021). SocHx:   reports that she has never smoked. She has never used smokeless tobacco. She reports that she does not drink alcohol and does not use drugs. FamHx:  family history includes Heart Disease in her father.    Allergies: Acetomenaphthone (menadiol diacetate) [menadiol sodium diphosphate] and Lisinopril-hydrochlorothiazide   ROS:  [x]Full ROS obtained and negative except as mentioned in HPI     MEDICATIONS      Current Outpatient Medications   Medication Sig Dispense Refill    rosuvastatin (CRESTOR) 20 MG tablet Take 1 tablet by mouth nightly 30 tablet 0    potassium chloride (KLOR-CON M) 20 MEQ extended release tablet TAKE 1 TABLET BY MOUTH ONCE DAILY      BRILINTA 90 MG TABS tablet Take 1 tablet by mouth twice daily 180 tablet 3    carvedilol (COREG) 3.125 MG tablet TAKE 1 TABLET BY MOUTH TWICE DAILY WITH MEALS 180 tablet 3    torsemide (DEMADEX) 20 MG tablet Take 1 tablet by mouth daily 30 tablet 1    amiodarone (CORDARONE) 200 MG tablet 200mg tid x 7d (thru 3/23/21) then 200mg qd thereafter 30 tablet 2     No current facility-administered medications for this visit. Reviewed with patient and will remain unchanged except as mentioned in A/P  PHYSICAL EXAM     Vitals:    07/07/21 1154   BP: 118/62   Pulse: 52   SpO2: 99%      Gen Alert, coop, no distress Heart  Rrr, no mrg   Head NC, AT, no abnorm Abd  Soft, NT, +BS, no mass, no OM   Eyes PER, conj/corn clear Ext  Ext nl, AT, no C/C/E   Nose Nares nl, no drain, NT Pulse 2+ and symmetric   Throat Lips, mucosa, tongue nl Skin Col/text/turg nl, no vis rash/les   Neck S/S, TM, NT, no bruit/JVD Psych Nl mood and affect   Lung CTA-B, unlabored, no DTP Lymph   No cervical or axillary LA   Ch wall NT, no deform Neuro  Nl gross M/S exam     ASSESSMENT AND PLAN     *CAD   Date EF Results   Sx   No concerning   Data   Most recent EKG, ECHO and LHC reviewed personally   LHC 11/20 55% PCI of LAD, STEMI Branda Zaire)   MPI   N/A   TTE 3/21 60%    Plan   Continue aggressive medical treatment at doses above  Stop brilinta, start asa 81 with hx of bleeding.    *AFIB  Status Parox, most recent TTE, monitors, EP procedures reviewed personally   Plan No AC due to anemia and recent surgeries   30 day monitor to determine burden   To see EP today, consider watchman if afib persists  *HTN  Status Controlled, vitals and available ambulatory monitoring logs reviewed personally  Plan Counseled on diet/salt/exercise/weight, continue meds at doses above  *CHOL  Status  Uncontrolled with last LDL of 93 (goal <70) and HDL of 38 (11/20), labs reviewed personally  Plan Counseled on diet/exercise/weight, continue high-intensity statin, lipid/liver surveillance per PCP  *COMPLIANCE  Status Compliant  Plan Discussed importance of compliance with meds/diet/salt/exercise; avoid tob/alc/drugs; patient verbalized understanding  *FOLLOWUP  12 months    [address] Donaldson am scribing for and in the presence of Vaishali Rico MD.   SignedRoyal Barger 06/30/21 9:37 AM   Provider Jeremy Fajardo is working as a scribe for and in the presence of anabel Rico MD). Working as a scribe, Royal Barger may have prepopulated components of this note with my historical  intellectual property under my direct supervision. Any additions to this intellectual property were performed in my presence and at my direction. Furthermore, the content and accuracy of this note have been reviewed by anabel Rico MD).  7/7/2021 12:13 PM    CODING     Category Diagnosis   Stable chronic illness  (25913/23450 - 2 or more) CAD, AFIB, HTN, CHOL   Chronic illness w/: Exac, progr or SA of Tx  (35295/12025 - 1 or more)    Undiagnosed new problem w/: uncertain prognosis  (64128/94132 - 1 or more)    Acute illness with systemic Sx  (05017/13689 - 1 or more)    Acute, complicated injury  (83724/54168 - 1 or more)    85818 1 or more chronic illness with exacerbation, progression or SA of treatment    Time  30-39 minutes spent preparing to see patient including reviewing patient history/prior tests/prior consults, performing a medical exam, counseling and educating patient/family/caregiver, ordering medications/tests/procedures, referring and communicating with PCPs and other pertinent consultants, documenting information in the EMR, independently interpreting results and communicating to family and coordination of patient care.

## 2021-07-07 ENCOUNTER — OFFICE VISIT (OUTPATIENT)
Dept: CARDIOLOGY CLINIC | Age: 81
End: 2021-07-07
Payer: MEDICARE

## 2021-07-07 VITALS
HEART RATE: 52 BPM | WEIGHT: 191 LBS | SYSTOLIC BLOOD PRESSURE: 118 MMHG | DIASTOLIC BLOOD PRESSURE: 62 MMHG | HEIGHT: 60 IN | OXYGEN SATURATION: 99 % | BODY MASS INDEX: 37.5 KG/M2

## 2021-07-07 VITALS
BODY MASS INDEX: 37.5 KG/M2 | WEIGHT: 191 LBS | DIASTOLIC BLOOD PRESSURE: 58 MMHG | SYSTOLIC BLOOD PRESSURE: 130 MMHG | HEIGHT: 60 IN

## 2021-07-07 DIAGNOSIS — I10 ESSENTIAL HYPERTENSION: ICD-10-CM

## 2021-07-07 DIAGNOSIS — I25.83 CORONARY ARTERY DISEASE DUE TO LIPID RICH PLAQUE: Primary | ICD-10-CM

## 2021-07-07 DIAGNOSIS — I48.0 PAF (PAROXYSMAL ATRIAL FIBRILLATION) (HCC): ICD-10-CM

## 2021-07-07 DIAGNOSIS — D50.0 IRON DEFICIENCY ANEMIA DUE TO CHRONIC BLOOD LOSS: ICD-10-CM

## 2021-07-07 DIAGNOSIS — K63.89 COLONIC MASS: ICD-10-CM

## 2021-07-07 DIAGNOSIS — I25.10 CORONARY ARTERY DISEASE DUE TO LIPID RICH PLAQUE: Primary | ICD-10-CM

## 2021-07-07 DIAGNOSIS — I48.0 PAF (PAROXYSMAL ATRIAL FIBRILLATION) (HCC): Primary | ICD-10-CM

## 2021-07-07 DIAGNOSIS — N18.31 STAGE 3A CHRONIC KIDNEY DISEASE (HCC): ICD-10-CM

## 2021-07-07 DIAGNOSIS — I25.10 CORONARY ARTERY DISEASE INVOLVING NATIVE CORONARY ARTERY OF NATIVE HEART WITHOUT ANGINA PECTORIS: ICD-10-CM

## 2021-07-07 DIAGNOSIS — I51.89 DIASTOLIC DYSFUNCTION: ICD-10-CM

## 2021-07-07 DIAGNOSIS — E78.00 HYPERCHOLESTEREMIA: ICD-10-CM

## 2021-07-07 PROBLEM — E87.6 HYPOKALEMIA: Status: RESOLVED | Noted: 2021-03-07 | Resolved: 2021-07-07

## 2021-07-07 PROCEDURE — 93000 ELECTROCARDIOGRAM COMPLETE: CPT | Performed by: NURSE PRACTITIONER

## 2021-07-07 PROCEDURE — 93228 REMOTE 30 DAY ECG REV/REPORT: CPT | Performed by: INTERNAL MEDICINE

## 2021-07-07 PROCEDURE — 99214 OFFICE O/P EST MOD 30 MIN: CPT | Performed by: NURSE PRACTITIONER

## 2021-07-07 PROCEDURE — 99214 OFFICE O/P EST MOD 30 MIN: CPT | Performed by: INTERNAL MEDICINE

## 2021-07-07 RX ORDER — FERROUS SULFATE 325(65) MG
325 TABLET ORAL 2 TIMES DAILY
COMMUNITY
Start: 2021-04-26

## 2021-07-07 RX ORDER — ASPIRIN 81 MG/1
81 TABLET ORAL DAILY
Qty: 30 TABLET | Refills: 0
Start: 2021-07-07

## 2021-07-07 RX ORDER — AMIODARONE HYDROCHLORIDE 200 MG/1
100 TABLET ORAL DAILY
Qty: 90 TABLET | Refills: 2
Start: 2021-07-07 | End: 2021-11-12

## 2021-07-07 NOTE — PATIENT INSTRUCTIONS
1. Reduce amiodarone to 100 mg daily  2. Complete event monitor  3. Please have labs from [facility] faxed to our office.  [phone]

## 2021-07-14 ENCOUNTER — TELEPHONE (OUTPATIENT)
Dept: CARDIOLOGY CLINIC | Age: 81
End: 2021-07-14

## 2021-07-14 NOTE — TELEPHONE ENCOUNTER
Who is requesting records:  Rishi Pacheco with [facility]    What is needed:  Last Ov Notes 07/07/2021    Phone number of the doctor/facility where records are to be sent:  [phone]    Fax number of the doctor/facility where records are to be sent[de-identified] 21

## 2021-07-26 ENCOUNTER — HOSPITAL ENCOUNTER (OUTPATIENT)
Age: 81
Discharge: HOME | End: 2021-07-26
Payer: MEDICARE

## 2021-07-26 ENCOUNTER — OFFICE VISIT (OUTPATIENT)
Dept: PULMONOLOGY | Age: 81
End: 2021-07-26
Payer: MEDICARE

## 2021-07-26 VITALS — DIASTOLIC BLOOD PRESSURE: 56 MMHG | BODY MASS INDEX: 37.3 KG/M2 | SYSTOLIC BLOOD PRESSURE: 142 MMHG | WEIGHT: 191 LBS

## 2021-07-26 DIAGNOSIS — I48.0 PAF (PAROXYSMAL ATRIAL FIBRILLATION) (HCC): Chronic | ICD-10-CM

## 2021-07-26 DIAGNOSIS — E66.9 NON MORBID OBESITY, UNSPECIFIED OBESITY TYPE: Chronic | ICD-10-CM

## 2021-07-26 DIAGNOSIS — G47.10 HYPERSOMNIA: Primary | ICD-10-CM

## 2021-07-26 DIAGNOSIS — I25.10 CORONARY ARTERY DISEASE INVOLVING NATIVE CORONARY ARTERY OF NATIVE HEART WITHOUT ANGINA PECTORIS: Chronic | ICD-10-CM

## 2021-07-26 DIAGNOSIS — I10 ESSENTIAL HYPERTENSION: Chronic | ICD-10-CM

## 2021-07-26 PROBLEM — I51.89 DIASTOLIC DYSFUNCTION: Chronic | Status: ACTIVE | Noted: 2021-03-07

## 2021-07-26 PROBLEM — C18.2 MALIGNANT NEOPLASM OF ASCENDING COLON (HCC): Chronic | Status: ACTIVE | Noted: 2021-03-07

## 2021-07-26 PROBLEM — N18.31 STAGE 3A CHRONIC KIDNEY DISEASE (HCC): Chronic | Status: ACTIVE | Noted: 2021-07-07

## 2021-07-26 LAB
ALBUMIN SERPL-MCNC: 3.4 G/DL (ref 3.4–5)
ANION GAP SERPL CALCULATED.3IONS-SCNC: 11 MMOL/L (ref 3–16)
BACTERIA: ABNORMAL /HPF
BILIRUBIN URINE: NEGATIVE
BLOOD, URINE: ABNORMAL
BUN BLDV-MCNC: 22 MG/DL (ref 7–20)
CALCIUM SERPL-MCNC: 9.2 MG/DL (ref 8.3–10.6)
CHLORIDE BLD-SCNC: 106 MMOL/L (ref 99–110)
CLARITY: ABNORMAL
CO2: 28 MMOL/L (ref 21–32)
COLOR: YELLOW
CREAT SERPL-MCNC: 1.8 MG/DL (ref 0.6–1.2)
EPITHELIAL CELLS, UA: 21 /HPF (ref 0–5)
GFR AFRICAN AMERICAN: 33
GFR NON-AFRICAN AMERICAN: 27
GLUCOSE BLD-MCNC: 96 MG/DL (ref 70–99)
GLUCOSE URINE: NEGATIVE MG/DL
HCT VFR BLD CALC: 28 % (ref 36–48)
HEMOGLOBIN: 9 G/DL (ref 12–16)
KETONES, URINE: NEGATIVE MG/DL
LEUKOCYTE ESTERASE, URINE: ABNORMAL
MAGNESIUM: 2.1 MG/DL (ref 1.8–2.4)
MCH RBC QN AUTO: 26.8 PG (ref 26–34)
MCHC RBC AUTO-ENTMCNC: 32 G/DL (ref 31–36)
MCV RBC AUTO: 83.8 FL (ref 80–100)
MICROSCOPIC EXAMINATION: YES
NITRITE, URINE: NEGATIVE
PARATHYROID HORMONE INTACT: 99 PG/ML (ref 14–72)
PDW BLD-RTO: 16.1 % (ref 12.4–15.4)
PH UA: 6.5 (ref 5–8)
PHOSPHORUS: 3.8 MG/DL (ref 2.5–4.9)
PLATELET # BLD: 245 K/UL (ref 135–450)
PMV BLD AUTO: 8.9 FL (ref 5–10.5)
POTASSIUM SERPL-SCNC: 3.9 MMOL/L (ref 3.5–5.1)
PROTEIN UA: 100 MG/DL
RBC # BLD: 3.34 M/UL (ref 4–5.2)
RBC UA: ABNORMAL /HPF (ref 0–4)
SODIUM BLD-SCNC: 145 MMOL/L (ref 136–145)
SPECIFIC GRAVITY UA: 1.02 (ref 1–1.03)
URINE TYPE: ABNORMAL
UROBILINOGEN, URINE: 0.2 E.U./DL
VITAMIN D 25-HYDROXY: 9.5 NG/ML
WBC # BLD: 7.1 K/UL (ref 4–11)
WBC UA: >900 /HPF (ref 0–5)

## 2021-07-26 PROCEDURE — 85027 COMPLETE CBC AUTOMATED: CPT

## 2021-07-26 PROCEDURE — 83970 ASSAY OF PARATHORMONE: CPT

## 2021-07-26 PROCEDURE — 81001 URINALYSIS AUTO W/SCOPE: CPT

## 2021-07-26 PROCEDURE — 83735 ASSAY OF MAGNESIUM: CPT

## 2021-07-26 PROCEDURE — 82306 VITAMIN D 25 HYDROXY: CPT

## 2021-07-26 PROCEDURE — 80069 RENAL FUNCTION PANEL: CPT

## 2021-07-26 PROCEDURE — 99204 OFFICE O/P NEW MOD 45 MIN: CPT | Performed by: INTERNAL MEDICINE

## 2021-07-26 ASSESSMENT — SLEEP AND FATIGUE QUESTIONNAIRES
HOW LIKELY ARE YOU TO NOD OFF OR FALL ASLEEP WHILE SITTING AND READING: 0
I DO OR HAVE BEEN TOLD I SNORE DURING SLEEP: NO
HOW LIKELY ARE YOU TO NOD OFF OR FALL ASLEEP WHILE SITTING AND TALKING TO SOMEONE: 0
ESS TOTAL SCORE: 0
HOW LIKELY ARE YOU TO NOD OFF OR FALL ASLEEP WHEN YOU ARE A PASSENGER IN A CAR FOR AN HOUR WITHOUT A BREAK: 0
HOW LIKELY ARE YOU TO NOD OFF OR FALL ASLEEP WHILE SITTING INACTIVE IN A PUBLIC PLACE: 0
HOW LIKELY ARE YOU TO NOD OFF OR FALL ASLEEP WHILE SITTING QUIETLY AFTER LUNCH WITHOUT ALCOHOL: 0
HOW LIKELY ARE YOU TO NOD OFF OR FALL ASLEEP IN A CAR, WHILE STOPPED FOR A FEW MINUTES IN TRAFFIC: 0
NECK CIRCUMFERENCE (INCHES): 13.5
HOW LIKELY ARE YOU TO NOD OFF OR FALL ASLEEP WHILE WATCHING TV: 0
HOW LIKELY ARE YOU TO NOD OFF OR FALL ASLEEP WHILE LYING DOWN TO REST IN THE AFTERNOON WHEN CIRCUMSTANCES PERMIT: 0

## 2021-07-26 NOTE — PROGRESS NOTES
Bia Viveros MD, Dewitte Goodell, [facility]  Tiffanie Kehrt CNP Myriam Cohens CNP [place] [address] [address]  [address] [facility], [address] [facility] [phone]   [facility] [address]asc[address]. [address] [facility]. [address] [phone]     [address] [place] [address]  [address] [facility] [address]  [address] [address]  Dept: [phone]  Loc: [phone]    Assessment:      Visit Diagnoses and Associated Orders     Hypersomnia   (New Problem)  -  Primary    needs work-up    Home Sleep Study (HST) [90020 Custom]   - Future Order         Coronary artery disease involving native coronary artery of native heart without angina pectoris   (Stable)           PAF (paroxysmal atrial fibrillation) (HCC)   (Stable)           Essential hypertension   (Stable)           Non morbid obesity, unspecified obesity type   (Not Stable)                  Plan:      One or more undiagnosed new problem with uncertain prognosis till final diagnosis is made. Differential diagnosis includes but not limited to: SAHRA, PLMD's, narcolepsy, parasomnias. Reviewed SAHRA (highest likelihood Dx): pathophysiology, diagnosis, complications and treatment. Instructed her not to drive if drowsy. Continue medications per her PCP and other physicians. Limit caffeine use after 3pm. Standard of care is to do in-lab PSG but insurance is mandating an inferior HST. 1 wk follow up after study to review her results. The chronic medical conditions listed are directly related to the primary diagnosis listed above. The management of the primary diagnosis affects the secondary diagnosis and vice versa. Reviewed referral note from cardiology dated 4/16/2021 showing the patient may be at risk for sleep apnea. Patient reviewed EKG from 7/7/2021 showing a widened QRS but normal sinus rhythm. Report from 3/3/2021 showing EF of 55-60%.     This information was analyzed to assess complexity and medical decision making in regards to further testing and management. Continue meds for: CAD, a fib, and HTN. Pt would medically benefit from wt loss for SAHRA (diet, exercise, surgical). Orders Placed This Encounter   Procedures    Home Sleep Study (HST)          Subjective:     Patient ID: Estela Springer is a 68 y.o. female. Chief Complaint   Patient presents with    New Patient    Sleep Apnea       HPI:      Estela Springer is a 68 y.o. female referred by JIMENA Devlin -* for a sleep evaluation. She complains of: excessive daytime sleepiness , non-restorative sleep, napping and tossing and turning at night. She denies: cataplexy and hypnagogic hallucinations. Symptoms began 5 years ago, gradually worsening since that time. Not sure about witnessed apnea nor snoring. Previous evaluation and treatment has included- none    DOT/CDL - No  FAA/'s license -No    Previous Report(s) Reviewed: historical medical records, office notes, andreferral letter(s). Pertinent data has been documented.     Melvin - Total score: 0    Caffeine Intake - Pop/Soda: 1/2 can(s) per day    Social History     Socioeconomic History    Marital status:      Spouse name: Not on file    Number of children: Not on file    Years of education: Not on file    Highest education level: Not on file   Occupational History    Not on file   Tobacco Use    Smoking status: Never Smoker    Smokeless tobacco: Never Used    Tobacco comment:  was a heavy smoker   Vaping Use    Vaping Use: Never used   Substance and Sexual Activity    Alcohol use: No    Drug use: No    Sexual activity: Not Currently   Other Topics Concern    Not on file   Social History Narrative    Not on file     Social Determinants of Health     Financial Resource Strain:     Difficulty of Paying Living Expenses:    Food Insecurity:     Worried About [address] in the Last Year:     [address] in the Last Year:    Transportation Needs:     Lack of Transportation (Medical):  Lack of Transportation (Non-Medical):    Physical Activity:     Days of Exercise per Week:     Minutes of Exercise per Session:    Stress:     Feeling of Stress :    Social Connections:     Frequency of Communication with Friends and Family:     Frequency of Social Gatherings with Friends and Family:     Attends [employer] Services:     Active Member of Clubs or Organizations:     Attends Club or Organization Meetings:     Marital Status:    Intimate Partner Violence:     Fear of Current or Ex-Partner:     Emotionally Abused:     Physically Abused:     Sexually Abused:         Current Outpatient Medications   Medication Instructions    amiodarone (CORDARONE) 100 mg, Oral, DAILY    aspirin EC 81 mg, Oral, DAILY    carvedilol (COREG) 3.125 MG tablet TAKE 1 TABLET BY MOUTH TWICE DAILY WITH MEALS    ferrous sulfate (IRON 325) 325 mg, Oral, 2 TIMES DAILY    potassium chloride (KLOR-CON M) 20 MEQ extended release tablet TAKE 1 TABLET BY MOUTH ONCE DAILY    rosuvastatin (CRESTOR) 20 mg, Oral, NIGHTLY    torsemide (DEMADEX) 20 mg, Oral, DAILY          Objective:     Vitals:  Weight BMI   Wt Readings from Last 3 Encounters:   07/26/21 191 lb (86.6 kg)   07/07/21 191 lb (86.6 kg)   07/07/21 191 lb (86.6 kg)    Body mass index is 37.3 kg/m². BP HR SaO2   BP Readings from Last 3 Encounters:   07/26/21 (!) 142/56   07/07/21 (!) 130/58   07/07/21 118/62    Pulse Readings from Last 3 Encounters:   07/07/21 52   04/16/21 64   04/07/21 77    SpO2 Readings from Last 3 Encounters:   07/07/21 99%   04/16/21 97%   04/07/21 96%        Physical Exam  Vitals reviewed. Constitutional:       General: She is not in acute distress. Appearance: Normal appearance. She is well-developed. She is obese. She is not toxic-appearing or diaphoretic. HENT:      Head: Normocephalic and atraumatic. Not macrocephalic and not microcephalic.       Right Ear: External ear normal.      Left Ear: External ear normal.      Nose: Nasal deformity, septal deviation and mucosal edema present. Mouth/Throat:      Lips: Pink. Mouth: Mucous membranes are moist.      Dentition: Abnormal dentition. Has dentures. Tongue: No lesions. Palate: No mass. Pharynx: Uvula midline. Uvula swelling present. No oropharyngeal exudate. Tonsils: No tonsillar exudate or tonsillar abscesses. Comments: Tonsils: normal size  Eyes:      General: Lids are normal.      Extraocular Movements: Extraocular movements intact. Conjunctiva/sclera: Conjunctivae normal.      Pupils: Pupils are equal, round, and reactive to light. Neck:      Vascular: No JVD. Trachea: Trachea normal.      Comments: Neck Circ: 13.5 inches    Cardiovascular:      Rate and Rhythm: Normal rate and regular rhythm. Heart sounds: Murmur heard. Systolic murmur is present with a grade of 2/6. Pulmonary:      Effort: Pulmonary effort is normal.      Breath sounds: Normal breath sounds. Abdominal:      General: Bowel sounds are normal.   Musculoskeletal:      Cervical back: Normal range of motion. Comments: No evidence of cyanosis or clubbing of nails   Skin:     General: Skin is warm. Nails: There is no clubbing. Neurological:      General: No focal deficit present. Mental Status: She is alert. Psychiatric:         Attention and Perception: Attention normal.         Mood and Affect: Mood and affect normal.         Speech: Speech normal.         Behavior: Behavior normal.         Thought Content:  Thought content normal.         Electronically signed by Alycia Branch MD on7/26/2021 at 9:58 AM

## 2021-07-26 NOTE — LETTER
[facility]  [address] [address]  Phone: [phone]  Fax: [phone]           Martín Koenig MD      July 26, 2021     Patient: Charla Goldman   MR Number: [id number]   YOB: 1940   Date of Visit: 7/26/2021       Dear Dr. Narda Adkins: Thank you for referring Charla Goldman to me for evaluation/treatment. Below are the relevant portions of my assessment and plan of care. Visit Diagnoses and Associated Orders     Hypersomnia   (New Problem)  -  Primary    needs work-up    Home Sleep Study (HST) [57560 Custom]   - Future Order         Coronary artery disease involving native coronary artery of native heart without angina pectoris   (Stable)           PAF (paroxysmal atrial fibrillation) (HCC)   (Stable)           Essential hypertension   (Stable)           Non morbid obesity, unspecified obesity type   (Not Stable)                 One or more undiagnosed new problem with uncertain prognosis till final diagnosis is made. Differential diagnosis includes but not limited to: SAHRA, PLMD's, narcolepsy, parasomnias. Reviewed SAHRA (highest likelihood Dx): pathophysiology, diagnosis, complications and treatment. Instructed her not to drive if drowsy. Continue medications per her PCP and other physicians. Limit caffeine use after 3pm. Standard of care is to do in-lab PSG but insurance is mandating an inferior HST. 1 wk follow up after study to review her results. The chronic medical conditions listed are directly related to the primary diagnosis listed above. The management of the primary diagnosis affects the secondary diagnosis and vice versa. Reviewed referral note from cardiology dated 4/16/2021 showing the patient may be at risk for sleep apnea. Patient reviewed EKG from 7/7/2021 showing a widened QRS but normal sinus rhythm. Report from 3/3/2021 showing EF of 55-60%.     This information was analyzed to assess complexity and medical decision making in regards to further testing and management. Continue meds for: CAD, a fib, and HTN. Pt would medically benefit from wt loss for SAHRA (diet, exercise, surgical). Orders Placed This Encounter   Procedures    Home Sleep Study (HST)       If you have questions, please do not hesitate to call me. I look forward to following Megha along with you.     Sincerely,        Carson Callow, MD    CC providers:  Ethan Ro MD  [address] [facility]  [address] [place] [address]  Via In [address] [facility], APRN - CNP  [address] [facility]  [address] [place] [address] [phone]  Via In [employer]

## 2021-07-26 NOTE — LETTER
[facility]  [address] [address]  Phone: [phone]  Fax: [phone]           Bonita Womack MD      July 26, 2021     Patient: Kaela Bianchi   MR Number: [id number]   YOB: 1940   Date of Visit: 7/26/2021       Dear Dr. Jam Mijares: Thank you for referring Kaela Bianchi to me for evaluation/treatment. Below are the relevant portions of my assessment and plan of care. Visit Diagnoses and Associated Orders     Hypersomnia   (New Problem)  -  Primary    needs work-up    Home Sleep Study (HST) [95150 Custom]   - Future Order         Snoring   (New Problem)      needs work-up    Home Sleep Study (HST) [80223 Custom]   - Future Order         Coronary artery disease involving native coronary artery of native heart without angina pectoris   (Stable)           PAF (paroxysmal atrial fibrillation) (HCC)   (Stable)           Essential hypertension   (Stable)           Non morbid obesity, unspecified obesity type   (Not Stable)                 One or more undiagnosed new problem with uncertain prognosis till final diagnosis is made. Differential diagnosis includes but not limited to: SAHRA, PLMD's, narcolepsy, parasomnias. Reviewed SAHRA (highest likelihood Dx): pathophysiology, diagnosis, complications and treatment. Instructed her not to drive if drowsy. Continue medications per her PCP and other physicians. Limit caffeine use after 3pm. Standard of care is to do in-lab PSG but insurance is mandating an inferior HST. 1 wk follow up after study to review her results. The chronic medical conditions listed are directly related to the primary diagnosis listed above. The management of the primary diagnosis affects the secondary diagnosis and vice versa. Reviewed referral note from cardiology dated 4/16/2021 showing the patient may be at risk for sleep apnea. Patient reviewed EKG from 7/7/2021 showing a widened QRS but normal sinus rhythm. Report from 3/3/2021 showing EF of 55-60%. This information was analyzed to assess complexity and medical decision making in regards to further testing and management. Continue meds for: CAD, a fib, and HTN. Pt would medically benefit from wt loss for SAHRA (diet, exercise, surgical). Orders Placed This Encounter   Procedures    Home Sleep Study (HST)       If you have questions, please do not hesitate to call me. I look forward to following Megha along with you.     Sincerely,        Finn Hernandez MD    CC providers:  Lashaun Paris, APRN - CNP  [address] [facility]  [address] [facility] [phone]  Via In [facility]  [address]  [facility] [address]  Via In [facility] [address]

## 2021-08-13 ENCOUNTER — HOSPITAL ENCOUNTER (OUTPATIENT)
Age: 81
Discharge: HOME | End: 2021-08-13
Payer: MEDICARE

## 2021-08-13 ENCOUNTER — HOSPITAL ENCOUNTER (OUTPATIENT)
Dept: SLEEP CENTER | Age: 81
Discharge: HOME | End: 2021-08-13
Payer: MEDICARE

## 2021-08-13 DIAGNOSIS — G47.10 HYPERSOMNIA: ICD-10-CM

## 2021-08-13 LAB
ALBUMIN SERPL-MCNC: 3.6 G/DL (ref 3.4–5)
ANION GAP SERPL CALCULATED.3IONS-SCNC: 12 MMOL/L (ref 3–16)
BACTERIA: ABNORMAL /HPF
BILIRUBIN URINE: NEGATIVE
BLOOD, URINE: ABNORMAL
BUN BLDV-MCNC: 28 MG/DL (ref 7–20)
CALCIUM SERPL-MCNC: 9.2 MG/DL (ref 8.3–10.6)
CHLORIDE BLD-SCNC: 105 MMOL/L (ref 99–110)
CLARITY: ABNORMAL
CO2: 25 MMOL/L (ref 21–32)
COLOR: YELLOW
COMMENT UA: ABNORMAL
CREAT SERPL-MCNC: 2.1 MG/DL (ref 0.6–1.2)
EPITHELIAL CELLS, UA: 1 /HPF (ref 0–5)
FERRITIN: 149.1 NG/ML (ref 15–150)
FOLATE: 5 NG/ML (ref 4.78–24.2)
GFR AFRICAN AMERICAN: 27
GFR NON-AFRICAN AMERICAN: 23
GLUCOSE BLD-MCNC: 98 MG/DL (ref 70–99)
GLUCOSE URINE: NEGATIVE MG/DL
HCT VFR BLD CALC: 27.8 % (ref 36–48)
HEMOGLOBIN: 9 G/DL (ref 12–16)
HYALINE CASTS: 2 /LPF (ref 0–8)
IRON SATURATION: 18 % (ref 15–50)
IRON: 53 UG/DL (ref 37–145)
KETONES, URINE: NEGATIVE MG/DL
LEUKOCYTE ESTERASE, URINE: ABNORMAL
MCH RBC QN AUTO: 27.3 PG (ref 26–34)
MCHC RBC AUTO-ENTMCNC: 32.3 G/DL (ref 31–36)
MCV RBC AUTO: 84.3 FL (ref 80–100)
MICROSCOPIC EXAMINATION: YES
NITRITE, URINE: NEGATIVE
PDW BLD-RTO: 16.2 % (ref 12.4–15.4)
PH UA: 6.5 (ref 5–8)
PHOSPHORUS: 4.1 MG/DL (ref 2.5–4.9)
PLATELET # BLD: 221 K/UL (ref 135–450)
PMV BLD AUTO: 9.3 FL (ref 5–10.5)
POTASSIUM SERPL-SCNC: 4.4 MMOL/L (ref 3.5–5.1)
PROTEIN UA: ABNORMAL MG/DL
RBC # BLD: 3.3 M/UL (ref 4–5.2)
RBC UA: 31 /HPF (ref 0–4)
SODIUM BLD-SCNC: 142 MMOL/L (ref 136–145)
SPECIFIC GRAVITY UA: 1.01 (ref 1–1.03)
TOTAL IRON BINDING CAPACITY: 295 UG/DL (ref 260–445)
TRANSFERRIN: 241 MG/DL (ref 200–360)
URINE TYPE: ABNORMAL
UROBILINOGEN, URINE: 0.2 E.U./DL
VITAMIN B-12: 518 PG/ML (ref 211–911)
WBC # BLD: 8.1 K/UL (ref 4–11)
WBC UA: 177 /HPF (ref 0–5)

## 2021-08-13 PROCEDURE — 87086 URINE CULTURE/COLONY COUNT: CPT

## 2021-08-13 PROCEDURE — 95806 SLEEP STUDY UNATT&RESP EFFT: CPT

## 2021-08-13 PROCEDURE — 81001 URINALYSIS AUTO W/SCOPE: CPT

## 2021-08-13 PROCEDURE — 85027 COMPLETE CBC AUTOMATED: CPT

## 2021-08-13 PROCEDURE — 80069 RENAL FUNCTION PANEL: CPT

## 2021-08-13 PROCEDURE — 83540 ASSAY OF IRON: CPT

## 2021-08-13 PROCEDURE — 82607 VITAMIN B-12: CPT

## 2021-08-13 PROCEDURE — 82746 ASSAY OF FOLIC ACID SERUM: CPT

## 2021-08-13 PROCEDURE — 87184 SC STD DISK METHOD PER PLATE: CPT

## 2021-08-13 PROCEDURE — 87077 CULTURE AEROBIC IDENTIFY: CPT

## 2021-08-13 PROCEDURE — 87186 SC STD MICRODIL/AGAR DIL: CPT

## 2021-08-13 PROCEDURE — 84466 ASSAY OF TRANSFERRIN: CPT

## 2021-08-13 PROCEDURE — 36415 COLL VENOUS BLD VENIPUNCTURE: CPT

## 2021-08-13 PROCEDURE — 82728 ASSAY OF FERRITIN: CPT

## 2021-08-16 LAB
ORGANISM: ABNORMAL
URINE CULTURE, ROUTINE: ABNORMAL

## 2021-08-16 PROCEDURE — 95806 SLEEP STUDY UNATT&RESP EFFT: CPT | Performed by: INTERNAL MEDICINE

## 2021-08-17 ENCOUNTER — TELEPHONE (OUTPATIENT)
Dept: PULMONOLOGY | Age: 81
End: 2021-08-17

## 2021-08-26 ENCOUNTER — OFFICE VISIT (OUTPATIENT)
Dept: SURGERY | Age: 81
End: 2021-08-26
Payer: MEDICARE

## 2021-08-26 VITALS — WEIGHT: 191 LBS | BODY MASS INDEX: 37.3 KG/M2

## 2021-08-26 DIAGNOSIS — R10.11 RUQ PAIN: Primary | ICD-10-CM

## 2021-08-26 PROCEDURE — 99213 OFFICE O/P EST LOW 20 MIN: CPT | Performed by: SURGERY

## 2021-08-26 NOTE — PROGRESS NOTES
Subjective:      Chief complaint-chronically draining wound    Patient ID: HPI- Josee Daryl is a 68 y.o. female in for evaluation of a right upper quadrant abdominal wound    HPI    Review of Systems    Objective:   Physical Exam  Constitutional:       Appearance: She is well-developed. HENT:      Head: Normocephalic and atraumatic. Right Ear: External ear normal.      Left Ear: External ear normal.   Eyes:      Conjunctiva/sclera: Conjunctivae normal.   Cardiovascular:      Rate and Rhythm: Normal rate and regular rhythm. Pulmonary:      Effort: Pulmonary effort is normal.      Breath sounds: Normal breath sounds. Abdominal:      General: There is no distension. Palpations: Abdomen is soft. Comments: There is a pinhole opening at the lateral aspect of her transverse right upper quadrant incision   Musculoskeletal:         General: Normal range of motion. Cervical back: Normal range of motion and neck supple. Skin:     General: Skin is warm and dry. Neurological:      Mental Status: She is alert and oriented to person, place, and time. Psychiatric:         Behavior: Behavior normal.         Assessment:      72-year-old female who is status post right colectomy and cholecystectomy in March 2021. 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 She had wound healing problems postoperatively and there was concern for a possible enterocutaneous fistula during that time. Other than the chronically draining wound, she has no complaints. The wound drains a fairly minimal amount of gray fluid. The drainage is not consistent with bowel contents. She never started any type of oncologic treatment. On physical examination, there is a few millimeter opening at the lateral aspect of her previous right upper quadrant incision. There is no surrounding evidence of infection. Plan:       We will check a CAT scan of the abdomen and pelvis with oral contrast with plans to communicate results with the patient by phone.         Ulysses Stade, MD

## 2021-09-01 ENCOUNTER — HOSPITAL ENCOUNTER (OUTPATIENT)
Age: 81
Discharge: HOME | End: 2021-09-01
Payer: MEDICARE

## 2021-09-01 LAB
BILIRUBIN URINE: NEGATIVE
BLOOD, URINE: ABNORMAL
CLARITY: CLEAR
COLOR: YELLOW
EPITHELIAL CELLS, UA: 1 /HPF (ref 0–5)
GLUCOSE URINE: NEGATIVE MG/DL
HYALINE CASTS: 1 /LPF (ref 0–8)
KETONES, URINE: NEGATIVE MG/DL
LEUKOCYTE ESTERASE, URINE: ABNORMAL
MICROSCOPIC EXAMINATION: YES
NITRITE, URINE: NEGATIVE
PH UA: 6 (ref 5–8)
PROTEIN UA: NEGATIVE MG/DL
RBC UA: 7 /HPF (ref 0–4)
SPECIFIC GRAVITY UA: 1.02 (ref 1–1.03)
URINE TYPE: ABNORMAL
UROBILINOGEN, URINE: 0.2 E.U./DL
WBC UA: 7 /HPF (ref 0–5)

## 2021-09-01 PROCEDURE — 87077 CULTURE AEROBIC IDENTIFY: CPT

## 2021-09-01 PROCEDURE — 87186 SC STD MICRODIL/AGAR DIL: CPT

## 2021-09-01 PROCEDURE — 81001 URINALYSIS AUTO W/SCOPE: CPT

## 2021-09-01 PROCEDURE — 87086 URINE CULTURE/COLONY COUNT: CPT

## 2021-09-03 LAB
ORGANISM: ABNORMAL
URINE CULTURE, ROUTINE: ABNORMAL

## 2021-09-16 ENCOUNTER — TELEPHONE (OUTPATIENT)
Dept: SURGERY | Age: 81
End: 2021-09-16

## 2021-09-16 ENCOUNTER — HOSPITAL ENCOUNTER (OUTPATIENT)
Dept: CT IMAGING | Age: 81
Discharge: HOME | End: 2021-09-16
Payer: MEDICARE

## 2021-09-16 DIAGNOSIS — R10.11 RUQ PAIN: ICD-10-CM

## 2021-09-16 PROCEDURE — 74176 CT ABD & PELVIS W/O CONTRAST: CPT

## 2021-09-16 PROCEDURE — 6360000004 HC RX CONTRAST MEDICATION: Performed by: SURGERY

## 2021-09-16 RX ADMIN — IOHEXOL 50 ML: 240 INJECTION, SOLUTION INTRATHECAL; INTRAVASCULAR; INTRAVENOUS; ORAL at 08:49

## 2021-09-16 NOTE — TELEPHONE ENCOUNTER
I let PT know her CT results were normal and scheduled her for an Appt. In 2 weeks. ----- Message from Jayme Apley, MD sent at 9/16/2021 10:07 AM EDT -----  Please let her know that the CT scan looks okay.  Have her follow up with me in 2 weeks  ----- Message -----  From: Yadiel Duran Incoming Radiology Results From [facility]  Sent: 9/16/2021   9:27 AM EDT  To: Jayme Apley, MD

## 2021-09-24 ENCOUNTER — TELEPHONE (OUTPATIENT)
Dept: PULMONOLOGY | Age: 81
End: 2021-09-24

## 2021-09-24 NOTE — TELEPHONE ENCOUNTER
Spoke with pt to review HST. Order to be sent to [facility] ( spouse uses [facility]) F/U scheduled.

## 2021-09-24 NOTE — PROGRESS NOTES
Kaela Bianchi         : 1940     Diagnosis: [x] SAHRA (G47.33) [] CSA (G47.31) [] Apnea (G47.30)   Length of Need: [x] 12 Months [] 99 Months [] Other:    Machine (NEMO!): [] Respironics Dream Station   2   Auto [x] ResMed AirSense     Auto S11 [] Other:     [x]  CPAP () [] Bilevel ()   Mode: [x] Auto [] Spontaneous    Mode: [] Auto [] Spontaneous      P min 6 cmH2O  P max 16 cmH2O      Comfort Settings:   - Ramp Pressure: 4 cmH2O                                        - Ramp time: 15 min                                     -  Flex/EPR - 3 full time                                    - For ResMed Bilevel (TiMax-4 sec   TiMin- 0.2 sec)     Humidifier: [x] Heated ()        [x] Water chamber replacement ()/ 1 per 6 months        Mask:   [x] Nasal () /1 per 3 months [] Full Face () /1 per 3 months   [x] Patient choice -Size and fit mask [] Patient Choice - Size and fit mask   [] Dispense:  [] Dispense:    [x] Headgear () / 1 per 3 months [] Headgear () / 1 per 3 months   [x] Replacement Nasal Cushion ()/2 per month [] Interface Replacement ()/1 per month   [] Replacement Nasal Pillows ()/2 per month         Tubing: [x] Heated ()/1 per 3 months    [] Standard ()/1 per 3 months [] Other:           Filters: [x] Non-disposable ()/1 per 6 months     [x] Ultra-Fine, Disposable ()/2 per month        Miscellaneous: [] Chin Strap ()/ 1 per 6 months [] O2 bleed-in:       LPM   [] Oximetry on CPAP/Bilevel []  Other:    [x] Modem: ()         Start Order Date: 21    MEDICAL JUSTIFICATION:  I, the undersigned, certify that the above prescribed supplies are medically necessary for this patients wellbeing. In my opinion, the supplies are both reasonable and necessary in reference to accepted standards of medicalpractice in treatment of this patients condition.     Bonita Womack MD      NPI: [id number]       Order Signed Date: 21    Electronically signed by Arleen Zaman MD on 2021 at 1:11 PM    Charmaine Rubinstein Fisher  1940  [address]  [phone] (home)   [phone] (mobile)      Insurance Info (confirm with patient if correct):  Payor/Plan Subscr  Sex Relation Sub.  Ins. ID Effective Group Num

## 2021-09-30 ENCOUNTER — OFFICE VISIT (OUTPATIENT)
Dept: SURGERY | Age: 81
End: 2021-09-30

## 2021-09-30 VITALS — BODY MASS INDEX: 37.89 KG/M2 | SYSTOLIC BLOOD PRESSURE: 126 MMHG | DIASTOLIC BLOOD PRESSURE: 60 MMHG | WEIGHT: 194 LBS

## 2021-09-30 DIAGNOSIS — C18.2 MALIGNANT NEOPLASM OF ASCENDING COLON (HCC): Primary | ICD-10-CM

## 2021-09-30 PROCEDURE — 99024 POSTOP FOLLOW-UP VISIT: CPT | Performed by: SURGERY

## 2021-09-30 NOTE — PROGRESS NOTES
Status post right colon resection in March of this year  The wound has finally healed  CAT scan from 9/16/2021 is unremarkable  She does not plan to proceed with chemotherapy  Follow-up as needed

## 2021-10-20 NOTE — PROGRESS NOTES
[facility]   Electrophysiology  Eric France, APRN-CNP  Attending EP: Dr. Quoc Springer    Date: 11/12/2021  I had the privilege of visiting Denise West in the office. Chief Complaint:   Chief Complaint   Patient presents with    Atrial Fibrillation     No complaints     Follow-up     4 Months      History of Present Illness: History obtained from patient and medical record. Denise West is 80 y.o. female with a past medical history of HTN, HLD, CKD, PAF, dCHF, and CAD.     In March of 2021, pt presented to hospital by recommendation of her PCP for anemia. Her hemoglobin was 5.8 and was she received multiple units of blood. GI completed colonoscopy/EGD with no gross bleeding found, however, she was found to have a mass in her colon. She underwent surgical bowel resection. During admission, she developed AF with RVR and was started on amiodarone.     -Interval history: Today, Denise West is being seen for routine follow up. Her daughter is present for the visit. She is doing pretty well. Pt is in sinus rhythm on EKG today. We discussed her event monitor results from a few months ago that were unremarkable for AF. Pt would like to get off amiodarone due to the long term risks. We had a long term discussion about monitoring, including loop implantation. Pt denies any further GI bleeding issues, but she is very hesitant about the idea of anti-coagulation. Her BP is elevated today, 161/71. Pt reports it runs in the 120-130s at home. Denies having chest pain, palpitations, shortness of breath, orthopnea/PND, cough, or dizziness at the time of this visit. With regard to medication therapy the patient has been compliant with prescribed regimen. They have tolerated therapy to date. Allergies:   Allergies   Allergen Reactions    Acetomenaphthone (Menadiol Diacetate) [Menadiol Sodium Diphosphate]      Upset stomach     Lisinopril-Hydrochlorothiazide Other (See Comments)     Home Medications:  Prior to Visit Medications    Medication Sig Taking? Authorizing Provider   rosuvastatin (CRESTOR) 20 MG tablet Take 1 tablet by mouth nightly Yes JIMENA Adams CNP   ferrous sulfate (IRON 325) 325 (65 Fe) MG tablet Take 325 mg by mouth 2 times daily Yes Historical Provider, MD   aspirin EC 81 MG EC tablet Take 1 tablet by mouth daily Yes Nicholas Roman MD   amiodarone (CORDARONE) 200 MG tablet Take 0.5 tablets by mouth daily Yes JIMENA Mata CNP   carvedilol (COREG) 3.125 MG tablet TAKE 1 TABLET BY MOUTH TWICE DAILY WITH MEALS Yes JIMENA Adams CNP   torsemide (DEMADEX) 20 MG tablet Take 1 tablet by mouth daily Yes Ronak Alcantar MD      Past Medical History:  Past Medical History:   Diagnosis Date    Anemia     CAD (coronary artery disease) 11/2020    Stent    CKD (chronic kidney disease)     Hyperlipidemia     Hypertension      Past Surgical History:    has a past surgical history that includes fracture surgery; Cystocopy (11/21/13); Upper gastrointestinal endoscopy (N/A, 3/5/2021); Colonoscopy (N/A, 3/5/2021); hemicolectomy (Right, 3/8/2021); and Cholecystectomy, laparoscopic (N/A, 3/8/2021). Social History:  Reviewed. reports that she has never smoked. She has never used smokeless tobacco. She reports that she does not drink alcohol and does not use drugs. Family History:  Reviewed. family history includes Heart Disease in her father.      Review of System:  · Constitutional: Negative for fever, night sweats, chills, weight changes, or weakness  · Skin: Negative for rash, dry skin, pruritus, bruising, bleeding, blood clots, or changes in skin pigment  · HEENT: Negative for vision changes, ringing in the ears, sore throat, dysphagia, or swollen lymph nodes  · Respiratory: Reviewed in HPI  · Cardiovascular: Reviewed in HPI  · Gastrointestinal: Negative for abdominal pain, N/V/D, constipation, or black/tarry stools  · Genito-Urinary: Negative for dysuria, incontinence, urgency, or hematuria  · Musculoskeletal: Negative for joint swelling, muscle pain, or injuries  · Neurological/Psych: Negative for confusion, seizures, dizziness, headaches, balance issues or TIA-like symptoms. No anxiety, depression, or insomnia    Physical Examination:  Vitals:    11/12/21 1441   BP: (!) 161/71   Pulse: 54      Wt Readings from Last 3 Encounters:   11/12/21 193 lb 6.4 oz (87.7 kg)   09/30/21 194 lb (88 kg)   08/26/21 191 lb (86.6 kg)     Constitutional: Cooperative and in no apparent distress, and appears well nourished  Skin: Warm and pink; no pallor, cyanosis, bruising, or clubbing. HEENT: Symmetric and normocephalic. PERRL, EOM intact. Conjunctiva pink with clear sclera. Mucus membranes pink and moist. Teeth intact. Thyroid smooth without nodules or goiter  Respiratory: Respirations symmetric and unlabored. Lungs clear to auscultation bilaterally, no wheezing, rhonchi, or crackles  Cardiovascular:  Regular rate and rhythm. S1/S2 present without murmurs, rubs, or gallops. Peripheral pulses 2+, capillary refill < 3 seconds. No elevation of JVP. Trace pitting edema (chronic, stable)  Gastrointestinal: Abdomen soft and round. Bowel sounds normoactive in all quadrants without tenderness or masses. Musculoskeletal: Bilateral upper and lower extremity strength 5/5 with full ROM. Neurological/Psych: Awake and orientated to person, place and time. Calm affect, appropriate mood.      Pertinent labs, diagnostic, device, and imaging results reviewed as a part of this visit    LABS    CBC:   Lab Results   Component Value Date    WBC 8.1 08/13/2021    HGB 9.0 (L) 08/13/2021    HCT 27.8 (L) 08/13/2021    MCV 84.3 08/13/2021     08/13/2021     BMP:   Lab Results   Component Value Date    CREATININE 2.1 (H) 08/13/2021    BUN 28 (H) 08/13/2021     08/13/2021    K 4.4 08/13/2021     08/13/2021    CO2 25 08/13/2021     Estimated Creatinine Clearance: 21 mL/min (A) (based on SCr of 2.1 mg/dL (H)). Thyroid: No results found for: TSH, Q1LUXBV, X0ZWINJ, THYROIDAB  Lipid Panel:   Lab Results   Component Value Date    CHOL 155 2020    HDL 38 2020    TRIG 121 2020     LFTs:  Lab Results   Component Value Date    ALT 6 (L) 2021    AST 10 (L) 2021    ALKPHOS 103 2021    BILITOT 0.4 2021     Coags:   Lab Results   Component Value Date    PROTIME 13.7 (H) 2021    INR 1.18 (H) 2021    APTT 27.8 2021       EC2021  - Sinus bradycardia with LBBB    ECG: 3/6/21  Atrial fibrillation     ECHO: 3/6/21   Normal left ventricle size, and systolic function with an estimated ejection fraction of 55-60%. Mild concentric left ventricular hypertrophy is present.   No regional wall motion abnormalities are seen. Mild tricuspid regurgitation, RVSP is estimated at 38 mmhg.     Stress Test: None     Cath: 20  Artery   Findings/Result  LM       Normal  LAD     95% mid  Cx        OM2 50%  RI         NA  RCA     20% prox, 20% mid  LVEDP            25  LVG     55%     Intervention:         PCI of LAD with 3.3D24Zfnfcm REBECA (PD with 3.75NC)     Event Monitor:   NSR. Average HR 58, low 49, high 120  No AF/AFL    Assessment:    1. Paroxysmal Atrial Fibrillation   - Currently in NSR   - Continue coreg 3.125 mg BID   - Will stop amiodarone given lack of recurrence and possible side effects      - CRF2VQ2nprm score:5 ; [id number] Vasc score and anticoagulation discussed. High risk for stroke and thromboembolism. Anticoagulation is recommended. Risk of bleeding was discussed.    ~ No AC due to anemia/GIB. Will need to consider starting if recurrent AF noted and Hgb stable (>9-10 consistently)      - Afib risk factors including age, HTN, obesity, inactivity and SAHRA were discussed with patient.  Risk factor modification recommended       - Diagnostic options including event monitor, loop recorder implant and/or EP study were discussed with patient. Risks, benefits and alternative of each treatment option were explained. All questions answered. ~ Pt wore outpatient monitor without recurrent AF/AFL. Since episodes are far in between and she is a poor candidate for anti-coagulation given her anemia, pt would benefit from implantation of loop recorder for long-term monitoring to assess for recurrence. Pt VU and would like to proceed     - If pt requires anti-coagulation and has further bleeding, we will need to consider SAIDA closure and placement of Watchman device     2. LBBB with aberrancy, Bradycardia              - Noted on prior EKG              - Currently sinus in 50's; asymptomatic   - Stop amio   - Discussed s/s of worsening AV block and need for possible PPM in future if symptomatic     3. SAHRA   - Multiple risk factors; awaiting mask   - Discussed long term risks of untreated sleep apnea    4. CAD   - Hx of PCI to LAD (11/20)   - Stable   - No complaints of angina   - On ASA, BB, statin   - Followed by Dr. Wally Landers    5. Chronic diastolic heart failure (NYHA Class II)  - Appears compensated   ~ EF 55-60% per echo (3/21)  - Continue with coreg 3.125 mg BID, torsemide 20 mg QD   ~ No ACE/ARB due to CKD  - Aggressive medical therapy with risk factor modification  - Discussed with patient importance of monitoring weight, low sodium diet and fluid restriction    6. HTN   - Stable   - Continue current medications     7. Colon Cancer              - S/p hemicolectomy surgery (3/8/21)                          ~ Invasive colonic adenocarcinoma               - Pt declined therapy              - Followed by oncology    8. Anemia              - Stable but low                          ~ 9.4/31.8 (10/21)               - No recurrent bleeding   - Instructed to have labs faxed to our office    9. CKD   - Stable    ~ Creatinine mid-high 1's   - Followed by nephrology    Plan:  1. Stop amiodarone  2.  Our  will be contacting you from [phone] to schedule your procedure  3. Call office if any problems or blood pressure consistently >140 at home    F/U: Follow-up with NPSR in 4 months  -Call Molly  at [phone] with any questions    Diet & Exercise:   The patient is counseled to follow a low salt diet to assure blood pressure remains controlled for cardiovascular risk factor modification   The patient is counseled to avoid excess caffeine, and energy drinks as this may exacerbated ectopy and arrhythmia   The patient is counseled to lose weight to control cardiovascular risk factors   Exercise program discussed: To improve overall cardiovascular health, the patient is instructed to increase cardiovascular related activities with a goal of 150 min/week of moderate level activity or 10,000 steps per day. Encouraged to perform as much activity as tolerated    I have addressed the patient's cardiac risk factors and adjusted pharmacologic treatment as needed. In addition, I have reinforced the need for patient directed risk factor modification. I independently reviewed the ECG    All questions and concerns were addressed with the patient. Alternatives to treatment were discussed. Thank you for allowing to us to participate in the care of [address] [place] [address]. Time  33 minutes spent preparing to see patient including reviewing patient history/prior tests/prior consults, performing a medical exam, counseling and educating patient/family/caregiver, ordering medications/tests/procedures, referring and communicating with PCPs and other pertinent consultants, documenting information in the EMR, independently interpreting results and communicating to family and coordination of patient care.     JIMENA Ghosh-CNP  [place]    Office: [phone]

## 2021-10-21 ENCOUNTER — TELEPHONE (OUTPATIENT)
Dept: CARDIOLOGY CLINIC | Age: 81
End: 2021-10-21

## 2021-10-21 NOTE — TELEPHONE ENCOUNTER
Event monitor worn 7/7/21 - 8/5/21 - results showed Sinus Rhythm with avg HR 58 (range 49 - 120). PVC and PAC burden 1%. No Atrial fib noted. Monitor ordered by Dr. Naoma Mortimer to assess AF burden. Patient was seen by SAIGE Schmitt NP on 7/7/21 (same day as Dr. Naoma Mortimer). Amiodarone was reduced to 100 mg daily on 7/7/21. Reviewed results with patient and she verbalized understanding. States she monitors her heart rate daily as she does not have symptoms when in AF and hear rate has been stable. She was also seen by PCP recently and was told she was in a normal rhythm.

## 2021-11-01 RX ORDER — ROSUVASTATIN CALCIUM 20 MG/1
20 TABLET, COATED ORAL NIGHTLY
Qty: 90 TABLET | Refills: 1 | Status: SHIPPED | OUTPATIENT
Start: 2021-11-01 | End: 2022-06-28

## 2021-11-12 ENCOUNTER — OFFICE VISIT (OUTPATIENT)
Dept: CARDIOLOGY CLINIC | Age: 81
End: 2021-11-12
Payer: MEDICARE

## 2021-11-12 VITALS
DIASTOLIC BLOOD PRESSURE: 71 MMHG | WEIGHT: 193.4 LBS | BODY MASS INDEX: 37.97 KG/M2 | SYSTOLIC BLOOD PRESSURE: 161 MMHG | HEART RATE: 54 BPM | HEIGHT: 60 IN

## 2021-11-12 DIAGNOSIS — D50.0 IRON DEFICIENCY ANEMIA DUE TO CHRONIC BLOOD LOSS: ICD-10-CM

## 2021-11-12 DIAGNOSIS — I48.0 PAF (PAROXYSMAL ATRIAL FIBRILLATION) (HCC): Primary | Chronic | ICD-10-CM

## 2021-11-12 DIAGNOSIS — I25.10 CORONARY ARTERY DISEASE INVOLVING NATIVE CORONARY ARTERY OF NATIVE HEART WITHOUT ANGINA PECTORIS: Chronic | ICD-10-CM

## 2021-11-12 DIAGNOSIS — I10 ESSENTIAL HYPERTENSION: Chronic | ICD-10-CM

## 2021-11-12 DIAGNOSIS — N18.31 STAGE 3A CHRONIC KIDNEY DISEASE (HCC): Chronic | ICD-10-CM

## 2021-11-12 DIAGNOSIS — I51.89 DIASTOLIC DYSFUNCTION: Chronic | ICD-10-CM

## 2021-11-12 PROCEDURE — 99214 OFFICE O/P EST MOD 30 MIN: CPT | Performed by: NURSE PRACTITIONER

## 2021-11-12 PROCEDURE — 93000 ELECTROCARDIOGRAM COMPLETE: CPT | Performed by: NURSE PRACTITIONER

## 2021-11-12 NOTE — LETTER
[facility]  EP Procedure Sheet    11/12/21  Muna Milton  1940  EP Procedures     Pacemaker implant (single/dual)  EP Study    ICD implant (single/dual)  Atrial flutter ablation (EDWIN Y/N)    Biv implant ICD  Tilt Table    Biv implant PPM  Atrial fibrillation ablation (EDWIN Yes)    Generator Change (PPM/ICD/BiV)  SVT ablation    Lead revision (RV/LA/RA) (<1 month)  VT ablation      Lead extraction +/- upgrade (BiV/PPM/ICD)  VT Ischemic/ non-ischemic   XXX Loop implant  VT RVOT    Cardioversion  VT Left sided    EDWIN  AVN ablation     Equipment     [employer]   [employer] Mapping System    [place]  [address] [address]  [employer]    [employer]  Laser Lead Extraction     EP Procedures Scheduling Request    # hours Requested   Scheduled  Date:   Specific Day  Completed    Anesthesia  F/u Date:   CT surgery backup  COVID     Overnight stay      Location [facility] - [facility] or [facility]       Pre-Procedure Labs / Imaging     PT/INR  Type & cross    CBC  Units PRBC    BMP/Mg  Units FFP    Venogram  Cardiac CTA for Pulmonary vein mapping     RN INITIALS:     Patient Instructions  Do not eat or drink after midnight the night prior to procedure  Dx:PAF

## 2021-12-13 ENCOUNTER — HOSPITAL ENCOUNTER (OUTPATIENT)
Dept: CARDIAC CATH/INVASIVE PROCEDURES | Age: 81
Discharge: HOME | End: 2021-12-13
Attending: INTERNAL MEDICINE | Admitting: INTERNAL MEDICINE
Payer: MEDICARE

## 2021-12-13 ENCOUNTER — TELEPHONE (OUTPATIENT)
Dept: CARDIOLOGY CLINIC | Age: 81
End: 2021-12-13

## 2021-12-13 VITALS
WEIGHT: 197 LBS | RESPIRATION RATE: 18 BRPM | HEART RATE: 60 BPM | DIASTOLIC BLOOD PRESSURE: 52 MMHG | SYSTOLIC BLOOD PRESSURE: 138 MMHG | HEIGHT: 60 IN | BODY MASS INDEX: 38.68 KG/M2

## 2021-12-13 DIAGNOSIS — Z45.09 ENCOUNTER FOR LOOP RECORDER CHECK: Primary | ICD-10-CM

## 2021-12-13 PROCEDURE — 33285 INSJ SUBQ CAR RHYTHM MNTR: CPT | Performed by: INTERNAL MEDICINE

## 2021-12-13 PROCEDURE — C1764 EVENT RECORDER, CARDIAC: HCPCS

## 2021-12-13 PROCEDURE — 33285 INSJ SUBQ CAR RHYTHM MNTR: CPT

## 2021-12-13 RX ORDER — SODIUM CHLORIDE 9 MG/ML
INJECTION, SOLUTION INTRAVENOUS CONTINUOUS
Status: DISCONTINUED | OUTPATIENT
Start: 2021-12-13 | End: 2021-12-13 | Stop reason: HOSPADM

## 2021-12-13 RX ORDER — SODIUM CHLORIDE 0.9 % (FLUSH) 0.9 %
5-40 SYRINGE (ML) INJECTION PRN
Status: DISCONTINUED | OUTPATIENT
Start: 2021-12-13 | End: 2021-12-13 | Stop reason: HOSPADM

## 2021-12-13 NOTE — H&P
[address]   Electrophysiology    Date: 12/13/2021  I had the privilege of visiting Sofia Scott in the office. History of Present Illness: History obtained from patient and medical record. Sofia Scott is 80 y.o. female with a past medical history of HTN, HLD, CKD, PAF, dCHF, and CAD.     In March of 2021, pt presented to hospital by recommendation of her PCP for anemia. Her hemoglobin was 5.8 and was she received multiple units of blood. GI completed colonoscopy/EGD with no gross bleeding found, however, she was found to have a mass in her colon. She underwent surgical bowel resection. During admission, she developed AF with RVR and was started on amiodarone. Her daughter is present for the visit. She is doing pretty well. Pt is in sinus rhythm on EKG today. We discussed her event monitor results from a few months ago that were unremarkable for AF. Pt would like to get off amiodarone due to the long term risks. We had a long term discussion about monitoring, including loop implantation. Pt denies any further GI bleeding issues, but she is very hesitant about the idea of anti-coagulation. Her BP is elevated today, 161/71. Pt reports it runs in the 120-130s at home. Denies having chest pain, palpitations, shortness of breath, orthopnea/PND, cough, or dizziness at the time of this visit. With regard to medication therapy the patient has been compliant with prescribed regimen. They have tolerated therapy to date. Allergies   Allergen Reactions    Acetomenaphthone (Menadiol Diacetate) [Menadiol Sodium Diphosphate]      Upset stomach     Lisinopril-Hydrochlorothiazide Other (See Comments)     Home Medications:  Prior to Visit Medications    Medication Sig Taking?  Authorizing Provider   rosuvastatin (CRESTOR) 20 MG tablet Take 1 tablet by mouth nightly Yes Ingrid Flank, APRN - CNP   ferrous sulfate (IRON 325) 325 (65 Fe) MG tablet Take 325 mg by mouth 2 times daily Yes Historical Provider, MD   aspirin EC 81 MG EC tablet Take 1 tablet by mouth daily Yes Toma Vila MD   carvedilol (COREG) 3.125 MG tablet TAKE 1 TABLET BY MOUTH TWICE DAILY WITH MEALS Yes JIMENA Browne CNP   torsemide (DEMADEX) 20 MG tablet Take 1 tablet by mouth daily Yes Lance Wiseman MD      Past Medical History:  Past Medical History:   Diagnosis Date    Anemia     CAD (coronary artery disease) 11/2020    Stent    CKD (chronic kidney disease)     Hyperlipidemia     Hypertension      Past Surgical History:    has a past surgical history that includes fracture surgery; Cystocopy (11/21/13); Upper gastrointestinal endoscopy (N/A, 3/5/2021); Colonoscopy (N/A, 3/5/2021); hemicolectomy (Right, 3/8/2021); and Cholecystectomy, laparoscopic (N/A, 3/8/2021). Social History:  Reviewed. reports that she has never smoked. She has never used smokeless tobacco. She reports that she does not drink alcohol and does not use drugs. Family History:  Reviewed. family history includes Heart Disease in her father. Review of System:  · Constitutional: Negative for fever, night sweats, chills, weight changes, or weakness  · Skin: Negative for rash, dry skin, pruritus, bruising, bleeding, blood clots, or changes in skin pigment  · HEENT: Negative for vision changes, ringing in the ears, sore throat, dysphagia, or swollen lymph nodes  · Respiratory: Reviewed in HPI  · Cardiovascular: Reviewed in HPI  · Gastrointestinal: Negative for abdominal pain, N/V/D, constipation, or black/tarry stools  · Genito-Urinary: Negative for dysuria, incontinence, urgency, or hematuria  · Musculoskeletal: Negative for joint swelling, muscle pain, or injuries  · Neurological/Psych: Negative for confusion, seizures, dizziness, headaches, balance issues or TIA-like symptoms.  No anxiety, depression, or insomnia    Physical Examination:  Vitals:    12/13/21 0830   BP: (!) 138/52   Pulse: 60   Resp: 18      Wt Readings from Last 3 Encounters:   12/13/21 197 lb (89.4 kg)   11/12/21 193 lb 6.4 oz (87.7 kg)   09/30/21 194 lb (88 kg)     Constitutional: Cooperative and in no apparent distress, and appears well nourished  Skin: Warm and pink; no pallor, cyanosis, bruising, or clubbing. HEENT: Symmetric and normocephalic. PERRL, EOM intact. Conjunctiva pink with clear sclera. Mucus membranes pink and moist. Teeth intact. Thyroid smooth without nodules or goiter  Respiratory: Respirations symmetric and unlabored. Lungs clear to auscultation bilaterally, no wheezing, rhonchi, or crackles  Cardiovascular:  Regular rate and rhythm. S1/S2 present without murmurs, rubs, or gallops. Peripheral pulses 2+, capillary refill < 3 seconds. No elevation of JVP. Trace pitting edema (chronic, stable)  Gastrointestinal: Abdomen soft and round. Bowel sounds normoactive in all quadrants without tenderness or masses. Musculoskeletal: Bilateral upper and lower extremity strength 5/5 with full ROM. Neurological/Psych: Awake and orientated to person, place and time. Calm affect, appropriate mood. Pertinent labs, diagnostic, device, and imaging results reviewed as a part of this visit    LABS    CBC:   Lab Results   Component Value Date    WBC 8.1 08/13/2021    HGB 9.0 (L) 08/13/2021    HCT 27.8 (L) 08/13/2021    MCV 84.3 08/13/2021     08/13/2021     BMP:   Lab Results   Component Value Date    CREATININE 2.1 (H) 08/13/2021    BUN 28 (H) 08/13/2021     08/13/2021    K 4.4 08/13/2021     08/13/2021    CO2 25 08/13/2021     CrCl cannot be calculated (Patient's most recent lab result is older than the maximum 120 days allowed. ).      Thyroid: No results found for: TSH, J7XTQCL, T9KJUNF, THYROIDAB  Lipid Panel:   Lab Results   Component Value Date    CHOL 155 11/21/2020    HDL 38 11/21/2020    TRIG 121 11/21/2020     LFTs:  Lab Results   Component Value Date    ALT 6 (L) 03/31/2021    AST 10 (L) 03/31/2021    ALKPHOS 103 03/31/2021    BILITOT 0.4 2021     Coags:   Lab Results   Component Value Date    PROTIME 13.7 (H) 2021    INR 1.18 (H) 2021    APTT 27.8 2021       EC2021  - Sinus bradycardia with LBBB    ECG: 3/6/21  Atrial fibrillation     ECHO: 3/6/21   Normal left ventricle size, and systolic function with an estimated ejection fraction of 55-60%. Mild concentric left ventricular hypertrophy is present.   No regional wall motion abnormalities are seen. Mild tricuspid regurgitation, RVSP is estimated at 38 mmhg.     Stress Test: None     Cath: 20  Artery   Findings/Result  LM       Normal  LAD     95% mid  Cx        OM2 50%  RI         NA  RCA     20% prox, 20% mid  LVEDP            25  LVG     55%     Intervention:         PCI of LAD with 3.3L05Voalfd REBECA (PD with 3.75NC)     Event Monitor:   NSR. Average HR 58, low 49, high 120  No AF/AFL    Assessment:    1. Paroxysmal Atrial Fibrillation   - Currently in NSR   - Continue coreg 3.125 mg BID   - Will stop amiodarone given lack of recurrence and possible side effects      - [id number] score:5 ; [id number] Vasc score and anticoagulation discussed. High risk for stroke and thromboembolism. Anticoagulation is recommended. Risk of bleeding was discussed.    ~ No AC due to anemia/GIB. Will need to consider starting if recurrent AF noted and Hgb stable (>9-10 consistently)      - Afib risk factors including age, HTN, obesity, inactivity and SAHRA were discussed with patient. Risk factor modification recommended       - Diagnostic options including event monitor, loop recorder implant and/or EP study were discussed with patient. Risks, benefits and alternative of each treatment option were explained. All questions answered. ~ Pt wore outpatient monitor without recurrent AF/AFL.  Since episodes are far in between and she is a poor candidate for anti-coagulation given her anemia, pt would benefit from implantation of loop recorder for long-term monitoring to assess for recurrence. Pt VU and would like to proceed     - If pt requires anti-coagulation and has further bleeding, we will need to consider SAIDA closure and placement of Watchman device     2. LBBB with aberrancy, Bradycardia              - Noted on prior EKG              - Currently sinus in 50's; asymptomatic   - Stop amio   - Discussed s/s of worsening AV block and need for possible PPM in future if symptomatic     3. SAHRA   - Multiple risk factors; awaiting mask   - Discussed long term risks of untreated sleep apnea    4. CAD   - Hx of PCI to LAD (11/20)   - Stable   - No complaints of angina   - On ASA, BB, statin   - Followed by Dr. Mago Lakhani    5. Chronic diastolic heart failure (NYHA Class II)  - Appears compensated   ~ EF 55-60% per echo (3/21)  - Continue with coreg 3.125 mg BID, torsemide 20 mg QD   ~ No ACE/ARB due to CKD  - Aggressive medical therapy with risk factor modification  - Discussed with patient importance of monitoring weight, low sodium diet and fluid restriction    6. HTN   - Stable   - Continue current medications     7. Colon Cancer              - S/p hemicolectomy surgery (3/8/21)                          ~ Invasive colonic adenocarcinoma               - Pt declined therapy              - Followed by oncology    8. Anemia              - Stable but low                          ~ 9.4/31.8 (10/21)               - No recurrent bleeding   - Instructed to have labs faxed to our office    9. CKD   - Stable    ~ Creatinine mid-high 1's   - Followed by nephrology    Plan:  1. Stop amiodarone  2. Our  will be contacting you from [phone] to schedule your procedure  3.  Call office if any problems or blood pressure consistently >140 at home    F/U: Follow-up with NPSR in 4 months  -Call [facility] at [phone] with any questions    Diet & Exercise:   The patient is counseled to follow a low salt diet to assure blood pressure remains controlled for cardiovascular risk factor modification   The patient is counseled to avoid excess caffeine, and energy drinks as this may exacerbated ectopy and arrhythmia   The patient is counseled to lose weight to control cardiovascular risk factors   Exercise program discussed: To improve overall cardiovascular health, the patient is instructed to increase cardiovascular related activities with a goal of 150 min/week of moderate level activity or 10,000 steps per day. Encouraged to perform as much activity as tolerated    I have addressed the patient's cardiac risk factors and adjusted pharmacologic treatment as needed. In addition, I have reinforced the need for patient directed risk factor modification. I independently reviewed the ECG    All questions and concerns were addressed with the patient. Alternatives to treatment were discussed. Thank you for allowing to us to participate in the care of [address] [place] [address]. H&P Update    I have reviewed the history and physical and examined the patient and updated with relevant changes. Consent: I have discussed with the patient and/or the patient representative the indication, alternatives, and the possible risks and/or complications of the planned procedure and the anesthesia methods. The patient and/or patient representative appear to understand and agree to proceed. Vitals:    12/13/21 0830   BP: (!) 138/52   Pulse: 60   Resp: 18     Prior to Admission medications    Medication Sig Start Date End Date Taking?  Authorizing Provider   rosuvastatin (CRESTOR) 20 MG tablet Take 1 tablet by mouth nightly 11/1/21  Yes JIMENA Pandey CNP   ferrous sulfate (IRON 325) 325 (65 Fe) MG tablet Take 325 mg by mouth 2 times daily 4/26/21  Yes Historical Provider, MD   aspirin EC 81 MG EC tablet Take 1 tablet by mouth daily 7/7/21  Yes Verito Landry MD   carvedilol (COREG) 3.125 MG tablet TAKE 1 TABLET BY MOUTH TWICE DAILY WITH MEALS 4/12/21  Yes JIMENA Pandey CNP   torsemide

## 2021-12-13 NOTE — TELEPHONE ENCOUNTER
Pt needs one week post op device ck, there is no scheduled what date and time can the pt get seen?     Please advise thank you

## 2021-12-13 NOTE — PROCEDURES
[facility]     Electrophysiology Procedure Note       Date of Procedure: 12/13/2021  Patient's Name: Fidelina Garrison  YOB: 1940   Medical Record Number: [id number]  Procedure Performed by: Viva Osgood, MD    Procedures performed:  · Loop recorder implantation    Indication of the procedure: atrial fibrillation management     Details of procedure:   Procedure's risks, benefits and alternatives of procedure were explained to patient. All questions were answered. Patient understood and informed consent was obtained. The patient was brought to the electrophysiology lab in a fasting nonsedated state. Patient was prepped and draped in usual sterile fashion. After injection of 2% lidocaine in the left 4th intercostal area, a 5 mm small incision was made. Using ILR insertion device, a small subcutaneous tunnel was created and the loop recorder was inserted under skin. Dermabond was used. The skin was covered with Steri-Strips. Device information:   The device is Reveal LINQ  SN# [id number] Med[employer] Implant date: 12/13/2021    Plan:   The patient will have usual post-implant care. Patient can be discharged home if remains stable with follow up in arrhythmia clinic.

## 2021-12-17 RX ORDER — ROSUVASTATIN CALCIUM 20 MG/1
20 TABLET, COATED ORAL NIGHTLY
Qty: 30 TABLET | Refills: 0 | OUTPATIENT
Start: 2021-12-17

## 2021-12-23 ENCOUNTER — NURSE ONLY (OUTPATIENT)
Dept: CARDIOLOGY CLINIC | Age: 81
End: 2021-12-23

## 2021-12-23 DIAGNOSIS — Z45.09 ENCOUNTER FOR LOOP RECORDER CHECK: Primary | ICD-10-CM

## 2021-12-23 NOTE — PROGRESS NOTES
Patient was evaluated by SAIGE Varghese NP for wound check post ILR implant. IR site is stable with no evidence of hematoma, oozing, erythema, or swelling. Steri strips in place. Patient instructed to begin washing site with soap and water daily and to remove steri strips once they become loosened with daily washing. Also instructed to call the office for any signs of drainage, erythema, swelling once steri strips are removed. Patient voiced understanding of instructions given.

## 2022-01-18 ENCOUNTER — HOSPITAL ENCOUNTER (OUTPATIENT)
Age: 82
Discharge: HOME | End: 2022-01-18
Payer: MEDICARE

## 2022-01-18 LAB
ALBUMIN SERPL-MCNC: 3.7 G/DL (ref 3.4–5)
ANION GAP SERPL CALCULATED.3IONS-SCNC: 12 MMOL/L (ref 3–16)
BUN BLDV-MCNC: 35 MG/DL (ref 7–20)
CALCIUM SERPL-MCNC: 8.7 MG/DL (ref 8.3–10.6)
CHLORIDE BLD-SCNC: 104 MMOL/L (ref 99–110)
CO2: 25 MMOL/L (ref 21–32)
CREAT SERPL-MCNC: 2.1 MG/DL (ref 0.6–1.2)
FERRITIN: 132.1 NG/ML (ref 15–150)
GFR AFRICAN AMERICAN: 27
GFR NON-AFRICAN AMERICAN: 23
GLUCOSE BLD-MCNC: 97 MG/DL (ref 70–99)
HCT VFR BLD CALC: 30.5 % (ref 36–48)
HEMOGLOBIN: 9.7 G/DL (ref 12–16)
IRON SATURATION: 20 % (ref 15–50)
IRON: 58 UG/DL (ref 37–145)
MCH RBC QN AUTO: 26.3 PG (ref 26–34)
MCHC RBC AUTO-ENTMCNC: 31.8 G/DL (ref 31–36)
MCV RBC AUTO: 82.6 FL (ref 80–100)
PDW BLD-RTO: 15.9 % (ref 12.4–15.4)
PHOSPHORUS: 4.2 MG/DL (ref 2.5–4.9)
PLATELET # BLD: 165 K/UL (ref 135–450)
PMV BLD AUTO: 8.9 FL (ref 5–10.5)
POTASSIUM SERPL-SCNC: 4.4 MMOL/L (ref 3.5–5.1)
RBC # BLD: 3.69 M/UL (ref 4–5.2)
SODIUM BLD-SCNC: 141 MMOL/L (ref 136–145)
TOTAL IRON BINDING CAPACITY: 293 UG/DL (ref 260–445)
WBC # BLD: 5.6 K/UL (ref 4–11)

## 2022-01-18 PROCEDURE — 36415 COLL VENOUS BLD VENIPUNCTURE: CPT

## 2022-01-18 PROCEDURE — 85027 COMPLETE CBC AUTOMATED: CPT

## 2022-01-18 PROCEDURE — 80069 RENAL FUNCTION PANEL: CPT

## 2022-01-18 PROCEDURE — 83550 IRON BINDING TEST: CPT

## 2022-01-18 PROCEDURE — 83540 ASSAY OF IRON: CPT

## 2022-01-18 PROCEDURE — 82728 ASSAY OF FERRITIN: CPT

## 2022-01-19 ENCOUNTER — TELEPHONE (OUTPATIENT)
Dept: PULMONOLOGY | Age: 82
End: 2022-01-19

## 2022-01-19 NOTE — TELEPHONE ENCOUNTER
Called patient to reschedule her [facility] appointment. Patient said she just can't use the machine because she can't stand the air blowing up her nose and it dries her mouth out. She said she only used it for about an hour. Are there any adjustments that can be made to the patient's pressures? Please call patient at [phone].

## 2022-01-20 NOTE — PROGRESS NOTES
Trinh Kim         : 1940 [facility]    Diagnosis: [x] SAHRA (G47.33) [] CSA (G47.31) [] Apnea (G47.30)   Length of Need: [x] 12 Months [] 99 Months [] Other:    Machine (NEMO!): [] Respironics Dream Station   2   Auto [] ResMed AirSense     Auto S11 [x] Other:Ramp changes and maximize humidifier settings     []  CPAP () [] Bilevel ()   Mode: [] Auto [] Spontaneous    Mode: [] Auto [] Spontaneous            Comfort Settings:   - Ramp Pressure: 4 cmH2O                                        - Ramp time: 45 min                                     -  Flex/EPR - 3 full time                                    - For ResMed Bilevel (TiMax-4 sec   TiMin- 0.2 sec)     Humidifier: [] Heated ()        [] Water chamber replacement ()/ 1 per 6 months        Mask:   [] Nasal () /1 per 3 months [] Full Face () /1 per 3 months   [] Patient choice -Size and fit mask [] Patient Choice - Size and fit mask   [] Dispense:  [] Dispense:    [] Headgear () / 1 per 3 months [] Headgear () / 1 per 3 months   [] Replacement Nasal Cushion ()/2 per month [] Interface Replacement ()/1 per month   [] Replacement Nasal Pillows ()/2 per month         Tubing: [] Heated ()/1 per 3 months    [] Standard ()/1 per 3 months [] Other:           Filters: [] Non-disposable ()/1 per 6 months     [] Ultra-Fine, Disposable ()/2 per month        Miscellaneous: [] Chin Strap ()/ 1 per 6 months [] O2 bleed-in:       LPM   [] Oximetry on CPAP/Bilevel [x]  Other: Maximize humidifier settings   [] Modem: ()         Start Order Date: 22    MEDICAL JUSTIFICATION:  I, the undersigned, certify that the above prescribed supplies are medically necessary for this patients wellbeing. In my opinion, the supplies are both reasonable and necessary in reference to accepted standards of medicalpractice in treatment of this patients condition.     Millie Landis MD      NPI: [id number]       Order Signed Date: 22    Electronically signed by Golden George MD on 2022 at 9:03 AM    Jackie Older  1940  [address] [place] [id number] [phone] (home)   [phone] (mobile)      Insurance Info (confirm with patient if correct):  Payor/Plan Subscr  Sex Relation Sub.  Ins. ID Effective Group Num

## 2022-01-29 PROCEDURE — G2066 INTER DEVC REMOTE 30D: HCPCS | Performed by: INTERNAL MEDICINE

## 2022-01-29 PROCEDURE — 93298 REM INTERROG DEV EVAL SCRMS: CPT | Performed by: INTERNAL MEDICINE

## 2022-01-31 ENCOUNTER — NURSE ONLY (OUTPATIENT)
Dept: CARDIOLOGY CLINIC | Age: 82
End: 2022-01-31
Payer: MEDICARE

## 2022-01-31 DIAGNOSIS — I48.0 PAF (PAROXYSMAL ATRIAL FIBRILLATION) (HCC): Chronic | ICD-10-CM

## 2022-01-31 DIAGNOSIS — Z45.09 ENCOUNTER FOR LOOP RECORDER CHECK: ICD-10-CM

## 2022-01-31 NOTE — PROGRESS NOTES
We received a remote transmission from patient's monitor at home. Remote Linq report shows no arrhythmias. Tachy, pauses and AF not real. Noted under and over sensing. EP physician to review. We will continue to monitor remotely.

## 2022-03-07 ENCOUNTER — NURSE ONLY (OUTPATIENT)
Dept: CARDIOLOGY CLINIC | Age: 82
End: 2022-03-07
Payer: MEDICARE

## 2022-03-07 DIAGNOSIS — Z45.09 ENCOUNTER FOR LOOP RECORDER CHECK: ICD-10-CM

## 2022-03-07 DIAGNOSIS — I48.0 PAF (PAROXYSMAL ATRIAL FIBRILLATION) (HCC): Chronic | ICD-10-CM

## 2022-03-08 PROCEDURE — G2066 INTER DEVC REMOTE 30D: HCPCS | Performed by: INTERNAL MEDICINE

## 2022-03-08 PROCEDURE — 93298 REM INTERROG DEV EVAL SCRMS: CPT | Performed by: INTERNAL MEDICINE

## 2022-03-10 ENCOUNTER — TELEPHONE (OUTPATIENT)
Dept: CARDIOLOGY CLINIC | Age: 82
End: 2022-03-10

## 2022-03-10 PROBLEM — K63.0 ABSCESS OF INTESTINE: Status: RESOLVED | Noted: 2021-03-30 | Resolved: 2022-03-10

## 2022-03-10 NOTE — PROGRESS NOTES
[facility]   Electrophysiology  Madhu Felix, APRN-CNP  Attending EP: Dr. Hailee Anthony    Date: 3/11/2022  I had the privilege of visiting Scarlet Ritter in the office. Chief Complaint:   Chief Complaint   Patient presents with    Atrial Fibrillation     History of Present Illness: History obtained from patient and medical record. Scarlet Ritter is 80 y.o. female with a past medical history of HTN, HLD, CKD, PAF, dCHF, and CAD.     In March of 2021, pt presented to hospital by recommendation of her PCP for anemia. Her hemoglobin was 5.8 and was she received multiple units of blood. GI completed colonoscopy/EGD with no gross bleeding found, however, she was found to have a mass in her colon. She underwent surgical bowel resection. During admission, she developed AF with RVR and was started on amiodarone.     -Interval history: Today, Scarlet Ritter is being seen for follow up. Her daughter is present for the visit. Her loop interrogation on 3/10 showed possible atrial fibrillation and she was asked to come in for visit to discuss. She is in sinus rhythm with 1st degree on EKG today. Pt states she can tell when she is in atrial fibrillation as she will feel \"off\" and can feel the irregularity in her heart beat. She is not interested in starting anti-coagulation unless definitive atrial fibrillation is documented. Her BP is mildly elevated today, 144/72. She just took her medications prior to the visit and states her BP runs in the 120-130s at home. She has been doing well and had a scan a few months ago that showed her cancer was still in remission. Pt reports she feels the best she has in years. They are planning on going on a trip to [place] soon. Denies having chest pain, palpitations, shortness of breath, orthopnea/PND, cough, or dizziness at the time of this visit. With regard to medication therapy the patient has been compliant with prescribed regimen. They have tolerated therapy to date. Allergies: Allergies   Allergen Reactions    Acetomenaphthone (Menadiol Diacetate) [Menadiol Sodium Diphosphate]      Upset stomach     Lisinopril-Hydrochlorothiazide Other (See Comments)     Home Medications:  Prior to Visit Medications    Medication Sig Taking? Authorizing Provider   rosuvastatin (CRESTOR) 20 MG tablet Take 1 tablet by mouth nightly Yes JIMENA Bautista CNP   ferrous sulfate (IRON 325) 325 (65 Fe) MG tablet Take 325 mg by mouth 2 times daily Yes Historical Provider, MD   aspirin EC 81 MG EC tablet Take 1 tablet by mouth daily Yes Itz Corrales MD   carvedilol (COREG) 3.125 MG tablet TAKE 1 TABLET BY MOUTH TWICE DAILY WITH MEALS Yes JIMENA Bautista - CNP   torsemide (DEMADEX) 20 MG tablet Take 1 tablet by mouth daily Yes Jesus Galo MD      Past Medical History:  Past Medical History:   Diagnosis Date    Anemia     CAD (coronary artery disease) 11/2020    Stent    CKD (chronic kidney disease)     Hyperlipidemia     Hypertension      Past Surgical History:    has a past surgical history that includes fracture surgery; Cystocopy (11/21/13); Upper gastrointestinal endoscopy (N/A, 3/5/2021); Colonoscopy (N/A, 3/5/2021); hemicolectomy (Right, 3/8/2021); and Cholecystectomy, laparoscopic (N/A, 3/8/2021). Social History:  Reviewed. reports that she has never smoked. She has never used smokeless tobacco. She reports that she does not drink alcohol and does not use drugs. Family History:  Reviewed. family history includes Heart Disease in her father.      Review of System:  · Constitutional: Negative for fever, night sweats, chills, weight changes, or weakness  · Skin: Negative for rash, dry skin, pruritus, bruising, bleeding, blood clots, or changes in skin pigment  · HEENT: Negative for vision changes, ringing in the ears, sore throat, dysphagia, or swollen lymph nodes  · Respiratory: Reviewed in HPI  · Cardiovascular: Reviewed in HPI  · Gastrointestinal: Negative for abdominal pain, N/V/D, constipation, or black/tarry stools  · Genito-Urinary: Negative for dysuria, incontinence, urgency, or hematuria  · Musculoskeletal: Negative for joint swelling, muscle pain, or injuries  · Neurological/Psych: Negative for confusion, seizures, dizziness, headaches, balance issues or TIA-like symptoms. No anxiety, depression, or insomnia    Physical Examination:  Vitals:    03/11/22 0807   BP: (!) 144/72   Pulse: 69      Wt Readings from Last 3 Encounters:   03/11/22 207 lb (93.9 kg)   12/13/21 197 lb (89.4 kg)   11/12/21 193 lb 6.4 oz (87.7 kg)     Constitutional: Cooperative and in no apparent distress, and appears well nourished  Skin: Warm and pink; no pallor, cyanosis, bruising, or clubbing. HEENT: Symmetric and normocephalic. PERRL, EOM intact. Conjunctiva pink with clear sclera. Mucus membranes pink and moist. Teeth intact. Thyroid smooth without nodules or goiter  Respiratory: Respirations symmetric and unlabored. Lungs clear to auscultation bilaterally, no wheezing, rhonchi, or crackles  Cardiovascular:  Regular rate and rhythm. S1/S2 present without murmurs, rubs, or gallops. Peripheral pulses 2+, capillary refill < 3 seconds. No elevation of JVP. Trace pitting edema (chronic, stable)  Gastrointestinal: Abdomen soft and round. Bowel sounds normoactive in all quadrants without tenderness or masses. Musculoskeletal: Bilateral upper and lower extremity strength 5/5 with full ROM. Neurological/Psych: Awake and orientated to person, place and time. Calm affect, appropriate mood.      Pertinent labs, diagnostic, device, and imaging results reviewed as a part of this visit    LABS    CBC:   Lab Results   Component Value Date    WBC 5.6 01/18/2022    HGB 9.7 (L) 01/18/2022    HCT 30.5 (L) 01/18/2022    MCV 82.6 01/18/2022     01/18/2022     BMP:   Lab Results   Component Value Date    CREATININE 2.1 (H) 01/18/2022    BUN 35 (H) 01/18/2022     01/18/2022    K 4.4 01/18/2022     01/18/2022    CO2 25 01/18/2022     Estimated Creatinine Clearance: 22 mL/min (A) (based on SCr of 2.1 mg/dL (H)). Thyroid: No results found for: TSH, Z7ZESLU, W1MONVA, THYROIDAB  Lipid Panel:   Lab Results   Component Value Date    CHOL 155 11/21/2020    HDL 38 11/21/2020    TRIG 121 11/21/2020     LFTs:  Lab Results   Component Value Date    ALT 6 (L) 03/31/2021    AST 10 (L) 03/31/2021    ALKPHOS 103 03/31/2021    BILITOT 0.4 03/31/2021     Coags:   Lab Results   Component Value Date    PROTIME 13.7 (H) 03/30/2021    INR 1.18 (H) 03/30/2021    APTT 27.8 03/30/2021       ECG: 3/11/2022  - NSR with 1st degree AVB and LBBB. Rate 71, , QTc 469    ECG: 3/6/21  Atrial fibrillation     ECHO: 3/6/21   Normal left ventricle size, and systolic function with an estimated ejection fraction of 55-60%. Mild concentric left ventricular hypertrophy is present.   No regional wall motion abnormalities are seen. Mild tricuspid regurgitation, RVSP is estimated at 38 mmhg.     Stress Test: None     Cath: 11/20/20  Artery   Findings/Result  LM       Normal  LAD     95% mid  Cx        OM2 50%  RI         NA  RCA     20% prox, 20% mid  LVEDP            25  LVG     55%     Intervention:         PCI of LAD with [id number] REBECA (PD with 3.75NC)     Event Monitor: 7/21  NSR. Average HR 58, low 49, high 120  No AF/AFL    Assessment:    1. Paroxysmal Atrial Fibrillation   - Currently in NSR   - Continue coreg 3.125 mg BID    ~ Previously on amiodarone, but stopped due to lack of recurrence and pt concern for possible side effects      - [id number] score:5 ; [id number] Vasc score and anticoagulation discussed. High risk for stroke and thromboembolism. Anticoagulation is recommended. Risk of bleeding was discussed.    ~ No AC due to anemia/GIB.  Will need to consider starting if conclusive recurrent AF noted on loop and Hgb stable (>9)      - Afib risk factors including age, HTN, obesity, inactivity and SAHRA were discussed with patient. Risk factor modification recommended     - If pt requires anti-coagulation and has further bleeding once on anti-coagulation, we will need to consider SAIDA closure and placement of Watchman device     2. LBBB with aberrancy, Bradycardia              - Noted on prior EKG              - Stable   - Discussed s/s of worsening AV block and need for possible PPM in future if symptomatic     3. Implantable Loop Recorder  - S/p ILR insertion on 12/21  -The CIED was interrogated and programmed and I supervised and reviewed all the data. All findings and changes are in device interrogation sheat and reflect my personal interpretation and changes and is scanned to Epic  - Device shows: Episode on 3/10 likely sinus with PAC, not atrial fibrillation  - Follow up with device clinic as scheduled    4. SAHRA   - Stable, on CPAP   - Discussed long term risks of untreated sleep apnea    5. CAD   - Hx of PCI to LAD (11/20)   - Stable   - No complaints of angina   - On ASA, BB, statin   - Followed by Dr. Kurt Devlin    6. Chronic diastolic heart failure (NYHA Class II)  - Appears compensated   ~ EF 55-60% per echo (3/21)  - Continue with coreg 3.125 mg BID, torsemide 20 mg QD   ~ No ACE/ARB due to CKD  - Aggressive medical therapy with risk factor modification  - Discussed with patient importance of monitoring weight, low sodium diet and fluid restriction    7. HTN   - Stable   - Continue current medications     8. Colon Cancer              - S/p hemicolectomy surgery (3/8/21)                          ~ Invasive colonic adenocarcinoma               - Pt declined therapy              - Followed by oncology    9. Anemia              - Stable but low                          ~ 9.4/31.8 (10/21)               - No recurrent bleeding   - Instructed to have labs faxed to our office    10. CKD   - Stable    ~ Creatinine mid-high 1's   - Followed by nephrology    Plan:  1. No changes  2. Device check every month  3.  Call office if any issues    F/U: Follow-up with NPTS in July as scheduled and EP in 6 months  -Call Molly  at [phone] with any questions    Diet & Exercise:   The patient is counseled to follow a low salt diet to assure blood pressure remains controlled for cardiovascular risk factor modification   The patient is counseled to avoid excess caffeine, and energy drinks as this may exacerbated ectopy and arrhythmia   The patient is counseled to lose weight to control cardiovascular risk factors   Exercise program discussed: To improve overall cardiovascular health, the patient is instructed to increase cardiovascular related activities with a goal of 150 min/week of moderate level activity or 10,000 steps per day. Encouraged to perform as much activity as tolerated    I have addressed the patient's cardiac risk factors and adjusted pharmacologic treatment as needed. In addition, I have reinforced the need for patient directed risk factor modification. I independently reviewed the ECG and device interrogation    All questions and concerns were addressed with the patient. Alternatives to treatment were discussed. Thank you for allowing to us to participate in the care of [address] [place] [address]. Time  32 minutes spent preparing to see patient including reviewing patient history/prior tests/prior consults, performing a medical exam, counseling and educating patient/family/caregiver, ordering medications/tests/procedures, referring and communicating with PCPs and other pertinent consultants, documenting information in the EMR, independently interpreting results and communicating to family and coordination of patient care.     JIMENA Cruz-CNP  [place]    Office: [phone]

## 2022-03-10 NOTE — TELEPHONE ENCOUNTER
Pt called back asking if she can some in at 8 am tomorrow morning because her daughter brings her and her daughter has another appt appt at that time.     Pls advise thank you     Juan Owens  [phone]

## 2022-03-11 ENCOUNTER — OFFICE VISIT (OUTPATIENT)
Dept: CARDIOLOGY CLINIC | Age: 82
End: 2022-03-11
Payer: MEDICARE

## 2022-03-11 VITALS
BODY MASS INDEX: 40.64 KG/M2 | HEIGHT: 60 IN | SYSTOLIC BLOOD PRESSURE: 144 MMHG | DIASTOLIC BLOOD PRESSURE: 72 MMHG | HEART RATE: 69 BPM | WEIGHT: 207 LBS

## 2022-03-11 DIAGNOSIS — I48.0 PAF (PAROXYSMAL ATRIAL FIBRILLATION) (HCC): Primary | ICD-10-CM

## 2022-03-11 PROCEDURE — 93000 ELECTROCARDIOGRAM COMPLETE: CPT | Performed by: NURSE PRACTITIONER

## 2022-03-11 PROCEDURE — 99214 OFFICE O/P EST MOD 30 MIN: CPT | Performed by: NURSE PRACTITIONER

## 2022-04-02 ENCOUNTER — HOSPITAL ENCOUNTER (OUTPATIENT)
Age: 82
Discharge: HOME | End: 2022-04-02
Payer: MEDICARE

## 2022-04-02 LAB
ALBUMIN SERPL-MCNC: 3.6 G/DL (ref 3.4–5)
ANION GAP SERPL CALCULATED.3IONS-SCNC: 15 MMOL/L (ref 3–16)
BUN BLDV-MCNC: 45 MG/DL (ref 7–20)
CALCIUM SERPL-MCNC: 9 MG/DL (ref 8.3–10.6)
CHLORIDE BLD-SCNC: 105 MMOL/L (ref 99–110)
CO2: 22 MMOL/L (ref 21–32)
CREAT SERPL-MCNC: 2.1 MG/DL (ref 0.6–1.2)
GFR AFRICAN AMERICAN: 27
GFR NON-AFRICAN AMERICAN: 23
GLUCOSE BLD-MCNC: 102 MG/DL (ref 70–99)
PARATHYROID HORMONE INTACT: 106.4 PG/ML (ref 14–72)
PHOSPHORUS: 4.5 MG/DL (ref 2.5–4.9)
POTASSIUM SERPL-SCNC: 4.2 MMOL/L (ref 3.5–5.1)
SODIUM BLD-SCNC: 142 MMOL/L (ref 136–145)

## 2022-04-02 PROCEDURE — 83970 ASSAY OF PARATHORMONE: CPT

## 2022-04-02 PROCEDURE — 36415 COLL VENOUS BLD VENIPUNCTURE: CPT

## 2022-04-02 PROCEDURE — 80069 RENAL FUNCTION PANEL: CPT

## 2022-04-07 ENCOUNTER — NURSE ONLY (OUTPATIENT)
Dept: CARDIOLOGY CLINIC | Age: 82
End: 2022-04-07
Payer: MEDICARE

## 2022-04-07 DIAGNOSIS — I48.0 PAF (PAROXYSMAL ATRIAL FIBRILLATION) (HCC): Chronic | ICD-10-CM

## 2022-04-07 DIAGNOSIS — Z45.09 ENCOUNTER FOR LOOP RECORDER CHECK: ICD-10-CM

## 2022-04-07 NOTE — PROGRESS NOTES
We received a remote transmission from patient's monitor at home. Remote Linq report shows AF. No AC due to anemia/GIB. Last note suggested possibly starting [facility] if recurrent AF. Pauses are not real. Noted under sensing. EP physician to review. We will continue to monitor remotely.

## 2022-04-10 PROCEDURE — G2066 INTER DEVC REMOTE 30D: HCPCS | Performed by: INTERNAL MEDICINE

## 2022-04-10 PROCEDURE — 93298 REM INTERROG DEV EVAL SCRMS: CPT | Performed by: INTERNAL MEDICINE

## 2022-04-12 ENCOUNTER — HOSPITAL ENCOUNTER (OUTPATIENT)
Age: 82
Discharge: HOME | End: 2022-04-12
Payer: MEDICARE

## 2022-04-12 LAB
FERRITIN: 164.6 NG/ML (ref 15–150)
IRON SATURATION: 20 % (ref 15–50)
IRON: 57 UG/DL (ref 37–145)
TOTAL IRON BINDING CAPACITY: 284 UG/DL (ref 260–445)

## 2022-04-12 PROCEDURE — 82728 ASSAY OF FERRITIN: CPT

## 2022-04-12 PROCEDURE — 83550 IRON BINDING TEST: CPT

## 2022-04-12 PROCEDURE — 83540 ASSAY OF IRON: CPT

## 2022-04-12 PROCEDURE — 36415 COLL VENOUS BLD VENIPUNCTURE: CPT

## 2022-05-16 ENCOUNTER — NURSE ONLY (OUTPATIENT)
Dept: CARDIOLOGY CLINIC | Age: 82
End: 2022-05-16
Payer: MEDICARE

## 2022-05-16 DIAGNOSIS — Z45.09 ENCOUNTER FOR LOOP RECORDER CHECK: ICD-10-CM

## 2022-05-16 DIAGNOSIS — I48.0 PAF (PAROXYSMAL ATRIAL FIBRILLATION) (HCC): Chronic | ICD-10-CM

## 2022-05-17 PROCEDURE — G2066 INTER DEVC REMOTE 30D: HCPCS | Performed by: INTERNAL MEDICINE

## 2022-05-17 PROCEDURE — 93298 REM INTERROG DEV EVAL SCRMS: CPT | Performed by: INTERNAL MEDICINE

## 2022-06-01 RX ORDER — CARVEDILOL 3.12 MG/1
TABLET ORAL
Qty: 180 TABLET | Refills: 0 | Status: SHIPPED | OUTPATIENT
Start: 2022-06-01 | End: 2022-09-06

## 2022-06-01 NOTE — TELEPHONE ENCOUNTER
Medication-carvedilol (COREG) 3.125 MG tablet [facility]), [place] - [address] [phone] Placido Dao [phone]   [facility] [address] [facility]   Phone:  [phone]  Fax:  [phone]    LOV-3/11/22  Labs- Last EKG 3/11/22  Refill-4/12/21  NOV-7/12/22

## 2022-06-20 ENCOUNTER — TELEPHONE (OUTPATIENT)
Dept: CARDIOLOGY CLINIC | Age: 82
End: 2022-06-20

## 2022-06-20 ENCOUNTER — NURSE ONLY (OUTPATIENT)
Dept: CARDIOLOGY CLINIC | Age: 82
End: 2022-06-20
Payer: MEDICARE

## 2022-06-20 DIAGNOSIS — Z45.09 ENCOUNTER FOR LOOP RECORDER CHECK: ICD-10-CM

## 2022-06-20 DIAGNOSIS — D50.0 IRON DEFICIENCY ANEMIA DUE TO CHRONIC BLOOD LOSS: Primary | ICD-10-CM

## 2022-06-20 PROCEDURE — 93298 REM INTERROG DEV EVAL SCRMS: CPT | Performed by: INTERNAL MEDICINE

## 2022-06-20 PROCEDURE — G2066 INTER DEVC REMOTE 30D: HCPCS | Performed by: INTERNAL MEDICINE

## 2022-06-20 NOTE — TELEPHONE ENCOUNTER
----- Message from Marleny Cramer sent at 6/20/2022 12:44 PM EDT -----  Stephanie Vogel, pt had AF recordings again. This is your last note. No AC due to anemia/GIB.  Will need to consider starting if conclusive recurrent AF noted on loop and Hgb stable (>9)

## 2022-06-20 NOTE — PROGRESS NOTES
We received a remote transmission from patient's monitor at home. Remote Linq report shows AF. Pt not on anti coags. Per Rosanna Miles last note No AC due to anemia/GIB. Will need to consider starting if conclusive recurrent AF noted on loop and Hgb stable (>9). Noted over and under sensing. Message sent to Salbador Plasencia for review. EP physician to review. We will continue to monitor remotely. End of 31-day monitoring period 6-20-22.

## 2022-06-20 NOTE — TELEPHONE ENCOUNTER
Sent script for Eliquis 5 mg BID and CBC ordered to have done two weeks after starting AC.  Please let patient know

## 2022-06-20 NOTE — Clinical Note
FYI, pt had AF recordings again. This is your last note. No AC due to anemia/GIB.  Will need to consider starting if conclusive recurrent AF noted on loop and Hgb stable (>9)

## 2022-06-20 NOTE — TELEPHONE ENCOUNTER
Her loop shows recurrent AF. Recommend she resume anti-coagulation (Xarelto 15 mg daily or eliquis 5 mg BID).  If she agrees to start them, would check CBC two weeks after starting the blood thinner to ensure her anemia is stable    JIMENA Ortiz-CNP

## 2022-06-22 NOTE — TELEPHONE ENCOUNTER
Yes she should continue ASA due to hx of CAD and monitor for s/s of bleeding    Gilford Hoover, JIMENA-CNP

## 2022-06-25 NOTE — RESULT ENCOUNTER NOTE
Sinus with PVC  3 episodes of Atrial fibrillation 14-34 min long  Reviewed.     Bucky Carver MD [facility]

## 2022-06-28 RX ORDER — ROSUVASTATIN CALCIUM 20 MG/1
20 TABLET, COATED ORAL NIGHTLY
Qty: 90 TABLET | Refills: 0 | Status: SHIPPED | OUTPATIENT
Start: 2022-06-28 | End: 2022-10-14

## 2022-07-12 ENCOUNTER — OFFICE VISIT (OUTPATIENT)
Dept: CARDIOLOGY CLINIC | Age: 82
End: 2022-07-12
Payer: MEDICARE

## 2022-07-12 VITALS
WEIGHT: 218.8 LBS | OXYGEN SATURATION: 97 % | HEIGHT: 60 IN | DIASTOLIC BLOOD PRESSURE: 62 MMHG | HEART RATE: 59 BPM | BODY MASS INDEX: 42.96 KG/M2 | SYSTOLIC BLOOD PRESSURE: 142 MMHG

## 2022-07-12 DIAGNOSIS — I48.0 PAF (PAROXYSMAL ATRIAL FIBRILLATION) (HCC): ICD-10-CM

## 2022-07-12 DIAGNOSIS — E78.5 DYSLIPIDEMIA: ICD-10-CM

## 2022-07-12 DIAGNOSIS — I25.10 CORONARY ARTERY DISEASE INVOLVING NATIVE CORONARY ARTERY OF NATIVE HEART WITHOUT ANGINA PECTORIS: Primary | ICD-10-CM

## 2022-07-12 DIAGNOSIS — I10 PRIMARY HYPERTENSION: ICD-10-CM

## 2022-07-12 PROCEDURE — 1123F ACP DISCUSS/DSCN MKR DOCD: CPT | Performed by: NURSE PRACTITIONER

## 2022-07-12 PROCEDURE — 99214 OFFICE O/P EST MOD 30 MIN: CPT | Performed by: NURSE PRACTITIONER

## 2022-07-12 RX ORDER — HYDROCODONE BITARTRATE AND ACETAMINOPHEN 5; 325 MG/1; MG/1
1 TABLET ORAL 2 TIMES DAILY
COMMUNITY
Start: 2022-06-20

## 2022-07-12 NOTE — PROGRESS NOTES
[facility]     Outpatient Follow Up Note    Norris Anderson is 80 y.o. female who presents today with a history of STEMI CAD s/p PTCA LAD Nov '20, PAF, HTN and hyperlipidemia. Her other hx includes: GIB March '21 s/p small bowel resection d/t adenocarcinoma (declined chemotherapy), s/p I&D wound abscess (follows with Dr. Veda Palmer)    [facility] / HPI:  Follow Up secondary to PAF    Subjective:   Eliquis was restarted on 6/22. She is unable to tell when she goes into AF (differs from what she had reported on an earlier visit in March)  She tried using a BiPAP but was intolerant. She denies significant chest pain. There is no SOB/EUBANKS. The patient denies orthopnea/PND. The patient does not have swelling. The patients weight is stable . The patient is not experiencing palpitations or dizziness. Her BP runs 116/62 as an average, SaPO2 97%    These symptoms show no change since the last OV. With regard to medication therapy the patient has been compliant with prescribed regimen. They have tolerated therapy to date. Past Medical History:   Diagnosis Date    Anemia     CAD (coronary artery disease) 11/2020    Stent    CKD (chronic kidney disease)     Hyperlipidemia     Hypertension      Social History:    Social History     Tobacco Use   Smoking Status Never Smoker   Smokeless Tobacco Never Used   Tobacco Comment     was a heavy smoker     Current Medications:  Current Outpatient Medications   Medication Sig Dispense Refill    HYDROcodone-acetaminophen (NORCO) 5-325 MG per tablet Take 1 tablet by mouth 2 times daily.       rosuvastatin (CRESTOR) 20 MG tablet Take 1 tablet by mouth nightly 90 tablet 0    apixaban (ELIQUIS) 5 MG TABS tablet Take 1 tablet by mouth 2 times daily 180 tablet 1    carvedilol (COREG) 3.125 MG tablet TAKE 1 TABLET BY MOUTH TWICE DAILY WITH MEALS 180 tablet 0    ferrous sulfate (IRON 325) 325 (65 Fe) MG tablet Take 325 mg by mouth 2 times daily      aspirin EC 81 MG EC tablet Take 1 tablet by mouth daily 30 tablet 0    torsemide (DEMADEX) 20 MG tablet Take 1 tablet by mouth daily 30 tablet 1     No current facility-administered medications for this visit. REVIEW OF SYSTEMS:    CONSTITUTIONAL: No major weight gain or loss, fatigue, weakness, night sweats or fever. HEENT: No new vision difficulties or ringing in the ears. RESPIRATORY: No new SOB, PND, orthopnea or cough. CARDIOVASCULAR: See HPI  GI: No nausea, vomiting, diarrhea, constipation, abdominal pain or changes in bowel habits. : No urinary frequency, urgency, incontinence hematuria or dysuria. SKIN: No cyanosis or skin lesions. MUSCULOSKELETAL: No new muscle or joint pain. NEUROLOGICAL: No syncope or TIA-like symptoms. PSYCHIATRIC: No anxiety, pain, insomnia or depression    Objective:   PHYSICAL EXAM:        Vitals:    07/12/22 1506 07/12/22 1536   BP: (!) 110/52 (!) 142/62   Site: Right Upper Arm Right Upper Arm   Position: Sitting    Cuff Size: Large Adult Large Adult   Pulse: 59    SpO2: 97%    Weight: 218 lb 12.8 oz (99.2 kg)    Height: 5' (1.524 m)        VITALS:  BP (!) 110/52 (Site: Right Upper Arm, Position: Sitting, Cuff Size: Large Adult)   Pulse 59   Ht 5' (1.524 m)   Wt 218 lb 12.8 oz (99.2 kg)   SpO2 97%   BMI 42.73 kg/m²   CONSTITUTIONAL: Cooperative, no apparent distress, and appears well nourished / over weight  NEUROLOGIC:  Awake and orientated to person, place and time. PSYCH: Calm affect. SKIN: Warm and dry. HEENT: Sclera non-icteric, normocephalic, neck supple, no elevation of JVP, normal carotid pulses with no bruits and thyroid normal size. LUNGS:  No increased work of breathing and clear to auscultation, no crackles or wheezing  CARDIOVASCULAR:  Regular rate 68 and rhythm with no murmurs, gallops, rubs, or abnormal heart sounds, normal PMI. The apical impulses not displaced  JVP less than 8 cm H2O  Heart tones are crisp and normal  Cervical veins are not engorged  The carotid upstroke is normal in amplitude and contour without delay or bruit  JVP is not elevated  ABDOMEN:  Normal bowel sounds, non-distended and non-tender to palpation  EXT: mena LE edema, + calf tenderness. Pulses are present bilaterally. DATA:    Lab Results   Component Value Date    ALT 6 (L) 03/31/2021    AST 10 (L) 03/31/2021    ALKPHOS 103 03/31/2021    BILITOT 0.4 03/31/2021     Lab Results   Component Value Date    CREATININE 2.1 (H) 04/02/2022    BUN 45 (H) 04/02/2022     04/02/2022    K 4.2 04/02/2022     04/02/2022    CO2 22 04/02/2022     No results found for: TSH, K4JVPRA, W9RRKXD, THYROIDAB  Lab Results   Component Value Date    WBC 5.6 01/18/2022    HGB 9.7 (L) 01/18/2022    HCT 30.5 (L) 01/18/2022    MCV 82.6 01/18/2022     01/18/2022     Oct '21:  Cholesterol 100 - 199 mg/dL 121    Triglycerides 0 - 149 mg/dL 107    HDL >39 mg/dL 49    VLDL Cholesterol Lasha 5 - 40 mg/dL 20    LDL CHOL CALC (NIH) 0 - 99 mg/dL 52        Radiology Review:  Pertinent images / reports were reviewed as a part of this visit and reveals the following:    [id number] Score for Atrial Fibrillation Stroke Risk   Risk   Factors  Component Value   C CHF No 0   H HTN Yes 1   A2 Age >= 76 Yes,  (77 y.o.) 2   D DM No 0   S2 Prior Stroke/TIA No 0   V Vascular Disease Yes 1   A Age 74-69 No,  (77 y.o.) 0   Sc Sex female 1    [id number]  Score  5   Score last updated 7/13/22 8:12 AM EDT    PaceArt transmission: 6/20/22:  AF : 3 episodes of Atrial fibrillation 14-34 min long  Pt not on anti coags. Per Lizeth Springer last note No AC due to anemia/GIB. Will need to  consider starting if conclusive recurrent AF noted on loop and  Hgb stable (>9). Echo: March '21   Summary   Normal left ventricle size, and systolic function with an estimated ejection   fraction of 55-60%. Mild concentric left ventricular hypertrophy is present. No regional wall motion abnormalities are seen.    Mild tricuspid regurgitation, RVSP is estimated at 38 mmhg. Angiogram:11/20/20  Artery   Findings/Result  LM       Normal  LAD     95% mid  Cx        OM2 50%  RI         NA  RCA     20% prox, 20% mid  LVEDP            25  LVG     55%     Intervention:         PCI of LAD with [id number] REBECA (PD with 3.75NC)     Assessment:      Diagnosis Orders   1. Coronary artery disease involving native coronary artery of native heart without angina pectoris   ~stable : denies angina  ~s/p PTCA LAD '20; non-obst disease OM & RCA  ~LVEF 55-60%  ~BB / ASA / statin     2. Primary hypertension   ~stable / reasonably controlled   ~carvedilol 3.125 mg bid  ~mild CLVH    3. PAF (paroxysmal atrial fibrillation) (HCC)   ~AP regular  ~denies palpitations and states unable to feel AF  ~tolerating DOAC which was started 6/20/22 for 3 episodes of AF 14-34 min duration  ~denies melena  ~LA 3.4 cm on echo  ~reporting untx sleep apnea ; intolerant to mask  ~last TSH WNL  ~CHADsVASc 5  ~Watchman's considered if recurrent bleeding   ~time in AF 0.3% with loop interrogation today: 7/8 - 7/12/22    4. Dyslipidemia   ~controlled on crestor 20 mg daily         I had the opportunity to review the clinical symptoms and presentation of Megha Rojas. Plan:     1. CBC as recommended now on DOAC  2. F/U as scheduled in 2 months with Dr. Mcfarland Precise   F/U with general cardiology in six months    Overall the patient is stable from CV standpoint    I have addresed the patient's cardiac risk factors and adjusted pharmacologic treatment as needed. In addition, I have reinforced the need for patient directed risk factor modification. Further evaluation will be based upon the patient's clinical course and testing results. All questions and concerns were addressed to the patient/family. Alternatives to my treatment were discussed. The patient is not currently smoking. The risks related to smoking were reviewed with the patient.  Recommend maintaining a smoke-free lifestyle. Patient is on a beta-blocker  Patient is on an ace-i/ARB : CKD stage IIIb, follows with Dr. Joanne KILPATRICK  Patient is on a statin    Antiplatelet therapy / anti-coagulation has been recommended / prescribed for this patient. Education conducted on adverse reactions including bleeding was discussed. The patient verbalizes understanding not to stop medications without discussing with us. Discussed exercise: 30-60 minutes 7 days/week  Discussed Low saturated fat/NORMA diet. Thank you for allowing to us to participate in the care of [address] [place] [address].     JIMENA Shankar    Documentation of today's visit sent to PCP

## 2022-07-13 ENCOUNTER — TELEPHONE (OUTPATIENT)
Dept: CARDIOLOGY CLINIC | Age: 82
End: 2022-07-13

## 2022-07-13 NOTE — TELEPHONE ENCOUNTER
Left message to call back. She needs to get labs ( cbc) SR NP ordered her to do that 2 weeks after she started eliquis

## 2022-07-18 ENCOUNTER — HOSPITAL ENCOUNTER (OUTPATIENT)
Age: 82
Discharge: HOME | End: 2022-07-18
Payer: MEDICARE

## 2022-07-18 DIAGNOSIS — D50.0 IRON DEFICIENCY ANEMIA DUE TO CHRONIC BLOOD LOSS: ICD-10-CM

## 2022-07-18 LAB
BASOPHILS ABSOLUTE: 0.1 K/UL (ref 0–0.2)
BASOPHILS RELATIVE PERCENT: 0.8 %
EOSINOPHILS ABSOLUTE: 0.3 K/UL (ref 0–0.6)
EOSINOPHILS RELATIVE PERCENT: 4 %
HCT VFR BLD CALC: 29.8 % (ref 36–48)
HEMOGLOBIN: 9.6 G/DL (ref 12–16)
LYMPHOCYTES ABSOLUTE: 2.3 K/UL (ref 1–5.1)
LYMPHOCYTES RELATIVE PERCENT: 30.6 %
MCH RBC QN AUTO: 26.8 PG (ref 26–34)
MCHC RBC AUTO-ENTMCNC: 32.2 G/DL (ref 31–36)
MCV RBC AUTO: 83.4 FL (ref 80–100)
MONOCYTES ABSOLUTE: 0.8 K/UL (ref 0–1.3)
MONOCYTES RELATIVE PERCENT: 9.8 %
NEUTROPHILS ABSOLUTE: 4.2 K/UL (ref 1.7–7.7)
NEUTROPHILS RELATIVE PERCENT: 54.8 %
PDW BLD-RTO: 14.9 % (ref 12.4–15.4)
PLATELET # BLD: 206 K/UL (ref 135–450)
PMV BLD AUTO: 8.6 FL (ref 5–10.5)
RBC # BLD: 3.58 M/UL (ref 4–5.2)
WBC # BLD: 7.6 K/UL (ref 4–11)

## 2022-07-18 PROCEDURE — 36415 COLL VENOUS BLD VENIPUNCTURE: CPT

## 2022-07-18 PROCEDURE — 85025 COMPLETE CBC W/AUTO DIFF WBC: CPT

## 2022-07-25 ENCOUNTER — NURSE ONLY (OUTPATIENT)
Dept: CARDIOLOGY CLINIC | Age: 82
End: 2022-07-25
Payer: MEDICARE

## 2022-07-25 DIAGNOSIS — I48.0 PAF (PAROXYSMAL ATRIAL FIBRILLATION) (HCC): Primary | Chronic | ICD-10-CM

## 2022-07-25 DIAGNOSIS — Z45.09 ENCOUNTER FOR LOOP RECORDER CHECK: ICD-10-CM

## 2022-07-25 PROCEDURE — 93298 REM INTERROG DEV EVAL SCRMS: CPT | Performed by: INTERNAL MEDICINE

## 2022-07-25 PROCEDURE — G2066 INTER DEVC REMOTE 30D: HCPCS | Performed by: INTERNAL MEDICINE

## 2022-07-25 NOTE — PROGRESS NOTES
We received a remote transmission from patient's monitor at home. Remote Linq report shows no arrhythmias. Tachy recordings are not real. Noted over sensing. EP physician to review. We will continue to monitor remotely. End of 31-day monitoring period 7-25-22.

## 2022-08-29 ENCOUNTER — NURSE ONLY (OUTPATIENT)
Dept: CARDIOLOGY CLINIC | Age: 82
End: 2022-08-29
Payer: MEDICARE

## 2022-08-29 DIAGNOSIS — Z45.09 ENCOUNTER FOR LOOP RECORDER CHECK: ICD-10-CM

## 2022-08-29 DIAGNOSIS — I48.0 PAF (PAROXYSMAL ATRIAL FIBRILLATION) (HCC): Primary | Chronic | ICD-10-CM

## 2022-08-29 PROCEDURE — G2066 INTER DEVC REMOTE 30D: HCPCS | Performed by: INTERNAL MEDICINE

## 2022-08-29 PROCEDURE — 93298 REM INTERROG DEV EVAL SCRMS: CPT | Performed by: INTERNAL MEDICINE

## 2022-08-29 NOTE — PROGRESS NOTES
We received a remote transmission from patient's monitor at home. Remote Linq report shows no arrhythmias. EP physician to review. We will continue to monitor remotely. Implanted for AF management. Pt is on Eliquis. End of 31-day monitoring period 8-29-22.

## 2022-09-06 RX ORDER — CARVEDILOL 3.12 MG/1
TABLET ORAL
Qty: 180 TABLET | Refills: 2 | Status: SHIPPED | OUTPATIENT
Start: 2022-09-06

## 2022-09-06 NOTE — TELEPHONE ENCOUNTER
Received refill request for Carvedilol from [facility] : 7/12/22 NPTS    Last EKG : 3/11/22     Last Refill : 6/1/22 180 w/0 refills     Next OV : 9/13/22 RMM

## 2022-09-08 ENCOUNTER — NURSE ONLY (OUTPATIENT)
Dept: CARDIOLOGY CLINIC | Age: 82
End: 2022-09-08

## 2022-09-08 NOTE — PROGRESS NOTES
Received a TxtFeedback Alert . Remote Linq report shows 2 wide complex tachycardia events and 3 AF events. History of oversensing/ undersensing, true AF and LBBB with aberrancy. Now on [address]. EP physician to review. We will continue to monitor remotely. Implanted for AF management. Pt is on [employer].

## 2022-09-09 PROBLEM — G47.33 OSA (OBSTRUCTIVE SLEEP APNEA): Status: ACTIVE | Noted: 2022-09-09

## 2022-09-09 NOTE — PROGRESS NOTES
[facility]   Electrophysiology Follow up   Date: 9/13/2022  I had the privilege of visiting Connor Sweet in the office. CC: PAF   HPI: Connor Sweet is a 80 y.o. female with a past medical history of HTN, HLD, CKD, PAF, dCHF, and CAD. In March of 2021, pt presented to hospital by recommendation of her PCP for anemia. Her hemoglobin was 5.8 and was she received multiple units of blood. GI completed colonoscopy/EGD with no gross bleeding found, however, she was found to have a mass in her colon. She underwent surgical bowel resection. During admission, she developed AF with RVR and was started on amiodarone. S/p ILR placement 12/13/2021    Zulay Bianchi presents to the office in follow up. She is doing well today from a cardiac standpoint. She remains asymptomatic with her episodes of atrial fibrillation. Patient denies lightheadedness, dizziness, chest pain, palpitations, orthopnea, edema, presyncope or syncope. Assessment and plan:   -PAF   Last episode of AF was 9/7/2022, rate controlled continue monitoring with ILR interrogations    Amiodarone was stopped previously, she was not interested in taking amiodarone. We discussed options: Ablation, antiarrhythmic therapy or continue with current medical therapy. She does not want to have ablation, or adding further antiarrhythmic therapy. She states that she is doing well. ECG today shows SR, LBBB   Continue Coreg 3.125 mg BID   Patient has a [id number] Score of at least 5 (CAD, Female, Age 4, HTN)   Continue Eliquis 5 mg BID, denies s/s of bleeding or excessive bruising    -ILR   S/p implantation of ILR 12/13/2021   The CIED was interrogated and programmed and I supervised and reviewed all the data. All findings and changes are in device interrogation sheet and reflect my personal interpretation and changes and is scanned to Epic.    ~0.1% AT/AF burden per device interrogation today.      -LBBB with aberrancy, Bradycardia Noted on prior EKG and on ECG today      -SAHRA              Multiple risk factors; awaiting mask              Discussed long term risks of untreated sleep apnea    -CAD              Hx of PCI to LAD (11/20)              Continue ASA, BB, statin              Followed by Dr. Lisandro Reyes     -Chronic diastolic heart failure (NYHA Class II)  EF 55-60% per echo (3/21)  Continue with coreg 3.125 mg BID, torsemide 20 mg QD, No ACE/ARB due to CKD  Aggressive medical therapy with risk factor modification  Discussed with patient importance of monitoring weight, low sodium diet and fluid restriction    -HTN  Controlled: 132/60  BP goal <130/80  Home BP monitoring encouraged, printed information provided on how to accurately measure BP at home. Counseled to follow a low salt diet to assure blood pressure remains controlled for cardiovascular risk factor modification. The patient is counseled to get regular exercise 3-5 times per week and maintain a healthy weight reduce cardiovascular risk factors. -Obesity  Body mass index is 42.34 kg/m². Trying to lose weight, difficult but has lost 3 pounds recently  Excessive weight is complicating assessment and treatment. It is placing patient at risk for multiple co-morbidities as well as early death and contributing to the patient's presentation.   Discussed weight management with diet and exercise          Patient Active Problem List    Diagnosis Date Noted    SAHRA (obstructive sleep apnea) 09/09/2022    Encounter for loop recorder check 12/13/2021    Stage 3a chronic kidney disease ([address] 07/07/2021    Intra-abdominal abscess ([facility] 03/30/2021    PAF (paroxysmal atrial fibrillation) ([facility] 03/10/2021    Colonic mass     Diastolic dysfunction 04/17/2848    Malignant neoplasm of ascending colon ([address] 03/07/2021    Anemia 03/03/2021    CAD (coronary artery disease) 03/03/2021    Hypercholesteremia 03/03/2021    Essential hypertension 01/10/2014     Diagnostic studies:   ECG 9/13/22  SR/LBBB  443 QTcH, 154 QRS    ECHO: 3/6/2021   Normal left ventricle size, and systolic function with an estimated ejection fraction of 55-60%. Mild concentric left ventricular hypertrophy is present. No regional wall motion abnormalities are seen. Mild tricuspid regurgitation, RVSP is estimated at 38 mmhg. Cath: 11/20/2020  Artery   Findings/Result  LM       Normal  LAD     95% mid  Cx        OM2 50%  RI         NA  RCA     20% prox, 20% mid  LVEDP            25  LVG     55%     Intervention:         PCI of LAD with [id number] REBECA (PD with 3.75NC)     Event Monitor: 7/2021  NSR. Average HR 58, low 49, high 120  No AF/AFL  I independently reviewed the cardiac diagnostic studies, ECG and relevant imaging studies. Lab Results   Component Value Date    LVEF 58 03/06/2021    LVEFMODE Cardiac Cath 11/20/2020     No results found for: TSHFT4, TSH      Physical Examination:  Vitals:    09/13/22 0802   BP: 132/60   Pulse: 65   SpO2: 97%      Wt Readings from Last 3 Encounters:   09/13/22 216 lb 12.8 oz (98.3 kg)   07/12/22 218 lb 12.8 oz (99.2 kg)   03/11/22 207 lb (93.9 kg)       Constitutional: Oriented. No distress. Head: Normocephalic and atraumatic. Mouth/Throat: Oropharynx is clear and moist.   Eyes: Conjunctivae normal. EOM are normal.   Neck: Neck supple. No JVD present. Cardiovascular: Normal rate, regular rhythm, A8&Z9.  + systolic murmur   Pulmonary/Chest: Bilateral respiratory sounds. No rhonchi. Abdominal: Soft. No tenderness. Musculoskeletal: No tenderness. No edema    Lymphadenopathy: Has no cervical adenopathy. Neurological: Alert and oriented. Follows command, No Gross deficit   Skin: Skin is warm, No rash noted. Psychiatric: Has a normal behavior        Review of System:  [x] Full ROS obtained and negative except as mentioned in HPI    Prior to Admission medications    Medication Sig Start Date End Date Taking?  Authorizing Provider   carvedilol (COREG) 3.125 MG tablet TAKE 1 TABLET BY MOUTH TWICE DAILY WITH MEALS 9/6/22  Yes JIMENA Hall CNP   HYDROcodone-acetaminophen (NORCO) 5-325 MG per tablet Take 1 tablet by mouth 2 times daily. 6/20/22  Yes Historical Provider, MD   rosuvastatin (CRESTOR) 20 MG tablet Take 1 tablet by mouth nightly 6/28/22  Yes JIMENA Jameson CNP   apixaban (ELIQUIS) 5 MG TABS tablet Take 1 tablet by mouth 2 times daily 6/20/22  Yes JIMENA Jameson CNP   ferrous sulfate (IRON 325) 325 (65 Fe) MG tablet Take 325 mg by mouth 2 times daily 4/26/21  Yes Historical Provider, MD   aspirin EC 81 MG EC tablet Take 1 tablet by mouth daily 7/7/21  Yes Ebony Ordonez MD   torsemide [facility]) 20 MG tablet Take 1 tablet by mouth daily 4/7/21  Yes Jolene Cross MD       Allergies   Allergen Reactions    Acetomenaphthone (Menadiol Diacetate) [Menadiol Sodium Diphosphate]      Upset stomach     Lisinopril-Hydrochlorothiazide Other (See Comments)       Social History:  Reviewed. reports that she has never smoked. She has never used smokeless tobacco. She reports that she does not drink alcohol and does not use drugs. Family History:  Reviewed. Reviewed. No family history of SCD. Relevant and available labs, and cardiovascular diagnostics reviewed. Reviewed. I independently reviewed relevant and available cardiac diagnostic tests ECG, CXR, Echo, Stress test, Device interrogation, Holter, CT scan. Outside medical records via Care everywhere reviewed and summarized in H&P above. Complex medical condition with multiple medical problems affecting prognosis and outcome of EP interventions    - The patient is counseled to follow a low salt diet to assure blood pressure remains controlled for cardiovascular risk factor modification.   - The patient is counseled to avoid excess caffeine, and energy drinks as this may exacerbated ectopy and arrhythmia.    - The patient is counseled to get regular exercise 3-5 times per week to control cardiovascular risk factors. - The patient is counseled to lose weigt to control cardiovascular risk factors. All questions and concerns were addressed to the patient/family. Alternatives to my treatment were discussed. I have discussed the above stated plan and the patient verbalized understanding and agreed with the plan. Scribe attestation: This note was scribed in the presence of Aldo Mackenzie MD by Ying Castellanos RN  Physician Attestation: I, Dr. Aldo Mackenzie, confirm that the scribe's documentation has been prepared under my direction and personally reviewed by me in its entirety. I also confirm that the note above accurately reflects all work, treatment, procedures, and medical decision making performed by me. NOTE: This report was transcribed using voice recognition software. Every effort was made to ensure accuracy, however, inadvertent computerized transcription errors may be present.      Aldo Mackenzie MD, MPH  [facility], operated by [facility]   Office: [phone]  Fax: [phone]

## 2022-09-13 ENCOUNTER — OFFICE VISIT (OUTPATIENT)
Dept: CARDIOLOGY CLINIC | Age: 82
End: 2022-09-13
Payer: MEDICARE

## 2022-09-13 ENCOUNTER — NURSE ONLY (OUTPATIENT)
Dept: CARDIOLOGY CLINIC | Age: 82
End: 2022-09-13

## 2022-09-13 VITALS
HEART RATE: 65 BPM | BODY MASS INDEX: 42.56 KG/M2 | HEIGHT: 60 IN | DIASTOLIC BLOOD PRESSURE: 60 MMHG | SYSTOLIC BLOOD PRESSURE: 132 MMHG | WEIGHT: 216.8 LBS | OXYGEN SATURATION: 97 %

## 2022-09-13 DIAGNOSIS — I48.0 PAF (PAROXYSMAL ATRIAL FIBRILLATION) (HCC): Primary | Chronic | ICD-10-CM

## 2022-09-13 DIAGNOSIS — I51.89 DIASTOLIC DYSFUNCTION: Chronic | ICD-10-CM

## 2022-09-13 DIAGNOSIS — I10 ESSENTIAL HYPERTENSION: Chronic | ICD-10-CM

## 2022-09-13 DIAGNOSIS — I25.10 CORONARY ARTERY DISEASE INVOLVING NATIVE CORONARY ARTERY OF NATIVE HEART WITHOUT ANGINA PECTORIS: Chronic | ICD-10-CM

## 2022-09-13 DIAGNOSIS — Z45.09 ENCOUNTER FOR LOOP RECORDER CHECK: ICD-10-CM

## 2022-09-13 DIAGNOSIS — G47.33 OSA (OBSTRUCTIVE SLEEP APNEA): ICD-10-CM

## 2022-09-13 PROCEDURE — 99214 OFFICE O/P EST MOD 30 MIN: CPT | Performed by: INTERNAL MEDICINE

## 2022-09-13 PROCEDURE — 1123F ACP DISCUSS/DSCN MKR DOCD: CPT | Performed by: INTERNAL MEDICINE

## 2022-09-13 NOTE — PROGRESS NOTES
Patient comes in for their OV with Dr. Yamel Bernabe today. Carelink Interrogation shows Battery Status GOOD. No new episode noted since last remote on 9/8/2022. 0.2% PVC burden. Prior AF and Tachy episodes noted. Prior titus and pauses noted are undersensing. Implanted for suspected AF. Patient remains Eliquis and Coreg. Changes sensitivity today to 0.025 mV. / Please see interrogation for more detail. We will continue to follow the Patient remotely.

## 2022-10-03 ENCOUNTER — NURSE ONLY (OUTPATIENT)
Dept: CARDIOLOGY CLINIC | Age: 82
End: 2022-10-03
Payer: MEDICARE

## 2022-10-03 DIAGNOSIS — I48.0 PAF (PAROXYSMAL ATRIAL FIBRILLATION) (HCC): Primary | Chronic | ICD-10-CM

## 2022-10-03 DIAGNOSIS — Z45.09 ENCOUNTER FOR LOOP RECORDER CHECK: ICD-10-CM

## 2022-10-03 PROCEDURE — G2066 INTER DEVC REMOTE 30D: HCPCS | Performed by: INTERNAL MEDICINE

## 2022-10-03 PROCEDURE — 93298 REM INTERROG DEV EVAL SCRMS: CPT | Performed by: INTERNAL MEDICINE

## 2022-10-14 RX ORDER — ROSUVASTATIN CALCIUM 20 MG/1
20 TABLET, COATED ORAL NIGHTLY
Qty: 90 TABLET | Refills: 0 | Status: SHIPPED | OUTPATIENT
Start: 2022-10-14

## 2022-10-31 ENCOUNTER — HOSPITAL ENCOUNTER (OUTPATIENT)
Age: 82
Discharge: HOME | End: 2022-10-31
Payer: MEDICARE

## 2022-10-31 ENCOUNTER — HOSPITAL ENCOUNTER (OUTPATIENT)
Dept: ULTRASOUND IMAGING | Age: 82
Discharge: HOME | End: 2022-10-31
Payer: MEDICARE

## 2022-10-31 DIAGNOSIS — N18.4 CKD (CHRONIC KIDNEY DISEASE), STAGE IV (HCC): ICD-10-CM

## 2022-10-31 LAB
ALBUMIN SERPL-MCNC: 4.3 G/DL (ref 3.4–5)
ANION GAP SERPL CALCULATED.3IONS-SCNC: 11 MMOL/L (ref 3–16)
BUN BLDV-MCNC: 38 MG/DL (ref 7–20)
CALCIUM SERPL-MCNC: 9.1 MG/DL (ref 8.3–10.6)
CHLORIDE BLD-SCNC: 104 MMOL/L (ref 99–110)
CHOLESTEROL, TOTAL: 122 MG/DL (ref 0–199)
CO2: 28 MMOL/L (ref 21–32)
CREAT SERPL-MCNC: 1.8 MG/DL (ref 0.6–1.2)
CREATININE URINE: 43.4 MG/DL (ref 28–259)
GFR SERPL CREATININE-BSD FRML MDRD: 28 ML/MIN/{1.73_M2}
GLUCOSE BLD-MCNC: 129 MG/DL (ref 70–99)
HCT VFR BLD CALC: 30.1 % (ref 36–48)
HDLC SERPL-MCNC: 49 MG/DL (ref 40–60)
HEMOGLOBIN: 9.8 G/DL (ref 12–16)
LDL CHOLESTEROL CALCULATED: 47 MG/DL
MCH RBC QN AUTO: 26.7 PG (ref 26–34)
MCHC RBC AUTO-ENTMCNC: 32.6 G/DL (ref 31–36)
MCV RBC AUTO: 81.9 FL (ref 80–100)
MICROALBUMIN UR-MCNC: 3.7 MG/DL
MICROALBUMIN/CREAT UR-RTO: 85.3 MG/G (ref 0–30)
PARATHYROID HORMONE INTACT: 172.3 PG/ML (ref 14–72)
PDW BLD-RTO: 15.6 % (ref 12.4–15.4)
PLATELET # BLD: 200 K/UL (ref 135–450)
PMV BLD AUTO: 9.4 FL (ref 5–10.5)
POTASSIUM SERPL-SCNC: 4.2 MMOL/L (ref 3.5–5.1)
RBC # BLD: 3.67 M/UL (ref 4–5.2)
SODIUM BLD-SCNC: 143 MMOL/L (ref 136–145)
TRIGL SERPL-MCNC: 129 MG/DL (ref 0–150)
TSH SERPL DL<=0.05 MIU/L-ACNC: 1.62 UIU/ML (ref 0.27–4.2)
VLDLC SERPL CALC-MCNC: 26 MG/DL
WBC # BLD: 8.1 K/UL (ref 4–11)

## 2022-10-31 PROCEDURE — 86039 ANTINUCLEAR ANTIBODIES (ANA): CPT

## 2022-10-31 PROCEDURE — 85027 COMPLETE CBC AUTOMATED: CPT

## 2022-10-31 PROCEDURE — 80061 LIPID PANEL: CPT

## 2022-10-31 PROCEDURE — 76770 US EXAM ABDO BACK WALL COMP: CPT

## 2022-10-31 PROCEDURE — 82040 ASSAY OF SERUM ALBUMIN: CPT

## 2022-10-31 PROCEDURE — 36415 COLL VENOUS BLD VENIPUNCTURE: CPT

## 2022-10-31 PROCEDURE — 80048 BASIC METABOLIC PNL TOTAL CA: CPT

## 2022-10-31 PROCEDURE — 83970 ASSAY OF PARATHORMONE: CPT

## 2022-10-31 PROCEDURE — 82570 ASSAY OF URINE CREATININE: CPT

## 2022-10-31 PROCEDURE — 86038 ANTINUCLEAR ANTIBODIES: CPT

## 2022-10-31 PROCEDURE — 82043 UR ALBUMIN QUANTITATIVE: CPT

## 2022-10-31 PROCEDURE — 84443 ASSAY THYROID STIM HORMONE: CPT

## 2022-11-02 LAB — ANTI-NUCLEAR ANTIBODY (ANA): POSITIVE

## 2022-11-04 LAB
ANA PATTERN: ABNORMAL
ANA TITER: ABNORMAL
ANTINUCLEAR AB INTERPRETIVE COMMENT: ABNORMAL
ANTINUCLEAR ANTIBODY, HEP-2, IGG: DETECTED

## 2022-11-07 ENCOUNTER — NURSE ONLY (OUTPATIENT)
Dept: CARDIOLOGY CLINIC | Age: 82
End: 2022-11-07
Payer: MEDICARE

## 2022-11-07 DIAGNOSIS — I48.0 PAF (PAROXYSMAL ATRIAL FIBRILLATION) (HCC): Primary | Chronic | ICD-10-CM

## 2022-11-07 DIAGNOSIS — Z45.09 ENCOUNTER FOR LOOP RECORDER CHECK: ICD-10-CM

## 2022-11-07 PROCEDURE — G2066 INTER DEVC REMOTE 30D: HCPCS | Performed by: INTERNAL MEDICINE

## 2022-11-07 PROCEDURE — 93298 REM INTERROG DEV EVAL SCRMS: CPT | Performed by: INTERNAL MEDICINE

## 2022-12-12 ENCOUNTER — NURSE ONLY (OUTPATIENT)
Dept: CARDIOLOGY CLINIC | Age: 82
End: 2022-12-12
Payer: MEDICARE

## 2022-12-12 DIAGNOSIS — Z45.09 ENCOUNTER FOR LOOP RECORDER CHECK: ICD-10-CM

## 2022-12-12 DIAGNOSIS — I48.0 PAF (PAROXYSMAL ATRIAL FIBRILLATION) (HCC): Primary | Chronic | ICD-10-CM

## 2022-12-12 PROCEDURE — G2066 INTER DEVC REMOTE 30D: HCPCS | Performed by: INTERNAL MEDICINE

## 2022-12-12 PROCEDURE — 93298 REM INTERROG DEV EVAL SCRMS: CPT | Performed by: INTERNAL MEDICINE

## 2022-12-12 NOTE — PROGRESS NOTES
We received a remote transmission from patient's monitor at home. Remote Linq report shows no arrhythmias. Tachy recordings are not real. This shows over sensing. EP physician to review. We will continue to monitor remotely. Implanted for AF management. Pt is on Eliquis. End of 31-day monitoring period 12-12-22.

## 2023-01-16 ENCOUNTER — NURSE ONLY (OUTPATIENT)
Dept: CARDIOLOGY CLINIC | Age: 83
End: 2023-01-16
Payer: MEDICARE

## 2023-01-16 DIAGNOSIS — I48.0 PAF (PAROXYSMAL ATRIAL FIBRILLATION) (HCC): Primary | Chronic | ICD-10-CM

## 2023-01-16 DIAGNOSIS — Z45.09 ENCOUNTER FOR LOOP RECORDER CHECK: ICD-10-CM

## 2023-01-16 PROCEDURE — G2066 INTER DEVC REMOTE 30D: HCPCS | Performed by: INTERNAL MEDICINE

## 2023-01-16 PROCEDURE — 93298 REM INTERROG DEV EVAL SCRMS: CPT | Performed by: INTERNAL MEDICINE

## 2023-01-16 RX ORDER — ROSUVASTATIN CALCIUM 20 MG/1
20 TABLET, COATED ORAL NIGHTLY
Qty: 90 TABLET | Refills: 3 | Status: SHIPPED | OUTPATIENT
Start: 2023-01-16

## 2023-01-16 NOTE — PROGRESS NOTES
We received a remote transmission from patient's monitor at home. Remote Linq report shows no arrhythmias. Tachy recordings are not real. This shows over sensing. EP physician to review. We will continue to monitor remotely. Implanted for AF management. Pt is on Eliquis. End of 31-day monitoring period 1-16-23.

## 2023-01-16 NOTE — TELEPHONE ENCOUNTER
Last OV: 09/13/2022  Ruddy  Last Labs:10/31/2022    Next OV: 01/19/2023  Devorah Cortez  Last Refill: 10/14/2022  JIMENA Jimenez

## 2023-01-18 NOTE — PROGRESS NOTES
[facility], [place]  H+P  Consult  OP Visit  FU Visit   CC HX HPI   GEN  Doing well. No new concerns. CV PCI, ILR Ø CP, SOB. HTN  Ambulatory BP in good range. Ø HA/dizziness. CHOL  Last lipid reviewed. On max dose/tolerated statin. MED  Compliant with CV meds. Ø reported SA. HISTORY/ALLERGY/ROS   MEDHx  has a past medical history of Anemia, CAD (coronary artery disease), CKD (chronic kidney disease), Hyperlipidemia, and Hypertension. SURGHx  has a past surgical history that includes fracture surgery; Cystocopy (11/21/13); Upper gastrointestinal endoscopy (N/A, 3/5/2021); Colonoscopy (N/A, 3/5/2021); hemicolectomy (Right, 3/8/2021); and Cholecystectomy, laparoscopic (N/A, 3/8/2021). SOCHx  reports that she has never smoked. She has never used smokeless tobacco. She reports that she does not drink alcohol and does not use drugs. FAMHx family history includes Heart Disease in her father. ALLERG Acetomenaphthone (menadiol diacetate) [menadiol sodium diphosphate] and Lisinopril-hydrochlorothiazide   ROS Full ROS obtained and negative except as mentioned in HPI   MEDICATIONS   Current Outpatient Medications   Medication Sig Dispense Refill    rosuvastatin (CRESTOR) 20 MG tablet Take 1 tablet by mouth nightly 90 tablet 3    torsemide (DEMADEX) 20 MG tablet Take 1 tablet by mouth daily 30 tablet 1    carvedilol (COREG) 3.125 MG tablet TAKE 1 TABLET BY MOUTH TWICE DAILY WITH MEALS 180 tablet 2    HYDROcodone-acetaminophen (NORCO) 5-325 MG per tablet Take 1 tablet by mouth 2 times daily. apixaban (ELIQUIS) 5 MG TABS tablet Take 1 tablet by mouth 2 times daily 180 tablet 1    ferrous sulfate (IRON 325) 325 (65 Fe) MG tablet Take 325 mg by mouth 2 times daily      aspirin EC 81 MG EC tablet Take 1 tablet by mouth daily 30 tablet 0     No current facility-administered medications for this visit.       PHYSICAL EXAM   Vitals BP (!) 126/58 (Site: Right Upper Arm, Position: Sitting, Cuff Size: Large Adult)   Pulse 74   Ht 5' (1.524 m)   Wt 214 lb 14.4 oz (97.5 kg)   SpO2 96%   BMI 41.97 kg/m²     Gen Alert, coop, no distress Heart  Rrr, no mrg   Head NC, AT, no abnorm Abd  Soft, NT, +BS, no mass, no OM   Eyes PER, conj/corn clear Ext  Ext nl, AT, no C/C/E   Nose Nares nl, no drain, NT Pulse 2+ and symmetric   Throat Lips, mucosa, tongue nl Skin Col/text/turg nl, no vis rash/les   Neck S/S, TM, NT, no bruit/JVD Psych Nl mood and affect   Lung CTA-B, unlabored, no DTP Lymph   No cervical or axillary LA   Ch wall NT, no deform Neuro  Nl gross M/S exam      COMPLIANCE   Counseling provided on diet, exercise, weight loss, smoking, alcohol, drugs. CODING   SCI (00343) - CAD, AFIB, HTN, CHOL,   30-39 minutes preparing to see pt including review hx, tests, consults, perf exam, , educating pt, fam, caregiver, ordering meds/tests/procedures, referring and comm with pcps and other consultants, documenting info in EMR, interpreting results and communicating to fam and coordination of pt care. SCRIBE   Nurse - Scribe Attestation  I, [employer], am scribing for and in the presence of Maral Dimas MD.   Signed, [employer], RN. Doctor - Provider Anthony Lewis is working as a scribe for and in the presence of anabel Dimas MD). Working as a scribe, [employer] may have prepopulated components of this note with my historical  intellectual property under my direct supervision. Any additions to this intellectual property were performed in my presence and at my direction.   Furthermore, the content and accuracy of this note have been reviewed by anabel Dimas MD).  1/19/2023 4:07 PM   ASSESSMENT AND PLAN   *CAD   Date EF Results   Sx   No concerning   Data   Most recent EKG, ECHO and LHC reviewed personally   C 11/20 55% PCI of LAD, STEMI Bree Amel)   MPI   N/A   TTE 3/21 60%    Plan   Continue aggressive medical treatment at doses above   *AF  Status Parox, most recent TTE, monitors, EP procedures reviewed personally.  ILR 12/21   Plan Eliquis per EP   *HTN  Status Controlled   Plan Counseled on diet/salt/exercise/weight, continue meds at doses above   *CHOL  LDL 47, 10/22   Plan Counseled on diet/exercise/weight, continue HI/MT statin   *COMPLIANCE  Status Compliant   Plan Discussed compliance with meds/diet/salt/exercise; avoid tob/alc/drugs   *FOLLOWUP  12 months

## 2023-01-19 ENCOUNTER — OFFICE VISIT (OUTPATIENT)
Dept: CARDIOLOGY CLINIC | Age: 83
End: 2023-01-19
Payer: MEDICARE

## 2023-01-19 VITALS
BODY MASS INDEX: 42.19 KG/M2 | HEART RATE: 74 BPM | SYSTOLIC BLOOD PRESSURE: 126 MMHG | HEIGHT: 60 IN | DIASTOLIC BLOOD PRESSURE: 58 MMHG | OXYGEN SATURATION: 96 % | WEIGHT: 214.9 LBS

## 2023-01-19 DIAGNOSIS — I10 ESSENTIAL HYPERTENSION: ICD-10-CM

## 2023-01-19 DIAGNOSIS — I48.0 PAF (PAROXYSMAL ATRIAL FIBRILLATION) (HCC): ICD-10-CM

## 2023-01-19 DIAGNOSIS — I25.10 CORONARY ARTERY DISEASE INVOLVING NATIVE CORONARY ARTERY OF NATIVE HEART WITHOUT ANGINA PECTORIS: Primary | ICD-10-CM

## 2023-01-19 PROCEDURE — 3078F DIAST BP <80 MM HG: CPT | Performed by: INTERNAL MEDICINE

## 2023-01-19 PROCEDURE — 1123F ACP DISCUSS/DSCN MKR DOCD: CPT | Performed by: INTERNAL MEDICINE

## 2023-01-19 PROCEDURE — 3074F SYST BP LT 130 MM HG: CPT | Performed by: INTERNAL MEDICINE

## 2023-01-19 PROCEDURE — 99214 OFFICE O/P EST MOD 30 MIN: CPT | Performed by: INTERNAL MEDICINE

## 2023-02-08 ENCOUNTER — OFFICE VISIT (OUTPATIENT)
Dept: RHEUMATOLOGY | Age: 83
End: 2023-02-08
Payer: MEDICARE

## 2023-02-08 VITALS
HEART RATE: 54 BPM | BODY MASS INDEX: 40.59 KG/M2 | WEIGHT: 215 LBS | DIASTOLIC BLOOD PRESSURE: 58 MMHG | HEIGHT: 61 IN | SYSTOLIC BLOOD PRESSURE: 120 MMHG | OXYGEN SATURATION: 100 %

## 2023-02-08 DIAGNOSIS — R76.8 POSITIVE ANA (ANTINUCLEAR ANTIBODY): Primary | ICD-10-CM

## 2023-02-08 PROCEDURE — 3074F SYST BP LT 130 MM HG: CPT | Performed by: INTERNAL MEDICINE

## 2023-02-08 PROCEDURE — 1123F ACP DISCUSS/DSCN MKR DOCD: CPT | Performed by: INTERNAL MEDICINE

## 2023-02-08 PROCEDURE — 99204 OFFICE O/P NEW MOD 45 MIN: CPT | Performed by: INTERNAL MEDICINE

## 2023-02-08 PROCEDURE — 3078F DIAST BP <80 MM HG: CPT | Performed by: INTERNAL MEDICINE

## 2023-02-08 NOTE — PROGRESS NOTES
SUBJECTIVE:    Patient ID: Brad Ruano is a [de-identified] y.o. female. Patient is referred by Dr. Sriram Schultz because of a positive RINKU. She was first discovered to have a renal issues 2 years ago when she had a colon cancer removed. Patient is currently on Coreg. She has no family history of connective tissue disease. Review of Systems:    Constitutional symptoms: denies weight loss, fevers night sweats  Eyes: denies dry eyes,   Ears, nose, mouth, throat: denies mucosal sores denies dry mouth  Cardiovascular: denies pleuritic chest pain  Respiratory: denies cough, denies shortness of breath  Gastrointestinal: denies peptic ulcer disease, denies blood or mucus in the stools  Skin: denies photosensitivity, denies rash, denies alopecia  Neurologic: denies history of stroke,  Hematologic: denies blood clots  Genitourinary: denies miscarriages  Musculoskeletal: denies Raynaud's, denies joint swelling    Prior to Visit Medications    Medication Sig Taking? Authorizing Provider   torsemide (DEMADEX) 20 MG tablet Take 1 tablet by mouth once daily Yes Sriram Schultz MD   rosuvastatin (CRESTOR) 20 MG tablet Take 1 tablet by mouth nightly Yes JIMENA Dominique CNP   carvedilol (COREG) 3.125 MG tablet TAKE 1 TABLET BY MOUTH TWICE DAILY WITH MEALS Yes JIMENA Carey CNP   HYDROcodone-acetaminophen (NORCO) 5-325 MG per tablet Take 1 tablet by mouth 2 times daily.  Yes Historical Provider, MD   apixaban (ELIQUIS) 5 MG TABS tablet Take 1 tablet by mouth 2 times daily  Patient taking differently: Take 5 mg by mouth daily Per PCP Yes JIMENA Dominique CNP   ferrous sulfate (IRON 325) 325 (65 Fe) MG tablet Take 325 mg by mouth 2 times daily Yes Historical Provider, MD   aspirin EC 81 MG EC tablet Take 1 tablet by mouth daily Yes May Lilly MD        OBJECTIVE:  BP (!) 120/58   Pulse 54   Ht 5' 1\" (1.549 m)   Wt 215 lb (97.5 kg)   SpO2 100%   BMI 40.62 kg/m²      Physical Exam:   General: Normal gait and posture. Hygiene appears normal.  No evidence of overt muscle wasting. Eye: inspection of the conjunctiva and eyelids reveals no evidence of abnormal injection or discharge. Examination of the pupils shows that they react equally and consensually to light and to accommodation. The irises appear normal.  Fundi are grossly benign. Ears, nose, mouth, throat: external inspection of the ears and nose reveals no evidence of asymmetry mass or other abnormality. Otoscopic examination of the external auditory canals and tympanic membranes reveals no obstruction mass or other abnormal finding. The tympanic membranes are clear without evidence of injection. Visualized structures within the middle ear are normal.  There are no mucosal sores. Neck: examination of the neck reveals no evidence of mass asymmetry or crepitus. The trachea is midline. Examination of the thyroid shows no evidence of enlargement tenderness or mass. Respiratory: the patient's respiratory effort shows normal respiration without evidence of the use of accessory muscles. Diaphragmatic movement is normal.  There is no evidence of intercostal retractions. Percussion of the chest revealed no evidence of dullness flatness or hyperresonance. Auscultation revealed normal breath sounds in all lung fields. They worsen no evidence of abnormal sounds such as rales or wheezes. There was no evidence of a friction rub. Cardiovascular: palpation of the patient's chest revealed no evidence of abnormal thrill. Point of maximal impulse showed no displacement. Auscultation of the heart revealed no evidence of murmur gallop or other abnormal sound. Examination of the carotid arteries revealed no evidence of bruit, abnormal amplitude or abnormal pulsation. Gastrointestinal: the examination of the patient's abdomen showed no evidence of mass or tenderness. The liver and spleen reveal no evidence of enlargement.   Musculoskeletal: 1+ pretibial edema no synovitis      Neurologic: cranial nerves two through 12 are grossly intact. Reflexes were equal bilaterally. Skin: no rashes    ASSESSMENT: Positive RINKU of undetermined significance       PLAN: Further serologic work-up will be obtained today to look for evidence of specific antibodies. If all is negative the RINKU is most likely related to age or her Coreg therapy. Letter to her referring physician will be dictated after the labs are available.

## 2023-02-20 ENCOUNTER — NURSE ONLY (OUTPATIENT)
Dept: CARDIOLOGY CLINIC | Age: 83
End: 2023-02-20
Payer: MEDICARE

## 2023-02-20 DIAGNOSIS — Z45.09 ENCOUNTER FOR LOOP RECORDER CHECK: ICD-10-CM

## 2023-02-20 DIAGNOSIS — I48.0 PAF (PAROXYSMAL ATRIAL FIBRILLATION) (HCC): Primary | Chronic | ICD-10-CM

## 2023-02-20 PROCEDURE — G2066 INTER DEVC REMOTE 30D: HCPCS | Performed by: INTERNAL MEDICINE

## 2023-02-20 PROCEDURE — 93298 REM INTERROG DEV EVAL SCRMS: CPT | Performed by: INTERNAL MEDICINE

## 2023-02-20 NOTE — PROGRESS NOTES
We received a remote transmission from patient's monitor at home. Remote Linq report shows no arrhythmias. Tachy recordings are not real. This shows over sensing. EP physician to review. We will continue to monitor remotely. Implanted for AF management. Pt is on Eliquis. End of 31-day monitoring period 2-20-23.

## 2023-03-14 NOTE — PLAN OF CARE
Problem: Skin Integrity:  Goal: Will show no infection signs and symptoms  Description: Will show no infection signs and symptoms  Outcome: Ongoing  Goal: Absence of new skin breakdown  Description: Absence of new skin breakdown  Outcome: Ongoing  Goal: Skin integrity will be maintained  Description: Skin integrity will be maintained  Outcome: Ongoing  Goal: Skin integrity will improve  Description: Skin integrity will improve  Outcome: Ongoing     Problem: Pain:  Goal: Pain level will decrease  Description: Pain level will decrease  Outcome: Ongoing  Goal: Control of acute pain  Description: Control of acute pain  Outcome: Ongoing  Goal: Control of chronic pain  Description: Control of chronic pain  Outcome: Ongoing     Problem: Falls - Risk of:  Goal: Will remain free from falls  Description: Will remain free from falls  Outcome: Ongoing  Goal: Absence of physical injury  Description: Absence of physical injury  Outcome: Ongoing     Problem: Physical Regulation:  Goal: Complications related to the disease process, condition or treatment will be avoided or minimized  Description: Complications related to the disease process, condition or treatment will be avoided or minimized  Outcome: Ongoing     Problem: Tissue Perfusion:  Goal: Ability to maintain adequate tissue perfusion will improve  Description: Ability to maintain adequate tissue perfusion will improve  Outcome: Ongoing [FreeTextEntry1] : Pt presents s/p right foot second toe amputation.  Pt was discharged on Augmentin but was unable to fill his prescription. He began changing his dressings with DSD every other day. During last visit,  Additionally, the right foot lateral fifth metatarsal wound was debrided to subQ,  Denies N/V/F/C/SOB, however reports some redness around the the RF lateral 5th met wound. Patient using vacc therapy.\par

## 2023-03-27 ENCOUNTER — NURSE ONLY (OUTPATIENT)
Dept: CARDIOLOGY CLINIC | Age: 83
End: 2023-03-27
Payer: MEDICARE

## 2023-03-27 DIAGNOSIS — I48.0 PAF (PAROXYSMAL ATRIAL FIBRILLATION) (HCC): Primary | Chronic | ICD-10-CM

## 2023-03-27 DIAGNOSIS — Z45.09 ENCOUNTER FOR LOOP RECORDER CHECK: ICD-10-CM

## 2023-03-27 PROCEDURE — G2066 INTER DEVC REMOTE 30D: HCPCS | Performed by: INTERNAL MEDICINE

## 2023-03-27 PROCEDURE — 93298 REM INTERROG DEV EVAL SCRMS: CPT | Performed by: INTERNAL MEDICINE

## 2023-03-27 NOTE — PROGRESS NOTES
We received a remote transmission from patient's monitor at home. Remote Linq report shows no arrhythmias. Tachy recordings are not real. This shows over sensing. EP physician to review. We will continue to monitor remotely. Implanted for AF management. Pt is on Eliquis. End of 31-day monitoring period 3-27-23.

## 2023-05-09 NOTE — PROGRESS NOTES
We received a remote transmission from patient's monitor at home. Remote Linq report shows no arrhythmias. Pause recording is not real. This shows under sensing. EP physician to review. We will continue to monitor remotely. Implanted for AF management. Pt is on Eliquis. End of 31-day monitoring period 5-10-23.

## 2023-05-10 ENCOUNTER — NURSE ONLY (OUTPATIENT)
Dept: CARDIOLOGY CLINIC | Age: 83
End: 2023-05-10

## 2023-05-10 DIAGNOSIS — Z45.09 ENCOUNTER FOR LOOP RECORDER CHECK: ICD-10-CM

## 2023-05-11 ENCOUNTER — HOSPITAL ENCOUNTER (OUTPATIENT)
Age: 83
Discharge: HOME | End: 2023-05-11
Payer: MEDICARE

## 2023-05-11 DIAGNOSIS — N18.4 CKD (CHRONIC KIDNEY DISEASE), STAGE IV (HCC): ICD-10-CM

## 2023-05-11 DIAGNOSIS — I51.89 DIASTOLIC DYSFUNCTION: ICD-10-CM

## 2023-05-11 DIAGNOSIS — I10 ESSENTIAL HYPERTENSION: ICD-10-CM

## 2023-05-11 LAB
BACTERIA URNS QL MICRO: ABNORMAL /HPF
BILIRUB UR QL STRIP.AUTO: NEGATIVE
CLARITY UR: CLEAR
COLOR UR: YELLOW
EPI CELLS #/AREA URNS AUTO: 1 /HPF (ref 0–5)
GLUCOSE UR STRIP.AUTO-MCNC: NEGATIVE MG/DL
HGB UR QL STRIP.AUTO: ABNORMAL
HYALINE CASTS #/AREA URNS AUTO: 0 /LPF (ref 0–8)
KETONES UR STRIP.AUTO-MCNC: NEGATIVE MG/DL
LEUKOCYTE ESTERASE UR QL STRIP.AUTO: ABNORMAL
NITRITE UR QL STRIP.AUTO: NEGATIVE
PH UR STRIP.AUTO: 5 [PH] (ref 5–8)
PROT UR STRIP.AUTO-MCNC: NEGATIVE MG/DL
RBC CLUMPS #/AREA URNS AUTO: 39 /HPF (ref 0–4)
SP GR UR STRIP.AUTO: 1.01 (ref 1–1.03)
UA DIPSTICK W REFLEX MICRO PNL UR: YES
URN SPEC COLLECT METH UR: ABNORMAL
UROBILINOGEN UR STRIP-ACNC: 0.2 E.U./DL
WBC #/AREA URNS AUTO: 4 /HPF (ref 0–5)

## 2023-05-11 PROCEDURE — 81001 URINALYSIS AUTO W/SCOPE: CPT

## 2023-05-11 PROCEDURE — 36415 COLL VENOUS BLD VENIPUNCTURE: CPT

## 2023-05-11 PROCEDURE — 80048 BASIC METABOLIC PNL TOTAL CA: CPT

## 2023-05-12 LAB
ANION GAP SERPL CALCULATED.3IONS-SCNC: 10 MMOL/L (ref 3–16)
BUN SERPL-MCNC: 47 MG/DL (ref 7–20)
CALCIUM SERPL-MCNC: 9.2 MG/DL (ref 8.3–10.6)
CHLORIDE SERPL-SCNC: 106 MMOL/L (ref 99–110)
CO2 SERPL-SCNC: 23 MMOL/L (ref 21–32)
CREAT SERPL-MCNC: 2.5 MG/DL (ref 0.6–1.2)
GFR SERPLBLD CREATININE-BSD FMLA CKD-EPI: 19 ML/MIN/{1.73_M2}
GLUCOSE SERPL-MCNC: 112 MG/DL (ref 70–99)
POTASSIUM SERPL-SCNC: 4.6 MMOL/L (ref 3.5–5.1)
SODIUM SERPL-SCNC: 139 MMOL/L (ref 136–145)

## 2023-05-23 RX ORDER — APIXABAN 5 MG/1
TABLET, FILM COATED ORAL
Qty: 180 TABLET | Refills: 3 | Status: SHIPPED | OUTPATIENT
Start: 2023-05-23 | End: 2023-05-23

## 2023-05-23 NOTE — TELEPHONE ENCOUNTER
Last OV:03/11/2022 Refugio Castanon  Last Labs:Cbc-10/31/2022    Next OV:07/20/2023  Refugio Castanon  Last Refill: Eliquis-06/20/22  Refugio Castanon

## 2023-06-20 PROBLEM — K65.1 INTRA-ABDOMINAL ABSCESS (HCC): Status: RESOLVED | Noted: 2021-03-30 | Resolved: 2023-06-20

## 2023-06-23 ENCOUNTER — HOSPITAL ENCOUNTER (OUTPATIENT)
Age: 83
Discharge: HOME | End: 2023-06-23
Payer: MEDICARE

## 2023-06-23 DIAGNOSIS — I10 ESSENTIAL HYPERTENSION: ICD-10-CM

## 2023-06-23 DIAGNOSIS — R76.8 ANA POSITIVE: ICD-10-CM

## 2023-06-23 DIAGNOSIS — N18.4 CKD (CHRONIC KIDNEY DISEASE), STAGE IV (HCC): ICD-10-CM

## 2023-06-23 LAB
ANION GAP SERPL CALCULATED.3IONS-SCNC: 9 MMOL/L (ref 3–16)
BUN SERPL-MCNC: 41 MG/DL (ref 7–20)
C3 SERPL-MCNC: 151.6 MG/DL (ref 90–180)
C4 SERPL-MCNC: 15.9 MG/DL (ref 10–40)
CALCIUM SERPL-MCNC: 8.9 MG/DL (ref 8.3–10.6)
CHLORIDE SERPL-SCNC: 106 MMOL/L (ref 99–110)
CO2 SERPL-SCNC: 25 MMOL/L (ref 21–32)
CREAT SERPL-MCNC: 2.5 MG/DL (ref 0.6–1.2)
CREAT UR-MCNC: 31.9 MG/DL (ref 28–259)
GFR SERPLBLD CREATININE-BSD FMLA CKD-EPI: 19 ML/MIN/{1.73_M2}
GLUCOSE SERPL-MCNC: 98 MG/DL (ref 70–99)
POTASSIUM SERPL-SCNC: 4 MMOL/L (ref 3.5–5.1)
PROT UR-MCNC: 6 MG/DL
PROT/CREAT UR-RTO: 0.2 MG/DL
SODIUM SERPL-SCNC: 140 MMOL/L (ref 136–145)

## 2023-06-23 PROCEDURE — 84156 ASSAY OF PROTEIN URINE: CPT

## 2023-06-23 PROCEDURE — 36415 COLL VENOUS BLD VENIPUNCTURE: CPT

## 2023-06-23 PROCEDURE — 80048 BASIC METABOLIC PNL TOTAL CA: CPT

## 2023-06-23 PROCEDURE — 82570 ASSAY OF URINE CREATININE: CPT

## 2023-06-23 PROCEDURE — 86160 COMPLEMENT ANTIGEN: CPT

## 2023-07-20 ENCOUNTER — OFFICE VISIT (OUTPATIENT)
Dept: CARDIOLOGY CLINIC | Age: 83
End: 2023-07-20
Payer: MEDICARE

## 2023-07-20 ENCOUNTER — NURSE ONLY (OUTPATIENT)
Dept: CARDIOLOGY CLINIC | Age: 83
End: 2023-07-20

## 2023-07-20 VITALS
WEIGHT: 219 LBS | BODY MASS INDEX: 41.35 KG/M2 | HEIGHT: 61 IN | DIASTOLIC BLOOD PRESSURE: 58 MMHG | SYSTOLIC BLOOD PRESSURE: 130 MMHG | OXYGEN SATURATION: 96 % | HEART RATE: 64 BPM

## 2023-07-20 DIAGNOSIS — Z45.09 ENCOUNTER FOR LOOP RECORDER CHECK: ICD-10-CM

## 2023-07-20 DIAGNOSIS — I44.7 LBBB (LEFT BUNDLE BRANCH BLOCK): ICD-10-CM

## 2023-07-20 DIAGNOSIS — I48.0 PAF (PAROXYSMAL ATRIAL FIBRILLATION) (HCC): Primary | Chronic | ICD-10-CM

## 2023-07-20 DIAGNOSIS — I44.0 1ST DEGREE AV BLOCK: ICD-10-CM

## 2023-07-20 DIAGNOSIS — I10 ESSENTIAL HYPERTENSION: Chronic | ICD-10-CM

## 2023-07-20 DIAGNOSIS — I51.89 DIASTOLIC DYSFUNCTION: Chronic | ICD-10-CM

## 2023-07-20 DIAGNOSIS — I25.10 CORONARY ARTERY DISEASE INVOLVING NATIVE CORONARY ARTERY OF NATIVE HEART WITHOUT ANGINA PECTORIS: Chronic | ICD-10-CM

## 2023-07-20 DIAGNOSIS — G47.33 OSA (OBSTRUCTIVE SLEEP APNEA): ICD-10-CM

## 2023-07-20 PROCEDURE — 3075F SYST BP GE 130 - 139MM HG: CPT | Performed by: NURSE PRACTITIONER

## 2023-07-20 PROCEDURE — 1123F ACP DISCUSS/DSCN MKR DOCD: CPT | Performed by: NURSE PRACTITIONER

## 2023-07-20 PROCEDURE — 99214 OFFICE O/P EST MOD 30 MIN: CPT | Performed by: NURSE PRACTITIONER

## 2023-07-20 PROCEDURE — 93000 ELECTROCARDIOGRAM COMPLETE: CPT | Performed by: NURSE PRACTITIONER

## 2023-07-20 PROCEDURE — 3078F DIAST BP <80 MM HG: CPT | Performed by: NURSE PRACTITIONER

## 2023-07-20 NOTE — PROGRESS NOTES
Patient comes in for her OV with NPSR today. Patient has [employer]onic HHT47 LINQ II-12/13/2021. Carelink Interrogation shows Battery Status GOOD. No new episode noted since last remote on 9/8/2022. 0.2% PVC burden. Prior AF and Tachy episodes noted. Prior titus and pauses noted are undersensing. Implanted for suspected AF. Patient remains Eliquis and Coreg. Please see interrogation for more detail. We will continue to follow the Patient remotely.

## 2023-08-31 PROCEDURE — 93298 REM INTERROG DEV EVAL SCRMS: CPT | Performed by: INTERNAL MEDICINE

## 2023-08-31 PROCEDURE — G2066 INTER DEVC REMOTE 30D: HCPCS | Performed by: INTERNAL MEDICINE

## 2023-09-05 RX ORDER — CARVEDILOL 3.12 MG/1
TABLET ORAL
Qty: 180 TABLET | Refills: 0 | Status: SHIPPED | OUTPATIENT
Start: 2023-09-05

## 2023-10-05 PROCEDURE — 93298 REM INTERROG DEV EVAL SCRMS: CPT | Performed by: INTERNAL MEDICINE

## 2023-10-05 PROCEDURE — G2066 INTER DEVC REMOTE 30D: HCPCS | Performed by: INTERNAL MEDICINE

## 2023-10-25 NOTE — PROGRESS NOTES
[facility]   Electrophysiology  JIMENA Nuñez-CNP  Attending EP: Dr. Waggoner Route    Date: 11/22/2023  I had the privilege of visiting Tra Mccauley in the office. Chief Complaint:   Chief Complaint   Patient presents with    Device Check     With appt    Follow-up     4mnth No Cardiac Symptoms with Device check     History of Present Illness: History obtained from patient and medical record. Tra Mccauley is 82 y.o. female with a past medical history of HTN, HLD, CKD, PAF, LBBB, dCHF, and CAD. In March of 2021, pt presented to hospital by recommendation of her PCP for anemia. Her hemoglobin was 5.8 and was she received multiple units of blood. GI completed colonoscopy/EGD with no gross bleeding found, however, she was found to have a mass in her colon. She underwent surgical bowel resection. During admission, she developed AF with RVR and was started on amiodarone.     -Interval history: Today, Tra Mccauley is being seen for routine follow up. Pt is present in a wheel chair. Her grand daughter is present for the visit. She is doing well. She denies any cardiac complaints. She has chronic leg swelling that is at its baseline. She just had labs done 2 weeks ago for her PCP. Pt denies any GIB. She checks her stool daily for any s/s of GIB. She denies any fatigue. Denies having chest pain, palpitations, shortness of breath, orthopnea/PND, cough, or dizziness at the time of this visit. With regard to medication therapy the patient has been compliant with prescribed regimen. They have tolerated therapy to date. Allergies:   Allergies   Allergen Reactions    Acetomenaphthone (Menadiol Diacetate) [Menadiol Sodium Diphosphate]      Upset stomach     Lisinopril-Hydrochlorothiazide Other (See Comments)    Advil [Ibuprofen] Nausea And Vomiting    Aleve [Naproxen] Nausea And Vomiting    Tylenol [Acetaminophen] Nausea And Vomiting     Home Medications:  Prior to Visit Medications    Medication

## 2023-11-09 PROCEDURE — G2066 INTER DEVC REMOTE 30D: HCPCS | Performed by: INTERNAL MEDICINE

## 2023-11-09 PROCEDURE — 93298 REM INTERROG DEV EVAL SCRMS: CPT | Performed by: INTERNAL MEDICINE

## 2023-11-22 ENCOUNTER — NURSE ONLY (OUTPATIENT)
Dept: CARDIOLOGY CLINIC | Age: 83
End: 2023-11-22

## 2023-11-22 ENCOUNTER — OFFICE VISIT (OUTPATIENT)
Dept: CARDIOLOGY CLINIC | Age: 83
End: 2023-11-22

## 2023-11-22 VITALS
HEIGHT: 61 IN | DIASTOLIC BLOOD PRESSURE: 68 MMHG | BODY MASS INDEX: 40.4 KG/M2 | WEIGHT: 214 LBS | HEART RATE: 62 BPM | SYSTOLIC BLOOD PRESSURE: 140 MMHG | OXYGEN SATURATION: 99 %

## 2023-11-22 DIAGNOSIS — G47.33 OSA (OBSTRUCTIVE SLEEP APNEA): ICD-10-CM

## 2023-11-22 DIAGNOSIS — I10 ESSENTIAL HYPERTENSION: Chronic | ICD-10-CM

## 2023-11-22 DIAGNOSIS — I48.0 PAF (PAROXYSMAL ATRIAL FIBRILLATION) (HCC): Primary | Chronic | ICD-10-CM

## 2023-11-22 DIAGNOSIS — N18.31 STAGE 3A CHRONIC KIDNEY DISEASE (HCC): Chronic | ICD-10-CM

## 2023-11-22 DIAGNOSIS — Z45.09 ENCOUNTER FOR LOOP RECORDER CHECK: ICD-10-CM

## 2023-11-22 DIAGNOSIS — D64.9 CHRONIC ANEMIA: ICD-10-CM

## 2023-11-22 NOTE — PROGRESS NOTES
Patient comes in for her OV with NPSR today. Patient has Medtronic BSX67 LINQ II-12/13/2021. [facility] Interrogation shows Battery Status GOOD. Lots of noise and undersensing noted. 1.6% PVC burden. Implanted for suspected AF. Patient remains Eliquis and Coreg. Please see interrogation under media tab for more detail. We will continue to follow the Patient remotely.

## 2023-12-12 RX ORDER — CARVEDILOL 3.12 MG/1
TABLET ORAL
Qty: 180 TABLET | Refills: 3 | Status: SHIPPED | OUTPATIENT
Start: 2023-12-12

## 2023-12-12 NOTE — TELEPHONE ENCOUNTER
Last OV: 23    Last labs:23    Last EK    Last Refill: 23     Appt scheduled : 24  NPTS   3/22/24 NPSR

## 2023-12-14 PROCEDURE — 93298 REM INTERROG DEV EVAL SCRMS: CPT | Performed by: INTERNAL MEDICINE

## 2023-12-14 PROCEDURE — G2066 INTER DEVC REMOTE 30D: HCPCS | Performed by: INTERNAL MEDICINE

## 2023-12-27 ENCOUNTER — HOSPITAL ENCOUNTER (OUTPATIENT)
Age: 83
Discharge: HOME | End: 2023-12-27
Payer: MEDICARE

## 2023-12-27 DIAGNOSIS — N18.5 CKD (CHRONIC KIDNEY DISEASE) STAGE 5, GFR LESS THAN 15 ML/MIN (HCC): ICD-10-CM

## 2023-12-27 LAB
25(OH)D3 SERPL-MCNC: 16.8 NG/ML
ANION GAP SERPL CALCULATED.3IONS-SCNC: 12 MMOL/L (ref 3–16)
BASOPHILS # BLD: 0.1 K/UL (ref 0–0.2)
BASOPHILS NFR BLD: 0.9 %
BUN SERPL-MCNC: 59 MG/DL (ref 7–20)
CALCIUM SERPL-MCNC: 8.4 MG/DL (ref 8.3–10.6)
CHLORIDE SERPL-SCNC: 109 MMOL/L (ref 99–110)
CO2 SERPL-SCNC: 20 MMOL/L (ref 21–32)
CREAT SERPL-MCNC: 4.8 MG/DL (ref 0.6–1.2)
DEPRECATED RDW RBC AUTO: 14.1 % (ref 12.4–15.4)
EOSINOPHIL # BLD: 0.4 K/UL (ref 0–0.6)
EOSINOPHIL NFR BLD: 5.1 %
GFR SERPLBLD CREATININE-BSD FMLA CKD-EPI: 9 ML/MIN/{1.73_M2}
GLUCOSE SERPL-MCNC: 96 MG/DL (ref 70–99)
HCT VFR BLD AUTO: 21.6 % (ref 36–48)
HGB BLD-MCNC: 6.9 G/DL (ref 12–16)
IRON SATN MFR SERPL: 18 % (ref 15–50)
IRON SERPL-MCNC: 46 UG/DL (ref 37–145)
LYMPHOCYTES # BLD: 2.2 K/UL (ref 1–5.1)
LYMPHOCYTES NFR BLD: 28.1 %
MCH RBC QN AUTO: 27.6 PG (ref 26–34)
MCHC RBC AUTO-ENTMCNC: 31.9 G/DL (ref 31–36)
MCV RBC AUTO: 86.4 FL (ref 80–100)
MONOCYTES # BLD: 0.6 K/UL (ref 0–1.3)
MONOCYTES NFR BLD: 7.5 %
NEUTROPHILS # BLD: 4.6 K/UL (ref 1.7–7.7)
NEUTROPHILS NFR BLD: 58.4 %
PHOSPHATE SERPL-MCNC: 4.7 MG/DL (ref 2.5–4.9)
PLATELET # BLD AUTO: 252 K/UL (ref 135–450)
PMV BLD AUTO: 8.7 FL (ref 5–10.5)
POTASSIUM SERPL-SCNC: 4.5 MMOL/L (ref 3.5–5.1)
PTH-INTACT SERPL-MCNC: 344.6 PG/ML (ref 14–72)
RBC # BLD AUTO: 2.5 M/UL (ref 4–5.2)
SODIUM SERPL-SCNC: 141 MMOL/L (ref 136–145)
TIBC SERPL-MCNC: 252 UG/DL (ref 260–445)
WBC # BLD AUTO: 7.8 K/UL (ref 4–11)

## 2023-12-27 PROCEDURE — 36415 COLL VENOUS BLD VENIPUNCTURE: CPT

## 2023-12-27 PROCEDURE — 85025 COMPLETE CBC W/AUTO DIFF WBC: CPT

## 2023-12-27 PROCEDURE — 83540 ASSAY OF IRON: CPT

## 2023-12-27 PROCEDURE — 83550 IRON BINDING TEST: CPT

## 2023-12-27 PROCEDURE — 80048 BASIC METABOLIC PNL TOTAL CA: CPT

## 2023-12-27 PROCEDURE — 82306 VITAMIN D 25 HYDROXY: CPT

## 2023-12-27 PROCEDURE — 82728 ASSAY OF FERRITIN: CPT

## 2023-12-27 PROCEDURE — 83970 ASSAY OF PARATHORMONE: CPT

## 2023-12-27 PROCEDURE — 84100 ASSAY OF PHOSPHORUS: CPT

## 2023-12-28 ENCOUNTER — HOSPITAL ENCOUNTER (INPATIENT)
Age: 83
LOS: 2 days | Discharge: HOME | DRG: 378 | End: 2023-12-30
Attending: EMERGENCY MEDICINE | Admitting: INTERNAL MEDICINE
Payer: MEDICARE

## 2023-12-28 ENCOUNTER — APPOINTMENT (OUTPATIENT)
Dept: GENERAL RADIOLOGY | Age: 83
DRG: 378 | End: 2023-12-28
Payer: MEDICARE

## 2023-12-28 DIAGNOSIS — N18.4 ACUTE RENAL FAILURE SUPERIMPOSED ON STAGE 4 CHRONIC KIDNEY DISEASE, UNSPECIFIED ACUTE RENAL FAILURE TYPE (HCC): Primary | ICD-10-CM

## 2023-12-28 DIAGNOSIS — N18.9 ANEMIA DUE TO CHRONIC KIDNEY DISEASE, UNSPECIFIED CKD STAGE: ICD-10-CM

## 2023-12-28 DIAGNOSIS — N17.9 ACUTE RENAL FAILURE SUPERIMPOSED ON STAGE 4 CHRONIC KIDNEY DISEASE, UNSPECIFIED ACUTE RENAL FAILURE TYPE (HCC): Primary | ICD-10-CM

## 2023-12-28 DIAGNOSIS — D63.1 ANEMIA DUE TO CHRONIC KIDNEY DISEASE, UNSPECIFIED CKD STAGE: ICD-10-CM

## 2023-12-28 DIAGNOSIS — R19.5 GUAIAC POSITIVE STOOLS: ICD-10-CM

## 2023-12-28 LAB
ABO + RH BLD: NORMAL
ALBUMIN SERPL-MCNC: 3.9 G/DL (ref 3.4–5)
ALBUMIN/GLOB SERPL: 1.2 {RATIO} (ref 1.1–2.2)
ALP SERPL-CCNC: 117 U/L (ref 40–129)
ALT SERPL-CCNC: 13 U/L (ref 10–40)
ANION GAP SERPL CALCULATED.3IONS-SCNC: 11 MMOL/L (ref 3–16)
APTT BLD: 36.4 SEC (ref 22.7–35.9)
AST SERPL-CCNC: 20 U/L (ref 15–37)
BASOPHILS # BLD: 0 K/UL (ref 0–0.2)
BASOPHILS NFR BLD: 0.4 %
BILIRUB SERPL-MCNC: <0.2 MG/DL (ref 0–1)
BLD GP AB SCN SERPL QL: NORMAL
BLOOD BANK DISPENSE STATUS: NORMAL
BLOOD BANK PRODUCT CODE: NORMAL
BPU ID: NORMAL
BUN SERPL-MCNC: 58 MG/DL (ref 7–20)
CALCIUM SERPL-MCNC: 8.6 MG/DL (ref 8.3–10.6)
CHLORIDE SERPL-SCNC: 112 MMOL/L (ref 99–110)
CO2 SERPL-SCNC: 21 MMOL/L (ref 21–32)
CREAT SERPL-MCNC: 4.9 MG/DL (ref 0.6–1.2)
DEPRECATED RDW RBC AUTO: 14.3 % (ref 12.4–15.4)
DESCRIPTION BLOOD BANK: NORMAL
EKG ATRIAL RATE: 65 BPM
EKG DIAGNOSIS: NORMAL
EKG P-R INTERVAL: 264 MS
EKG Q-T INTERVAL: 446 MS
EKG QRS DURATION: 148 MS
EKG QTC CALCULATION (BAZETT): 463 MS
EKG R AXIS: -34 DEGREES
EKG T AXIS: 91 DEGREES
EKG VENTRICULAR RATE: 65 BPM
EOSINOPHIL # BLD: 0.3 K/UL (ref 0–0.6)
EOSINOPHIL NFR BLD: 3.5 %
FERRITIN SERPL IA-MCNC: 229.9 NG/ML (ref 15–150)
GFR SERPLBLD CREATININE-BSD FMLA CKD-EPI: 8 ML/MIN/{1.73_M2}
GLUCOSE SERPL-MCNC: 116 MG/DL (ref 70–99)
HCT VFR BLD AUTO: 21.1 % (ref 36–48)
HEMOCCULT STL QL: ABNORMAL
HGB BLD-MCNC: 6.8 G/DL (ref 12–16)
INR PPP: 1.56 (ref 0.84–1.16)
LIPASE SERPL-CCNC: 55 U/L (ref 13–60)
LYMPHOCYTES # BLD: 1.5 K/UL (ref 1–5.1)
LYMPHOCYTES NFR BLD: 20.8 %
MCH RBC QN AUTO: 27.6 PG (ref 26–34)
MCHC RBC AUTO-ENTMCNC: 32.1 G/DL (ref 31–36)
MCV RBC AUTO: 85.9 FL (ref 80–100)
MONOCYTES # BLD: 0.5 K/UL (ref 0–1.3)
MONOCYTES NFR BLD: 6.6 %
NEUTROPHILS # BLD: 5 K/UL (ref 1.7–7.7)
NEUTROPHILS NFR BLD: 68.7 %
PLATELET # BLD AUTO: 249 K/UL (ref 135–450)
PMV BLD AUTO: 7.9 FL (ref 5–10.5)
POTASSIUM SERPL-SCNC: 4.7 MMOL/L (ref 3.5–5.1)
PROT SERPL-MCNC: 7.2 G/DL (ref 6.4–8.2)
PROTHROMBIN TIME: 18.7 SEC (ref 11.5–14.8)
RBC # BLD AUTO: 2.45 M/UL (ref 4–5.2)
SODIUM SERPL-SCNC: 144 MMOL/L (ref 136–145)
WBC # BLD AUTO: 7.3 K/UL (ref 4–11)

## 2023-12-28 PROCEDURE — P9016 RBC LEUKOCYTES REDUCED: HCPCS

## 2023-12-28 PROCEDURE — 85610 PROTHROMBIN TIME: CPT

## 2023-12-28 PROCEDURE — 2580000003 HC RX 258: Performed by: INTERNAL MEDICINE

## 2023-12-28 PROCEDURE — C9113 INJ PANTOPRAZOLE SODIUM, VIA: HCPCS | Performed by: INTERNAL MEDICINE

## 2023-12-28 PROCEDURE — 71045 X-RAY EXAM CHEST 1 VIEW: CPT

## 2023-12-28 PROCEDURE — 86901 BLOOD TYPING SEROLOGIC RH(D): CPT

## 2023-12-28 PROCEDURE — 93010 ELECTROCARDIOGRAM REPORT: CPT | Performed by: INTERNAL MEDICINE

## 2023-12-28 PROCEDURE — 85025 COMPLETE CBC W/AUTO DIFF WBC: CPT

## 2023-12-28 PROCEDURE — 80053 COMPREHEN METABOLIC PANEL: CPT

## 2023-12-28 PROCEDURE — 1200000000 HC SEMI PRIVATE

## 2023-12-28 PROCEDURE — 6360000002 HC RX W HCPCS: Performed by: INTERNAL MEDICINE

## 2023-12-28 PROCEDURE — 86923 COMPATIBILITY TEST ELECTRIC: CPT

## 2023-12-28 PROCEDURE — 36430 TRANSFUSION BLD/BLD COMPNT: CPT

## 2023-12-28 PROCEDURE — 86900 BLOOD TYPING SEROLOGIC ABO: CPT

## 2023-12-28 PROCEDURE — 83690 ASSAY OF LIPASE: CPT

## 2023-12-28 PROCEDURE — 82270 OCCULT BLOOD FECES: CPT

## 2023-12-28 PROCEDURE — 6370000000 HC RX 637 (ALT 250 FOR IP): Performed by: INTERNAL MEDICINE

## 2023-12-28 PROCEDURE — C9113 INJ PANTOPRAZOLE SODIUM, VIA: HCPCS | Performed by: PHYSICIAN ASSISTANT

## 2023-12-28 PROCEDURE — 85730 THROMBOPLASTIN TIME PARTIAL: CPT

## 2023-12-28 PROCEDURE — 6360000002 HC RX W HCPCS: Performed by: PHYSICIAN ASSISTANT

## 2023-12-28 PROCEDURE — 86850 RBC ANTIBODY SCREEN: CPT

## 2023-12-28 PROCEDURE — 99285 EMERGENCY DEPT VISIT HI MDM: CPT

## 2023-12-28 PROCEDURE — 2580000003 HC RX 258: Performed by: PHYSICIAN ASSISTANT

## 2023-12-28 PROCEDURE — 30233N1 TRANSFUSION OF NONAUTOLOGOUS RED BLOOD CELLS INTO PERIPHERAL VEIN, PERCUTANEOUS APPROACH: ICD-10-PCS | Performed by: INTERNAL MEDICINE

## 2023-12-28 PROCEDURE — 36415 COLL VENOUS BLD VENIPUNCTURE: CPT

## 2023-12-28 PROCEDURE — 93005 ELECTROCARDIOGRAM TRACING: CPT | Performed by: PHYSICIAN ASSISTANT

## 2023-12-28 RX ORDER — ROSUVASTATIN CALCIUM 20 MG/1
20 TABLET, COATED ORAL NIGHTLY
Status: DISCONTINUED | OUTPATIENT
Start: 2023-12-28 | End: 2023-12-30 | Stop reason: HOSPADM

## 2023-12-28 RX ORDER — CITRIC ACID/SODIUM CITRATE 334-500MG
30 SOLUTION, ORAL ORAL DAILY
Status: DISCONTINUED | OUTPATIENT
Start: 2023-12-28 | End: 2023-12-30 | Stop reason: HOSPADM

## 2023-12-28 RX ORDER — SODIUM CHLORIDE 9 MG/ML
INJECTION, SOLUTION INTRAVENOUS PRN
Status: DISCONTINUED | OUTPATIENT
Start: 2023-12-28 | End: 2023-12-30 | Stop reason: HOSPADM

## 2023-12-28 RX ORDER — 0.9 % SODIUM CHLORIDE 0.9 %
500 INTRAVENOUS SOLUTION INTRAVENOUS ONCE
Status: COMPLETED | OUTPATIENT
Start: 2023-12-28 | End: 2023-12-28

## 2023-12-28 RX ORDER — SODIUM CHLORIDE 0.9 % (FLUSH) 0.9 %
5-40 SYRINGE (ML) INJECTION EVERY 12 HOURS SCHEDULED
Status: DISCONTINUED | OUTPATIENT
Start: 2023-12-28 | End: 2023-12-30 | Stop reason: HOSPADM

## 2023-12-28 RX ORDER — SODIUM CHLORIDE, SODIUM LACTATE, POTASSIUM CHLORIDE, CALCIUM CHLORIDE 600; 310; 30; 20 MG/100ML; MG/100ML; MG/100ML; MG/100ML
INJECTION, SOLUTION INTRAVENOUS CONTINUOUS
Status: DISCONTINUED | OUTPATIENT
Start: 2023-12-28 | End: 2023-12-30

## 2023-12-28 RX ORDER — SODIUM CHLORIDE 0.9 % (FLUSH) 0.9 %
5-40 SYRINGE (ML) INJECTION PRN
Status: DISCONTINUED | OUTPATIENT
Start: 2023-12-28 | End: 2023-12-30 | Stop reason: HOSPADM

## 2023-12-28 RX ORDER — PANTOPRAZOLE SODIUM 40 MG/10ML
40 INJECTION, POWDER, LYOPHILIZED, FOR SOLUTION INTRAVENOUS 2 TIMES DAILY
Status: DISCONTINUED | OUTPATIENT
Start: 2023-12-28 | End: 2023-12-30 | Stop reason: HOSPADM

## 2023-12-28 RX ORDER — HEPARIN SODIUM 5000 [USP'U]/ML
5000 INJECTION, SOLUTION INTRAVENOUS; SUBCUTANEOUS EVERY 8 HOURS SCHEDULED
Status: DISCONTINUED | OUTPATIENT
Start: 2023-12-28 | End: 2023-12-30 | Stop reason: HOSPADM

## 2023-12-28 RX ORDER — ACETAMINOPHEN 325 MG/1
650 TABLET ORAL EVERY 6 HOURS PRN
Status: DISCONTINUED | OUTPATIENT
Start: 2023-12-28 | End: 2023-12-30 | Stop reason: HOSPADM

## 2023-12-28 RX ORDER — HYDROCODONE BITARTRATE AND ACETAMINOPHEN 5; 325 MG/1; MG/1
1 TABLET ORAL 2 TIMES DAILY
Status: DISCONTINUED | OUTPATIENT
Start: 2023-12-28 | End: 2023-12-30 | Stop reason: HOSPADM

## 2023-12-28 RX ORDER — CARVEDILOL 3.12 MG/1
3.12 TABLET ORAL 2 TIMES DAILY WITH MEALS
Status: DISCONTINUED | OUTPATIENT
Start: 2023-12-28 | End: 2023-12-30 | Stop reason: HOSPADM

## 2023-12-28 RX ORDER — POLYETHYLENE GLYCOL 3350 17 G/17G
17 POWDER, FOR SOLUTION ORAL DAILY PRN
Status: DISCONTINUED | OUTPATIENT
Start: 2023-12-28 | End: 2023-12-30 | Stop reason: HOSPADM

## 2023-12-28 RX ORDER — PANTOPRAZOLE SODIUM 40 MG/10ML
80 INJECTION, POWDER, LYOPHILIZED, FOR SOLUTION INTRAVENOUS ONCE
Status: COMPLETED | OUTPATIENT
Start: 2023-12-28 | End: 2023-12-28

## 2023-12-28 RX ORDER — ACETAMINOPHEN 650 MG/1
650 SUPPOSITORY RECTAL EVERY 6 HOURS PRN
Status: DISCONTINUED | OUTPATIENT
Start: 2023-12-28 | End: 2023-12-30 | Stop reason: HOSPADM

## 2023-12-28 RX ADMIN — PANTOPRAZOLE SODIUM 80 MG: 40 INJECTION, POWDER, FOR SOLUTION INTRAVENOUS at 13:14

## 2023-12-28 RX ADMIN — PANTOPRAZOLE SODIUM 40 MG: 40 INJECTION, POWDER, FOR SOLUTION INTRAVENOUS at 21:29

## 2023-12-28 RX ADMIN — SODIUM CHLORIDE, PRESERVATIVE FREE 10 ML: 5 INJECTION INTRAVENOUS at 20:11

## 2023-12-28 RX ADMIN — ROSUVASTATIN 20 MG: 20 TABLET, FILM COATED ORAL at 20:11

## 2023-12-28 RX ADMIN — SODIUM CHLORIDE 500 ML: 9 INJECTION, SOLUTION INTRAVENOUS at 15:49

## 2023-12-28 RX ADMIN — CARVEDILOL 3.12 MG: 3.12 TABLET, FILM COATED ORAL at 18:55

## 2023-12-28 RX ADMIN — SODIUM CHLORIDE 200 MG: 9 INJECTION, SOLUTION INTRAVENOUS at 19:39

## 2023-12-28 RX ADMIN — SODIUM CHLORIDE, POTASSIUM CHLORIDE, SODIUM LACTATE AND CALCIUM CHLORIDE: 600; 310; 30; 20 INJECTION, SOLUTION INTRAVENOUS at 18:20

## 2023-12-28 RX ADMIN — SODIUM CITRATE AND CITRIC ACID MONOHYDRATE 30 ML: 500; 334 SOLUTION ORAL at 18:56

## 2023-12-28 RX ADMIN — EPOETIN ALFA-EPBX 10000 UNITS: 10000 INJECTION, SOLUTION INTRAVENOUS; SUBCUTANEOUS at 18:59

## 2023-12-28 RX ADMIN — SODIUM CHLORIDE, POTASSIUM CHLORIDE, SODIUM LACTATE AND CALCIUM CHLORIDE: 600; 310; 30; 20 INJECTION, SOLUTION INTRAVENOUS at 17:42

## 2023-12-28 RX ADMIN — HYDROCODONE BITARTRATE AND ACETAMINOPHEN 1 TABLET: 5; 325 TABLET ORAL at 20:11

## 2023-12-28 RX ADMIN — HEPARIN SODIUM 5000 UNITS: 5000 INJECTION INTRAVENOUS; SUBCUTANEOUS at 18:59

## 2023-12-28 ASSESSMENT — PAIN - FUNCTIONAL ASSESSMENT: PAIN_FUNCTIONAL_ASSESSMENT: NONE - DENIES PAIN

## 2023-12-28 ASSESSMENT — LIFESTYLE VARIABLES
HOW OFTEN DO YOU HAVE A DRINK CONTAINING ALCOHOL: NEVER
HOW OFTEN DO YOU HAVE A DRINK CONTAINING ALCOHOL: NEVER
HOW MANY STANDARD DRINKS CONTAINING ALCOHOL DO YOU HAVE ON A TYPICAL DAY: PATIENT DOES NOT DRINK
HOW MANY STANDARD DRINKS CONTAINING ALCOHOL DO YOU HAVE ON A TYPICAL DAY: PATIENT DOES NOT DRINK
HOW OFTEN DO YOU HAVE A DRINK CONTAINING ALCOHOL: NEVER
HOW MANY STANDARD DRINKS CONTAINING ALCOHOL DO YOU HAVE ON A TYPICAL DAY: PATIENT DOES NOT DRINK

## 2023-12-28 NOTE — ED PROVIDER NOTES
In addition to the advanced practice provider, I personally saw Mya Doing and performed a substantive portion of the visit including all aspects of the medical decision making. I made/approved the management plan and take responsibility for the patient management    Briefly, this is a 82 y.o. female here for abnormal renal function tests. Patient had laboratory studies performed by her nephrologist and was noted to have low hemoglobin as well. She denies any symptomatic complaints, overall states she feels very well. Denies any bleeding, reports dark stools for years which she attributes to taking iron supplementation. She is anticoagulated on Eliquis    On exam, patient afebrile and nontoxic. No distress. Heart RRR. Lungs CTAB. Abdomen soft, nondistended, nontender to palpation in all quadrants. Rectal exam performed by BRYANNA, please see her note for details. EKG  EKG was reviewed by emergency department physician in the absence of a cardiologist    Wide complex sinus rhythm, rate 65, left axis deviation, prolonged MI and wide QRS intervals, normal Qtc, no specific ST elevations or depressions, normal t-wave morphology, impression sinus rhythm with first-degree AV block and LBBB, no STEMI, no significant change from comparison 11/22/2023      Screenings   [place] Coma Scale  Eye Opening: Spontaneous  Best Verbal Response: Oriented  Best Motor Response: Obeys commands  [place] Coma Scale Score: 15      Is this patient to be included in the SEP-1 Core Measure due to severe sepsis or septic shock? No   Exclusion criteria - the patient is NOT to be included for SEP-1 Core Measure due to:   Infection is not suspected    Blood transfusion consent    I have discussed with the patient the rationale for blood component transfusion; its benefits in treating or preventing fatigue, organ damage, or death; and its risk which includes mild transfusion reactions, rare risk of blood borne infection, or more serious

## 2023-12-28 NOTE — PROGRESS NOTES
Pharmacy Home Medication Reconciliation Note    A medication reconciliation has been completed for Ayesha Houser 1940    Pharmacy: [address] [place] [address] provided by: patient, fill history    The patient's home medication list is as follows: No current facility-administered medications on file prior to encounter. Current Outpatient Medications on File Prior to Encounter   Medication Sig Dispense Refill    sodium bicarbonate 650 MG tablet Take 1 tablet by mouth daily (Patient not taking: Reported on 12/28/2023) 90 tablet 1    carvedilol (COREG) 3.125 MG tablet TAKE 1 TABLET BY MOUTH TWICE DAILY WITH MEALS 180 tablet 3    apixaban (ELIQUIS) 2.5 MG TABS tablet Take 1 tablet by mouth 2 times daily (Patient taking differently: Take 2 tablets by mouth daily) 60 tablet 5    torsemide (DEMADEX) 20 MG tablet Take 1 tablet by mouth once daily 30 tablet 5    rosuvastatin (CRESTOR) 20 MG tablet Take 1 tablet by mouth nightly 90 tablet 3    HYDROcodone-acetaminophen (NORCO) 5-325 MG per tablet Take 1 tablet by mouth 2 times daily. ferrous sulfate (IRON 325) 325 (65 Fe) MG tablet Take 1 tablet by mouth daily (with breakfast)      aspirin EC 81 MG EC tablet Take 1 tablet by mouth daily 30 tablet 0       Patient is no longer taking sodium bicarbonate, patient states it causes stomach upset. Of note, patient takes Eliquis 5mg once daily, last dose today at 0900. Timing of last doses updated.     Thank you,  Lesa Kilgore, PharmD, BCCCP

## 2023-12-28 NOTE — CONSULTS
Gastroenterology Consult Note        Patient: Daphne Aaron  : 1940  Acct#:      Date:  2023    Subjective:       History of Present Illness  Patient is a 82 y.o.  female admitted with YESENIA (acute kidney injury) ([address]) [N17.9] who is seen in consult for Anemia. Hx of CAD (on eliquis), HTN, HLD, Anemia, colon cancer (resection in ). Presented to the ED after her nephrologist completed routine labs showing low hgb 6.9. She reports having black stools but is on PO iron at home. Denies NSAIDs at home. Guiac stools were positive in the ED. She has no complaints, no abdominal pain nausea or vomiting. Was feeling fine before coming to the ED. No SOB, EUBANKS, CP. Past Medical History:   Diagnosis Date    Anemia     CAD (coronary artery disease) 2020    Stent    CKD (chronic kidney disease)     History of blood transfusion     Hyperlipidemia     patient states well controlled    Hypertension       Past Surgical History:   Procedure Laterality Date     SECTION      x3    CHOLECYSTECTOMY, LAPAROSCOPIC N/A 2021    LAPAROSCOPIC CHOLECYSTECTOMY performed by Dulce Maria Duran MD at [address] [place] [address] N/A 2021    COLONOSCOPY WITH BIOPSY performed by Prabha Jessica MD at [address] [place] [address]  2013    w/ bladder biopsy    FRACTURE SURGERY      right wrist    HEMICOLECTOMY Right 2021    LAPAROSCOPIC RIGHT COLON RESECTION performed by Dulce Maria Duran MD at [facility] N/A 2021    EGD BIOPSY performed by Prabha Jessica MD at [facility]      Past Endoscopic History  EGD and colonoscopy 3/5/2021 with dr. Irina Regalado  egd possible short mauro's, otherwise normal  Colon mass prox ascending colon explains anemia; smaller polyp not removed. Admission Meds  No current facility-administered medications on file prior to encounter.      Current Outpatient Medications on File Prior to Encounter notes, labs, imaging and other testing), documentation of findings and subsequent follow up of ordered medication and testing, placing referrals and communication with patient care providers, coordinating future care, as well as documentation in the EHR. I agree with the findings and recommended plan of care, as documented by the physician assistant/nurse practitioner.  In summary, my findings and plan are the followin-year-old  female with Hx of CAD (on eliquis), HTN, HLD, Anemia, proximal ascending colon cancer (resection in , no chemo or radiation).  Patient was sent by his nephrologist to the ER after routine labs shows a hemoglobin of 6.9.  Patient has chronic iron deficiency anemia and has been taking oral iron at home.  Because of this, stools are always dark/black.  Patient is completely asymptomatic.  Stool guaiac here was positive  Vital signs are stable.  Abdomen is obese, soft, nontender, no rebound or guarding  Impression/plan: Acute on chronic anemia with melena.  Guaiac positive stools.  Her current hemoglobin at 6.9.  Patient had received 1 unit of packed red cell in the ER.  N.p.o. after midnight.  IV PPI, monitor H&H, transfuse to keep hemoglobin above 7.  We will plan for EGD tomorrow to rule out active upper GI bleed.  If EGD negative and her hemoglobin stable, we could do outpatient colonoscopy to check for bleeding at the ileocolonic anastomosis.    Junior Hermosillo MD, MSc  [facility]  23

## 2023-12-28 NOTE — ACP (ADVANCE CARE PLANNING)
Advanced Care Planning Note. Purpose of Encounter: Advanced care planning in light of hospitalization  Parties In Attendance: Patient,    Decisional Capacity: Yes  Subjective: Patient  understand that this conversation is to address long term care goal  Objective:  to the hospital with acute anemia and YESENIA on CKD 3  Goals of Care Determination: Patient would pursue CPR and Intubation if required. No tracheostomy or long term ventilation if required.    Code Status: full code  Time spent on Advanced care Plannin minutes  Advanced Care Planning Documents: documented patient's wishes, would like Margaret Dukes to make medical decisions if unable to make decisions    Gerard Ambriz MD  2023 1:08 PM

## 2023-12-28 NOTE — ED NOTES
ED TO INPATIENT SBAR HANDOFF    Patient Name: Ayesha Houser   :  1940  82 y.o. MRN:  [id number]  Preferred Name  [facility]  ED Room #:  ED-002  Family/Caregiver Present yes   Restraints no   Sitter no   Sepsis Risk Score Sepsis Risk Score: 1.64    Situation  Code Status: Prior No additional code details. Allergies: Acetomenaphthone (menadiol diacetate) [menadiol sodium diphosphate], Lisinopril-hydrochlorothiazide, Advil [ibuprofen], Aleve [naproxen], and Tylenol [acetaminophen]  Weight: Patient Vitals for the past 96 hrs (Last 3 readings):   Weight   23 1027 96.2 kg (212 lb)     Arrived from: home  Chief Complaint:   Chief Complaint   Patient presents with    abnormal labs      Patient states the kidney doctor sent her to ER for low hgb. Patient needs a blood transfusion. Hospital Problem/Diagnosis:  Principal Problem:    YESENIA (acute kidney injury) ([address])  Resolved Problems:    * No resolved hospital problems. *    Imaging:   XR CHEST PORTABLE   Final Result   Mild central pulmonary vascular congestion.   No evidence of interstitial edema           Abnormal labs:   Abnormal Labs Reviewed   CBC WITH AUTO DIFFERENTIAL - Abnormal; Notable for the following components:       Result Value    RBC 2.45 (*)     Hemoglobin 6.8 (*)     Hematocrit 21.1 (*)     All other components within normal limits    Narrative:     CALL  Roger  SFER tel. [phone],  Hematology results called to and read back by LEXIE Horton,  2023 11:15, by Cleveland Mcmahan   COMPREHENSIVE METABOLIC PANEL W/ REFLEX TO MG FOR LOW K - Abnormal; Notable for the following components:    Chloride 112 (*)     Glucose 116 (*)     BUN 58 (*)     Creatinine 4.9 (*)     Est, Glom Filt Rate 8 (*)     All other components within normal limits   BLOOD OCCULT STOOL DIAGNOSTIC - Abnormal; Notable for the following components:    Occult Blood Diagnostic   (*)     Value: Result: POSITIVE  Normal range: Negative      All other components within Medications/Drips: yes   If Yes, please provide details: Blood transfusion  Pending Blood Product Administration: blood transfusion administering       You may also review the ED PT Care Timeline found under the Summary Nursing Index tab.    Recommendation    Pending orders Inpatient orders  Plan for Discharge (if known):   Additional Comments: Patient walks with a walker. Patient daughter is present. Patient had previous transfusion without reactions   If any further questions, please call Sending RN at Juana OWEN @ [phone]    Electronically signed by: Electronically signed by Juana Denton RN on 12/28/2023 at 1:26 PM

## 2023-12-28 NOTE — CONSULTS
Nephrology Consult Note  [facility]  [phone]   [facility]        Reason for Consult: YESENIA on CKD, anemia    HISTORY OF PRESENT ILLNESS:      82-year-old female with history of CKD 4, HTN, anemia CKD, who follows with nephrology for CKD 4 is admitted to the hospital after her outpatient labs showed worsening creatinine of 4.9 from base of 2.5, hemoglobin 6.8. Nephrology consulted for worsening kidney function, anemia. Patient was seen and examined in the ER. Daughter is at bedside. She is receiving 1 unit PRBC. Patient reports that she feels great. she denies any abdominal pain, nausea, vomiting, chest pain, shortness of breath, leg swelling. She reports good urine output. She is physically very active, cooks her own meals. No  metallic taste, loss of appetite, confusion. No NSAIDS. Past Medical History:        Diagnosis Date    Anemia     CAD (coronary artery disease) 2020    Stent    CKD (chronic kidney disease)     History of blood transfusion     Hyperlipidemia     patient states well controlled    Hypertension        Past Surgical History:        Procedure Laterality Date     SECTION      x3    CHOLECYSTECTOMY, LAPAROSCOPIC N/A 2021    LAPAROSCOPIC CHOLECYSTECTOMY performed by Ruben Joaquin MD at [facility] N/A 2021    COLONOSCOPY WITH BIOPSY performed by Mendy Garcia MD at [address]  2013    w/ bladder biopsy    FRACTURE SURGERY      right wrist    HEMICOLECTOMY Right 2021    LAPAROSCOPIC RIGHT COLON RESECTION performed by Ruben Joaquin MD at [facility] N/A 2021    EGD BIOPSY performed by Mendy Garcia MD at [facility]       Current Medications:    No current facility-administered medications on file prior to encounter.      Current Outpatient Medications on File Prior to Encounter   Medication Sig Dispense Refill    sodium Transportation (Medical): No     Lack of Transportation (Non-Medical): No   Physical Activity: Not on file   Stress: Not on file   Social Connections: Not on file   Intimate Partner Violence: Not on file   Housing Stability: Low Risk  (12/28/2023)    Housing Stability Vital Sign     Unable to Pay for Housing in the Last Year: No     Number of Places Lived in the Last Year: 1     Unstable Housing in the Last Year: No       Family History:       Problem Relation Age of Onset    Cirrhosis Mother         Hepatitis    Heart Disease Father     No Known Problems Sister     No Known Problems Sister     No Known Problems Brother     No Known Problems Brother     No Known Problems Brother     Alzheimer's Disease Brother     No Known Problems Brother          REVIEW OF SYSTEMS:     The remainder of the ROS is negative except per HPI.  No headache, no confusion.    PHYSICAL EXAM:    Vitals:    BP (!) 138/58   Pulse 72   Temp 98.9 °F (37.2 °C) (Oral)   Resp 23   Ht 1.524 m (5')   Wt 96.2 kg (212 lb)   SpO2 98%   BMI 41.40 kg/m²   No intake/output data recorded.  No intake/output data recorded.    Physical Exam:  Gen: Resting in bed, NAD.    HEENT: MMM, OP clear.  CV: RRR no m/r.  No S3.  Lungs: Clear bilaterally.  No rhonchi.  Abd: S/NT +BS  Ext: No edema, no cyanosis  Skin: Warm.  No rashes appreciated.  : No TTP over bladder, nondistended.  Neuro: Alert and oriented x 3, nonfocal.  Joints: No erythema noted over joints.    DATA:    CBC:   Lab Results   Component Value Date/Time    WBC 7.3 12/28/2023 10:41 AM    RBC 2.45 12/28/2023 10:41 AM    HGB 6.8 12/28/2023 10:41 AM    HCT 21.1 12/28/2023 10:41 AM    MCV 85.9 12/28/2023 10:41 AM    MCH 27.6 12/28/2023 10:41 AM    MCHC 32.1 12/28/2023 10:41 AM    RDW 14.3 12/28/2023 10:41 AM     12/28/2023 10:41 AM    MPV 7.9 12/28/2023 10:41 AM     CMP:    Lab Results   Component Value Date/Time     12/28/2023 10:41 AM    K 4.7 12/28/2023 10:41 AM     12/28/2023

## 2023-12-28 NOTE — PROGRESS NOTES
Order for Home BiPAP/CPAP per patients home settings, patient states she does not have or use a home BiPAP/CPAP.

## 2023-12-28 NOTE — PROGRESS NOTES
4 Eyes Skin Assessment     NAME:  Nishant Carreno  YOB: 1940  MEDICAL RECORD NUMBER:  [id number]    The patient is being assessed for  Admission    I agree that at least one RN has performed a thorough Head to Toe Skin Assessment on the patient. ALL assessment sites listed below have been assessed. Areas assessed by both nurses:    Head, Face, Ears, Shoulders, Back, Chest, Arms, Elbows, Hands, Sacrum. Buttock, Coccyx, Ischium, Legs. Feet and Heels, and Under Medical Devices         Does the Patient have a Wound?  No noted wound(s)       Sukhwinder Prevention initiated by RN: No  Wound Care Orders initiated by RN: No    Pressure Injury (Stage 3,4, Unstageable, DTI, NWPT, and Complex wounds) if present, place Wound referral order by RN under : No    New Ostomies, if present place, Ostomy referral order under : No     Nurse 1 eSignature: Electronically signed by Serina Galvez RN on 12/28/23 at 5:42 PM EST    **SHARE this note so that the co-signing nurse can place an eSignature**    Nurse 2 eSignature: Electronically signed by Anjana Osborne LPN on 30/85/87 at 5:00 PM EST

## 2023-12-28 NOTE — ED PROVIDER NOTES
[facility]  EMERGENCY DEPARTMENT ENCOUNTER        Pt Name: Megha Rojas  MRN: [id number]  Birthdate 1940  Date of evaluation: 12/28/2023  Provider: BASILIO Matamoros  PCP: Denice Reynolds MD  Note Started: 12:29 PM EST 12/28/23       I have seen and evaluated this patient with my supervising physician William Lynne DO.      CHIEF COMPLAINT       Chief Complaint   Patient presents with    abnormal labs      Patient states the kidney doctor sent her to ER for low hgb. Patient needs a blood transfusion.        HISTORY OF PRESENT ILLNESS: 1 or more Elements     History From: Patient, daughter  Limitations to history : None    Megha Rojas is a 83 y.o. female who presents to the ED at the recommendation of her nephrologist due to anemia and need for blood transfusion.  Patient has history of chronic kidney disease and anemia.  She had outpatient labs done yesterday and her hemoglobin was noted to be especially low so she was referred to the ED.  She is denying any symptoms whatsoever.  She states she has not seen blood in her stool, but her stools are dark as she takes iron so she is not certain.  She is denying any abdominal pain, nausea, vomiting or diarrhea.  Patient denies fever, malaise, fatigue, headache.  She denies history of congestive heart failure.  No other acute complaints at this time.    Nursing Notes were all reviewed and agreed with or any disagreements were addressed in the HPI.    REVIEW OF SYSTEMS :      Review of Systems   Constitutional:  Negative for chills, diaphoresis and fever.   HENT:  Negative for congestion, rhinorrhea and sore throat.    Eyes:  Negative for discharge, redness and itching.   Respiratory:  Negative for cough, shortness of breath and stridor.    Cardiovascular:  Negative for chest pain and palpitations.   Gastrointestinal:  Negative for abdominal pain, constipation, diarrhea, nausea and vomiting.   Genitourinary:  Negative for dysuria and  Lisinopril-hydrochlorothiazide, Advil [ibuprofen], Aleve [naproxen], and Tylenol [acetaminophen]    FAMILYHISTORY       Family History   Problem Relation Age of Onset    Cirrhosis Mother         Hepatitis    Heart Disease Father     No Known Problems Sister     No Known Problems Sister     No Known Problems Brother     No Known Problems Brother     No Known Problems Brother     Alzheimer's Disease Brother     No Known Problems Brother         SOCIAL HISTORY       Social History     Tobacco Use    Smoking status: Never    Smokeless tobacco: Never    Tobacco comments:      was a heavy smoker   Vaping Use    Vaping Use: Never used   Substance Use Topics    Alcohol use: No    Drug use: No       SCREENINGS        [place] Coma Scale  Eye Opening: Spontaneous  Best Verbal Response: Oriented  Best Motor Response: Obeys commands  Dulce Maria Coma Scale Score: 15                CIWA Assessment  BP: (!) 168/73  Pulse: 85           PHYSICAL EXAM  1 or more Elements     ED Triage Vitals [12/28/23 1027]   BP Temp Temp src Pulse Respirations SpO2 Height Weight - Scale   (!) 140/45 97.9 °F (36.6 °C) -- 79 18 99 % 1.524 m (5') 96.2 kg (212 lb)       Physical Exam  Constitutional:       General: She is not in acute distress.     Appearance: Normal appearance. She is not ill-appearing, toxic-appearing or diaphoretic.   HENT:      Head: Normocephalic and atraumatic.      Mouth/Throat:      Mouth: Mucous membranes are moist.   Eyes:      Conjunctiva/sclera: Conjunctivae normal.   Cardiovascular:      Rate and Rhythm: Normal rate and regular rhythm.      Pulses: Normal pulses.      Heart sounds: Normal heart sounds.   Pulmonary:      Effort: Pulmonary effort is normal. No respiratory distress.      Breath sounds: No wheezing or rhonchi.   Abdominal:      Palpations: Abdomen is soft.      Tenderness: There is no abdominal tenderness.   Musculoskeletal:         General: No tenderness or deformity. Normal range of motion.      Cervical  FOR LOW K   CBC WITH AUTO DIFFERENTIAL   TYPE AND SCREEN   PREPARE RBC (CROSSMATCH)       When ordered only abnormal lab results are displayed. All other labs were within normal range or not returned as of this dictation. EKG: When ordered, EKG's are interpreted by the Emergency Department Physician in the absence of a cardiologist.  Please see their note for interpretation of EKG. RADIOLOGY:   Non-plain film images such as CT, Ultrasound and MRI are read by the radiologist. Plain radiographic images are visualized and preliminarily interpreted by the ED Provider with the below findings:        Interpretation per the Radiologist below, if available at the time of this note:    XR CHEST PORTABLE   Final Result   Mild central pulmonary vascular congestion. No evidence of interstitial edema           XR CHEST PORTABLE    Result Date: 12/28/2023  EXAMINATION: ONE XRAY VIEW OF THE CHEST 12/28/2023 11:06 am COMPARISON: None. HISTORY: ORDERING SYSTEM PROVIDED HISTORY: eval for pulm congestion, anemic TECHNOLOGIST PROVIDED HISTORY: Reason for exam:->eval for pulm congestion, anemic Reason for Exam: eval for pulm congestion, anemic FINDINGS: Normal cardiomediastinal silhouette. Mild central pulmonary vascular congestion. No evidence of interstitial edema. No pneumothorax or pleural effusion     Mild central pulmonary vascular congestion. No evidence of interstitial edema       No results found. PROCEDURES   Unless otherwise noted below, none     Procedures    CRITICAL CARE TIME (.cctime)   There was a high probability of life-threatening clinical deterioration in the patient's condition requiring my urgent intervention. I personally saw the patient and independently provided 40 minutes of non-concurrent critical care out of the total shared critical care time provided, excluding separately reportable procedures.          PAST MEDICAL HISTORY      has a past medical history of Anemia, CAD (coronary artery

## 2023-12-28 NOTE — PROGRESS NOTES
Patient seen in ED, room 26. Admission completed with the following exceptions:  4 Eyes Assessment, Immunizations, Vaccines, Rights and Responsibilities, Orientation to room, Plan of Care, Education/Learning Assessment and Education Plan, white board, height and weight, pain assessment and head to toe assessment. Patient is alert and oriented X 4. Patient lives in a one story house. Home Medications as well as Outside Sources has been verbally reviewed with patient and updated as appropriate and is now Completed per pharmacy technician Aguila Collins. Plan of care updated if indicated. All questions answered. Daughter is at bedside. Dr. Dionna Jorgensen is now at bedside. Patient has advance directives but not on file (encouraged daughter to bring in directives).

## 2023-12-29 ENCOUNTER — ANESTHESIA (OUTPATIENT)
Dept: ENDOSCOPY | Age: 83
End: 2023-12-29
Payer: MEDICARE

## 2023-12-29 ENCOUNTER — ANESTHESIA EVENT (OUTPATIENT)
Dept: ENDOSCOPY | Age: 83
End: 2023-12-29
Payer: MEDICARE

## 2023-12-29 LAB
ANION GAP SERPL CALCULATED.3IONS-SCNC: 8 MMOL/L (ref 3–16)
BASOPHILS # BLD: 0.1 K/UL (ref 0–0.2)
BASOPHILS NFR BLD: 0.8 %
BUN SERPL-MCNC: 51 MG/DL (ref 7–20)
CALCIUM SERPL-MCNC: 8.9 MG/DL (ref 8.3–10.6)
CHLORIDE SERPL-SCNC: 112 MMOL/L (ref 99–110)
CO2 SERPL-SCNC: 22 MMOL/L (ref 21–32)
CREAT SERPL-MCNC: 4.5 MG/DL (ref 0.6–1.2)
DEPRECATED RDW RBC AUTO: 14 % (ref 12.4–15.4)
EOSINOPHIL # BLD: 0.2 K/UL (ref 0–0.6)
EOSINOPHIL NFR BLD: 2.7 %
GFR SERPLBLD CREATININE-BSD FMLA CKD-EPI: 9 ML/MIN/{1.73_M2}
GLUCOSE SERPL-MCNC: 93 MG/DL (ref 70–99)
HCT VFR BLD AUTO: 23.3 % (ref 36–48)
HGB BLD-MCNC: 7.6 G/DL (ref 12–16)
LYMPHOCYTES # BLD: 2 K/UL (ref 1–5.1)
LYMPHOCYTES NFR BLD: 27.8 %
MCH RBC QN AUTO: 28 PG (ref 26–34)
MCHC RBC AUTO-ENTMCNC: 32.6 G/DL (ref 31–36)
MCV RBC AUTO: 86 FL (ref 80–100)
MONOCYTES # BLD: 0.6 K/UL (ref 0–1.3)
MONOCYTES NFR BLD: 8.7 %
NEUTROPHILS # BLD: 4.2 K/UL (ref 1.7–7.7)
NEUTROPHILS NFR BLD: 60 %
PLATELET # BLD AUTO: 230 K/UL (ref 135–450)
PMV BLD AUTO: 8 FL (ref 5–10.5)
POTASSIUM SERPL-SCNC: 5 MMOL/L (ref 3.5–5.1)
RBC # BLD AUTO: 2.71 M/UL (ref 4–5.2)
SODIUM SERPL-SCNC: 142 MMOL/L (ref 136–145)
WBC # BLD AUTO: 7.1 K/UL (ref 4–11)

## 2023-12-29 PROCEDURE — 7100000001 HC PACU RECOVERY - ADDTL 15 MIN: Performed by: INTERNAL MEDICINE

## 2023-12-29 PROCEDURE — 2580000003 HC RX 258: Performed by: INTERNAL MEDICINE

## 2023-12-29 PROCEDURE — 85025 COMPLETE CBC W/AUTO DIFF WBC: CPT

## 2023-12-29 PROCEDURE — 6360000002 HC RX W HCPCS: Performed by: INTERNAL MEDICINE

## 2023-12-29 PROCEDURE — 3609013200 HC EGD W/ ABLATION: Performed by: INTERNAL MEDICINE

## 2023-12-29 PROCEDURE — 2580000003 HC RX 258: Performed by: NURSE ANESTHETIST, CERTIFIED REGISTERED

## 2023-12-29 PROCEDURE — 2709999900 HC NON-CHARGEABLE SUPPLY: Performed by: INTERNAL MEDICINE

## 2023-12-29 PROCEDURE — 3700000001 HC ADD 15 MINUTES (ANESTHESIA): Performed by: INTERNAL MEDICINE

## 2023-12-29 PROCEDURE — 2500000003 HC RX 250 WO HCPCS: Performed by: NURSE ANESTHETIST, CERTIFIED REGISTERED

## 2023-12-29 PROCEDURE — 36415 COLL VENOUS BLD VENIPUNCTURE: CPT

## 2023-12-29 PROCEDURE — 6360000002 HC RX W HCPCS: Performed by: NURSE ANESTHETIST, CERTIFIED REGISTERED

## 2023-12-29 PROCEDURE — 7100000000 HC PACU RECOVERY - FIRST 15 MIN: Performed by: INTERNAL MEDICINE

## 2023-12-29 PROCEDURE — 3609013000 HC EGD TRANSORAL CONTROL BLEEDING ANY METHOD: Performed by: INTERNAL MEDICINE

## 2023-12-29 PROCEDURE — 6370000000 HC RX 637 (ALT 250 FOR IP): Performed by: INTERNAL MEDICINE

## 2023-12-29 PROCEDURE — 80048 BASIC METABOLIC PNL TOTAL CA: CPT

## 2023-12-29 PROCEDURE — 1200000000 HC SEMI PRIVATE

## 2023-12-29 PROCEDURE — C9113 INJ PANTOPRAZOLE SODIUM, VIA: HCPCS | Performed by: INTERNAL MEDICINE

## 2023-12-29 PROCEDURE — 2720000010 HC SURG SUPPLY STERILE: Performed by: INTERNAL MEDICINE

## 2023-12-29 PROCEDURE — 0W3P8ZZ CONTROL BLEEDING IN GASTROINTESTINAL TRACT, VIA NATURAL OR ARTIFICIAL OPENING ENDOSCOPIC: ICD-10-PCS | Performed by: INTERNAL MEDICINE

## 2023-12-29 PROCEDURE — 3700000000 HC ANESTHESIA ATTENDED CARE: Performed by: INTERNAL MEDICINE

## 2023-12-29 RX ORDER — CALCITRIOL 0.25 UG/1
0.25 CAPSULE, LIQUID FILLED ORAL DAILY
Status: DISCONTINUED | OUTPATIENT
Start: 2023-12-29 | End: 2023-12-30 | Stop reason: HOSPADM

## 2023-12-29 RX ORDER — PROPOFOL 10 MG/ML
INJECTION, EMULSION INTRAVENOUS PRN
Status: DISCONTINUED | OUTPATIENT
Start: 2023-12-29 | End: 2023-12-29 | Stop reason: SDUPTHER

## 2023-12-29 RX ORDER — ERGOCALCIFEROL 1.25 MG/1
50000 CAPSULE ORAL WEEKLY
Status: DISCONTINUED | OUTPATIENT
Start: 2023-12-29 | End: 2023-12-30 | Stop reason: HOSPADM

## 2023-12-29 RX ORDER — LIDOCAINE HYDROCHLORIDE 20 MG/ML
INJECTION, SOLUTION INFILTRATION; PERINEURAL PRN
Status: DISCONTINUED | OUTPATIENT
Start: 2023-12-29 | End: 2023-12-29 | Stop reason: SDUPTHER

## 2023-12-29 RX ORDER — SODIUM CHLORIDE 9 MG/ML
INJECTION, SOLUTION INTRAVENOUS CONTINUOUS PRN
Status: DISCONTINUED | OUTPATIENT
Start: 2023-12-29 | End: 2023-12-29 | Stop reason: SDUPTHER

## 2023-12-29 RX ADMIN — ERGOCALCIFEROL 50000 UNITS: 1.25 CAPSULE ORAL at 17:55

## 2023-12-29 RX ADMIN — PANTOPRAZOLE SODIUM 40 MG: 40 INJECTION, POWDER, FOR SOLUTION INTRAVENOUS at 21:29

## 2023-12-29 RX ADMIN — PROPOFOL 50 MG: 10 INJECTION, EMULSION INTRAVENOUS at 12:26

## 2023-12-29 RX ADMIN — HEPARIN SODIUM 5000 UNITS: 5000 INJECTION INTRAVENOUS; SUBCUTANEOUS at 21:29

## 2023-12-29 RX ADMIN — PROPOFOL 20 MG: 10 INJECTION, EMULSION INTRAVENOUS at 12:30

## 2023-12-29 RX ADMIN — CALCITRIOL CAPSULES 0.25 MCG 0.25 MCG: 0.25 CAPSULE ORAL at 17:23

## 2023-12-29 RX ADMIN — PANTOPRAZOLE SODIUM 40 MG: 40 INJECTION, POWDER, FOR SOLUTION INTRAVENOUS at 10:15

## 2023-12-29 RX ADMIN — CARVEDILOL 3.12 MG: 3.12 TABLET, FILM COATED ORAL at 10:15

## 2023-12-29 RX ADMIN — SODIUM CHLORIDE, POTASSIUM CHLORIDE, SODIUM LACTATE AND CALCIUM CHLORIDE: 600; 310; 30; 20 INJECTION, SOLUTION INTRAVENOUS at 21:32

## 2023-12-29 RX ADMIN — HEPARIN SODIUM 5000 UNITS: 5000 INJECTION INTRAVENOUS; SUBCUTANEOUS at 02:02

## 2023-12-29 RX ADMIN — LIDOCAINE HYDROCHLORIDE 100 MG: 20 INJECTION, SOLUTION INFILTRATION; PERINEURAL at 12:26

## 2023-12-29 RX ADMIN — SODIUM CHLORIDE, PRESERVATIVE FREE 10 ML: 5 INJECTION INTRAVENOUS at 10:16

## 2023-12-29 RX ADMIN — HYDROCODONE BITARTRATE AND ACETAMINOPHEN 1 TABLET: 5; 325 TABLET ORAL at 21:29

## 2023-12-29 RX ADMIN — ROSUVASTATIN 20 MG: 20 TABLET, FILM COATED ORAL at 21:29

## 2023-12-29 RX ADMIN — SODIUM CHLORIDE: 9 INJECTION, SOLUTION INTRAVENOUS at 12:21

## 2023-12-29 RX ADMIN — SODIUM CHLORIDE 200 MG: 9 INJECTION, SOLUTION INTRAVENOUS at 18:04

## 2023-12-29 RX ADMIN — PROPOFOL 20 MG: 10 INJECTION, EMULSION INTRAVENOUS at 12:36

## 2023-12-29 RX ADMIN — HEPARIN SODIUM 5000 UNITS: 5000 INJECTION INTRAVENOUS; SUBCUTANEOUS at 13:40

## 2023-12-29 RX ADMIN — PROPOFOL 20 MG: 10 INJECTION, EMULSION INTRAVENOUS at 12:33

## 2023-12-29 ASSESSMENT — PAIN - FUNCTIONAL ASSESSMENT
PAIN_FUNCTIONAL_ASSESSMENT: NONE - DENIES PAIN
PAIN_FUNCTIONAL_ASSESSMENT: 0-10
PAIN_FUNCTIONAL_ASSESSMENT: WONG-BAKER FACES

## 2023-12-29 NOTE — BRIEF OP NOTE
Brief Postoperative Note      Patient: Radha Aranda  YOB: 1940  MRN: [id number]    Date of Procedure: 12/29/2023    Pre-Op Diagnosis Codes:     * Anemia, unspecified type [D64.9]       Procedure(s):  EGD APC STOMACH    Surgeon(s):  Leigha Khan MD    Anesthesia: Monitor Anesthesia Care    Estimated Blood Loss (mL): Minimal    Complications: None    Findings:   Bleeding antral AVM, treated with APC and 1 clip. Endoscopic hemostasis achieved. Plans:  Oral Pantoprazole 40 mg daily. Iron sulfate 325 mg twice daily for 3 months. Patient will need follow-up colonoscopy as outpatient (usually 1 year after colon cancer surgery). May discharge home today. GI will sign off. Please call if you have questions.       Electronically signed by Leigha Khan MD on 12/29/2023 at 12:42 PM

## 2023-12-29 NOTE — PROGRESS NOTES
[facility]    12/29/2023 11:20 AM        Name: Megha Rojas .              Admitted: 12/28/2023  Primary Care Provider: Denice Reynolds MD (Tel: [phone])      Subjective:    In bed hemoglobin stable feels better no nausea vomiting chest pain    Reviewed interval ancillary notes    Current Medications  calcitRIOL (ROCALTROL) capsule 0.25 mcg, Daily  vitamin D (ERGOCALCIFEROL) capsule 50,000 Units, Weekly  0.9 % sodium chloride infusion, PRN  carvedilol (COREG) tablet 3.125 mg, BID WC  rosuvastatin (CRESTOR) tablet 20 mg, Nightly  HYDROcodone-acetaminophen (NORCO) 5-325 MG per tablet 1 tablet, BID  sodium chloride flush 0.9 % injection 5-40 mL, 2 times per day  sodium chloride flush 0.9 % injection 5-40 mL, PRN  0.9 % sodium chloride infusion, PRN  heparin (porcine) injection 5,000 Units, 3 times per day  polyethylene glycol (GLYCOLAX) packet 17 g, Daily PRN  acetaminophen (TYLENOL) tablet 650 mg, Q6H PRN   Or  acetaminophen (TYLENOL) suppository 650 mg, Q6H PRN  lactated ringers IV soln infusion, Continuous  iron sucrose (VENOFER) 200 mg in sodium chloride 0.9 % 100 mL IVPB, Q24H  epoetin robin-epbx (RETACRIT) injection 10,000 Units, Weekly  citric acid-sodium citrate (BICITRA) solution 30 mL, Daily  pantoprazole (PROTONIX) injection 40 mg, BID        Objective:  BP (!) 122/57   Pulse 69   Temp (!) 96.7 °F (35.9 °C) (Temporal)   Resp 16   Ht 1.524 m (5')   Wt 96.2 kg (212 lb 1.3 oz)   SpO2 94%   BMI 41.42 kg/m²     Intake/Output Summary (Last 24 hours) at 12/29/2023 1120  Last data filed at 12/29/2023 1016  Gross per 24 hour   Intake 310 ml   Output --   Net 310 ml      Wt Readings from Last 3 Encounters:   12/29/23 96.2 kg (212 lb 1.3 oz)   12/12/23 95.8 kg (211 lb 3.2 oz)   11/22/23 97.1 kg (214 lb)       General appearance:  Appears comfortable. AAOx3  HEENT: atraumatic, Pupils equal, muscous  membranes moist, no masses appreciated  Cardiovascular: Regular rate and rhythm no murmurs appreciated  Respiratory: CTAB no wheezing  Gastrointestinal: Abdomen soft, non-tender, BS+  EXT: no edema  Neurology: no gross focal deficts  Psychiatry: Appropriate affect. Not agitated  Skin: Warm, dry, no rashes appreciated    Labs and Tests:  CBC:   Recent Labs     12/27/23  1008 12/28/23  1041 12/29/23  0637   WBC 7.8 7.3 7.1   HGB 6.9* 6.8* 7.6*    249 230     BMP:    Recent Labs     12/27/23  1008 12/28/23  1041 12/29/23  0636    144 142   K 4.5 4.7 5.0    112* 112*   CO2 20* 21 22   BUN 59* 58* 51*   CREATININE 4.8* 4.9* 4.5*   GLUCOSE 96 116* 93     Hepatic:   Recent Labs     12/28/23  1041   AST 20   ALT 13   BILITOT <0.2   ALKPHOS 117     XR CHEST PORTABLE   Final Result   Mild central pulmonary vascular congestion. No evidence of interstitial edema             Recent imaging reviewed    Problem List  Principal Problem:    SHAHEEN (acute kidney injury) ([address])  Resolved Problems:    * No resolved hospital problems.  *     Assessment/Plan:   Acute anemia: s/p 1 unit prbc  Plan for egd not cbc in am     Shaheen on ckd3: ivf cr down to 4.5 bmp in am npehro on board     PAF home meds hold eliquis     Cad s/p stent 21 hold asa     Chronic diastolic chf home meds hold diuretics        DVT prophylaxis scds  Code status full code      Wyonia Councilman, MD   12/29/2023 11:20 AM

## 2023-12-29 NOTE — PROGRESS NOTES
Pt awak and alert, VSS, ready for transfer back to . Telephone report given to RN; all questions answered. Glasses placed back on pt's face. Pt left department via stretcher accompanied by RN & transporter with tele pack in place. Sinus titus displayed on monitor.

## 2023-12-29 NOTE — ANESTHESIA POSTPROCEDURE EVALUATION
Department of Anesthesiology  Postprocedure Note    Patient: Rosi Nascimento  MRN: [id number]  YOB: 1940  Date of evaluation: 12/29/2023    Procedure Summary       Date: 12/29/23 Room / Location: [address] [place] [address]    Anesthesia Start: 3288 Anesthesia Stop: 1152    Procedures:       EGD APC STOMACH (Abdomen)      EGD CONTROL HEMORRHAGE (Abdomen) Diagnosis:       Anemia, unspecified type      (Anemia, unspecified type [D64.9])    Surgeons: Ramona Card MD Responsible Provider: Lydia Winston MD    Anesthesia Type: MAC ASA Status: 3            Anesthesia Type: No value filed. Stephon Phase I: Stephon Score: 7    Stephon Phase II:      Anesthesia Post Evaluation    Patient location during evaluation: PACU  Patient participation: complete - patient participated  Level of consciousness: awake  Airway patency: patent  Nausea & Vomiting: no vomiting  Cardiovascular status: hemodynamically stable  Respiratory status: acceptable  Hydration status: euvolemic  There was medical reason for not using a multimodal analgesia pain management approach. Pain management: adequate    No notable events documented.

## 2023-12-29 NOTE — PROGRESS NOTES
Pt arrived to PACU bay 6 from Endo per stretcher. VSS. Arousable but groggy. Resp easy/regular on 2 L N/C. Bedside report received from Mark RN/CRNA.

## 2023-12-29 NOTE — ANESTHESIA PRE PROCEDURE
Department of Anesthesiology  Preprocedure Note       Name:  Whit Cole   Age:  82 y.o.  :  1940                                          MRN:  [id number]         Date:  2023      Surgeon: Yamil Robison):  Meg Cisneros MD    Procedure: Procedure(s):  EGD DIAGNOSTIC ONLY    Medications prior to admission:   Prior to Admission medications    Medication Sig Start Date End Date Taking? Authorizing Provider   sodium bicarbonate 650 MG tablet Take 1 tablet by mouth daily  Patient not taking: Reported on 2023  Melissa Lee MD   carvedilol (COREG) 3.125 MG tablet TAKE 1 TABLET BY MOUTH TWICE DAILY WITH MEALS 23   JIMENA Martin - CNP   apixaban (ELIQUIS) 2.5 MG TABS tablet Take 1 tablet by mouth 2 times daily  Patient taking differently: Take 2 tablets by mouth daily 23   JIMENA Martin - CNP   torsemide (DEMADEX) 20 MG tablet Take 1 tablet by mouth once daily 3/7/23   Ellen Veronica MD   rosuvastatin (CRESTOR) 20 MG tablet Take 1 tablet by mouth nightly 23   JIMENA Martin - CNP   HYDROcodone-acetaminophen (NORCO) 5-325 MG per tablet Take 1 tablet by mouth 2 times daily.  22   Verena Reyna MD   ferrous sulfate (IRON 325) 325 (65 Fe) MG tablet Take 1 tablet by mouth daily (with breakfast) 21   Verena Reyna MD   aspirin EC 81 MG EC tablet Take 1 tablet by mouth daily 21   Carol Ann Ybarra MD       Current medications:    Current Facility-Administered Medications   Medication Dose Route Frequency Provider Last Rate Last Admin    0.9 % sodium chloride infusion   IntraVENous PRN BASILIO Morocho        carvedilol (COREG) tablet 3.125 mg  3.125 mg Oral BID Christ Magallanes MD   3.125 mg at 23 1015    rosuvastatin (CRESTOR) tablet 20 mg  20 mg Oral Nightly Yolanda Jarvis MD   20 mg at 23    HYDROcodone-acetaminophen (NORCO) 5-325 MG per tablet 1 tablet  1 tablet Oral BID Yolanda Jarvis

## 2023-12-29 NOTE — PROGRESS NOTES
Nephrology Progress Note  [facility]  [phone]   Toplist    Patient:  Megha Rojas   : 1940    CC:  ckd 5     Subjective:  Patient had EKG today.  Discussed with daughter at bedside.  No complaints.  Got Retacrit injection yesterday.  Patient is aware of her CKD 5 status however denies any uremic symptoms.  I have discussed in detail the need to notify nephrology when uremic symptoms arise.      ROS:   No nausea, vomiting or metallic taste, leg swelling, chest pain, shortness of breath    SHx:  Daughter at bedside    Meds:  Scheduled Meds:   calcitRIOL  0.25 mcg Oral Daily    vitamin D  50,000 Units Oral Weekly    carvedilol  3.125 mg Oral BID WC    rosuvastatin  20 mg Oral Nightly    HYDROcodone-acetaminophen  1 tablet Oral BID    sodium chloride flush  5-40 mL IntraVENous 2 times per day    heparin (porcine)  5,000 Units SubCUTAneous 3 times per day    iron sucrose  200 mg IntraVENous Q24H    epoetin robin-epbx  10,000 Units SubCUTAneous Weekly    citric acid-sodium citrate  30 mL Oral Daily    pantoprazole  40 mg IntraVENous BID     Continuous Infusions:   sodium chloride      sodium chloride      lactated ringers IV soln 100 mL/hr at 23 1820     PRN Meds:.sodium chloride, sodium chloride flush, sodium chloride, polyethylene glycol, acetaminophen **OR** acetaminophen    Vitals:  /71   Pulse 53   Temp 97.7 °F (36.5 °C) (Oral)   Resp 18   Ht 1.524 m (5')   Wt 96.2 kg (212 lb 1.3 oz)   SpO2 96%   BMI 41.42 kg/m²     Physical Exam:  Gen: Resting in bed, NAD.    HEENT: MMM, OP clear.  CV: RRR, no S3.  Lungs: good respiratory effort and clear air entry   Abd: S/NT +BS  Ext: No edema, no cyanosis  Skin: Warm.  No rashes appreciated.    Labs:  CBC:   Lab Results   Component Value Date/Time    WBC 7.1 2023 06:37 AM    RBC 2.71 2023 06:37 AM    HGB 7.6 2023 06:37 AM    HCT 23.3 2023 06:37 AM    MCV 86.0 2023 06:37 AM    MCH 28.0  12/29/2023 06:37 AM    MCHC 32.6 12/29/2023 06:37 AM    RDW 14.0 12/29/2023 06:37 AM     12/29/2023 06:37 AM    MPV 8.0 12/29/2023 06:37 AM     CMP:    Lab Results   Component Value Date/Time     12/29/2023 06:36 AM    K 5.0 12/29/2023 06:36 AM     12/29/2023 06:36 AM    CO2 22 12/29/2023 06:36 AM    BUN 51 12/29/2023 06:36 AM    CREATININE 4.5 12/29/2023 06:36 AM    GFRAA 27 04/02/2022 10:56 AM    AGRATIO 1.2 12/28/2023 10:41 AM    LABGLOM 9 12/29/2023 06:36 AM    GLUCOSE 93 12/29/2023 06:36 AM    PROT 7.2 12/28/2023 10:41 AM    LABALBU 3.9 12/28/2023 10:41 AM    CALCIUM 8.9 12/29/2023 06:36 AM    BILITOT <0.2 12/28/2023 10:41 AM    ALKPHOS 117 12/28/2023 10:41 AM    AST 20 12/28/2023 10:41 AM    ALT 13 12/28/2023 10:41 AM       Assessment/Plan:  Progression of CKD 4. CKD likely secondary to hypertension,  aging. Used to follow with Dr. Priya Bazan, now with Dr Matthias Alejo, last seen 12/12  Creatinine was 3.68 11/23  from 3.1 in 9/23. Cr was in 2 s in  early 2023 . Now with S creatinine of 4.8> 4.5. eGFR has declined from 17 to 9. No uremic symptoms. No urgent need to initiate dialysis yet. Anemia of CKD, GI bleed  Hemoccult positive however on iron  S/p EGD 12/29/2023 showed bleeding antral AVM, treated with APC and 1 clip. On PPI. Outpatient colonoscopy recommended. Hb 6.8 on admission, status post PRBC transfusion  TSAT 18 , on iv iron and retacrit 10 K weekly. Renal osteodystrophy PTH elevated in the 300 range. Started on calcitriol. Vitamin D level 16, started on ergocalciferol 50 K weekly for 4 weeks. NAGMA secondary to CKD ,  on sodium bicarb however she has not taken as it causes bloating. On  Bicitra now , can be discharged on it. HTN, well-controlled, continue home medications Coreg, torsemide      History of paroxysmal A-fib, on Eliquis     CHF diastolic dysfunction on torsemide at home, on hold.       Ilene Jean MD  [facility]  Office

## 2023-12-30 VITALS
RESPIRATION RATE: 16 BRPM | WEIGHT: 212.08 LBS | OXYGEN SATURATION: 93 % | HEIGHT: 60 IN | TEMPERATURE: 98 F | BODY MASS INDEX: 41.64 KG/M2 | DIASTOLIC BLOOD PRESSURE: 82 MMHG | HEART RATE: 59 BPM | SYSTOLIC BLOOD PRESSURE: 150 MMHG

## 2023-12-30 LAB
ANION GAP SERPL CALCULATED.3IONS-SCNC: 10 MMOL/L (ref 3–16)
BASOPHILS # BLD: 0.1 K/UL (ref 0–0.2)
BASOPHILS NFR BLD: 0.8 %
BUN SERPL-MCNC: 44 MG/DL (ref 7–20)
CALCIUM SERPL-MCNC: 8.7 MG/DL (ref 8.3–10.6)
CHLORIDE SERPL-SCNC: 113 MMOL/L (ref 99–110)
CO2 SERPL-SCNC: 21 MMOL/L (ref 21–32)
CREAT SERPL-MCNC: 4.3 MG/DL (ref 0.6–1.2)
DEPRECATED RDW RBC AUTO: 14.6 % (ref 12.4–15.4)
EOSINOPHIL # BLD: 0.2 K/UL (ref 0–0.6)
EOSINOPHIL NFR BLD: 2.4 %
GFR SERPLBLD CREATININE-BSD FMLA CKD-EPI: 10 ML/MIN/{1.73_M2}
GLUCOSE SERPL-MCNC: 106 MG/DL (ref 70–99)
HCT VFR BLD AUTO: 23.2 % (ref 36–48)
HGB BLD-MCNC: 7.5 G/DL (ref 12–16)
LYMPHOCYTES # BLD: 1.5 K/UL (ref 1–5.1)
LYMPHOCYTES NFR BLD: 20.3 %
MCH RBC QN AUTO: 27.6 PG (ref 26–34)
MCHC RBC AUTO-ENTMCNC: 32.3 G/DL (ref 31–36)
MCV RBC AUTO: 85.3 FL (ref 80–100)
MONOCYTES # BLD: 0.5 K/UL (ref 0–1.3)
MONOCYTES NFR BLD: 6.9 %
NEUTROPHILS # BLD: 5.3 K/UL (ref 1.7–7.7)
NEUTROPHILS NFR BLD: 69.6 %
PLATELET # BLD AUTO: 217 K/UL (ref 135–450)
PMV BLD AUTO: 8.1 FL (ref 5–10.5)
POTASSIUM SERPL-SCNC: 4.8 MMOL/L (ref 3.5–5.1)
RBC # BLD AUTO: 2.71 M/UL (ref 4–5.2)
SODIUM SERPL-SCNC: 144 MMOL/L (ref 136–145)
WBC # BLD AUTO: 7.6 K/UL (ref 4–11)

## 2023-12-30 PROCEDURE — C9113 INJ PANTOPRAZOLE SODIUM, VIA: HCPCS | Performed by: INTERNAL MEDICINE

## 2023-12-30 PROCEDURE — 2580000003 HC RX 258: Performed by: INTERNAL MEDICINE

## 2023-12-30 PROCEDURE — 6370000000 HC RX 637 (ALT 250 FOR IP): Performed by: INTERNAL MEDICINE

## 2023-12-30 PROCEDURE — 6360000002 HC RX W HCPCS: Performed by: INTERNAL MEDICINE

## 2023-12-30 PROCEDURE — 85025 COMPLETE CBC W/AUTO DIFF WBC: CPT

## 2023-12-30 PROCEDURE — 80048 BASIC METABOLIC PNL TOTAL CA: CPT

## 2023-12-30 PROCEDURE — 36415 COLL VENOUS BLD VENIPUNCTURE: CPT

## 2023-12-30 RX ORDER — CALCITRIOL 0.25 UG/1
0.25 CAPSULE, LIQUID FILLED ORAL DAILY
Qty: 30 CAPSULE | Refills: 3 | Status: SHIPPED | OUTPATIENT
Start: 2023-12-31

## 2023-12-30 RX ORDER — CITRIC ACID/SODIUM CITRATE 334-500MG
30 SOLUTION, ORAL ORAL DAILY
Qty: 900 ML | Refills: 0 | Status: SHIPPED | OUTPATIENT
Start: 2023-12-31 | End: 2024-01-30

## 2023-12-30 RX ORDER — ERGOCALCIFEROL 1.25 MG/1
50000 CAPSULE ORAL WEEKLY
Qty: 3 CAPSULE | Refills: 0 | Status: SHIPPED | OUTPATIENT
Start: 2024-01-05 | End: 2024-01-20

## 2023-12-30 RX ORDER — FERROUS SULFATE 325(65) MG
325 TABLET ORAL 2 TIMES DAILY
Qty: 180 TABLET | Refills: 1 | Status: SHIPPED | OUTPATIENT
Start: 2023-12-30

## 2023-12-30 RX ORDER — PANTOPRAZOLE SODIUM 40 MG/1
40 TABLET, DELAYED RELEASE ORAL
Qty: 60 TABLET | Refills: 0 | Status: SHIPPED | OUTPATIENT
Start: 2023-12-30

## 2023-12-30 RX ADMIN — CALCITRIOL CAPSULES 0.25 MCG 0.25 MCG: 0.25 CAPSULE ORAL at 10:00

## 2023-12-30 RX ADMIN — HEPARIN SODIUM 5000 UNITS: 5000 INJECTION INTRAVENOUS; SUBCUTANEOUS at 13:44

## 2023-12-30 RX ADMIN — HEPARIN SODIUM 5000 UNITS: 5000 INJECTION INTRAVENOUS; SUBCUTANEOUS at 04:21

## 2023-12-30 RX ADMIN — PANTOPRAZOLE SODIUM 40 MG: 40 INJECTION, POWDER, FOR SOLUTION INTRAVENOUS at 10:02

## 2023-12-30 RX ADMIN — CARVEDILOL 3.12 MG: 3.12 TABLET, FILM COATED ORAL at 10:00

## 2023-12-30 RX ADMIN — SODIUM CHLORIDE, PRESERVATIVE FREE 10 ML: 5 INJECTION INTRAVENOUS at 09:56

## 2023-12-30 RX ADMIN — HYDROCODONE BITARTRATE AND ACETAMINOPHEN 1 TABLET: 5; 325 TABLET ORAL at 09:56

## 2023-12-30 ASSESSMENT — PAIN SCALES - GENERAL: PAINLEVEL_OUTOF10: 2

## 2023-12-30 ASSESSMENT — PAIN DESCRIPTION - ORIENTATION: ORIENTATION: RIGHT;LEFT

## 2023-12-30 ASSESSMENT — PAIN DESCRIPTION - DESCRIPTORS: DESCRIPTORS: ACHING

## 2023-12-30 ASSESSMENT — PAIN DESCRIPTION - LOCATION: LOCATION: KNEE

## 2023-12-30 NOTE — PROGRESS NOTES
Pt discharged to home. Transported in wheelchair. Accompanied by daughter. Transported in personal vehicle. Discharge instructions and personal belongings given to pt. Explanation of discharge medications and instructions understood by verbal statement. No questions, comments or concerns at this time.  Electronically signed by Maribeth Taveras RN on 12/30/2023 at 2:22 PM

## 2023-12-30 NOTE — DISCHARGE INSTR - COC
Continuity of Care Form    Patient Name: Adia Schneider   :  1940  MRN:  [id number]    Admit date:  2023  Discharge date:  ***    Code Status Order: Full Code   Advance Directives:   Advance Care Flowsheet Documentation       Date/Time Healthcare Directive Type of Healthcare Directive Copy in [address] Agent's Name Healthcare Agent's Phone Number    23 1111 No, patient does not have an advance directive for healthcare treatment -- -- -- -- --            Admitting Physician:  Hanh Wyatt MD  PCP: Ravinder Damon MD    Discharging Nurse: [facility] Unit/Room#: 3TO-8914/3934-21  Discharging Unit Phone Number: ***    Emergency Contact:   Extended Emergency Contact Information  Primary Emergency Contact: [address] [place] [address], [address] Phone: [phone]  Relation: Child  Secondary Emergency Contact: [address] [place] [address] Phone: [phone]  Relation: Grandchild    Past Surgical History:  Past Surgical History:   Procedure Laterality Date     SECTION      x3    CHOLECYSTECTOMY, LAPAROSCOPIC N/A 2021    LAPAROSCOPIC CHOLECYSTECTOMY performed by Magdalena Grossman MD at [facility] 2021    COLONOSCOPY WITH BIOPSY performed by Damon Bartholomew MD at [address] [place] [address]  2013    w/ bladder biopsy    FRACTURE SURGERY      right wrist    HEMICOLECTOMY Right 2021    LAPAROSCOPIC RIGHT COLON RESECTION performed by Magdalena Grossman MD at [facility] N/A 2021    EGD BIOPSY performed by Damon Bartholomew MD at [address] [place] [address] N/A 2023    EGD APC STOMACH performed by Mitchel Gamez MD at [address] [place] [address] N/A 2023    EGD CONTROL HEMORRHAGE performed by Mitchel Gamez MD at [address] [place] [address] ENDOSCOPY       Immunization History:      There is no immunization history on file for this -     Safety Concerns:     { ANUP Safety Concerns:571650999}    Impairments/Disabilities:      {[facility] [place]airments/Disabilities:526424590}    Nutrition Therapy:  Current Nutrition Therapy:   {[facility] Diet List:026970587}    Routes of Feeding: {[facility] DME Other Feedings:364136011}  Liquids: {Slp liquid thickness:06722}  Daily Fluid Restriction: {[facility] DME Yes amt example:773668104}  Last Modified Barium Swallow with Video (Video Swallowing Test): {Done Not Done Date:}    Treatments at the Time of Hospital Discharge:   Respiratory Treatments: ***  Oxygen Therapy:  {Therapy; copd oxygen:60261}  Ventilator:    {[facility] Vent List:279999444}    Rehab Therapies: {THERAPEUTIC INTERVENTION:8374366552}  Weight Bearing Status/Restrictions: {[facility] Weight Bearin}  Other Medical Equipment (for information only, NOT a DME order):  {EQUIPMENT:756895915}  Other Treatments: ***    Patient's personal belongings (please select all that are sent with patient):  {[facility] DME Belongings:727796269}    RN SIGNATURE:  {Esignature:199863556}    CASE MANAGEMENT/SOCIAL WORK SECTION    Inpatient Status Date: ***    Readmission Risk Assessment Score:  Readmission Risk              Risk of Unplanned Readmission:  19           Discharging to Facility/ Agency   Name:   Address:  Phone:  Fax:    Dialysis Facility (if applicable)   Name:  Address:  Dialysis Schedule:  Phone:  Fax:    / signature: {Esignature:491142824}    PHYSICIAN SECTION    Prognosis: {Prognosis:6438041644}    Condition at Discharge: { Patient Condition:624030179}    Rehab Potential (if transferring to Rehab): {Prognosis:8124579736}    Recommended Labs or Other Treatments After Discharge: ***    Physician Certification: I certify the above information and transfer of Megha Rojas  is necessary for the continuing treatment of the diagnosis listed and that she requires {Admit to Appropriate Level of Care:38418} for {GREATER/LESS:546205488} 30

## 2023-12-30 NOTE — CARE COORDINATION
Case Management -  Discharge Note      Patient Name: Declan Correa                   YOB: 1940            Readmission Risk (Low < 19, Mod (19-27), High > 27): Readmission Risk Score: 15.5    Current PCP: Brigido Skiff, MD    ([facility]) Important Message from Medicare:    Date: 12/28/23    PT AM-PAC:   /24  OT AM-PAC:   /24        Financial    Payor: Jj Sham / Plan: Samira Ronald ESSENTIAL/PLUS / Product Type: *No Product type* /     Pharmacy:  Potential assistance Purchasing Medications: No  Meds-to-Beds request:        [address] [facility], [place] - [address] [facility] [phone] [place] [phone]  [address] [place] [address] [address]   Phone: [phone] Fax: Critical access hospital,Building [facility] - [address] [phone] [place] [phone]  [address] [place] [address]  Phone: [phone] Fax: [phone]      Notes: Additional Case Management Notes: Patient will discharge home today with no needs.     Electronically signed by Mina Gonzales on 12/30/23 at 1:03 PM EST

## 2024-01-18 PROCEDURE — 93298 REM INTERROG DEV EVAL SCRMS: CPT | Performed by: INTERNAL MEDICINE

## 2024-01-19 ENCOUNTER — OFFICE VISIT (OUTPATIENT)
Dept: CARDIOLOGY CLINIC | Age: 84
End: 2024-01-19
Payer: MEDICARE

## 2024-01-19 DIAGNOSIS — E87.79 OTHER HYPERVOLEMIA: ICD-10-CM

## 2024-01-19 DIAGNOSIS — I25.10 CORONARY ARTERY DISEASE INVOLVING NATIVE CORONARY ARTERY OF NATIVE HEART WITHOUT ANGINA PECTORIS: Primary | ICD-10-CM

## 2024-01-19 DIAGNOSIS — I10 PRIMARY HYPERTENSION: ICD-10-CM

## 2024-01-19 DIAGNOSIS — E78.5 DYSLIPIDEMIA: ICD-10-CM

## 2024-01-19 PROCEDURE — 3074F SYST BP LT 130 MM HG: CPT | Performed by: NURSE PRACTITIONER

## 2024-01-19 PROCEDURE — 3078F DIAST BP <80 MM HG: CPT | Performed by: NURSE PRACTITIONER

## 2024-01-19 PROCEDURE — 1123F ACP DISCUSS/DSCN MKR DOCD: CPT | Performed by: NURSE PRACTITIONER

## 2024-01-19 PROCEDURE — 99214 OFFICE O/P EST MOD 30 MIN: CPT | Performed by: NURSE PRACTITIONER

## 2024-01-19 NOTE — PROGRESS NOTES
[facility]     Outpatient Follow Up Note    Megha Rojas is 83 y.o. female who presents today with a history of STEMI CAD s/p PTCA LAD Nov '20, PAF, HTN, PAF and hyperlipidemia.   Her other hx includes: GIB March '21 s/p small bowel resection d/t adenocarcinoma (declined chemotherapy), s/p I&D wound abscess (follows with Dr. Britton)    Interval hx: 12/28 - 12/30/23  Acute anemia: s/p 1 unit prbc  Findings:   Bleeding antral AVM, treated with APC and 1 clip.  Endoscopic hemostasis achieved.     Plans:  Oral Pantoprazole 40 mg daily. Iron sulfate 325 mg twice daily for 3 months.  Patient will need follow-up colonoscopy as outpatient (usually 1 year after colon cancer surgery). May discharge home today. GI will sign off     YESENIA on CKD patient was started on IV fluids creatinine proved to 4.3 cleared for discharge by nephrology with outpatient follow-up with nephrology discharge off diuretics per nephrology    CHIEF COMPLAINT / HPI:  Follow Up secondary to coronary artery disease    Subjective:   She denies significant chest pain. There is no SOB/EUBANKS. The patient denies orthopnea/PND. She sleeps in a chair to be able to elevate her legs. The patient has swelling in her legs that started a couple of days after stopping her diuretic (late December). She's been wrapping them and they've gotten better.  The patients weight is up 4# at 215# today . The patient is not experiencing palpitations or dizziness.   Her BP is running 125/55 - 130/60    These symptoms are stable since the last OV.   With regard to medication therapy the patient has been compliant with prescribed regimen. They have tolerated therapy to date.     Past Medical History:   Diagnosis Date    Anemia     CAD (coronary artery disease) 11/2020    Stent    CKD (chronic kidney disease)     History of blood transfusion     Hyperlipidemia     patient states well controlled    Hypertension      Social History:    Social History     Tobacco Use

## 2024-01-22 ENCOUNTER — HOSPITAL ENCOUNTER (OUTPATIENT)
Age: 84
Discharge: HOME | End: 2024-01-22
Payer: MEDICARE

## 2024-01-22 DIAGNOSIS — I25.10 CORONARY ARTERY DISEASE INVOLVING NATIVE CORONARY ARTERY OF NATIVE HEART WITHOUT ANGINA PECTORIS: ICD-10-CM

## 2024-01-22 DIAGNOSIS — E87.79 OTHER HYPERVOLEMIA: ICD-10-CM

## 2024-01-22 DIAGNOSIS — I50.9 HEART FAILURE, UNSPECIFIED HF CHRONICITY, UNSPECIFIED HEART FAILURE TYPE (HCC): ICD-10-CM

## 2024-01-22 LAB
ALBUMIN SERPL-MCNC: 3.8 G/DL (ref 3.4–5)
ALBUMIN/GLOB SERPL: 1.5 {RATIO} (ref 1.1–2.2)
ALP SERPL-CCNC: 115 U/L (ref 40–129)
ALT SERPL-CCNC: 10 U/L (ref 10–40)
ANION GAP SERPL CALCULATED.3IONS-SCNC: 10 MMOL/L (ref 3–16)
AST SERPL-CCNC: 15 U/L (ref 15–37)
BILIRUB SERPL-MCNC: <0.2 MG/DL (ref 0–1)
BUN SERPL-MCNC: 50 MG/DL (ref 7–20)
CALCIUM SERPL-MCNC: 8.4 MG/DL (ref 8.3–10.6)
CHLORIDE SERPL-SCNC: 111 MMOL/L (ref 99–110)
CO2 SERPL-SCNC: 20 MMOL/L (ref 21–32)
CREAT SERPL-MCNC: 5.4 MG/DL (ref 0.6–1.2)
DEPRECATED RDW RBC AUTO: 15.6 % (ref 12.4–15.4)
GFR SERPLBLD CREATININE-BSD FMLA CKD-EPI: 7 ML/MIN/{1.73_M2}
GLUCOSE SERPL-MCNC: 92 MG/DL (ref 70–99)
HCT VFR BLD AUTO: 22.5 % (ref 36–48)
HGB BLD-MCNC: 7.3 G/DL (ref 12–16)
MCH RBC QN AUTO: 28.3 PG (ref 26–34)
MCHC RBC AUTO-ENTMCNC: 32.3 G/DL (ref 31–36)
MCV RBC AUTO: 87.5 FL (ref 80–100)
NT-PROBNP SERPL-MCNC: 5691 PG/ML (ref 0–449)
PLATELET # BLD AUTO: 193 K/UL (ref 135–450)
PMV BLD AUTO: 8.9 FL (ref 5–10.5)
POTASSIUM SERPL-SCNC: 5.8 MMOL/L (ref 3.5–5.1)
PROT SERPL-MCNC: 6.4 G/DL (ref 6.4–8.2)
RBC # BLD AUTO: 2.57 M/UL (ref 4–5.2)
SODIUM SERPL-SCNC: 141 MMOL/L (ref 136–145)
WBC # BLD AUTO: 7.7 K/UL (ref 4–11)

## 2024-01-22 PROCEDURE — 80053 COMPREHEN METABOLIC PANEL: CPT

## 2024-01-22 PROCEDURE — 85027 COMPLETE CBC AUTOMATED: CPT

## 2024-01-22 PROCEDURE — 83880 ASSAY OF NATRIURETIC PEPTIDE: CPT

## 2024-01-22 PROCEDURE — 36415 COLL VENOUS BLD VENIPUNCTURE: CPT

## 2024-01-23 ENCOUNTER — APPOINTMENT (OUTPATIENT)
Dept: CT IMAGING | Age: 84
DRG: 674 | End: 2024-01-23
Payer: MEDICARE

## 2024-01-23 ENCOUNTER — TELEPHONE (OUTPATIENT)
Dept: CARDIOLOGY CLINIC | Age: 84
End: 2024-01-23

## 2024-01-23 ENCOUNTER — HOSPITAL ENCOUNTER (INPATIENT)
Age: 84
LOS: 6 days | Discharge: HOME | DRG: 674 | End: 2024-01-29
Attending: EMERGENCY MEDICINE | Admitting: HOSPITALIST
Payer: MEDICARE

## 2024-01-23 ENCOUNTER — APPOINTMENT (OUTPATIENT)
Dept: GENERAL RADIOLOGY | Age: 84
DRG: 674 | End: 2024-01-23
Payer: MEDICARE

## 2024-01-23 DIAGNOSIS — E87.70 HYPERVOLEMIA, UNSPECIFIED HYPERVOLEMIA TYPE: ICD-10-CM

## 2024-01-23 DIAGNOSIS — N17.9 ACUTE RENAL FAILURE SUPERIMPOSED ON CHRONIC KIDNEY DISEASE, UNSPECIFIED ACUTE RENAL FAILURE TYPE, UNSPECIFIED CKD STAGE (HCC): Primary | ICD-10-CM

## 2024-01-23 DIAGNOSIS — N18.9 ACUTE RENAL FAILURE SUPERIMPOSED ON CHRONIC KIDNEY DISEASE, UNSPECIFIED ACUTE RENAL FAILURE TYPE, UNSPECIFIED CKD STAGE (HCC): Primary | ICD-10-CM

## 2024-01-23 DIAGNOSIS — R60.0 PEDAL EDEMA: ICD-10-CM

## 2024-01-23 DIAGNOSIS — N30.00 ACUTE CYSTITIS WITHOUT HEMATURIA: ICD-10-CM

## 2024-01-23 LAB
ALBUMIN SERPL-MCNC: 3.6 G/DL (ref 3.4–5)
ALBUMIN/GLOB SERPL: 1.1 {RATIO} (ref 1.1–2.2)
ALP SERPL-CCNC: 115 U/L (ref 40–129)
ALT SERPL-CCNC: 10 U/L (ref 10–40)
ANION GAP SERPL CALCULATED.3IONS-SCNC: 13 MMOL/L (ref 3–16)
AST SERPL-CCNC: 15 U/L (ref 15–37)
BACTERIA URNS QL MICRO: ABNORMAL /HPF
BASOPHILS # BLD: 0.1 K/UL (ref 0–0.2)
BASOPHILS NFR BLD: 0.7 %
BILIRUB SERPL-MCNC: 0.3 MG/DL (ref 0–1)
BILIRUB UR QL STRIP.AUTO: NEGATIVE
BUN SERPL-MCNC: 54 MG/DL (ref 7–20)
CALCIUM SERPL-MCNC: 8.7 MG/DL (ref 8.3–10.6)
CHLORIDE SERPL-SCNC: 112 MMOL/L (ref 99–110)
CLARITY UR: CLEAR
CO2 SERPL-SCNC: 17 MMOL/L (ref 21–32)
COLOR UR: YELLOW
CREAT SERPL-MCNC: 5.6 MG/DL (ref 0.6–1.2)
DEPRECATED RDW RBC AUTO: 15.5 % (ref 12.4–15.4)
EOSINOPHIL # BLD: 0.2 K/UL (ref 0–0.6)
EOSINOPHIL NFR BLD: 2.9 %
EPI CELLS #/AREA URNS AUTO: 4 /HPF (ref 0–5)
FLUAV RNA RESP QL NAA+PROBE: NOT DETECTED
FLUBV RNA RESP QL NAA+PROBE: NOT DETECTED
GFR SERPLBLD CREATININE-BSD FMLA CKD-EPI: 7 ML/MIN/{1.73_M2}
GLUCOSE SERPL-MCNC: 105 MG/DL (ref 70–99)
GLUCOSE UR STRIP.AUTO-MCNC: NEGATIVE MG/DL
HCT VFR BLD AUTO: 23.4 % (ref 36–48)
HGB BLD-MCNC: 7.3 G/DL (ref 12–16)
HGB UR QL STRIP.AUTO: ABNORMAL
HYALINE CASTS #/AREA URNS AUTO: 2 /LPF (ref 0–8)
KETONES UR STRIP.AUTO-MCNC: NEGATIVE MG/DL
LEUKOCYTE ESTERASE UR QL STRIP.AUTO: ABNORMAL
LYMPHOCYTES # BLD: 1.3 K/UL (ref 1–5.1)
LYMPHOCYTES NFR BLD: 16.6 %
MCH RBC QN AUTO: 27.4 PG (ref 26–34)
MCHC RBC AUTO-ENTMCNC: 31.1 G/DL (ref 31–36)
MCV RBC AUTO: 88.1 FL (ref 80–100)
MONOCYTES # BLD: 0.5 K/UL (ref 0–1.3)
MONOCYTES NFR BLD: 7 %
NEUTROPHILS # BLD: 5.6 K/UL (ref 1.7–7.7)
NEUTROPHILS NFR BLD: 72.8 %
NITRITE UR QL STRIP.AUTO: NEGATIVE
NT-PROBNP SERPL-MCNC: 5739 PG/ML (ref 0–449)
PH UR STRIP.AUTO: 6 [PH] (ref 5–8)
PLATELET # BLD AUTO: 217 K/UL (ref 135–450)
PMV BLD AUTO: 8.2 FL (ref 5–10.5)
POTASSIUM SERPL-SCNC: 5.2 MMOL/L (ref 3.5–5.1)
POTASSIUM SERPL-SCNC: 5.4 MMOL/L (ref 3.5–5.1)
PROCALCITONIN SERPL IA-MCNC: 0.1 NG/ML (ref 0–0.15)
PROT SERPL-MCNC: 6.9 G/DL (ref 6.4–8.2)
PROT UR STRIP.AUTO-MCNC: 100 MG/DL
RBC # BLD AUTO: 2.66 M/UL (ref 4–5.2)
RBC CLUMPS #/AREA URNS AUTO: 232 /HPF (ref 0–4)
SARS-COV-2 RNA RESP QL NAA+PROBE: NOT DETECTED
SODIUM SERPL-SCNC: 142 MMOL/L (ref 136–145)
SP GR UR STRIP.AUTO: 1.01 (ref 1–1.03)
TROPONIN, HIGH SENSITIVITY: 19 NG/L (ref 0–14)
TROPONIN, HIGH SENSITIVITY: 20 NG/L (ref 0–14)
UA COMPLETE W REFLEX CULTURE PNL UR: YES
UA DIPSTICK W REFLEX MICRO PNL UR: YES
URN SPEC COLLECT METH UR: ABNORMAL
UROBILINOGEN UR STRIP-ACNC: 0.2 E.U./DL
WBC # BLD AUTO: 7.7 K/UL (ref 4–11)
WBC #/AREA URNS AUTO: 87 /HPF (ref 0–5)

## 2024-01-23 PROCEDURE — 1200000000 HC SEMI PRIVATE

## 2024-01-23 PROCEDURE — 36415 COLL VENOUS BLD VENIPUNCTURE: CPT

## 2024-01-23 PROCEDURE — 85025 COMPLETE CBC W/AUTO DIFF WBC: CPT

## 2024-01-23 PROCEDURE — 81001 URINALYSIS AUTO W/SCOPE: CPT

## 2024-01-23 PROCEDURE — 6360000002 HC RX W HCPCS: Performed by: PHYSICIAN ASSISTANT

## 2024-01-23 PROCEDURE — 6370000000 HC RX 637 (ALT 250 FOR IP): Performed by: HOSPITALIST

## 2024-01-23 PROCEDURE — 87636 SARSCOV2 & INF A&B AMP PRB: CPT

## 2024-01-23 PROCEDURE — 87086 URINE CULTURE/COLONY COUNT: CPT

## 2024-01-23 PROCEDURE — 93005 ELECTROCARDIOGRAM TRACING: CPT | Performed by: PHYSICIAN ASSISTANT

## 2024-01-23 PROCEDURE — 84484 ASSAY OF TROPONIN QUANT: CPT

## 2024-01-23 PROCEDURE — 6370000000 HC RX 637 (ALT 250 FOR IP): Performed by: PHYSICIAN ASSISTANT

## 2024-01-23 PROCEDURE — 96365 THER/PROPH/DIAG IV INF INIT: CPT

## 2024-01-23 PROCEDURE — 2580000003 HC RX 258: Performed by: HOSPITALIST

## 2024-01-23 PROCEDURE — 83880 ASSAY OF NATRIURETIC PEPTIDE: CPT

## 2024-01-23 PROCEDURE — 74176 CT ABD & PELVIS W/O CONTRAST: CPT

## 2024-01-23 PROCEDURE — 84132 ASSAY OF SERUM POTASSIUM: CPT

## 2024-01-23 PROCEDURE — 2580000003 HC RX 258: Performed by: PHYSICIAN ASSISTANT

## 2024-01-23 PROCEDURE — 80053 COMPREHEN METABOLIC PANEL: CPT

## 2024-01-23 PROCEDURE — 99285 EMERGENCY DEPT VISIT HI MDM: CPT

## 2024-01-23 PROCEDURE — 84145 PROCALCITONIN (PCT): CPT

## 2024-01-23 PROCEDURE — 71045 X-RAY EXAM CHEST 1 VIEW: CPT

## 2024-01-23 RX ORDER — ACETAMINOPHEN 650 MG/1
650 SUPPOSITORY RECTAL EVERY 6 HOURS PRN
Status: DISCONTINUED | OUTPATIENT
Start: 2024-01-23 | End: 2024-01-29 | Stop reason: HOSPADM

## 2024-01-23 RX ORDER — ROSUVASTATIN CALCIUM 20 MG/1
10 TABLET, COATED ORAL NIGHTLY
Status: DISCONTINUED | OUTPATIENT
Start: 2024-01-23 | End: 2024-01-29 | Stop reason: HOSPADM

## 2024-01-23 RX ORDER — POLYETHYLENE GLYCOL 3350 17 G/17G
17 POWDER, FOR SOLUTION ORAL DAILY PRN
Status: DISCONTINUED | OUTPATIENT
Start: 2024-01-23 | End: 2024-01-29 | Stop reason: HOSPADM

## 2024-01-23 RX ORDER — CALCITRIOL 0.25 UG/1
0.25 CAPSULE, LIQUID FILLED ORAL DAILY
Status: DISCONTINUED | OUTPATIENT
Start: 2024-01-24 | End: 2024-01-29 | Stop reason: HOSPADM

## 2024-01-23 RX ORDER — SODIUM CHLORIDE 0.9 % (FLUSH) 0.9 %
5-40 SYRINGE (ML) INJECTION PRN
Status: DISCONTINUED | OUTPATIENT
Start: 2024-01-23 | End: 2024-01-29 | Stop reason: HOSPADM

## 2024-01-23 RX ORDER — ONDANSETRON 4 MG/1
4 TABLET, ORALLY DISINTEGRATING ORAL EVERY 8 HOURS PRN
Status: DISCONTINUED | OUTPATIENT
Start: 2024-01-23 | End: 2024-01-29 | Stop reason: HOSPADM

## 2024-01-23 RX ORDER — SODIUM CHLORIDE 9 MG/ML
INJECTION, SOLUTION INTRAVENOUS PRN
Status: DISCONTINUED | OUTPATIENT
Start: 2024-01-23 | End: 2024-01-29 | Stop reason: HOSPADM

## 2024-01-23 RX ORDER — CARVEDILOL 3.12 MG/1
3.12 TABLET ORAL 2 TIMES DAILY WITH MEALS
Status: DISCONTINUED | OUTPATIENT
Start: 2024-01-24 | End: 2024-01-29 | Stop reason: HOSPADM

## 2024-01-23 RX ORDER — ONDANSETRON 2 MG/ML
4 INJECTION INTRAMUSCULAR; INTRAVENOUS EVERY 6 HOURS PRN
Status: DISCONTINUED | OUTPATIENT
Start: 2024-01-23 | End: 2024-01-29 | Stop reason: HOSPADM

## 2024-01-23 RX ORDER — ASPIRIN 81 MG/1
81 TABLET ORAL DAILY
Status: DISCONTINUED | OUTPATIENT
Start: 2024-01-24 | End: 2024-01-29 | Stop reason: HOSPADM

## 2024-01-23 RX ORDER — ACETAMINOPHEN 325 MG/1
650 TABLET ORAL EVERY 6 HOURS PRN
Status: DISCONTINUED | OUTPATIENT
Start: 2024-01-23 | End: 2024-01-29 | Stop reason: HOSPADM

## 2024-01-23 RX ORDER — SODIUM CHLORIDE 0.9 % (FLUSH) 0.9 %
5-40 SYRINGE (ML) INJECTION EVERY 12 HOURS SCHEDULED
Status: DISCONTINUED | OUTPATIENT
Start: 2024-01-23 | End: 2024-01-29 | Stop reason: HOSPADM

## 2024-01-23 RX ORDER — FUROSEMIDE 10 MG/ML
80 INJECTION INTRAMUSCULAR; INTRAVENOUS ONCE
Status: COMPLETED | OUTPATIENT
Start: 2024-01-23 | End: 2024-01-23

## 2024-01-23 RX ORDER — HYDROCODONE BITARTRATE AND ACETAMINOPHEN 5; 325 MG/1; MG/1
1 TABLET ORAL 2 TIMES DAILY
Status: DISCONTINUED | OUTPATIENT
Start: 2024-01-23 | End: 2024-01-29 | Stop reason: HOSPADM

## 2024-01-23 RX ADMIN — APIXABAN 2.5 MG: 5 TABLET, FILM COATED ORAL at 22:36

## 2024-01-23 RX ADMIN — ROSUVASTATIN CALCIUM 10 MG: 20 TABLET, FILM COATED ORAL at 22:36

## 2024-01-23 RX ADMIN — Medication 10 ML: at 22:38

## 2024-01-23 RX ADMIN — FUROSEMIDE 80 MG: 10 INJECTION, SOLUTION INTRAMUSCULAR; INTRAVENOUS at 18:08

## 2024-01-23 RX ADMIN — CEFTRIAXONE SODIUM 1000 MG: 1 INJECTION, POWDER, FOR SOLUTION INTRAMUSCULAR; INTRAVENOUS at 14:58

## 2024-01-23 RX ADMIN — SODIUM ZIRCONIUM CYCLOSILICATE 10 G: 10 POWDER, FOR SUSPENSION ORAL at 18:14

## 2024-01-23 RX ADMIN — HYDROCODONE BITARTRATE AND ACETAMINOPHEN 1 TABLET: 5; 325 TABLET ORAL at 22:36

## 2024-01-23 ASSESSMENT — PAIN - FUNCTIONAL ASSESSMENT: PAIN_FUNCTIONAL_ASSESSMENT: NONE - DENIES PAIN

## 2024-01-23 ASSESSMENT — LIFESTYLE VARIABLES
HOW MANY STANDARD DRINKS CONTAINING ALCOHOL DO YOU HAVE ON A TYPICAL DAY: PATIENT DOES NOT DRINK
HOW OFTEN DO YOU HAVE A DRINK CONTAINING ALCOHOL: NEVER

## 2024-01-23 NOTE — PROGRESS NOTES
Pharmacy Home Medication Reconciliation Note    A medication reconciliation has been completed for Megha Rojas 1940    [facility] [id number] [facility] [place]  Information provided by: patient w/med list    The patient's home medication list is as follows:  No current facility-administered medications on file prior to encounter.     Current Outpatient Medications on File Prior to Encounter   Medication Sig Dispense Refill    esomeprazole (NEXIUM) 40 MG delayed release capsule TAKE 1 CAPSULE BY MOUTH IN THE MORNING BEFORE BREAKFAST      apixaban (ELIQUIS) 5 MG TABS tablet Take 1 tablet by mouth daily      ferrous sulfate (IRON 325) 325 (65 Fe) MG tablet Take 1 tablet by mouth 2 times daily 180 tablet 1    calcitRIOL (ROCALTROL) 0.25 MCG capsule Take 1 capsule by mouth daily 30 capsule 3    Ergocalciferol (VITAMIN D) 32411 units CAPS Take 50,000 Units by mouth once a week for 3 doses (Patient not taking: Reported on 1/23/2024) 3 capsule 0    [DISCONTINUED] citric acid-sodium citrate (BICITRA) 500-334 MG/5ML solution Take 30 mLs by mouth daily 900 mL 0    [DISCONTINUED] pantoprazole (PROTONIX) 40 MG tablet Take 1 tablet by mouth 2 times daily (before meals) 60 tablet 0    carvedilol (COREG) 3.125 MG tablet TAKE 1 TABLET BY MOUTH TWICE DAILY WITH MEALS 180 tablet 3    rosuvastatin (CRESTOR) 20 MG tablet Take 1 tablet by mouth nightly 90 tablet 3    HYDROcodone-acetaminophen (NORCO) 5-325 MG per tablet Take 1 tablet by mouth 2 times daily.      aspirin EC 81 MG EC tablet Take 1 tablet by mouth daily 30 tablet 0       Patient is no longer taking Vitamin D.    Of note, patient takes Eliquis 5 mg daily: told by physician this was appropriate for patients over 80. Patient was not able to take any medications today prior to ED arrival.    Timing of last doses updated.    Thank you,  Dara Archuleta, PROhT

## 2024-01-23 NOTE — ED PROVIDER NOTES
CAPS    Take 50,000 Units by mouth once a week for 3 doses    ESOMEPRAZOLE (NEXIUM) 40 MG DELAYED RELEASE CAPSULE    TAKE 1 CAPSULE BY MOUTH IN THE MORNING BEFORE BREAKFAST    FERROUS SULFATE (IRON 325) 325 (65 FE) MG TABLET    Take 1 tablet by mouth 2 times daily    HYDROCODONE-ACETAMINOPHEN (NORCO) 5-325 MG PER TABLET    Take 1 tablet by mouth 2 times daily.    ROSUVASTATIN (CRESTOR) 20 MG TABLET    Take 1 tablet by mouth nightly       ALLERGIES     Acetomenaphthone (menadiol diacetate) [menadiol sodium diphosphate], Lisinopril-hydrochlorothiazide, Advil [ibuprofen], Aleve [naproxen], and Tylenol [acetaminophen]    FAMILYHISTORY       Family History   Problem Relation Age of Onset    Cirrhosis Mother         Hepatitis    Heart Disease Father     No Known Problems Sister     No Known Problems Sister     No Known Problems Brother     No Known Problems Brother     No Known Problems Brother     Alzheimer's Disease Brother     No Known Problems Brother         SOCIAL HISTORY       Social History     Tobacco Use    Smoking status: Never    Smokeless tobacco: Never    Tobacco comments:      was a heavy smoker   Vaping Use    Vaping Use: Never used   Substance Use Topics    Alcohol use: No    Drug use: No       SCREENINGS          Dulce Maria Coma Scale  Eye Opening: Spontaneous  Best Verbal Response: Oriented  Best Motor Response: Obeys commands  Dulce Maria Coma Scale Score: 15                        CIWA Assessment  BP: (!) 143/73  Pulse: 76             PHYSICAL EXAM  1 or more Elements     ED Triage Vitals [01/23/24 1303]   BP Temp Temp Source Pulse Respirations SpO2 Height Weight - Scale   (!) 164/46 97.8 °F (36.6 °C) Oral 82 20 97 % 1.524 m (5') 98.9 kg (218 lb)       Physical Exam  Constitutional:       General: She is not in acute distress.     Appearance: Normal appearance. She is well-developed. She is not ill-appearing, toxic-appearing or diaphoretic.   HENT:      Head: Normocephalic and atraumatic.      Right  pending.  Also added on COVID and flu.  I discussed the case with on-call nephrologist who recommended single dose of Lokelma 10g and IV Lasix 80 mg here in the emergency department.  Will see patient on consultation on admission.  Case discussed with hospital service for admission.       I am the Primary Clinician of Record.  FINAL IMPRESSION      1. Acute renal failure superimposed on chronic kidney disease, unspecified acute renal failure type, unspecified CKD stage (HCC)    2. Acute cystitis without hematuria    3. Pedal edema          DISPOSITION/PLAN     DISPOSITION Admitted 01/23/2024 05:05:10 PM      PATIENT REFERRED TO:  No follow-up provider specified.    DISCHARGE MEDICATIONS:  New Prescriptions    No medications on file       DISCONTINUED MEDICATIONS:  Discontinued Medications    No medications on file              (Please note that portions of this note were completed with a voice recognition program.  Efforts were made to edit the dictations but occasionally words are mis-transcribed.)    BASILIO Corado (electronically signed)      Danielito Davis PA  01/23/24 2632

## 2024-01-23 NOTE — TELEPHONE ENCOUNTER
I spoke to pt and let her know she should go to the ER based off her labs. She states she wasn't feeling bad. I again let her know her labs were very abnormal and she should go to the ER for a full evaluation.

## 2024-01-23 NOTE — PROGRESS NOTES
Patient seen in ED, room 26.  Admission completed with the following exceptions:  4 Eyes Assessment, Immunizations, Vaccines, Rights and Responsibilities, Orientation to room, Plan of Care, Education/Learning Assessment and Education Plan, white board, height and weight, pain assessment and head to toe assessment.  Patient is alert and oriented X 4.  Patient lives at home in a one story and is being admitted for YESENIA.  Home Medications as well as Outside Sources have been verbally reviewed with patient and updated if appropriate.  Medication reconciliation is now Completed per pharmacy technician.  All questions answered.

## 2024-01-23 NOTE — ED PROVIDER NOTES
[facility] Emergency Department      Pt Name: Megha Rojas  MRN: [id number]  Birthdate 1940  Date of evaluation: 2024  Provider: RIRI CARRION MD  I personally saw Megha Rojas and made and approved the management plan with the advanced practice provider.  I take responsibility for the patient management.     HPI: Megha Rojas presented with   Chief Complaint   Patient presents with    Abnormal Lab     Pt sent by nephrology for abnormal lab, pt is not on HD yet, pt said her K is too high and she has additional fluid     Megha Rojas has a past medical history of Anemia, CAD (coronary artery disease) (2020), CKD (chronic kidney disease), History of blood transfusion, Hyperlipidemia, and Hypertension.  She has a past surgical history that includes fracture surgery; Cystocopy (2013); Upper gastrointestinal endoscopy (N/A, 2021); Colonoscopy (N/A, 2021); hemicolectomy (Right, 2021); Cholecystectomy, laparoscopic (N/A, 2021);  section; and Upper gastrointestinal endoscopy (N/A, 2023).    No current facility-administered medications on file prior to encounter.     Current Outpatient Medications on File Prior to Encounter   Medication Sig Dispense Refill    esomeprazole (NEXIUM) 40 MG delayed release capsule TAKE 1 CAPSULE BY MOUTH IN THE MORNING BEFORE BREAKFAST      apixaban (ELIQUIS) 5 MG TABS tablet Take 1 tablet by mouth daily      ferrous sulfate (IRON 325) 325 (65 Fe) MG tablet Take 1 tablet by mouth 2 times daily 180 tablet 1    calcitRIOL (ROCALTROL) 0.25 MCG capsule Take 1 capsule by mouth daily 30 capsule 3    Ergocalciferol (VITAMIN D) 39616 units CAPS Take 50,000 Units by mouth once a week for 3 doses (Patient not taking: Reported on 2024) 3 capsule 0    carvedilol (COREG) 3.125 MG tablet TAKE 1 TABLET BY MOUTH TWICE DAILY WITH MEALS 180 tablet 3    rosuvastatin (CRESTOR) 20 MG tablet Take 1 tablet by mouth nightly 90 tablet 3     Notable for the following components:       Result Value    RBC 2.66 (*)     Hemoglobin 7.3 (*)     Hematocrit 23.4 (*)     RDW 15.5 (*)     All other components within normal limits   COMPREHENSIVE METABOLIC PANEL W/ REFLEX TO MG FOR LOW K - Abnormal; Notable for the following components:    Potassium reflex Magnesium 5.2 (*)     Chloride 112 (*)     CO2 17 (*)     Glucose 105 (*)     BUN 54 (*)     Creatinine 5.6 (*)     Est, Glom Filt Rate 7 (*)     All other components within normal limits    Narrative:     CALL  Roger  [facility] tel. [phone],                  Previous panic on this admission - call not needed per SOP, 01/23/2024 14:02,                  by [employer]   BRAIN NATRIURETIC PEPTIDE - Abnormal; Notable for the following components:    Pro-BNP 5,739 (*)     All other components within normal limits    Narrative:     CALL  Roger  [facility] tel. [phone],                  Previous panic on this admission - call not needed per SOP, 01/23/2024 14:02,                  by [employer]   URINALYSIS WITH REFLEX TO CULTURE - Abnormal; Notable for the following components:    Blood, Urine LARGE (*)     Protein,  (*)     Leukocyte Esterase, Urine MODERATE (*)     All other components within normal limits   TROPONIN - Abnormal; Notable for the following components:    Troponin, High Sensitivity 19 (*)     All other components within normal limits    Narrative:     CALL  Roger  [facility] tel. [phone],                  Previous panic on this admission - call not needed per SOP, 01/23/2024 14:02,                  by Li[place]hey   MICROSCOPIC URINALYSIS - Abnormal; Notable for the following components:    Bacteria, UA Rare (*)     WBC, UA 87 (*)     RBC,  (*)     All other components within normal limits   TROPONIN - Abnormal; Notable for the following components:    Troponin, High Sensitivity 20 (*)     All other components within normal limits   CULTURE, URINE   COVID-19 & INFLUENZA COMBO   PROCALCITONIN     Previous BUN/crea:

## 2024-01-23 NOTE — ED NOTES
RN rounded on pt. Repositioned pt's arm so IV could continue infusing. Provided education on purpose of tele monitor to pt and family.

## 2024-01-23 NOTE — TELEPHONE ENCOUNTER
Called and spoke with patient Per Dr Aguirre. Her creatine is slightly worsen she is to follow up with her Nephrologist. She will call to schedule appt.

## 2024-01-23 NOTE — TELEPHONE ENCOUNTER
----- Message from JIMENA Baum CNP sent at 1/23/2024  8:03 AM EST -----  Regarding: FW: Abnl labs  I left a message for her last evening. She NEEDS to go to the ER.  ----- Message -----  From: Angela Incoming Lab Results From Soft (Epic Adt)  Sent: 1/22/2024   6:26 PM EST  To: JIMENA Baum CNP

## 2024-01-23 NOTE — ED NOTES
other components within normal limits    Narrative:     CALL  Roger  [facility] tel. [phone],  Previous panic on this admission - call not needed per SOP, 01/23/2024 14:02,  by [email or web address]   BRAIN NATRIURETIC PEPTIDE - Abnormal; Notable for the following components:    Pro-BNP 5,739 (*)     All other components within normal limits    Narrative:     CALL  Roger  [facility] tel. [phone],  Previous panic on this admission - call not needed per SOP, 01/23/2024 14:02,  by [email or web address]   URINALYSIS WITH REFLEX TO CULTURE - Abnormal; Notable for the following components:    Blood, Urine LARGE (*)     Protein,  (*)     Leukocyte Esterase, Urine MODERATE (*)     All other components within normal limits   TROPONIN - Abnormal; Notable for the following components:    Troponin, High Sensitivity 19 (*)     All other components within normal limits    Narrative:     CALL  Roger  [facility] tel. [phone],  Previous panic on this admission - call not needed per SOP, 01/23/2024 14:02,  by [email or web address]   MICROSCOPIC URINALYSIS - Abnormal; Notable for the following components:    Bacteria, UA Rare (*)     WBC, UA 87 (*)     RBC,  (*)     All other components within normal limits   TROPONIN - Abnormal; Notable for the following components:    Troponin, High Sensitivity 20 (*)     All other components within normal limits     Critical values: no     Background  History:   Past Medical History:   Diagnosis Date    Anemia     CAD (coronary artery disease) 11/2020    Stent    CKD (chronic kidney disease)     History of blood transfusion     Hyperlipidemia     patient states well controlled    Hypertension        Assessment    Vitals/MEWS: MEWS Score: 1  Level of Consciousness: Alert (0)   Vitals:    01/23/24 1516 01/23/24 1531 01/23/24 1546 01/23/24 1601   BP: (!) 148/71 (!) 159/71 (!) 152/63 (!) 143/73   Pulse: 94 78 74 76   Resp: 20 19 20 17   Temp:       TempSrc:       SpO2: 96% 97% 94% 99%   Weight:       Height:         FiO2 (%): n/a  O2 Flow Rate:  O2 Device: None (Room air)    Cardiac Rhythm:    Pain Assessment:  [x] Verbal [] Gomez Reynolds Scale  Pain Scale: Pain Assessment  Pain Assessment: None - Denies Pain  Last documented pain score (0-10 scale)    Last documented pain medication administered: n/a  Mental Status: oriented, alert, coherent, logical, thought processes intact, and able to concentrate and follow conversation  Orientation Level:    NIH Score:    C-SSRS: Risk of Suicide: No Risk  Bedside swallow:    Driscoll Coma Scale (GCS): Dulce Maria Coma Scale  Eye Opening: Spontaneous  Best Verbal Response: Oriented  Best Motor Response: Obeys commands  Driscoll Coma Scale Score: 15  Active LDA's:   Peripheral IV 01/23/24 Right Antecubital (Active)   Site Assessment Clean, dry & intact 01/23/24 1330   Line Status Blood return noted 01/23/24 1330     PO Status: Regular  Pertinent or High Risk Medications/Drips: no   If Yes, please provide details:   Pending Blood Product Administration: no       You may also review the ED PT Care Timeline found under the Summary Nursing Index tab.    Recommendation    Pending orders All ER orders complete   Plan for Discharge (if known):   Additional Comments: [id number]   If any further questions, please call Sending RN at [phone]    Electronically signed by: Electronically signed by Chanel Navarrete RN on 1/23/2024 at 6:38 PM

## 2024-01-23 NOTE — H&P
HOSPITALISTS HISTORY AND PHYSICAL    1/23/2024 5:58 PM    Patient Information:  MEHNAZ CALL is a 83 y.o. female [id number]  PCP:  Denice Reynolds MD (Tel: [phone] )    Chief complaint:    Chief Complaint   Patient presents with    Abnormal Lab     Pt sent by nephrology for abnormal lab, pt is not on HD yet, pt said her K is too high and she has additional fluid        History of Present Illness:  Mehnaz Call is a 83 y.o. female who presented with at the request of her nephrology about abnormal labs.  Patient with chronic kidney disease patient has been having increased leg swelling found to have abnormal lab with creatinine at 5.6 and high potassium.  Patient was asked to come to ER.  Patient's list last admission has been off of diuretics due to worsening renal function since then increased leg swelling.  Patient denies any shortness of breath patient workup in the ER was also was concern for UTI.  Patient anemia chronic.  Patient given IV Lasix with antibiotics in the E  REVIEW OF SYSTEMS:   Constitutional: Negative for fever,chills or night sweats  ENT: Negative for rhinorrhea, epistaxis, hoarseness, sore throat.  Respiratory: Negative for shortness of breath,wheezing  Cardiovascular: Negative for chest pain, palpitations   Gastrointestinal: Negative for nausea, vomiting, diarrhea  Genitourinary: Negative for polyuria, dysuria   Hematologic/Lymphatic: Negative for bleeding tendency, easy bruising  Musculoskeletal: Negative for myalgias and arthralgias  Neurologic: Negative for confusion,dysarthria.  Skin: Negative for itching,rash, good capillary refill.   Psychiatric: Negative for depression,anxiety, agitation.  Endocrine: Negative for polydipsia,polyuria,heat /cold intolerance.    Past Medical History:   has a past medical history of Anemia, CAD  smokeless tobacco. She reports that she does not drink alcohol and does not use drugs.     Family History:  family history includes Alzheimer's Disease in her brother; Cirrhosis in her mother; Heart Disease in her brother and father; No Known Problems in her brother, brother, brother, sister, and sister. ,       Physical Exam:  BP (!) 143/73   Pulse 76   Temp 97.8 °F (36.6 °C) (Oral)   Resp 17   Ht 1.524 m (5')   Wt 98.9 kg (218 lb)   SpO2 99%   BMI 42.58 kg/m²     General appearance:  Appears comfortable. Well nourished  Eyes: Sclera clear, pupils equal  ENT: Moist mucus membranes, no thrush. Trachea midline.  Cardiovascular: Regular rhythm, normal S1, S2. No murmur, gallop, rub.  2+ edema in lower extremities  Respiratory: Clear to auscultation bilaterally, no wheeze, good inspiratory effort  Gastrointestinal: Abdomen soft, non-tender, not distended, normal bowel sounds  Musculoskeletal: No cyanosis in digits, neck supple  Neurology: Cranial nerves grossly intact. Alert and oriented in time, place and person. No speech or motor deficits  Psychiatry: Appropriate affect. Not agitated  Skin: Warm, dry, normal turgor, no rash    Labs:  CBC:   Lab Results   Component Value Date/Time    WBC 7.7 01/23/2024 01:26 PM    RBC 2.66 01/23/2024 01:26 PM    HGB 7.3 01/23/2024 01:26 PM    HCT 23.4 01/23/2024 01:26 PM    MCV 88.1 01/23/2024 01:26 PM    MCH 27.4 01/23/2024 01:26 PM    MCHC 31.1 01/23/2024 01:26 PM    RDW 15.5 01/23/2024 01:26 PM     01/23/2024 01:26 PM    MPV 8.2 01/23/2024 01:26 PM     BMP:    Lab Results   Component Value Date/Time     01/23/2024 01:26 PM    K 5.2 01/23/2024 01:26 PM     01/23/2024 01:26 PM    CO2 17 01/23/2024 01:26 PM    BUN 54 01/23/2024 01:26 PM    CREATININE 5.6 01/23/2024 01:26 PM    CALCIUM 8.7 01/23/2024 01:26 PM    GFRAA 27 04/02/2022 10:56 AM    LABGLOM 7 01/23/2024 01:26 PM    GLUCOSE 105 01/23/2024 01:26 PM       Chest Xray:   EKG:    I visualized CXR images

## 2024-01-23 NOTE — TELEPHONE ENCOUNTER
Pt's daughter, Rome called back. Advised her Dr. Bernal wants pt to go to the ER due to Creatinine levels being elevated. Daughter said pt wasn't wanting to go, Pt got on phone, and said she will go. Pt verbalized understanding.

## 2024-01-24 VITALS
SYSTOLIC BLOOD PRESSURE: 162 MMHG | OXYGEN SATURATION: 96 % | BODY MASS INDEX: 42.8 KG/M2 | WEIGHT: 218 LBS | HEIGHT: 60 IN | DIASTOLIC BLOOD PRESSURE: 60 MMHG | HEART RATE: 65 BPM

## 2024-01-24 LAB
ABO + RH BLD: NORMAL
ANION GAP SERPL CALCULATED.3IONS-SCNC: 12 MMOL/L (ref 3–16)
BACTERIA UR CULT: NORMAL
BASOPHILS # BLD: 0.1 K/UL (ref 0–0.2)
BASOPHILS NFR BLD: 0.9 %
BLD GP AB SCN SERPL QL: NORMAL
BLOOD BANK DISPENSE STATUS: NORMAL
BLOOD BANK PRODUCT CODE: NORMAL
BPU ID: NORMAL
BUN SERPL-MCNC: 56 MG/DL (ref 7–20)
CALCIUM SERPL-MCNC: 8.6 MG/DL (ref 8.3–10.6)
CHLORIDE SERPL-SCNC: 113 MMOL/L (ref 99–110)
CO2 SERPL-SCNC: 18 MMOL/L (ref 21–32)
CREAT SERPL-MCNC: 5.8 MG/DL (ref 0.6–1.2)
DEPRECATED RDW RBC AUTO: 15.5 % (ref 12.4–15.4)
DESCRIPTION BLOOD BANK: NORMAL
EKG ATRIAL RATE: 75 BPM
EKG DIAGNOSIS: NORMAL
EKG P AXIS: 50 DEGREES
EKG P-R INTERVAL: 216 MS
EKG Q-T INTERVAL: 402 MS
EKG QRS DURATION: 134 MS
EKG QTC CALCULATION (BAZETT): 448 MS
EKG R AXIS: -19 DEGREES
EKG T AXIS: 102 DEGREES
EKG VENTRICULAR RATE: 75 BPM
EOSINOPHIL # BLD: 0.2 K/UL (ref 0–0.6)
EOSINOPHIL NFR BLD: 3.4 %
GFR SERPLBLD CREATININE-BSD FMLA CKD-EPI: 7 ML/MIN/{1.73_M2}
GLUCOSE SERPL-MCNC: 95 MG/DL (ref 70–99)
HAV IGM SERPL QL IA: NORMAL
HBV CORE IGM SERPL QL IA: NORMAL
HBV SURFACE AB SERPL IA-ACNC: <3.5 MIU/ML
HBV SURFACE AG SERPL QL IA: NORMAL
HCT VFR BLD AUTO: 21.1 % (ref 36–48)
HCT VFR BLD AUTO: 21.4 % (ref 36–48)
HCT VFR BLD AUTO: 25 % (ref 36–48)
HCV AB SERPL QL IA: NORMAL
HGB BLD-MCNC: 6.5 G/DL (ref 12–16)
HGB BLD-MCNC: 6.7 G/DL (ref 12–16)
HGB BLD-MCNC: 8 G/DL (ref 12–16)
LYMPHOCYTES # BLD: 1.7 K/UL (ref 1–5.1)
LYMPHOCYTES NFR BLD: 25.9 %
MCH RBC QN AUTO: 27.6 PG (ref 26–34)
MCHC RBC AUTO-ENTMCNC: 31 G/DL (ref 31–36)
MCV RBC AUTO: 88.9 FL (ref 80–100)
MONOCYTES # BLD: 0.7 K/UL (ref 0–1.3)
MONOCYTES NFR BLD: 10.6 %
NEUTROPHILS # BLD: 4 K/UL (ref 1.7–7.7)
NEUTROPHILS NFR BLD: 59.2 %
PLATELET # BLD AUTO: 200 K/UL (ref 135–450)
PMV BLD AUTO: 8.6 FL (ref 5–10.5)
POTASSIUM SERPL-SCNC: 5.1 MMOL/L (ref 3.5–5.1)
RBC # BLD AUTO: 2.37 M/UL (ref 4–5.2)
SODIUM SERPL-SCNC: 143 MMOL/L (ref 136–145)
WBC # BLD AUTO: 6.7 K/UL (ref 4–11)

## 2024-01-24 PROCEDURE — P9016 RBC LEUKOCYTES REDUCED: HCPCS

## 2024-01-24 PROCEDURE — 93010 ELECTROCARDIOGRAM REPORT: CPT | Performed by: INTERNAL MEDICINE

## 2024-01-24 PROCEDURE — 2580000003 HC RX 258: Performed by: HOSPITALIST

## 2024-01-24 PROCEDURE — 80074 ACUTE HEPATITIS PANEL: CPT

## 2024-01-24 PROCEDURE — 86704 HEP B CORE ANTIBODY TOTAL: CPT

## 2024-01-24 PROCEDURE — 36415 COLL VENOUS BLD VENIPUNCTURE: CPT

## 2024-01-24 PROCEDURE — 6360000002 HC RX W HCPCS: Performed by: HOSPITALIST

## 2024-01-24 PROCEDURE — 86706 HEP B SURFACE ANTIBODY: CPT

## 2024-01-24 PROCEDURE — 85014 HEMATOCRIT: CPT

## 2024-01-24 PROCEDURE — 80048 BASIC METABOLIC PNL TOTAL CA: CPT

## 2024-01-24 PROCEDURE — 1200000000 HC SEMI PRIVATE

## 2024-01-24 PROCEDURE — 85025 COMPLETE CBC W/AUTO DIFF WBC: CPT

## 2024-01-24 PROCEDURE — 86901 BLOOD TYPING SEROLOGIC RH(D): CPT

## 2024-01-24 PROCEDURE — 85018 HEMOGLOBIN: CPT

## 2024-01-24 PROCEDURE — 36430 TRANSFUSION BLD/BLD COMPNT: CPT

## 2024-01-24 PROCEDURE — 86923 COMPATIBILITY TEST ELECTRIC: CPT

## 2024-01-24 PROCEDURE — 30233N1 TRANSFUSION OF NONAUTOLOGOUS RED BLOOD CELLS INTO PERIPHERAL VEIN, PERCUTANEOUS APPROACH: ICD-10-PCS | Performed by: HOSPITALIST

## 2024-01-24 PROCEDURE — 86850 RBC ANTIBODY SCREEN: CPT

## 2024-01-24 PROCEDURE — 6370000000 HC RX 637 (ALT 250 FOR IP): Performed by: HOSPITALIST

## 2024-01-24 PROCEDURE — 86900 BLOOD TYPING SEROLOGIC ABO: CPT

## 2024-01-24 RX ORDER — FUROSEMIDE 10 MG/ML
80 INJECTION INTRAMUSCULAR; INTRAVENOUS ONCE
Status: COMPLETED | OUTPATIENT
Start: 2024-01-24 | End: 2024-01-24

## 2024-01-24 RX ORDER — SODIUM CHLORIDE 9 MG/ML
INJECTION, SOLUTION INTRAVENOUS PRN
Status: DISCONTINUED | OUTPATIENT
Start: 2024-01-24 | End: 2024-01-29 | Stop reason: HOSPADM

## 2024-01-24 RX ADMIN — CARVEDILOL 3.12 MG: 3.12 TABLET, FILM COATED ORAL at 10:05

## 2024-01-24 RX ADMIN — APIXABAN 2.5 MG: 5 TABLET, FILM COATED ORAL at 10:05

## 2024-01-24 RX ADMIN — FUROSEMIDE 80 MG: 10 INJECTION, SOLUTION INTRAMUSCULAR; INTRAVENOUS at 15:53

## 2024-01-24 RX ADMIN — CARVEDILOL 3.12 MG: 3.12 TABLET, FILM COATED ORAL at 18:35

## 2024-01-24 RX ADMIN — Medication 10 ML: at 10:08

## 2024-01-24 RX ADMIN — Medication 10 ML: at 20:12

## 2024-01-24 RX ADMIN — CEFTRIAXONE SODIUM 1000 MG: 1 INJECTION, POWDER, FOR SOLUTION INTRAMUSCULAR; INTRAVENOUS at 15:49

## 2024-01-24 RX ADMIN — ROSUVASTATIN CALCIUM 10 MG: 20 TABLET, FILM COATED ORAL at 20:12

## 2024-01-24 RX ADMIN — ASPIRIN 81 MG: 81 TABLET, COATED ORAL at 10:05

## 2024-01-24 RX ADMIN — HYDROCODONE BITARTRATE AND ACETAMINOPHEN 1 TABLET: 5; 325 TABLET ORAL at 20:12

## 2024-01-24 RX ADMIN — CALCITRIOL CAPSULES 0.25 MCG 0.25 MCG: 0.25 CAPSULE ORAL at 10:05

## 2024-01-24 RX ADMIN — HYDROCODONE BITARTRATE AND ACETAMINOPHEN 1 TABLET: 5; 325 TABLET ORAL at 10:05

## 2024-01-24 NOTE — CARE COORDINATION
Case Management Assessment  Initial Evaluation    Date/Time of Evaluation: 1/24/2024 12:07 PM  Assessment Completed by: Du Silverio    If patient is discharged prior to next notation, then this note serves as note for discharge by case management.    Patient Name: Megha Rojas                   YOB: 1940  Diagnosis: Pedal edema [R60.0]  YESENIA (acute kidney injury) (HCC) [N17.9]  Acute cystitis without hematuria [N30.00]  Hypervolemia, unspecified hypervolemia type [E87.70]  Acute renal failure superimposed on chronic kidney disease, unspecified acute renal failure type, unspecified CKD stage (HCC) [N17.9, N18.9]                   Date / Time: 1/23/2024 12:54 PM    Patient Admission Status: Inpatient   Readmission Risk (Low < 19, Mod (19-27), High > 27): Readmission Risk Score: 21.2    Current PCP: Denice Reynolds MD  PCP verified by CM? Yes    Chart Reviewed: Yes      History Provided by: Patient  Patient Orientation: Alert and Oriented    Patient Cognition: Alert    Hospitalization in the last 30 days (Readmission):  Yes    If yes, Readmission Assessment in CM Navigator will be completed.    Advance Directives:      Code Status: Full Code   Patient's Primary Decision Maker is: Legal Next of Kin    Primary Decision Maker: Rome Kirkland - Manny - [phone]    Discharge Planning:    Patient lives with: Family Members (grand daughter lives with patient) Type of Home: House  Primary Care Giver: Self  Patient Support Systems include: Children, Family Members (grand daughter lives with patient)   Current Financial resources: Medicare  Current community resources: None  Current services prior to admission: None            Current DME:              Type of Home Care services:  None    ADLS  Prior functional level: Independent in ADLs/IADLs, Bathing, Dressing, Toileting, Feeding, Cooking, Housework, Shopping, Mobility  Current functional level: Assistance with the following:, Bathing, Dressing,

## 2024-01-24 NOTE — FLOWSHEET NOTE
Pt remains stable. No s/s of reaction. Continually monitoring patient still until next vital signs taken.

## 2024-01-24 NOTE — ED NOTES
Patient alert and oriented x3.  GCS 15/15.  Skin appropriate for ethnicity, dry and intact.  No signs of acute distress noted at this time. Regular respiratory pattern, normal respiratory depth, unlabored respirations.   Denies pain at this time.      Mobility Level of assistance pt uses a walker at baseline.      Pt placed on a continuous pulse oximetry and telemetry monitoring. Bedside Monitor on with Alarms audible and alarms set.  Pt on cycling blood pressure. Fall risk precautions in place, call light in reach, bed side table within reach, bed alarm on, daughter at bedside. Will continue to monitor.

## 2024-01-24 NOTE — FLOWSHEET NOTE
Blood infusing at 60 ml/hr. No s/s of reaction. Pt stable and VSS. Pt A&O. Rate increased to 150 ml/hr. Pt has been monitored by this nurse continuously for 15 minutes.

## 2024-01-24 NOTE — CARE COORDINATION
01/24/24 1158   Readmission Assessment   Number of Days since last admission? 8-30 days   Previous Disposition Home with Family   Who is being Interviewed Patient   What was the patient's/caregiver's perception as to why they think they needed to return back to the hospital? Other (Comment)  (Low blood count and water on my lungs)   Did you visit your Primary Care Physician after you left the hospital, before you returned this time? No   Why weren't you able to visit your PCP? Did not have an appointment   Did you see a specialist, such as Cardiac, Pulmonary, Orthopedic Physician, etc. after you left the hospital? Yes   Who advised the patient to return to the hospital? Physician's Nurse/Office staff  (Nephro)   Does the patient report anything that got in the way of taking their medications? No   In our efforts to provide the best possible care to you and others like you, can you think of anything that we could have done to help you after you left the hospital the first time, so that you might not have needed to return so soon? Other (Comment)  (No)     Electronically signed by Du Silverio on 1/24/24 at 12:00 PM EST

## 2024-01-24 NOTE — ED PROVIDER NOTES
I did not perform history or physical on Megha Rojas.  This patient was seen independently by an BRYANNA. I did review the EKG, which is documented below.     EKG  The Ekg interpreted by me in the absence of a cardiologist shows.  normal sinus rhythm with a rate of 75 and 1st degree AV block  Axis is   Left axis deviation  QTc is  normal  LBBB   No specific ST-T wave changes appreciated.  No evidence of acute ischemia.   No significant change from prior EKG dated 12/28/2023         Dwaine Salazar MD  01/23/24 2054

## 2024-01-24 NOTE — FLOWSHEET NOTE
Liliana RN, charge nurse, and Collette RN, pt nurse, both aware pt was observed for 30 min in total after blood administration started. Aware next vitals are due to be checked at 1310.

## 2024-01-24 NOTE — PROGRESS NOTES
V2.0    [facility] Progress Note      Name:  Megha Rojas /Age/Sex: 1940  (83 y.o. female)   MRN & CSN:  [id number] & [id number] Encounter Date/Time: 2024 1:26 PM EST   Location:  [id number]/5558-01 PCP: Denice Reynolds MD     Attending:Delgado Cee MD       Hospital Day: 2    Assessment and Recommendations   Megha Rojas is a 83 y.o. female who presents with YESENIA (acute kidney injury) (HCC)      Plan:   Acute on chronic kidney disease  Progressing to end-stage  Evaluated by nephrology plan is to put temporary tunneled catheter will initiate dialysis in house  Discussed with case management setting up outpatient dialysis    Anemia of chronic disease  Noted drop in hemoglobin we will transfuse 1 unit of blood check for Hemoccult as well ensure no further drop    Hypertension    CAD    Obstructive sleep apnea          Diet ADULT DIET; Regular  Diet NPO   DVT Prophylaxis    Code Status Full Code   Disposition          Personally reviewed Lab Studies and Imaging         Subjective:     Chief Complaint:     Megha Rojas is a 83 y.o. female who presents with resting comfortably no new changes.      Review of Systems:      Pertinent positives and negatives discussed in HPI    Objective:     Intake/Output Summary (Last 24 hours) at 2024 1326  Last data filed at 2024 0957  Gross per 24 hour   Intake 270 ml   Output --   Net 270 ml      Vitals:   Vitals:    24 1104 24 1205 24 1222 24 1238   BP: (!) 162/63 (!) 150/52 (!) 157/66 (!) 153/67   Pulse: 72 68 66 67   Resp: 18 18 18 17   Temp: 97.8 °F (36.6 °C) 98.4 °F (36.9 °C) 97.9 °F (36.6 °C) 98.2 °F (36.8 °C)   TempSrc: Oral Oral Oral Oral   SpO2: 95% 94% 94% 94%   Weight:       Height:             Physical Exam:      General: NAD  Eyes: EOMI  ENT: neck supple  Cardiovascular: Regular rate.  Respiratory: Clear to auscultation  Gastrointestinal: Soft, non tender  Genitourinary: no suprapubic tenderness  Musculoskeletal: No

## 2024-01-24 NOTE — CONSULTS
[facility]   Nephrology progress Note  [phone]  [phone]   [employer]         Reason for Consult:  YESENIA on CKD    HISTORY OF PRESENT ILLNESS:      The patient is a 83 y.o. female with significant past medical history of progressive CKD, hypertension who presents with abnormal labs from nephrology office and need for dialysis initiation. She is also volume up and on po diuretics at home. Initially on admission, she was not agreeable to starting dialysis but after conversation with family, she is now amenable to starting dialysis. She follows with Dr. Mir outpatient. Denies any shortness of breath with rest or chest pain. Does have lower extremity edema. Potassium 5.8 on admission, treated medically.       Past Medical History:        Diagnosis Date    Anemia     CAD (coronary artery disease) 2020    Stent    CKD (chronic kidney disease)     History of blood transfusion     Hyperlipidemia     patient states well controlled    Hypertension        Past Surgical History:        Procedure Laterality Date     SECTION      x3    CHOLECYSTECTOMY, LAPAROSCOPIC N/A 2021    LAPAROSCOPIC CHOLECYSTECTOMY performed by Trung Hurst MD at [facility] OR    COLONOSCOPY N/A 2021    COLONOSCOPY WITH BIOPSY performed by Ed Castellanos MD at [facility] ENDOSCOPY    CYSTOSCOPY  2013    w/ bladder biopsy    FRACTURE SURGERY      right wrist    HEMICOLECTOMY Right 2021    LAPAROSCOPIC RIGHT COLON RESECTION performed by Trung Hurst MD at [facility] OR    UPPER GASTROINTESTINAL ENDOSCOPY N/A 2021    EGD BIOPSY performed by Ed Castellanos MD at [facility] ENDOSCOPY    UPPER GASTROINTESTINAL ENDOSCOPY N/A 2023    EGD CONTROL HEMORRHAGE, EGD APC STOMACH performed by Junoir Hermosillo MD at [facility] ENDOSCOPY       Current Medications:    No current facility-administered medications on file prior to encounter.     Current Outpatient Medications on File Prior to  in the Last Year: Never true   Transportation Needs: No Transportation Needs (1/23/2024)    PRAPARE - Transportation     Lack of Transportation (Medical): No     Lack of Transportation (Non-Medical): No   Physical Activity: Not on file   Stress: Not on file   Social Connections: Not on file   Intimate Partner Violence: Not on file   Housing Stability: Low Risk  (1/23/2024)    Housing Stability Vital Sign     Unable to Pay for Housing in the Last Year: No     Number of Places Lived in the Last Year: 1     Unstable Housing in the Last Year: No       Family History:       Problem Relation Age of Onset    Cirrhosis Mother         Hepatitis    Heart Disease Father     No Known Problems Sister     No Known Problems Sister     Heart Disease Brother     No Known Problems Brother     No Known Problems Brother     Alzheimer's Disease Brother     No Known Problems Brother        REVIEW OF SYSTEMS:    10 pt ROS done, relevant features as in [place], rest negative       PHYSICAL EXAM:    Vitals:    BP (!) 162/63   Pulse 72   Temp 97.8 °F (36.6 °C) (Oral)   Resp 18   Ht 1.524 m (5')   Wt 98.9 kg (218 lb)   SpO2 95%   BMI 42.58 kg/m²   I/O last 3 completed shifts:  In: 120 [P.O.:120]  Out: -   I/O this shift:  In: 150 [P.O.:150]  Out: -     Physical Exam:  Gen:  alert, oriented x 3  PERRL , EOM +  Pallor +, No icterus  JVP not raised   CV: RRR no murmur or rub .  Lungs:B/ L air entry, Normal breath sounds   Abd: soft, bowel sounds + , No organomegaly   Ext: 4+ edema, no cyanosis  Skin: Warm.  No rash  Neuro: nonfocal.      DATA:    CBC:   Lab Results   Component Value Date/Time    WBC 6.7 01/24/2024 05:28 AM    RBC 2.37 01/24/2024 05:28 AM    HGB 6.7 01/24/2024 07:46 AM    HCT 21.4 01/24/2024 07:46 AM    MCV 88.9 01/24/2024 05:28 AM    MCH 27.6 01/24/2024 05:28 AM    MCHC 31.0 01/24/2024 05:28 AM    RDW 15.5 01/24/2024 05:28 AM     01/24/2024 05:28 AM    MPV 8.6 01/24/2024 05:28 AM     BMP:    Lab Results   Component

## 2024-01-25 ENCOUNTER — APPOINTMENT (OUTPATIENT)
Dept: INTERVENTIONAL RADIOLOGY/VASCULAR | Age: 84
DRG: 674 | End: 2024-01-25
Payer: MEDICARE

## 2024-01-25 LAB
ALBUMIN SERPL-MCNC: 3.4 G/DL (ref 3.4–5)
ANION GAP SERPL CALCULATED.3IONS-SCNC: 17 MMOL/L (ref 3–16)
APTT BLD: 29.2 SEC (ref 22.7–35.9)
BASOPHILS # BLD: 0.1 K/UL (ref 0–0.2)
BASOPHILS NFR BLD: 0.8 %
BUN SERPL-MCNC: 62 MG/DL (ref 7–20)
CALCIUM SERPL-MCNC: 8.4 MG/DL (ref 8.3–10.6)
CHLORIDE SERPL-SCNC: 107 MMOL/L (ref 99–110)
CO2 SERPL-SCNC: 15 MMOL/L (ref 21–32)
CREAT SERPL-MCNC: 5.9 MG/DL (ref 0.6–1.2)
DEPRECATED RDW RBC AUTO: 16.4 % (ref 12.4–15.4)
EOSINOPHIL # BLD: 0.2 K/UL (ref 0–0.6)
EOSINOPHIL NFR BLD: 3.3 %
GFR SERPLBLD CREATININE-BSD FMLA CKD-EPI: 7 ML/MIN/{1.73_M2}
GLUCOSE SERPL-MCNC: 83 MG/DL (ref 70–99)
HBV CORE AB SERPL QL IA: NEGATIVE
HCT VFR BLD AUTO: 24.9 % (ref 36–48)
HGB BLD-MCNC: 7.9 G/DL (ref 12–16)
INR PPP: 1.42 (ref 0.84–1.16)
LYMPHOCYTES # BLD: 1.6 K/UL (ref 1–5.1)
LYMPHOCYTES NFR BLD: 22.3 %
MCH RBC QN AUTO: 27.6 PG (ref 26–34)
MCHC RBC AUTO-ENTMCNC: 31.6 G/DL (ref 31–36)
MCV RBC AUTO: 87.4 FL (ref 80–100)
MONOCYTES # BLD: 0.6 K/UL (ref 0–1.3)
MONOCYTES NFR BLD: 8.6 %
NEUTROPHILS # BLD: 4.8 K/UL (ref 1.7–7.7)
NEUTROPHILS NFR BLD: 65 %
PHOSPHATE SERPL-MCNC: 5.2 MG/DL (ref 2.5–4.9)
PLATELET # BLD AUTO: 208 K/UL (ref 135–450)
PMV BLD AUTO: 8 FL (ref 5–10.5)
POTASSIUM SERPL-SCNC: 4.9 MMOL/L (ref 3.5–5.1)
POTASSIUM SERPL-SCNC: 4.9 MMOL/L (ref 3.5–5.1)
PROTHROMBIN TIME: 17.3 SEC (ref 11.5–14.8)
RBC # BLD AUTO: 2.85 M/UL (ref 4–5.2)
SODIUM SERPL-SCNC: 139 MMOL/L (ref 136–145)
WBC # BLD AUTO: 7.4 K/UL (ref 4–11)

## 2024-01-25 PROCEDURE — 6360000002 HC RX W HCPCS: Performed by: HOSPITALIST

## 2024-01-25 PROCEDURE — 77001 FLUOROGUIDE FOR VEIN DEVICE: CPT

## 2024-01-25 PROCEDURE — 85730 THROMBOPLASTIN TIME PARTIAL: CPT

## 2024-01-25 PROCEDURE — 02HV33Z INSERTION OF INFUSION DEVICE INTO SUPERIOR VENA CAVA, PERCUTANEOUS APPROACH: ICD-10-PCS | Performed by: HOSPITALIST

## 2024-01-25 PROCEDURE — 36558 INSERT TUNNELED CV CATH: CPT

## 2024-01-25 PROCEDURE — 1200000000 HC SEMI PRIVATE

## 2024-01-25 PROCEDURE — 93005 ELECTROCARDIOGRAM TRACING: CPT | Performed by: HOSPITALIST

## 2024-01-25 PROCEDURE — 6370000000 HC RX 637 (ALT 250 FOR IP): Performed by: HOSPITALIST

## 2024-01-25 PROCEDURE — 5A1D70Z PERFORMANCE OF URINARY FILTRATION, INTERMITTENT, LESS THAN 6 HOURS PER DAY: ICD-10-PCS | Performed by: RADIOLOGY

## 2024-01-25 PROCEDURE — 85025 COMPLETE CBC W/AUTO DIFF WBC: CPT

## 2024-01-25 PROCEDURE — 76937 US GUIDE VASCULAR ACCESS: CPT

## 2024-01-25 PROCEDURE — 2580000003 HC RX 258: Performed by: RADIOLOGY

## 2024-01-25 PROCEDURE — 0JH63XZ INSERTION OF TUNNELED VASCULAR ACCESS DEVICE INTO CHEST SUBCUTANEOUS TISSUE AND FASCIA, PERCUTANEOUS APPROACH: ICD-10-PCS | Performed by: RADIOLOGY

## 2024-01-25 PROCEDURE — 36415 COLL VENOUS BLD VENIPUNCTURE: CPT

## 2024-01-25 PROCEDURE — 6360000002 HC RX W HCPCS: Performed by: RADIOLOGY

## 2024-01-25 PROCEDURE — 85610 PROTHROMBIN TIME: CPT

## 2024-01-25 PROCEDURE — 99152 MOD SED SAME PHYS/QHP 5/>YRS: CPT

## 2024-01-25 PROCEDURE — C1750 CATH, HEMODIALYSIS,LONG-TERM: HCPCS

## 2024-01-25 PROCEDURE — 2580000003 HC RX 258: Performed by: HOSPITALIST

## 2024-01-25 PROCEDURE — 2500000003 HC RX 250 WO HCPCS: Performed by: HOSPITALIST

## 2024-01-25 PROCEDURE — C1894 INTRO/SHEATH, NON-LASER: HCPCS

## 2024-01-25 PROCEDURE — 90935 HEMODIALYSIS ONE EVALUATION: CPT

## 2024-01-25 PROCEDURE — 80069 RENAL FUNCTION PANEL: CPT

## 2024-01-25 RX ORDER — FENTANYL CITRATE 50 UG/ML
INJECTION, SOLUTION INTRAMUSCULAR; INTRAVENOUS PRN
Status: COMPLETED | OUTPATIENT
Start: 2024-01-25 | End: 2024-01-25

## 2024-01-25 RX ORDER — HEPARIN SODIUM 5000 [USP'U]/ML
3600 INJECTION, SOLUTION INTRAVENOUS; SUBCUTANEOUS ONCE
Status: COMPLETED | OUTPATIENT
Start: 2024-01-25 | End: 2024-01-25

## 2024-01-25 RX ORDER — LIDOCAINE HYDROCHLORIDE AND EPINEPHRINE 10; 10 MG/ML; UG/ML
7 INJECTION, SOLUTION INFILTRATION; PERINEURAL ONCE
Status: COMPLETED | OUTPATIENT
Start: 2024-01-25 | End: 2024-01-25

## 2024-01-25 RX ORDER — LIDOCAINE HYDROCHLORIDE 10 MG/ML
5 INJECTION, SOLUTION EPIDURAL; INFILTRATION; INTRACAUDAL; PERINEURAL ONCE
Status: COMPLETED | OUTPATIENT
Start: 2024-01-25 | End: 2024-01-25

## 2024-01-25 RX ORDER — MIDAZOLAM HYDROCHLORIDE 2 MG/2ML
INJECTION, SOLUTION INTRAMUSCULAR; INTRAVENOUS PRN
Status: COMPLETED | OUTPATIENT
Start: 2024-01-25 | End: 2024-01-25

## 2024-01-25 RX ADMIN — CARVEDILOL 3.12 MG: 3.12 TABLET, FILM COATED ORAL at 18:11

## 2024-01-25 RX ADMIN — ROSUVASTATIN CALCIUM 10 MG: 20 TABLET, FILM COATED ORAL at 22:37

## 2024-01-25 RX ADMIN — Medication 10 ML: at 22:41

## 2024-01-25 RX ADMIN — CALCITRIOL CAPSULES 0.25 MCG 0.25 MCG: 0.25 CAPSULE ORAL at 12:45

## 2024-01-25 RX ADMIN — CEFTRIAXONE SODIUM 1000 MG: 1 INJECTION, POWDER, FOR SOLUTION INTRAMUSCULAR; INTRAVENOUS at 16:14

## 2024-01-25 RX ADMIN — CARVEDILOL 3.12 MG: 3.12 TABLET, FILM COATED ORAL at 12:45

## 2024-01-25 RX ADMIN — HYDROCODONE BITARTRATE AND ACETAMINOPHEN 1 TABLET: 5; 325 TABLET ORAL at 12:45

## 2024-01-25 RX ADMIN — LIDOCAINE HYDROCHLORIDE 5 ML: 10 INJECTION, SOLUTION EPIDURAL; INFILTRATION; INTRACAUDAL; PERINEURAL at 12:27

## 2024-01-25 RX ADMIN — FENTANYL CITRATE 50 MCG: 50 INJECTION, SOLUTION INTRAMUSCULAR; INTRAVENOUS at 12:15

## 2024-01-25 RX ADMIN — MIDAZOLAM HYDROCHLORIDE 1 MG: 1 INJECTION, SOLUTION INTRAMUSCULAR; INTRAVENOUS at 12:15

## 2024-01-25 RX ADMIN — MIDAZOLAM HYDROCHLORIDE 1 MG: 1 INJECTION, SOLUTION INTRAMUSCULAR; INTRAVENOUS at 12:19

## 2024-01-25 RX ADMIN — FENTANYL CITRATE 50 MCG: 50 INJECTION, SOLUTION INTRAMUSCULAR; INTRAVENOUS at 12:19

## 2024-01-25 RX ADMIN — Medication 10 ML: at 12:46

## 2024-01-25 RX ADMIN — CEFAZOLIN 2000 MG: 1 INJECTION, POWDER, FOR SOLUTION INTRAVENOUS at 12:18

## 2024-01-25 RX ADMIN — LIDOCAINE HYDROCHLORIDE,EPINEPHRINE BITARTRATE 7 ML: 10; .01 INJECTION, SOLUTION INFILTRATION; PERINEURAL at 12:27

## 2024-01-25 RX ADMIN — HYDROCODONE BITARTRATE AND ACETAMINOPHEN 1 TABLET: 5; 325 TABLET ORAL at 22:37

## 2024-01-25 RX ADMIN — SODIUM CHLORIDE: 9 INJECTION, SOLUTION INTRAVENOUS at 16:13

## 2024-01-25 RX ADMIN — HEPARIN SODIUM 3600 UNITS: 5000 INJECTION INTRAVENOUS; SUBCUTANEOUS at 12:27

## 2024-01-25 ASSESSMENT — PAIN SCALES - GENERAL: PAINLEVEL_OUTOF10: 0

## 2024-01-25 NOTE — PROGRESS NOTES
Shift assessment completed. Routine vitals obtained. Scheduled medications given. Patient is awake, alert and oriented. Respirations are easy and unlabored. Patient does not appear to be in distress, resting comfortably at this time. Call light within reach.

## 2024-01-25 NOTE — PRE SEDATION
Sedation Pre-Procedure Note    Patient Name: Megha Rojas   YOB: 1940  Room/Bed: 5TN-5558/5558-01  Medical Record Number: [id number]  Date: 2024   Time: 12:25 PM       Indication:  Tunneled dialysis catheter placement.    Consent: I have discussed with the patient and/or the patient representative the indication, alternatives, and the possible risks and/or complications of the planned procedure and the anesthesia methods. The patient and/or patient representative appear to understand and agree to proceed.    Vital Signs:   Vitals:    24 1221   BP: (!) 154/70   Pulse: 89   Resp: 19   Temp:    SpO2: 90%       Past Medical History:   has a past medical history of Anemia, CAD (coronary artery disease), CKD (chronic kidney disease), History of blood transfusion, Hyperlipidemia, and Hypertension.    Past Surgical History:   has a past surgical history that includes fracture surgery; Cystocopy (2013); Upper gastrointestinal endoscopy (N/A, 2021); Colonoscopy (N/A, 2021); hemicolectomy (Right, 2021); Cholecystectomy, laparoscopic (N/A, 2021);  section; and Upper gastrointestinal endoscopy (N/A, 2023).    Medications:   Scheduled Meds:    epoetin robin-epbx  5,000 Units SubCUTAneous Once per day on     cefTRIAXone (ROCEPHIN) IV  1,000 mg IntraVENous Q24H    [Held by provider] apixaban  2.5 mg Oral BID    [Held by provider] aspirin EC  81 mg Oral Daily    calcitRIOL  0.25 mcg Oral Daily    carvedilol  3.125 mg Oral BID WC    HYDROcodone-acetaminophen  1 tablet Oral BID    rosuvastatin  10 mg Oral Nightly    sodium chloride flush  5-40 mL IntraVENous 2 times per day     Continuous Infusions:    sodium chloride      sodium chloride       PRN Meds: sodium chloride, sodium chloride flush, sodium chloride, ondansetron **OR** ondansetron, polyethylene glycol, acetaminophen **OR** acetaminophen  Home Meds:   Prior to Admission medications    Medication

## 2024-01-25 NOTE — DIALYSIS
Treatment time: 2 hours    Net UF: 1 liters    Pre weight: 92.1 kg   Post weight: 91.1 kg   EDW: TBD     Access used: R TDC   Access function: good     Medications or blood products given: none     Regular outpatient schedule: YESENIA     Summary of response to treatment: pt tolerated first tx well. Blood pressure stable throughout, pt loc at baseline. Post VSS     Copy of dialysis treatment record placed in chart, to be scanned into EMR.

## 2024-01-25 NOTE — PROGRESS NOTES
[facility]   Nephrology progress Note  [phone]   [employer]         Reason for Consult:  YESENIA on CKD    HISTORY OF PRESENT ILLNESS:      The patient is a 83 y.o. female with significant past medical history of progressive CKD, hypertension who presents with abnormal labs from nephrology office and need for dialysis initiation. She is also volume up and on po diuretics at home. Initially on admission, she was not agreeable to starting dialysis but after conversation with family, she is now amenable to starting dialysis. She follows with Dr. Mir outpatient. Denies any shortness of breath with rest or chest pain. Does have lower extremity edema. Potassium 5.8 on admission, treated medically.     Interval history  Feels okay this morning.   NPO for TDC placement.     Current Medications:    Scheduled Meds:   epoetin robin-epbx  5,000 Units SubCUTAneous Once per day on Mon Wed Fri    cefTRIAXone (ROCEPHIN) IV  1,000 mg IntraVENous Q24H    [Held by provider] apixaban  2.5 mg Oral BID    [Held by provider] aspirin EC  81 mg Oral Daily    calcitRIOL  0.25 mcg Oral Daily    carvedilol  3.125 mg Oral BID WC    HYDROcodone-acetaminophen  1 tablet Oral BID    rosuvastatin  10 mg Oral Nightly    sodium chloride flush  5-40 mL IntraVENous 2 times per day     Continuous Infusions:   sodium chloride      sodium chloride       PRN Meds:.sodium chloride, sodium chloride flush, sodium chloride, ondansetron **OR** ondansetron, polyethylene glycol, acetaminophen **OR** acetaminophen     PHYSICAL EXAM:    Vitals:    BP (!) 153/58   Pulse 72   Temp 97.8 °F (36.6 °C) (Oral)   Resp 18   Ht 1.524 m (5')   Wt 98.9 kg (218 lb)   SpO2 95%   BMI 42.58 kg/m²   I/O last 3 completed shifts:  In: 955.3 [P.O.:630; Blood:325.3]  Out: -   No intake/output data recorded.    Physical Exam:  Gen:  alert, oriented x 3  PERRL , EOM +  Pallor +, No icterus  JVP not raised   CV: RRR no murmur or rub

## 2024-01-25 NOTE — PROGRESS NOTES
V2.0    [facility] Progress Note      Name:  Megha Rojas /Age/Sex: 1940  (83 y.o. female)   MRN & CSN:  [id number] & [id number] Encounter Date/Time: 2024 1:26 PM EST   Location:  5TN-5558/5558-01 PCP: Denice Reynolds MD     Attending:Delgado Cee MD       Hospital Day: 3    Assessment and Recommendations   Megha Rojas is a 83 y.o. female who presents with YESENIA (acute kidney injury) (HCC)      Plan:   Acute on chronic kidney disease  Progressing to end-stage  Evaluated by nephrology plan is to put temporary tunneled catheter will initiate dialysis in house  Plan to start HD    Anemia of chronic disease  S/p one unit  Hgb is stable    Hypertension    CAD    Obstructive sleep apnea          Diet Diet NPO   DVT Prophylaxis    Code Status Full Code   Disposition          Personally reviewed Lab Studies and Imaging         Subjective:     Chief Complaint:     Megha Rojas is a 83 y.o. female who presents with resting comfortably no new changes.      Review of Systems:      Pertinent positives and negatives discussed in HPI    Objective:     Intake/Output Summary (Last 24 hours) at 2024 0839  Last data filed at 2024 1830  Gross per 24 hour   Intake 835.33 ml   Output --   Net 835.33 ml      Vitals:   Vitals:    24 2042 24 0008 24 0445 24 0715   BP:  (!) 150/65 (!) 155/71 (!) 153/58   Pulse:  72 73 72   Resp: 18 18 18 18   Temp:  97.7 °F (36.5 °C) 97.6 °F (36.4 °C) 97.8 °F (36.6 °C)   TempSrc:  Oral Oral Oral   SpO2:  97% 94% 95%   Weight:       Height:             Physical Exam:      General: NAD  Eyes: EOMI  ENT: neck supple  Cardiovascular: Regular rate.  Respiratory: Clear to auscultation  Gastrointestinal: Soft, non tender  Genitourinary: no suprapubic tenderness  Musculoskeletal: No edema  Skin: warm, dry  Neuro: Alert.  Psych: Mood appropriate.         Medications:   Medications:    epoetin robin-epbx  5,000 Units SubCUTAneous Once per day on

## 2024-01-25 NOTE — BRIEF OP NOTE
Brief Postoperative Note    Megha Rojas  YOB: 1940  [id number]    Pre-operative Diagnosis: YESENIA    Post-operative Diagnosis: Same    Procedure: Tunneled hemodialysis catheter placement    Anesthesia: Moderate Sedation    Surgeons: Raul Toney MD    Estimated Blood Loss: Less than 5 mL    Complications: None    Specimens: Was Not Obtained    Findings: Successful placement of a right IJ tunneled hemodialysis catheter.    Electronically signed by Raul Toney MD on 1/25/2024 at 12:26 PM

## 2024-01-26 LAB
ANION GAP SERPL CALCULATED.3IONS-SCNC: 13 MMOL/L (ref 3–16)
BASOPHILS # BLD: 0 K/UL (ref 0–0.2)
BASOPHILS NFR BLD: 0.5 %
BUN SERPL-MCNC: 48 MG/DL (ref 7–20)
CALCIUM SERPL-MCNC: 8.5 MG/DL (ref 8.3–10.6)
CHLORIDE SERPL-SCNC: 108 MMOL/L (ref 99–110)
CO2 SERPL-SCNC: 21 MMOL/L (ref 21–32)
CREAT SERPL-MCNC: 4.8 MG/DL (ref 0.6–1.2)
DEPRECATED RDW RBC AUTO: 16 % (ref 12.4–15.4)
EKG ATRIAL RATE: 117 BPM
EKG DIAGNOSIS: NORMAL
EKG Q-T INTERVAL: 392 MS
EKG QRS DURATION: 140 MS
EKG QTC CALCULATION (BAZETT): 505 MS
EKG R AXIS: -42 DEGREES
EKG T AXIS: 110 DEGREES
EKG VENTRICULAR RATE: 100 BPM
EOSINOPHIL # BLD: 0 K/UL (ref 0–0.6)
EOSINOPHIL NFR BLD: 0.1 %
GFR SERPLBLD CREATININE-BSD FMLA CKD-EPI: 9 ML/MIN/{1.73_M2}
GLUCOSE SERPL-MCNC: 120 MG/DL (ref 70–99)
HCT VFR BLD AUTO: 24.9 % (ref 36–48)
HEMOCCULT STL QL: ABNORMAL
HGB BLD-MCNC: 8 G/DL (ref 12–16)
LYMPHOCYTES # BLD: 1.4 K/UL (ref 1–5.1)
LYMPHOCYTES NFR BLD: 15.9 %
MCH RBC QN AUTO: 27.5 PG (ref 26–34)
MCHC RBC AUTO-ENTMCNC: 32.2 G/DL (ref 31–36)
MCV RBC AUTO: 85.3 FL (ref 80–100)
MONOCYTES # BLD: 0.6 K/UL (ref 0–1.3)
MONOCYTES NFR BLD: 7.1 %
NEUTROPHILS # BLD: 6.9 K/UL (ref 1.7–7.7)
NEUTROPHILS NFR BLD: 76.4 %
PLATELET # BLD AUTO: 201 K/UL (ref 135–450)
PMV BLD AUTO: 8.1 FL (ref 5–10.5)
POTASSIUM SERPL-SCNC: 4.9 MMOL/L (ref 3.5–5.1)
RBC # BLD AUTO: 2.91 M/UL (ref 4–5.2)
SODIUM SERPL-SCNC: 142 MMOL/L (ref 136–145)
WBC # BLD AUTO: 9.1 K/UL (ref 4–11)

## 2024-01-26 PROCEDURE — 2580000003 HC RX 258: Performed by: HOSPITALIST

## 2024-01-26 PROCEDURE — 36415 COLL VENOUS BLD VENIPUNCTURE: CPT

## 2024-01-26 PROCEDURE — 85025 COMPLETE CBC W/AUTO DIFF WBC: CPT

## 2024-01-26 PROCEDURE — 6370000000 HC RX 637 (ALT 250 FOR IP): Performed by: HOSPITALIST

## 2024-01-26 PROCEDURE — 90935 HEMODIALYSIS ONE EVALUATION: CPT

## 2024-01-26 PROCEDURE — 80048 BASIC METABOLIC PNL TOTAL CA: CPT

## 2024-01-26 PROCEDURE — 6360000002 HC RX W HCPCS: Performed by: HOSPITALIST

## 2024-01-26 PROCEDURE — 1200000000 HC SEMI PRIVATE

## 2024-01-26 PROCEDURE — 93010 ELECTROCARDIOGRAM REPORT: CPT | Performed by: INTERNAL MEDICINE

## 2024-01-26 PROCEDURE — 82270 OCCULT BLOOD FECES: CPT

## 2024-01-26 RX ORDER — HEPARIN SODIUM 1000 [USP'U]/ML
3600 INJECTION, SOLUTION INTRAVENOUS; SUBCUTANEOUS PRN
Status: DISCONTINUED | OUTPATIENT
Start: 2024-01-26 | End: 2024-01-29 | Stop reason: HOSPADM

## 2024-01-26 RX ORDER — NEPHROCAP 1 MG
1 CAP ORAL DAILY
Status: DISCONTINUED | OUTPATIENT
Start: 2024-01-26 | End: 2024-01-29 | Stop reason: HOSPADM

## 2024-01-26 RX ADMIN — SODIUM CHLORIDE: 9 INJECTION, SOLUTION INTRAVENOUS at 15:05

## 2024-01-26 RX ADMIN — CARVEDILOL 3.12 MG: 3.12 TABLET, FILM COATED ORAL at 07:42

## 2024-01-26 RX ADMIN — HYDROCODONE BITARTRATE AND ACETAMINOPHEN 1 TABLET: 5; 325 TABLET ORAL at 20:05

## 2024-01-26 RX ADMIN — CALCITRIOL CAPSULES 0.25 MCG 0.25 MCG: 0.25 CAPSULE ORAL at 07:42

## 2024-01-26 RX ADMIN — ROSUVASTATIN CALCIUM 10 MG: 20 TABLET, FILM COATED ORAL at 20:05

## 2024-01-26 RX ADMIN — APIXABAN 2.5 MG: 5 TABLET, FILM COATED ORAL at 20:05

## 2024-01-26 RX ADMIN — Medication 10 ML: at 07:43

## 2024-01-26 RX ADMIN — NEPHROCAP 1 MG: 1 CAP ORAL at 15:06

## 2024-01-26 RX ADMIN — CEFTRIAXONE SODIUM 1000 MG: 1 INJECTION, POWDER, FOR SOLUTION INTRAMUSCULAR; INTRAVENOUS at 15:05

## 2024-01-26 RX ADMIN — HYDROCODONE BITARTRATE AND ACETAMINOPHEN 1 TABLET: 5; 325 TABLET ORAL at 07:42

## 2024-01-26 RX ADMIN — Medication 10 ML: at 20:06

## 2024-01-26 RX ADMIN — CARVEDILOL 3.12 MG: 3.12 TABLET, FILM COATED ORAL at 18:29

## 2024-01-26 ASSESSMENT — PAIN SCALES - GENERAL
PAINLEVEL_OUTOF10: 0
PAINLEVEL_OUTOF10: 0
PAINLEVEL_OUTOF10: 8
PAINLEVEL_OUTOF10: 4

## 2024-01-26 NOTE — CARE COORDINATION
Discharge Planning:     (KALLI) spoke with Brandy @ [facility] who will run the patients benefits for [facility]. KALLI faxed demographics over to [phone].    [place] [address]  [place], [place] [address]  Phone [phone]  Fax  [phone]    Electronically signed by Du Silverio on 1/26/24 at 12:00 PM EST

## 2024-01-26 NOTE — PROGRESS NOTES
intake/output data recorded.    Physical Exam:  Gen:  alert, oriented x 3  PERRL , EOM +  Pallor +, No icterus  JVP not raised   CV: RRR no murmur or rub .  Lungs:B/ L air entry, Normal breath sounds   Abd: soft, bowel sounds + , No organomegaly   Ext: 4+ edema, no cyanosis  Skin: Warm.  No rash  Neuro: nonfocal.  [facility] TDC in place      DATA:    CBC:   Lab Results   Component Value Date/Time    WBC 9.1 01/26/2024 04:27 AM    RBC 2.91 01/26/2024 04:27 AM    HGB 8.0 01/26/2024 04:27 AM    HCT 24.9 01/26/2024 04:27 AM    MCV 85.3 01/26/2024 04:27 AM    MCH 27.5 01/26/2024 04:27 AM    MCHC 32.2 01/26/2024 04:27 AM    RDW 16.0 01/26/2024 04:27 AM     01/26/2024 04:27 AM    MPV 8.1 01/26/2024 04:27 AM     BMP:    Lab Results   Component Value Date/Time     01/26/2024 04:27 AM    K 4.9 01/26/2024 04:27 AM     01/26/2024 04:27 AM    CO2 21 01/26/2024 04:27 AM    BUN 48 01/26/2024 04:27 AM    LABALBU 3.4 01/25/2024 06:45 AM    CREATININE 4.8 01/26/2024 04:27 AM    CALCIUM 8.5 01/26/2024 04:27 AM    GFRAA 27 04/02/2022 10:56 AM    LABGLOM 9 01/26/2024 04:27 AM    GLUCOSE 120 01/26/2024 04:27 AM     Magnesium:    Lab Results   Component Value Date/Time    MG 2.10 07/26/2021 10:46 AM     Phosphorus:    Lab Results   Component Value Date/Time    PHOS 5.2 01/25/2024 06:45 AM     LDH:  No results found for: \"LDH\"  Uric Acid:  No results found for: \"LABURIC\", \"URICACID\"  PT/INR:    Lab Results   Component Value Date/Time    PROTIME 17.3 01/25/2024 06:45 AM    INR 1.42 01/25/2024 06:45 AM     U/A:    Lab Results   Component Value Date/Time    COLORU Yellow 01/23/2024 01:48 PM    PROTEINU 100 01/23/2024 01:48 PM    PHUR 6.0 01/23/2024 01:48 PM    WBCUA 87 01/23/2024 01:48 PM    RBCUA 232 01/23/2024 01:48 PM    BACTERIA Rare 01/23/2024 01:48 PM    CLARITYU Clear 01/23/2024 01:48 PM    SPECGRAV 1.010 01/23/2024 01:48 PM    LEUKOCYTESUR MODERATE 01/23/2024 01:48 PM    UROBILINOGEN 0.2 01/23/2024 01:48 PM    BILIRUBINUR

## 2024-01-26 NOTE — CARE COORDINATION
Discharge Planning:     (CM) spoke with Dr Xochilt Moses about setting up a dialysis chair for this patient.  In order as he selected:    [facility]  [address]  [place], [place] [address]  Phone [phone]  Fax  [phone]    [facility]  [address] [place] [address]  Phone: [phone]  Fax [phone]     [facility]  [address] [place] [address]  Phone [phone]  Fax [phone]     Electronically signed by Du Silverio on 1/26/24 at 8:55 AM EST

## 2024-01-26 NOTE — PROGRESS NOTES
V2.0    [facility] Progress Note      Name:  Megha Rojas /Age/Sex: 1940  (83 y.o. female)   MRN & CSN:  [id number] & [id number] Encounter Date/Time: 2024 1:26 PM EST   Location:  5TN-5558/5558-01 PCP: Denice Reynolds MD     Attending:Delgado Cee MD       Hospital Day: 4    Assessment and Recommendations   Megha Rojas is a 83 y.o. female who presents with YESENIA (acute kidney injury) (HCC)      Plan:   Acute on chronic kidney disease  Progressing to end-stage  Evaluated by nephrology plan is to put temporary tunneled catheter will initiate dialysis in house  Plan to start HD    Anemia of chronic disease  S/p one unit  Hgb is stable    Hypertension    CAD    Obstructive sleep apnea          Diet ADULT DIET; Regular   DVT Prophylaxis    Code Status Full Code   Disposition          Personally reviewed Lab Studies and Imaging         Subjective:     Chief Complaint:     Megha Rojas is a 83 y.o. female who presents with resting comfortably no new changes.      Review of Systems:      Pertinent positives and negatives discussed in HPI    Objective:     Intake/Output Summary (Last 24 hours) at 2024 1132  Last data filed at 2024 1825  Gross per 24 hour   Intake 1063.28 ml   Output 1500 ml   Net -436.72 ml      Vitals:   Vitals:    24 0400 24 0730 24 0812 24 1105   BP: 132/73 (!) 124/57  109/60   Pulse: 69 95  70   Resp: 18 18 18 18   Temp: 97.9 °F (36.6 °C)   97.8 °F (36.6 °C)   TempSrc: Oral   Oral   SpO2:  96%  96%   Weight:       Height:             Physical Exam:      General: NAD  Eyes: EOMI  ENT: neck supple  Cardiovascular: Regular rate.  Respiratory: Clear to auscultation  Gastrointestinal: Soft, non tender  Genitourinary: no suprapubic tenderness  Musculoskeletal: No edema  Skin: warm, dry  Neuro: Alert.  Psych: Mood appropriate.         Medications:   Medications:    epoetin robin-epbx        epoetin robin-epbx  5,000 Units SubCUTAneous Once per day on Mon  Wed Fri    cefTRIAXone (ROCEPHIN) IV  1,000 mg IntraVENous Q24H    [Held by provider] apixaban  2.5 mg Oral BID    [Held by provider] aspirin EC  81 mg Oral Daily    calcitRIOL  0.25 mcg Oral Daily    carvedilol  3.125 mg Oral BID WC    HYDROcodone-acetaminophen  1 tablet Oral BID    rosuvastatin  10 mg Oral Nightly    sodium chloride flush  5-40 mL IntraVENous 2 times per day      Infusions:    sodium chloride      sodium chloride 10 mL/hr at 01/25/24 1825     PRN Meds: epoetin robin-epbx, ,   heparin (porcine), 3,600 Units, PRN  sodium chloride, , PRN  sodium chloride flush, 5-40 mL, PRN  sodium chloride, , PRN  ondansetron, 4 mg, Q8H PRN   Or  ondansetron, 4 mg, Q6H PRN  polyethylene glycol, 17 g, Daily PRN  acetaminophen, 650 mg, Q6H PRN   Or  acetaminophen, 650 mg, Q6H PRN        Labs and Imaging   CT ABDOMEN PELVIS WO CONTRAST Additional Contrast? None    Result Date: 1/23/2024  EXAMINATION: CT OF THE ABDOMEN AND PELVIS WITHOUT CONTRAST 1/23/2024 3:06 pm TECHNIQUE: CT of the abdomen and pelvis was performed without the administration of intravenous contrast. Multiplanar reformatted images are provided for review. Automated exposure control, iterative reconstruction, and/or weight based adjustment of the mA/kV was utilized to reduce the radiation dose to as low as reasonably achievable. COMPARISON: CT dated 09/16/2021 HISTORY: ORDERING SYSTEM PROVIDED HISTORY: veda - uti - ro obstruction TECHNOLOGIST PROVIDED HISTORY: Reason for exam:->veda - uti - ro obstruction Additional Contrast?->None Decision Support Exception - unselect if not a suspected or confirmed emergency medical condition->Emergency Medical Condition (MA) Reason for Exam: veda - uti - ro obstruction FINDINGS: Lower chest: Patchy consolidative opacities are present in the lung bases, right greater than left with associated mucous plugging. Liver: The liver demonstrates a normal noncontrast appearance, however evaluation is limited in the absence of

## 2024-01-26 NOTE — PROGRESS NOTES
This medication Retacrit was dispensed in dialysis today@ 0902.  This nurse did not give due to not knowing if dialysis gave it.  This nurse called dialysis and got no answer.

## 2024-01-26 NOTE — PROGRESS NOTES
Treatment time: 2.5 hours    Net UF: 1000 ml    Pre weight: 94.1 kg  Post weight: 93.1 kg    Access used: R Upper Chest Wall TDC  Access function: . Access runs without malfunction.     Medications or blood products given: Epogen 5,000 units    Regular outpatient schedule: MWF    Summary of response to treatment: Pt. Tolerates treatment well today.     Copy of dialysis treatment record placed in chart, to be scanned into EMR.

## 2024-01-26 NOTE — CARE COORDINATION
SW informed that [facility] could not accept patient.  Referral was made today through the [facility] portal.  First choice was [facility].  Second choice was [facility].  All documents uploaded.  Waiting on a response from [email or web address].    Electronically signed by GLORIA Meyer, SENTHILW on 1/26/2024 at 3:58 PM

## 2024-01-27 LAB
ALBUMIN SERPL-MCNC: 3.7 G/DL (ref 3.4–5)
ANION GAP SERPL CALCULATED.3IONS-SCNC: 12 MMOL/L (ref 3–16)
BASOPHILS # BLD: 0.1 K/UL (ref 0–0.2)
BASOPHILS NFR BLD: 0.7 %
BUN SERPL-MCNC: 42 MG/DL (ref 7–20)
CALCIUM SERPL-MCNC: 8.6 MG/DL (ref 8.3–10.6)
CHLORIDE SERPL-SCNC: 105 MMOL/L (ref 99–110)
CO2 SERPL-SCNC: 23 MMOL/L (ref 21–32)
CREAT SERPL-MCNC: 4 MG/DL (ref 0.6–1.2)
DEPRECATED RDW RBC AUTO: 15.8 % (ref 12.4–15.4)
EOSINOPHIL # BLD: 0.1 K/UL (ref 0–0.6)
EOSINOPHIL NFR BLD: 0.8 %
GFR SERPLBLD CREATININE-BSD FMLA CKD-EPI: 11 ML/MIN/{1.73_M2}
GLUCOSE SERPL-MCNC: 108 MG/DL (ref 70–99)
HCT VFR BLD AUTO: 25.5 % (ref 36–48)
HGB BLD-MCNC: 8.1 G/DL (ref 12–16)
LYMPHOCYTES # BLD: 2.4 K/UL (ref 1–5.1)
LYMPHOCYTES NFR BLD: 23.7 %
MCH RBC QN AUTO: 27.4 PG (ref 26–34)
MCHC RBC AUTO-ENTMCNC: 31.9 G/DL (ref 31–36)
MCV RBC AUTO: 85.8 FL (ref 80–100)
MONOCYTES # BLD: 1.1 K/UL (ref 0–1.3)
MONOCYTES NFR BLD: 10.8 %
NEUTROPHILS # BLD: 6.4 K/UL (ref 1.7–7.7)
NEUTROPHILS NFR BLD: 64 %
PHOSPHATE SERPL-MCNC: 4.8 MG/DL (ref 2.5–4.9)
PLATELET # BLD AUTO: 193 K/UL (ref 135–450)
PMV BLD AUTO: 8.5 FL (ref 5–10.5)
POTASSIUM SERPL-SCNC: 4.5 MMOL/L (ref 3.5–5.1)
RBC # BLD AUTO: 2.97 M/UL (ref 4–5.2)
SODIUM SERPL-SCNC: 140 MMOL/L (ref 136–145)
WBC # BLD AUTO: 9.9 K/UL (ref 4–11)

## 2024-01-27 PROCEDURE — 6370000000 HC RX 637 (ALT 250 FOR IP): Performed by: HOSPITALIST

## 2024-01-27 PROCEDURE — 90935 HEMODIALYSIS ONE EVALUATION: CPT

## 2024-01-27 PROCEDURE — 2580000003 HC RX 258: Performed by: HOSPITALIST

## 2024-01-27 PROCEDURE — 80069 RENAL FUNCTION PANEL: CPT

## 2024-01-27 PROCEDURE — 6360000002 HC RX W HCPCS: Performed by: INTERNAL MEDICINE

## 2024-01-27 PROCEDURE — 85025 COMPLETE CBC W/AUTO DIFF WBC: CPT

## 2024-01-27 PROCEDURE — 36415 COLL VENOUS BLD VENIPUNCTURE: CPT

## 2024-01-27 PROCEDURE — 1200000000 HC SEMI PRIVATE

## 2024-01-27 PROCEDURE — 93005 ELECTROCARDIOGRAM TRACING: CPT | Performed by: HOSPITALIST

## 2024-01-27 PROCEDURE — 6360000002 HC RX W HCPCS: Performed by: HOSPITALIST

## 2024-01-27 RX ORDER — DILTIAZEM HYDROCHLORIDE 120 MG/1
120 CAPSULE, COATED, EXTENDED RELEASE ORAL DAILY
Status: DISCONTINUED | OUTPATIENT
Start: 2024-01-27 | End: 2024-01-29 | Stop reason: HOSPADM

## 2024-01-27 RX ADMIN — ROSUVASTATIN CALCIUM 10 MG: 20 TABLET, FILM COATED ORAL at 20:20

## 2024-01-27 RX ADMIN — SODIUM CHLORIDE, PRESERVATIVE FREE 10 ML: 5 INJECTION INTRAVENOUS at 15:01

## 2024-01-27 RX ADMIN — CARVEDILOL 3.12 MG: 3.12 TABLET, FILM COATED ORAL at 12:14

## 2024-01-27 RX ADMIN — HYDROCODONE BITARTRATE AND ACETAMINOPHEN 1 TABLET: 5; 325 TABLET ORAL at 12:16

## 2024-01-27 RX ADMIN — Medication 10 ML: at 20:21

## 2024-01-27 RX ADMIN — APIXABAN 2.5 MG: 5 TABLET, FILM COATED ORAL at 20:21

## 2024-01-27 RX ADMIN — CALCITRIOL CAPSULES 0.25 MCG 0.25 MCG: 0.25 CAPSULE ORAL at 12:14

## 2024-01-27 RX ADMIN — DILTIAZEM HYDROCHLORIDE 120 MG: 120 CAPSULE, EXTENDED RELEASE ORAL at 13:56

## 2024-01-27 RX ADMIN — CARVEDILOL 3.12 MG: 3.12 TABLET, FILM COATED ORAL at 16:32

## 2024-01-27 RX ADMIN — APIXABAN 2.5 MG: 5 TABLET, FILM COATED ORAL at 12:14

## 2024-01-27 RX ADMIN — NEPHROCAP 1 MG: 1 CAP ORAL at 12:15

## 2024-01-27 RX ADMIN — Medication 10 ML: at 12:13

## 2024-01-27 RX ADMIN — CEFTRIAXONE SODIUM 1000 MG: 1 INJECTION, POWDER, FOR SOLUTION INTRAMUSCULAR; INTRAVENOUS at 15:02

## 2024-01-27 RX ADMIN — HYDROCODONE BITARTRATE AND ACETAMINOPHEN 1 TABLET: 5; 325 TABLET ORAL at 20:20

## 2024-01-27 RX ADMIN — ASPIRIN 81 MG: 81 TABLET, COATED ORAL at 12:15

## 2024-01-27 RX ADMIN — HEPARIN SODIUM 3600 UNITS: 1000 INJECTION INTRAVENOUS; SUBCUTANEOUS at 10:45

## 2024-01-27 ASSESSMENT — PAIN SCALES - GENERAL
PAINLEVEL_OUTOF10: 0
PAINLEVEL_OUTOF10: 4
PAINLEVEL_OUTOF10: 0

## 2024-01-27 NOTE — PROGRESS NOTES
V2.0    [facility] [place]      Name:  Megha Rojas /Age/Sex: 1940  (83 y.o. female)   MRN & CSN:  [id number] & [id number] Encounter Date/Time: 2024 1:26 PM EST   Location:  5TN-5558/5558-01 PCP: Denice Reynolds MD     Attending:Delgado Cee MD       Hospital Day: 5    Assessment and Recommendations   Megha Rojas is a 83 y.o. female who presents with YESENIA (acute kidney injury) (HCC)      Plan:   Acute on chronic kidney disease  Progressing to end-stage  -Patient has been initiated on dialysis now.  Tolerating well.  Awaiting placement outpatient continue further recommendation    Anemia of chronic disease  S/p one unit  Hgb is stable    Hypertension    CAD    Obstructive sleep apnea          Diet ADULT DIET; Regular   DVT Prophylaxis    Code Status Full Code   Disposition          Personally reviewed Lab Studies and Imaging         Subjective:     Chief Complaint:     Megha Rojas is a 83 y.o. female who presents with resting comfortably no new changes.      Review of Systems:      Pertinent positives and negatives discussed in HPI    Objective:     Intake/Output Summary (Last 24 hours) at 2024 0804  Last data filed at 2024 1509  Gross per 24 hour   Intake 485.13 ml   Output --   Net 485.13 ml      Vitals:   Vitals:    24 1547 24 1933 24 2347 24 0422   BP: 126/66 113/66 (!) 144/64 116/70   Pulse: 77 79 69 65   Resp: 18 16 18 16   Temp: 97.9 °F (36.6 °C) 98.1 °F (36.7 °C) 97.9 °F (36.6 °C) 97.5 °F (36.4 °C)   TempSrc: Oral Oral Oral Oral   SpO2: 94% 94% 94% 96%   Weight:       Height:             Physical Exam:      General: NAD  Eyes: EOMI  ENT: neck supple  Cardiovascular: Regular rate.  Respiratory: Clear to auscultation  Gastrointestinal: Soft, non tender  Genitourinary: no suprapubic tenderness  Musculoskeletal: No edema  Skin: warm, dry  Neuro: Alert.  Psych: Mood appropriate.         Medications:   Medications:    Virt-Caps  1 capsule Oral Daily  FINDINGS: HEART/MEDIASTINUM: The cardiomediastinal silhouette is stable. PLEURA/LUNGS: There are no focal consolidations or pleural effusions. There is no appreciable pneumothorax. BONES/SOFT TISSUE: No acute abnormality.     No radiographic evidence of acute pulmonary disease.       CBC:   Recent Labs     01/25/24  0645 01/26/24  0427 01/27/24  0431   WBC 7.4 9.1 9.9   HGB 7.9* 8.0* 8.1*    201 193     BMP:    Recent Labs     01/25/24  0645 01/26/24  0427 01/27/24  0431    142 140   K 4.9  4.9 4.9 4.5    108 105   CO2 15* 21 23   BUN 62* 48* 42*   CREATININE 5.9* 4.8* 4.0*   GLUCOSE 83 120* 108*     Hepatic:   No results for input(s): \"AST\", \"ALT\", \"ALB\", \"BILITOT\", \"ALKPHOS\" in the last 72 hours.    Lipids:   Lab Results   Component Value Date/Time    CHOL 122 10/31/2022 03:17 PM    HDL 49 10/31/2022 03:17 PM    TRIG 129 10/31/2022 03:17 PM     Hemoglobin A1C: No results found for: \"LABA1C\"  TSH:   Lab Results   Component Value Date/Time    TSH 1.62 10/31/2022 03:17 PM     Troponin: No results found for: \"TROPONINT\"  Lactic Acid: No results for input(s): \"LACTA\" in the last 72 hours.  BNP:   No results for input(s): \"PROBNP\" in the last 72 hours.    UA:  Lab Results   Component Value Date/Time    NITRU Negative 01/23/2024 01:48 PM    COLORU Yellow 01/23/2024 01:48 PM    PHUR 6.0 01/23/2024 01:48 PM    WBCUA 87 01/23/2024 01:48 PM    RBCUA 232 01/23/2024 01:48 PM    BACTERIA Rare 01/23/2024 01:48 PM    CLARITYU Clear 01/23/2024 01:48 PM    SPECGRAV 1.010 01/23/2024 01:48 PM    LEUKOCYTESUR MODERATE 01/23/2024 01:48 PM    UROBILINOGEN 0.2 01/23/2024 01:48 PM    BILIRUBINUR Negative 01/23/2024 01:48 PM    BLOODU LARGE 01/23/2024 01:48 PM    GLUCOSEU Negative 01/23/2024 01:48 PM    KETUA Negative 01/23/2024 01:48 PM     Urine Cultures:   Lab Results   Component Value Date/Time    LABURIN  01/23/2024 01:25 PM     <10,000 CFU/ml mixed skin/urogenital aria. No further workup     Blood Cultures:   Lab

## 2024-01-27 NOTE — CARE COORDINATION
Per Roberta Friedman Patient OP HD chair is pending Clinical clearance. RN made aware.     Electronically signed by Adeline Fournier on 1/27/2024 at 3:40 PM

## 2024-01-27 NOTE — PROGRESS NOTES
Pt return to room from dialysis.  Assessment complete.  AM medications administered.  Pt resting in bed.  Denies needs at this time.  Call light in hand.

## 2024-01-27 NOTE — PROGRESS NOTES
[facility]   Nephrology progress Note  [phone]   [email or web address]         Reason for Consult:  YESENIA on CKD    HISTORY OF PRESENT ILLNESS:      The patient is a 83 y.o. female with significant past medical history of progressive CKD, hypertension who presents with abnormal labs from nephrology office and need for dialysis initiation. She is also volume up and on po diuretics at home. Initially on admission, she was not agreeable to starting dialysis but after conversation with family, she is now amenable to starting dialysis. She follows with Dr. Mir outpatient. Denies any shortness of breath with rest or chest pain. Does have lower extremity edema. Potassium 5.8 on admission, treated medically.     Interval history  Feels good  seen post HD.Tx went well .  ROS No SOB/EUBANKS .    Current Medications:    Scheduled Meds:   Virt-Caps  1 capsule Oral Daily    epoetin robin-epbx  5,000 Units SubCUTAneous Once per day on Mon Wed Fri    cefTRIAXone (ROCEPHIN) IV  1,000 mg IntraVENous Q24H    apixaban  2.5 mg Oral BID    aspirin EC  81 mg Oral Daily    calcitRIOL  0.25 mcg Oral Daily    carvedilol  3.125 mg Oral BID WC    HYDROcodone-acetaminophen  1 tablet Oral BID    rosuvastatin  10 mg Oral Nightly    sodium chloride flush  5-40 mL IntraVENous 2 times per day     Continuous Infusions:   sodium chloride      sodium chloride Stopped (01/26/24 1505)     PRN Meds:.heparin (porcine), sodium chloride, sodium chloride flush, sodium chloride, ondansetron **OR** ondansetron, polyethylene glycol, acetaminophen **OR** acetaminophen     PHYSICAL EXAM:    Vitals:    BP (!) 113/49   Pulse (!) 101   Temp 98.2 °F (36.8 °C) (Oral)   Resp 16   Ht 1.524 m (5')   Wt 91.1 kg (200 lb 13.4 oz)   SpO2 93%   BMI 39.22 kg/m²   I/O last 3 completed shifts:  In: 485.1 [P.O.:480; IV Piggyback:5.1]  Out: -   No intake/output data recorded.    Physical Exam:  Gen:  alert, oriented x 3  PERRL , EOM +  Pallor  05-Apr-2021 23:20:26

## 2024-01-28 LAB
ALBUMIN SERPL-MCNC: 3.8 G/DL (ref 3.4–5)
ANION GAP SERPL CALCULATED.3IONS-SCNC: 14 MMOL/L (ref 3–16)
BASOPHILS # BLD: 0.1 K/UL (ref 0–0.2)
BASOPHILS NFR BLD: 0.8 %
BUN SERPL-MCNC: 35 MG/DL (ref 7–20)
CALCIUM SERPL-MCNC: 8.7 MG/DL (ref 8.3–10.6)
CHLORIDE SERPL-SCNC: 102 MMOL/L (ref 99–110)
CO2 SERPL-SCNC: 22 MMOL/L (ref 21–32)
CREAT SERPL-MCNC: 4.1 MG/DL (ref 0.6–1.2)
DEPRECATED RDW RBC AUTO: 15.8 % (ref 12.4–15.4)
EKG ATRIAL RATE: 144 BPM
EKG ATRIAL RATE: 147 BPM
EKG DIAGNOSIS: NORMAL
EKG DIAGNOSIS: NORMAL
EKG Q-T INTERVAL: 352 MS
EKG Q-T INTERVAL: 358 MS
EKG QRS DURATION: 140 MS
EKG QRS DURATION: 140 MS
EKG QTC CALCULATION (BAZETT): 528 MS
EKG QTC CALCULATION (BAZETT): 542 MS
EKG R AXIS: -30 DEGREES
EKG R AXIS: -32 DEGREES
EKG T AXIS: 126 DEGREES
EKG T AXIS: 130 DEGREES
EKG VENTRICULAR RATE: 135 BPM
EKG VENTRICULAR RATE: 138 BPM
EOSINOPHIL # BLD: 0.1 K/UL (ref 0–0.6)
EOSINOPHIL NFR BLD: 0.5 %
FERRITIN SERPL IA-MCNC: 411.9 NG/ML (ref 15–150)
GFR SERPLBLD CREATININE-BSD FMLA CKD-EPI: 10 ML/MIN/{1.73_M2}
GLUCOSE SERPL-MCNC: 118 MG/DL (ref 70–99)
HCT VFR BLD AUTO: 28.8 % (ref 36–48)
HGB BLD-MCNC: 8.9 G/DL (ref 12–16)
IRON SATN MFR SERPL: 22 % (ref 15–50)
IRON SERPL-MCNC: 50 UG/DL (ref 37–145)
LYMPHOCYTES # BLD: 2.3 K/UL (ref 1–5.1)
LYMPHOCYTES NFR BLD: 20.5 %
MCH RBC QN AUTO: 26.7 PG (ref 26–34)
MCHC RBC AUTO-ENTMCNC: 30.9 G/DL (ref 31–36)
MCV RBC AUTO: 86.3 FL (ref 80–100)
MONOCYTES # BLD: 1 K/UL (ref 0–1.3)
MONOCYTES NFR BLD: 9.1 %
NEUTROPHILS # BLD: 7.9 K/UL (ref 1.7–7.7)
NEUTROPHILS NFR BLD: 69.1 %
PHOSPHATE SERPL-MCNC: 4.9 MG/DL (ref 2.5–4.9)
PLATELET # BLD AUTO: 213 K/UL (ref 135–450)
PMV BLD AUTO: 8.6 FL (ref 5–10.5)
POTASSIUM SERPL-SCNC: 4.4 MMOL/L (ref 3.5–5.1)
PTH-INTACT SERPL-MCNC: 378.6 PG/ML (ref 14–72)
RBC # BLD AUTO: 3.34 M/UL (ref 4–5.2)
SODIUM SERPL-SCNC: 138 MMOL/L (ref 136–145)
TIBC SERPL-MCNC: 230 UG/DL (ref 260–445)
WBC # BLD AUTO: 11.4 K/UL (ref 4–11)

## 2024-01-28 PROCEDURE — 83550 IRON BINDING TEST: CPT

## 2024-01-28 PROCEDURE — 93010 ELECTROCARDIOGRAM REPORT: CPT | Performed by: INTERNAL MEDICINE

## 2024-01-28 PROCEDURE — 6370000000 HC RX 637 (ALT 250 FOR IP): Performed by: HOSPITALIST

## 2024-01-28 PROCEDURE — 6360000002 HC RX W HCPCS: Performed by: HOSPITALIST

## 2024-01-28 PROCEDURE — 82728 ASSAY OF FERRITIN: CPT

## 2024-01-28 PROCEDURE — 83540 ASSAY OF IRON: CPT

## 2024-01-28 PROCEDURE — 1200000000 HC SEMI PRIVATE

## 2024-01-28 PROCEDURE — 2580000003 HC RX 258: Performed by: HOSPITALIST

## 2024-01-28 PROCEDURE — 83970 ASSAY OF PARATHORMONE: CPT

## 2024-01-28 PROCEDURE — 85025 COMPLETE CBC W/AUTO DIFF WBC: CPT

## 2024-01-28 PROCEDURE — 80069 RENAL FUNCTION PANEL: CPT

## 2024-01-28 PROCEDURE — 36415 COLL VENOUS BLD VENIPUNCTURE: CPT

## 2024-01-28 RX ADMIN — CEFTRIAXONE SODIUM 1000 MG: 1 INJECTION, POWDER, FOR SOLUTION INTRAMUSCULAR; INTRAVENOUS at 15:10

## 2024-01-28 RX ADMIN — CARVEDILOL 3.12 MG: 3.12 TABLET, FILM COATED ORAL at 08:01

## 2024-01-28 RX ADMIN — SODIUM CHLORIDE, PRESERVATIVE FREE 10 ML: 5 INJECTION INTRAVENOUS at 15:08

## 2024-01-28 RX ADMIN — HYDROCODONE BITARTRATE AND ACETAMINOPHEN 1 TABLET: 5; 325 TABLET ORAL at 20:12

## 2024-01-28 RX ADMIN — APIXABAN 2.5 MG: 5 TABLET, FILM COATED ORAL at 20:13

## 2024-01-28 RX ADMIN — ROSUVASTATIN CALCIUM 10 MG: 20 TABLET, FILM COATED ORAL at 20:13

## 2024-01-28 RX ADMIN — CARVEDILOL 3.12 MG: 3.12 TABLET, FILM COATED ORAL at 17:22

## 2024-01-28 RX ADMIN — Medication 10 ML: at 08:03

## 2024-01-28 RX ADMIN — Medication 10 ML: at 20:13

## 2024-01-28 RX ADMIN — DILTIAZEM HYDROCHLORIDE 120 MG: 120 CAPSULE, EXTENDED RELEASE ORAL at 08:02

## 2024-01-28 RX ADMIN — HYDROCODONE BITARTRATE AND ACETAMINOPHEN 1 TABLET: 5; 325 TABLET ORAL at 08:02

## 2024-01-28 RX ADMIN — APIXABAN 2.5 MG: 5 TABLET, FILM COATED ORAL at 08:01

## 2024-01-28 RX ADMIN — CALCITRIOL CAPSULES 0.25 MCG 0.25 MCG: 0.25 CAPSULE ORAL at 08:01

## 2024-01-28 RX ADMIN — ASPIRIN 81 MG: 81 TABLET, COATED ORAL at 08:02

## 2024-01-28 ASSESSMENT — PAIN SCALES - GENERAL
PAINLEVEL_OUTOF10: 0
PAINLEVEL_OUTOF10: 1
PAINLEVEL_OUTOF10: 0

## 2024-01-28 NOTE — PROGRESS NOTES
V2.0    [facility] [place] Progress Note      Name:  Megha Rojas /Age/Sex: 1940  (83 y.o. female)   MRN & CSN:  [id number] & [id number] Encounter Date/Time: 2024 1:26 PM EST   Location:  5TN-5558/5558-01 PCP: Denice Reynolds MD     Attending:Delgado Cee MD       Hospital Day: 6    Assessment and Recommendations   Megha Rojas is a 83 y.o. female who presents with YESENIA (acute kidney injury) (HCC)      Plan:   Acute on chronic kidney disease  Progressing to end-stage  -Patient has been initiated on dialysis now.  Tolerating well.  Awaiting placement outpatient continue further recommendation    Anemia of chronic disease  S/p one unit  Hgb is stable    A-fib rate is controlled continue Cardizem    Hypertension    CAD    Obstructive sleep apnea          Diet ADULT DIET; Regular   DVT Prophylaxis    Code Status Full Code   Disposition          Personally reviewed Lab Studies and Imaging         Subjective:     Chief Complaint:     Megha Rojas is a 83 y.o. female who presents with resting comfortably no new changes.      Review of Systems:      Pertinent positives and negatives discussed in HPI    Objective:     Intake/Output Summary (Last 24 hours) at 2024 0753  Last data filed at 2024 1841  Gross per 24 hour   Intake 240 ml   Output --   Net 240 ml      Vitals:   Vitals:    24 1632 24 1956 24 0030 24 0533   BP:  114/63 115/63 (!) 122/57   Pulse: 75 67 63 69   Resp:  18 14 18   Temp:  98.7 °F (37.1 °C) 97.8 °F (36.6 °C) 97.7 °F (36.5 °C)   TempSrc:  Oral Oral Oral   SpO2:  95% 95% 95%   Weight:       Height:             Physical Exam:      General: NAD  Eyes: EOMI  ENT: neck supple  Cardiovascular: Regular rate.  Respiratory: Clear to auscultation  Gastrointestinal: Soft, non tender  Genitourinary: no suprapubic tenderness  Musculoskeletal: No edema  Skin: warm, dry  Neuro: Alert.  Psych: Mood appropriate.         Medications:   Medications:    dilTIAZem  120 mg Oral  exam:->sob - edema Reason for Exam: short of breath FINDINGS: HEART/MEDIASTINUM: The cardiomediastinal silhouette is stable. PLEURA/LUNGS: There are no focal consolidations or pleural effusions. There is no appreciable pneumothorax. BONES/SOFT TISSUE: No acute abnormality.     No radiographic evidence of acute pulmonary disease.       CBC:   Recent Labs     01/26/24 0427 01/27/24  0431 01/28/24  0543   WBC 9.1 9.9 11.4*   HGB 8.0* 8.1* 8.9*    193 213     BMP:    Recent Labs     01/26/24 0427 01/27/24  0431 01/28/24  0543    140 138   K 4.9 4.5 4.4    105 102   CO2 21 23 22   BUN 48* 42* 35*   CREATININE 4.8* 4.0* 4.1*   GLUCOSE 120* 108* 118*     Hepatic:   No results for input(s): \"AST\", \"ALT\", \"ALB\", \"BILITOT\", \"ALKPHOS\" in the last 72 hours.    Lipids:   Lab Results   Component Value Date/Time    CHOL 122 10/31/2022 03:17 PM    HDL 49 10/31/2022 03:17 PM    TRIG 129 10/31/2022 03:17 PM     Hemoglobin A1C: No results found for: \"LABA1C\"  TSH:   Lab Results   Component Value Date/Time    TSH 1.62 10/31/2022 03:17 PM     Troponin: No results found for: \"TROPONINT\"  Lactic Acid: No results for input(s): \"LACTA\" in the last 72 hours.  BNP:   No results for input(s): \"PROBNP\" in the last 72 hours.    UA:  Lab Results   Component Value Date/Time    NITRU Negative 01/23/2024 01:48 PM    COLORU Yellow 01/23/2024 01:48 PM    PHUR 6.0 01/23/2024 01:48 PM    WBCUA 87 01/23/2024 01:48 PM    RBCUA 232 01/23/2024 01:48 PM    BACTERIA Rare 01/23/2024 01:48 PM    CLARITYU Clear 01/23/2024 01:48 PM    SPECGRAV 1.010 01/23/2024 01:48 PM    LEUKOCYTESUR MODERATE 01/23/2024 01:48 PM    UROBILINOGEN 0.2 01/23/2024 01:48 PM    BILIRUBINUR Negative 01/23/2024 01:48 PM    BLOODU LARGE 01/23/2024 01:48 PM    GLUCOSEU Negative 01/23/2024 01:48 PM    KETUA Negative 01/23/2024 01:48 PM     Urine Cultures:   Lab Results   Component Value Date/Time    LABURIN  01/23/2024 01:25 PM     <10,000 CFU/ml mixed skin/urogenital

## 2024-01-29 VITALS
SYSTOLIC BLOOD PRESSURE: 127 MMHG | TEMPERATURE: 97.7 F | OXYGEN SATURATION: 94 % | HEART RATE: 64 BPM | WEIGHT: 200.84 LBS | BODY MASS INDEX: 39.43 KG/M2 | DIASTOLIC BLOOD PRESSURE: 66 MMHG | RESPIRATION RATE: 18 BRPM | HEIGHT: 60 IN

## 2024-01-29 LAB
ALBUMIN SERPL-MCNC: 3.7 G/DL (ref 3.4–5)
ANION GAP SERPL CALCULATED.3IONS-SCNC: 16 MMOL/L (ref 3–16)
BASOPHILS # BLD: 0.1 K/UL (ref 0–0.2)
BASOPHILS NFR BLD: 0.6 %
BUN SERPL-MCNC: 56 MG/DL (ref 7–20)
CALCIUM SERPL-MCNC: 8.7 MG/DL (ref 8.3–10.6)
CHLORIDE SERPL-SCNC: 100 MMOL/L (ref 99–110)
CO2 SERPL-SCNC: 21 MMOL/L (ref 21–32)
CREAT SERPL-MCNC: 6.1 MG/DL (ref 0.6–1.2)
DEPRECATED RDW RBC AUTO: 15.6 % (ref 12.4–15.4)
EOSINOPHIL # BLD: 0.3 K/UL (ref 0–0.6)
EOSINOPHIL NFR BLD: 2.3 %
GFR SERPLBLD CREATININE-BSD FMLA CKD-EPI: 6 ML/MIN/{1.73_M2}
GLUCOSE SERPL-MCNC: 111 MG/DL (ref 70–99)
HCT VFR BLD AUTO: 28 % (ref 36–48)
HGB BLD-MCNC: 8.9 G/DL (ref 12–16)
LYMPHOCYTES # BLD: 2.4 K/UL (ref 1–5.1)
LYMPHOCYTES NFR BLD: 20.3 %
MCH RBC QN AUTO: 27.6 PG (ref 26–34)
MCHC RBC AUTO-ENTMCNC: 31.9 G/DL (ref 31–36)
MCV RBC AUTO: 86.7 FL (ref 80–100)
MONOCYTES # BLD: 1.1 K/UL (ref 0–1.3)
MONOCYTES NFR BLD: 9.7 %
NEUTROPHILS # BLD: 7.8 K/UL (ref 1.7–7.7)
NEUTROPHILS NFR BLD: 67.1 %
PHOSPHATE SERPL-MCNC: 5.5 MG/DL (ref 2.5–4.9)
PLATELET # BLD AUTO: 218 K/UL (ref 135–450)
PMV BLD AUTO: 8.5 FL (ref 5–10.5)
POTASSIUM SERPL-SCNC: 4.4 MMOL/L (ref 3.5–5.1)
RBC # BLD AUTO: 3.23 M/UL (ref 4–5.2)
SODIUM SERPL-SCNC: 137 MMOL/L (ref 136–145)
WBC # BLD AUTO: 11.7 K/UL (ref 4–11)

## 2024-01-29 PROCEDURE — 6370000000 HC RX 637 (ALT 250 FOR IP): Performed by: HOSPITALIST

## 2024-01-29 PROCEDURE — 85025 COMPLETE CBC W/AUTO DIFF WBC: CPT

## 2024-01-29 PROCEDURE — 2580000003 HC RX 258: Performed by: HOSPITALIST

## 2024-01-29 PROCEDURE — 94760 N-INVAS EAR/PLS OXIMETRY 1: CPT

## 2024-01-29 PROCEDURE — 36415 COLL VENOUS BLD VENIPUNCTURE: CPT

## 2024-01-29 PROCEDURE — 80069 RENAL FUNCTION PANEL: CPT

## 2024-01-29 RX ORDER — DILTIAZEM HYDROCHLORIDE 120 MG/1
120 CAPSULE, COATED, EXTENDED RELEASE ORAL DAILY
Qty: 30 CAPSULE | Refills: 3 | Status: SHIPPED | OUTPATIENT
Start: 2024-01-29

## 2024-01-29 RX ADMIN — ASPIRIN 81 MG: 81 TABLET, COATED ORAL at 10:01

## 2024-01-29 RX ADMIN — APIXABAN 2.5 MG: 5 TABLET, FILM COATED ORAL at 10:01

## 2024-01-29 RX ADMIN — Medication 10 ML: at 10:03

## 2024-01-29 RX ADMIN — CARVEDILOL 3.12 MG: 3.12 TABLET, FILM COATED ORAL at 10:01

## 2024-01-29 RX ADMIN — NEPHROCAP 1 MG: 1 CAP ORAL at 10:01

## 2024-01-29 RX ADMIN — DILTIAZEM HYDROCHLORIDE 120 MG: 120 CAPSULE, EXTENDED RELEASE ORAL at 10:01

## 2024-01-29 RX ADMIN — HYDROCODONE BITARTRATE AND ACETAMINOPHEN 1 TABLET: 5; 325 TABLET ORAL at 10:01

## 2024-01-29 RX ADMIN — CALCITRIOL CAPSULES 0.25 MCG 0.25 MCG: 0.25 CAPSULE ORAL at 10:01

## 2024-01-29 ASSESSMENT — PAIN SCALES - GENERAL: PAINLEVEL_OUTOF10: 0

## 2024-01-29 NOTE — CARE COORDINATION
01/29/24 1159   IMM Letter   IMM Letter given to Patient/Family/Significant other/Guardian/POA/by: Allison Mccrary RN CM - second notice   IMM Letter date given: 01/29/24   IMM Letter time given: 9995  (copy made for hard chart)

## 2024-01-29 NOTE — PROGRESS NOTES
CLINICAL PHARMACY NOTE: MEDS TO BEDS    Total # of Prescriptions Filled: 1   The following medications were delivered to the patient:  DILTIAZEM     Additional Documentation:  Patients daughter picked up in outpatient pharmacy = signed  Edilma Rojas - Pharmacy Tech.

## 2024-01-29 NOTE — DISCHARGE INSTR - COC
Continuity of Care Form    Patient Name: Megha Rojas   :  1940  MRN:  [id number]    Admit date:  2024  Discharge date:  2024    Code Status Order: Full Code   Advance Directives:     Admitting Physician:  Delgado Cee MD  PCP: Denice Reynolds MD    Discharging Nurse: Johana PEREZ  Discharging Hospital Unit/Room#: 5TN-5558/5558-01  Discharging Unit Phone Number: ***    Emergency Contact:   Extended Emergency Contact Information  Primary Emergency Contact: Rome Kirkland  Home Phone: [phone]  Relation: Child  Secondary Emergency Contact: Eva Pearl  Home Phone: [phone]  Relation: Grandchild    Past Surgical History:  Past Surgical History:   Procedure Laterality Date     SECTION      x3    CHOLECYSTECTOMY, LAPAROSCOPIC N/A 2021    LAPAROSCOPIC CHOLECYSTECTOMY performed by Trung Hurst MD at [facility] OR    COLONOSCOPY N/A 2021    COLONOSCOPY WITH BIOPSY performed by Ed Castellanos MD at [facility] ENDOSCOPY    CYSTOSCOPY  2013    w/ bladder biopsy    FRACTURE SURGERY      right wrist    HEMICOLECTOMY Right 2021    LAPAROSCOPIC RIGHT COLON RESECTION performed by Trung Hurst MD at [facility] OR    IR TUNNELED CATHETER PLACEMENT GREATER THAN 5 YEARS  2024    IR TUNNELED CATHETER PLACEMENT GREATER THAN 5 YEARS 2024 Raul Toney MD [facility] SPECIAL PROCEDURES    UPPER GASTROINTESTINAL ENDOSCOPY N/A 2021    EGD BIOPSY performed by Ed Castellanos MD at [facility] ENDOSCOPY    UPPER GASTROINTESTINAL ENDOSCOPY N/A 2023    EGD CONTROL HEMORRHAGE, EGD APC STOMACH performed by Junior Hermosillo MD at [facility] ENDOSCOPY       Immunization History:     There is no immunization history on file for this patient.    Active Problems:  Patient Active Problem List   Diagnosis Code    Essential hypertension I10    Anemia D64.9    CAD (coronary artery disease) I25.10    Hypercholesteremia E78.00    Diastolic dysfunction I51.89    Malignant neoplasm of

## 2024-01-29 NOTE — CARE COORDINATION
CM checked in [employer] Portal and still waiting clinical clearance and acceptance. Previous SW had sent message stating everything was uploaded and questions answered.    CM sent message within portal that I am covering patient today and to let me know if anything else is needed. No response to message yet.     Allison Mccrary RN, BSN  [phone]

## 2024-01-29 NOTE — CARE COORDINATION
Bird has not had a response from rep in the [employer] portal to my message about pt being agreeable and if pt can be discharged. CM typed message to rep that I will call the HD clinic directly.    CM called [facility] [phone] and spoke to Lil[facility] clinic manager and discussed the above and that pt to start tomorrow at 11 AM. She states they have accepted patient and will be expecting her.     CM notified pt's RN she can discharge and notified pt also.    Pt states her granddaughter drives her everywhere and did not know if she can get reimbursed for when she helps to take care of her. CM told pt I would make COA referral and make note that there is a family member wanting information about caregiver pay. CM placed referral on line.     Patient discharged 1/29/2024 to home with new HD clinic and schedule.   All discharge needs met per case management.    Allison Mccrary, RN, BSN  [phone]

## 2024-01-29 NOTE — CARE COORDINATION
CM met with patient and discussed waiting on the final approval for outpt HD chair. Explained she has been accepted at [facility] on tues/thurs/sat at 11:15 AM. Pt states that schedule is fine.     CM in [facility] portal sent message to rep stating that yes pt is agreeable to the above and ready for discharge and I am just waiting for the final acceptance in portal so I can print pt a schedule. Waiting on return message.    Allison Mccrary RN, BSN  [phone]

## 2024-01-29 NOTE — PROGRESS NOTES
[facility]   Nephrology progress Note  [phone]  [phone]   [email or web address]           SUMMARY :  We are following this patient for YESENIA on CKD ( pt of Dr Mir )  Patient with progressive CKD, hypertension presented to the nephrologist office with abnormal labs for dialysis initiation.  She was found to have in addition to fluid overload.  She was started on dialysis.      SUBJECTIVE:   Patient progress reviewed. The patient has an outpatient spot at [facility]     Physical Exam:    VITALS:  /66   Pulse 64   Temp 97.7 °F (36.5 °C) (Oral)   Resp 18   Ht 1.524 m (5')   Wt 91.1 kg (200 lb 13.4 oz)   SpO2 94%   BMI 39.22 kg/m²   BLOOD PRESSURE RANGE:  Systolic (24hrs), Av , Min:107 , Max:149   ; Diastolic (24hrs), Av, Min:48, Max:67    24HR INTAKE/OUTPUT:    Intake/Output Summary (Last 24 hours) at 2024 1204  Last data filed at 2024 1005  Gross per 24 hour   Intake 240 ml   Output --   Net 240 ml       Gen:  alert, oriented x 3  PERRL , EOM +  Pallor +, No icterus  JVP not raised   CV: RRR no murmur or rub .  Lungs:B/ L air entry, Normal breath sounds   Abd: soft, bowel sounds + , No organomegaly   Ext: No edema, no cyanosis  Skin: Warm.  No rash  Neuro: nonfocal.  Right IJ tunneled dialysis catheter    DATA:    CBC with Differential:    Lab Results   Component Value Date/Time    WBC 11.7 2024 06:20 AM    RBC 3.23 2024 06:20 AM    HGB 8.9 2024 06:20 AM    HCT 28.0 2024 06:20 AM     2024 06:20 AM    MCV 86.7 2024 06:20 AM    MCH 27.6 2024 06:20 AM    MCHC 31.9 2024 06:20 AM    RDW 15.6 2024 06:20 AM    BANDSPCT 6 2021 06:30 AM    METASPCT 1 2021 05:12 AM    LYMPHOPCT 20.3 2024 06:20 AM    MONOPCT 9.7 2024 06:20 AM    MYELOPCT 1 2021 06:30 AM    BASOPCT 0.6 2024 06:20 AM    MONOSABS 1.1 2024 06:20 AM    LYMPHSABS 2.4 2024 06:20 AM    EOSABS 0.3  overload  Better    Anemia of chronic kidney disease  Target Hb 9-11      Damien Carballo MD

## 2024-01-29 NOTE — DISCHARGE SUMMARY
[facility]    [facility] Demographics    Patient. Megha Rojas  Date of Birth. 1940  MRN. [id number]     Primary care provider. Denice Reynolds MD  (Tel: [phone])    Admit date: 1/23/2024    Discharge date (blank if same as Note Date):   Note Date: 1/29/2024     Reason for Hospitalization.   Chief Complaint   Patient presents with    Abnormal Lab     Pt sent by nephrology for abnormal lab, pt is not on HD yet, pt said her K is too high and she has additional fluid           Problem-based Hospital Course.  Acute on chronic daily presenting to end-stage  -Patient started on new onset dialysis.  Tolerating well  -Patient continue dialysis outpatient    Anemia chronic disease needed blood transfusion hemoglobin remained stable    Consults.  IP CONSULT TO NEPHROLOGY  IP CONSULT TO SOCIAL WORK    Physical examination on discharge day.   /67   Pulse 62   Temp 97.2 °F (36.2 °C) (Oral)   Resp 16   Ht 1.524 m (5')   Wt 91.1 kg (200 lb 13.4 oz)   SpO2 96%   BMI 39.22 kg/m²   General appearance.  Alert. Looks comfortable.  HEENT. Sclera clear. Moist mucus membranes.  Cardiovascular. Regular rate and rhythm, normal S1, S2. No murmur.   Respiratory. Not using accessory muscles.Clear to auscultation bilaterally, no wheeze.  Gastrointestinal. Abdomen soft, non-tender, not distended, normal bowel sounds  Neurology. Facial symmetry. No speech deficits. Moving all extremities equally.  Extremities. No edema in lower extremities.  Skin. Warm, dry, normal turgor    Condition at time of discharge stable     Medication instructions provided to patient at discharge.     Medication List        START taking these medications      dilTIAZem 120 MG extended release capsule  Commonly known as: CARDIZEM CD  Take 1 capsule by mouth daily            CONTINUE

## 2024-02-02 RX ORDER — ROSUVASTATIN CALCIUM 20 MG/1
20 TABLET, COATED ORAL NIGHTLY
Qty: 90 TABLET | Refills: 2 | Status: SHIPPED | OUTPATIENT
Start: 2024-02-02

## 2024-02-02 NOTE — TELEPHONE ENCOUNTER
Refill request Rosuvastatin Calcium 20 MG Oral Tablet     Last OV:1/19/24 NPTS    Last Lab:10/31/22 LIPID    Next Appt:2/15/24  NPTS    rosuvastatin (CRESTOR) 20 MG tablet [[id number]]    Order Details  Dose: 20 mg Route: Oral Frequency: NIGHTLY   Dispense Quantity: 90 tablet Refills: 3          Sig: Take 1 tablet by mouth nightly         Start Date: 01/16/23 End Date: --   Written Date: 01/16/23 Expiration Date: 01/16/24   Original Order: rosuvastatin (CRESTOR) 20 MG tablet [[id number]]   Providers    Authorizing Provider: Olayinka Kaur APRN - CNP NPI: [id number]   Ordering User: Olayinka Kaur APRN - CNP

## 2024-02-20 NOTE — PROGRESS NOTES
Physician Progress Note      PATIENT:               MEHNAZ CALL  CSN #:                  [id number]  :                       1940  ADMIT DATE:       2024 12:54 PM  DISCH DATE:        2024 4:42 PM  RESPONDING  PROVIDER #:        Delgado Cee MD          QUERY TEXT:    Patient admitted with YESENIA, noted to have atrial fibrillation and is maintained   on Eliquis. If possible, please document in progress notes and discharge   summary if you are evaluating and/or treating any of the following:    The medical record reflects the following:  Risk Factors: Age 83, Female, HTN  Clinical Indicators: ED, \"atrial fibrillation on Eliquis\"  Treatment: Eliquis  Options provided:  -- Secondary hypercoagulable state in a patient with atrial fibrillation  -- Other - I will add my own diagnosis  -- Disagree - Not applicable / Not valid  -- Disagree - Clinically unable to determine / Unknown  -- Refer to Clinical Documentation Reviewer    PROVIDER RESPONSE TEXT:    This patient has secondary hypercoagulable state in a patient with atrial   fibrillation.    Query created by: Cielo Anderson on 2024 11:11 AM      Electronically signed by:  Delgado Cee MD 2024 8:23 AM

## 2024-02-26 ENCOUNTER — OFFICE VISIT (OUTPATIENT)
Dept: CARDIOLOGY CLINIC | Age: 84
End: 2024-02-26
Payer: MEDICARE

## 2024-02-26 VITALS
BODY MASS INDEX: 40.15 KG/M2 | WEIGHT: 204.5 LBS | SYSTOLIC BLOOD PRESSURE: 122 MMHG | HEIGHT: 60 IN | OXYGEN SATURATION: 98 % | HEART RATE: 62 BPM | DIASTOLIC BLOOD PRESSURE: 40 MMHG

## 2024-02-26 DIAGNOSIS — E87.79 OTHER HYPERVOLEMIA: ICD-10-CM

## 2024-02-26 DIAGNOSIS — I10 PRIMARY HYPERTENSION: ICD-10-CM

## 2024-02-26 DIAGNOSIS — I25.10 CORONARY ARTERY DISEASE INVOLVING NATIVE CORONARY ARTERY OF NATIVE HEART WITHOUT ANGINA PECTORIS: Primary | ICD-10-CM

## 2024-02-26 DIAGNOSIS — E78.5 DYSLIPIDEMIA: ICD-10-CM

## 2024-02-26 PROCEDURE — 3074F SYST BP LT 130 MM HG: CPT | Performed by: NURSE PRACTITIONER

## 2024-02-26 PROCEDURE — 1123F ACP DISCUSS/DSCN MKR DOCD: CPT | Performed by: NURSE PRACTITIONER

## 2024-02-26 PROCEDURE — 99214 OFFICE O/P EST MOD 30 MIN: CPT | Performed by: NURSE PRACTITIONER

## 2024-02-26 PROCEDURE — 3078F DIAST BP <80 MM HG: CPT | Performed by: NURSE PRACTITIONER

## 2024-02-26 RX ORDER — DILTIAZEM HYDROCHLORIDE 120 MG/1
120 CAPSULE, COATED, EXTENDED RELEASE ORAL DAILY
Qty: 90 CAPSULE | Refills: 2 | Status: SHIPPED | OUTPATIENT
Start: 2024-02-26

## 2024-02-26 NOTE — PROGRESS NOTES
[facility]   Electrophysiology  Olayinka Kaur, APRN-CNP  Attending EP: Dr. Ruddy Tavarez    Date: 3/27/2024  I had the privilege of visiting Megha Rojas in the office.     Chief Complaint:   Chief Complaint   Patient presents with    Follow-up     4mnth No Cardiac Symptoms    Device Check     History of Present Illness: History obtained from patient and medical record.    Megha Rojas is 83 y.o. female with a past medical history of HTN, HLD, CKD, PAF, LBBB, dCHF, and CAD.     In March of 2021, pt presented to hospital by recommendation of her PCP for anemia. Her hemoglobin was 5.8 and was she received multiple units of blood. GI completed colonoscopy/EGD with no gross bleeding found, however, she was found to have a mass in her colon. She underwent surgical bowel resection. During admission, she developed AF with RVR and was started on amiodarone. S/p ILR (12/21). She was admitted in December of 2023 with GIB and anemia. An EGD showed AVMs requiring APC and clip. She was later admitted with worsening YESENIA and was started on dialysis    -Interval history: Today, Megha Rojas is being seen for routine follow up. Her granddaughter is present for the visit. She is doing better. She had multiple admissions over the winter, including for acute anemia and worsening renal failure. She is now on hemodialysis three days per week.    Denies having chest pain, palpitations, shortness of breath, orthopnea/PND, cough, or dizziness at the time of this visit.    With regard to medication therapy the patient has been compliant with prescribed regimen. They have tolerated therapy to date.     Allergies:  Allergies   Allergen Reactions    Acetomenaphthone (Menadiol Diacetate) [Menadiol Sodium Diphosphate]      Upset stomach     Lisinopril-Hydrochlorothiazide Other (See Comments)     shaky    Advil [Ibuprofen] Nausea And Vomiting    Aleve [Naproxen] Nausea And Vomiting    Tylenol [Acetaminophen] Nausea And Vomiting

## 2024-02-26 NOTE — PROGRESS NOTES
March '21   Summary   Normal left ventricle size, and systolic function with an estimated ejection   fraction of 55-60%. Mild concentric left ventricular hypertrophy is present.   No regional wall motion abnormalities are seen.   Mild tricuspid regurgitation, RVSP is estimated at 38 mmhg.      Assessment:      Diagnosis Orders   1. Coronary artery disease involving native coronary artery of native heart without angina pectoris   ~stable : denies angina  ~ideal to keep Hgb > 10; last value 8.9  ~hx PTCA LAD '20  ~hx of PAF on DOAC ; diltiazem started  ~hx anemia and colon cancer       2. Primary hypertension   ~controlled   ~low DBP  ~carvedilol 3.125 mg bid  ~mild CLVH on echo       3. Dyslipidemia   ~controlled on crestor        4. Other hypervolemia   ~improved : wt down ~ 10#  ~continues to have mena LE peripheral edema  ~new for hemodialysis              I had the opportunity to review the clinical symptoms and presentation of Megha Rojas.   Plan:     Continue present management   F/U as scheduled with GRANT Byrd NP 3/27  F/U with general cardiology this fall    Overall the patient is stable from CV standpoint    I have addresed the patient's cardiac risk factors and adjusted pharmacologic treatment as needed. In addition, I have reinforced the need for patient directed risk factor modification.    Further evaluation will be based upon the patient's clinical course and testing results.    All questions and concerns were addressed to the patient/family. Alternatives to my treatment were discussed.      The patient is not currently smoking.     Patient is on a beta-blocker  Patient is not on an ace-i/ARB : CKD : follows with [facility]  Patient is on a statin     anti-coagulation has been recommended / prescribed for this patient. Education conducted on adverse reactions including bleeding was discussed.     Daily weight, low sodium diet were discussed. Patient instructed to call the office with a weight gain: > 3 # over

## 2024-03-03 ENCOUNTER — HOSPITAL ENCOUNTER (EMERGENCY)
Age: 84
Discharge: HOME | End: 2024-03-03
Attending: EMERGENCY MEDICINE
Payer: MEDICARE

## 2024-03-03 VITALS
OXYGEN SATURATION: 94 % | SYSTOLIC BLOOD PRESSURE: 116 MMHG | TEMPERATURE: 98.2 F | HEART RATE: 76 BPM | WEIGHT: 205 LBS | BODY MASS INDEX: 40.25 KG/M2 | HEIGHT: 60 IN | RESPIRATION RATE: 21 BRPM | DIASTOLIC BLOOD PRESSURE: 74 MMHG

## 2024-03-03 DIAGNOSIS — D64.9 ANEMIA, UNSPECIFIED TYPE: Primary | ICD-10-CM

## 2024-03-03 DIAGNOSIS — R19.5 GUAIAC POSITIVE STOOLS: ICD-10-CM

## 2024-03-03 DIAGNOSIS — N18.9 CHRONIC KIDNEY DISEASE, UNSPECIFIED CKD STAGE: ICD-10-CM

## 2024-03-03 LAB
ABO + RH BLD: NORMAL
ALBUMIN SERPL-MCNC: 3.9 G/DL (ref 3.4–5)
ALBUMIN/GLOB SERPL: 1.1 {RATIO} (ref 1.1–2.2)
ALP SERPL-CCNC: 112 U/L (ref 40–129)
ALT SERPL-CCNC: 11 U/L (ref 10–40)
ANION GAP SERPL CALCULATED.3IONS-SCNC: 13 MMOL/L (ref 3–16)
APTT BLD: 32.8 SEC (ref 22.7–35.9)
AST SERPL-CCNC: 17 U/L (ref 15–37)
BASOPHILS # BLD: 0.1 K/UL (ref 0–0.2)
BASOPHILS NFR BLD: 1.1 %
BILIRUB SERPL-MCNC: 0.3 MG/DL (ref 0–1)
BLD GP AB SCN SERPL QL: NORMAL
BUN SERPL-MCNC: 20 MG/DL (ref 7–20)
CALCIUM SERPL-MCNC: 9 MG/DL (ref 8.3–10.6)
CHLORIDE SERPL-SCNC: 98 MMOL/L (ref 99–110)
CO2 SERPL-SCNC: 25 MMOL/L (ref 21–32)
CREAT SERPL-MCNC: 4.9 MG/DL (ref 0.6–1.2)
DEPRECATED RDW RBC AUTO: 18 % (ref 12.4–15.4)
EOSINOPHIL # BLD: 0.3 K/UL (ref 0–0.6)
EOSINOPHIL NFR BLD: 3.6 %
GFR SERPLBLD CREATININE-BSD FMLA CKD-EPI: 8 ML/MIN/{1.73_M2}
GLUCOSE SERPL-MCNC: 110 MG/DL (ref 70–99)
HCT VFR BLD AUTO: 26.2 % (ref 36–48)
HEMOCCULT STL QL: ABNORMAL
HGB BLD-MCNC: 8.2 G/DL (ref 12–16)
INR PPP: 1.33 (ref 0.84–1.16)
LIPASE SERPL-CCNC: 32 U/L (ref 13–60)
LYMPHOCYTES # BLD: 2.2 K/UL (ref 1–5.1)
LYMPHOCYTES NFR BLD: 25.4 %
MCH RBC QN AUTO: 27.9 PG (ref 26–34)
MCHC RBC AUTO-ENTMCNC: 31.2 G/DL (ref 31–36)
MCV RBC AUTO: 89.3 FL (ref 80–100)
MONOCYTES # BLD: 1 K/UL (ref 0–1.3)
MONOCYTES NFR BLD: 11.1 %
NEUTROPHILS # BLD: 5 K/UL (ref 1.7–7.7)
NEUTROPHILS NFR BLD: 58.8 %
PLATELET # BLD AUTO: 195 K/UL (ref 135–450)
PMV BLD AUTO: 8 FL (ref 5–10.5)
POTASSIUM SERPL-SCNC: 5.3 MMOL/L (ref 3.5–5.1)
PROT SERPL-MCNC: 7.3 G/DL (ref 6.4–8.2)
PROTHROMBIN TIME: 16.5 SEC (ref 11.5–14.8)
RBC # BLD AUTO: 2.93 M/UL (ref 4–5.2)
SODIUM SERPL-SCNC: 136 MMOL/L (ref 136–145)
WBC # BLD AUTO: 8.6 K/UL (ref 4–11)

## 2024-03-03 PROCEDURE — 86850 RBC ANTIBODY SCREEN: CPT

## 2024-03-03 PROCEDURE — 85025 COMPLETE CBC W/AUTO DIFF WBC: CPT

## 2024-03-03 PROCEDURE — 83690 ASSAY OF LIPASE: CPT

## 2024-03-03 PROCEDURE — 80053 COMPREHEN METABOLIC PANEL: CPT

## 2024-03-03 PROCEDURE — 86901 BLOOD TYPING SEROLOGIC RH(D): CPT

## 2024-03-03 PROCEDURE — 86900 BLOOD TYPING SEROLOGIC ABO: CPT

## 2024-03-03 PROCEDURE — 82270 OCCULT BLOOD FECES: CPT

## 2024-03-03 PROCEDURE — 85730 THROMBOPLASTIN TIME PARTIAL: CPT

## 2024-03-03 PROCEDURE — 85610 PROTHROMBIN TIME: CPT

## 2024-03-03 PROCEDURE — 99284 EMERGENCY DEPT VISIT MOD MDM: CPT

## 2024-03-03 PROCEDURE — 36415 COLL VENOUS BLD VENIPUNCTURE: CPT

## 2024-03-03 PROCEDURE — 93005 ELECTROCARDIOGRAM TRACING: CPT | Performed by: PHYSICIAN ASSISTANT

## 2024-03-03 ASSESSMENT — PAIN SCALES - GENERAL: PAINLEVEL_OUTOF10: 0

## 2024-03-03 ASSESSMENT — LIFESTYLE VARIABLES
HOW OFTEN DO YOU HAVE A DRINK CONTAINING ALCOHOL: NEVER
HOW MANY STANDARD DRINKS CONTAINING ALCOHOL DO YOU HAVE ON A TYPICAL DAY: PATIENT DOES NOT DRINK

## 2024-03-03 NOTE — ED TRIAGE NOTES
Patient oriented to room and ED throughput process.  Safety measures with ED bed locked in lowest position and call light in reach.  Patient educated on all orders, including any medications.  Patient educated on chief complaint/symptoms. Patient encouraged to ask questions regarding care, medications or treatment plan.  Patient aware of how to reach staff with questions/concerns.     S/p ipg stimulator   Continue oxybutynin

## 2024-03-03 NOTE — ED PROVIDER NOTES
[facility] EMERGENCY DEPARTMENT  EMERGENCY DEPARTMENT ENCOUNTER        Pt Name: Megha Rojas  MRN: [id number]  Birthdate 1940  Date of evaluation: 3/3/2024  Provider: BASILIO Matamoros  PCP: Denice Reynolds MD  Note Started: 3:39 PM EST 3/3/24       I have seen and evaluated this patient with my supervising physician Dr. Curry.      CHIEF COMPLAINT       Chief Complaint   Patient presents with    Abnormal Lab     Pt reporting that she went to dialysis and was told her hemoglobin went from 6.7 to 6.5 in 2 days.        HISTORY OF PRESENT ILLNESS: 1 or more Elements     History From: Patient  Limitations to history : None    Megha Rojas is a 83 y.o. female who presents to the ED with complaints of abnormal hemoglobin.  Patient is a dialysis patient.  She went to dialysis yesterday and was informed that her hemoglobin Tuesday was 6.7 and Thursday was 6.5, and that she should come to the ED for evaluation.  Patient states at the beginning of January it was 8.  Patient reports she did require transfusion in December 2023 but at that time was identified as having an upper GI bleed.  She did have very dark stools.  Patient states currently her stools are brown and she does not see any blood.  She denies any abdominal pain.  Patient states she is completely asymptomatic.  No headache, extreme fatigue or dizziness.  No chest pain or shortness of breath.  No diarrhea or vomiting.  Patient denies any recent falls.  No other acute concerns at this time.  Patient is anticoagulated on Eliquis for atrial fibrillation.    Nursing Notes were all reviewed and agreed with or any disagreements were addressed in the HPI.    REVIEW OF SYSTEMS :      Review of Systems   Constitutional:  Negative for chills, diaphoresis and fever.   HENT:  Negative for congestion, rhinorrhea and sore throat.    Eyes:  Negative for discharge, redness and itching.   Respiratory:  Negative for cough, shortness of breath and 
note for rectal exam but the patient was grossly negative but occult positive.      Medical decision makin-year-old female presents because she was told her hemoglobin was low.  A repeat hemoglobin today came back at 8.2 g which is the patient's usual hemoglobin.  She did have some occult bleeding however and we recommended the patient stay for further evaluation.  However, the patient is leading after some shared decision making.  The patient states she will contact her GI doctor to arrange prompt colonoscopy and return if she changes her mind or if she is worse.  She was discharged in stable condition.  The patient was given the risks along with a loved one in the room.    Is this patient to be included in the SEP-1 Core Measure due to severe sepsis or septic shock?   No   Exclusion criteria - the patient is NOT to be included for SEP-1 Core Measure due to:  Infection is not suspected      FINAL IMPRESSION:    1. Anemia, unspecified type    2. Guaiac positive stools    3. Chronic kidney disease, unspecified CKD stage           Félix Curry MD  [phone]

## 2024-03-03 NOTE — ED NOTES
Pt was told her Hemoglobin was 6.2 told to come in to ER  Pt denies complaint, pt states she does not feel bad.

## 2024-03-04 LAB
EKG ATRIAL RATE: 68 BPM
EKG DIAGNOSIS: NORMAL
EKG P AXIS: 79 DEGREES
EKG P-R INTERVAL: 226 MS
EKG Q-T INTERVAL: 434 MS
EKG QRS DURATION: 152 MS
EKG QTC CALCULATION (BAZETT): 461 MS
EKG R AXIS: -51 DEGREES
EKG T AXIS: 93 DEGREES
EKG VENTRICULAR RATE: 68 BPM

## 2024-03-04 PROCEDURE — 93010 ELECTROCARDIOGRAM REPORT: CPT | Performed by: INTERNAL MEDICINE

## 2024-03-06 ENCOUNTER — TELEPHONE (OUTPATIENT)
Dept: CARDIOLOGY CLINIC | Age: 84
End: 2024-03-06

## 2024-03-06 NOTE — TELEPHONE ENCOUNTER
Rima, [phone] [facility], states they have not been able to get Cardizem CD.    Can they replace it with Dilt XR once a day dosing?    Please advise.

## 2024-03-27 ENCOUNTER — NURSE ONLY (OUTPATIENT)
Dept: CARDIOLOGY CLINIC | Age: 84
End: 2024-03-27

## 2024-03-27 ENCOUNTER — OFFICE VISIT (OUTPATIENT)
Dept: CARDIOLOGY CLINIC | Age: 84
End: 2024-03-27
Payer: MEDICARE

## 2024-03-27 VITALS
WEIGHT: 198.2 LBS | HEART RATE: 65 BPM | DIASTOLIC BLOOD PRESSURE: 68 MMHG | BODY MASS INDEX: 38.91 KG/M2 | HEIGHT: 60 IN | SYSTOLIC BLOOD PRESSURE: 120 MMHG | OXYGEN SATURATION: 99 %

## 2024-03-27 DIAGNOSIS — I10 ESSENTIAL HYPERTENSION: Chronic | ICD-10-CM

## 2024-03-27 DIAGNOSIS — Z45.09 ENCOUNTER FOR LOOP RECORDER CHECK: ICD-10-CM

## 2024-03-27 DIAGNOSIS — G47.33 OSA (OBSTRUCTIVE SLEEP APNEA): ICD-10-CM

## 2024-03-27 DIAGNOSIS — I48.0 PAF (PAROXYSMAL ATRIAL FIBRILLATION) (HCC): Primary | Chronic | ICD-10-CM

## 2024-03-27 DIAGNOSIS — I44.7 LBBB (LEFT BUNDLE BRANCH BLOCK): ICD-10-CM

## 2024-03-27 PROBLEM — N17.9 AKI (ACUTE KIDNEY INJURY) (HCC): Status: RESOLVED | Noted: 2023-12-28 | Resolved: 2024-03-27

## 2024-03-27 PROCEDURE — 93000 ELECTROCARDIOGRAM COMPLETE: CPT | Performed by: NURSE PRACTITIONER

## 2024-03-27 PROCEDURE — 1123F ACP DISCUSS/DSCN MKR DOCD: CPT | Performed by: NURSE PRACTITIONER

## 2024-03-27 PROCEDURE — 99214 OFFICE O/P EST MOD 30 MIN: CPT | Performed by: NURSE PRACTITIONER

## 2024-03-27 PROCEDURE — 3074F SYST BP LT 130 MM HG: CPT | Performed by: NURSE PRACTITIONER

## 2024-03-27 PROCEDURE — 3078F DIAST BP <80 MM HG: CPT | Performed by: NURSE PRACTITIONER

## 2024-03-27 NOTE — PROGRESS NOTES
Patient comes in for her OV with NPSR today.  Patient has Medtronic Reveal LNQ22 LINQ II-12/13/2021 with Dr. Moncada.     [employer] Interrogation shows Battery Status GOOD. Since 12/13/2023 AT/AF with a 7.5% burden. Last on 1/31/2024, longest noted ~ 9 1/2 hours. 80 Tachy episodes noted. Last on 3/26/2024. 3.1% PVC burden. Implanted for suspected AF. Patient remains Eliquis and Coreg. Please see interrogation scanned under media tab for more detail. We will continue to follow the Patient remotely

## 2024-04-02 NOTE — PROGRESS NOTES
[facility]  Haja Evans MD, FACS, VI  [phone]    DIALYSIS CONSULTATION      PCP:  Denice Reynolds MD       Chief Complaint: ESRD    Nephrologist:  Mahsa/COURTNEY    Prior AVaccess:  None    Tunneled Catheters:  Right IJ    Dialysis schedule:  TTS    Hand Dominance:  Right    History of Present Illness:   Megha Rojas is a 83 y.o. female who presents today for discussion regarding new access creation.  Patient with no previous upper extremity access.  She has a history of a implantable loop recorder.  She currently receives dialysis with a right chest wall tunneled dialysis catheter.  She underwent vein mapping at the [facility] dialysis access center on  and is here to discuss options..       Past Medical History:  Past Medical History:   Diagnosis Date    Anemia     CAD (coronary artery disease) 2020    Stent    CKD (chronic kidney disease)     History of blood transfusion     Hyperlipidemia     patient states well controlled    Hypertension        Past Surgical History:  Past Surgical History:   Procedure Laterality Date     SECTION      x3    CHOLECYSTECTOMY, LAPAROSCOPIC N/A 2021    LAPAROSCOPIC CHOLECYSTECTOMY performed by Trung Hurst MD at [facility] OR    COLONOSCOPY N/A 2021    COLONOSCOPY WITH BIOPSY performed by Ed Castellanos MD at [facility] ENDOSCOPY    CYSTOSCOPY  2013    w/ bladder biopsy    FRACTURE SURGERY      right wrist    HEMICOLECTOMY Right 2021    LAPAROSCOPIC RIGHT COLON RESECTION performed by Trung Hurst MD at [facility] OR    IR TUNNELED CATHETER PLACEMENT GREATER THAN 5 YEARS  2024    IR TUNNELED CATHETER PLACEMENT GREATER THAN 5 YEARS 2024 Raul Toney MD [facility] SPECIAL PROCEDURES    UPPER GASTROINTESTINAL ENDOSCOPY N/A 2021    EGD BIOPSY performed by Ed Castellanos MD at [facility] ENDOSCOPY    UPPER GASTROINTESTINAL ENDOSCOPY N/A 2023    EGD CONTROL HEMORRHAGE, EGD APC STOMACH performed by [facility]

## 2024-04-09 ENCOUNTER — OFFICE VISIT (OUTPATIENT)
Dept: VASCULAR SURGERY | Age: 84
End: 2024-04-09
Payer: MEDICARE

## 2024-04-09 VITALS
HEIGHT: 60 IN | WEIGHT: 202 LBS | SYSTOLIC BLOOD PRESSURE: 118 MMHG | DIASTOLIC BLOOD PRESSURE: 60 MMHG | BODY MASS INDEX: 39.66 KG/M2

## 2024-04-09 DIAGNOSIS — N18.6 ESRD (END STAGE RENAL DISEASE) ON DIALYSIS (HCC): Primary | ICD-10-CM

## 2024-04-09 DIAGNOSIS — Z99.2 ESRD (END STAGE RENAL DISEASE) ON DIALYSIS (HCC): Primary | ICD-10-CM

## 2024-04-09 PROCEDURE — 3078F DIAST BP <80 MM HG: CPT | Performed by: SURGERY

## 2024-04-09 PROCEDURE — 1123F ACP DISCUSS/DSCN MKR DOCD: CPT | Performed by: SURGERY

## 2024-04-09 PROCEDURE — 99204 OFFICE O/P NEW MOD 45 MIN: CPT | Performed by: SURGERY

## 2024-04-09 PROCEDURE — 3074F SYST BP LT 130 MM HG: CPT | Performed by: SURGERY

## 2024-04-10 ENCOUNTER — TELEPHONE (OUTPATIENT)
Dept: VASCULAR SURGERY | Age: 84
End: 2024-04-10

## 2024-04-12 ENCOUNTER — PREP FOR PROCEDURE (OUTPATIENT)
Dept: VASCULAR SURGERY | Age: 84
End: 2024-04-12

## 2024-04-12 DIAGNOSIS — N18.6 END STAGE RENAL DISEASE (HCC): ICD-10-CM

## 2024-04-12 RX ORDER — SODIUM CHLORIDE 0.9 % (FLUSH) 0.9 %
5-40 SYRINGE (ML) INJECTION PRN
Status: CANCELLED | OUTPATIENT
Start: 2024-04-12

## 2024-04-12 RX ORDER — SODIUM CHLORIDE 9 MG/ML
INJECTION, SOLUTION INTRAVENOUS CONTINUOUS
Status: CANCELLED | OUTPATIENT
Start: 2024-04-12

## 2024-04-12 RX ORDER — SODIUM CHLORIDE 9 MG/ML
INJECTION, SOLUTION INTRAVENOUS PRN
Status: CANCELLED | OUTPATIENT
Start: 2024-04-12

## 2024-04-12 RX ORDER — SODIUM CHLORIDE 0.9 % (FLUSH) 0.9 %
5-40 SYRINGE (ML) INJECTION EVERY 12 HOURS SCHEDULED
Status: CANCELLED | OUTPATIENT
Start: 2024-04-12

## 2024-04-21 ENCOUNTER — TRANSCRIBE ORDERS (OUTPATIENT)
Dept: ADMINISTRATIVE | Age: 84
End: 2024-04-21

## 2024-04-21 DIAGNOSIS — R31.0 GROSS HEMATURIA: Primary | ICD-10-CM

## 2024-04-26 NOTE — PROGRESS NOTES
[facility]   Electrophysiology Follow up   Date: 5/1/2024  I had the privilege of visiting Megha Rojas in the office.       CC: AF   HPI: Megha Rojas is a 83 y.o. female with a past medical history of atrial fibrillation,HTN, HLD, CKD, PAF, dCHF, and CAD.     In March of 2021, pt presented to hospital by recommendation of her PCP for anemia. Her hemoglobin was 5.8 and was she received multiple units of blood. GI completed colonoscopy/EGD with no gross bleeding found, however, she was found to have a mass in her colon. She underwent surgical bowel resection. During admission, she developed AF with RVR and was started on amiodarone.     S/p ILR placement 12/13/2021 12/2023 Admitted with GIB and anemia. 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     Later admitted with worsening YESENIA and was started on dialysis     Megha presents to the office today for follow up. She is accompanied by her daughter today. Denies any recurrence of GI bleeding.  Denies noticing when she is in atrial fibrillation. Ambulates via walker at home. Tolerating current medications well with no concerns at this time.     Assessment and plan:   -PAF, GI bleeding, Anemia      ECG today shows Sinus rhythm     High risk [id number] score. Anticoagulation is recommended.    Continue Coreg 3.125 mg BID and Diltiazem 120 mg QD for rate control    No longer on Amiodarone - stopped d/t lack of recurrence and pt concern for side effects    Had recurrent GIB and anemia 12/2023, poor candidate for long term OAC d/t anemia and AVM's      H/H 8.2/26.2 3/3/24   Noted 12/2023-requiring AVM's requiring APC and clip     Patient is high risk for stroke or systemic thromboembolism. Patient is a poor candidate for long term anticoagulation.     [id number] Score for Atrial Fibrillation Stroke Risk   Risk   Factors  Component Value   C CHF Yes 1   H HTN Yes 1   A2 Age >= 75 Yes,  (83 y.o.) 2   D DM No 0   S2 Prior Stroke/TIA No 0   V Vascular

## 2024-05-01 ENCOUNTER — NURSE ONLY (OUTPATIENT)
Dept: CARDIOLOGY CLINIC | Age: 84
End: 2024-05-01

## 2024-05-01 ENCOUNTER — OFFICE VISIT (OUTPATIENT)
Dept: CARDIOLOGY CLINIC | Age: 84
End: 2024-05-01
Payer: MEDICARE

## 2024-05-01 VITALS
HEIGHT: 60 IN | DIASTOLIC BLOOD PRESSURE: 50 MMHG | HEART RATE: 68 BPM | BODY MASS INDEX: 39.54 KG/M2 | WEIGHT: 201.4 LBS | SYSTOLIC BLOOD PRESSURE: 82 MMHG | OXYGEN SATURATION: 97 %

## 2024-05-01 DIAGNOSIS — I48.0 PAF (PAROXYSMAL ATRIAL FIBRILLATION) (HCC): Primary | Chronic | ICD-10-CM

## 2024-05-01 PROCEDURE — 3078F DIAST BP <80 MM HG: CPT | Performed by: INTERNAL MEDICINE

## 2024-05-01 PROCEDURE — 93000 ELECTROCARDIOGRAM COMPLETE: CPT | Performed by: INTERNAL MEDICINE

## 2024-05-01 PROCEDURE — 99215 OFFICE O/P EST HI 40 MIN: CPT | Performed by: INTERNAL MEDICINE

## 2024-05-01 PROCEDURE — 1123F ACP DISCUSS/DSCN MKR DOCD: CPT | Performed by: INTERNAL MEDICINE

## 2024-05-01 PROCEDURE — 3074F SYST BP LT 130 MM HG: CPT | Performed by: INTERNAL MEDICINE

## 2024-05-01 NOTE — PROGRESS NOTES
Patient comes in for her OV with Dr. Tavarez today.  Patient has Medtronic Reveal LNQ22 LINQ II-12/13/2021 with Dr. Moncada.     [facility] Interrogation shows Battery Status GOOD. Since 3/27/2024 AT/AF with a 1.8% burden. 49 Tachy episodes noted. Some T-wave oversensing noted. 2.8% PVC burden. Implanted for suspected AF. Patient remains Eliquis and Coreg. Please see interrogation scanned under media tab for more detail. We will continue to follow the Patient remotely

## 2024-05-02 ENCOUNTER — TELEPHONE (OUTPATIENT)
Dept: CARDIOLOGY CLINIC | Age: 84
End: 2024-05-02

## 2024-05-02 DIAGNOSIS — Z95.818 PRESENCE OF WATCHMAN LEFT ATRIAL APPENDAGE CLOSURE DEVICE: ICD-10-CM

## 2024-05-02 DIAGNOSIS — Z01.818 PRE-OP TESTING: ICD-10-CM

## 2024-05-02 DIAGNOSIS — I48.20 CHRONIC A-FIB (HCC): ICD-10-CM

## 2024-05-02 DIAGNOSIS — R31.9 URINARY TRACT INFECTION WITH HEMATURIA, SITE UNSPECIFIED: ICD-10-CM

## 2024-05-02 DIAGNOSIS — I48.0 PAROXYSMAL A-FIB (HCC): Primary | ICD-10-CM

## 2024-05-02 DIAGNOSIS — N39.0 URINARY TRACT INFECTION WITH HEMATURIA, SITE UNSPECIFIED: ICD-10-CM

## 2024-05-02 NOTE — TELEPHONE ENCOUNTER
Patient needs a Watchman shared decision appointment.  I reached out left message in the attempt to schedule the appointment.

## 2024-05-03 ENCOUNTER — TELEPHONE (OUTPATIENT)
Dept: CARDIOLOGY CLINIC | Age: 84
End: 2024-05-03

## 2024-05-03 NOTE — TELEPHONE ENCOUNTER
----- Message from Adam Steiner RN sent at 5/2/2024  7:54 AM EDT -----  Regarding: FW: Watchman Procedure    ----- Message -----  From: Vidya Houston LPN  Sent: 5/1/2024  11:13 AM EDT  To: Danielle Butts; Adam Steiner RN  Subject: Watchman Procedure                               Patient seen in clinic with Dr. Tavarez. She is agreeable to proceed with Watchman . She needs to have shared decision making appt scheduled    She has dialysis on Tuesday,Thursdays, Satrurdays.

## 2024-05-20 ENCOUNTER — ANESTHESIA (OUTPATIENT)
Dept: OPERATING ROOM | Age: 84
End: 2024-05-20
Payer: MEDICARE

## 2024-05-20 ENCOUNTER — HOSPITAL ENCOUNTER (OUTPATIENT)
Age: 84
Setting detail: OUTPATIENT SURGERY
Discharge: HOME | End: 2024-05-20
Attending: SURGERY | Admitting: SURGERY
Payer: MEDICARE

## 2024-05-20 ENCOUNTER — ANESTHESIA EVENT (OUTPATIENT)
Dept: OPERATING ROOM | Age: 84
End: 2024-05-20
Payer: MEDICARE

## 2024-05-20 VITALS
OXYGEN SATURATION: 95 % | TEMPERATURE: 97.9 F | WEIGHT: 199 LBS | SYSTOLIC BLOOD PRESSURE: 146 MMHG | RESPIRATION RATE: 21 BRPM | DIASTOLIC BLOOD PRESSURE: 74 MMHG | HEART RATE: 64 BPM | BODY MASS INDEX: 39.07 KG/M2 | HEIGHT: 60 IN

## 2024-05-20 DIAGNOSIS — Z99.2 ESRD (END STAGE RENAL DISEASE) ON DIALYSIS (HCC): Primary | ICD-10-CM

## 2024-05-20 DIAGNOSIS — N18.6 ESRD (END STAGE RENAL DISEASE) ON DIALYSIS (HCC): Primary | ICD-10-CM

## 2024-05-20 LAB
ABO + RH BLD: NORMAL
ANION GAP SERPL CALCULATED.3IONS-SCNC: 15 MMOL/L (ref 3–16)
APTT BLD: 29.7 SEC (ref 22.1–36.4)
BLD GP AB SCN SERPL QL: NORMAL
BUN SERPL-MCNC: 42 MG/DL (ref 7–20)
CALCIUM SERPL-MCNC: 9.1 MG/DL (ref 8.3–10.6)
CHLORIDE SERPL-SCNC: 105 MMOL/L (ref 99–110)
CO2 SERPL-SCNC: 19 MMOL/L (ref 21–32)
CREAT SERPL-MCNC: 5.9 MG/DL (ref 0.6–1.2)
DEPRECATED RDW RBC AUTO: 19.4 % (ref 12.4–15.4)
GFR SERPLBLD CREATININE-BSD FMLA CKD-EPI: 7 ML/MIN/{1.73_M2}
GLUCOSE SERPL-MCNC: 101 MG/DL (ref 70–99)
HCT VFR BLD AUTO: 40.8 % (ref 36–48)
HGB BLD-MCNC: 12.3 G/DL (ref 12–16)
INR PPP: 0.99 (ref 0.85–1.15)
MCH RBC QN AUTO: 24.9 PG (ref 26–34)
MCHC RBC AUTO-ENTMCNC: 30.1 G/DL (ref 31–36)
MCV RBC AUTO: 82.7 FL (ref 80–100)
PLATELET # BLD AUTO: 123 K/UL (ref 135–450)
PMV BLD AUTO: 8.3 FL (ref 5–10.5)
POTASSIUM SERPL-SCNC: 5.4 MMOL/L (ref 3.5–5.1)
PROTHROMBIN TIME: 13.3 SEC (ref 11.9–14.9)
RBC # BLD AUTO: 4.94 M/UL (ref 4–5.2)
SODIUM SERPL-SCNC: 139 MMOL/L (ref 136–145)
WBC # BLD AUTO: 6.2 K/UL (ref 4–11)

## 2024-05-20 PROCEDURE — 2500000003 HC RX 250 WO HCPCS: Performed by: NURSE ANESTHETIST, CERTIFIED REGISTERED

## 2024-05-20 PROCEDURE — 6360000002 HC RX W HCPCS: Performed by: SURGERY

## 2024-05-20 PROCEDURE — 7100000010 HC PHASE II RECOVERY - FIRST 15 MIN: Performed by: SURGERY

## 2024-05-20 PROCEDURE — 3700000000 HC ANESTHESIA ATTENDED CARE: Performed by: SURGERY

## 2024-05-20 PROCEDURE — A4217 STERILE WATER/SALINE, 500 ML: HCPCS | Performed by: SURGERY

## 2024-05-20 PROCEDURE — 6360000002 HC RX W HCPCS: Performed by: NURSE ANESTHETIST, CERTIFIED REGISTERED

## 2024-05-20 PROCEDURE — 6360000002 HC RX W HCPCS: Performed by: ANESTHESIOLOGY

## 2024-05-20 PROCEDURE — 85610 PROTHROMBIN TIME: CPT

## 2024-05-20 PROCEDURE — 2580000003 HC RX 258: Performed by: SURGERY

## 2024-05-20 PROCEDURE — 6360000002 HC RX W HCPCS

## 2024-05-20 PROCEDURE — 3600000004 HC SURGERY LEVEL 4 BASE: Performed by: SURGERY

## 2024-05-20 PROCEDURE — 86900 BLOOD TYPING SEROLOGIC ABO: CPT

## 2024-05-20 PROCEDURE — 86850 RBC ANTIBODY SCREEN: CPT

## 2024-05-20 PROCEDURE — 2709999900 HC NON-CHARGEABLE SUPPLY: Performed by: SURGERY

## 2024-05-20 PROCEDURE — 7100000011 HC PHASE II RECOVERY - ADDTL 15 MIN: Performed by: SURGERY

## 2024-05-20 PROCEDURE — 7100000001 HC PACU RECOVERY - ADDTL 15 MIN: Performed by: SURGERY

## 2024-05-20 PROCEDURE — 6370000000 HC RX 637 (ALT 250 FOR IP): Performed by: SURGERY

## 2024-05-20 PROCEDURE — 3600000014 HC SURGERY LEVEL 4 ADDTL 15MIN: Performed by: SURGERY

## 2024-05-20 PROCEDURE — 6370000000 HC RX 637 (ALT 250 FOR IP): Performed by: ANESTHESIOLOGY

## 2024-05-20 PROCEDURE — 86901 BLOOD TYPING SEROLOGIC RH(D): CPT

## 2024-05-20 PROCEDURE — 85730 THROMBOPLASTIN TIME PARTIAL: CPT

## 2024-05-20 PROCEDURE — 7100000000 HC PACU RECOVERY - FIRST 15 MIN: Performed by: SURGERY

## 2024-05-20 PROCEDURE — C1768 GRAFT, VASCULAR: HCPCS | Performed by: SURGERY

## 2024-05-20 PROCEDURE — 85027 COMPLETE CBC AUTOMATED: CPT

## 2024-05-20 PROCEDURE — 64415 NJX AA&/STRD BRCH PLXS IMG: CPT | Performed by: ANESTHESIOLOGY

## 2024-05-20 PROCEDURE — 36415 COLL VENOUS BLD VENIPUNCTURE: CPT

## 2024-05-20 PROCEDURE — C1889 IMPLANT/INSERT DEVICE, NOC: HCPCS | Performed by: SURGERY

## 2024-05-20 PROCEDURE — 80048 BASIC METABOLIC PNL TOTAL CA: CPT

## 2024-05-20 PROCEDURE — 36830 ARTERY-VEIN NONAUTOGRAFT: CPT | Performed by: SURGERY

## 2024-05-20 PROCEDURE — 3700000001 HC ADD 15 MINUTES (ANESTHESIA): Performed by: SURGERY

## 2024-05-20 DEVICE — GRAFT VASC L45CM DIA4-6MM UNIV PTFE STR STD WALL TAPR: Type: IMPLANTABLE DEVICE | Site: ARM | Status: FUNCTIONAL

## 2024-05-20 DEVICE — CLIP LIG M BLU TI HRT SHP WIRE HORZ 6 CLIPS PER PK: Type: IMPLANTABLE DEVICE | Site: ARM | Status: FUNCTIONAL

## 2024-05-20 DEVICE — IMPLANTABLE DEVICE: Type: IMPLANTABLE DEVICE | Site: ARM | Status: FUNCTIONAL

## 2024-05-20 RX ORDER — PROPOFOL 10 MG/ML
INJECTION, EMULSION INTRAVENOUS PRN
Status: DISCONTINUED | OUTPATIENT
Start: 2024-05-20 | End: 2024-05-20 | Stop reason: SDUPTHER

## 2024-05-20 RX ORDER — PROTAMINE SULFATE 10 MG/ML
INJECTION, SOLUTION INTRAVENOUS PRN
Status: DISCONTINUED | OUTPATIENT
Start: 2024-05-20 | End: 2024-05-20 | Stop reason: SDUPTHER

## 2024-05-20 RX ORDER — OXYCODONE HYDROCHLORIDE 5 MG/1
5 TABLET ORAL EVERY 6 HOURS PRN
Qty: 20 TABLET | Refills: 0 | Status: SHIPPED | OUTPATIENT
Start: 2024-05-20 | End: 2024-05-25

## 2024-05-20 RX ORDER — SODIUM CHLORIDE 9 MG/ML
INJECTION, SOLUTION INTRAVENOUS PRN
Status: DISCONTINUED | OUTPATIENT
Start: 2024-05-20 | End: 2024-05-20 | Stop reason: HOSPADM

## 2024-05-20 RX ORDER — LIDOCAINE HYDROCHLORIDE 10 MG/ML
1 INJECTION, SOLUTION EPIDURAL; INFILTRATION; INTRACAUDAL; PERINEURAL
Status: CANCELLED | OUTPATIENT
Start: 2024-05-20 | End: 2024-05-21

## 2024-05-20 RX ORDER — ONDANSETRON 2 MG/ML
4 INJECTION INTRAMUSCULAR; INTRAVENOUS
Status: DISCONTINUED | OUTPATIENT
Start: 2024-05-20 | End: 2024-05-20 | Stop reason: HOSPADM

## 2024-05-20 RX ORDER — HYDROMORPHONE HYDROCHLORIDE 2 MG/ML
0.25 INJECTION, SOLUTION INTRAMUSCULAR; INTRAVENOUS; SUBCUTANEOUS EVERY 5 MIN PRN
Status: DISCONTINUED | OUTPATIENT
Start: 2024-05-20 | End: 2024-05-20 | Stop reason: HOSPADM

## 2024-05-20 RX ORDER — FENTANYL CITRATE 50 UG/ML
25 INJECTION, SOLUTION INTRAMUSCULAR; INTRAVENOUS EVERY 5 MIN PRN
Status: DISCONTINUED | OUTPATIENT
Start: 2024-05-20 | End: 2024-05-20 | Stop reason: HOSPADM

## 2024-05-20 RX ORDER — HYDRALAZINE HYDROCHLORIDE 20 MG/ML
10 INJECTION INTRAMUSCULAR; INTRAVENOUS
Status: DISCONTINUED | OUTPATIENT
Start: 2024-05-20 | End: 2024-05-20 | Stop reason: HOSPADM

## 2024-05-20 RX ORDER — HEPARIN SODIUM 1000 [USP'U]/ML
INJECTION, SOLUTION INTRAVENOUS; SUBCUTANEOUS PRN
Status: DISCONTINUED | OUTPATIENT
Start: 2024-05-20 | End: 2024-05-20 | Stop reason: SDUPTHER

## 2024-05-20 RX ORDER — EPHEDRINE SULFATE 50 MG/ML
INJECTION INTRAVENOUS PRN
Status: DISCONTINUED | OUTPATIENT
Start: 2024-05-20 | End: 2024-05-20 | Stop reason: SDUPTHER

## 2024-05-20 RX ORDER — SODIUM CHLORIDE 0.9 % (FLUSH) 0.9 %
5-40 SYRINGE (ML) INJECTION EVERY 12 HOURS SCHEDULED
Status: DISCONTINUED | OUTPATIENT
Start: 2024-05-20 | End: 2024-05-20 | Stop reason: HOSPADM

## 2024-05-20 RX ORDER — SODIUM CHLORIDE 0.9 % (FLUSH) 0.9 %
5-40 SYRINGE (ML) INJECTION PRN
Status: CANCELLED | OUTPATIENT
Start: 2024-05-20

## 2024-05-20 RX ORDER — BUPIVACAINE HYDROCHLORIDE 5 MG/ML
INJECTION, SOLUTION EPIDURAL; INTRACAUDAL
Status: COMPLETED | OUTPATIENT
Start: 2024-05-20 | End: 2024-05-20

## 2024-05-20 RX ORDER — PHENYLEPHRINE HCL IN 0.9% NACL 1 MG/10 ML
SYRINGE (ML) INTRAVENOUS PRN
Status: DISCONTINUED | OUTPATIENT
Start: 2024-05-20 | End: 2024-05-20 | Stop reason: SDUPTHER

## 2024-05-20 RX ORDER — SODIUM CHLORIDE 0.9 % (FLUSH) 0.9 %
5-40 SYRINGE (ML) INJECTION EVERY 12 HOURS SCHEDULED
Status: CANCELLED | OUTPATIENT
Start: 2024-05-20

## 2024-05-20 RX ORDER — SODIUM CHLORIDE 0.9 % (FLUSH) 0.9 %
5-40 SYRINGE (ML) INJECTION PRN
Status: DISCONTINUED | OUTPATIENT
Start: 2024-05-20 | End: 2024-05-20 | Stop reason: HOSPADM

## 2024-05-20 RX ORDER — SODIUM CHLORIDE 9 MG/ML
INJECTION, SOLUTION INTRAVENOUS PRN
Status: CANCELLED | OUTPATIENT
Start: 2024-05-20

## 2024-05-20 RX ORDER — FENTANYL CITRATE 50 UG/ML
INJECTION, SOLUTION INTRAMUSCULAR; INTRAVENOUS
Status: COMPLETED
Start: 2024-05-20 | End: 2024-05-20

## 2024-05-20 RX ORDER — OXYCODONE HYDROCHLORIDE 5 MG/1
5 TABLET ORAL
Status: COMPLETED | OUTPATIENT
Start: 2024-05-20 | End: 2024-05-20

## 2024-05-20 RX ORDER — PROCHLORPERAZINE EDISYLATE 5 MG/ML
5 INJECTION INTRAMUSCULAR; INTRAVENOUS
Status: DISCONTINUED | OUTPATIENT
Start: 2024-05-20 | End: 2024-05-20 | Stop reason: HOSPADM

## 2024-05-20 RX ORDER — SODIUM CHLORIDE, SODIUM LACTATE, POTASSIUM CHLORIDE, CALCIUM CHLORIDE 600; 310; 30; 20 MG/100ML; MG/100ML; MG/100ML; MG/100ML
INJECTION, SOLUTION INTRAVENOUS CONTINUOUS
Status: CANCELLED | OUTPATIENT
Start: 2024-05-20

## 2024-05-20 RX ORDER — FENTANYL CITRATE 50 UG/ML
50 INJECTION, SOLUTION INTRAMUSCULAR; INTRAVENOUS ONCE
Status: COMPLETED | OUTPATIENT
Start: 2024-05-20 | End: 2024-05-20

## 2024-05-20 RX ORDER — LIDOCAINE HYDROCHLORIDE 20 MG/ML
INJECTION, SOLUTION EPIDURAL; INFILTRATION; INTRACAUDAL; PERINEURAL PRN
Status: DISCONTINUED | OUTPATIENT
Start: 2024-05-20 | End: 2024-05-20 | Stop reason: SDUPTHER

## 2024-05-20 RX ORDER — LABETALOL HYDROCHLORIDE 5 MG/ML
10 INJECTION, SOLUTION INTRAVENOUS
Status: DISCONTINUED | OUTPATIENT
Start: 2024-05-20 | End: 2024-05-20 | Stop reason: HOSPADM

## 2024-05-20 RX ORDER — SODIUM CHLORIDE 9 MG/ML
INJECTION, SOLUTION INTRAVENOUS CONTINUOUS
Status: DISCONTINUED | OUTPATIENT
Start: 2024-05-20 | End: 2024-05-20 | Stop reason: HOSPADM

## 2024-05-20 RX ORDER — GLYCOPYRROLATE 0.2 MG/ML
INJECTION INTRAMUSCULAR; INTRAVENOUS PRN
Status: DISCONTINUED | OUTPATIENT
Start: 2024-05-20 | End: 2024-05-20 | Stop reason: SDUPTHER

## 2024-05-20 RX ADMIN — EPHEDRINE SULFATE 10 MG: 50 INJECTION, SOLUTION INTRAVENOUS at 13:39

## 2024-05-20 RX ADMIN — PROPOFOL 50 MG: 10 INJECTION, EMULSION INTRAVENOUS at 14:21

## 2024-05-20 RX ADMIN — HEPARIN SODIUM 3000 UNITS: 1000 INJECTION INTRAVENOUS; SUBCUTANEOUS at 13:33

## 2024-05-20 RX ADMIN — FENTANYL CITRATE 25 MCG: 0.05 INJECTION, SOLUTION INTRAMUSCULAR; INTRAVENOUS at 12:19

## 2024-05-20 RX ADMIN — SODIUM CHLORIDE: 9 INJECTION, SOLUTION INTRAVENOUS at 13:06

## 2024-05-20 RX ADMIN — PROPOFOL 150 MG: 10 INJECTION, EMULSION INTRAVENOUS at 13:12

## 2024-05-20 RX ADMIN — BUPIVACAINE HYDROCHLORIDE 25 ML: 5 INJECTION, SOLUTION EPIDURAL; INTRACAUDAL at 12:21

## 2024-05-20 RX ADMIN — Medication 50 MCG: at 13:24

## 2024-05-20 RX ADMIN — OXYCODONE 5 MG: 5 TABLET ORAL at 15:04

## 2024-05-20 RX ADMIN — LIDOCAINE HYDROCHLORIDE 100 MG: 20 INJECTION, SOLUTION EPIDURAL; INFILTRATION; INTRACAUDAL; PERINEURAL at 13:12

## 2024-05-20 RX ADMIN — CEFAZOLIN 2000 MG: 2 INJECTION, POWDER, FOR SOLUTION INTRAMUSCULAR; INTRAVENOUS at 13:15

## 2024-05-20 RX ADMIN — GLYCOPYRROLATE 0.2 MG: 0.2 INJECTION INTRAMUSCULAR; INTRAVENOUS at 13:35

## 2024-05-20 RX ADMIN — PROTAMINE SULFATE 20 MG: 10 INJECTION, SOLUTION INTRAVENOUS at 14:02

## 2024-05-20 RX ADMIN — PROPOFOL 30 MG: 10 INJECTION, EMULSION INTRAVENOUS at 13:13

## 2024-05-20 RX ADMIN — Medication 100 MCG: at 13:27

## 2024-05-20 RX ADMIN — Medication 50 MCG: at 13:21

## 2024-05-20 RX ADMIN — PROPOFOL 20 MG: 10 INJECTION, EMULSION INTRAVENOUS at 14:03

## 2024-05-20 RX ADMIN — FENTANYL CITRATE 25 MCG: 50 INJECTION, SOLUTION INTRAMUSCULAR; INTRAVENOUS at 12:19

## 2024-05-20 ASSESSMENT — PAIN SCALES - GENERAL
PAINLEVEL_OUTOF10: 6
PAINLEVEL_OUTOF10: 7
PAINLEVEL_OUTOF10: 10

## 2024-05-20 ASSESSMENT — PAIN - FUNCTIONAL ASSESSMENT
PAIN_FUNCTIONAL_ASSESSMENT: 0-10
PAIN_FUNCTIONAL_ASSESSMENT: PREVENTS OR INTERFERES SOME ACTIVE ACTIVITIES AND ADLS

## 2024-05-20 ASSESSMENT — PAIN DESCRIPTION - LOCATION
LOCATION: ARM

## 2024-05-20 ASSESSMENT — PAIN DESCRIPTION - ORIENTATION
ORIENTATION: LEFT
ORIENTATION: LEFT;UPPER
ORIENTATION: UPPER;LEFT

## 2024-05-20 ASSESSMENT — PAIN DESCRIPTION - DESCRIPTORS: DESCRIPTORS: DISCOMFORT

## 2024-05-20 ASSESSMENT — ENCOUNTER SYMPTOMS: SHORTNESS OF BREATH: 0

## 2024-05-20 NOTE — ANESTHESIA PRE PROCEDURE
Department of Anesthesiology  Preprocedure Note       Name:  Megha Rojas   Age:  83 y.o.  :  1940                                          MRN:  [id number]         Date:  2024      Surgeon: Surgeon(s):  Haja Evans II, MD    Procedure: Procedure(s):  LEFT ARM ARTERIOVENOUS GRAFT    Medications prior to admission:   Prior to Admission medications    Medication Sig Start Date End Date Taking? Authorizing Provider   sevelamer (RENVELA) 800 MG tablet Take 1 tablet by mouth 3 times daily (with meals) 24   Zuri Abel MD   apixaban (ELIQUIS) 5 MG TABS tablet Take 0.5 tablets by mouth 2 times daily 3/27/24   Olayinka Kaur APRN - CNP   dilTIAZem (CARDIZEM CD) 120 MG extended release capsule Take 1 capsule by mouth daily 24   Britney Laura APRN - CNP   rosuvastatin (CRESTOR) 20 MG tablet Take 1 tablet by mouth nightly 24   Britney Laura APRN - CNP   carvedilol (COREG) 3.125 MG tablet TAKE 1 TABLET BY MOUTH TWICE DAILY WITH MEALS 23   Olayinka Kaur APRN - CNP   HYDROcodone-acetaminophen (NORCO) 5-325 MG per tablet Take 1 tablet by mouth 2 times daily. 22   ProviderVerena MD   aspirin EC 81 MG EC tablet Take 1 tablet by mouth daily 21   MalachiFlorencio palomares MD       Current medications:    Current Facility-Administered Medications   Medication Dose Route Frequency Provider Last Rate Last Admin    0.9 % sodium chloride infusion   IntraVENous Continuous Haja Evans II, MD        sodium chloride flush 0.9 % injection 5-40 mL  5-40 mL IntraVENous 2 times per day Haja Evans II, MD        sodium chloride flush 0.9 % injection 5-40 mL  5-40 mL IntraVENous PRN Haja Evans II, MD        0.9 % sodium chloride infusion   IntraVENous PRN Haja Evans II, MD        ceFAZolin (ANCEF) 2,000 mg in sterile water 20 mL IV syringe  2,000 mg IntraVENous On Call to OR Haja Evans II, MD           Allergies:    Allergies   Allergen

## 2024-05-20 NOTE — DISCHARGE INSTRUCTIONS
SEDATION DISCHARGE INSTRUCTIONS    5/20/2024     Wear your seatbelt home.  You are under the influence of drugs. Do not drink alcohol, drive, operate machinery, or make any important decisions or sign any legal documents for 24 hours  A responsible adult needs to be with you for 24 hours.  You may experience lightheadedness, dizziness, nausea, heightened emotions and/or sleepiness following surgery.  Rest at home today- increase activity as tolerated.  Progress slowly to a regular diet and drink plenty of fluids unless your physician has instructed you otherwise.  If nausea becomes a problem, call your physician.  Coughing, sore throat, and muscle aches are other side effects of anesthesia and should improve with time.  Do not drive or operate machinery while taking narcotics.  ATTENTION FEMALE PATIENTS: if you use an oral contraceptive or birth control pill, you need to use an additional or alternative method for pregnancy prevention for the next thirty days.  Medications given today may render your contraceptive ineffective for this cycle.       Follow up with Dr. Evans with any questions, concerns, results, and an appointment after your procedure in the time period recommended.   [phone]      When you sleep, avoid placing pressure on the extremity with the fistula or graft.  Never allow anyone to take a blood pressure or perform a venipuncture on the extremity with the fistula or graft.  Do not wear tight clothing above or around the access site.      Turbulent blood flow over the AV fistula or AV graft is commonly felt as a vibration and is termed a thrill. Routinely check the access site for the thrill, which indicates that the AV site is still functioning (if the vibration or thrill disappears, call your health care provider immediately).     Call your doctor immediately or come to the Emergency Department if you:    cannot feel the vibration or thrill at the access site    have severe chills or fever

## 2024-05-20 NOTE — PROGRESS NOTES
Pt VSS, O2 99% on room air. Left radial pulse strong 3+, cap refill <3sec, ace wrap to left arm from wrist to shoulder CDI, soft to palpation. Pt seen by anesthesia. Pt tolerating po intake well. Phase I criteria met. Transferring to phase II

## 2024-05-20 NOTE — H&P
Consultation/History & Physical    Date of Admission:  2024  Date of Consultation:  2024    PCP:  Denice Reynolds MD     Chief Complaint: ESRD    History of Present Illness:  Megha Rojas is a 83 y.o. female who presents with ESRD in need of new access creation.  Presents today for new access in the left arm.   PMH:   has a past medical history of Anemia, CAD (coronary artery disease), CKD (chronic kidney disease), History of blood transfusion, Hyperlipidemia, and Hypertension.      PSH:   has a past surgical history that includes fracture surgery; Cystocopy (2013); Upper gastrointestinal endoscopy (N/A, 2021); Colonoscopy (N/A, 2021); hemicolectomy (Right, 2021); Cholecystectomy, laparoscopic (N/A, 2021);  section; Upper gastrointestinal endoscopy (N/A, 2023); and IR TUNNELED CVC PLACE WO SQ PORT/PUMP > 5 YEARS (2024).    Allergies:    Allergies   Allergen Reactions    Acetomenaphthone (Menadiol Diacetate) [Menadiol Sodium Diphosphate]      Upset stomach     Lisinopril-Hydrochlorothiazide Other (See Comments)     shaky    Advil [Ibuprofen] Nausea And Vomiting    Aleve [Naproxen] Nausea And Vomiting    Tylenol [Acetaminophen] Nausea And Vomiting        Home Meds:    Prior to Admission medications    Medication Sig Start Date End Date Taking? Authorizing Provider   sevelamer (RENVELA) 800 MG tablet Take 1 tablet by mouth 3 times daily (with meals) 24   Zuri Abel MD   apixaban (ELIQUIS) 5 MG TABS tablet Take 0.5 tablets by mouth 2 times daily 3/27/24   Olayinka Kaur APRN - CNP   dilTIAZem (CARDIZEM CD) 120 MG extended release capsule Take 1 capsule by mouth daily 24   Britney Laura APRN - CNP   rosuvastatin (CRESTOR) 20 MG tablet Take 1 tablet by mouth nightly 24   Britney Laura APRN - CNP   carvedilol (COREG) 3.125 MG tablet TAKE 1 TABLET BY MOUTH TWICE DAILY WITH MEALS 23   Olayinka Kaur APRN - CNP

## 2024-05-20 NOTE — PROGRESS NOTES
Pt arrived from OR to PACU O2 100% on 6L simple mask. Surgical site to Left upper extremity x 2 with suture and surgical glue, kurlex and ace wrap. Right radial pulse strong, cap refill <3 sec. Pt requesting bed pan and assisting in lateral rolling and repositioning. Will cont to monitor.

## 2024-05-20 NOTE — ANESTHESIA PROCEDURE NOTES
Peripheral Block    Patient location during procedure: pre-op  Reason for block: post-op pain management and at surgeon's request  Start time: 5/20/2024 12:21 PM  End time: 5/20/2024 12:26 PM  Staffing  Performed: anesthesiologist   Anesthesiologist: Ananda Miranda MD  Performed by: Ananda Miranda MD  Authorized by: Ananda Miranda MD    Preanesthetic Checklist  Completed: patient identified, IV checked, site marked, risks and benefits discussed, surgical/procedural consents, equipment checked, pre-op evaluation, timeout performed, anesthesia consent given, oxygen available and monitors applied/VS acknowledged  Peripheral Block   Patient position: sitting  Prep: ChloraPrep  Provider prep: mask and sterile gloves  Patient monitoring: cardiac monitor, continuous pulse ox, frequent blood pressure checks and IV access  Block type: Brachial plexus  Supraclavicular  Laterality: left  Injection technique: single-shot  Guidance: nerve stimulator and ultrasound guided  Local infiltration: lidocaine  Infiltration strength: 1 %  Local infiltration: lidocaine  Dose: 3 mL    Needle   Needle type: short-bevel   Needle gauge: 20 G  Needle localization: ultrasound guidance and nerve stimulator  Needle length: 10 cm  Assessment   Injection assessment: negative aspiration for heme, no paresthesia on injection and local visualized surrounding nerve on ultrasound  Paresthesia pain: none  Slow fractionated injection: yes  Hemodynamics: stable  Outcomes: uncomplicated and patient tolerated procedure well    Additional Notes  U/S [id number].  (1) Under ultrasound guidance, a 20 gauge needle was inserted and placed in close proximity to the BP nerve.  (2) Ultrasound was also used to visualize the spread of the anesthetic in close proximity to the nerve being blocked. (3) The nerve appeared anatomically normal, and (4 there were no apparent abnormal pathological findings on the image that were readily visible

## 2024-05-20 NOTE — ANESTHESIA POSTPROCEDURE EVALUATION
Department of Anesthesiology  Postprocedure Note    Patient: Megha Rojas  MRN: [id number]  YOB: 1940  Date of evaluation: 5/20/2024    Procedure Summary       Date: 05/20/24 Room / Location: [address]    Anesthesia Start: 1306 Anesthesia Stop: 1442    Procedure: LEFT ARM ARTERIOVENOUS GRAFT (Left: Arm Lower) Diagnosis:       End stage renal disease (HCC)      (End stage renal disease (HCC) [N18.6])    Surgeons: Haja Evans II, MD Responsible Provider: Ananda Miranda MD    Anesthesia Type: general, regional ASA Status: 3            Anesthesia Type: No value filed.    Stephon Phase I: Stephon Score: 9    Stephon Phase II:      Anesthesia Post Evaluation    Patient location during evaluation: PACU  Patient participation: complete - patient participated  Level of consciousness: awake and alert  Airway patency: patent  Nausea & Vomiting: no nausea and no vomiting  Cardiovascular status: hemodynamically stable  Respiratory status: acceptable  Hydration status: stable  Multimodal analgesia pain management approach  Pain management: adequate    No notable events documented.

## 2024-05-20 NOTE — PROGRESS NOTES
Pt IV removed without incident. Pt VSS. O2 99% on RA. This nurse assisted pt to dress at bedside with daughter. Left radial pulse strong 3+, ace wrap CDI, soft to palpation. Discharge instructions provided to daughter and patient at the bedside, all questions answered, verbalized understanding. Pt DC with daughter in personal WC and all belongings.

## 2024-05-20 NOTE — OP NOTE
Operative Note      Patient: Megha Rojas  YOB: 1940  MRN: [id number]    Date of Procedure: 5/20/2024    Pre-Op Diagnosis Codes:     * End stage renal disease (HCC) [N18.6]    Post-Op Diagnosis: Same       Procedure(s):  LEFT ARM ARTERIOVENOUS GRAFT    Surgeon(s):  Haja Evans II, MD    Assistant:   Surgical Assistant: Tariq Menjivar    Anesthesia: General    Estimated Blood Loss (mL): Minimal    Complications: None    Specimens:   * No specimens in log *    Implants:  Implant Name Type Inv. Item Serial No.  Lot No. LRB No. Used Action   CLIP LIG M KAILEY TI HRT SHP WIRE HORZ 6 CLIPS PER PK - [id number]  CLIP LIG M KAILEY TI HRT SHP WIRE HORZ 6 CLIPS PER PK  [employer] [id number] Left 1 Implanted   CLIP INT WECK SM WIDE RED TI TRNSVRS GRV CHEVRON SHP W/ PERCIS TIP 6 PER PK - [id number]  CLIP INT WECK SM WIDE RED TI TRNSVRS GRV CHEVRON SHP W/ PERCIS TIP 6 PER PK  netFactor [id number] Left 1 Implanted   GRAFT VASC L45CM DIA4-6MM UNIV PTFE STR STD WALL TAPR - [id number] Vascular grafts GRAFT VASC L45CM DIA4-6MM UNIV PTFE STR STD WALL TAPR [id number] [employer] [facility]  Left 1 Implanted   CLIP INT WECK SM WIDE RED TI TRNSVRS GRV CHEVRON SHP W/ PERCIS TIP 6 PER PK - [id number]  CLIP INT WECK SM WIDE RED TI TRNSVRS GRV CHEVRON SHP W/ PERCIS TIP 6 PER PK  netFactor [id number] Left 1 Implanted         Drains: * No LDAs found *    Findings:  Infection Present At Time Of Surgery (PATOS) (choose all levels that have infection present):  No infection present  Other Findings: as above    Detailed Description of Procedure:   [facility]    Operative Note 5/20/2024    Megha Rojas  YOB: 1940  [id number]    Pre-procedure Diagnosis:  ESRD with need for permanent dialysis access    Post-procedure Diagnosis: Same    Procedure: left Brachial artery to basilic vein AV graft with 4X6mm Propaten graft        Anesthesia:

## 2024-05-23 PROBLEM — C18.9 COLON CANCER (HCC): Status: ACTIVE | Noted: 2021-03-07

## 2024-05-23 NOTE — PROGRESS NOTES
GENERAL CARDIOLOGY    REFERRING PROVIDER  Denice Reynolds MD     CHIEF COMPLAINT  Shared decision for Watchman    Indications for Watchman:  GI Bleed  Anemia       HPI    Megha Rojas is a 83 y.o. female presents to the clinic for Shared decision consultation for Watchman occlusion procedure.     Medical history reviewed, significant for PAF, CAD, dCHF, hypertension, SAHRA, history of resected colon cancer.  Most recently underwent fistula, 5/20/024, ESRD.    Today, the patient reports:  \"Feeling fine\", accompanied by granddaughter  Patient denies pre syncopal symptoms, syncope, melena, hematochezia, epistaxis, hematemesis  States 1.2 L of fluid removal yesterday at dialysis, typical for her  Reports not unusual to have low blood pressure    ASSESSMENT AND PLAN  PAF (paroxysmal atrial fibrillation) (HCC)  High risk [id number] score. Anticoagulation is recommended. Patient is high risk for stroke or systemic thromboembolism. Patient is a poor candidate for long term anticoagulation.  Agree with consideration for Watchman.    CAD (coronary artery disease)  S/p PCI to LAD 03/2021  Continue ASA, Coreg and Crestor  Risk factor modification     Diastolic dysfunction  EF 55-60% per echo 3/6/21  Stable  Continue medication management                  Essential hypertension  BP today is 91/52, first 2 today were 70/40, she states that her BP typically runs low  She is currently asymptomatic, denies blood loss, states typical fluid removal during dialysis yesterday  Asked patient to have stat CBC drawn today    Colon cancer (HCC)  S/p hemicolectomy 3/8/21               Follows with oncology     SAHRA (obstructive sleep apnea)  Continue treatment    ESRD (end stage renal disease) on dialysis (HCC)  Had Left arm graft AV 5/20/2024([facility])    Low blood pressure reading  Low reading today in office  Asymptomatic, denies blood loss  History of anemia, GIB, advised stat CBC      Megha Rojas has been evaluated for left

## 2024-05-23 NOTE — ASSESSMENT & PLAN NOTE
High risk [id number] score. Anticoagulation is recommended. Patient is high risk for stroke or systemic thromboembolism. Patient is a poor candidate for long term anticoagulation.  Agree with consideration for Watchman.

## 2024-05-23 NOTE — ASSESSMENT & PLAN NOTE
BP today is 91/52, first 2 today were 70/40, she states that her BP typically runs low  She is currently asymptomatic, denies blood loss, states typical fluid removal during dialysis yesterday  Asked patient to have stat CBC drawn today

## 2024-05-24 ENCOUNTER — OFFICE VISIT (OUTPATIENT)
Dept: CARDIOLOGY CLINIC | Age: 84
End: 2024-05-24
Payer: MEDICARE

## 2024-05-24 ENCOUNTER — HOSPITAL ENCOUNTER (OUTPATIENT)
Age: 84
Discharge: HOME | End: 2024-05-24
Payer: MEDICARE

## 2024-05-24 VITALS
DIASTOLIC BLOOD PRESSURE: 52 MMHG | BODY MASS INDEX: 39.11 KG/M2 | SYSTOLIC BLOOD PRESSURE: 91 MMHG | WEIGHT: 199.2 LBS | HEIGHT: 60 IN | HEART RATE: 69 BPM | OXYGEN SATURATION: 95 %

## 2024-05-24 DIAGNOSIS — I10 ESSENTIAL HYPERTENSION: Chronic | ICD-10-CM

## 2024-05-24 DIAGNOSIS — I25.10 CORONARY ARTERY DISEASE INVOLVING NATIVE CORONARY ARTERY OF NATIVE HEART WITHOUT ANGINA PECTORIS: Chronic | ICD-10-CM

## 2024-05-24 DIAGNOSIS — N18.6 ESRD (END STAGE RENAL DISEASE) ON DIALYSIS (HCC): ICD-10-CM

## 2024-05-24 DIAGNOSIS — G47.33 OSA (OBSTRUCTIVE SLEEP APNEA): ICD-10-CM

## 2024-05-24 DIAGNOSIS — C18.9 MALIGNANT NEOPLASM OF COLON, UNSPECIFIED PART OF COLON (HCC): ICD-10-CM

## 2024-05-24 DIAGNOSIS — Z99.2 ESRD (END STAGE RENAL DISEASE) ON DIALYSIS (HCC): ICD-10-CM

## 2024-05-24 DIAGNOSIS — I51.89 DIASTOLIC DYSFUNCTION: Chronic | ICD-10-CM

## 2024-05-24 DIAGNOSIS — I48.0 PAF (PAROXYSMAL ATRIAL FIBRILLATION) (HCC): Primary | Chronic | ICD-10-CM

## 2024-05-24 DIAGNOSIS — R03.1 LOW BLOOD PRESSURE READING: ICD-10-CM

## 2024-05-24 LAB
BASOPHILS # BLD: 0.1 K/UL (ref 0–0.2)
BASOPHILS NFR BLD: 1 %
DEPRECATED RDW RBC AUTO: 19.5 % (ref 12.4–15.4)
EOSINOPHIL # BLD: 0.3 K/UL (ref 0–0.6)
EOSINOPHIL NFR BLD: 3.8 %
HCT VFR BLD AUTO: 41.6 % (ref 36–48)
HGB BLD-MCNC: 12.8 G/DL (ref 12–16)
LYMPHOCYTES # BLD: 1.8 K/UL (ref 1–5.1)
LYMPHOCYTES NFR BLD: 21.1 %
MCH RBC QN AUTO: 25.3 PG (ref 26–34)
MCHC RBC AUTO-ENTMCNC: 30.7 G/DL (ref 31–36)
MCV RBC AUTO: 82.4 FL (ref 80–100)
MONOCYTES # BLD: 1.1 K/UL (ref 0–1.3)
MONOCYTES NFR BLD: 12.7 %
NEUTROPHILS # BLD: 5.2 K/UL (ref 1.7–7.7)
NEUTROPHILS NFR BLD: 61.4 %
PLATELET # BLD AUTO: 177 K/UL (ref 135–450)
PMV BLD AUTO: 8.8 FL (ref 5–10.5)
RBC # BLD AUTO: 5.05 M/UL (ref 4–5.2)
WBC # BLD AUTO: 8.5 K/UL (ref 4–11)

## 2024-05-24 PROCEDURE — 3074F SYST BP LT 130 MM HG: CPT | Performed by: INTERNAL MEDICINE

## 2024-05-24 PROCEDURE — 1123F ACP DISCUSS/DSCN MKR DOCD: CPT | Performed by: INTERNAL MEDICINE

## 2024-05-24 PROCEDURE — 36415 COLL VENOUS BLD VENIPUNCTURE: CPT

## 2024-05-24 PROCEDURE — 85025 COMPLETE CBC W/AUTO DIFF WBC: CPT

## 2024-05-24 PROCEDURE — 3078F DIAST BP <80 MM HG: CPT | Performed by: INTERNAL MEDICINE

## 2024-05-24 PROCEDURE — 99214 OFFICE O/P EST MOD 30 MIN: CPT | Performed by: INTERNAL MEDICINE

## 2024-05-24 NOTE — ASSESSMENT & PLAN NOTE
Low reading today in office  Asymptomatic, denies blood loss  History of anemia, GIB, advised stat CBC

## 2024-05-24 NOTE — RESULT ENCOUNTER NOTE
I called Carlene, I spoke with her daughter , I informed her of Carlene's repeat CBC results from today, she verbalized understanding and she will report to Carlene.

## 2024-05-30 NOTE — TELEPHONE ENCOUNTER
Called to schedule the Watchman procedure date but patient was at Dialysis was asked to call back on Monday 6/3/2024 to discuss.

## 2024-06-11 NOTE — TELEPHONE ENCOUNTER
I called and left message at both numbers available to see if patient is interested in scheduling her Watchman procedure.

## 2024-06-11 NOTE — PROGRESS NOTES
[facility]     Referring Provider:  Denice Reynolds MD     Nephrologist:  Mahsa/COURTNEY    Office Follow Up after Dialysis Access    Subjective: Patient is status post left brachial artery basilic vein AV graft on May 20, 2024 and presents today for her first postoperative visit.  She is doing well without any significant complaints.  Has some mild intermittent numbness in her left fifth digit but states it is nearly completely resolved.      Objective:  There were no vitals taken for this visit.  Incision: Clean and dry  Good thrill and bruit no significant edema  Hand warm with palpable radial pulse    Assessment:  ESRD s/p left brachial artery basilic vein AV graft      Plan:  Clinically patent left upper arm AV graft.  Okay to begin cannulation.  Happy to see back as needed.  Sincerely appreciate the referral to participate in her care.

## 2024-06-12 RX ORDER — CHLORHEXIDINE GLUCONATE 40 MG/ML
SOLUTION TOPICAL ONCE
Qty: 1 EACH | Refills: 0 | Status: SHIPPED | OUTPATIENT
Start: 2024-06-12 | End: 2024-06-12

## 2024-06-12 NOTE — TELEPHONE ENCOUNTER
Spoke with patient and informed her of the procedure that is being scheduled for 7/1/2024 9:30 AM arrival.  Lab work has been ordered and instructed to have done at [facility] 6/24/2024.  Also informed to get Hibiclens bath at their Pharmacy.  All procedure instructions were e-mailed to patient.  Instructed to contact me with any questions prior to procedure.

## 2024-06-12 NOTE — TELEPHONE ENCOUNTER
Carson Kaur 3/27/2024 referred for Watchman procedure.  Dr. Fox consulted on 5/1/2024.  Dr. Mindy Gutierrez shared decision on 5/24/2024.  EDWIN to be done the day of the procedure.  Insurance to be approved per Ruth Duran

## 2024-06-18 ENCOUNTER — OFFICE VISIT (OUTPATIENT)
Dept: VASCULAR SURGERY | Age: 84
End: 2024-06-18

## 2024-06-18 VITALS
WEIGHT: 200 LBS | HEIGHT: 61 IN | SYSTOLIC BLOOD PRESSURE: 128 MMHG | BODY MASS INDEX: 37.76 KG/M2 | DIASTOLIC BLOOD PRESSURE: 82 MMHG

## 2024-06-18 DIAGNOSIS — N18.6 END STAGE RENAL DISEASE (HCC): Primary | ICD-10-CM

## 2024-06-18 PROCEDURE — 99024 POSTOP FOLLOW-UP VISIT: CPT | Performed by: SURGERY

## 2024-06-25 ENCOUNTER — HOSPITAL ENCOUNTER (OUTPATIENT)
Age: 84
Discharge: HOME | End: 2024-06-25
Payer: MEDICARE

## 2024-06-25 ENCOUNTER — TELEPHONE (OUTPATIENT)
Dept: CARDIOLOGY CLINIC | Age: 84
End: 2024-06-25

## 2024-06-25 DIAGNOSIS — Z01.818 PRE-OP TESTING: ICD-10-CM

## 2024-06-25 DIAGNOSIS — I48.0 PAROXYSMAL A-FIB (HCC): ICD-10-CM

## 2024-06-25 LAB
ABO + RH BLD: NORMAL
ANION GAP SERPL CALCULATED.3IONS-SCNC: 14 MMOL/L (ref 3–16)
BACTERIA URNS QL MICRO: ABNORMAL /HPF
BILIRUB UR QL STRIP.AUTO: NEGATIVE
BLD GP AB SCN SERPL QL: NORMAL
BUN SERPL-MCNC: 38 MG/DL (ref 7–20)
CALCIUM SERPL-MCNC: 9.1 MG/DL (ref 8.3–10.6)
CHLORIDE SERPL-SCNC: 104 MMOL/L (ref 99–110)
CLARITY UR: CLEAR
CO2 SERPL-SCNC: 24 MMOL/L (ref 21–32)
COLOR UR: YELLOW
CREAT SERPL-MCNC: 6.4 MG/DL (ref 0.6–1.2)
DEPRECATED RDW RBC AUTO: 20.9 % (ref 12.4–15.4)
EPI CELLS #/AREA URNS AUTO: 8 /HPF (ref 0–5)
GFR SERPLBLD CREATININE-BSD FMLA CKD-EPI: 6 ML/MIN/{1.73_M2}
GLUCOSE SERPL-MCNC: 85 MG/DL (ref 70–99)
GLUCOSE UR STRIP.AUTO-MCNC: NEGATIVE MG/DL
HCT VFR BLD AUTO: 41.1 % (ref 36–48)
HGB BLD-MCNC: 12.4 G/DL (ref 12–16)
HGB UR QL STRIP.AUTO: ABNORMAL
HYALINE CASTS #/AREA URNS AUTO: 2 /LPF (ref 0–8)
INR PPP: 1.13 (ref 0.85–1.15)
KETONES UR STRIP.AUTO-MCNC: NEGATIVE MG/DL
LEUKOCYTE ESTERASE UR QL STRIP.AUTO: ABNORMAL
MCH RBC QN AUTO: 25.1 PG (ref 26–34)
MCHC RBC AUTO-ENTMCNC: 30.2 G/DL (ref 31–36)
MCV RBC AUTO: 83.1 FL (ref 80–100)
NITRITE UR QL STRIP.AUTO: NEGATIVE
PH UR STRIP.AUTO: 8 [PH] (ref 5–8)
PLATELET # BLD AUTO: 164 K/UL (ref 135–450)
PMV BLD AUTO: 8.4 FL (ref 5–10.5)
POTASSIUM SERPL-SCNC: 5.1 MMOL/L (ref 3.5–5.1)
PROT UR STRIP.AUTO-MCNC: 300 MG/DL
PROTHROMBIN TIME: 14.7 SEC (ref 11.9–14.9)
RBC # BLD AUTO: 4.95 M/UL (ref 4–5.2)
RBC CLUMPS #/AREA URNS AUTO: 315 /HPF (ref 0–4)
SODIUM SERPL-SCNC: 142 MMOL/L (ref 136–145)
SP GR UR STRIP.AUTO: 1.01 (ref 1–1.03)
UA DIPSTICK W REFLEX MICRO PNL UR: YES
URN SPEC COLLECT METH UR: ABNORMAL
UROBILINOGEN UR STRIP-ACNC: 1 E.U./DL
WBC # BLD AUTO: 8.4 K/UL (ref 4–11)
WBC #/AREA URNS AUTO: 27 /HPF (ref 0–5)

## 2024-06-25 PROCEDURE — 81001 URINALYSIS AUTO W/SCOPE: CPT

## 2024-06-25 PROCEDURE — 86900 BLOOD TYPING SEROLOGIC ABO: CPT

## 2024-06-25 PROCEDURE — 85610 PROTHROMBIN TIME: CPT

## 2024-06-25 PROCEDURE — 80048 BASIC METABOLIC PNL TOTAL CA: CPT

## 2024-06-25 PROCEDURE — 36415 COLL VENOUS BLD VENIPUNCTURE: CPT

## 2024-06-25 PROCEDURE — 86901 BLOOD TYPING SEROLOGIC RH(D): CPT

## 2024-06-25 PROCEDURE — 85027 COMPLETE CBC AUTOMATED: CPT

## 2024-06-25 PROCEDURE — 86850 RBC ANTIBODY SCREEN: CPT

## 2024-06-25 NOTE — TELEPHONE ENCOUNTER
Nette from [facility] phoned with a critical lab for patient.  The Creatine is 6.42

## 2024-06-28 RX ORDER — APIXABAN 5 MG/1
5 TABLET, FILM COATED ORAL 2 TIMES DAILY
Qty: 60 TABLET | Refills: 0 | Status: SHIPPED | OUTPATIENT
Start: 2024-06-28

## 2024-06-28 RX ORDER — CIPROFLOXACIN 250 MG/1
250 TABLET, FILM COATED ORAL 2 TIMES DAILY
Qty: 10 TABLET | Refills: 0 | Status: SHIPPED | OUTPATIENT
Start: 2024-06-28 | End: 2024-07-03

## 2024-06-28 NOTE — TELEPHONE ENCOUNTER
Watchman 7/1/2024     9:30 AM Megha Rojas 1940 [place] PATO    Spoke with patient and went over all instructions.     Creatinine 6.4 patient on Dialysis has on Tuesday. Thursday and Saturdays  Glucose 85  Hgb 12.4  Plt 164  U/A UTI started on an antibiotic. Cipro   PT/INR 14.7 / 1.13    CHADSvasc is at least 5 (Age, HTN, Female, CAD, CHF)   HASbled is at least 5    Currently on Eliquis and BASA. She is a poor candidate for chronic anticoagulation with her history of GIB and anemia.     Carson Kaur 3/27/2024 referred for Watchman procedure.  Dr. Fox consulted on 5/1/2024.  Dr. Mindy Gutierrez shared decision on 5/24/2024.  EDWIN to be done the day of the procedure.  Insurance to be approved per Ruth Duran    Call patient on Friday to review medication/problems. YES  UTI (Antibiotic taken)? UTI placed on Cipro.  Any groin issues? look for any type of rash, open skin, etc NO  On any blood thinners & when to stop taking? Take Eliquis on 6/29/2024 then hold medication, Take BASA the day of the procedure.  Lab work addressed prior to admission. YES  Allergies addressed? Yes  Pre-medication necessary? NO  Pt. staying overnight? NO  PT/INR, T&C & Glucose ordered for day of procedure on every patient. YES  \"Electrophysiology Testing\" order placed on every patient? YES

## 2024-06-28 NOTE — TELEPHONE ENCOUNTER
Requested Prescriptions     Pending Prescriptions Disp Refills    ELIQUIS 5 MG TABS tablet [Pharmacy Med Name: Eliquis 5 MG Oral Tablet] 180 tablet 0     Sig: Take 1 tablet by mouth twice daily      [id number]     Last OV:  5/24/2024 VNZ    Next OV: 11/7/2024 DCE    Last Labs: 06/25/2024 CMP    Last Filled: 03/27/2024 NPSR

## 2024-06-28 NOTE — TELEPHONE ENCOUNTER
Patient is aware of the 2.5 mg dose of eliquis and has been cutting her tablets in half as it is cheaper to do that. The 2.5 mg tablets are significantly more expensive for her

## 2024-07-01 ENCOUNTER — ANESTHESIA (OUTPATIENT)
Age: 84
DRG: 274 | End: 2024-07-01
Payer: MEDICARE

## 2024-07-01 ENCOUNTER — APPOINTMENT (OUTPATIENT)
Age: 84
DRG: 274 | End: 2024-07-01
Attending: INTERNAL MEDICINE
Payer: MEDICARE

## 2024-07-01 ENCOUNTER — HOSPITAL ENCOUNTER (INPATIENT)
Age: 84
LOS: 1 days | Discharge: HOME | DRG: 274 | End: 2024-07-01
Attending: INTERNAL MEDICINE | Admitting: INTERNAL MEDICINE
Payer: MEDICARE

## 2024-07-01 ENCOUNTER — ANESTHESIA EVENT (OUTPATIENT)
Age: 84
DRG: 274 | End: 2024-07-01
Payer: MEDICARE

## 2024-07-01 VITALS
HEIGHT: 61 IN | WEIGHT: 200 LBS | RESPIRATION RATE: 15 BRPM | OXYGEN SATURATION: 96 % | HEART RATE: 76 BPM | SYSTOLIC BLOOD PRESSURE: 136 MMHG | DIASTOLIC BLOOD PRESSURE: 39 MMHG | TEMPERATURE: 98 F | BODY MASS INDEX: 37.76 KG/M2

## 2024-07-01 DIAGNOSIS — I48.0 PAROXYSMAL A-FIB (HCC): ICD-10-CM

## 2024-07-01 DIAGNOSIS — I48.0 PAROXYSMAL ATRIAL FIBRILLATION (HCC): ICD-10-CM

## 2024-07-01 DIAGNOSIS — Z01.818 PRE-OP TESTING: ICD-10-CM

## 2024-07-01 PROBLEM — I48.19 PERSISTENT ATRIAL FIBRILLATION (HCC): Status: ACTIVE | Noted: 2024-07-01

## 2024-07-01 LAB
ABO + RH BLD: NORMAL
BLD GP AB SCN SERPL QL: NORMAL
ECHO BSA: 1.98 M2
ECHO BSA: 1.98 M2
EKG ATRIAL RATE: 76 BPM
EKG DIAGNOSIS: NORMAL
EKG P AXIS: 57 DEGREES
EKG P-R INTERVAL: 234 MS
EKG Q-T INTERVAL: 416 MS
EKG QRS DURATION: 148 MS
EKG QTC CALCULATION (BAZETT): 468 MS
EKG R AXIS: -24 DEGREES
EKG T AXIS: 110 DEGREES
EKG VENTRICULAR RATE: 76 BPM
POC ACT LR: 311 SEC

## 2024-07-01 PROCEDURE — 7100000001 HC PACU RECOVERY - ADDTL 15 MIN: Performed by: INTERNAL MEDICINE

## 2024-07-01 PROCEDURE — 6360000002 HC RX W HCPCS: Performed by: NURSE ANESTHETIST, CERTIFIED REGISTERED

## 2024-07-01 PROCEDURE — 02L73DK OCCLUSION OF LEFT ATRIAL APPENDAGE WITH INTRALUMINAL DEVICE, PERCUTANEOUS APPROACH: ICD-10-PCS | Performed by: INTERNAL MEDICINE

## 2024-07-01 PROCEDURE — 86900 BLOOD TYPING SEROLOGIC ABO: CPT

## 2024-07-01 PROCEDURE — 6360000002 HC RX W HCPCS: Performed by: INTERNAL MEDICINE

## 2024-07-01 PROCEDURE — 86901 BLOOD TYPING SEROLOGIC RH(D): CPT

## 2024-07-01 PROCEDURE — 3700000000 HC ANESTHESIA ATTENDED CARE: Performed by: INTERNAL MEDICINE

## 2024-07-01 PROCEDURE — 6370000000 HC RX 637 (ALT 250 FOR IP): Performed by: NURSE ANESTHETIST, CERTIFIED REGISTERED

## 2024-07-01 PROCEDURE — 33340 PERQ CLSR TCAT L ATR APNDGE: CPT

## 2024-07-01 PROCEDURE — 93355 ECHO TRANSESOPHAGEAL (TEE): CPT

## 2024-07-01 PROCEDURE — 33340 PERQ CLSR TCAT L ATR APNDGE: CPT | Performed by: INTERNAL MEDICINE

## 2024-07-01 PROCEDURE — 6360000004 HC RX CONTRAST MEDICATION: Performed by: INTERNAL MEDICINE

## 2024-07-01 PROCEDURE — 93005 ELECTROCARDIOGRAM TRACING: CPT

## 2024-07-01 PROCEDURE — APPNB45 APP NON BILLABLE 31-45 MINUTES: Performed by: NURSE PRACTITIONER

## 2024-07-01 PROCEDURE — 3700000001 HC ADD 15 MINUTES (ANESTHESIA): Performed by: INTERNAL MEDICINE

## 2024-07-01 PROCEDURE — 2500000003 HC RX 250 WO HCPCS: Performed by: NURSE ANESTHETIST, CERTIFIED REGISTERED

## 2024-07-01 PROCEDURE — C1769 GUIDE WIRE: HCPCS | Performed by: INTERNAL MEDICINE

## 2024-07-01 PROCEDURE — 2580000003 HC RX 258: Performed by: INTERNAL MEDICINE

## 2024-07-01 PROCEDURE — 85347 COAGULATION TIME ACTIVATED: CPT

## 2024-07-01 PROCEDURE — 1200000000 HC SEMI PRIVATE

## 2024-07-01 PROCEDURE — 6360000002 HC RX W HCPCS

## 2024-07-01 PROCEDURE — 7100000011 HC PHASE II RECOVERY - ADDTL 15 MIN: Performed by: INTERNAL MEDICINE

## 2024-07-01 PROCEDURE — 36415 COLL VENOUS BLD VENIPUNCTURE: CPT

## 2024-07-01 PROCEDURE — 7100000000 HC PACU RECOVERY - FIRST 15 MIN: Performed by: INTERNAL MEDICINE

## 2024-07-01 PROCEDURE — 86850 RBC ANTIBODY SCREEN: CPT

## 2024-07-01 PROCEDURE — 2709999900 HC NON-CHARGEABLE SUPPLY: Performed by: INTERNAL MEDICINE

## 2024-07-01 PROCEDURE — C1894 INTRO/SHEATH, NON-LASER: HCPCS | Performed by: INTERNAL MEDICINE

## 2024-07-01 PROCEDURE — C1889 IMPLANT/INSERT DEVICE, NOC: HCPCS | Performed by: INTERNAL MEDICINE

## 2024-07-01 PROCEDURE — 2500000003 HC RX 250 WO HCPCS

## 2024-07-01 PROCEDURE — 2580000003 HC RX 258

## 2024-07-01 PROCEDURE — B24BZZ4 ULTRASONOGRAPHY OF HEART WITH AORTA, TRANSESOPHAGEAL: ICD-10-PCS | Performed by: INTERNAL MEDICINE

## 2024-07-01 PROCEDURE — 7100000010 HC PHASE II RECOVERY - FIRST 15 MIN: Performed by: INTERNAL MEDICINE

## 2024-07-01 PROCEDURE — 2500000003 HC RX 250 WO HCPCS: Performed by: INTERNAL MEDICINE

## 2024-07-01 PROCEDURE — C1760 CLOSURE DEV, VASC: HCPCS | Performed by: INTERNAL MEDICINE

## 2024-07-01 DEVICE — LEFT ATRIAL APPENDAGE CLOSURE DEVICE WITH DELIVERY SYSTEM
Type: IMPLANTABLE DEVICE | Site: HEART | Status: FUNCTIONAL
Brand: WATCHMAN FLX™ PRO

## 2024-07-01 RX ORDER — OXYCODONE HYDROCHLORIDE 5 MG/1
5 TABLET ORAL
Status: DISCONTINUED | OUTPATIENT
Start: 2024-07-01 | End: 2024-07-01 | Stop reason: HOSPADM

## 2024-07-01 RX ORDER — DEXAMETHASONE SODIUM PHOSPHATE 4 MG/ML
INJECTION, SOLUTION INTRA-ARTICULAR; INTRALESIONAL; INTRAMUSCULAR; INTRAVENOUS; SOFT TISSUE PRN
Status: DISCONTINUED | OUTPATIENT
Start: 2024-07-01 | End: 2024-07-01 | Stop reason: SDUPTHER

## 2024-07-01 RX ORDER — PROPOFOL 10 MG/ML
INJECTION, EMULSION INTRAVENOUS PRN
Status: DISCONTINUED | OUTPATIENT
Start: 2024-07-01 | End: 2024-07-01 | Stop reason: SDUPTHER

## 2024-07-01 RX ORDER — SODIUM CHLORIDE 0.9 % (FLUSH) 0.9 %
5-40 SYRINGE (ML) INJECTION EVERY 12 HOURS SCHEDULED
Status: DISCONTINUED | OUTPATIENT
Start: 2024-07-01 | End: 2024-07-01 | Stop reason: HOSPADM

## 2024-07-01 RX ORDER — ONDANSETRON 2 MG/ML
4 INJECTION INTRAMUSCULAR; INTRAVENOUS
Status: DISCONTINUED | OUTPATIENT
Start: 2024-07-01 | End: 2024-07-01 | Stop reason: HOSPADM

## 2024-07-01 RX ORDER — HEPARIN SODIUM 1000 [USP'U]/ML
INJECTION, SOLUTION INTRAVENOUS; SUBCUTANEOUS PRN
Status: DISCONTINUED | OUTPATIENT
Start: 2024-07-01 | End: 2024-07-01 | Stop reason: SDUPTHER

## 2024-07-01 RX ORDER — PROTAMINE SULFATE 10 MG/ML
INJECTION, SOLUTION INTRAVENOUS PRN
Status: DISCONTINUED | OUTPATIENT
Start: 2024-07-01 | End: 2024-07-01 | Stop reason: SDUPTHER

## 2024-07-01 RX ORDER — NALOXONE HYDROCHLORIDE 0.4 MG/ML
INJECTION, SOLUTION INTRAMUSCULAR; INTRAVENOUS; SUBCUTANEOUS PRN
Status: DISCONTINUED | OUTPATIENT
Start: 2024-07-01 | End: 2024-07-01 | Stop reason: HOSPADM

## 2024-07-01 RX ORDER — ACETAMINOPHEN 325 MG/1
650 TABLET ORAL EVERY 4 HOURS PRN
Status: DISCONTINUED | OUTPATIENT
Start: 2024-07-01 | End: 2024-07-01 | Stop reason: HOSPADM

## 2024-07-01 RX ORDER — FENTANYL CITRATE 50 UG/ML
INJECTION, SOLUTION INTRAMUSCULAR; INTRAVENOUS PRN
Status: DISCONTINUED | OUTPATIENT
Start: 2024-07-01 | End: 2024-07-01 | Stop reason: SDUPTHER

## 2024-07-01 RX ORDER — SODIUM CHLORIDE 0.9 % (FLUSH) 0.9 %
5-40 SYRINGE (ML) INJECTION PRN
Status: DISCONTINUED | OUTPATIENT
Start: 2024-07-01 | End: 2024-07-01 | Stop reason: HOSPADM

## 2024-07-01 RX ORDER — ROCURONIUM BROMIDE 10 MG/ML
INJECTION, SOLUTION INTRAVENOUS PRN
Status: DISCONTINUED | OUTPATIENT
Start: 2024-07-01 | End: 2024-07-01 | Stop reason: SDUPTHER

## 2024-07-01 RX ORDER — SODIUM CHLORIDE 9 MG/ML
INJECTION, SOLUTION INTRAVENOUS PRN
Status: DISCONTINUED | OUTPATIENT
Start: 2024-07-01 | End: 2024-07-01 | Stop reason: HOSPADM

## 2024-07-01 RX ORDER — LIDOCAINE HYDROCHLORIDE 10 MG/ML
INJECTION, SOLUTION INFILTRATION; PERINEURAL PRN
Status: DISCONTINUED | OUTPATIENT
Start: 2024-07-01 | End: 2024-07-01 | Stop reason: HOSPADM

## 2024-07-01 RX ORDER — LIDOCAINE HYDROCHLORIDE 40 MG/ML
SOLUTION TOPICAL PRN
Status: DISCONTINUED | OUTPATIENT
Start: 2024-07-01 | End: 2024-07-01 | Stop reason: SDUPTHER

## 2024-07-01 RX ORDER — SODIUM CHLORIDE, SODIUM LACTATE, POTASSIUM CHLORIDE, CALCIUM CHLORIDE 600; 310; 30; 20 MG/100ML; MG/100ML; MG/100ML; MG/100ML
INJECTION, SOLUTION INTRAVENOUS CONTINUOUS
Status: DISCONTINUED | OUTPATIENT
Start: 2024-07-01 | End: 2024-07-01 | Stop reason: HOSPADM

## 2024-07-01 RX ORDER — HYDROMORPHONE HYDROCHLORIDE 2 MG/ML
0.5 INJECTION, SOLUTION INTRAMUSCULAR; INTRAVENOUS; SUBCUTANEOUS EVERY 5 MIN PRN
Status: DISCONTINUED | OUTPATIENT
Start: 2024-07-01 | End: 2024-07-01 | Stop reason: HOSPADM

## 2024-07-01 RX ORDER — ONDANSETRON 2 MG/ML
INJECTION INTRAMUSCULAR; INTRAVENOUS PRN
Status: DISCONTINUED | OUTPATIENT
Start: 2024-07-01 | End: 2024-07-01 | Stop reason: SDUPTHER

## 2024-07-01 RX ORDER — FENTANYL CITRATE 50 UG/ML
50 INJECTION, SOLUTION INTRAMUSCULAR; INTRAVENOUS EVERY 5 MIN PRN
Status: DISCONTINUED | OUTPATIENT
Start: 2024-07-01 | End: 2024-07-01 | Stop reason: HOSPADM

## 2024-07-01 RX ORDER — PHENYLEPHRINE HCL IN 0.9% NACL 1 MG/10 ML
SYRINGE (ML) INTRAVENOUS PRN
Status: DISCONTINUED | OUTPATIENT
Start: 2024-07-01 | End: 2024-07-01 | Stop reason: SDUPTHER

## 2024-07-01 RX ORDER — LIDOCAINE HYDROCHLORIDE 20 MG/ML
INJECTION, SOLUTION EPIDURAL; INFILTRATION; INTRACAUDAL; PERINEURAL PRN
Status: DISCONTINUED | OUTPATIENT
Start: 2024-07-01 | End: 2024-07-01 | Stop reason: SDUPTHER

## 2024-07-01 RX ORDER — ACETAMINOPHEN 325 MG/1
650 TABLET ORAL ONCE
Status: DISCONTINUED | OUTPATIENT
Start: 2024-07-01 | End: 2024-07-01 | Stop reason: HOSPADM

## 2024-07-01 RX ORDER — SODIUM CHLORIDE 9 MG/ML
INJECTION, SOLUTION INTRAVENOUS CONTINUOUS
Status: DISCONTINUED | OUTPATIENT
Start: 2024-07-01 | End: 2024-07-01 | Stop reason: HOSPADM

## 2024-07-01 RX ADMIN — SUGAMMADEX 200 MG: 100 INJECTION, SOLUTION INTRAVENOUS at 12:16

## 2024-07-01 RX ADMIN — LIDOCAINE HYDROCHLORIDE 4 ML: 40 SOLUTION TOPICAL at 11:38

## 2024-07-01 RX ADMIN — HEPARIN SODIUM 7000 UNITS: 1000 INJECTION INTRAVENOUS; SUBCUTANEOUS at 11:53

## 2024-07-01 RX ADMIN — DEXAMETHASONE SODIUM PHOSPHATE 4 MG: 4 INJECTION, SOLUTION INTRAMUSCULAR; INTRAVENOUS at 11:42

## 2024-07-01 RX ADMIN — Medication 100 MCG: at 12:17

## 2024-07-01 RX ADMIN — LIDOCAINE HYDROCHLORIDE 50 MG: 20 INJECTION, SOLUTION EPIDURAL; INFILTRATION; INTRACAUDAL; PERINEURAL at 11:36

## 2024-07-01 RX ADMIN — FENTANYL CITRATE 25 MCG: 50 INJECTION, SOLUTION INTRAMUSCULAR; INTRAVENOUS at 11:59

## 2024-07-01 RX ADMIN — PROPOFOL 100 MG: 10 INJECTION, EMULSION INTRAVENOUS at 11:36

## 2024-07-01 RX ADMIN — ROCURONIUM BROMIDE 20 MG: 10 INJECTION, SOLUTION INTRAVENOUS at 11:57

## 2024-07-01 RX ADMIN — ROCURONIUM BROMIDE 50 MG: 10 INJECTION, SOLUTION INTRAVENOUS at 11:37

## 2024-07-01 RX ADMIN — Medication 100 MCG: at 12:04

## 2024-07-01 RX ADMIN — FENTANYL CITRATE 50 MCG: 50 INJECTION, SOLUTION INTRAMUSCULAR; INTRAVENOUS at 11:36

## 2024-07-01 RX ADMIN — Medication 100 MCG: at 11:51

## 2024-07-01 RX ADMIN — ONDANSETRON 4 MG: 2 INJECTION INTRAMUSCULAR; INTRAVENOUS at 11:42

## 2024-07-01 RX ADMIN — Medication 2000 MG: at 11:41

## 2024-07-01 RX ADMIN — PROTAMINE SULFATE 30 MG: 10 INJECTION, SOLUTION INTRAVENOUS at 12:15

## 2024-07-01 RX ADMIN — SODIUM CHLORIDE: 9 INJECTION, SOLUTION INTRAVENOUS at 11:29

## 2024-07-01 ASSESSMENT — PAIN - FUNCTIONAL ASSESSMENT: PAIN_FUNCTIONAL_ASSESSMENT: 0-10

## 2024-07-01 NOTE — CARE COORDINATION
07/01/24 1147   IMM Letter   IMM Letter given to Patient/Family/Significant other/Guardian/POA/by: CASE MANAGEMENT   IMM Letter date given: 07/01/24   IMM Letter time given: 0930

## 2024-07-01 NOTE — ANESTHESIA PRE PROCEDURE
Department of Anesthesiology  Preprocedure Note       Name:  Megha Rojas   Age:  83 y.o.  :  1940                                          MRN:  [id number]         Date:  2024      Surgeon: Surgeon(s):  Ruddy Tavarez MD Wananu, Moses, MD    Procedure: Procedure(s):  Watchman kary closure device    Medications prior to admission:   Prior to Admission medications    Medication Sig Start Date End Date Taking? Authorizing Provider   ciprofloxacin (CIPRO) 250 MG tablet Take 1 tablet by mouth 2 times daily for 5 days 6/28/24 7/3/24  Ruddy Tavarez MD   apixaban (ELIQUIS) 5 MG TABS tablet Take 1 tablet by mouth twice daily 24   Olayinka Kaur APRN - CNP   sevelamer (RENVELA) 800 MG tablet Take 1 tablet by mouth 3 times daily (with meals) 24   Zuri Abel MD   dilTIAZem (CARDIZEM CD) 120 MG extended release capsule Take 1 capsule by mouth daily 24   Britney Laura APRN - CNP   rosuvastatin (CRESTOR) 20 MG tablet Take 1 tablet by mouth nightly 24   Britney Laura APRN - CNP   carvedilol (COREG) 3.125 MG tablet TAKE 1 TABLET BY MOUTH TWICE DAILY WITH MEALS 23   Olayinka Kaur APRN - CNP   HYDROcodone-acetaminophen (NORCO) 5-325 MG per tablet Take 1 tablet by mouth 2 times daily. 22   Provider, MD Verena   aspirin EC 81 MG EC tablet Take 1 tablet by mouth daily 21   MalachiFlorencio palomares MD       Current medications:    Current Facility-Administered Medications   Medication Dose Route Frequency Provider Last Rate Last Admin    0.9 % sodium chloride infusion   IntraVENous Continuous Ruddy Tavarez MD        ceFAZolin (ANCEF) 2000 mg in 0.9% sodium chloride 50 mL IVPB  2,000 mg IntraVENous On Call to OR Ruddy Tavarez MD           Allergies:    Allergies   Allergen Reactions    Acetomenaphthone (Menadiol Diacetate) [Menadiol Sodium Diphosphate]      Upset stomach     Lisinopril-Hydrochlorothiazide Other (See Comments)     shaky    Advil [Ibuprofen] Nausea

## 2024-07-01 NOTE — ANESTHESIA POSTPROCEDURE EVALUATION
Department of Anesthesiology  Postprocedure Note    Patient: Megha Rojas  MRN: [id number]  YOB: 1940  Date of evaluation: 7/1/2024    Procedure Summary       Date: 07/01/24 Room / Location: [facility] EP LAB 5 / [facility] [place] CARDIAC CATH LAB    Anesthesia Start: 1129 Anesthesia Stop: 1234    Procedure: Watchman kary closure device Diagnosis:       Paroxysmal atrial fibrillation (HCC)      (paroxysmal atrial fibrillation  GI bleed)    Providers: Shaw Moncada MD Responsible Provider: Vanita Cazares MD    Anesthesia Type: general ASA Status: 3            Anesthesia Type: No value filed.    Stephon Phase I: Stephon Score: 10    Stephon Phase II:      Anesthesia Post Evaluation    Patient location during evaluation: PACU  Level of consciousness: awake  Airway patency: patent  Cardiovascular status: hemodynamically stable  Respiratory status: acceptable  There was medical reason for not using a multimodal analgesia pain management approach.    There were no known notable events for this encounter.

## 2024-07-01 NOTE — PROGRESS NOTES
Patient completed prescribed period of bedrest, dressing remains asymptomatic. Pt denies pain. Able to ambulate with walker, as she does at home, for ambulation trial. No pain reported and Right groin site remains asymptomatic, dressing is clean-dry-intact. We reviewed discharge instructions with patient and daughter, both verbalize understanding and deny any questions or concerns. Once dressed, taken to lobby via her personal wheelchair. No prescriptions associated with this visit.

## 2024-07-01 NOTE — PROGRESS NOTES
C/o inability in breath, coughing hard, 02 sat 100%, lungs clear and equal bilaterally, Dr. Cazares at bedside, pt. Reassured and relax

## 2024-07-01 NOTE — DISCHARGE SUMMARY
[facility]    DISCHARGE SUMMARY      Patient ID:  Megha Rojas  [id number] 83 y.o. 1940    Discharge date:  07/01/24    Admitting Physician: Shaw Moncada MD     Discharge NP: JIMENA Cortés - CNP    Admission Diagnoses: Paroxysmal atrial fibrillation (HCC) [I48.0]  Persistent atrial fibrillation (HCC) [I48.19]    Discharge Diagnoses:   Patient Active Problem List   Diagnosis    Essential hypertension    Anemia    CAD (coronary artery disease)    Hypercholesteremia    Diastolic dysfunction    Colon cancer (HCC)    PAF (paroxysmal atrial fibrillation) (HCC)    Colonic mass    Stage 3a chronic kidney disease (HCC)    Encounter for loop recorder check    SAHRA (obstructive sleep apnea)    LBBB (left bundle branch block)    1st degree AV block    ESRD (end stage renal disease) on dialysis (HCC)    Low blood pressure reading    Persistent atrial fibrillation (HCC)         Discharged Condition: good    Hospital Course: Megha Rojas is a 83 y.o. female patient with PMHx significant for PAF, CAD, dCHF, hypertension, SAHRA, history of resected colon cancer.  Most recently underwent fistula, 5/20/024, ESRD.     Indication of procedure:   Aatrial fibrillation, high thromboembolic risk.   High Risk for bleeding  Bleeding Episodes  (per H&P)    Patient presented today for Watchman Device implantation.     Impression     1. Atrial fibrillation (non-valvular) s/p successful percutaneous left atrial appendage occlusion    Recommendations     Treatment with antiplatelet and anti thrombotic therapy   2.   EDWIN at 45 days         -  Anticoagulation recommendations:   Anticoagulation with for 6 weeks with Eliquis 2.5mg BID (she takes 0.5 tab of 5mg) and aspirin EDWIN after 45 days.   If Watchman device is well seated with adequate seal and residual leak < 5 mm, then: start ASA 81 mg + Plavix for total of 6 months post implantation date.                After 6 months, discontinue Plavix and change to ASA

## 2024-07-01 NOTE — TELEPHONE ENCOUNTER
7/1/2024 s/p SUCCESSFUL WATCHMAN LAAC PROCEDURE PER DR. Ruddy Tavarez of left atrial appendage occlusion device with no complication, WATCHMAN device used 27 mm size.     --6-month f/u Post Watchman.  --1-year f/u Post Watchman.  --2-year f/u Post Watchman.    Danielle, Schedule at [facility] Ruddy  Post procedure EDWIN to be completed 45-59 days post procedure (8/15/2024-8/29/2024) Order placed.     Thanks.   Srikanth Steiner RN, BSN   Watchman Coordinator

## 2024-07-01 NOTE — DISCHARGE INSTRUCTIONS
Watchman Discharge Instruction    Care of your puncture site:  Remove bandage 24 hours after the procedure.  May shower in 24 hours but do not sit in a bathtub/pool of water for 5 days or until the wound is healed.  Gently clean groin using soap and water.  Dry thoroughly and apply a Band-Aid that covers the entire site. Use Band-Aid until skin heals over in about 3-5 days.   Do not apply powder or lotion.    Limit walking and stair climbing today.    Normal Observations:  Soreness or tenderness which may last one week.  Mild oozing from the incision site.  Possible bruising that could last 2 weeks.    Activity:  You may resume normal activity in 5 days or after the wound heals.  Avoid lifting more than 10 pounds for 5 days or until the wound heals.  Avoid strenuous exercise or activity for 1 week.  You may return to work in 1 week  without restrictions       Call your doctor immediately if your condition worsens, for any other concerns, for a follow-up appointment or if you experience any of the following:  Increased swelling on the groin or leg.  Unusual pain, numbness, or tingling of the groin or down the leg.  Any signs of infection such as: redness, yellow drainage at the site, swelling or pain.    IF GROIN STARTS BLEEDING SIGNIFICANTLY:   LAY FLAT, HOLD FIRM DIRECT PRESSURE, AND CALL 911     antibiotic prophylaxis (amoxicillin 2 g once 60 minutes prior to procedure) for 6 months for:  a.     Dental procedures including cleanings  b.     Gastrointestinal procedures  c.     Genitourinary procedures  d.     Respiratory procedures involving incision or biopsy  e.     Procedures on infected skin or muscle.     ANESTHESIA DISCHARGE INSTRUCTIONS    Wear your seatbelt home.  You are under the influence of drugs-do not drink alcohol, drive, operate machinery, make any important decisions or sign any legal documents for 24 hours.  Children should not ride bikes, skate boards or play on gym sets for 24 hours after

## 2024-07-01 NOTE — H&P
[facility]   Electrophysiology Follow up   Date: 7/1/2024    CC: AF   HPI: Megha Rojas is a 83 y.o. female with a past medical history of atrial fibrillation,HTN, HLD, CKD, PAF, dCHF, and CAD.     In March of 2021, pt presented to hospital by recommendation of her PCP for anemia. Her hemoglobin was 5.8 and was she received multiple units of blood. GI completed colonoscopy/EGD with no gross bleeding found, however, she was found to have a mass in her colon. She underwent surgical bowel resection. During admission, she developed AF with RVR and was started on amiodarone.     S/p ILR placement 12/13/2021 12/2023 Admitted with GIB and anemia. 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     Later admitted with worsening YESENIA and was started on dialysis     Megha was seen by Dr. Tavarez in clinic on 5/1/24 accompanied by her daughter. Denies any recurrence of GI bleeding.  Denies noticing when she is in atrial fibrillation. Ambulates via walker at home. Tolerating current medications well with no concerns at this time.     Assessment and plan:   -PAF, GI bleeding, Anemia      ECG today shows Sinus rhythm     High risk [id number] score. Anticoagulation is recommended.    Continue Coreg 3.125 mg BID and Diltiazem 120 mg QD for rate control    No longer on Amiodarone - stopped d/t lack of recurrence and pt concern for side effects    Had recurrent GIB and anemia 12/2023, poor candidate for long term OAC d/t anemia and AVM's      H/H 8.2/26.2 3/3/24   Noted 12/2023-requiring AVM's requiring APC and clip     Patient is high risk for stroke or systemic thromboembolism. Patient is a poor candidate for long term anticoagulation.     [id number] Score for Atrial Fibrillation Stroke Risk   Risk   Factors  Component Value   C CHF Yes 1   H HTN Yes 1   A2 Age >= 75 Yes,  (83 y.o.) 2   D DM No 0   S2 Prior Stroke/TIA No 0   V Vascular Disease No 0   A Age 65-74 No,  (83 y.o.) 0   Sc Sex female 1    [id number]  Score

## 2024-08-07 RX ORDER — SODIUM CHLORIDE 9 MG/ML
INJECTION, SOLUTION INTRAVENOUS PRN
OUTPATIENT
Start: 2024-08-07

## 2024-08-07 RX ORDER — SODIUM CHLORIDE 0.9 % (FLUSH) 0.9 %
5-40 SYRINGE (ML) INJECTION PRN
OUTPATIENT
Start: 2024-08-07

## 2024-08-07 RX ORDER — SODIUM CHLORIDE 0.9 % (FLUSH) 0.9 %
5-40 SYRINGE (ML) INJECTION EVERY 12 HOURS SCHEDULED
OUTPATIENT
Start: 2024-08-07

## 2024-08-28 ENCOUNTER — HOSPITAL ENCOUNTER (INPATIENT)
Age: 84
LOS: 2 days | Discharge: HOME | End: 2024-08-30
Attending: EMERGENCY MEDICINE | Admitting: STUDENT IN AN ORGANIZED HEALTH CARE EDUCATION/TRAINING PROGRAM
Payer: MEDICARE

## 2024-08-28 ENCOUNTER — APPOINTMENT (OUTPATIENT)
Dept: GENERAL RADIOLOGY | Age: 84
End: 2024-08-28
Payer: MEDICARE

## 2024-08-28 ENCOUNTER — HOSPITAL ENCOUNTER (OUTPATIENT)
Age: 84
Discharge: HOME | End: 2024-08-28
Attending: INTERNAL MEDICINE

## 2024-08-28 DIAGNOSIS — N18.6 ESRD ON HEMODIALYSIS (HCC): ICD-10-CM

## 2024-08-28 DIAGNOSIS — J18.9 HCAP (HEALTHCARE-ASSOCIATED PNEUMONIA): ICD-10-CM

## 2024-08-28 DIAGNOSIS — J96.01 ACUTE RESPIRATORY FAILURE WITH HYPOXIA (HCC): Primary | ICD-10-CM

## 2024-08-28 DIAGNOSIS — Z99.2 ESRD ON HEMODIALYSIS (HCC): ICD-10-CM

## 2024-08-28 PROBLEM — E87.8 DISORDERS OF FLUID, ELECTROLYTE, AND ACID-BASE BALANCE: Status: ACTIVE | Noted: 2024-08-28

## 2024-08-28 LAB
ALBUMIN SERPL-MCNC: 3.5 G/DL (ref 3.4–5)
ALBUMIN/GLOB SERPL: 1 {RATIO} (ref 1.1–2.2)
ALP SERPL-CCNC: 96 U/L (ref 40–129)
ALT SERPL-CCNC: 9 U/L (ref 10–40)
ANION GAP SERPL CALCULATED.3IONS-SCNC: 16 MMOL/L (ref 3–16)
AST SERPL-CCNC: 18 U/L (ref 15–37)
BACTERIA URNS QL MICRO: ABNORMAL /HPF
BASOPHILS # BLD: 0 K/UL (ref 0–0.2)
BASOPHILS NFR BLD: 0.3 %
BILIRUB SERPL-MCNC: 0.9 MG/DL (ref 0–1)
BILIRUB UR QL STRIP.AUTO: NEGATIVE
BUN SERPL-MCNC: 33 MG/DL (ref 7–20)
CALCIUM SERPL-MCNC: 9 MG/DL (ref 8.3–10.6)
CHLORIDE SERPL-SCNC: 94 MMOL/L (ref 99–110)
CLARITY UR: ABNORMAL
CO2 SERPL-SCNC: 29 MMOL/L (ref 21–32)
COLOR UR: YELLOW
CREAT SERPL-MCNC: 5.9 MG/DL (ref 0.6–1.2)
DEPRECATED RDW RBC AUTO: 20.3 % (ref 12.4–15.4)
EKG ATRIAL RATE: 84 BPM
EKG DIAGNOSIS: NORMAL
EKG P AXIS: -11 DEGREES
EKG P-R INTERVAL: 198 MS
EKG Q-T INTERVAL: 396 MS
EKG QRS DURATION: 142 MS
EKG QTC CALCULATION (BAZETT): 467 MS
EKG R AXIS: -25 DEGREES
EKG T AXIS: 149 DEGREES
EKG VENTRICULAR RATE: 84 BPM
EOSINOPHIL # BLD: 0.2 K/UL (ref 0–0.6)
EOSINOPHIL NFR BLD: 1.3 %
EPI CELLS #/AREA URNS HPF: ABNORMAL /HPF (ref 0–5)
FLUAV RNA RESP QL NAA+PROBE: NOT DETECTED
FLUBV RNA RESP QL NAA+PROBE: NOT DETECTED
GFR SERPLBLD CREATININE-BSD FMLA CKD-EPI: 7 ML/MIN/{1.73_M2}
GLUCOSE SERPL-MCNC: 107 MG/DL (ref 70–99)
GLUCOSE UR STRIP.AUTO-MCNC: NEGATIVE MG/DL
HCT VFR BLD AUTO: 34.9 % (ref 36–48)
HGB BLD-MCNC: 10.7 G/DL (ref 12–16)
HGB UR QL STRIP.AUTO: ABNORMAL
KETONES UR STRIP.AUTO-MCNC: ABNORMAL MG/DL
LACTATE BLDV-SCNC: 1.3 MMOL/L (ref 0.4–1.9)
LEUKOCYTE ESTERASE UR QL STRIP.AUTO: NEGATIVE
LYMPHOCYTES # BLD: 0.8 K/UL (ref 1–5.1)
LYMPHOCYTES NFR BLD: 6 %
MCH RBC QN AUTO: 25.2 PG (ref 26–34)
MCHC RBC AUTO-ENTMCNC: 30.7 G/DL (ref 31–36)
MCV RBC AUTO: 82.1 FL (ref 80–100)
MONOCYTES # BLD: 1.2 K/UL (ref 0–1.3)
MONOCYTES NFR BLD: 9.3 %
NEUTROPHILS # BLD: 10.8 K/UL (ref 1.7–7.7)
NEUTROPHILS NFR BLD: 83.1 %
NITRITE UR QL STRIP.AUTO: NEGATIVE
PH UR STRIP.AUTO: >=9 [PH] (ref 5–8)
PLATELET # BLD AUTO: 174 K/UL (ref 135–450)
PMV BLD AUTO: 8.3 FL (ref 5–10.5)
POTASSIUM SERPL-SCNC: 4.7 MMOL/L (ref 3.5–5.1)
PROCALCITONIN SERPL IA-MCNC: 0.82 NG/ML (ref 0–0.15)
PROT SERPL-MCNC: 7.1 G/DL (ref 6.4–8.2)
PROT UR STRIP.AUTO-MCNC: >=300 MG/DL
RBC # BLD AUTO: 4.25 M/UL (ref 4–5.2)
RBC #/AREA URNS HPF: >100 /HPF (ref 0–4)
SARS-COV-2 RNA RESP QL NAA+PROBE: NOT DETECTED
SODIUM SERPL-SCNC: 139 MMOL/L (ref 136–145)
SP GR UR STRIP.AUTO: 1.02 (ref 1–1.03)
TROPONIN, HIGH SENSITIVITY: 32 NG/L (ref 0–14)
TROPONIN, HIGH SENSITIVITY: 34 NG/L (ref 0–14)
UA COMPLETE W REFLEX CULTURE PNL UR: ABNORMAL
UA DIPSTICK W REFLEX MICRO PNL UR: YES
URN SPEC COLLECT METH UR: ABNORMAL
UROBILINOGEN UR STRIP-ACNC: 0.2 E.U./DL
WBC # BLD AUTO: 12.9 K/UL (ref 4–11)
WBC #/AREA URNS HPF: ABNORMAL /HPF (ref 0–5)

## 2024-08-28 PROCEDURE — 87449 NOS EACH ORGANISM AG IA: CPT

## 2024-08-28 PROCEDURE — 6360000002 HC RX W HCPCS: Performed by: STUDENT IN AN ORGANIZED HEALTH CARE EDUCATION/TRAINING PROGRAM

## 2024-08-28 PROCEDURE — 93005 ELECTROCARDIOGRAM TRACING: CPT | Performed by: EMERGENCY MEDICINE

## 2024-08-28 PROCEDURE — 1200000000 HC SEMI PRIVATE

## 2024-08-28 PROCEDURE — 87081 CULTURE SCREEN ONLY: CPT

## 2024-08-28 PROCEDURE — 96365 THER/PROPH/DIAG IV INF INIT: CPT

## 2024-08-28 PROCEDURE — 81001 URINALYSIS AUTO W/SCOPE: CPT

## 2024-08-28 PROCEDURE — 71045 X-RAY EXAM CHEST 1 VIEW: CPT

## 2024-08-28 PROCEDURE — 6370000000 HC RX 637 (ALT 250 FOR IP): Performed by: STUDENT IN AN ORGANIZED HEALTH CARE EDUCATION/TRAINING PROGRAM

## 2024-08-28 PROCEDURE — 99285 EMERGENCY DEPT VISIT HI MDM: CPT

## 2024-08-28 PROCEDURE — 2580000003 HC RX 258: Performed by: STUDENT IN AN ORGANIZED HEALTH CARE EDUCATION/TRAINING PROGRAM

## 2024-08-28 PROCEDURE — 87636 SARSCOV2 & INF A&B AMP PRB: CPT

## 2024-08-28 PROCEDURE — 2580000003 HC RX 258: Performed by: EMERGENCY MEDICINE

## 2024-08-28 PROCEDURE — 83605 ASSAY OF LACTIC ACID: CPT

## 2024-08-28 PROCEDURE — 6360000002 HC RX W HCPCS: Performed by: EMERGENCY MEDICINE

## 2024-08-28 PROCEDURE — 93010 ELECTROCARDIOGRAM REPORT: CPT | Performed by: INTERNAL MEDICINE

## 2024-08-28 PROCEDURE — 96375 TX/PRO/DX INJ NEW DRUG ADDON: CPT

## 2024-08-28 PROCEDURE — 84145 PROCALCITONIN (PCT): CPT

## 2024-08-28 PROCEDURE — 84484 ASSAY OF TROPONIN QUANT: CPT

## 2024-08-28 PROCEDURE — 87040 BLOOD CULTURE FOR BACTERIA: CPT

## 2024-08-28 PROCEDURE — 85025 COMPLETE CBC W/AUTO DIFF WBC: CPT

## 2024-08-28 PROCEDURE — 80053 COMPREHEN METABOLIC PANEL: CPT

## 2024-08-28 RX ORDER — CARVEDILOL 3.12 MG/1
3.12 TABLET ORAL 2 TIMES DAILY WITH MEALS
Status: DISCONTINUED | OUTPATIENT
Start: 2024-08-28 | End: 2024-08-30 | Stop reason: HOSPADM

## 2024-08-28 RX ORDER — SODIUM CHLORIDE 0.9 % (FLUSH) 0.9 %
5-40 SYRINGE (ML) INJECTION EVERY 12 HOURS SCHEDULED
Status: DISCONTINUED | OUTPATIENT
Start: 2024-08-28 | End: 2024-08-30 | Stop reason: HOSPADM

## 2024-08-28 RX ORDER — 0.9 % SODIUM CHLORIDE 0.9 %
500 INTRAVENOUS SOLUTION INTRAVENOUS ONCE
Status: COMPLETED | OUTPATIENT
Start: 2024-08-28 | End: 2024-08-28

## 2024-08-28 RX ORDER — ACETAMINOPHEN 325 MG/1
650 TABLET ORAL ONCE
Status: DISCONTINUED | OUTPATIENT
Start: 2024-08-28 | End: 2024-08-30 | Stop reason: HOSPADM

## 2024-08-28 RX ORDER — ASPIRIN 81 MG/1
81 TABLET ORAL DAILY
Status: DISCONTINUED | OUTPATIENT
Start: 2024-08-28 | End: 2024-08-30 | Stop reason: HOSPADM

## 2024-08-28 RX ORDER — HYDROCODONE BITARTRATE AND ACETAMINOPHEN 5; 325 MG/1; MG/1
1 TABLET ORAL ONCE
Status: DISCONTINUED | OUTPATIENT
Start: 2024-08-28 | End: 2024-08-30 | Stop reason: HOSPADM

## 2024-08-28 RX ORDER — ACETAMINOPHEN 650 MG/1
650 SUPPOSITORY RECTAL EVERY 6 HOURS PRN
Status: DISCONTINUED | OUTPATIENT
Start: 2024-08-28 | End: 2024-08-30 | Stop reason: HOSPADM

## 2024-08-28 RX ORDER — SODIUM CHLORIDE 0.9 % (FLUSH) 0.9 %
5-40 SYRINGE (ML) INJECTION PRN
Status: DISCONTINUED | OUTPATIENT
Start: 2024-08-28 | End: 2024-08-30 | Stop reason: HOSPADM

## 2024-08-28 RX ORDER — HYDROCODONE BITARTRATE AND ACETAMINOPHEN 5; 325 MG/1; MG/1
1 TABLET ORAL 2 TIMES DAILY PRN
Status: DISCONTINUED | OUTPATIENT
Start: 2024-08-28 | End: 2024-08-30 | Stop reason: HOSPADM

## 2024-08-28 RX ORDER — POLYETHYLENE GLYCOL 3350 17 G/17G
17 POWDER, FOR SOLUTION ORAL DAILY PRN
Status: DISCONTINUED | OUTPATIENT
Start: 2024-08-28 | End: 2024-08-30 | Stop reason: HOSPADM

## 2024-08-28 RX ORDER — ACETAMINOPHEN 325 MG/1
650 TABLET ORAL EVERY 6 HOURS PRN
Status: DISCONTINUED | OUTPATIENT
Start: 2024-08-28 | End: 2024-08-30 | Stop reason: HOSPADM

## 2024-08-28 RX ORDER — ROSUVASTATIN CALCIUM 20 MG/1
10 TABLET, COATED ORAL NIGHTLY
Status: DISCONTINUED | OUTPATIENT
Start: 2024-08-28 | End: 2024-08-30 | Stop reason: HOSPADM

## 2024-08-28 RX ORDER — ONDANSETRON 4 MG/1
4 TABLET, ORALLY DISINTEGRATING ORAL EVERY 8 HOURS PRN
Status: DISCONTINUED | OUTPATIENT
Start: 2024-08-28 | End: 2024-08-30 | Stop reason: HOSPADM

## 2024-08-28 RX ORDER — SEVELAMER CARBONATE 800 MG/1
800 TABLET, FILM COATED ORAL
Status: DISCONTINUED | OUTPATIENT
Start: 2024-08-28 | End: 2024-08-28

## 2024-08-28 RX ORDER — SODIUM CHLORIDE 9 MG/ML
INJECTION, SOLUTION INTRAVENOUS PRN
Status: DISCONTINUED | OUTPATIENT
Start: 2024-08-28 | End: 2024-08-30 | Stop reason: HOSPADM

## 2024-08-28 RX ORDER — ONDANSETRON 2 MG/ML
4 INJECTION INTRAMUSCULAR; INTRAVENOUS EVERY 6 HOURS PRN
Status: DISCONTINUED | OUTPATIENT
Start: 2024-08-28 | End: 2024-08-30 | Stop reason: HOSPADM

## 2024-08-28 RX ORDER — SEVELAMER CARBONATE 800 MG/1
800 TABLET, FILM COATED ORAL
Status: DISCONTINUED | OUTPATIENT
Start: 2024-08-28 | End: 2024-08-30 | Stop reason: HOSPADM

## 2024-08-28 RX ADMIN — CEFEPIME 2000 MG: 2 INJECTION, POWDER, FOR SOLUTION INTRAVENOUS at 12:10

## 2024-08-28 RX ADMIN — ROSUVASTATIN 10 MG: 20 TABLET, FILM COATED ORAL at 22:11

## 2024-08-28 RX ADMIN — SODIUM CHLORIDE, PRESERVATIVE FREE 10 ML: 5 INJECTION INTRAVENOUS at 22:13

## 2024-08-28 RX ADMIN — HYDROCODONE BITARTRATE AND ACETAMINOPHEN 1 TABLET: 5; 325 TABLET ORAL at 22:16

## 2024-08-28 RX ADMIN — SODIUM CHLORIDE 500 ML: 9 INJECTION, SOLUTION INTRAVENOUS at 15:51

## 2024-08-28 RX ADMIN — AZITHROMYCIN MONOHYDRATE 500 MG: 500 INJECTION, POWDER, LYOPHILIZED, FOR SOLUTION INTRAVENOUS at 15:52

## 2024-08-28 RX ADMIN — APIXABAN 2.5 MG: 5 TABLET, FILM COATED ORAL at 22:11

## 2024-08-28 RX ADMIN — SODIUM CHLORIDE 1500 MG: 9 INJECTION, SOLUTION INTRAVENOUS at 12:44

## 2024-08-28 ASSESSMENT — PAIN SCALES - GENERAL: PAINLEVEL_OUTOF10: 7

## 2024-08-28 ASSESSMENT — PAIN DESCRIPTION - LOCATION: LOCATION: BACK

## 2024-08-28 ASSESSMENT — PAIN - FUNCTIONAL ASSESSMENT: PAIN_FUNCTIONAL_ASSESSMENT: NONE - DENIES PAIN

## 2024-08-28 NOTE — CONSULTS
Nephrology Consult Note                                                                                                                                                                                                                                                                                                                                                               Office : [phone]     Fax :[phone]    Patient's Name: Megha Rojas  5:51 PM  8/28/2024    Reason for Consult:  ESRD  Requesting Physician:  Denice Reynolds MD  Chief Complaint:    Chief Complaint   Patient presents with    Shortness of Breath     Pt c/o sob and cough with productive green yellow secretions that started on Sunday,  denies fevers, no nausea, vomiting or diarrhea.  Went to HD yesterday.  Spo2 81% with ambulation, sitting spo2 89% not on oxygen at home       Assessment/Plan     # ESRD    # HTN    # Anemia    # Acid- base/ Electrolyte imbalance, Potassium is in normal range     # Disorder of volume, no signs of hypervolemia    # Pneumonia    # A fib    # CAD    Plan   HD in am to continue MWF session, we will do minimal UF as BP is borderline low  She is on Coreg and Diltiazem at home, please prescribe as tolerated   Continue Sevelamer as per home dose     History of Present Ilness:    Megha Rojas is a 83 y.o. female with PMHx significant for PAF, CAD, dCHF, ESRD ,hypertension, SAHRA, history of resected colon cancer. Came with soa and cough, started 2 days ago, got worse yesterday .  Has  yellow -green sputum . Feel tired more than usual. Was also lightheaded. No fever, chills , diarrhea.  Had HD yesterday. Had watchman done few months ago.       Interval hx     Past Medical History:   Diagnosis Date    Anemia     CAD (coronary artery disease) 11/2020    Stent    CKD (chronic kidney disease)     History of blood transfusion     Hyperlipidemia     patient states well controlled    Hypertension        Past Surgical  per tablet 1 tablet, Once  aspirin EC tablet 81 mg, Daily  apixaban (ELIQUIS) tablet 2.5 mg, BID  HYDROcodone-acetaminophen (NORCO) 5-325 MG per tablet 1 tablet, BID PRN  rosuvastatin (CRESTOR) tablet 10 mg, Nightly  sodium chloride flush 0.9 % injection 5-40 mL, 2 times per day  sodium chloride flush 0.9 % injection 5-40 mL, PRN  0.9 % sodium chloride infusion, PRN  ondansetron (ZOFRAN-ODT) disintegrating tablet 4 mg, Q8H PRN   Or  ondansetron (ZOFRAN) injection 4 mg, Q6H PRN  polyethylene glycol (GLYCOLAX) packet 17 g, Daily PRN  acetaminophen (TYLENOL) tablet 650 mg, Q6H PRN   Or  acetaminophen (TYLENOL) suppository 650 mg, Q6H PRN  [START ON 8/29/2024] cefTRIAXone (ROCEPHIN) 1,000 mg in sterile water 10 mL IV syringe, Q24H  azithromycin (ZITHROMAX) 500 mg in sodium chloride 0.9 % 250 mL IVPB ([id number]), Q24H  [Held by provider] carvedilol (COREG) tablet 3.125 mg, BID WC        Review of Systems:   14 point ROS obtained but were negative except mentioned in HPI      Physical exam:     Vitals:  BP (!) 117/58   Pulse 89   Temp 97.7 °F (36.5 °C) (Oral)   Resp 18   Ht 1.524 m (5')   Wt 81.6 kg (180 lb)   SpO2 92%   BMI 35.15 kg/m²   Constitutional:  OAA X3 NAD Yes  Skin: no rash, turgor wnl  Heent:  eomi, mmm  Neck: no bruits or jvd noted  Cardiovascular:  S1, S2 without m/r/g  Respiratory: CTA B without w/r/r  Abdomen:  , soft, nt, nd  Ext:  lower extremity edema No  Psychiatric: mood and affect STABLE   Musculoskeletal:  Rom, muscular strength intact    Data:   Labs:  CBC:   Recent Labs     08/28/24  1031   WBC 12.9*   HGB 10.7*        BMP:    Recent Labs     08/28/24  1031      K 4.7   CL 94*   CO2 29   BUN 33*   CREATININE 5.9*   GLUCOSE 107*     Ca/Mg/Phos:   Recent Labs     08/28/24  1031   CALCIUM 9.0     Hepatic:   Recent Labs     08/28/24  1031   AST 18   ALT 9*   BILITOT 0.9   ALKPHOS 96     Troponin: No results for input(s): \"TROPONINI\" in the last 72 hours.  BNP: No results for

## 2024-08-28 NOTE — ED NOTES
How does patient ambulate?   []Low Fall Risk (ambulates by themselves without support)  []Stand by assist   []Contact Guard   []Front wheel walker  [x]Wheelchair   []Steady  []Bed bound  []History of Lower Extremity Amputation  []Unknown, did not assess in the emergency department   How does patient take pills?  []Whole with Water  []Crushed in applesauce  []Crushed in pudding  []Other  [x]Unknown no oral medications were given in the ED  Is patient alert?   [x]Alert  []Drowsy but responds to voice  []Doesn't respond to voice but responds to painful stimuli  []Unresponsive  Is patient oriented?   [x]To person  [x]To place  [x]To time  [x]To situation  []Confused  []Agitated  [x]Follows commands  If patient is disoriented or from a Skill Nursing Facility has family been notified of admission?   []Yes   [x]No  Patient belongings?   [x]Cell phone  []Wallet   []Dentures  [x]Clothing  Any specific patient or family belongings/needs/dynamics?   N/A  Miscellaneous comments/pending orders?  N/A     If there are any additional questions please reach out to the Emergency Department.

## 2024-08-28 NOTE — ED PROVIDER NOTES
LAPAROSCOPIC CHOLECYSTECTOMY performed by Trung Hurst MD at [facility] OR    COLONOSCOPY N/A 03/05/2021    COLONOSCOPY WITH BIOPSY performed by Ed Castellanos MD at [facility] ENDOSCOPY    CYSTOSCOPY  11/21/2013    w/ bladder biopsy    EP DEVICE PROCEDURE N/A 7/1/2024    Watchman kary closure device performed by Shaw Moncada MD at [facility] CARDIAC CATH LAB    FRACTURE SURGERY      right wrist    HEMICOLECTOMY Right 03/08/2021    LAPAROSCOPIC RIGHT COLON RESECTION performed by Trung Hurst MD at [facility] OR    IR TUNNELED CATHETER PLACEMENT GREATER THAN 5 YEARS  1/25/2024    IR TUNNELED CATHETER PLACEMENT GREATER THAN 5 YEARS 1/25/2024 Raul Toney MD [facility] SPECIAL PROCEDURES    UPPER GASTROINTESTINAL ENDOSCOPY N/A 03/05/2021    EGD BIOPSY performed by Ed Castellanos MD at [facility] ENDOSCOPY    UPPER GASTROINTESTINAL ENDOSCOPY N/A 12/29/2023    EGD CONTROL HEMORRHAGE, EGD APC STOMACH performed by Junior Hermosillo MD at [facility] ENDOSCOPY    VASCULAR SURGERY Left 5/20/2024    LEFT ARM ARTERIOVENOUS GRAFT performed by Haja Evans II, MD at [facility] OR       Home medications:   Previous Medications    APIXABAN (ELIQUIS) 5 MG TABS TABLET    Take 1 tablet by mouth twice daily    ASPIRIN EC 81 MG EC TABLET    Take 1 tablet by mouth daily    CARVEDILOL (COREG) 3.125 MG TABLET    TAKE 1 TABLET BY MOUTH TWICE DAILY WITH MEALS    DILTIAZEM (CARDIZEM CD) 120 MG EXTENDED RELEASE CAPSULE    Take 1 capsule by mouth daily    HYDROCODONE-ACETAMINOPHEN (NORCO) 5-325 MG PER TABLET    Take 1 tablet by mouth 2 times daily.    ROSUVASTATIN (CRESTOR) 20 MG TABLET    Take 1 tablet by mouth nightly    SEVELAMER (RENVELA) 800 MG TABLET    Take 1 tablet by mouth 3 times daily (with meals)       Social history:  reports that she has never smoked. She has never used smokeless tobacco. She reports that she does not drink alcohol and does not use drugs.    Family history:    Family History   Problem Relation Age of Onset    Cirrhosis Mother   (H) 0.00 - 0.15 ng/mL   EKG 12 Lead   Result Value Ref Range    Ventricular Rate 84 BPM    Atrial Rate 84 BPM    P-R Interval 198 ms    QRS Duration 142 ms    Q-T Interval 396 ms    QTc Calculation (Bazett) 467 ms    P Axis -11 degrees    R Axis -25 degrees    T Axis 149 degrees    Diagnosis       Sinus rhythm with occasional Premature ventricular complexesLeft bundle branch blockAbnormal ECG       Screenings   Dulce Maria Coma Scale  Eye Opening: Spontaneous  Best Verbal Response: Oriented  Best Motor Response: Obeys commands  Dulce Maria Coma Scale Score: 15       Is this patient to be included in the SEP-1 Core Measure due to severe sepsis or septic shock?   No     Exclusion criteria - the patient is NOT to be included for SEP-1 Core Measure due to:  May have criteria for sepsis, but does not meet criteria for severe sepsis or septic shock      MDM and ED Course    Patient afebrile and nontoxic.  She is in no shanelle painful or respiratory distress.  Her initial hypoxia was corrected with supplemental oxygen by nasal cannula which she tolerated well.  Bibasilar rhonchi appreciated, no wheezes, doubt bronchospasm.  EKG with chronic LBBB, no STEMI.  Troponin is elevated however stable on repeat, on record review patient with chronic nonzero troponin which is likely secondary to her underlying ESRD, my clinical suspicion for ACS today is low.  No malignant dysrhythmia.  Pulmonary embolism is considered, however this is not the most likely diagnosis and patient is already anticoagulated on Eliquis.  CXR with findings concerning for pneumonia which is clinically consistent with her presentation today.  Remainder of labs workup is reassuring, consistent with ESRD however no critical electrolyte derangement.  No fluid overload, no indication for emergent dialysis.  Patient received cefepime and vancomycin for treatment of HCAP given her routine dialysis.  Lactic acid normal, no hypotension, no indication for aggressive 30 cc/kg  height 1.524 m (5'), weight 81.6 kg (180 lb), SpO2 95%.     Disposition:  DISPOSITION Decision To Admit 08/28/2024 11:51:57 AM  Condition at Disposition: Stable      Patient Referrals:  No follow-up provider specified.    Discharge Medications:  New Prescriptions    No medications on file       Discontinued Medications:  Discontinued Medications    No medications on file       This chart was generated using the Dragon dictation system. I created this record but it may contain dictation errors given the limitations of this technology.    William Lynne DO (electronically signed)  Attending Emergency Physician       William Lynne DO  08/28/24 3194

## 2024-08-28 NOTE — H&P
HOSPITALISTS HISTORY AND PHYSICAL    2024 12:25 PM    Patient Information:  MEHNAZ CALL is a 83 y.o. female [id number]  PCP:  Denice Reynolds MD (Tel: [phone] )    Chief complaint:    Chief Complaint   Patient presents with    Shortness of Breath     Pt c/o sob and cough with productive green yellow secretions that started on ,  denies fevers, no nausea, vomiting or diarrhea.  Went to HD yesterday.  Spo2 81% with ambulation, sitting spo2 89% not on oxygen at home    sob    History of Present Illness:  Mehnaz Call is a 83 y.o. female 83 y.o with PMHx significant for PAF, CAD, dCHF, ESRD ,hypertension, SAHRA, history of resected colon cancer. Came with sob and cough, started 2 days ago, got worse yesterday .  Has  yellow -green sputum . No fever, chills , diarrhea.  Had HD yest.   Had watchman done few months ago and  had f/u today     REVIEW OF SYSTEMS:   All 10 systems reviewed and  are negative except as mentioned in HPI    Past Medical History:   has a past medical history of Anemia, CAD (coronary artery disease), CKD (chronic kidney disease), History of blood transfusion, Hyperlipidemia, and Hypertension.     Past Surgical History:   has a past surgical history that includes fracture surgery; Cystocopy (2013); Upper gastrointestinal endoscopy (N/A, 2021); Colonoscopy (N/A, 2021); hemicolectomy (Right, 2021); Cholecystectomy, laparoscopic (N/A, 2021);  section; Upper gastrointestinal endoscopy (N/A, 2023); IR TUNNELED CVC PLACE WO SQ PORT/PUMP > 5 YEARS (2024); vascular surgery (Left, 2024); and ep device procedure (N/A, 2024).     Medications:  No current facility-administered medications on file prior to encounter.     Current Outpatient Medications on File Prior to Encounter   Medication Sig Dispense Refill    apixaban  deficits  Psychiatry: Appropriate affect. Not agitated  Skin: Warm, dry, normal turgor, no rash  Brisk capillary refill, peripheral pulses palpable   Labs:  CBC:   Lab Results   Component Value Date/Time    WBC 12.9 08/28/2024 10:31 AM    RBC 4.25 08/28/2024 10:31 AM    HGB 10.7 08/28/2024 10:31 AM    HCT 34.9 08/28/2024 10:31 AM    MCV 82.1 08/28/2024 10:31 AM    MCH 25.2 08/28/2024 10:31 AM    MCHC 30.7 08/28/2024 10:31 AM    RDW 20.3 08/28/2024 10:31 AM     08/28/2024 10:31 AM    MPV 8.3 08/28/2024 10:31 AM     BMP:    Lab Results   Component Value Date/Time     08/28/2024 10:31 AM    K 4.7 08/28/2024 10:31 AM    CL 94 08/28/2024 10:31 AM    CO2 29 08/28/2024 10:31 AM    BUN 33 08/28/2024 10:31 AM    CREATININE 5.9 08/28/2024 10:31 AM    CALCIUM 9.0 08/28/2024 10:31 AM    GFRAA 27 04/02/2022 10:56 AM    LABGLOM 7 08/28/2024 10:31 AM    LABGLOM 8 03/03/2024 03:41 PM    GLUCOSE 107 08/28/2024 10:31 AM     XR CHEST PORTABLE   Final Result   Right lower lobe airspace disease, suspicious for pneumonia.                 Problem List  Active Problems:    * No active hospital problems. *  Resolved Problems:    * No resolved hospital problems. *        Assessment/Plan:   CAP/Acute hypoxic resp failure   Doesn't meet sepsis criteria  Obtain blood cx, sputum cx, urine strep, nasal mrsa  Start  rocephin, azithro  for now    Afib  NSR. Cont metoprolol, eliquis. Needs watchman f/u once acute illness improves    CAD  Cont asa,statin    ESRD  Not in vol overload. Consult nephro      DVT prophylaxis heparin  Code status Full, POA DAUGHTER SANJUANA  Diet regular  Iv access:peripheral  Pickard: no    Admit as inpatient. I anticipate hospitalization spanning more than two midnights for investigation and treatment of the above medically necessary diagnoses.    Please note that some part of this chart was generated using Dragon dictation software. Although every effort was made to ensure the accuracy of this automated  transcription, some errors in transcription may have occurred inadvertently. If you may need any clarification, please do not hesitate to contact me through EPIC.       Felton Rico MD    8/28/2024 12:25 PM

## 2024-08-28 NOTE — PROGRESS NOTES
4 Eyes Skin Assessment     NAME:  Megha Rojas  YOB: 1940  MEDICAL RECORD NUMBER:  [id number]    The patient is being assessed for  Admission    I agree that at least one RN has performed a thorough Head to Toe Skin Assessment on the patient. ALL assessment sites listed below have been assessed.      Areas assessed by both nurses:    Head, Face, Ears, Shoulders, Back, Chest, Arms, Elbows, Hands, Sacrum. Buttock, Coccyx, Ischium, Legs. Feet and Heels, and Under Medical Devices         Does the Patient have a Wound? No noted wound(s)       Sukhwinder Prevention initiated by RN: No  Wound Care Orders initiated by RN: No    Pressure Injury (Stage 3,4, Unstageable, DTI, NWPT, and Complex wounds) if present, place Wound referral order by RN under : No    New Ostomies, if present place, Ostomy referral order under : No     Nurse 1 eSignature: Electronically signed by Barbara Nunez RN on 8/28/24 at 2:29 PM EDT    **SHARE this note so that the co-signing nurse can place an eSignature**    Nurse 2 eSignature: Electronically signed by Miguelina Horan RN on 8/28/24 at 2:30 PM EDT

## 2024-08-28 NOTE — PROGRESS NOTES
Pt transferred to , [facility], oriented to room and call light. All questions and concerns addressed, no further needs at this time.

## 2024-08-29 ENCOUNTER — TELEPHONE (OUTPATIENT)
Dept: CARDIOLOGY CLINIC | Age: 84
End: 2024-08-29

## 2024-08-29 DIAGNOSIS — Z95.818 PRESENCE OF WATCHMAN LEFT ATRIAL APPENDAGE CLOSURE DEVICE: ICD-10-CM

## 2024-08-29 DIAGNOSIS — I48.0 PAROXYSMAL A-FIB (HCC): Primary | ICD-10-CM

## 2024-08-29 LAB
ALBUMIN SERPL-MCNC: 3 G/DL (ref 3.4–5)
ALBUMIN/GLOB SERPL: 0.9 {RATIO} (ref 1.1–2.2)
ALP SERPL-CCNC: 80 U/L (ref 40–129)
ALT SERPL-CCNC: 8 U/L (ref 10–40)
ANION GAP SERPL CALCULATED.3IONS-SCNC: 19 MMOL/L (ref 3–16)
AST SERPL-CCNC: 20 U/L (ref 15–37)
BASOPHILS # BLD: 0 K/UL (ref 0–0.2)
BASOPHILS NFR BLD: 0.4 %
BILIRUB SERPL-MCNC: 0.5 MG/DL (ref 0–1)
BUN SERPL-MCNC: 47 MG/DL (ref 7–20)
CALCIUM SERPL-MCNC: 8.6 MG/DL (ref 8.3–10.6)
CHLORIDE SERPL-SCNC: 98 MMOL/L (ref 99–110)
CO2 SERPL-SCNC: 21 MMOL/L (ref 21–32)
CREAT SERPL-MCNC: 6.8 MG/DL (ref 0.6–1.2)
DEPRECATED RDW RBC AUTO: 19.7 % (ref 12.4–15.4)
EOSINOPHIL # BLD: 0.3 K/UL (ref 0–0.6)
EOSINOPHIL NFR BLD: 1.9 %
GFR SERPLBLD CREATININE-BSD FMLA CKD-EPI: 6 ML/MIN/{1.73_M2}
GLUCOSE BLD-MCNC: 109 MG/DL (ref 70–99)
GLUCOSE SERPL-MCNC: 108 MG/DL (ref 70–99)
HBV SURFACE AB SERPL IA-ACNC: <3.5 MIU/ML
HBV SURFACE AG SERPL QL IA: NORMAL
HCT VFR BLD AUTO: 31.9 % (ref 36–48)
HGB BLD-MCNC: 9.7 G/DL (ref 12–16)
LEGIONELLA AG UR QL: NORMAL
LYMPHOCYTES # BLD: 1.1 K/UL (ref 1–5.1)
LYMPHOCYTES NFR BLD: 8.4 %
MCH RBC QN AUTO: 24.9 PG (ref 26–34)
MCHC RBC AUTO-ENTMCNC: 30.4 G/DL (ref 31–36)
MCV RBC AUTO: 81.8 FL (ref 80–100)
MONOCYTES # BLD: 1.5 K/UL (ref 0–1.3)
MONOCYTES NFR BLD: 10.7 %
NEUTROPHILS # BLD: 10.6 K/UL (ref 1.7–7.7)
NEUTROPHILS NFR BLD: 78.6 %
PERFORMED ON: ABNORMAL
PLATELET # BLD AUTO: 171 K/UL (ref 135–450)
PMV BLD AUTO: 8.3 FL (ref 5–10.5)
POTASSIUM SERPL-SCNC: 4.6 MMOL/L (ref 3.5–5.1)
PROT SERPL-MCNC: 6.5 G/DL (ref 6.4–8.2)
RBC # BLD AUTO: 3.91 M/UL (ref 4–5.2)
S PNEUM AG UR QL: NORMAL
SODIUM SERPL-SCNC: 138 MMOL/L (ref 136–145)
WBC # BLD AUTO: 13.5 K/UL (ref 4–11)

## 2024-08-29 PROCEDURE — 6370000000 HC RX 637 (ALT 250 FOR IP): Performed by: HOSPITALIST

## 2024-08-29 PROCEDURE — 87340 HEPATITIS B SURFACE AG IA: CPT

## 2024-08-29 PROCEDURE — 6370000000 HC RX 637 (ALT 250 FOR IP): Performed by: STUDENT IN AN ORGANIZED HEALTH CARE EDUCATION/TRAINING PROGRAM

## 2024-08-29 PROCEDURE — 6360000002 HC RX W HCPCS: Performed by: STUDENT IN AN ORGANIZED HEALTH CARE EDUCATION/TRAINING PROGRAM

## 2024-08-29 PROCEDURE — 36415 COLL VENOUS BLD VENIPUNCTURE: CPT

## 2024-08-29 PROCEDURE — 1200000000 HC SEMI PRIVATE

## 2024-08-29 PROCEDURE — 86706 HEP B SURFACE ANTIBODY: CPT

## 2024-08-29 PROCEDURE — 2580000003 HC RX 258: Performed by: STUDENT IN AN ORGANIZED HEALTH CARE EDUCATION/TRAINING PROGRAM

## 2024-08-29 PROCEDURE — 5A1D70Z PERFORMANCE OF URINARY FILTRATION, INTERMITTENT, LESS THAN 6 HOURS PER DAY: ICD-10-PCS | Performed by: INTERNAL MEDICINE

## 2024-08-29 PROCEDURE — 85025 COMPLETE CBC W/AUTO DIFF WBC: CPT

## 2024-08-29 PROCEDURE — 90935 HEMODIALYSIS ONE EVALUATION: CPT

## 2024-08-29 PROCEDURE — 6370000000 HC RX 637 (ALT 250 FOR IP): Performed by: NURSE PRACTITIONER

## 2024-08-29 PROCEDURE — 87070 CULTURE OTHR SPECIMN AEROBIC: CPT

## 2024-08-29 PROCEDURE — 80053 COMPREHEN METABOLIC PANEL: CPT

## 2024-08-29 PROCEDURE — 87205 SMEAR GRAM STAIN: CPT

## 2024-08-29 RX ADMIN — ROSUVASTATIN 10 MG: 20 TABLET, FILM COATED ORAL at 21:35

## 2024-08-29 RX ADMIN — ASPIRIN 81 MG: 81 TABLET, COATED ORAL at 08:15

## 2024-08-29 RX ADMIN — CARVEDILOL 3.12 MG: 3.12 TABLET, FILM COATED ORAL at 18:51

## 2024-08-29 RX ADMIN — HYDROCODONE BITARTRATE AND ACETAMINOPHEN 1 TABLET: 5; 325 TABLET ORAL at 08:18

## 2024-08-29 RX ADMIN — SODIUM CHLORIDE, PRESERVATIVE FREE 10 ML: 5 INJECTION INTRAVENOUS at 21:35

## 2024-08-29 RX ADMIN — HYDROCODONE BITARTRATE AND ACETAMINOPHEN 1 TABLET: 5; 325 TABLET ORAL at 21:39

## 2024-08-29 RX ADMIN — AZITHROMYCIN MONOHYDRATE 500 MG: 500 INJECTION, POWDER, LYOPHILIZED, FOR SOLUTION INTRAVENOUS at 18:30

## 2024-08-29 RX ADMIN — APIXABAN 2.5 MG: 5 TABLET, FILM COATED ORAL at 21:35

## 2024-08-29 RX ADMIN — APIXABAN 2.5 MG: 5 TABLET, FILM COATED ORAL at 08:15

## 2024-08-29 RX ADMIN — SODIUM CHLORIDE, PRESERVATIVE FREE 10 ML: 5 INJECTION INTRAVENOUS at 08:15

## 2024-08-29 RX ADMIN — WATER 1000 MG: 1 INJECTION INTRAMUSCULAR; INTRAVENOUS; SUBCUTANEOUS at 13:12

## 2024-08-29 ASSESSMENT — PAIN DESCRIPTION - LOCATION
LOCATION: KNEE
LOCATION: KNEE

## 2024-08-29 ASSESSMENT — PAIN SCALES - GENERAL
PAINLEVEL_OUTOF10: 7
PAINLEVEL_OUTOF10: 0
PAINLEVEL_OUTOF10: 7

## 2024-08-29 ASSESSMENT — PAIN DESCRIPTION - DESCRIPTORS: DESCRIPTORS: ACHING

## 2024-08-29 ASSESSMENT — PAIN DESCRIPTION - ORIENTATION: ORIENTATION: RIGHT;LEFT

## 2024-08-29 NOTE — PROGRESS NOTES
PM assessment complete and documented. Meds given per MAR. Pt resting in bed. Limb alert sleeve in place. Call light in reach. Pt denies further needs or concerns at present.

## 2024-08-29 NOTE — PROGRESS NOTES
Pt back to unit from HD, pt tolerated well. Vitals obtained. Pt denies pain, expresses no needs. Set up for dinner. Sputum cx collected and sent. Daughter at bedside.

## 2024-08-29 NOTE — PROGRESS NOTES
Pt seen at bedside for shift handoff. SpO2 88% on RA. Pt placed on 2L NC, SpO2 now >90% on 2L NC. Pt remains resting in bed with eyes closed, no s/s of distress observed. Call light in reach. Fall precautions in place.

## 2024-08-29 NOTE — PROGRESS NOTES
Nephrology Progress Note                                                                                                                                                                                                                                                                                                                                                               Office : [phone]     Fax :[phone]    Patient's Name: Megha Rojas  11:51 AM  8/29/2024    Reason for Consult:  ESRD  Requesting Physician:  Denice Reynolds MD  Chief Complaint:    Chief Complaint   Patient presents with    Shortness of Breath     Pt c/o sob and cough with productive green yellow secretions that started on Sunday,  denies fevers, no nausea, vomiting or diarrhea.  Went to HD yesterday.  Spo2 81% with ambulation, sitting spo2 89% not on oxygen at home       Assessment/Plan     # ESRD    # HTN    # Anemia    # Acid- base/ Electrolyte imbalance, Potassium is in normal range     # Disorder of volume, no signs of hypervolemia    # Pneumonia    # A fib    # CAD    Plan   HD today to continue TTS session  She is on Coreg and Diltiazem at home, please prescribe as tolerated   Continue Sevelamer as per home dose     History of Present Ilness:    Megha Rojas is a 83 y.o. female with PMHx significant for PAF, CAD, dCHF, ESRD ,hypertension, SAHRA, history of resected colon cancer. Came with soa and cough, started 2 days ago, got worse yesterday .  Has  yellow -green sputum . Feel tired more than usual. Was also lightheaded. No fever, chills , diarrhea.  Had HD yesterday. Had watchman done few months ago.       Interval hx   Feel more energetic today    Past Medical History:   Diagnosis Date    Anemia     CAD (coronary artery disease) 11/2020    Stent    CKD (chronic kidney disease)     History of blood transfusion     Hyperlipidemia     patient states well controlled    Hypertension        Past Surgical History:   Procedure  Once  aspirin EC tablet 81 mg, Daily  apixaban (ELIQUIS) tablet 2.5 mg, BID  HYDROcodone-acetaminophen (NORCO) 5-325 MG per tablet 1 tablet, BID PRN  rosuvastatin (CRESTOR) tablet 10 mg, Nightly  sodium chloride flush 0.9 % injection 5-40 mL, 2 times per day  sodium chloride flush 0.9 % injection 5-40 mL, PRN  0.9 % sodium chloride infusion, PRN  ondansetron (ZOFRAN-ODT) disintegrating tablet 4 mg, Q8H PRN   Or  ondansetron (ZOFRAN) injection 4 mg, Q6H PRN  polyethylene glycol (GLYCOLAX) packet 17 g, Daily PRN  acetaminophen (TYLENOL) tablet 650 mg, Q6H PRN   Or  acetaminophen (TYLENOL) suppository 650 mg, Q6H PRN  cefTRIAXone (ROCEPHIN) 1,000 mg in sterile water 10 mL IV syringe, Q24H  azithromycin (ZITHROMAX) 500 mg in sodium chloride 0.9 % 250 mL IVPB ([id number]), Q24H  [Held by provider] carvedilol (COREG) tablet 3.125 mg, BID WC  sevelamer (RENVELA) tablet 800 mg, TID WC        Review of Systems:   14 point ROS obtained but were negative except mentioned in HPI      Physical exam:     Vitals:  BP (!) 113/55   Pulse 72   Temp 97.8 °F (36.6 °C) (Oral)   Resp 18   Ht 1.524 m (5')   Wt 84.2 kg (185 lb 9.6 oz)   SpO2 96%   BMI 36.25 kg/m²   Constitutional:  OAA X3 NAD Yes  Skin: no rash, turgor wnl  Heent:  eomi, mmm  Neck: no bruits or jvd noted  Cardiovascular:  S1, S2 without m/r/g  Respiratory: CTA B without w/r/r  Abdomen:  , soft, nt, nd  Ext:  lower extremity edema No  Psychiatric: mood and affect STABLE   Musculoskeletal:  Rom, muscular strength intact    Data:   Labs:  CBC:   Recent Labs     08/28/24  1031 08/29/24  0639   WBC 12.9* 13.5*   HGB 10.7* 9.7*    171     BMP:    Recent Labs     08/28/24  1031 08/29/24  0639    138   K 4.7 4.6   CL 94* 98*   CO2 29 21   BUN 33* 47*   CREATININE 5.9* 6.8*   GLUCOSE 107* 108*     Ca/Mg/Phos:   Recent Labs     08/28/24  1031 08/29/24  0639   CALCIUM 9.0 8.6     Hepatic:   Recent Labs     08/28/24  1031 08/29/24  0639   AST 18 20   ALT 9* 8*

## 2024-08-29 NOTE — CARE COORDINATION
Case Management Assessment  Initial Evaluation    Date/Time of Evaluation: 8/29/2024 1:55 PM  Assessment Completed by: GLORIA Puga    If patient is discharged prior to next notation, then this note serves as note for discharge by case management.    Patient Name: Megha Rojas                   YOB: 1940  Diagnosis: ESRD on hemodialysis (HCC) [N18.6, Z99.2]  Acute respiratory failure with hypoxia (HCC) [J96.01]  HCAP (healthcare-associated pneumonia) [J18.9]  Acute hypoxic respiratory failure (HCC) [J96.01]                   Date / Time: 8/28/2024  9:45 AM    Patient Admission Status: Inpatient   Readmission Risk (Low < 19, Mod (19-27), High > 27): Readmission Risk Score: 22.5    Current PCP: Denice Reynolds MD  PCP verified by CM? (P) Yes    Chart Reviewed: Yes      History Provided by: (P) Patient  Patient Orientation: (P) Alert and Oriented    Patient Cognition: (P) Alert    Hospitalization in the last 30 days (Readmission):  No    If yes, Readmission Assessment in  Navigator will be completed.    Advance Directives:      Code Status: Full Code   Patient's Primary Decision Maker is: (P) Legal Next of Kin    Primary Decision Maker: Rome Kirkland - Manny - [phone]    Discharge Planning:    Patient lives with: (P) Family Members Type of Home: (P) House  Primary Care Giver: (P) Self  Patient Support Systems include: (P) Children, Family Members   Current Financial resources: (P) Medicare  Current community resources: (P) None  Current services prior to admission: (P) Durable Medical Equipment            Current DME: (P) Walker            Type of Home Care services:  (P) None    ADLS  Prior functional level: (P) Mobility, Assistance with the following:  Current functional level: (P) Mobility, Assistance with the following:    PT AM-PAC:   /24  OT AM-PAC:   /24    Family can provide assistance at DC: (P) Yes  Would you like Case Management to discuss the discharge plan with any other

## 2024-08-29 NOTE — TELEPHONE ENCOUNTER
Dilcia please contact patient once discharged from hospital and reschedule the Post Watchman EDWIN that she cancelled d/t illness.

## 2024-08-29 NOTE — PROGRESS NOTES
[facility]    8/29/2024 8:06 AM        Name: Megha Rojas .              Admitted: 8/28/2024  Primary Care Provider: Denice Reynolds MD (Tel: [phone])      Subjective:  .    Pt has ESRD and is on HD q T TH SAT,    Reports that she began to have shaking chills and feeling poorly after HD on Tuesday.  Went home and took a long nap,  on Wed she felt short of breath and was brought to ED.      Seen this am   Feels better ,  I have stopped her oxygen.  Sats are 92 % on RA   Currently has loose non productive cough         Reviewed interval ancillary notes    Current Medications  acetaminophen (TYLENOL) tablet 650 mg, Once  HYDROcodone-acetaminophen (NORCO) 5-325 MG per tablet 1 tablet, Once  aspirin EC tablet 81 mg, Daily  apixaban (ELIQUIS) tablet 2.5 mg, BID  HYDROcodone-acetaminophen (NORCO) 5-325 MG per tablet 1 tablet, BID PRN  rosuvastatin (CRESTOR) tablet 10 mg, Nightly  sodium chloride flush 0.9 % injection 5-40 mL, 2 times per day  sodium chloride flush 0.9 % injection 5-40 mL, PRN  0.9 % sodium chloride infusion, PRN  ondansetron (ZOFRAN-ODT) disintegrating tablet 4 mg, Q8H PRN   Or  ondansetron (ZOFRAN) injection 4 mg, Q6H PRN  polyethylene glycol (GLYCOLAX) packet 17 g, Daily PRN  acetaminophen (TYLENOL) tablet 650 mg, Q6H PRN   Or  acetaminophen (TYLENOL) suppository 650 mg, Q6H PRN  cefTRIAXone (ROCEPHIN) 1,000 mg in sterile water 10 mL IV syringe, Q24H  azithromycin (ZITHROMAX) 500 mg in sodium chloride 0.9 % 250 mL IVPB ([id number]), Q24H  [Held by provider] carvedilol (COREG) tablet 3.125 mg, BID WC  sevelamer (RENVELA) tablet 800 mg, TID WC        Objective:  BP (!) 113/55   Pulse 72   Temp 97.8 °F (36.6 °C) (Oral)   Resp 17   Ht 1.524 m (5')   Wt 84.2 kg (185 lb 9.6 oz)   SpO2 96%   BMI 36.25 kg/m²     Intake/Output Summary (Last 24 hours) at 8/29/2024 0806  Last data filed at 8/28/2024 1240  Gross per 24 hour   Intake 100 ml   Output --   Net

## 2024-08-30 VITALS
SYSTOLIC BLOOD PRESSURE: 124 MMHG | OXYGEN SATURATION: 91 % | HEART RATE: 74 BPM | HEIGHT: 60 IN | TEMPERATURE: 97.3 F | WEIGHT: 182.2 LBS | RESPIRATION RATE: 18 BRPM | BODY MASS INDEX: 35.77 KG/M2 | DIASTOLIC BLOOD PRESSURE: 65 MMHG

## 2024-08-30 LAB
ALBUMIN SERPL-MCNC: 3 G/DL (ref 3.4–5)
ANION GAP SERPL CALCULATED.3IONS-SCNC: 15 MMOL/L (ref 3–16)
BASOPHILS # BLD: 0.1 K/UL (ref 0–0.2)
BASOPHILS NFR BLD: 0.5 %
BUN SERPL-MCNC: 30 MG/DL (ref 7–20)
CALCIUM SERPL-MCNC: 8.7 MG/DL (ref 8.3–10.6)
CHLORIDE SERPL-SCNC: 100 MMOL/L (ref 99–110)
CO2 SERPL-SCNC: 22 MMOL/L (ref 21–32)
CREAT SERPL-MCNC: 5.2 MG/DL (ref 0.6–1.2)
DEPRECATED RDW RBC AUTO: 19.9 % (ref 12.4–15.4)
EOSINOPHIL # BLD: 0.4 K/UL (ref 0–0.6)
EOSINOPHIL NFR BLD: 3.7 %
GFR SERPLBLD CREATININE-BSD FMLA CKD-EPI: 8 ML/MIN/{1.73_M2}
GLUCOSE SERPL-MCNC: 129 MG/DL (ref 70–99)
HCT VFR BLD AUTO: 34.2 % (ref 36–48)
HGB BLD-MCNC: 10.5 G/DL (ref 12–16)
LYMPHOCYTES # BLD: 1.3 K/UL (ref 1–5.1)
LYMPHOCYTES NFR BLD: 11.9 %
MCH RBC QN AUTO: 25.1 PG (ref 26–34)
MCHC RBC AUTO-ENTMCNC: 30.6 G/DL (ref 31–36)
MCV RBC AUTO: 82.2 FL (ref 80–100)
MONOCYTES # BLD: 1.2 K/UL (ref 0–1.3)
MONOCYTES NFR BLD: 11.2 %
MRSA SPEC QL CULT: NORMAL
NEUTROPHILS # BLD: 7.8 K/UL (ref 1.7–7.7)
NEUTROPHILS NFR BLD: 72.7 %
PHOSPHATE SERPL-MCNC: 4.5 MG/DL (ref 2.5–4.9)
PLATELET # BLD AUTO: 196 K/UL (ref 135–450)
PMV BLD AUTO: 8.2 FL (ref 5–10.5)
POTASSIUM SERPL-SCNC: 4.3 MMOL/L (ref 3.5–5.1)
RBC # BLD AUTO: 4.17 M/UL (ref 4–5.2)
SODIUM SERPL-SCNC: 137 MMOL/L (ref 136–145)
WBC # BLD AUTO: 10.7 K/UL (ref 4–11)

## 2024-08-30 PROCEDURE — 80069 RENAL FUNCTION PANEL: CPT

## 2024-08-30 PROCEDURE — 85025 COMPLETE CBC W/AUTO DIFF WBC: CPT

## 2024-08-30 PROCEDURE — 6370000000 HC RX 637 (ALT 250 FOR IP): Performed by: STUDENT IN AN ORGANIZED HEALTH CARE EDUCATION/TRAINING PROGRAM

## 2024-08-30 PROCEDURE — 6360000002 HC RX W HCPCS: Performed by: STUDENT IN AN ORGANIZED HEALTH CARE EDUCATION/TRAINING PROGRAM

## 2024-08-30 PROCEDURE — 36415 COLL VENOUS BLD VENIPUNCTURE: CPT

## 2024-08-30 PROCEDURE — 2580000003 HC RX 258: Performed by: STUDENT IN AN ORGANIZED HEALTH CARE EDUCATION/TRAINING PROGRAM

## 2024-08-30 PROCEDURE — 6370000000 HC RX 637 (ALT 250 FOR IP): Performed by: NURSE PRACTITIONER

## 2024-08-30 RX ORDER — AMOXICILLIN AND CLAVULANATE POTASSIUM 500; 125 MG/1; MG/1
1 TABLET, FILM COATED ORAL DAILY
Qty: 7 TABLET | Refills: 0 | Status: SHIPPED | OUTPATIENT
Start: 2024-08-30 | End: 2024-09-06

## 2024-08-30 RX ADMIN — WATER 1000 MG: 1 INJECTION INTRAMUSCULAR; INTRAVENOUS; SUBCUTANEOUS at 12:15

## 2024-08-30 RX ADMIN — APIXABAN 2.5 MG: 5 TABLET, FILM COATED ORAL at 09:09

## 2024-08-30 RX ADMIN — HYDROCODONE BITARTRATE AND ACETAMINOPHEN 1 TABLET: 5; 325 TABLET ORAL at 09:13

## 2024-08-30 RX ADMIN — CARVEDILOL 3.12 MG: 3.12 TABLET, FILM COATED ORAL at 09:09

## 2024-08-30 RX ADMIN — ASPIRIN 81 MG: 81 TABLET, COATED ORAL at 09:09

## 2024-08-30 RX ADMIN — SODIUM CHLORIDE, PRESERVATIVE FREE 10 ML: 5 INJECTION INTRAVENOUS at 09:11

## 2024-08-30 ASSESSMENT — PAIN DESCRIPTION - LOCATION: LOCATION: GENERALIZED

## 2024-08-30 ASSESSMENT — PAIN SCALES - WONG BAKER: WONGBAKER_NUMERICALRESPONSE: HURTS A LITTLE BIT

## 2024-08-30 ASSESSMENT — PAIN SCALES - GENERAL
PAINLEVEL_OUTOF10: 2
PAINLEVEL_OUTOF10: 7

## 2024-08-30 NOTE — CARE COORDINATION
Case Management -  Discharge Note      Patient Name: Megha Rojas                   YOB: 1940            Readmission Risk (Low < 19, Mod (19-27), High > 27): Readmission Risk Score: 22.9    Current PCP: Denice Reynolds MD      (IMM) Important Message from Medicare:    Has pt received appropriate IMM before discharge if required: yes  Date: 8/28/24    PT AM-PAC:   /24  OT AM-PAC:   /24    [facility]  [address] [place] [address]  Phone: [phone].  Fax: [phone]    Schedule: Tuesday, Thursday Saturday  Established patient        Financial    Payor: [facility] MEDICARE / Plan: [employer] ESSENTIAL/PLUS / Product Type: *No Product type* /     Pharmacy:  Potential assistance Purchasing Medications: No  Meds-to-Beds request: Yes      [facility] [address] [place] [address] ([facility]), [place] - [address] - P [phone] - F [phone]  [address]  [place]  Phone: [phone] Fax: [phone]    [facility] [place], [place] - [address] [facility] - P [phone] - F [phone]  [address] [facility] [address]  Phone: [phone] Fax: [phone]      Notes:    Additional Case Management Notes: Patient denies any home going needs. Patient declined Medical house pawel and need for  to schedule follow up appointments.  Daughter to transport patient home.

## 2024-08-30 NOTE — CARE COORDINATION
Discharge Planning Note    met with patient who states she does not need any services at discharge.  Patient states she has an appointment in a week with Dr. Reynolds. Patient offered Medical House call.  Patient declined Medical House call.  Patient anticipate discharge today.   Electronically signed by GLORIA Puga on 8/30/2024 at 11:31 AM  Phone [phone]

## 2024-08-30 NOTE — PROGRESS NOTES
RN discharge summary from 3a to home.     This patient has had a discharge order placed. They are returning home and being picked up in the lobby. Discharge paperwork has been printed, highlighted, and gone over with the patient by this RN. Patient understands teaching and has no further questions at this time. VSS. IV has been removed with no complications. Telemetry has been removed. Pt has all belongings present.

## 2024-08-30 NOTE — PROGRESS NOTES
CLINICAL PHARMACY NOTE: MEDS TO BEDS    Total # of Prescriptions Filled: 1   The following medications were delivered to the patient:  Amox/Clav 500/125mg    Additional Documentation:     Medication was picked up in the Outpatient Pharmacy by patient    Carola Ramirez CPhT

## 2024-08-30 NOTE — PLAN OF CARE
Problem: Discharge Planning  Goal: Discharge to home or other facility with appropriate resources  8/30/2024 1228 by Barbara Nunez RN  Outcome: Adequate for Discharge  Flowsheets (Taken 8/30/2024 0815)  Discharge to home or other facility with appropriate resources: Identify barriers to discharge with patient and caregiver  8/30/2024 0725 by Barbara Nunez RN  Outcome: Progressing     Problem: Safety - Adult  Goal: Free from fall injury  8/30/2024 1228 by Barbara Nunez RN  Outcome: Adequate for Discharge  8/30/2024 0725 by Barbara Nunez RN  Outcome: Progressing     Problem: ABCDS Injury Assessment  Goal: Absence of physical injury  8/30/2024 1228 by Barbara Nunez RN  Outcome: Adequate for Discharge  8/30/2024 0725 by Barbara Nunez RN  Outcome: Progressing  Flowsheets (Taken 8/30/2024 0724)  Absence of Physical Injury: Implement safety measures based on patient assessment     Problem: Pain  Goal: Verbalizes/displays adequate comfort level or baseline comfort level  8/30/2024 1228 by Barbara Nunez RN  Outcome: Adequate for Discharge  Flowsheets (Taken 8/30/2024 0900)  Verbalizes/displays adequate comfort level or baseline comfort level: Encourage patient to monitor pain and request assistance  8/30/2024 0725 by Barbara Nunez RN  Outcome: Progressing     Problem: Neurosensory - Adult  Goal: Achieves maximal functionality and self care  8/30/2024 1228 by Barbara Nunez RN  Outcome: Adequate for Discharge  Flowsheets (Taken 8/30/2024 0815)  Achieves maximal functionality and self care: Monitor swallowing and airway patency with patient fatigue and changes in neurological status  8/30/2024 0725 by Barbara Nunez RN  Outcome: Progressing     Problem: Respiratory - Adult  Goal: Achieves optimal ventilation and oxygenation  8/30/2024 1228 by Barbara Nunez RN  Outcome: Adequate for Discharge  Flowsheets (Taken 8/30/2024 0815)  Achieves optimal ventilation and oxygenation: Assess for  changes in respiratory status  8/30/2024 0725 by Barbara Nunez RN  Outcome: Progressing     Problem: Cardiovascular - Adult  Goal: Absence of cardiac dysrhythmias or at baseline  8/30/2024 1228 by Barbara Nunez RN  Outcome: Adequate for Discharge  Flowsheets (Taken 8/30/2024 0815)  Absence of cardiac dysrhythmias or at baseline: Monitor cardiac rate and rhythm  8/30/2024 0725 by Barbara Nunez RN  Outcome: Progressing     Problem: Musculoskeletal - Adult  Goal: Return mobility to safest level of function  8/30/2024 1228 by Barbara Nunez RN  Outcome: Adequate for Discharge  Flowsheets (Taken 8/30/2024 0815)  Return Mobility to Safest Level of Function: Assess patient stability and activity tolerance for standing, transferring and ambulating with or without assistive devices  8/30/2024 0725 by Barbara Nunez RN  Outcome: Progressing  Goal: Return ADL status to a safe level of function  8/30/2024 1228 by Barbara Nunez RN  Outcome: Adequate for Discharge  Flowsheets (Taken 8/30/2024 0815)  Return ADL Status to a Safe Level of Function: Administer medication as ordered  8/30/2024 0725 by Barbara Nunez RN  Outcome: Progressing     Problem: Metabolic/Fluid and Electrolytes - Adult  Goal: Hemodynamic stability and optimal renal function maintained  8/30/2024 1228 by Barbara Nunez RN  Outcome: Adequate for Discharge  Flowsheets (Taken 8/30/2024 0815)  Hemodynamic stability and optimal renal function maintained: Monitor labs and assess for signs and symptoms of volume excess or deficit  8/30/2024 0725 by Barbara Nunez RN  Outcome: Progressing     Problem: Chronic Conditions and Co-morbidities  Goal: Patient's chronic conditions and co-morbidity symptoms are monitored and maintained or improved  8/30/2024 1228 by Barbara Nunez RN  Outcome: Adequate for Discharge  Flowsheets (Taken 8/30/2024 0815)  Care Plan - Patient's Chronic Conditions and Co-Morbidity Symptoms are Monitored and

## 2024-08-30 NOTE — DISCHARGE SUMMARY
[facility]    Patient Demographics    Patient. Megha Rojas  Date of Birth. 1940  MRN. [id number]     Primary care provider. Denice Reynolds MD  (Tel: [phone])    Admit date: 8/28/2024    Discharge date 8/30/2024  Note Date: 8/30/2024     Reason for Hospitalization.   Chief Complaint   Patient presents with    Shortness of Breath     Pt c/o sob and cough with productive green yellow secretions that started on Sunday,  denies fevers, no nausea, vomiting or diarrhea.  Went to HD yesterday.  Spo2 81% with ambulation, sitting spo2 89% not on oxygen at home         Significant Findings.   Principal Problem:    Acute respiratory failure with hypoxia (HCC)  Active Problems:    ESRD (end stage renal disease) (HCC)    Disorders of fluid, electrolyte, and acid-base balance    HCAP (healthcare-associated pneumonia)  Resolved Problems:    * No resolved hospital problems. *       Problems and results from this hospitalization that need follow up.  Follow up with PCP regarding PNA  Continue HD schedule     Significant test results and incidental findings.  Chest xray:  IMPRESSION:  Right lower lobe airspace disease, suspicious for pneumonia      Invasive procedures and treatments.   None     Problem-based Hospital Course.    Pt has ESRD and is on HD q T TH SAT,    Reports that she began to have shaking chills and feeling poorly after HD on Tuesday.  Went home and took a long nap,  on Wed she felt short of breath and was brought to ED.  She was admitted and treated for the following:         CAP:   she was treated with 3 days of zithromax and rocephin ,  she was afebrile and easily weaned of oxygen therapy.  Strept pneumo, legionella etc were all  negative.    ESRD on HD q T TH SAT   CAD:  on asa, statin therapy   Hx of PAF:   currently in SR , continue eliquis and BB therapy   HTN:  bp in              Where to Get Your Medications        These medications were sent to [facility] [place], [place] - [address] - P [phone] - F [phone]  [address] [address]      Phone: [phone]   amoxicillin-clavulanate 500-125 MG per tablet         Discharge recommendations given to patient.  Follow Up.  in 1 week   Disposition.  home  Activity. activity as tolerated  Diet: ADULT DIET; Regular; Low Potassium (Less than 3000 mg/day)      Spent 35 minutes in discharge process.    Signed:  JIMENA Jules CNP     8/30/2024 11:51 AM

## 2024-08-30 NOTE — FLOWSHEET NOTE
08/29/24 1451 08/29/24 1755   Vital Signs   BP (!) 116/49 (!) 126/51   Temp 97.4 °F (36.3 °C) 98 °F (36.7 °C)   Pulse 69 73   Respirations 16 16       Treatment time: 3 hours  Net UF: ! L    Pre weight: 84.1 kg (bed scale)  Post weight: 83.1 kg (bed scale)  EDW: 60 kg    Access used: ZULMA AVG  Access function: No problems, area eccymotic from prior infiltrations    Medications or blood products given: None    Regular outpatient schedule:  [facility]    Summary of response to treatment: Tolerated 3 hour HD tx without difficulty. Vital signs stable throughout tx.      Copy of dialysis treatment record placed in chart, to be scanned into EMR.    Report called to isaiah Sarkar RN

## 2024-08-30 NOTE — PLAN OF CARE
Problem: Discharge Planning  Goal: Discharge to home or other facility with appropriate resources  8/29/2024 2209 by Terrence Madera RN  Outcome: Progressing  8/29/2024 2209 by Terrence Madera RN  Outcome: Progressing  8/29/2024 0827 by Leona Schafer RN  Outcome: Progressing     Problem: Safety - Adult  Goal: Free from fall injury  8/29/2024 2209 by Terrence Madera RN  Outcome: Progressing  8/29/2024 2209 by Terrence Madera RN  Outcome: Progressing  8/29/2024 0827 by Leona Schafer RN  Outcome: Progressing     Problem: ABCDS Injury Assessment  Goal: Absence of physical injury  8/29/2024 2209 by Terrence Madera RN  Outcome: Progressing  8/29/2024 2209 by Terrence Madera RN  Outcome: Progressing  8/29/2024 0827 by Leona Schafer RN  Outcome: Progressing     Problem: Pain  Goal: Verbalizes/displays adequate comfort level or baseline comfort level  8/29/2024 2209 by Terrence Madera RN  Outcome: Progressing  8/29/2024 2209 by Terrence Madera RN  Outcome: Progressing     Problem: Neurosensory - Adult  Goal: Achieves maximal functionality and self care  Outcome: Progressing     Problem: Respiratory - Adult  Goal: Achieves optimal ventilation and oxygenation  Outcome: Progressing     Problem: Cardiovascular - Adult  Goal: Absence of cardiac dysrhythmias or at baseline  Outcome: Progressing     Problem: Musculoskeletal - Adult  Goal: Return mobility to safest level of function  Outcome: Progressing  Goal: Return ADL status to a safe level of function  Outcome: Progressing     Problem: Metabolic/Fluid and Electrolytes - Adult  Goal: Hemodynamic stability and optimal renal function maintained  Outcome: Progressing     Problem: Chronic Conditions and Co-morbidities  Goal: Patient's chronic conditions and co-morbidity symptoms are monitored and maintained or improved  Outcome: Progressing

## 2024-08-30 NOTE — PLAN OF CARE
Problem: Discharge Planning  Goal: Discharge to home or other facility with appropriate resources  8/30/2024 0725 by Barbara Nunez RN  Outcome: Progressing  8/29/2024 2209 by Terrence Madera RN  Outcome: Progressing  8/29/2024 2209 by Terrence Madera RN  Outcome: Progressing     Problem: Safety - Adult  Goal: Free from fall injury  8/30/2024 0725 by Barbara Nunez RN  Outcome: Progressing  8/29/2024 2209 by Terrence Madera RN  Outcome: Progressing  8/29/2024 2209 by Terrence Madera RN  Outcome: Progressing     Problem: ABCDS Injury Assessment  Goal: Absence of physical injury  8/30/2024 0725 by Barbara Nunez RN  Outcome: Progressing  Flowsheets (Taken 8/30/2024 0724)  Absence of Physical Injury: Implement safety measures based on patient assessment  8/29/2024 2209 by Terrence Madera RN  Outcome: Progressing  8/29/2024 2209 by Terrence Madera RN  Outcome: Progressing

## 2024-08-30 NOTE — DISCHARGE INSTR - COC
Bowel: {YES / NO:}  Bladder: {YES / NO:}  Urinary Catheter: {Urinary Catheter:291168403}   Colostomy/Ileostomy/Ileal Conduit: {YES / NO:}       Date of Last BM: ***    Intake/Output Summary (Last 24 hours) at 2024 1158  Last data filed at 2024 0911  Gross per 24 hour   Intake 130 ml   Output --   Net 130 ml     I/O last 3 completed shifts:  In: 240 [P.O.:240]  Out: -     Safety Concerns:     { [facility]UP Safety Concerns:433243507}    Impairments/Disabilities:      {[facility] Impairments/Disabilities:231088746}    Nutrition Therapy:  Current Nutrition Therapy:   {[facility] Diet List:044448131}    Routes of Feeding: {[facility] Other Feedings:385289016}  Liquids: {Slp liquid thickness:66140}  Daily Fluid Restriction: {[facility] DME Yes amt example:946376650}  Last Modified Barium Swallow with Video (Video Swallowing Test): {Done Not Done Date:}    Treatments at the Time of Hospital Discharge:   Respiratory Treatments: ***  Oxygen Therapy:  {Therapy; copd oxygen:51455}  Ventilator:    {[facility] Vent List:465979310}    Rehab Therapies: {THERAPEUTIC INTERVENTION:6221163673}  Weight Bearing Status/Restrictions: {[facility] Weight Bearin}  Other Medical Equipment (for information only, NOT a DME order):  {EQUIPMENT:990314619}  Other Treatments: ***    Patient's personal belongings (please select all that are sent with patient):  {[facility] DME Belongings:481646982}    RN SIGNATURE:  {Esignature:469915761}    CASE MANAGEMENT/SOCIAL WORK SECTION    Inpatient Status Date: 2024    Readmission Risk Assessment Score:  Readmission Risk              Risk of Unplanned Readmission:  27         Dialysis Facility (if applicable)   [place] [address]  Phone: [phone].  Fax: [phone]    Schedule: Tuesday, Thursday, and Saturday      / signature: Electronically signed by GLORIA Puga on 24 at 11:59 AM EDT    PHYSICIAN SECTION    Prognosis:  {Prognosis:8202139917}    Condition at Discharge: { Patient Condition:013849392}    Rehab Potential (if transferring to Rehab): {Prognosis:1743523882}    Recommended Labs or Other Treatments After Discharge: ***    Physician Certification: I certify the above information and transfer of Megha Rojas  is necessary for the continuing treatment of the diagnosis listed and that she requires {Admit to Appropriate Level of Care:73287} for {GREATER/LESS:162456013} 30 days.     Update Admission H&P: {[facility] DME Changes in HandP:176047564}    PHYSICIAN SIGNATURE:  {Esignature:380745702}

## 2024-08-30 NOTE — PROGRESS NOTES
Nephrology Progress Note                                                                                                                                                                                                                                                                                                                                                               Office : [phone]     Fax :[phone]    Patient's Name: Megha Rojas  1:44 PM  8/30/2024    Reason for Consult:  ESRD  Requesting Physician:  Denice Reynolds MD  Chief Complaint:    Chief Complaint   Patient presents with    Shortness of Breath     Pt c/o sob and cough with productive green yellow secretions that started on Sunday,  denies fevers, no nausea, vomiting or diarrhea.  Went to HD yesterday.  Spo2 81% with ambulation, sitting spo2 89% not on oxygen at home       Assessment/Plan     # ESRD    # HTN    # Anemia    # Acid- base/ Electrolyte imbalance, Potassium is in normal range     # Disorder of volume, no signs of hypervolemia    # Pneumonia    # A fib    # CAD    Plan   If she stays inpatient, then HD in am to continue TTS session, last HD yesterday and she tolerated well  She is on Coreg and Diltiazem at home, please prescribe as tolerated   Continue Sevelamer as per home dose     History of Present Ilness:    Megha Rojas is a 83 y.o. female with PMHx significant for PAF, CAD, dCHF, ESRD ,hypertension, SAHRA, history of resected colon cancer. Came with soa and cough, started 2 days ago, got worse yesterday .  Has  yellow -green sputum . Feel tired more than usual. Was also lightheaded. No fever, chills , diarrhea.  Had HD yesterday. Had watchman done few months ago.       Interval hx   Feel more energetic today    Past Medical History:   Diagnosis Date    Anemia     CAD (coronary artery disease) 11/2020    Stent    CKD (chronic kidney disease)     History of blood transfusion     Hyperlipidemia     patient states well  controlled    Hypertension        Past Surgical History:   Procedure Laterality Date     SECTION      x3    CHOLECYSTECTOMY, LAPAROSCOPIC N/A 2021    LAPAROSCOPIC CHOLECYSTECTOMY performed by Trung Hurst MD at [facility] OR    COLONOSCOPY N/A 2021    COLONOSCOPY WITH BIOPSY performed by Ed Castellanos MD at [facility] ENDOSCOPY    CYSTOSCOPY  2013    w/ bladder biopsy    EP DEVICE PROCEDURE N/A 2024    Watchman kary closure device performed by Shaw Moncada MD at [facility] CARDIAC CATH LAB    FRACTURE SURGERY      right wrist    HEMICOLECTOMY Right 2021    LAPAROSCOPIC RIGHT COLON RESECTION performed by Trung Hurst MD at [facility] OR    IR TUNNELED CATHETER PLACEMENT GREATER THAN 5 YEARS  2024    IR TUNNELED CATHETER PLACEMENT GREATER THAN 5 YEARS 2024 Raul Toney MD [facility] SPECIAL PROCEDURES    UPPER GASTROINTESTINAL ENDOSCOPY N/A 2021    EGD BIOPSY performed by Ed Castellanos MD at [facility] ENDOSCOPY    UPPER GASTROINTESTINAL ENDOSCOPY N/A 2023    EGD CONTROL HEMORRHAGE, EGD APC STOMACH performed by Junior Hermosillo MD at [facility] ENDOSCOPY    VASCULAR SURGERY Left 2024    LEFT ARM ARTERIOVENOUS GRAFT performed by Haja Evans II, MD at [facility] OR       Family History   Problem Relation Age of Onset    Cirrhosis Mother         Hepatitis    Heart Disease Father     No Known Problems Sister     No Known Problems Sister     Heart Disease Brother     No Known Problems Brother     No Known Problems Brother     Alzheimer's Disease Brother     No Known Problems Brother         reports that she has never smoked. She has never used smokeless tobacco. She reports that she does not drink alcohol and does not use drugs.    Allergies:  Acetomenaphthone (menadiol diacetate) [menadiol sodium diphosphate], Lisinopril-hydrochlorothiazide, Advil [ibuprofen], Aleve [naproxen], and Tylenol [acetaminophen]    Current Medications:    acetaminophen (TYLENOL) tablet 650 mg,  Once  HYDROcodone-acetaminophen (NORCO) 5-325 MG per tablet 1 tablet, Once  aspirin EC tablet 81 mg, Daily  apixaban (ELIQUIS) tablet 2.5 mg, BID  HYDROcodone-acetaminophen (NORCO) 5-325 MG per tablet 1 tablet, BID PRN  rosuvastatin (CRESTOR) tablet 10 mg, Nightly  sodium chloride flush 0.9 % injection 5-40 mL, 2 times per day  sodium chloride flush 0.9 % injection 5-40 mL, PRN  0.9 % sodium chloride infusion, PRN  ondansetron (ZOFRAN-ODT) disintegrating tablet 4 mg, Q8H PRN   Or  ondansetron (ZOFRAN) injection 4 mg, Q6H PRN  polyethylene glycol (GLYCOLAX) packet 17 g, Daily PRN  acetaminophen (TYLENOL) tablet 650 mg, Q6H PRN   Or  acetaminophen (TYLENOL) suppository 650 mg, Q6H PRN  cefTRIAXone (ROCEPHIN) 1,000 mg in sterile water 10 mL IV syringe, Q24H  azithromycin (ZITHROMAX) 500 mg in sodium chloride 0.9 % 250 mL IVPB (Ehof1Vth), Q24H  carvedilol (COREG) tablet 3.125 mg, BID WC  sevelamer (RENVELA) tablet 800 mg, TID WC        Review of Systems:   14 point ROS obtained but were negative except mentioned in HPI      Physical exam:     Vitals:  /65   Pulse 74   Temp 97.3 °F (36.3 °C) (Oral)   Resp 18   Ht 1.524 m (5')   Wt 82.6 kg (182 lb 3.2 oz)   SpO2 91%   BMI 35.58 kg/m²   Constitutional:  OAA X3 NAD Yes  Skin: no rash, turgor wnl  Heent:  eomi, mmm  Neck: no bruits or jvd noted  Cardiovascular:  S1, S2 without m/r/g  Respiratory: CTA B without w/r/r  Abdomen:  , soft, nt, nd  Ext:  lower extremity edema No  Psychiatric: mood and affect STABLE   Musculoskeletal:  Rom, muscular strength intact    Data:   Labs:  CBC:   Recent Labs     08/28/24  1031 08/29/24  0639 08/30/24  0903   WBC 12.9* 13.5* 10.7   HGB 10.7* 9.7* 10.5*    171 196     BMP:    Recent Labs     08/28/24  1031 08/29/24  0639 08/30/24  0903    138 137   K 4.7 4.6 4.3   CL 94* 98* 100   CO2 29 21 22   BUN 33* 47* 30*   CREATININE 5.9* 6.8* 5.2*   GLUCOSE 107* 108* 129*     Ca/Mg/Phos:   Recent Labs     08/28/24  1031

## 2024-09-01 LAB
BACTERIA BLD CULT ORG #2: NORMAL
BACTERIA BLD CULT: NORMAL
BACTERIA SPEC RESP CULT: ABNORMAL
BACTERIA SPEC RESP CULT: ABNORMAL
GRAM STN SPEC: ABNORMAL
ORGANISM: ABNORMAL

## 2024-09-03 LAB
BACTERIA SPEC RESP CULT: ABNORMAL
BACTERIA SPEC RESP CULT: ABNORMAL
GRAM STN SPEC: ABNORMAL
ORGANISM: ABNORMAL

## 2024-09-03 NOTE — TELEPHONE ENCOUNTER
Megha called, she is ready to reschedule the [facility] EDWIN that was canceled due to her being hospitalized.  She requested 9-25 due to dialysis and other appointments.  She will be at [facility] at 12:30 pm for a 1:30 pm EDWIN with RMM.     Libtayo Counseling- I discussed with the patient the risks of Libtayo including but not limited to nausea, vomiting, diarrhea, and bone or muscle pain.  The patient verbalized understanding of the proper use and possible adverse effects of Libtayo.  All of the patient's questions and concerns were addressed.

## 2024-09-25 ENCOUNTER — HOSPITAL ENCOUNTER (OUTPATIENT)
Age: 84
Discharge: HOME | End: 2024-09-27
Attending: INTERNAL MEDICINE
Payer: MEDICARE

## 2024-09-25 VITALS
DIASTOLIC BLOOD PRESSURE: 61 MMHG | OXYGEN SATURATION: 98 % | HEART RATE: 108 BPM | RESPIRATION RATE: 19 BRPM | SYSTOLIC BLOOD PRESSURE: 134 MMHG

## 2024-09-25 DIAGNOSIS — I48.0 PAROXYSMAL A-FIB (HCC): ICD-10-CM

## 2024-09-25 DIAGNOSIS — Z95.818 PRESENCE OF WATCHMAN LEFT ATRIAL APPENDAGE CLOSURE DEVICE: ICD-10-CM

## 2024-09-25 PROCEDURE — 7100000011 HC PHASE II RECOVERY - ADDTL 15 MIN: Performed by: INTERNAL MEDICINE

## 2024-09-25 PROCEDURE — 6360000002 HC RX W HCPCS: Performed by: INTERNAL MEDICINE

## 2024-09-25 PROCEDURE — 7100000010 HC PHASE II RECOVERY - FIRST 15 MIN: Performed by: INTERNAL MEDICINE

## 2024-09-25 PROCEDURE — 99152 MOD SED SAME PHYS/QHP 5/>YRS: CPT | Performed by: INTERNAL MEDICINE

## 2024-09-25 PROCEDURE — 6370000000 HC RX 637 (ALT 250 FOR IP): Performed by: INTERNAL MEDICINE

## 2024-09-25 PROCEDURE — 93312 ECHO TRANSESOPHAGEAL: CPT

## 2024-09-25 RX ORDER — SODIUM CHLORIDE 0.9 % (FLUSH) 0.9 %
5-40 SYRINGE (ML) INJECTION PRN
Status: DISCONTINUED | OUTPATIENT
Start: 2024-09-25 | End: 2024-09-28 | Stop reason: HOSPADM

## 2024-09-25 RX ORDER — SODIUM CHLORIDE 9 MG/ML
INJECTION, SOLUTION INTRAVENOUS PRN
Status: DISCONTINUED | OUTPATIENT
Start: 2024-09-25 | End: 2024-09-28 | Stop reason: HOSPADM

## 2024-09-25 RX ORDER — LIDOCAINE HYDROCHLORIDE 20 MG/ML
SOLUTION OROPHARYNGEAL PRN
Status: COMPLETED | OUTPATIENT
Start: 2024-09-25 | End: 2024-09-25

## 2024-09-25 RX ORDER — SODIUM CHLORIDE 0.9 % (FLUSH) 0.9 %
5-40 SYRINGE (ML) INJECTION EVERY 12 HOURS SCHEDULED
Status: DISCONTINUED | OUTPATIENT
Start: 2024-09-25 | End: 2024-09-28 | Stop reason: HOSPADM

## 2024-09-25 RX ORDER — CLOPIDOGREL BISULFATE 75 MG/1
75 TABLET ORAL DAILY
Qty: 30 TABLET | Refills: 3 | Status: SHIPPED | OUTPATIENT
Start: 2024-09-25

## 2024-09-25 RX ORDER — MIDAZOLAM HYDROCHLORIDE 1 MG/ML
INJECTION INTRAMUSCULAR; INTRAVENOUS PRN
Status: COMPLETED | OUTPATIENT
Start: 2024-09-25 | End: 2024-09-25

## 2024-09-25 RX ADMIN — MIDAZOLAM 0.5 MG: 1 INJECTION INTRAMUSCULAR; INTRAVENOUS at 13:19

## 2024-09-25 RX ADMIN — LIDOCAINE HYDROCHLORIDE 15 ML: 20 SOLUTION ORAL; TOPICAL at 13:06

## 2024-09-26 LAB — ECHO LV EF PHYSICIAN: 50 %

## 2024-11-04 PROBLEM — R03.1 LOW BLOOD PRESSURE READING: Status: RESOLVED | Noted: 2024-05-24 | Resolved: 2024-11-04

## 2024-11-04 PROBLEM — J18.9 HCAP (HEALTHCARE-ASSOCIATED PNEUMONIA): Status: RESOLVED | Noted: 2024-08-28 | Resolved: 2024-11-04

## 2024-11-04 PROBLEM — E87.8 DISORDERS OF FLUID, ELECTROLYTE, AND ACID-BASE BALANCE: Status: RESOLVED | Noted: 2024-08-28 | Resolved: 2024-11-04

## 2024-11-04 PROBLEM — J96.01 ACUTE RESPIRATORY FAILURE WITH HYPOXIA: Status: RESOLVED | Noted: 2024-08-28 | Resolved: 2024-11-04

## 2024-11-04 PROBLEM — I48.0 PAF (PAROXYSMAL ATRIAL FIBRILLATION) (HCC): Chronic | Status: RESOLVED | Noted: 2021-03-10 | Resolved: 2024-11-04

## 2024-11-04 RX ORDER — ROSUVASTATIN CALCIUM 20 MG/1
20 TABLET, COATED ORAL NIGHTLY
Qty: 90 TABLET | Refills: 0 | Status: SHIPPED | OUTPATIENT
Start: 2024-11-04

## 2024-11-06 NOTE — PROGRESS NOTES
[facility]  H+P  Consult  OP Visit  FU Visit   CC/HPI   CC Followup visit for cardiac conditions detailed in assessment and plan below.   Intervention PCI, Watchman   General Doing well.  No new concerns.     Cardiac Sx -CP, -SOB, -dizziness, -syncope, -edema, -orthopnea, -pnd, -fatigue, -palpitations   HISTORY/ALLERGY/ROS   M/S/S/F Hx Reviewed in Epic and updated as appropriate   ALLERG Acetomenaphthone (menadiol diacetate) [menadiol sodium diphosphate], Lisinopril-hydrochlorothiazide, Advil [ibuprofen], Aleve [naproxen], and Tylenol [acetaminophen]   ROS Full ROS obtained and negative except as mentioned in HPI   MEDICATIONS   Cardiac medications reviewed in Epic during visit with patient.   PHYSICAL EXAM   Vitals /66 (Site: Right Upper Arm, Position: Sitting, Cuff Size: Large Adult)   Pulse 65   Ht 1.524 m (5')   Wt 84.4 kg (186 lb)   SpO2 97%   BMI 36.33 kg/m²    Gen Alert, coop, no distress Heart  RRR, no MRG   Lung CTA-B, unlabored, no DTP Extrem Edema -Grade 0 (0mm)      COMPLIANCE   Discussed and counseled on diet, exercise, weight loss, smoking, alcohol, drugs.  All questions answered.   CODING   SCI (82237) - 30-39 mins spent reviewing hx/tests/consults, performing exam, counseling/educating, ordering meds/tests/procedures, referring/communicating w/PCP/consultants, documenting in EMR, interpreting results, communicating medical information and plan with family.   SCRIBE ATTESTATION   Nurse I, Eliane Patino RN, am scribing for and in the presence of Florencio Ly MD. 11/6/2024 9:37 AM EST   Doctor Eliane Patino is working as scribe for and in presence of me and may have prepopulated components of note with my historical intellectual property (IP) under my supervision.  Any additions to this IP performed in my presence and at my direction. Content and accuracy of this note reviewed by me (Florencio Ly MD).   NEW MEDICATIONS   Pt verbalizes understanding of the need for treatment and

## 2024-11-07 ENCOUNTER — OFFICE VISIT (OUTPATIENT)
Dept: CARDIOLOGY CLINIC | Age: 84
End: 2024-11-07

## 2024-11-07 VITALS
HEART RATE: 65 BPM | SYSTOLIC BLOOD PRESSURE: 110 MMHG | HEIGHT: 60 IN | DIASTOLIC BLOOD PRESSURE: 66 MMHG | WEIGHT: 186 LBS | BODY MASS INDEX: 36.52 KG/M2 | OXYGEN SATURATION: 97 %

## 2024-11-07 DIAGNOSIS — E78.00 HYPERCHOLESTEROLEMIA: ICD-10-CM

## 2024-11-07 DIAGNOSIS — I25.10 CORONARY ARTERY DISEASE DUE TO LIPID RICH PLAQUE: Primary | ICD-10-CM

## 2024-11-07 DIAGNOSIS — I48.0 PAROXYSMAL A-FIB (HCC): ICD-10-CM

## 2024-11-07 DIAGNOSIS — I25.83 CORONARY ARTERY DISEASE DUE TO LIPID RICH PLAQUE: Primary | ICD-10-CM

## 2024-11-07 DIAGNOSIS — I10 ESSENTIAL HYPERTENSION: ICD-10-CM

## 2024-12-03 ENCOUNTER — TELEPHONE (OUTPATIENT)
Dept: CARDIOLOGY CLINIC | Age: 84
End: 2024-12-03

## 2024-12-03 NOTE — TELEPHONE ENCOUNTER
Pt had to reschedule her 12/4/24 6 mo watchman fu due to transportation. Pt rescheduled for 12/17/24 at 10 am

## 2024-12-12 NOTE — PROGRESS NOTES
factor modification.    I independently reviewed the ECG and device interrogation    All questions and concerns were addressed with the patient. Alternatives to treatment were discussed.     Thank you for allowing to us to participate in the care of Megha Rojas.    JIMENA [facility]   Office: [phone]

## 2024-12-18 ENCOUNTER — OFFICE VISIT (OUTPATIENT)
Dept: CARDIOLOGY CLINIC | Age: 84
End: 2024-12-18
Payer: MEDICARE

## 2024-12-18 ENCOUNTER — NURSE ONLY (OUTPATIENT)
Dept: CARDIOLOGY CLINIC | Age: 84
End: 2024-12-18

## 2024-12-18 VITALS
WEIGHT: 190.4 LBS | HEIGHT: 60 IN | BODY MASS INDEX: 37.38 KG/M2 | SYSTOLIC BLOOD PRESSURE: 108 MMHG | DIASTOLIC BLOOD PRESSURE: 54 MMHG | HEART RATE: 96 BPM | OXYGEN SATURATION: 95 %

## 2024-12-18 DIAGNOSIS — I44.7 LBBB (LEFT BUNDLE BRANCH BLOCK): ICD-10-CM

## 2024-12-18 DIAGNOSIS — I48.19 PERSISTENT ATRIAL FIBRILLATION (HCC): Primary | ICD-10-CM

## 2024-12-18 DIAGNOSIS — N18.6 ESRD (END STAGE RENAL DISEASE) (HCC): ICD-10-CM

## 2024-12-18 DIAGNOSIS — I51.89 DIASTOLIC DYSFUNCTION: Chronic | ICD-10-CM

## 2024-12-18 DIAGNOSIS — I10 ESSENTIAL HYPERTENSION: Chronic | ICD-10-CM

## 2024-12-18 DIAGNOSIS — G47.33 OSA (OBSTRUCTIVE SLEEP APNEA): ICD-10-CM

## 2024-12-18 DIAGNOSIS — I25.10 CORONARY ARTERY DISEASE INVOLVING NATIVE CORONARY ARTERY OF NATIVE HEART WITHOUT ANGINA PECTORIS: Chronic | ICD-10-CM

## 2024-12-18 PROCEDURE — 99214 OFFICE O/P EST MOD 30 MIN: CPT | Performed by: NURSE PRACTITIONER

## 2024-12-18 PROCEDURE — 3074F SYST BP LT 130 MM HG: CPT | Performed by: NURSE PRACTITIONER

## 2024-12-18 PROCEDURE — 1160F RVW MEDS BY RX/DR IN RCRD: CPT | Performed by: NURSE PRACTITIONER

## 2024-12-18 PROCEDURE — 3078F DIAST BP <80 MM HG: CPT | Performed by: NURSE PRACTITIONER

## 2024-12-18 PROCEDURE — 1159F MED LIST DOCD IN RCRD: CPT | Performed by: NURSE PRACTITIONER

## 2024-12-18 PROCEDURE — 93000 ELECTROCARDIOGRAM COMPLETE: CPT | Performed by: NURSE PRACTITIONER

## 2024-12-18 PROCEDURE — 1123F ACP DISCUSS/DSCN MKR DOCD: CPT | Performed by: NURSE PRACTITIONER

## 2024-12-18 RX ORDER — METOPROLOL SUCCINATE 25 MG/1
25 TABLET, EXTENDED RELEASE ORAL DAILY
Qty: 90 TABLET | Refills: 1 | Status: SHIPPED | OUTPATIENT
Start: 2024-12-18

## 2024-12-18 NOTE — PROGRESS NOTES
Patient comes in for her OV with NPSR today.  Patient has Medtronic Reveal LNQ22 LINQ II-12/13/2021 with Dr. Moncada.     Carelink Interrogation shows the Battery Status is GOOD. Since 8/16//2024 AT/AF with a 18.2% burden. Current in AF today. 17 Tachy episodes noted. Some T-wave oversensing noted. 1.4% PVC burden. Implanted for suspected AF. Patient remains Eliquis and Coreg. Please see interrogation scanned under media tab for more detail. We will continue to follow the Patient remotely

## 2025-01-31 NOTE — TELEPHONE ENCOUNTER
Received refill request for rosuvastatin from [facility] pharmacy.    Last ov:2024 NPSR    Last labs:2023 Care everywhere    Last EK2024    Last Refill:2024     Next appointment:2025 NPSR 2025 NPTS

## 2025-02-03 RX ORDER — ROSUVASTATIN CALCIUM 20 MG/1
20 TABLET, COATED ORAL NIGHTLY
Qty: 90 TABLET | Refills: 0 | Status: SHIPPED | OUTPATIENT
Start: 2025-02-03

## 2025-03-04 ENCOUNTER — TELEPHONE (OUTPATIENT)
Dept: CARDIOLOGY CLINIC | Age: 85
End: 2025-03-04

## 2025-03-04 NOTE — TELEPHONE ENCOUNTER
----- Message from JIMENA Adams CNP sent at 3/4/2025  4:23 PM EST -----  Please change her appt from me to Ruddy in next couple months. Thanks  ----- Message -----  From: Luisito Gaston MD  Sent: 3/4/2025   2:06 PM EST  To: Ruddy Tavarez MD; JIMENA Adams CNP    She seems to be having more A-fib.  May be procedure or medical therapy.  She has watchman

## 2025-05-13 NOTE — TELEPHONE ENCOUNTER
Received refill request for Rosuvastatin Calcium 20 MG Oral Tablet  from [facility] pharmacy.     Last OV: 12/18/24    Next OV: 7/3/25    Last Labs: lipid 10/31/22    Last Filled: 02/3/25

## 2025-05-14 RX ORDER — ROSUVASTATIN CALCIUM 20 MG/1
TABLET, COATED ORAL
Qty: 30 TABLET | Refills: 0 | Status: SHIPPED | OUTPATIENT
Start: 2025-05-14

## 2025-05-22 PROCEDURE — 93298 REM INTERROG DEV EVAL SCRMS: CPT | Performed by: INTERNAL MEDICINE

## 2025-06-18 NOTE — TELEPHONE ENCOUNTER
Last ov:24 NPSR  Next ov:25 RMM  Last EK24  Last labs:24  Last filled:   Disp Refills Start End    rosuvastatin (CRESTOR) 20 MG tablet 30 tablet 0 2025 --    Sig: TAKE 1 TABLET BY MOUTH NIGHTLY . APPOINTMENT REQUIRED FOR FUTURE REFILLS    Sent to pharmacy as: Rosuvastatin Calcium 20 MG Oral Tablet (CRESTOR)    Notes to Pharmacy: Needs labs for additional refills    E-Prescribing Status: Receipt confirmed by pharmacy (2025  7:41 AM EDT)

## 2025-06-19 RX ORDER — ROSUVASTATIN CALCIUM 20 MG/1
TABLET, COATED ORAL
Qty: 30 TABLET | Refills: 0 | Status: SHIPPED | OUTPATIENT
Start: 2025-06-19

## 2025-07-02 NOTE — PROGRESS NOTES
[facility]   Electrophysiology Follow up   Date: 7/3/2025  I had the privilege of visiting Megha Rojas in the office.       CC: AF   HPI: Megha Rojas is a 84 y.o. female with a past medical history of atrial fibrillation,HTN, HLD, CKD, PAF, dCHF, and CAD.     In March of 2021, pt presented to hospital by recommendation of her PCP for anemia. Her hemoglobin was 5.8 and was she received multiple units of blood. GI completed colonoscopy/EGD with no gross bleeding found, however, she was found to have a mass in her colon. She underwent surgical bowel resection. During admission, she developed AF with RVR and was started on amiodarone.     S/p ILR placement 12/13/2021 12/2023 Admitted with GIB and anemia. 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     Later admitted with worsening YESENIA and was started on dialysis     S/p SAIDA closure with Watchman (7/1/24)  for frequent GI bleeds and anemia.    History of Present Illness  The patient presents for atrial fibrillation.    She reports feeling well overall, with no noticeable symptoms of atrial fibrillation. She is not experiencing any pain or swelling. She has been managing her condition without medication and feels comfortable with this approach. She has requested a refill of her metoprolol prescription, which she has attempted to obtain from the pharmacy on several occasions. Her condition has improved since starting dialysis.        Assessment and plan:   -Persistent atrial fibrillation    High risk [id number] score.    S/p SAIDA closure with Watchman (7/1/24); verified closure on EDWIN (9/24)  Continue ASA.    Continue toprol xl 25 mg daily and Diltiazem 120 mg QD for rate control      No longer on Amiodarone - stopped d/t lack of recurrence and pt concern for side effects    Increased burden of AF at last office visit. Not symptomatic. NP changes coreg to toprol xl to see if this would help decrease burden.     I discussed treatment options in detail with

## 2025-07-03 ENCOUNTER — OFFICE VISIT (OUTPATIENT)
Dept: CARDIOLOGY CLINIC | Age: 85
End: 2025-07-03
Payer: MEDICARE

## 2025-07-03 ENCOUNTER — CLINICAL SUPPORT (OUTPATIENT)
Dept: CARDIOLOGY CLINIC | Age: 85
End: 2025-07-03

## 2025-07-03 VITALS
HEIGHT: 60 IN | WEIGHT: 188 LBS | SYSTOLIC BLOOD PRESSURE: 120 MMHG | HEART RATE: 104 BPM | OXYGEN SATURATION: 95 % | BODY MASS INDEX: 36.91 KG/M2 | DIASTOLIC BLOOD PRESSURE: 70 MMHG

## 2025-07-03 DIAGNOSIS — I48.19 PERSISTENT ATRIAL FIBRILLATION (HCC): ICD-10-CM

## 2025-07-03 DIAGNOSIS — I10 ESSENTIAL HYPERTENSION: Chronic | ICD-10-CM

## 2025-07-03 DIAGNOSIS — I25.10 CORONARY ARTERY DISEASE INVOLVING NATIVE CORONARY ARTERY OF NATIVE HEART WITHOUT ANGINA PECTORIS: Primary | Chronic | ICD-10-CM

## 2025-07-03 DIAGNOSIS — I44.7 LBBB (LEFT BUNDLE BRANCH BLOCK): ICD-10-CM

## 2025-07-03 PROCEDURE — 3074F SYST BP LT 130 MM HG: CPT | Performed by: INTERNAL MEDICINE

## 2025-07-03 PROCEDURE — 3078F DIAST BP <80 MM HG: CPT | Performed by: INTERNAL MEDICINE

## 2025-07-03 PROCEDURE — 99214 OFFICE O/P EST MOD 30 MIN: CPT | Performed by: INTERNAL MEDICINE

## 2025-07-03 PROCEDURE — 1159F MED LIST DOCD IN RCRD: CPT | Performed by: INTERNAL MEDICINE

## 2025-07-03 PROCEDURE — 1123F ACP DISCUSS/DSCN MKR DOCD: CPT | Performed by: INTERNAL MEDICINE

## 2025-07-03 PROCEDURE — G2211 COMPLEX E/M VISIT ADD ON: HCPCS | Performed by: INTERNAL MEDICINE

## 2025-07-03 PROCEDURE — 93000 ELECTROCARDIOGRAM COMPLETE: CPT | Performed by: INTERNAL MEDICINE

## 2025-07-03 RX ORDER — METOPROLOL SUCCINATE 25 MG/1
25 TABLET, EXTENDED RELEASE ORAL DAILY
Qty: 90 TABLET | Refills: 3 | Status: SHIPPED | OUTPATIENT
Start: 2025-07-03

## 2025-07-03 NOTE — PROGRESS NOTES
Patient comes in for her OV with Dr. Tavarez today.  Patient has Medtronic Reveal LNQ22 LINQ II-12/13/2021 with Dr. Moncada.     Carelink Interrogation shows the Battery Status is GOOD. Since 12/18/2024 AT/AF with a 82.1% burden. Current in AF today. 1 Tachy episodes noted on 12/25/2024 lasting 9 seconds, RVR. 3.8% PVC burden. Implanted for suspected AF. Patient remains Eliquis and Coreg. Please see interrogation scanned under media tab for more detail. We will continue to follow the Patient remotely.

## 2025-07-16 ENCOUNTER — HOSPITAL ENCOUNTER (INPATIENT)
Age: 85
LOS: 5 days | Discharge: HOME | DRG: 291 | End: 2025-07-21
Attending: HOSPITALIST | Admitting: HOSPITALIST
Payer: MEDICARE

## 2025-07-16 ENCOUNTER — APPOINTMENT (OUTPATIENT)
Dept: CT IMAGING | Age: 85
DRG: 291 | End: 2025-07-16
Payer: MEDICARE

## 2025-07-16 ENCOUNTER — APPOINTMENT (OUTPATIENT)
Dept: GENERAL RADIOLOGY | Age: 85
DRG: 291 | End: 2025-07-16
Payer: MEDICARE

## 2025-07-16 DIAGNOSIS — Z99.2 DIALYSIS PATIENT: ICD-10-CM

## 2025-07-16 DIAGNOSIS — M79.89 LEG SWELLING: ICD-10-CM

## 2025-07-16 DIAGNOSIS — B35.1 ONYCHOMYCOSIS: ICD-10-CM

## 2025-07-16 DIAGNOSIS — I42.9 CARDIOMYOPATHY, UNSPECIFIED TYPE (HCC): ICD-10-CM

## 2025-07-16 DIAGNOSIS — I50.9 ACUTE ON CHRONIC CONGESTIVE HEART FAILURE, UNSPECIFIED HEART FAILURE TYPE (HCC): Primary | ICD-10-CM

## 2025-07-16 LAB
ALBUMIN SERPL-MCNC: 3.7 G/DL (ref 3.4–5)
ALBUMIN/GLOB SERPL: 1.2 {RATIO} (ref 1.1–2.2)
ALP SERPL-CCNC: 75 U/L (ref 40–129)
ALT SERPL-CCNC: 8 U/L (ref 10–40)
ANION GAP SERPL CALCULATED.3IONS-SCNC: 15 MMOL/L (ref 3–16)
AST SERPL-CCNC: 21 U/L (ref 15–37)
BACTERIA URNS QL MICRO: ABNORMAL /HPF
BASOPHILS # BLD: 0.1 K/UL (ref 0–0.2)
BASOPHILS NFR BLD: 1.3 %
BILIRUB SERPL-MCNC: 0.9 MG/DL (ref 0–1)
BILIRUB UR QL STRIP.AUTO: NEGATIVE
BUN SERPL-MCNC: 22 MG/DL (ref 7–20)
CALCIUM SERPL-MCNC: 9.6 MG/DL (ref 8.3–10.6)
CHLORIDE SERPL-SCNC: 96 MMOL/L (ref 99–110)
CLARITY UR: ABNORMAL
CO2 SERPL-SCNC: 29 MMOL/L (ref 21–32)
COLOR UR: YELLOW
CREAT SERPL-MCNC: 4.5 MG/DL (ref 0.6–1.2)
D-DIMER QUANTITATIVE: 1.79 UG/ML FEU (ref 0–0.6)
DEPRECATED RDW RBC AUTO: 21.7 % (ref 12.4–15.4)
EOSINOPHIL # BLD: 0.1 K/UL (ref 0–0.6)
EOSINOPHIL NFR BLD: 1.9 %
EPI CELLS #/AREA URNS AUTO: 13 /HPF (ref 0–5)
GFR SERPLBLD CREATININE-BSD FMLA CKD-EPI: 9 ML/MIN/{1.73_M2}
GLUCOSE SERPL-MCNC: 92 MG/DL (ref 70–99)
GLUCOSE UR STRIP.AUTO-MCNC: 100 MG/DL
HCT VFR BLD AUTO: 39.5 % (ref 36–48)
HGB BLD-MCNC: 12.5 G/DL (ref 12–16)
HGB UR QL STRIP.AUTO: ABNORMAL
HYALINE CASTS #/AREA URNS AUTO: 5 /LPF (ref 0–8)
KETONES UR STRIP.AUTO-MCNC: NEGATIVE MG/DL
LACTATE BLDV-SCNC: 1.6 MMOL/L (ref 0.4–1.9)
LEUKOCYTE ESTERASE UR QL STRIP.AUTO: ABNORMAL
LYMPHOCYTES # BLD: 1.3 K/UL (ref 1–5.1)
LYMPHOCYTES NFR BLD: 25.4 %
MCH RBC QN AUTO: 27.4 PG (ref 26–34)
MCHC RBC AUTO-ENTMCNC: 31.6 G/DL (ref 31–36)
MCV RBC AUTO: 86.9 FL (ref 80–100)
MONOCYTES # BLD: 0.5 K/UL (ref 0–1.3)
MONOCYTES NFR BLD: 10.1 %
NEUTROPHILS # BLD: 3.2 K/UL (ref 1.7–7.7)
NEUTROPHILS NFR BLD: 61.3 %
NITRITE UR QL STRIP.AUTO: NEGATIVE
NT-PROBNP SERPL-MCNC: ABNORMAL PG/ML (ref 0–449)
PH UR STRIP.AUTO: >=9 [PH] (ref 5–8)
PLATELET # BLD AUTO: 122 K/UL (ref 135–450)
PMV BLD AUTO: 9.1 FL (ref 5–10.5)
POTASSIUM SERPL-SCNC: 4.9 MMOL/L (ref 3.5–5.1)
PROT SERPL-MCNC: 6.8 G/DL (ref 6.4–8.2)
PROT UR STRIP.AUTO-MCNC: 300 MG/DL
RBC # BLD AUTO: 4.55 M/UL (ref 4–5.2)
RBC CLUMPS #/AREA URNS AUTO: 271 /HPF (ref 0–4)
SODIUM SERPL-SCNC: 140 MMOL/L (ref 136–145)
SP GR UR STRIP.AUTO: 1.01 (ref 1–1.03)
TROPONIN, HIGH SENSITIVITY: 33 NG/L (ref 0–14)
TROPONIN, HIGH SENSITIVITY: 34 NG/L (ref 0–14)
TSH SERPL DL<=0.005 MIU/L-ACNC: 2.75 UIU/ML (ref 0.27–4.2)
UA COMPLETE W REFLEX CULTURE PNL UR: YES
UA DIPSTICK W REFLEX MICRO PNL UR: YES
URN SPEC COLLECT METH UR: ABNORMAL
UROBILINOGEN UR STRIP-ACNC: 1 E.U./DL
WBC # BLD AUTO: 5.2 K/UL (ref 4–11)
WBC #/AREA URNS AUTO: 576 /HPF (ref 0–5)

## 2025-07-16 PROCEDURE — 96375 TX/PRO/DX INJ NEW DRUG ADDON: CPT

## 2025-07-16 PROCEDURE — 83540 ASSAY OF IRON: CPT

## 2025-07-16 PROCEDURE — 83550 IRON BINDING TEST: CPT

## 2025-07-16 PROCEDURE — 85379 FIBRIN DEGRADATION QUANT: CPT

## 2025-07-16 PROCEDURE — 85025 COMPLETE CBC W/AUTO DIFF WBC: CPT

## 2025-07-16 PROCEDURE — 81001 URINALYSIS AUTO W/SCOPE: CPT

## 2025-07-16 PROCEDURE — 83605 ASSAY OF LACTIC ACID: CPT

## 2025-07-16 PROCEDURE — 2500000003 HC RX 250 WO HCPCS: Performed by: HOSPITALIST

## 2025-07-16 PROCEDURE — 6360000002 HC RX W HCPCS: Performed by: NURSE PRACTITIONER

## 2025-07-16 PROCEDURE — 80053 COMPREHEN METABOLIC PANEL: CPT

## 2025-07-16 PROCEDURE — 71260 CT THORAX DX C+: CPT

## 2025-07-16 PROCEDURE — 93005 ELECTROCARDIOGRAM TRACING: CPT

## 2025-07-16 PROCEDURE — 2500000003 HC RX 250 WO HCPCS: Performed by: NURSE PRACTITIONER

## 2025-07-16 PROCEDURE — 71045 X-RAY EXAM CHEST 1 VIEW: CPT

## 2025-07-16 PROCEDURE — 87086 URINE CULTURE/COLONY COUNT: CPT

## 2025-07-16 PROCEDURE — 82728 ASSAY OF FERRITIN: CPT

## 2025-07-16 PROCEDURE — 6360000002 HC RX W HCPCS: Performed by: HOSPITALIST

## 2025-07-16 PROCEDURE — 6370000000 HC RX 637 (ALT 250 FOR IP): Performed by: HOSPITALIST

## 2025-07-16 PROCEDURE — 6360000004 HC RX CONTRAST MEDICATION

## 2025-07-16 PROCEDURE — 6360000002 HC RX W HCPCS

## 2025-07-16 PROCEDURE — 2500000003 HC RX 250 WO HCPCS

## 2025-07-16 PROCEDURE — 84484 ASSAY OF TROPONIN QUANT: CPT

## 2025-07-16 PROCEDURE — 84443 ASSAY THYROID STIM HORMONE: CPT

## 2025-07-16 PROCEDURE — 83880 ASSAY OF NATRIURETIC PEPTIDE: CPT

## 2025-07-16 PROCEDURE — 99285 EMERGENCY DEPT VISIT HI MDM: CPT

## 2025-07-16 PROCEDURE — 1200000000 HC SEMI PRIVATE

## 2025-07-16 PROCEDURE — 96374 THER/PROPH/DIAG INJ IV PUSH: CPT

## 2025-07-16 RX ORDER — HYDROCODONE BITARTRATE AND ACETAMINOPHEN 5; 325 MG/1; MG/1
1 TABLET ORAL 2 TIMES DAILY
Status: DISCONTINUED | OUTPATIENT
Start: 2025-07-16 | End: 2025-07-21 | Stop reason: HOSPADM

## 2025-07-16 RX ORDER — ACETAMINOPHEN 325 MG/1
650 TABLET ORAL EVERY 6 HOURS PRN
Status: DISCONTINUED | OUTPATIENT
Start: 2025-07-16 | End: 2025-07-21 | Stop reason: HOSPADM

## 2025-07-16 RX ORDER — METOPROLOL SUCCINATE 25 MG/1
25 TABLET, EXTENDED RELEASE ORAL DAILY
Status: DISCONTINUED | OUTPATIENT
Start: 2025-07-17 | End: 2025-07-21 | Stop reason: HOSPADM

## 2025-07-16 RX ORDER — ROSUVASTATIN CALCIUM 20 MG/1
20 TABLET, COATED ORAL NIGHTLY
Status: DISCONTINUED | OUTPATIENT
Start: 2025-07-16 | End: 2025-07-21 | Stop reason: HOSPADM

## 2025-07-16 RX ORDER — FUROSEMIDE 10 MG/ML
40 INJECTION INTRAMUSCULAR; INTRAVENOUS 2 TIMES DAILY
Status: DISCONTINUED | OUTPATIENT
Start: 2025-07-17 | End: 2025-07-19

## 2025-07-16 RX ORDER — SODIUM CHLORIDE 0.9 % (FLUSH) 0.9 %
5-40 SYRINGE (ML) INJECTION EVERY 12 HOURS SCHEDULED
Status: DISCONTINUED | OUTPATIENT
Start: 2025-07-16 | End: 2025-07-21 | Stop reason: HOSPADM

## 2025-07-16 RX ORDER — FUROSEMIDE 10 MG/ML
40 INJECTION INTRAMUSCULAR; INTRAVENOUS ONCE
Status: COMPLETED | OUTPATIENT
Start: 2025-07-16 | End: 2025-07-16

## 2025-07-16 RX ORDER — SUCROFERRIC OXYHYDROXIDE 500 MG/1
500 TABLET, CHEWABLE ORAL
COMMUNITY

## 2025-07-16 RX ORDER — IOPAMIDOL 755 MG/ML
75 INJECTION, SOLUTION INTRAVASCULAR
Status: COMPLETED | OUTPATIENT
Start: 2025-07-16 | End: 2025-07-16

## 2025-07-16 RX ORDER — SODIUM CHLORIDE 0.9 % (FLUSH) 0.9 %
5-40 SYRINGE (ML) INJECTION PRN
Status: DISCONTINUED | OUTPATIENT
Start: 2025-07-16 | End: 2025-07-21 | Stop reason: HOSPADM

## 2025-07-16 RX ORDER — ASPIRIN 81 MG/1
81 TABLET ORAL DAILY
Status: DISCONTINUED | OUTPATIENT
Start: 2025-07-17 | End: 2025-07-21 | Stop reason: HOSPADM

## 2025-07-16 RX ORDER — ACETAMINOPHEN 650 MG/1
650 SUPPOSITORY RECTAL EVERY 6 HOURS PRN
Status: DISCONTINUED | OUTPATIENT
Start: 2025-07-16 | End: 2025-07-21 | Stop reason: HOSPADM

## 2025-07-16 RX ORDER — ONDANSETRON 4 MG/1
4 TABLET, ORALLY DISINTEGRATING ORAL EVERY 8 HOURS PRN
Status: DISCONTINUED | OUTPATIENT
Start: 2025-07-16 | End: 2025-07-21 | Stop reason: HOSPADM

## 2025-07-16 RX ORDER — ONDANSETRON 2 MG/ML
4 INJECTION INTRAMUSCULAR; INTRAVENOUS EVERY 6 HOURS PRN
Status: DISCONTINUED | OUTPATIENT
Start: 2025-07-16 | End: 2025-07-21 | Stop reason: HOSPADM

## 2025-07-16 RX ORDER — HEPARIN SODIUM 5000 [USP'U]/ML
5000 INJECTION, SOLUTION INTRAVENOUS; SUBCUTANEOUS EVERY 8 HOURS SCHEDULED
Status: DISCONTINUED | OUTPATIENT
Start: 2025-07-16 | End: 2025-07-21 | Stop reason: HOSPADM

## 2025-07-16 RX ORDER — SEVELAMER CARBONATE 800 MG/1
800 TABLET, FILM COATED ORAL
Status: DISCONTINUED | OUTPATIENT
Start: 2025-07-17 | End: 2025-07-21 | Stop reason: HOSPADM

## 2025-07-16 RX ORDER — SODIUM CHLORIDE 9 MG/ML
INJECTION, SOLUTION INTRAVENOUS PRN
Status: DISCONTINUED | OUTPATIENT
Start: 2025-07-16 | End: 2025-07-21 | Stop reason: HOSPADM

## 2025-07-16 RX ADMIN — WATER 40 MG: 1 INJECTION INTRAMUSCULAR; INTRAVENOUS; SUBCUTANEOUS at 17:04

## 2025-07-16 RX ADMIN — ROSUVASTATIN 20 MG: 20 TABLET, FILM COATED ORAL at 22:23

## 2025-07-16 RX ADMIN — FUROSEMIDE 40 MG: 10 INJECTION, SOLUTION INTRAMUSCULAR; INTRAVENOUS at 18:17

## 2025-07-16 RX ADMIN — SODIUM CHLORIDE, PRESERVATIVE FREE 10 ML: 5 INJECTION INTRAVENOUS at 22:36

## 2025-07-16 RX ADMIN — HEPARIN SODIUM 5000 UNITS: 5000 INJECTION INTRAVENOUS; SUBCUTANEOUS at 22:21

## 2025-07-16 RX ADMIN — IOPAMIDOL 75 ML: 755 INJECTION, SOLUTION INTRAVENOUS at 19:53

## 2025-07-16 RX ADMIN — WATER 1000 MG: 1 INJECTION INTRAMUSCULAR; INTRAVENOUS; SUBCUTANEOUS at 22:22

## 2025-07-16 RX ADMIN — HYDROCODONE BITARTRATE AND ACETAMINOPHEN 1 TABLET: 5; 325 TABLET ORAL at 22:20

## 2025-07-16 ASSESSMENT — PAIN SCALES - GENERAL
PAINLEVEL_OUTOF10: 0
PAINLEVEL_OUTOF10: 5

## 2025-07-16 ASSESSMENT — PAIN DESCRIPTION - LOCATION: LOCATION: KNEE

## 2025-07-16 ASSESSMENT — PAIN DESCRIPTION - DESCRIPTORS: DESCRIPTORS: ACHING

## 2025-07-16 ASSESSMENT — PAIN - FUNCTIONAL ASSESSMENT: PAIN_FUNCTIONAL_ASSESSMENT: 0-10

## 2025-07-16 ASSESSMENT — PAIN DESCRIPTION - ORIENTATION: ORIENTATION: RIGHT;LEFT

## 2025-07-16 NOTE — ED PROVIDER NOTES
[facility]  EMERGENCY DEPARTMENT ENCOUNTER        Pt Name: Megha Rojas  MRN: [id number]  Birthdate 1940  Date of evaluation: 2025  Provider: Elvia Calhoun PA-C  PCP: Denice Reynolds MD  Note Started: 3:26 PM EDT 25      BRYANNA. I have evaluated this patient.        CHIEF COMPLAINT       Chief Complaint   Patient presents with    Shortness of Breath     Pt. Brought in via [facility] from home, Pt. C/O shortness of breathes since 4am. Per EMS .       HISTORY OF PRESENT ILLNESS: 1 or more Elements     History From: Patient, patient's daughter            Chief Complaint: Shortness of breath    Megha Rojas is a 84 y.o. female who presents with shortness of breath that she first noted at 4 AM this morning.  She states she woke up and folic it was hard to breathe, and it was hard to catch her breath.  She went back to sleep and woke up again at noon, she states that the shortness of breath was then persistent.  She states she does not typically have issues with shortness of breath.  She has a history of atrial fibrillation, she has had a Watchman device implanted before.  She is not on any blood thinners, she does take aspirin daily.  She reports that she intermittently spits up phlegm, but she chronically does this.  She denies any palpitations.  She denies chest pain.  She denies fever, chills.     Nursing Notes were all reviewed and agreed with or any disagreements were addressed in the HPI.    REVIEW OF SYSTEMS :      Review of Systems    Positives and Pertinent negatives as per HPI.     SURGICAL HISTORY     Past Surgical History:   Procedure Laterality Date     SECTION      x3    CHOLECYSTECTOMY, LAPAROSCOPIC N/A 2021    LAPAROSCOPIC CHOLECYSTECTOMY performed by Trung Hurst MD at [facility] OR    COLONOSCOPY N/A 2021    COLONOSCOPY WITH BIOPSY performed by Ed Castellanos MD at [facility] ENDOSCOPY    CYSTOSCOPY  2013    w/ bladder biopsy    EP  Eliquis and Coreg. Please see interrogation scanned under media tab for more detail. We will continue to follow the Patient remotely.     Chronic stasis dermatitis of bilateral lower extremities     EDWIN 9/25/24:     Left Ventricle: Low normal left ventricular systolic function with a visually estimated EF of 50 - 55%. Left ventricle size is normal.    Left Atrium: Left atrium is dilated. Watchman device in the appendage seen, with no thrombus. No olivia-device leak noted using Color Doppler. .    Aortic Valve: Mild regurgitation.    Mitral Valve: Mild regurgitation.    Tricuspid Valve: Mild regurgitation.    Image quality is adequate.    CC/HPI Summary, DDx, ED Course, and Reassessment: 84-year-old female presenting with shortness of breath that occurred when she woke up this morning.  On my initial assessment, patient is sitting in exam bed in no acute distress.  Vital signs are unremarkable.  She is in atrial fibrillation, this is not atypical for her.  Daughter however, does state that she typically flips out of atrial fibrillation but she remains in atrial fibrillation throughout her entire course of stay in the emergency department today.  Heart regular rate.  Not in RVR.    CBC with no leukocytosis, no anemia.  CMP with creatinine 4.5, patient is a dialysis patient and gets dialysis on Tuesday, Thursday, Saturday.  She did have dialysis yesterday.    Troponin mildly elevated but stable.  BNP value greater than 70,000.  Patient does have a history of CHF, she was taken off of her Lasix in January.  D-dimer was elevated today at 1.79, CT PE study ordered.  PE could be possible etiology behind CHF exacerbation today.  She is not having hemoptysis, cough, low suspicion.  CT PE study was pending at time of admission, but it did come back with no PE.  It was consistent with CHF and possible mild consolidation in the lower lobes but likely related CHF.    On my reassessment, patient is doing well.  I ordered 40 mg IV

## 2025-07-16 NOTE — PROGRESS NOTES
Pharmacy Home Medication Reconciliation Note    A medication reconciliation has been completed for Megha Rojas 1940    Pharmacy: [facility] [place]  Information provided by: patient, patient's family, and fill history    The patient's home medication list is as follows:  No current facility-administered medications on file prior to encounter.     Current Outpatient Medications on File Prior to Encounter   Medication Sig Dispense Refill    sucroferric oxyhydroxide (VELPHORO) 500 MG CHEW chewable tablet Take 1 tablet by mouth 3 times daily (with meals)      metoprolol succinate (TOPROL XL) 25 MG extended release tablet Take 1 tablet by mouth daily 90 tablet 3    rosuvastatin (CRESTOR) 20 MG tablet TAKE 1 TABLET BY MOUTH NIGHTLY . APPOINTMENT REQUIRED FOR FUTURE REFILLS (Patient taking differently: Take 1 tablet by mouth nightly) 30 tablet 0    HYDROcodone-acetaminophen (NORCO) 5-325 MG per tablet Take 1 tablet by mouth 2 times daily.      aspirin EC 81 MG EC tablet Take 1 tablet by mouth daily 30 tablet 0    clopidogrel (PLAVIX) 75 MG tablet Take 1 tablet by mouth daily (Patient not taking: Reported on 7/16/2025) 30 tablet 3       Patient is no longer taking Plavix.    Of note, patient took all AM meds prior to ED arrival.    Timing of last doses updated.    Thank you,  Venkata Pham, Pharmacy Intern

## 2025-07-17 ENCOUNTER — APPOINTMENT (OUTPATIENT)
Dept: VASCULAR LAB | Age: 85
DRG: 291 | End: 2025-07-17
Attending: INTERNAL MEDICINE
Payer: MEDICARE

## 2025-07-17 PROBLEM — E87.8 DISORDERS OF FLUID, ELECTROLYTE, AND ACID-BASE BALANCE: Status: ACTIVE | Noted: 2025-07-17

## 2025-07-17 PROBLEM — Z99.2 DIALYSIS PATIENT: Status: ACTIVE | Noted: 2025-07-17

## 2025-07-17 PROBLEM — M79.89 LEG SWELLING: Status: ACTIVE | Noted: 2025-07-17

## 2025-07-17 PROBLEM — L03.119 CELLULITIS OF LOWER EXTREMITY: Status: ACTIVE | Noted: 2025-07-17

## 2025-07-17 LAB
ALBUMIN SERPL-MCNC: 3.4 G/DL (ref 3.4–5)
ALP SERPL-CCNC: 70 U/L (ref 40–129)
ALT SERPL-CCNC: 7 U/L (ref 10–40)
ANION GAP SERPL CALCULATED.3IONS-SCNC: 15 MMOL/L (ref 3–16)
AST SERPL-CCNC: 32 U/L (ref 15–37)
BACTERIA UR CULT: NORMAL
BASOPHILS # BLD: 0.1 K/UL (ref 0–0.2)
BASOPHILS NFR BLD: 1.2 %
BILIRUB DIRECT SERPL-MCNC: 0.2 MG/DL (ref 0–0.3)
BILIRUB INDIRECT SERPL-MCNC: 0.5 MG/DL (ref 0–1)
BILIRUB SERPL-MCNC: 0.7 MG/DL (ref 0–1)
BUN SERPL-MCNC: 28 MG/DL (ref 7–20)
CALCIUM SERPL-MCNC: 9.2 MG/DL (ref 8.3–10.6)
CHLORIDE SERPL-SCNC: 97 MMOL/L (ref 99–110)
CHOLEST SERPL-MCNC: 92 MG/DL (ref 0–199)
CO2 SERPL-SCNC: 26 MMOL/L (ref 21–32)
CREAT SERPL-MCNC: 5.1 MG/DL (ref 0.6–1.2)
CRP SERPL-MCNC: <3 MG/L (ref 0–5.1)
CRP SERPL-MCNC: <3 MG/L (ref 0–5.1)
DEPRECATED RDW RBC AUTO: 22 % (ref 12.4–15.4)
EKG DIAGNOSIS: NORMAL
EKG Q-T INTERVAL: 398 MS
EKG QRS DURATION: 136 MS
EKG QTC CALCULATION (BAZETT): 478 MS
EKG R AXIS: -32 DEGREES
EKG T AXIS: 125 DEGREES
EKG VENTRICULAR RATE: 87 BPM
EOSINOPHIL # BLD: 0 K/UL (ref 0–0.6)
EOSINOPHIL NFR BLD: 0.1 %
ERYTHROCYTE [SEDIMENTATION RATE] IN BLOOD BY WESTERGREN METHOD: 30 MM/HR (ref 0–30)
FERRITIN SERPL IA-MCNC: 412 NG/ML (ref 15–150)
GFR SERPLBLD CREATININE-BSD FMLA CKD-EPI: 8 ML/MIN/{1.73_M2}
GLUCOSE BLD-MCNC: 120 MG/DL (ref 70–99)
GLUCOSE BLD-MCNC: 127 MG/DL (ref 70–99)
GLUCOSE BLD-MCNC: 138 MG/DL (ref 70–99)
GLUCOSE BLD-MCNC: 139 MG/DL (ref 70–99)
GLUCOSE BLD-MCNC: 199 MG/DL (ref 70–99)
GLUCOSE BLD-MCNC: 66 MG/DL (ref 70–99)
GLUCOSE BLD-MCNC: 77 MG/DL (ref 70–99)
GLUCOSE BLD-MCNC: 93 MG/DL (ref 70–99)
GLUCOSE SERPL-MCNC: 116 MG/DL (ref 70–99)
HCT VFR BLD AUTO: 38.8 % (ref 36–48)
HDLC SERPL-MCNC: 49 MG/DL (ref 40–60)
HGB BLD-MCNC: 12.4 G/DL (ref 12–16)
IRON SATN MFR SERPL: 13 % (ref 15–50)
IRON SERPL-MCNC: 34 UG/DL (ref 37–145)
LDLC SERPL CALC-MCNC: 34 MG/DL
LYMPHOCYTES # BLD: 1.1 K/UL (ref 1–5.1)
LYMPHOCYTES NFR BLD: 24.4 %
MAGNESIUM SERPL-MCNC: 2.37 MG/DL (ref 1.8–2.4)
MCH RBC QN AUTO: 27.6 PG (ref 26–34)
MCHC RBC AUTO-ENTMCNC: 32 G/DL (ref 31–36)
MCV RBC AUTO: 86.1 FL (ref 80–100)
MONOCYTES # BLD: 0.2 K/UL (ref 0–1.3)
MONOCYTES NFR BLD: 4.3 %
NEUTROPHILS # BLD: 3.2 K/UL (ref 1.7–7.7)
NEUTROPHILS NFR BLD: 70 %
PERFORMED ON: ABNORMAL
PERFORMED ON: NORMAL
PERFORMED ON: NORMAL
PLATELET # BLD AUTO: 105 K/UL (ref 135–450)
PMV BLD AUTO: 9.3 FL (ref 5–10.5)
POTASSIUM SERPL-SCNC: 5.4 MMOL/L (ref 3.5–5.1)
POTASSIUM SERPL-SCNC: 6 MMOL/L (ref 3.5–5.1)
PROCALCITONIN SERPL IA-MCNC: 0.11 NG/ML (ref 0–0.15)
PROT SERPL-MCNC: 6.4 G/DL (ref 6.4–8.2)
RBC # BLD AUTO: 4.51 M/UL (ref 4–5.2)
SODIUM SERPL-SCNC: 138 MMOL/L (ref 136–145)
TIBC SERPL-MCNC: 255 UG/DL (ref 260–445)
TRIGL SERPL-MCNC: 45 MG/DL (ref 0–150)
VLDLC SERPL CALC-MCNC: 9 MG/DL
WBC # BLD AUTO: 4.6 K/UL (ref 4–11)

## 2025-07-17 PROCEDURE — 85652 RBC SED RATE AUTOMATED: CPT

## 2025-07-17 PROCEDURE — 93010 ELECTROCARDIOGRAM REPORT: CPT | Performed by: INTERNAL MEDICINE

## 2025-07-17 PROCEDURE — 6360000002 HC RX W HCPCS: Performed by: NURSE PRACTITIONER

## 2025-07-17 PROCEDURE — 87340 HEPATITIS B SURFACE AG IA: CPT

## 2025-07-17 PROCEDURE — 99223 1ST HOSP IP/OBS HIGH 75: CPT | Performed by: INTERNAL MEDICINE

## 2025-07-17 PROCEDURE — 6360000002 HC RX W HCPCS: Performed by: HOSPITALIST

## 2025-07-17 PROCEDURE — 99223 1ST HOSP IP/OBS HIGH 75: CPT | Performed by: HOSPITALIST

## 2025-07-17 PROCEDURE — 80048 BASIC METABOLIC PNL TOTAL CA: CPT

## 2025-07-17 PROCEDURE — 6370000000 HC RX 637 (ALT 250 FOR IP): Performed by: HOSPITALIST

## 2025-07-17 PROCEDURE — 2060000000 HC ICU INTERMEDIATE R&B

## 2025-07-17 PROCEDURE — 84132 ASSAY OF SERUM POTASSIUM: CPT

## 2025-07-17 PROCEDURE — 83735 ASSAY OF MAGNESIUM: CPT

## 2025-07-17 PROCEDURE — 87040 BLOOD CULTURE FOR BACTERIA: CPT

## 2025-07-17 PROCEDURE — 85025 COMPLETE CBC W/AUTO DIFF WBC: CPT

## 2025-07-17 PROCEDURE — 90935 HEMODIALYSIS ONE EVALUATION: CPT

## 2025-07-17 PROCEDURE — 86706 HEP B SURFACE ANTIBODY: CPT

## 2025-07-17 PROCEDURE — 93970 EXTREMITY STUDY: CPT

## 2025-07-17 PROCEDURE — 2580000003 HC RX 258: Performed by: HOSPITALIST

## 2025-07-17 PROCEDURE — 86140 C-REACTIVE PROTEIN: CPT

## 2025-07-17 PROCEDURE — 2500000003 HC RX 250 WO HCPCS: Performed by: NURSE PRACTITIONER

## 2025-07-17 PROCEDURE — 2500000003 HC RX 250 WO HCPCS: Performed by: HOSPITALIST

## 2025-07-17 PROCEDURE — 80061 LIPID PANEL: CPT

## 2025-07-17 PROCEDURE — 84145 PROCALCITONIN (PCT): CPT

## 2025-07-17 PROCEDURE — 5A1D70Z PERFORMANCE OF URINARY FILTRATION, INTERMITTENT, LESS THAN 6 HOURS PER DAY: ICD-10-PCS | Performed by: HOSPITALIST

## 2025-07-17 PROCEDURE — 80076 HEPATIC FUNCTION PANEL: CPT

## 2025-07-17 PROCEDURE — 36415 COLL VENOUS BLD VENIPUNCTURE: CPT

## 2025-07-17 RX ORDER — CALCIUM GLUCONATE 20 MG/ML
1000 INJECTION, SOLUTION INTRAVENOUS ONCE
Status: COMPLETED | OUTPATIENT
Start: 2025-07-17 | End: 2025-07-17

## 2025-07-17 RX ORDER — GLUCAGON 1 MG/ML
1 KIT INJECTION PRN
Status: DISCONTINUED | OUTPATIENT
Start: 2025-07-17 | End: 2025-07-21 | Stop reason: HOSPADM

## 2025-07-17 RX ORDER — DEXTROSE MONOHYDRATE 100 MG/ML
INJECTION, SOLUTION INTRAVENOUS CONTINUOUS PRN
Status: DISCONTINUED | OUTPATIENT
Start: 2025-07-17 | End: 2025-07-21 | Stop reason: HOSPADM

## 2025-07-17 RX ADMIN — SODIUM CHLORIDE, PRESERVATIVE FREE 10 ML: 5 INJECTION INTRAVENOUS at 22:34

## 2025-07-17 RX ADMIN — DEXTROSE 250 ML: 10 SOLUTION INTRAVENOUS at 06:15

## 2025-07-17 RX ADMIN — ROSUVASTATIN 20 MG: 20 TABLET, FILM COATED ORAL at 22:33

## 2025-07-17 RX ADMIN — ASPIRIN 81 MG: 81 TABLET, COATED ORAL at 08:46

## 2025-07-17 RX ADMIN — METOPROLOL SUCCINATE 25 MG: 25 TABLET, EXTENDED RELEASE ORAL at 08:46

## 2025-07-17 RX ADMIN — FUROSEMIDE 40 MG: 10 INJECTION, SOLUTION INTRAMUSCULAR; INTRAVENOUS at 19:08

## 2025-07-17 RX ADMIN — HYDROCODONE BITARTRATE AND ACETAMINOPHEN 1 TABLET: 5; 325 TABLET ORAL at 22:33

## 2025-07-17 RX ADMIN — HEPARIN SODIUM 5000 UNITS: 5000 INJECTION INTRAVENOUS; SUBCUTANEOUS at 06:32

## 2025-07-17 RX ADMIN — HEPARIN SODIUM 5000 UNITS: 5000 INJECTION INTRAVENOUS; SUBCUTANEOUS at 17:33

## 2025-07-17 RX ADMIN — SEVELAMER CARBONATE 800 MG: 800 TABLET, FILM COATED ORAL at 08:46

## 2025-07-17 RX ADMIN — CALCIUM GLUCONATE 1000 MG: 20 INJECTION, SOLUTION INTRAVENOUS at 06:21

## 2025-07-17 RX ADMIN — SEVELAMER CARBONATE 800 MG: 800 TABLET, FILM COATED ORAL at 17:32

## 2025-07-17 RX ADMIN — WATER 1000 MG: 1 INJECTION INTRAMUSCULAR; INTRAVENOUS; SUBCUTANEOUS at 22:34

## 2025-07-17 RX ADMIN — HEPARIN SODIUM 5000 UNITS: 5000 INJECTION INTRAVENOUS; SUBCUTANEOUS at 22:33

## 2025-07-17 RX ADMIN — SODIUM CHLORIDE, PRESERVATIVE FREE 10 ML: 5 INJECTION INTRAVENOUS at 08:49

## 2025-07-17 RX ADMIN — FUROSEMIDE 40 MG: 10 INJECTION, SOLUTION INTRAMUSCULAR; INTRAVENOUS at 08:46

## 2025-07-17 RX ADMIN — HYDROCODONE BITARTRATE AND ACETAMINOPHEN 1 TABLET: 5; 325 TABLET ORAL at 08:46

## 2025-07-17 RX ADMIN — INSULIN HUMAN 10 UNITS: 100 INJECTION, SOLUTION PARENTERAL at 06:24

## 2025-07-17 ASSESSMENT — PAIN DESCRIPTION - ORIENTATION: ORIENTATION: LEFT;RIGHT

## 2025-07-17 ASSESSMENT — PAIN DESCRIPTION - LOCATION: LOCATION: KNEE

## 2025-07-17 ASSESSMENT — PAIN SCALES - GENERAL
PAINLEVEL_OUTOF10: 0
PAINLEVEL_OUTOF10: 1
PAINLEVEL_OUTOF10: 0
PAINLEVEL_OUTOF10: 0

## 2025-07-17 NOTE — ED NOTES
Patient Name: Megha Rojas  : 1940 84 y.o.  MRN: [id number]  ED Room #: ED-0006/06     Chief complaint:   Chief Complaint   Patient presents with    Shortness of Breath     Pt. Brought in via [facility] from home, Pt. C/O shortness of breathes since 4am. Per EMS .     Hospital Problem/Diagnosis:   Hospital Problems           Last Modified POA    * (Principal) Acute decompensated heart failure (HCC) 2025 Yes         O2 Flow Rate:O2 Device: None (Room air)   (if applicable)  Cardiac Rhythm:   (if applicable)  Active LDA's:   Peripheral IV 25 Right Antecubital (Active)            How does patient ambulate? Unknown, did not assess in the Emergency Department    2. How does patient take pills? Unknown, no oral medications were given in the Emergency Department    3. Is patient alert? Alert    4. Is patient oriented? To Person, To Place, To Time, To Situation, and Follows Commands    5.   Patient arrived from:  home  Facility Name: ___________________________________________    6. If patient is disoriented or from a Skill Nursing Facility has family been notified of admission? na    7. Patient belongings? Belongings: Clothing    Disposition of belongings? Kept with Patient     8. Any specific patient or family belongings/needs/dynamics?   a. None     9. Miscellaneous comments/pending orders?  a. See admit       If there are any additional questions please reach out to the Emergency Department.

## 2025-07-17 NOTE — PROGRESS NOTES
Treatment time: 3 hrs    Net UF: 1200 ml     Pre weight: 85.1 kg  Post weight: 84.0 kg     Access used: ZULMA AVG  Access function:  tolerated well,   ml/min     Medications or blood products given: none     Regular outpatient schedule: TTS     Summary of response to treatment: Pt tolerated well. Pt remained stable throughout entire treatment and upon exiting the hemodialysis suite.      Copy of dialysis treatment record placed in chart, to be scanned into EMR.

## 2025-07-17 NOTE — CONSULTS
[facility], [facility] [place]   Electrophysiology   Date: 7/17/2025  Reason for Consultation: Atrial fibrillation    Consult Requesting Physician: Jennifer Reynaga MD     Chief Complaint   Patient presents with    Shortness of Breath     Pt. Brought in via [facility] from home, Pt. C/O shortness of breathes since 4am. Per EMS .       CC: Shortness of breath and LE swelling    HPI: Megha Rojas is a 84 y.o. 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     S/p ILR placement 12/13/2021  S/p SAIDA closure with Watchman (7/1/24)  for frequent GI bleeds and anemia.     She was previously on amiodarone which was stopped d/t lack of recurrence and risk of long term use.   At last office visit 7/3, options including cardioversion / ablation discussed but she was not interested in cardioversion/resuming amiodarone or ablation.      Pt presented to ED with SOB upon waking up in the morning and worse with exertion. Had BLE edema upon presentation and was in AF with controlled rate. CT showed bilateral pleural effusions and BNP was significantly elevated.     Assessment:   Acute on chronic HFpEF   Permanent atrial fibrillation   s/p Watchman   ESRD on HD  Hyperkalemia  CAD s/p PCI  HTN     Plan:   Patient has permanent atrial fibrillation.  On her last visit we discussed rhythm control versus rate control with the patient and she chose to proceed with rate control only.  Today I discussed with her at her atrial fibrillation treatment options.    HR controlled.     High risk [id number]. S/p Watchman with EDWIN confirmation. Continue ASA 81 mg daily. No anticoagulation indicated.  Continue rate control with home Toprol dose of 25 mg daily  Pt is not interested in antiarrhythmic therapy or ablation. Due to large LA volume, Afib will likely recur with cardioversion and ablation.

## 2025-07-17 NOTE — PROGRESS NOTES
HOSPITALISTS PROGRESS NOTE    7/17/2025 9:04 AM        Name: Megha Rojas .              Admitted: 7/16/2025  Primary Care Provider: Denice Reynolds MD (Tel: [phone])      Brief Course: This 84-year-old female with PMHx of ESRD on HD, A-fib; s/p Watchman, CAD; s/p PCI to LAD 2021, HFpEF, GIB 2/2 AVM (s/p APC &clip), colon cancer s/p hemicolectomy on 3/2021 hypertension hyperlipidemia, who presented with SOB.      Interval history:   Pt seen and examined today   Overnight events noted and interval ancillary notes reviewed.  S/p Lokelma and hyperkalemia cocktail for elevated potassium level.  Recheck potassium levels  Hypoglycemic after receiving insulin this morning; BG improved with orange juice  Up in bed; reported BLE BLE swelling and exertional SOB but denied any fever, chills, palpitation, nausea vomiting or abdominal pain        Assessment & Plan:     Acute on chronic diastolic CHF.    proBNP> 70,000 on admission.  CTPE negative for PE; showed moderate right and small left pleural effusion with mild adjacent consolidation in lower lobes likely atelectasis.  Last echo on 9/24 showed EF of 50%.  Cardiology on board; continue diuresis with IV Lasix.  Fluid and salt restriction.  Daily weights  Monitor electrolytes closely while on IV Lasix and replace as needed    Hx of A-fib; s/p watchman's and ILR.  Rate controlled  Cardiology on board; continue aspirin and metoprolol.  Monitor on telemetry  Patient not interested in antiarrhythmic or ablation.    Elevated troponin, chronic likely 2/2 CKD  Denies any chest pain.  EKG showed A-fib with LBBB; chronic.  No acute ischemic changes     Hx of ESRD on HD T,Th&Sat Friday.  Nephrology consulted for dialysis.  Monitor renal function closely     Elevated D-dimer.  CT chest without acute PE.  Venous Doppler ordered    UTI; UA suggestive of UTI.  Continue Rocephin awaiting urine culture and

## 2025-07-17 NOTE — ACP (ADVANCE CARE PLANNING)
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  Advanced Care Planning Documents: Completed advances directives, advanced directives in chart.      Electronically signed by TAMANNA DOWNING MD on 7/17/2025 at 12:51 PM

## 2025-07-17 NOTE — FLOWSHEET NOTE
07/16/25 2100   Vital Signs   Temp 98 °F (36.7 °C)   Temp Source Temporal   Pulse 83   Heart Rate Source Telemetry   Respirations 22   BP (!) 141/78   MAP (Calculated) 99   BP Location Left lower arm   BP Method Automatic   Patient Position Semi fowlers   Pain Assessment   Pain Assessment None - Denies Pain   Oxygen Therapy   SpO2 96 %   Pulse via Oximetry 77 beats per minute   O2 Device Nasal cannula   O2 Flow Rate (L/min) 1 L/min   Height and Weight   Height 1.524 m (5')   Weight - Scale 85.1 kg (187 lb 9.6 oz)   BSA (Calculated - sq m) 1.9 sq meters   BMI (Calculated) 36.7     Pt admission completed at this time. All safety measures in place. Pt understands and is agreeable to plan of care.

## 2025-07-17 NOTE — CARE COORDINATION
Case Management Assessment  Initial Evaluation    Date/Time of Evaluation: 7/17/2025 1:35 PM  Assessment Completed by: RON RUEDA RN    If patient is discharged prior to next notation, then this note serves as note for discharge by case management.    Patient Name: Megha Rojas                   YOB: 1940  Diagnosis: Dialysis patient [Z99.2]  Acute decompensated heart failure (HCC) [I50.9]  Acute on chronic congestive heart failure, unspecified heart failure type (HCC) [I50.9]                   Date / Time: 7/16/2025  3:16 PM    Patient Admission Status: Inpatient   Readmission Risk (Low < 19, Mod (19-27), High > 27): Readmission Risk Score: 14.2    Current PCP: Denice Reynolds MD  PCP verified by CM? Yes    Chart Reviewed: Yes      History Provided by: Patient, Medical Record, Child/Family  Patient Orientation: Alert and Oriented, Person, Place, Situation    Patient Cognition: Alert    Hospitalization in the last 30 days (Readmission):  No    If yes, Readmission Assessment in  Navigator will be completed.    Advance Directives:      Code Status: Full Code   Patient's Primary Decision Maker is: Legal Next of Kin    Primary Decision Maker: Rome Kirkland - Child - [phone]    Discharge Planning:    Patient lives with: Family Members, Children Type of Home: House  Primary Care Giver: Family (daughter)  Patient Support Systems include: Children, Family Members   Current Financial resources: Medicare  Current community resources: Other (Comment) ([facility] [place]  Current services prior to admission: Durable Medical Equipment            Current DME: Walker, Cane, Wheelchair            Type of Home Care services:  None    ADLS  Prior functional level: Assistance with the following:, Housework, Shopping  Current functional level: Assistance with the following:, Housework, Shopping    PT AM-PAC:   /24  OT AM-PAC:   /24    Family can provide assistance at DC:

## 2025-07-17 NOTE — CONSULTS
[facility]  Advanced CHF/Pulmonary Hypertension   Cardiac Evaluation      Megha Rojas  YOB: 1940    Requesting PHysician:  Dr. Reynaga      Chief Complaint   Patient presents with    Shortness of Breath     Pt. Brought in via [facility] from home, Pt. C/O shortness of breathes since 4am. Per EMS .        History of Present Illness:  Megha Rojas is a 83 yo female with ESRD on hemodialysis T,Th,Sat, afib s/p Watchman, CAD with history of stents, chronic diastolic HF, hypertension who presented to the ED after she woke up and found it hard to breathe.  She went back to sleep and woke up again at noon, stating that the shortness of breath was persistent.  She normally does not have SOB.  She is not on blood thinners, has a Watchman.  She does take aspirin daily.  Denies palpitations.  Denies chest pain, fever, chills.  She is in afib today on her EKG.  She sees Dr. Tavarez in the office.      She has a history of GI bleeding with hemoglobin down to 5.8 in March, 2021.  She had a mass in her colon and underwent resection.  She still has an ILR.  She has been on hemodialysis since Feb, 2024.      She tells me that she has not missed any dialysis sessions.  Her diet has not changed, no increased fluid intake or sodium.  There have not been issues with removing fluid in dialysis and low blood pressure.  She has had increased swelling in her legs, and there is concern that she might have cellulitis.  This is the only reason she can think of that might have caused fluid retention.  Her BNP is > 70,000.  Her thyroid function is normal.  She has also been seen today by EP due to the afib.  She is only on toprol xl for blood pressure.  No previous admissions for CHF.  Her last echo was 2021 showing normal LVEF.  We are asked to see her for CHF.    Labs:  Sodium 140  K 4.9  BUN/Cre 22/4.5  Troponin 34, 33  BNP > 70,000  TSH 2.75  Wbc 5.2  H/H 12.5/39.5  Platelet 122    EKG:  atrial  urination. No tremor.  Hematologic/Lymphatic: No abnormal bruising or bleeding, blood clots or swollen lymph nodes.  Allergic/Immunologic: No nasal congestion or hives.    Physical Examination:    Vitals:    07/17/25 0830 07/17/25 0846 07/17/25 1301 07/17/25 1305   BP: 137/72   123/86   Pulse: 85  77 73   Resp: 16 16 16 16   Temp: 97.7 °F (36.5 °C)   97.5 °F (36.4 °C)   TempSrc: Oral   Oral   SpO2: 94%   95%   Weight:       Height:         Body mass index is 36.64 kg/m².     Wt Readings from Last 3 Encounters:   07/17/25 85.1 kg (187 lb 9.6 oz)   07/03/25 85.3 kg (188 lb)   12/18/24 86.4 kg (190 lb 6.4 oz)     BP Readings from Last 3 Encounters:   07/17/25 123/86   07/03/25 120/70   12/18/24 (!) 108/54     Constitutional and General Appearance:   WD/WN in NAD.  I am seeing her in the dialysis unit.  HEENT:  NC/AT  EVA  No problems with hearing  Skin:  Warm, dry  Respiratory:  Normal excursion and expansion without use of accessory muscles  Resp Auscultation: Normal breath sounds without dullness  Cardiovascular:  The apical impulses not displaced  Heart tones are crisp and normal  Cervical veins are not engorged  The carotid upstroke is normal in amplitude and contour without delay or bruit  JVP less than 8 cm H2O  Irregularly irregular rhythm with nl S1 and S2 without m,r,g  Peripheral pulses are symmetrical and full  There is no clubbing, cyanosis of the extremities.  2+ bilateral edema up to knees, legs are wrapped with wraps.  Femoral Arteries: 2+ and equal  Pedal Pulses: 2+ and equal   Neck:  No thyromegaly  Abdomen:  No masses or tenderness  Liver/Spleen: No Abnormalities Noted  Neurological/Psychiatric:  Alert and oriented in all spheres  Moves all extremities well  Exhibits normal gait balance and coordination  No abnormalities of mood, affect, memory, mentation, or behavior are noted    Labs were reviewed including labs from other hospital systems through Care Everywhere.  Cardiac testing was reviewed

## 2025-07-17 NOTE — CONSULTS
Nephrology Consult Note                                                                                                                                                                                                                                                                                                                                                               Office : [phone]     Fax :[phone]    Patient's Name: Megha Rojas  7/17/2025    Reason for Consult:  ESRD on HD   Requesting Physician:  Denice Reynolds MD  Chief Complaint:    Chief Complaint   Patient presents with    Shortness of Breath     Pt. Brought in via [facility] from home, Pt. C/O shortness of breathes since 4am. Per EMS .       Assessment/Plan     # ESRD on HD  # Volume overload   - Dialyzes [facility]  - Plan for HD today with increased UF as able  - Renally dose meds  - Monitor renal labs     # CHF  # Dyspnea  # Bilateral pleural effusions   - Volume management with HD, as above  - Cardiology consulted     # HTN  - BP's controlled on BB  - Monitor     # Anemia of chronic disease   - Hgb at goal for ESRD  - Monitor     # Acid- base/ Electrolyte imbalance   - Low K diet  - Lokelma PRN    # MBD management  - Sevelamer with meals  - Low phos diet      History of Present Ilness:    Megha Rojas is a 84 y.o. female with a PMH of ESRD on HD, A-fib s/p Watchman, CAD, CHF, and HTN, who presented to the ED with complaints of shortness of breath. Patient has not missed any of her outpatient HD sessions. Imaging revealed bilateral pleural effusions. She was admitted for further management of CHF exacebration. We are consulted for ESRD and HD management.     Interval hx:          Past Medical History:   Diagnosis Date    Anemia     CAD (coronary artery disease) 11/2020    Stent    CKD (chronic kidney disease)     History of blood transfusion     Hyperlipidemia     patient states well controlled

## 2025-07-17 NOTE — PROGRESS NOTES
MD Timmy Medina MD Aldo Estella,               Office: [phone]                      Fax: [phone]               [employer]                     Nephrology consult has been forwarded to Dr Raphael as this is their pt.   Thank you.     Timmy Goss MD

## 2025-07-17 NOTE — PLAN OF CARE
Problem: Discharge Planning  Goal: Discharge to home or other facility with appropriate resources  7/17/2025 1455 by Paul Cyr RN  Outcome: Progressing  7/17/2025 0348 by Vikki Damian RN  Outcome: Progressing     Problem: Safety - Adult  Goal: Free from fall injury  7/17/2025 0348 by Vikki Damian RN  Outcome: Progressing     Problem: ABCDS Injury Assessment  Goal: Absence of physical injury  7/17/2025 0348 by Vikki Damian RN  Outcome: Progressing     Problem: Respiratory - Adult  Goal: Achieves optimal ventilation and oxygenation  Outcome: Progressing     Problem: Cardiovascular - Adult  Goal: Maintains optimal cardiac output and hemodynamic stability  Outcome: Progressing  Goal: Absence of cardiac dysrhythmias or at baseline  Outcome: Progressing     Problem: Skin/Tissue Integrity - Adult  Goal: Skin integrity remains intact  Outcome: Progressing  Goal: Incisions, wounds, or drain sites healing without S/S of infection  Outcome: Progressing     Problem: Musculoskeletal - Adult  Goal: Return mobility to safest level of function  Outcome: Progressing  Goal: Maintain proper alignment of affected body part  Outcome: Progressing  Goal: Return ADL status to a safe level of function  Outcome: Progressing

## 2025-07-17 NOTE — H&P
V2.0  History and Physical      Name:  Megha Rojas /Age/Sex: 1940  (84 y.o. female)   MRN & CSN:  [id number] & [id number] Encounter Date/Time: 2025 8:21 PM EDT   Location:  ED- PCP: Denice Reynolds MD       Hospital Day: 1    Assessment and Plan:   Megha Rojas is a 84 y.o. female with a pmh of ESRD on hemodialysis , A-fib status post watchman, CAD, diastolic CHF, hypertension.  She presents with worsening SOB onset this morning.  She is found to be in CHF exacerbation.  She is also in rate controlled A-fib.  She had dialysis yesterday and is due tomorrow.  She has been admitted for further management.  Hospital Problems           Last Modified POA    * (Principal) Acute decompensated heart failure (HCC) 2025 Yes       Plan:  # Acute diastolic CHF exacerbation.  Preserved EF of 50% from echo on 2024.  # Dyspnea due to above  # Bilateral pleural effusions-per imaging  - Admit with telemetry.  - Given 40 of Lasix in the ED.  Continue with Lasix twice daily x 3 days.  - Intake and output monitoring.  - Consult cardiology.  - Supplemental oxygen as needed.    # Atrial fibrillation, rate controlled, status post Watchman device.  Not anticoagulated following her Watchman device placement per the patient.  Pending cardiology consultation.  Continue telemetry.    # ESRD on hemodialysis .  Last dialysis was yesterday.  Consult nephrology for hemodialysis needs.    # Elevated troponin, chronic and at baseline.  No chest pain.  No acute ischemia on telemetry.  Likely due to CKD and heart disease.  Continue telemetry.    # Elevated D-dimer.  CT chest without acute PE.    # Hypertension.  Keep goal SBP less than 160.  Continue home antihypertensives.    # Obstructive sleep apnea-not on BiPAP.    # Acute cystitis with hematuria.  Continue Rocephin pending cultures.    # Bilateral lower extremity chronic edema with blisters.  Patient reports  GLUCOSEU 100 07/16/2025 06:14 PM    KETUA Negative 07/16/2025 06:14 PM     Urine Cultures:   Lab Results   Component Value Date/Time    LABURIN  01/23/2024 01:25 PM     <10,000 CFU/ml mixed skin/urogenital aria. No further workup     Blood Cultures:   Lab Results   Component Value Date/Time    BC No Growth after 4 days of incubation. 08/28/2024 10:30 AM     Lab Results   Component Value Date/Time    BLOODCULT2 No Growth after 4 days of incubation. 08/28/2024 10:31 AM     Organism:   Lab Results   Component Value Date/Time    ORG Staphylococcus aureus 08/29/2024 06:39 PM       Imaging/Diagnostics Last 24 Hours   XR CHEST PORTABLE  Result Date: 7/16/2025  EXAMINATION: ONE XRAY VIEW OF THE CHEST 7/16/2025 4:23 pm COMPARISON: 08/28/2024 HISTORY: ORDERING SYSTEM PROVIDED HISTORY: Shortness of Breath TECHNOLOGIST PROVIDED HISTORY: Reason for exam:->Shortness of Breath Reason for Exam: Shortness of Breath FINDINGS: Cardiomegaly.  Pulmonary vascular congestion.  Pulmonary edema.  No pneumothorax.  No focal pulmonary consolidation.     Findings suggest congestive heart failure         Electronically signed by JIMENA Fermin CNP on 7/16/2025 at 8:21 PM

## 2025-07-18 ENCOUNTER — APPOINTMENT (OUTPATIENT)
Age: 85
DRG: 291 | End: 2025-07-18
Attending: INTERNAL MEDICINE
Payer: MEDICARE

## 2025-07-18 LAB
ALBUMIN SERPL-MCNC: 3.4 G/DL (ref 3.4–5)
ANION GAP SERPL CALCULATED.3IONS-SCNC: 15 MMOL/L (ref 3–16)
BASOPHILS # BLD: 0.1 K/UL (ref 0–0.2)
BASOPHILS NFR BLD: 1 %
BUN SERPL-MCNC: 19 MG/DL (ref 7–20)
CALCIUM SERPL-MCNC: 9.2 MG/DL (ref 8.3–10.6)
CHLORIDE SERPL-SCNC: 99 MMOL/L (ref 99–110)
CO2 SERPL-SCNC: 24 MMOL/L (ref 21–32)
CREAT SERPL-MCNC: 4 MG/DL (ref 0.6–1.2)
DEPRECATED RDW RBC AUTO: 22 % (ref 12.4–15.4)
ECHO AO ASC DIAM: 3.2 CM
ECHO AO ASCENDING AORTA INDEX: 1.77 CM/M2
ECHO AO ROOT DIAM: 2.8 CM
ECHO AO ROOT INDEX: 1.55 CM/M2
ECHO AV AREA PEAK VELOCITY: 1.8 CM2
ECHO AV AREA VTI: 1.7 CM2
ECHO AV AREA/BSA PEAK VELOCITY: 1 CM2/M2
ECHO AV AREA/BSA VTI: 0.9 CM2/M2
ECHO AV MEAN GRADIENT: 3 MMHG
ECHO AV MEAN GRADIENT: 3 MMHG
ECHO AV MEAN VELOCITY: 0.8 M/S
ECHO AV PEAK GRADIENT: 6 MMHG
ECHO AV PEAK VELOCITY: 1.3 M/S
ECHO AV VELOCITY RATIO: 0.54
ECHO AV VTI: 22.2 CM
ECHO BSA: 1.89 M2
ECHO BSA: 1.9 M2
ECHO EST RA PRESSURE: 15 MMHG
ECHO IVC PROX: 2.4 CM
ECHO LA AREA 2C: 26.4 CM2
ECHO LA AREA 4C: 30 CM2
ECHO LA DIAMETER INDEX: 2.15 CM/M2
ECHO LA DIAMETER: 3.9 CM
ECHO LA MAJOR AXIS: 7.2 CM
ECHO LA MINOR AXIS: 6 CM
ECHO LA TO AORTIC ROOT RATIO: 1.39
ECHO LA VOL BP: 104 ML (ref 22–52)
ECHO LA VOL MOD A2C: 94 ML (ref 22–52)
ECHO LA VOL MOD A4C: 95 ML (ref 22–52)
ECHO LA VOL/BSA BIPLANE: 57 ML/M2 (ref 16–34)
ECHO LA VOLUME INDEX MOD A2C: 52 ML/M2 (ref 16–34)
ECHO LA VOLUME INDEX MOD A4C: 52 ML/M2 (ref 16–34)
ECHO LV EDV A2C: 88 ML
ECHO LV EDV A4C: 88 ML
ECHO LV EDV INDEX A4C: 49 ML/M2
ECHO LV EDV NDEX A2C: 49 ML/M2
ECHO LV EF PHYSICIAN: 40 %
ECHO LV EJECTION FRACTION A2C: 44 %
ECHO LV EJECTION FRACTION A4C: 31 %
ECHO LV EJECTION FRACTION BIPLANE: 39 % (ref 55–100)
ECHO LV ESV A2C: 50 ML
ECHO LV ESV A4C: 61 ML
ECHO LV ESV INDEX A2C: 28 ML/M2
ECHO LV ESV INDEX A4C: 34 ML/M2
ECHO LV FRACTIONAL SHORTENING: 28 % (ref 28–44)
ECHO LV INTERNAL DIMENSION DIASTOLE INDEX: 2.54 CM/M2
ECHO LV INTERNAL DIMENSION DIASTOLIC: 4.6 CM (ref 3.9–5.3)
ECHO LV INTERNAL DIMENSION SYSTOLIC INDEX: 1.82 CM/M2
ECHO LV INTERNAL DIMENSION SYSTOLIC: 3.3 CM
ECHO LV IVSD: 1.1 CM (ref 0.6–0.9)
ECHO LV MASS 2D: 205 G (ref 67–162)
ECHO LV MASS INDEX 2D: 113.3 G/M2 (ref 43–95)
ECHO LV POSTERIOR WALL DIASTOLIC: 1.3 CM (ref 0.6–0.9)
ECHO LV RELATIVE WALL THICKNESS RATIO: 0.57
ECHO LVOT AREA: 3.1 CM2
ECHO LVOT AV VTI INDEX: 0.54
ECHO LVOT DIAM: 2 CM
ECHO LVOT MEAN GRADIENT: 1 MMHG
ECHO LVOT MEAN GRADIENT: 1 MMHG
ECHO LVOT PEAK GRADIENT: 2 MMHG
ECHO LVOT PEAK VELOCITY: 0.7 M/S
ECHO LVOT STROKE VOLUME INDEX: 20.8 ML/M2
ECHO LVOT SV: 37.7 ML
ECHO LVOT VTI: 12 CM
ECHO MV AREA VTI: 1.2 CM2
ECHO MV E VELOCITY: 1.46 M/S
ECHO MV LVOT VTI INDEX: 2.65
ECHO MV MAX VELOCITY: 1.3 M/S
ECHO MV MEAN GRADIENT: 2 MMHG
ECHO MV MEAN VELOCITY: 0.7 M/S
ECHO MV PEAK GRADIENT: 6 MMHG
ECHO MV REGURGITANT PEAK GRADIENT: 81 MMHG
ECHO MV REGURGITANT PEAK VELOCITY: 4.5 M/S
ECHO MV VTI: 31.8 CM
ECHO PULMONARY ARTERY END DIASTOLIC PRESSURE: 12 MMHG
ECHO PV REGURGITANT MAX VELOCITY: 1.8 M/S
EOSINOPHIL # BLD: 0.1 K/UL (ref 0–0.6)
EOSINOPHIL NFR BLD: 1.9 %
GFR SERPLBLD CREATININE-BSD FMLA CKD-EPI: 11 ML/MIN/{1.73_M2}
GLUCOSE BLD-MCNC: 103 MG/DL (ref 70–99)
GLUCOSE BLD-MCNC: 110 MG/DL (ref 70–99)
GLUCOSE BLD-MCNC: 116 MG/DL (ref 70–99)
GLUCOSE SERPL-MCNC: 101 MG/DL (ref 70–99)
HBV SURFACE AB SERPL IA-ACNC: <3.5 MIU/ML
HBV SURFACE AG SERPL QL IA: NORMAL
HCT VFR BLD AUTO: 38.3 % (ref 36–48)
HGB BLD-MCNC: 12.2 G/DL (ref 12–16)
LYMPHOCYTES # BLD: 1.4 K/UL (ref 1–5.1)
LYMPHOCYTES NFR BLD: 21.6 %
MAGNESIUM SERPL-MCNC: 2.19 MG/DL (ref 1.8–2.4)
MCH RBC QN AUTO: 27.6 PG (ref 26–34)
MCHC RBC AUTO-ENTMCNC: 31.7 G/DL (ref 31–36)
MCV RBC AUTO: 86.9 FL (ref 80–100)
MONOCYTES # BLD: 0.8 K/UL (ref 0–1.3)
MONOCYTES NFR BLD: 11.4 %
NEUTROPHILS # BLD: 4.2 K/UL (ref 1.7–7.7)
NEUTROPHILS NFR BLD: 64.1 %
NT-PROBNP SERPL-MCNC: ABNORMAL PG/ML (ref 0–449)
PERFORMED ON: ABNORMAL
PHOSPHATE SERPL-MCNC: 4.2 MG/DL (ref 2.5–4.9)
PLATELET # BLD AUTO: 99 K/UL (ref 135–450)
PLATELET BLD QL SMEAR: ABNORMAL
PMV BLD AUTO: 9.2 FL (ref 5–10.5)
POTASSIUM SERPL-SCNC: 4.6 MMOL/L (ref 3.5–5.1)
RBC # BLD AUTO: 4.41 M/UL (ref 4–5.2)
SLIDE REVIEW: ABNORMAL
SODIUM SERPL-SCNC: 138 MMOL/L (ref 136–145)
WBC # BLD AUTO: 6.6 K/UL (ref 4–11)

## 2025-07-18 PROCEDURE — 87641 MR-STAPH DNA AMP PROBE: CPT

## 2025-07-18 PROCEDURE — 6370000000 HC RX 637 (ALT 250 FOR IP): Performed by: INTERNAL MEDICINE

## 2025-07-18 PROCEDURE — 99232 SBSQ HOSP IP/OBS MODERATE 35: CPT | Performed by: NURSE PRACTITIONER

## 2025-07-18 PROCEDURE — 93970 EXTREMITY STUDY: CPT | Performed by: SURGERY

## 2025-07-18 PROCEDURE — 36415 COLL VENOUS BLD VENIPUNCTURE: CPT

## 2025-07-18 PROCEDURE — 80069 RENAL FUNCTION PANEL: CPT

## 2025-07-18 PROCEDURE — 2500000003 HC RX 250 WO HCPCS: Performed by: HOSPITALIST

## 2025-07-18 PROCEDURE — 6370000000 HC RX 637 (ALT 250 FOR IP): Performed by: HOSPITALIST

## 2025-07-18 PROCEDURE — C8929 TTE W OR WO FOL WCON,DOPPLER: HCPCS

## 2025-07-18 PROCEDURE — 99233 SBSQ HOSP IP/OBS HIGH 50: CPT | Performed by: HOSPITALIST

## 2025-07-18 PROCEDURE — 2060000000 HC ICU INTERMEDIATE R&B

## 2025-07-18 PROCEDURE — 6360000004 HC RX CONTRAST MEDICATION: Performed by: INTERNAL MEDICINE

## 2025-07-18 PROCEDURE — 85025 COMPLETE CBC W/AUTO DIFF WBC: CPT

## 2025-07-18 PROCEDURE — 6360000002 HC RX W HCPCS: Performed by: NURSE PRACTITIONER

## 2025-07-18 PROCEDURE — 83735 ASSAY OF MAGNESIUM: CPT

## 2025-07-18 PROCEDURE — 93306 TTE W/DOPPLER COMPLETE: CPT | Performed by: INTERNAL MEDICINE

## 2025-07-18 PROCEDURE — 83880 ASSAY OF NATRIURETIC PEPTIDE: CPT

## 2025-07-18 PROCEDURE — 6360000002 HC RX W HCPCS: Performed by: HOSPITALIST

## 2025-07-18 RX ADMIN — FUROSEMIDE 40 MG: 10 INJECTION, SOLUTION INTRAMUSCULAR; INTRAVENOUS at 09:27

## 2025-07-18 RX ADMIN — CEPHALEXIN 500 MG: 250 CAPSULE ORAL at 21:41

## 2025-07-18 RX ADMIN — HYDROCODONE BITARTRATE AND ACETAMINOPHEN 1 TABLET: 5; 325 TABLET ORAL at 21:41

## 2025-07-18 RX ADMIN — METOPROLOL SUCCINATE 25 MG: 25 TABLET, EXTENDED RELEASE ORAL at 09:27

## 2025-07-18 RX ADMIN — HYDROCODONE BITARTRATE AND ACETAMINOPHEN 1 TABLET: 5; 325 TABLET ORAL at 09:27

## 2025-07-18 RX ADMIN — ROSUVASTATIN 20 MG: 20 TABLET, FILM COATED ORAL at 21:41

## 2025-07-18 RX ADMIN — SEVELAMER CARBONATE 800 MG: 800 TABLET, FILM COATED ORAL at 11:56

## 2025-07-18 RX ADMIN — SODIUM CHLORIDE, PRESERVATIVE FREE 10 ML: 5 INJECTION INTRAVENOUS at 09:28

## 2025-07-18 RX ADMIN — HEPARIN SODIUM 5000 UNITS: 5000 INJECTION INTRAVENOUS; SUBCUTANEOUS at 15:43

## 2025-07-18 RX ADMIN — ASPIRIN 81 MG: 81 TABLET, COATED ORAL at 09:27

## 2025-07-18 RX ADMIN — HEPARIN SODIUM 5000 UNITS: 5000 INJECTION INTRAVENOUS; SUBCUTANEOUS at 06:17

## 2025-07-18 RX ADMIN — FUROSEMIDE 40 MG: 10 INJECTION, SOLUTION INTRAMUSCULAR; INTRAVENOUS at 17:50

## 2025-07-18 RX ADMIN — CEPHALEXIN 500 MG: 250 CAPSULE ORAL at 11:56

## 2025-07-18 RX ADMIN — SEVELAMER CARBONATE 800 MG: 800 TABLET, FILM COATED ORAL at 17:50

## 2025-07-18 RX ADMIN — HEPARIN SODIUM 5000 UNITS: 5000 INJECTION INTRAVENOUS; SUBCUTANEOUS at 21:42

## 2025-07-18 RX ADMIN — SULFUR HEXAFLUORIDE 2 ML: 60.7; .19; .19 INJECTION, POWDER, LYOPHILIZED, FOR SUSPENSION INTRAVENOUS; INTRAVESICAL at 10:44

## 2025-07-18 RX ADMIN — SEVELAMER CARBONATE 800 MG: 800 TABLET, FILM COATED ORAL at 09:28

## 2025-07-18 RX ADMIN — SODIUM CHLORIDE, PRESERVATIVE FREE 5 ML: 5 INJECTION INTRAVENOUS at 21:42

## 2025-07-18 ASSESSMENT — PAIN SCALES - GENERAL
PAINLEVEL_OUTOF10: 0
PAINLEVEL_OUTOF10: 0

## 2025-07-18 NOTE — PLAN OF CARE
Problem: Discharge Planning  Goal: Discharge to home or other facility with appropriate resources  7/18/2025 1233 by Aayn Rivera RN  Outcome: Progressing  7/18/2025 0406 by Vikki Damian RN  Outcome: Progressing     Problem: Safety - Adult  Goal: Free from fall injury  7/18/2025 1233 by Ayan Rivera RN  Outcome: Progressing  7/18/2025 0406 by Vikki Damian RN  Outcome: Progressing     Problem: ABCDS Injury Assessment  Goal: Absence of physical injury  7/18/2025 1233 by Ayan Rivera RN  Outcome: Progressing  7/18/2025 0406 by Vikki Damian RN  Outcome: Progressing     Problem: Chronic Conditions and Co-morbidities  Goal: Patient's chronic conditions and co-morbidity symptoms are monitored and maintained or improved  7/18/2025 1233 by Ayan Rivera RN  Outcome: Progressing  7/18/2025 0406 by Vikki Damian RN  Outcome: Progressing     Problem: Respiratory - Adult  Goal: Achieves optimal ventilation and oxygenation  7/18/2025 1233 by Ayan Rivera RN  Outcome: Progressing  7/18/2025 0406 by Vikki Damian RN  Outcome: Progressing     Problem: Cardiovascular - Adult  Goal: Maintains optimal cardiac output and hemodynamic stability  7/18/2025 1233 by Ayan Rivera RN  Outcome: Progressing  7/18/2025 0406 by Vikki Damian RN  Outcome: Progressing  Goal: Absence of cardiac dysrhythmias or at baseline  7/18/2025 1233 by Ayan Rivera RN  Outcome: Progressing  7/18/2025 0406 by Vikki Damian RN  Outcome: Progressing     Problem: Skin/Tissue Integrity - Adult  Goal: Skin integrity remains intact  7/18/2025 1233 by Ayan Rivera RN  Outcome: Progressing  7/18/2025 0406 by Vikki Damian RN  Outcome: Progressing  Goal: Incisions, wounds, or drain sites healing without S/S of infection  7/18/2025 1233 by Ayan Rivera RN  Outcome: Progressing  7/18/2025 0406 by Vikki Damian RN  Outcome: Progressing  Goal: Oral mucous membranes remain intact  7/18/2025 1233 by

## 2025-07-18 NOTE — PROGRESS NOTES
CHF Nutrition Education    RD triggered to see pt for heart failure diet education.  Nutrition therapy included low sodium diet guidelines, foods to choose and foods to avoid, and ways to add flavor to food.  Patient tries to follow a low sodium diet. Pt reports she has been using No Salt with cooking; advised pt to discontinue as this product is potassium chloride. Pt agreeable.  All questions answered and patient voiced understanding.  Time spent with patient: 10 minutes      Electronically signed by Eleonora Rosales MS, RD, LD on 7/18/2025 at 8:25 AM

## 2025-07-18 NOTE — PLAN OF CARE
Problem: Discharge Planning  Goal: Discharge to home or other facility with appropriate resources  7/18/2025 0406 by Vikki Damian RN  Outcome: Progressing  7/17/2025 1455 by Paul Cyr RN  Outcome: Progressing     Problem: Safety - Adult  Goal: Free from fall injury  Outcome: Progressing     Problem: ABCDS Injury Assessment  Goal: Absence of physical injury  Outcome: Progressing     Problem: Chronic Conditions and Co-morbidities  Goal: Patient's chronic conditions and co-morbidity symptoms are monitored and maintained or improved  Outcome: Progressing     Problem: Respiratory - Adult  Goal: Achieves optimal ventilation and oxygenation  7/18/2025 0406 by Vikki Damian RN  Outcome: Progressing  7/17/2025 1455 by Paul Cyr RN  Outcome: Progressing     Problem: Cardiovascular - Adult  Goal: Maintains optimal cardiac output and hemodynamic stability  7/18/2025 0406 by Vikki Damian RN  Outcome: Progressing  7/17/2025 1455 by Paul Cyr RN  Outcome: Progressing  Goal: Absence of cardiac dysrhythmias or at baseline  7/18/2025 0406 by Vikki Damian RN  Outcome: Progressing  7/17/2025 1455 by Paul Cyr RN  Outcome: Progressing     Problem: Skin/Tissue Integrity - Adult  Goal: Skin integrity remains intact  7/18/2025 0406 by Vikki Damian RN  Outcome: Progressing  7/17/2025 1455 by Paul Cyr RN  Outcome: Progressing  Goal: Incisions, wounds, or drain sites healing without S/S of infection  7/18/2025 0406 by Vikki Damian RN  Outcome: Progressing  7/17/2025 1455 by Paul Cyr RN  Outcome: Progressing  Goal: Oral mucous membranes remain intact  Outcome: Progressing     Problem: Musculoskeletal - Adult  Goal: Return mobility to safest level of function  7/18/2025 0406 by Vikki Damian RN  Outcome: Progressing  7/17/2025 1455 by Paul Cyr RN  Outcome: Progressing  Goal: Maintain proper alignment of affected body part  7/18/2025 0406 by Vikki Damian RN  Outcome:

## 2025-07-18 NOTE — PROGRESS NOTES
Nephrology Progress Note                                                                                                                                                                                                                                                                                                                                                               Office : [phone]     Fax :[phone]    Patient's Name: Megha Rojas  7/18/2025    Reason for Consult:  ESRD on HD   Requesting Physician:  Denice Reynolds MD  Chief Complaint:    Chief Complaint   Patient presents with    Shortness of Breath     Pt. Brought in via [facility] from home, Pt. C/O shortness of breathes since 4am. Per EMS .       Assessment/Plan     # ESRD on HD  # Volume overload   - Dialyzes TTS at [facility]  - Plan for next HD tomorrow   - Renally dose meds  - Monitor renal labs     # CHF  # Dyspnea  # Bilateral pleural effusions   - Volume management with HD, as above  - Cardiology consulted     # HTN  - BP's controlled on BB  - Monitor     # Anemia of chronic disease   - Hgb at goal for ESRD  - Monitor     # Acid- base/ Electrolyte imbalance   - Low K diet  - Lokelma PRN    # MBD management  - Sevelamer with meals  - Low phos diet      History of Present Ilness:    Megha Rojas is a 84 y.o. female with a PMH of ESRD on HD, A-fib s/p Watchman, CAD, CHF, and HTN, who presented to the ED with complaints of shortness of breath. Patient has not missed any of her outpatient HD sessions. Imaging revealed bilateral pleural effusions. She was admitted for further management of CHF exacebration. We are consulted for ESRD and HD management.     Interval hx:    HD yesterday with 1.2L off   BP's controlled  On RA  Electrolytes stable       Past Medical History:   Diagnosis Date    Anemia     CAD (coronary artery disease) 11/2020    Stent    CKD (chronic kidney disease)     History of blood transfusion      Hyperlipidemia     patient states well controlled    Hypertension        Past Surgical History:   Procedure Laterality Date     SECTION      x3    CHOLECYSTECTOMY, LAPAROSCOPIC N/A 2021    LAPAROSCOPIC CHOLECYSTECTOMY performed by Trung Hurst MD at [facility] OR    COLONOSCOPY N/A 2021    COLONOSCOPY WITH BIOPSY performed by Ed Csatellanos MD at [facility] ENDOSCOPY    CYSTOSCOPY  2013    w/ bladder biopsy    EP DEVICE PROCEDURE N/A 2024    Watchman kary closure device performed by Shaw Moncada MD at [facility] CARDIAC CATH LAB    FRACTURE SURGERY      right wrist    HEMICOLECTOMY Right 2021    LAPAROSCOPIC RIGHT COLON RESECTION performed by Trung Hurst MD at [facility] OR    IR TUNNELED CVC PLACE WO SQ PORT/PUMP > 5 YEARS  2024    IR TUNNELED CATHETER PLACEMENT GREATER THAN 5 YEARS 2024 Raul Toney MD [facility] SPECIAL PROCEDURES    UPPER GASTROINTESTINAL ENDOSCOPY N/A 2021    EGD BIOPSY performed by Ed Castellanos MD at [facility] ENDOSCOPY    UPPER GASTROINTESTINAL ENDOSCOPY N/A 2023    EGD CONTROL HEMORRHAGE, EGD APC STOMACH performed by Junior Hermosillo MD at [facility] ENDOSCOPY    VASCULAR SURGERY Left 2024    LEFT ARM ARTERIOVENOUS GRAFT performed by Haja Evans II, MD at [facility] OR       Family History   Problem Relation Age of Onset    Cirrhosis Mother         Hepatitis    Heart Disease Father     No Known Problems Sister     No Known Problems Sister     Heart Disease Brother     No Known Problems Brother     No Known Problems Brother     Alzheimer's Disease Brother     No Known Problems Brother         reports that she has never smoked. She has never used smokeless tobacco. She reports that she does not drink alcohol and does not use drugs.    Allergies:  Acetomenaphthone (menadiol diacetate) [menadiol sodium diphosphate], Lisinopril-hydrochlorothiazide, Advil [ibuprofen], Aleve [naproxen], and Tylenol [acetaminophen]    Current Medications:

## 2025-07-18 NOTE — PROGRESS NOTES
HOSPITALISTS PROGRESS NOTE    7/18/2025 8:54 AM        Name: Megha Rojas .              Admitted: 7/16/2025  Primary Care Provider: Denice Reynolds MD (Tel: [phone])      Brief Course: This 84-year-old female with PMHx of ESRD on HD, A-fib; s/p Watchman, CAD; s/p PCI to LAD 2021, HFpEF, GIB 2/2 AVM (s/p APC &clip), colon cancer s/p hemicolectomy on 3/2021 hypertension hyperlipidemia, who presented with SOB.      Interval history:   Pt seen and examined today.  Overnight events noted, interval ancillary notes and labs reviewed.   On RA satting well.  Afebrile overnight, WBCs WNL blood culture NGTD.  Urine culture negative  Reported she is feeling much better. BLE edema improving. Denied any chest pain, SOB or palpitations  Echo pending      Assessment & Plan:     Acute on chronic diastolic CHF   proBNP> 70,000 on admission.  CTPE -VE for PE; showed moderate right& small left pleural effusion   Cardiology on board; continue IV Lasix.  Volume removal with HD.  Echo pending  Fluid and salt restriction.  Daily weights    Hx of A-fib; s/p watchman's and ILR.  Rate controlled  Cardiology on board; continue aspirin and metoprolol.  Monitor on telemetry  Patient not interested in antiarrhythmic or ablation.    Elevated troponin, chronic likely 2/2 CKD  Denies any chest pain.  EKG showed A-fib with LBBB; chronic.  No acute ischemic changes     Hx of ESRD on HD T,Th&Sat Friday.  Nephrology consulted for dialysis.  Monitor renal function closely     Elevated D-dimer.  CT chest without acute PE.  BLE venous Doppler negative for DVT or SVT    UTI; UA suggestive of UTI.  Urine culture negative.  Rocephin DC'd     Hypertension.  Continue current regimen and monitor BP closely    Hyperlipidemia; continue statins    BLE cellulitis; improving.  On Keflex.      Hx of SAHRA      DVT PPX: S/C heparin  Code:Full Code    Disposition: Once acute medical issues      07/16/25  1702 07/17/25  0450 07/18/25  0538   WBC 5.2 4.6 6.6   HGB 12.5 12.4 12.2   * 105* 99*     BMP:    Recent Labs     07/16/25  1702 07/17/25  0450 07/17/25  0758 07/18/25  0538    138  --  138   K 4.9 6.0* 5.4* 4.6   CL 96* 97*  --  99   CO2 29 26  --  24   BUN 22* 28*  --  19   CREATININE 4.5* 5.1*  --  4.0*   GLUCOSE 92 116*  --  101*     Hepatic:   Recent Labs     07/16/25  1702 07/17/25  0450   AST 21 32   ALT 8* 7*   BILITOT 0.9 0.7   ALKPHOS 75 70     Vascular duplex lower extremity venous bilateral   Final Result      CT CHEST PULMONARY EMBOLISM W CONTRAST   Final Result   1. No pulmonary embolism.   2. Congestive heart failure with mild interstitial edema.   3. Moderate right and small left pleural effusions with mild adjacent   consolidation in the lower lobes the likely represents atelectasis.   Pneumonia is considered less likely.   4. Mild enlargement of the central pulmonary arteries which can be seen with   pulmonary hypertension.         XR CHEST PORTABLE   Final Result   Findings suggest congestive heart failure             Problem List  Principal Problem:    Acute on chronic congestive heart failure (HCC)  Active Problems:    Paroxysmal atrial fibrillation (HCC)    Dialysis patient    Leg swelling    Disorders of fluid, electrolyte, and acid-base balance    Cellulitis of lower extremity  Resolved Problems:    * No resolved hospital problems. *       TAMANNA DOWNING MD   7/18/2025 8:54 AM      Please note that some part of this chart was generated using Dragon dictation software. Although every effort was made to ensure the accuracy of this automated transcription, some errors in transcription may have occurred inadvertently. If you may need any clarification, please do not hesitate to contact me through EPIC.

## 2025-07-18 NOTE — CONSULTS
[facility]  HEART FAILURE PROGRAM      Megha Rojas 1940    History:  Past Medical History:   Diagnosis Date    Anemia     CAD (coronary artery disease) 11/2020    Stent    CKD (chronic kidney disease)     History of blood transfusion     Hyperlipidemia     patient states well controlled    Hypertension        ECHO: repeat echo pending  9/25/24  Left Ventricle Low normal left ventricular systolic function with a visually estimated EF of 50 - 55%. Left ventricle size is normal.   Left Atrium Left atrium is dilated. Watchman device in the appendage seen, with no thrombus. No olivia-device leak noted using Color Doppler. .   Interatrial Septum No interatrial shunt visualized with color Doppler.   Right Ventricle Right ventricle size is normal. Normal systolic function.   Right Atrium Right atrium size is normal.   Aortic Valve Valve structure is normal. Mild regurgitation. No stenosis.   Mitral Valve Valve structure is normal. Mild regurgitation. No stenosis noted.   Tricuspid Valve Valve structure is normal. Mild regurgitation. No stenosis noted.   Pulmonic Valve Trace regurgitation. No stenosis noted.   Aorta Normal sized ascending aorta.   Pericardium No pericardial effusion.       ACE/ARB/ARNi:   BB: toprol xl 25 mg daily  Aldosterone Antagonist:   SGLT2:     History of sleep apnea: No    [place] Screen ordered: Yes    DM History: No      Last Hospital Admission: 8/28/24 with respiratory failure/ESRD  Code Status: full   Discharge plans: from home    Family Present: no    Megha Rojas was admitted to the hospital with increased shortness of breath. Patient is ESRD on HD. She weighs daily at home. States baseline is around 188 lb. She did not notice any increase in weight. She did have some lower leg edema but attributed this to cellulitis. She does not add salt to foods. She keeps her fluids under 32 oz per day. She is compliant with medications, follow up visits and her HD treatments.

## 2025-07-18 NOTE — PROGRESS NOTES
The [place] Sleepiness Scale       The [place] Sleepiness Scale is widely used in the field of sleep medicine as a subjective measure of a patient's sleepiness. The test is a list of eight situations in which you rate your tendency to become sleepy on a scale of 0, no chance to 3, high chance of dozing. Your score is based on a scale of 0 to 24. The scale estimates whether you are experiencing excessive sleepiness that possibly requires medical attention.     How Sleepy Are You?  How sleepy are you to doze off or fall asleep in the following situations? You should rate your chances of dozing off, not just feeling tired. Even if you have not done some of these things recently try to determine how they would have affected you. For each situation, decide whether or not you would have:     0 = No chance of dozing 1 = Slight chance of dozing   2 = Moderate chance of  dozing 3 = High change of dozing       Situation                                                                                     Chance of Dozing    Sitting and reading  0 =  [x]  1 =    [] 2 =    [] 3 =    []    Watching TV  0 =  []  1 =    [x] 2 =    [] 3 =    []      Sitting inactive in public place (e.g., a theater or a meeting)  0 =  [x]  1 =    [] 2 =    [] 3 =    []    As a passenger in a car for an hour without a break          0  =  [x]  1 =    [] 2 =    [] 3 =    []    Lying down to rest in the afternoon when circumstances permit    0 =  []  1 =    [] 2 =    [] 3 =    [x]    Sitting and talking to someone  0 =  [x]  1 =    [] 2 =    [] 3 =    []      Sitting quietly after a lunch without alcohol  0 =  [x]  1 =    [] 2 =    [] 3 =    []    In a car, while stopped for a few minutes in traffic                                                                      0 =  [x]  1 =    [] 2 =    [] 3 =    []    Total Score =4    If your total score is 10 or greater, you are experiencing excessive sleepiness and should consider seeking a medical

## 2025-07-18 NOTE — DISCHARGE INSTRUCTIONS
Guidelines for Heart Failure home management:    1. Continue to monitor weight first thing each morning. You should weigh yourself after using the bathroom and before you eat breakfast.    2. Report to your doctor any significant weight change. Remember that weight change of 2-3 lbs. in 1 day or 5 lbs in a week is \"significant\" and likely represents changes in \"fluid\" status.  If you are experiencing any swelling in your feet, ankles or abdomen, or shortness of breath, call your doctor.     3. You should restrict all sodium intake to 3000 milligrams (3 grams) a day. Depending on your status, you may also be asked to restrict fluid intake to no more that 64 oz/2 Liters a day. If uncertain, ask the nurse or physician.     4. Regular aerobic exercise is encouraged 30 minutes a day (walking, bike, swimming, etc.). For specific exercise recommendations, ask your physician.     5. Report to your doctor any change in symptoms (chest pain, worsening shortness of breath, increased dizziness or passing out, increased palpitations or ICD shock, trouble catching breath while lying down, increased edema or abdominal bloating). Remember that even \"minor\" changes in symptoms may be important. Also report any changes in medications including \"over the counter\" medications.     6. DO NOT take NSAID's for pain (i.e, Advil, Aleve, Motrin, ibuprofen, and many more) since these may cause serious problems in those with a history of CHF. If uncertain about the medication, call your doctor.    7. If you have new significant or ongoing diarrhea or vomiting, please call your doctor for further instructions. Taking a diuretic (water pill) with these symptoms can worsen dehydration.     8. If you have any questions or concerns you can always call the CHF  Resource Line( [phone].  It is available Monday thru Friday 8 am- 5 pm. Please leave a message and your call will be returned shortly.     Extra Heart Failure                                                          0 =  [x]  1 =    [] 2 =    [] 3 =    []     Total Score =4     If your total score is 10 or greater, you are experiencing excessive sleepiness and should consider seeking a medical follow-up. Take a copy of this screening test to your primary care physician on your next office visit.       Interpretation:       0 -   7: It is unlikely that you are abnormally sleepy.    8 -   9: You have an average amount of daytime sleepiness.  10 - 15: You may be excessively sleepy depending on the situation. You may want to consider seeking medical attention.   16 - 24: You are excessively sleepy and should consider seeking medical attention.       Electronically signed by Yoko Thompson RCP on 7/18/2025 at 3:42 PM

## 2025-07-18 NOTE — PROGRESS NOTES
[facility]   EP Progress Note     Date: 7/18/2025  Admit Date: 7/16/2025     Reason for consultation: AF    Chief Complaint:   Chief Complaint   Patient presents with    Shortness of Breath     Pt. Brought in via [facility] from home, Pt. C/O shortness of breathes since 4am. Per EMS .       History of Present Illness: History obtained from patient and medical record.     Megha Rojas is a 84 y.o. female with a past medical history of HTN, HLD, ESRD, persistent AF, LBBB, dCHF, and CAD. S/p ILR (12/21). She was admitted in December of 2023 with GIB and anemia. An EGD showed AVMs requiring APC and clip. She was later admitted with worsening YESENIA and was started on dialysis. S/p SAIDA closure with Watchman (7/1/24)    Pt presented to ED with SOB upon waking up in the morning and worse with exertion. Had BLE edema upon presentation and was in AF with controlled rate. CT showed bilateral pleural effusions and BNP was significantly elevated    Interval Hx: Today, she is being seen for follow up. She is feeling better    Patient seen and examined. Clinical notes reviewed. Telemetry reviewed.  No new complaints today. No major events overnight.   Denies having chest pain, palpitations, shortness of breath, orthopnea/PND, cough, or dizziness at the time of this visit.    Assessment and Plan:     1.Paroxysmal Atrial Fibrillation              - S/p SAIDA closure with Watchman (7/1/24); verified closure on EDWIN (9/24)                          ~ Continue ASA indefinitely     - Currently in AF              - Stop coreg and switch to Toprol XL 25 mg daily                          ~ Previously on amiodarone                 - She is in persistent AF. Given her lack of symptoms and dilated left atrium, decision was made at last office visit to continue rate control efforts. She is not interested in ablation and does not want any more procedures     2. LBBB, 1st degree AVB              - Noted on prior sinus EKG

## 2025-07-18 NOTE — PROGRESS NOTES
[facility]  HEART FAILURE  Progress Note      Admit Date 2025     Reason for Consult:      Reason for Consultation/Chief Complaint: SOB    HPI:    Megha Rojas is a 84 y.o. female with PMH ESRD on HD, AF, s/p watchman, CAD, PCI, HFpEF, HTN admitted with SOB.       Subjective:  Patient is being seen for CHF. There were no acute overnight cardiac events.   Today Ms. Rojas is going down for her echo, c/o feeling bad after lasix but edema is improving and she denies chest pain, shortness of breath, palpitations, or dizziness      Wt Readings from Last 3 Encounters:   25 84.6 kg (186 lb 8 oz)   25 85.3 kg (188 lb)   24 86.4 kg (190 lb 6.4 oz)         Cardiac Testing:   ECHO pendin25  Prelim reading     Left Ventricle: Mildly reduced left ventricular systolic function with a visually estimated EF of 40 - 45%. EF by 2D Simpsons Biplane is 39%. Left ventricle size is normal. Mildly increased wall thickness. Unable to assess wall motion due to arrhythmia.    Right Ventricle: Not well visualized. Unable to assess systolic function.    Aortic Valve: Mild regurgitation with an anteromedial eccentrically directed jet and may underestimate severity.    Mitral Valve: Mild regurgitation with a centrally directed jet.    Tricuspid Valve: Mild regurgitation.    Left Atrium: Left atrium is severely dilated.    Extracardiac: Medium sized bilateral pleural effusion.    Image quality is adequate. Contrast used: Lumason. Technically difficult study with poor endocardial visualization.    NYHA Class III    Objective:   /64   Pulse 78   Temp 97.4 °F (36.3 °C)   Resp 17   Ht 1.524 m (5')   Wt 84.6 kg (186 lb 8 oz)   SpO2 94%   BMI 36.42 kg/m²     Intake/Output Summary (Last 24 hours) at 2025 0914  Last data filed at 2025 1723  Gross per 24 hour   Intake 773.29 ml   Output --   Net 773.29 ml      In: 1013.3 [P.O.:720; I.V.:250.4]  Out: -       Physical Exam:  General  Alternatives to my treatment were discussed. I have discussed the above stated plan with patient and the nurse. The patient verbalized understanding and agreed with the plan.    I appreciate the opportunity of cooperating in the care of this individual.    JIMENA SHI - CNP, ACNP, AGPCNP 7/18/2025, 9:14 AM  Heart Failure  [facility] [place] [address]  Ph: [phone]

## 2025-07-18 NOTE — CARE COORDINATION
Wound Referral Progress Note       NAME:  Megha Rojas  MEDICAL RECORD NUMBER:  [id number]  AGE: 84 y.o.   GENDER: female  : 1940  TODAY'S DATE:  2025    Subjective   Reason for WOCN Evaluation and Assessment: venous ulcers bilateral legs       Megha Rojas is a 84 y.o. female referred by:   Provider and Nursing    Wound Identification:  Wound Type: venous  Contributing Factors: CHF, fluid overload, dialysis patient     Wound History: present on admission  Current Wound Care Treatment:  ulcers are scabbed over, open to air    Patient Care Goal:  Wound Healing        PAST MEDICAL HISTORY        Diagnosis Date    Anemia     CAD (coronary artery disease) 2020    Stent    CKD (chronic kidney disease)     History of blood transfusion     Hyperlipidemia     patient states well controlled    Hypertension        PAST SURGICAL HISTORY    Past Surgical History:   Procedure Laterality Date     SECTION      x3    CHOLECYSTECTOMY, LAPAROSCOPIC N/A 2021    LAPAROSCOPIC CHOLECYSTECTOMY performed by Trung Hurst MD at [facility] [place] OR    COLONOSCOPY N/A 2021    COLONOSCOPY WITH BIOPSY performed by Ed Castellanos MD at [facility] ENDOSCOPY    CYSTOSCOPY  2013    w/ bladder biopsy    EP DEVICE PROCEDURE N/A 2024    Watchman kary closure device performed by Shaw Moncada MD at [facility] [place] CARDIAC CATH LAB    FRACTURE SURGERY      right wrist    HEMICOLECTOMY Right 2021    LAPAROSCOPIC RIGHT COLON RESECTION performed by Trung Hurst MD at [facility] [place] OR    IR TUNNELED CVC PLACE WO SQ PORT/PUMP > 5 YEARS  2024    IR TUNNELED CATHETER PLACEMENT GREATER THAN 5 YEARS 2024 Raul Toney MD [facility] [place] SPECIAL PROCEDURES    UPPER GASTROINTESTINAL ENDOSCOPY N/A 2021    EGD BIOPSY performed by Ed Castellanos MD at [facility] ENDOSCOPY    UPPER GASTROINTESTINAL ENDOSCOPY N/A 2023    EGD CONTROL HEMORRHAGE, EGD APC STOMACH performed by Junior Hermosillo MD at [facility] ENDOSCOPY     Venous 07/18/25 1317   Dressing Status Other (Comment) 07/17/25 2048   Wound Cleansed Soap and water 07/17/25 1723   Dressing/Treatment Ace wrap 07/18/25 1116   Wound Assessment Dry;Ruptured blister 07/18/25 1317   Drainage Amount None (dry) 07/18/25 1317   Number of days: 1   Patient agreeable to visit. Patient in with fluid overload.  Patient's lower extremities were red, not edematous at this time.  There are scabbed wounds where the blisters had ruptured.  Patient reports she normally uses kenalog cream and or neosporin if she has open blisters.  Will recommend using kenalog cream at this time.  Also feet assessed for edema, it is noted that toenails were very long, inhibiting patient from wearing normal shoes. Will recommend podiatry consult for have nails clipped.          Response to treatment:  Well tolerated by patient.     Pain Assessment:  Severity:  0 / 10  Quality of pain: N/A  Wound Pain Timing/Severity: none  Premedicated: N/A    Plan   Plan of Care: Wound 07/16/25 Pretibial Right scabbed ulcers/ cellulitis-Dressing/Treatment: Ace wrap (pt refusinga at this time)  Wound 07/16/25 Pretibial Left scabbed ulcerations/ cellulitis-Dressing/Treatment: Ace wrap (pt refusing at this time)  Wound 07/16/25 Coccyx blanchable redness-Dressing/Treatment: Ace wrap  Venous ulcers, cleanse legs with dial soap and water, apply Kenalog cream as prescribed.  Wash feet with dial soap and water, dry, apply dermaphor (get from central supply)  ointment. Consult podiatry for long, curving toenails.     Specialty Bed Required :  N/A    Current Diet: ADULT DIET; Regular; Low Sodium (2 gm); Low Potassium (Less than 3000 mg/day); Low Phosphorus (Less than 1000 mg); 1200 ml  Dietician consult:  Yes    Discharge Plan:  Placement for patient upon discharge: Home with Support   Patient appropriate for Outpatient Wound Care Center: Yes    Referrals:  Home Health Care    Patient/Caregiver Teaching:  Level of patient/caregiver

## 2025-07-18 NOTE — PROGRESS NOTES
Pt has permanent AF, rate is controlled. No plans for EP intervention, rate control + ASA for Watchman going forward. No further recommendations, pt is stable from EP standpoint, will sign off. Follow up with general cardiology as scheduled.

## 2025-07-19 PROBLEM — N18.5 ANEMIA OF CHRONIC RENAL FAILURE, STAGE 5 (HCC): Status: ACTIVE | Noted: 2025-07-19

## 2025-07-19 PROBLEM — D63.1 ANEMIA OF CHRONIC RENAL FAILURE, STAGE 5 (HCC): Status: ACTIVE | Noted: 2025-07-19

## 2025-07-19 PROBLEM — E83.9 CHRONIC KIDNEY DISEASE-MINERAL BONE DISORDER (CKD-MBD) WITH STAGE 5 CHRONIC KIDNEY DISEASE, ON CHRONIC DIALYSIS (HCC): Status: ACTIVE | Noted: 2025-07-19

## 2025-07-19 PROBLEM — M89.9 CHRONIC KIDNEY DISEASE-MINERAL BONE DISORDER (CKD-MBD) WITH STAGE 5 CHRONIC KIDNEY DISEASE, ON CHRONIC DIALYSIS (HCC): Status: ACTIVE | Noted: 2025-07-19

## 2025-07-19 PROBLEM — Z99.2 CHRONIC KIDNEY DISEASE-MINERAL BONE DISORDER (CKD-MBD) WITH STAGE 5 CHRONIC KIDNEY DISEASE, ON CHRONIC DIALYSIS (HCC): Status: ACTIVE | Noted: 2025-07-19

## 2025-07-19 PROBLEM — N18.5 CHRONIC KIDNEY DISEASE-MINERAL BONE DISORDER (CKD-MBD) WITH STAGE 5 CHRONIC KIDNEY DISEASE, ON CHRONIC DIALYSIS (HCC): Status: ACTIVE | Noted: 2025-07-19

## 2025-07-19 LAB
ALBUMIN SERPL-MCNC: 3.3 G/DL (ref 3.4–5)
ANION GAP SERPL CALCULATED.3IONS-SCNC: 13 MMOL/L (ref 3–16)
BASOPHILS # BLD: 0 K/UL (ref 0–0.2)
BASOPHILS NFR BLD: 0.6 %
BUN SERPL-MCNC: 31 MG/DL (ref 7–20)
CALCIUM SERPL-MCNC: 9.3 MG/DL (ref 8.3–10.6)
CHLORIDE SERPL-SCNC: 100 MMOL/L (ref 99–110)
CO2 SERPL-SCNC: 26 MMOL/L (ref 21–32)
CREAT SERPL-MCNC: 5.2 MG/DL (ref 0.6–1.2)
DEPRECATED RDW RBC AUTO: 22.3 % (ref 12.4–15.4)
EOSINOPHIL # BLD: 0.2 K/UL (ref 0–0.6)
EOSINOPHIL NFR BLD: 2.8 %
GFR SERPLBLD CREATININE-BSD FMLA CKD-EPI: 8 ML/MIN/{1.73_M2}
GLUCOSE BLD-MCNC: 89 MG/DL (ref 70–99)
GLUCOSE BLD-MCNC: 92 MG/DL (ref 70–99)
GLUCOSE SERPL-MCNC: 91 MG/DL (ref 70–99)
HCT VFR BLD AUTO: 36.2 % (ref 36–48)
HGB BLD-MCNC: 11.5 G/DL (ref 12–16)
LYMPHOCYTES # BLD: 1.8 K/UL (ref 1–5.1)
LYMPHOCYTES NFR BLD: 29 %
MAGNESIUM SERPL-MCNC: 2.34 MG/DL (ref 1.8–2.4)
MCH RBC QN AUTO: 27.4 PG (ref 26–34)
MCHC RBC AUTO-ENTMCNC: 31.9 G/DL (ref 31–36)
MCV RBC AUTO: 86 FL (ref 80–100)
MONOCYTES # BLD: 1 K/UL (ref 0–1.3)
MONOCYTES NFR BLD: 15.5 %
NEUTROPHILS # BLD: 3.3 K/UL (ref 1.7–7.7)
NEUTROPHILS NFR BLD: 52.1 %
PERFORMED ON: NORMAL
PERFORMED ON: NORMAL
PHOSPHATE SERPL-MCNC: 5 MG/DL (ref 2.5–4.9)
PLATELET # BLD AUTO: 99 K/UL (ref 135–450)
PMV BLD AUTO: 9.4 FL (ref 5–10.5)
POTASSIUM SERPL-SCNC: 4.9 MMOL/L (ref 3.5–5.1)
RBC # BLD AUTO: 4.21 M/UL (ref 4–5.2)
SODIUM SERPL-SCNC: 139 MMOL/L (ref 136–145)
WBC # BLD AUTO: 6.3 K/UL (ref 4–11)

## 2025-07-19 PROCEDURE — 6370000000 HC RX 637 (ALT 250 FOR IP): Performed by: INTERNAL MEDICINE

## 2025-07-19 PROCEDURE — 99232 SBSQ HOSP IP/OBS MODERATE 35: CPT | Performed by: INTERNAL MEDICINE

## 2025-07-19 PROCEDURE — 36415 COLL VENOUS BLD VENIPUNCTURE: CPT

## 2025-07-19 PROCEDURE — 80069 RENAL FUNCTION PANEL: CPT

## 2025-07-19 PROCEDURE — 2060000000 HC ICU INTERMEDIATE R&B

## 2025-07-19 PROCEDURE — 6370000000 HC RX 637 (ALT 250 FOR IP): Performed by: HOSPITALIST

## 2025-07-19 PROCEDURE — 83735 ASSAY OF MAGNESIUM: CPT

## 2025-07-19 PROCEDURE — 6360000002 HC RX W HCPCS: Performed by: HOSPITALIST

## 2025-07-19 PROCEDURE — 2500000003 HC RX 250 WO HCPCS: Performed by: HOSPITALIST

## 2025-07-19 PROCEDURE — 85025 COMPLETE CBC W/AUTO DIFF WBC: CPT

## 2025-07-19 PROCEDURE — 90935 HEMODIALYSIS ONE EVALUATION: CPT

## 2025-07-19 PROCEDURE — 6360000002 HC RX W HCPCS: Performed by: INTERNAL MEDICINE

## 2025-07-19 RX ORDER — FUROSEMIDE 10 MG/ML
40 INJECTION INTRAMUSCULAR; INTRAVENOUS DAILY
Status: DISCONTINUED | OUTPATIENT
Start: 2025-07-19 | End: 2025-07-20

## 2025-07-19 RX ADMIN — HEPARIN SODIUM 5000 UNITS: 5000 INJECTION INTRAVENOUS; SUBCUTANEOUS at 17:38

## 2025-07-19 RX ADMIN — SODIUM CHLORIDE, PRESERVATIVE FREE 10 ML: 5 INJECTION INTRAVENOUS at 17:43

## 2025-07-19 RX ADMIN — HEPARIN SODIUM 5000 UNITS: 5000 INJECTION INTRAVENOUS; SUBCUTANEOUS at 22:25

## 2025-07-19 RX ADMIN — CEPHALEXIN 500 MG: 250 CAPSULE ORAL at 14:41

## 2025-07-19 RX ADMIN — SEVELAMER CARBONATE 800 MG: 800 TABLET, FILM COATED ORAL at 14:40

## 2025-07-19 RX ADMIN — HYDROCODONE BITARTRATE AND ACETAMINOPHEN 1 TABLET: 5; 325 TABLET ORAL at 14:40

## 2025-07-19 RX ADMIN — CEPHALEXIN 500 MG: 250 CAPSULE ORAL at 22:25

## 2025-07-19 RX ADMIN — ROSUVASTATIN 20 MG: 20 TABLET, FILM COATED ORAL at 22:27

## 2025-07-19 RX ADMIN — HEPARIN SODIUM 5000 UNITS: 5000 INJECTION INTRAVENOUS; SUBCUTANEOUS at 05:56

## 2025-07-19 RX ADMIN — HYDROCODONE BITARTRATE AND ACETAMINOPHEN 1 TABLET: 5; 325 TABLET ORAL at 22:26

## 2025-07-19 RX ADMIN — SEVELAMER CARBONATE 800 MG: 800 TABLET, FILM COATED ORAL at 17:38

## 2025-07-19 RX ADMIN — ASPIRIN 81 MG: 81 TABLET, COATED ORAL at 14:40

## 2025-07-19 RX ADMIN — METOPROLOL SUCCINATE 25 MG: 25 TABLET, EXTENDED RELEASE ORAL at 14:40

## 2025-07-19 RX ADMIN — FUROSEMIDE 40 MG: 10 INJECTION, SOLUTION INTRAMUSCULAR; INTRAVENOUS at 20:20

## 2025-07-19 ASSESSMENT — PAIN SCALES - GENERAL
PAINLEVEL_OUTOF10: 0
PAINLEVEL_OUTOF10: 0
PAINLEVEL_OUTOF10: 2

## 2025-07-19 ASSESSMENT — PAIN DESCRIPTION - LOCATION: LOCATION: KNEE

## 2025-07-19 NOTE — PROGRESS NOTES
Nephrology Progress Note                                                                                                                                                                                                                                                                                                                                                               Office : [phone]     Fax :[phone]    Patient's Name: Megha Rojas  7/19/2025    Reason for Consult:  ESRD on HD   Requesting Physician:  Denice Reynolds MD  Chief Complaint:    Chief Complaint   Patient presents with    Shortness of Breath     Pt. Brought in via [facility] from home, Pt. C/O shortness of breathes since 4am. Per EMS .       Assessment/Plan     # ESRD on HD  # Volume overload   - Dialyzes TTS at [facility]  - Plan for next HD Monday   - Renally dose meds  - Monitor renal labs     # CHF  # Dyspnea  # Bilateral pleural effusions   - Volume management with HD, as above  - Cardiology consulted   - resume lasix 40 mg I.v daily     # HTN  - BP's controlled on BB  - Monitor     # Anemia of chronic disease   - Hgb at goal for ESRD  - Monitor     # Acid- base/ Electrolyte imbalance   - Low K diet  - Lokelma PRN    # MBD management  - Sevelamer with meals  - Low phos diet      History of Present Ilness:    Megha Rojas is a 84 y.o. female with a PMH of ESRD on HD, A-fib s/p Watchman, CAD, CHF, and HTN, who presented to the ED with complaints of shortness of breath. Patient has not missed any of her outpatient HD sessions. Imaging revealed bilateral pleural effusions. She was admitted for further management of CHF exacebration. We are consulted for ESRD and HD management.     Interval hx:  Pt feels better  GXT on Monday   Lytes ok   BP controlled   HD today UF 1.2 L   BP's controlled  On RA  Electrolytes stable       Past Medical History:   Diagnosis Date    Anemia     CAD (coronary artery disease)

## 2025-07-19 NOTE — PROGRESS NOTES
Treatment time: 3 hrs    Net UF: 1200 ml    Pre weight: 81.3 kg  Post weight: 80.1 kg    Access used: Lt UE AVG  Access function: Well tolerated, 300 BFR    Medications or blood products given: none    Regular outpatient schedule: TTS    Summary of response to treatment: Pt tolerated well. No issues. Report given to Shae Blanco RN

## 2025-07-19 NOTE — PROGRESS NOTES
[facility], [facility] [place]   Cardiology   Date: 7/19/2025  Reason for Follow up: Shortness of breath    Chief Complaint   Patient presents with    Shortness of Breath     Pt. Brought in via [facility] from home, Pt. C/O shortness of breathes since 4am. Per EMS .       HPI: Megha Rojas is a 84 y.o. female past medical history significant for ESRD on hemodialysis, AF, CAD, PCI, HFpEF, HTN, s/p watchman implantation admitted for shortness of breath.  Was seen by heart failure team and EP team.  She has been treated for cellulitis of lower extremities.  Patient seen and examined.   No major events overnight.   States that her lower extremity swelling has been improved.    Assessment:   Permanent Afib   S/p SAIDA closure with Watchman on 7/1/2024,  Left bundle branch block  Acute on chronic HFpEF  SAHRA  CAD s/p PCI to LAD  HTN  Cellulitis  Chronic anemia  End-stage renal disease on hemodialysis    Plan:   On hemodialysis for fluid overload.  Daily weight.  Monitor I&O's closely.    Patient has permanent atrial fibrillation.  Rates are controlled on telemetry.  High risk [id number] score. Patient is s/p watchman. On ASA   She does have a left bundle branch block.  Not interested in invasive procedure.  Antiarrhythmic therapy and ablation has been discussed again and she is not interested in.    Continue antibiotic therapy for lower extremity cellulitis.    Stress test could not be completed today since she was undergoing hemodialysis.  Plan for completing a stress test on Monday.    Relevant available labs, and cardiovascular diagnostics, documents reviewed.     NA: 139  K4.9 creatinine 5.2  NT proBNP > 70,000  Hemoglobin 11.5  Platelet 99    Echo: 7/18/2025: EF 40 to 45%.  Mild AI.  Mild MR.  Mild TR.  LA severely dilated.        7/19/2025     8:12 AM 7/19/2025     5:43 AM 7/18/2025    11:09 AM 7/18/2025     5:49 AM 7/17/2025     4:58 AM 7/16/2025     9:00 PM 7/16/2025     3:28 PM   Weight Metrics

## 2025-07-19 NOTE — PLAN OF CARE
Problem: Safety - Adult  Goal: Free from fall injury  Outcome: Progressing  Flowsheets (Taken 7/19/2025 0540)  Free From Fall Injury: Instruct family/caregiver on patient safety     Problem: ABCDS Injury Assessment  Goal: Absence of physical injury  Outcome: Progressing  Flowsheets (Taken 7/19/2025 0540)  Absence of Physical Injury: Implement safety measures based on patient assessment     Problem: Cardiovascular - Adult  Goal: Maintains optimal cardiac output and hemodynamic stability  Outcome: Progressing  Flowsheets (Taken 7/19/2025 0540)  Maintains optimal cardiac output and hemodynamic stability:   Monitor blood pressure and heart rate   Assess for signs of decreased cardiac output     Problem: Skin/Tissue Integrity - Adult  Goal: Skin integrity remains intact  Description: 1.  Monitor for areas of redness and/or skin breakdown  2.  Assess vascular access sites hourly  3.  Every 4-6 hours minimum:  Change oxygen saturation probe site  4.  Every 4-6 hours:  If on nasal continuous positive airway pressure, respiratory therapy assess nares and determine need for appliance change or resting period  Outcome: Progressing  Flowsheets (Taken 7/19/2025 0540)  Skin Integrity Remains Intact: Monitor for areas of redness and/or skin breakdown     Problem: Musculoskeletal - Adult  Goal: Return mobility to safest level of function  Outcome: Progressing  Flowsheets (Taken 7/19/2025 0540)  Return Mobility to Safest Level of Function: Assess patient stability and activity tolerance for standing, transferring and ambulating with or without assistive devices     Problem: Skin/Tissue Integrity  Goal: Skin integrity remains intact  Description: 1.  Monitor for areas of redness and/or skin breakdown  2.  Assess vascular access sites hourly  3.  Every 4-6 hours minimum:  Change oxygen saturation probe site  4.  Every 4-6 hours:  If on nasal continuous positive airway pressure, respiratory therapy assess nares and determine need for

## 2025-07-19 NOTE — PROGRESS NOTES
Stress test to be completed Monday morning as patient is receiving dialysis this AM. Floor RN aware. NPO after midnight Sunday with caffeine restriction starting at noon.

## 2025-07-19 NOTE — PROGRESS NOTES
HOSPITALISTS PROGRESS NOTE    7/19/2025 3:04 PM        Name: Megha Rojas .              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      Interval history:   Pt seen and examined today.  Feels well  No chest pain no shortness of breath    Assessment & Plan:     Acute on chronic diastolic and mild systolic CHF   proBNP> 70,000 on admission.  CTPE -VE for PE; showed moderate right& small left pleural effusion   Was on IV Lasix and getting dialysis  For another session of dialysis today  Discussed with nephrology will discontinue IV Lasix  Patient seems very euvolemic  Echo showed a EF of 40 to 45%  At this point will need a cardiac stress test  This will be done on Monday and if that is negative she will be discharged    Hx of A-fib; s/p watchman's and ILR.  Rate controlled  Cardiology on board; continue aspirin and metoprolol.  Monitor on telemetry  Patient not interested in antiarrhythmic or ablation.    Elevated troponin, chronic likely 2/2 CKD  Denies any chest pain.  EKG showed A-fib with LBBB; chronic.  No acute ischemic changes     Hx of ESRD on HD T,Th&Sat Friday.  Nephrology consulted for dialysis.  Monitor renal function closely     Elevated D-dimer.  CT chest without acute PE.  BLE venous Doppler negative for DVT or SVT    UTI; UA suggestive of UTI.  Urine culture negative.  Rocephin DC'd     Hypertension.  Continue current regimen and monitor BP closely    Hyperlipidemia; continue statins    BLE cellulitis; improving.  On Keflex.      Hx of SAHRA      DVT PPX: S/C heparin  Code:Full Code        Current Medications  Reviewed     Objective:  /61

## 2025-07-20 LAB
ALBUMIN SERPL-MCNC: 3.4 G/DL (ref 3.4–5)
ANION GAP SERPL CALCULATED.3IONS-SCNC: 13 MMOL/L (ref 3–16)
BASOPHILS # BLD: 0.1 K/UL (ref 0–0.2)
BASOPHILS NFR BLD: 1.4 %
BUN SERPL-MCNC: 29 MG/DL (ref 7–20)
CALCIUM SERPL-MCNC: 9 MG/DL (ref 8.3–10.6)
CHLORIDE SERPL-SCNC: 101 MMOL/L (ref 99–110)
CO2 SERPL-SCNC: 24 MMOL/L (ref 21–32)
CREAT SERPL-MCNC: 4.5 MG/DL (ref 0.6–1.2)
DEPRECATED RDW RBC AUTO: 22 % (ref 12.4–15.4)
EOSINOPHIL # BLD: 0.2 K/UL (ref 0–0.6)
EOSINOPHIL NFR BLD: 2.9 %
GFR SERPLBLD CREATININE-BSD FMLA CKD-EPI: 9 ML/MIN/{1.73_M2}
GLUCOSE BLD-MCNC: 101 MG/DL (ref 70–99)
GLUCOSE BLD-MCNC: 124 MG/DL (ref 70–99)
GLUCOSE BLD-MCNC: 84 MG/DL (ref 70–99)
GLUCOSE SERPL-MCNC: 91 MG/DL (ref 70–99)
HCT VFR BLD AUTO: 36.9 % (ref 36–48)
HGB BLD-MCNC: 11.9 G/DL (ref 12–16)
LYMPHOCYTES # BLD: 1.5 K/UL (ref 1–5.1)
LYMPHOCYTES NFR BLD: 25.9 %
MAGNESIUM SERPL-MCNC: 2.26 MG/DL (ref 1.8–2.4)
MCH RBC QN AUTO: 27.8 PG (ref 26–34)
MCHC RBC AUTO-ENTMCNC: 32.2 G/DL (ref 31–36)
MCV RBC AUTO: 86.5 FL (ref 80–100)
MONOCYTES # BLD: 0.6 K/UL (ref 0–1.3)
MONOCYTES NFR BLD: 10.7 %
MRSA DNA SPEC QL NAA+PROBE: NORMAL
NEUTROPHILS # BLD: 3.4 K/UL (ref 1.7–7.7)
NEUTROPHILS NFR BLD: 59.1 %
NT-PROBNP SERPL-MCNC: ABNORMAL PG/ML (ref 0–449)
PERFORMED ON: ABNORMAL
PERFORMED ON: ABNORMAL
PERFORMED ON: NORMAL
PHOSPHATE SERPL-MCNC: 4.3 MG/DL (ref 2.5–4.9)
PLATELET # BLD AUTO: 85 K/UL (ref 135–450)
PMV BLD AUTO: 9.5 FL (ref 5–10.5)
POTASSIUM SERPL-SCNC: 4.4 MMOL/L (ref 3.5–5.1)
RBC # BLD AUTO: 4.26 M/UL (ref 4–5.2)
SODIUM SERPL-SCNC: 138 MMOL/L (ref 136–145)
WBC # BLD AUTO: 5.7 K/UL (ref 4–11)

## 2025-07-20 PROCEDURE — 83735 ASSAY OF MAGNESIUM: CPT

## 2025-07-20 PROCEDURE — 80069 RENAL FUNCTION PANEL: CPT

## 2025-07-20 PROCEDURE — 6370000000 HC RX 637 (ALT 250 FOR IP): Performed by: HOSPITALIST

## 2025-07-20 PROCEDURE — 2060000000 HC ICU INTERMEDIATE R&B

## 2025-07-20 PROCEDURE — 99232 SBSQ HOSP IP/OBS MODERATE 35: CPT | Performed by: INTERNAL MEDICINE

## 2025-07-20 PROCEDURE — 2500000003 HC RX 250 WO HCPCS: Performed by: HOSPITALIST

## 2025-07-20 PROCEDURE — 97530 THERAPEUTIC ACTIVITIES: CPT

## 2025-07-20 PROCEDURE — 36415 COLL VENOUS BLD VENIPUNCTURE: CPT

## 2025-07-20 PROCEDURE — 97165 OT EVAL LOW COMPLEX 30 MIN: CPT

## 2025-07-20 PROCEDURE — 6370000000 HC RX 637 (ALT 250 FOR IP): Performed by: INTERNAL MEDICINE

## 2025-07-20 PROCEDURE — 85025 COMPLETE CBC W/AUTO DIFF WBC: CPT

## 2025-07-20 PROCEDURE — 6360000002 HC RX W HCPCS: Performed by: HOSPITALIST

## 2025-07-20 PROCEDURE — 97161 PT EVAL LOW COMPLEX 20 MIN: CPT

## 2025-07-20 PROCEDURE — 6370000000 HC RX 637 (ALT 250 FOR IP): Performed by: STUDENT IN AN ORGANIZED HEALTH CARE EDUCATION/TRAINING PROGRAM

## 2025-07-20 PROCEDURE — 83880 ASSAY OF NATRIURETIC PEPTIDE: CPT

## 2025-07-20 RX ORDER — FUROSEMIDE 40 MG/1
40 TABLET ORAL 2 TIMES DAILY
Status: DISCONTINUED | OUTPATIENT
Start: 2025-07-20 | End: 2025-07-21 | Stop reason: HOSPADM

## 2025-07-20 RX ADMIN — SEVELAMER CARBONATE 800 MG: 800 TABLET, FILM COATED ORAL at 18:48

## 2025-07-20 RX ADMIN — CEPHALEXIN 500 MG: 250 CAPSULE ORAL at 21:05

## 2025-07-20 RX ADMIN — HYDROCODONE BITARTRATE AND ACETAMINOPHEN 1 TABLET: 5; 325 TABLET ORAL at 09:53

## 2025-07-20 RX ADMIN — FUROSEMIDE 40 MG: 40 TABLET ORAL at 09:54

## 2025-07-20 RX ADMIN — HEPARIN SODIUM 5000 UNITS: 5000 INJECTION INTRAVENOUS; SUBCUTANEOUS at 18:45

## 2025-07-20 RX ADMIN — SODIUM CHLORIDE, PRESERVATIVE FREE 10 ML: 5 INJECTION INTRAVENOUS at 09:55

## 2025-07-20 RX ADMIN — CEPHALEXIN 500 MG: 250 CAPSULE ORAL at 09:53

## 2025-07-20 RX ADMIN — ROSUVASTATIN 20 MG: 20 TABLET, FILM COATED ORAL at 21:05

## 2025-07-20 RX ADMIN — HEPARIN SODIUM 5000 UNITS: 5000 INJECTION INTRAVENOUS; SUBCUTANEOUS at 06:13

## 2025-07-20 RX ADMIN — HYDROCODONE BITARTRATE AND ACETAMINOPHEN 1 TABLET: 5; 325 TABLET ORAL at 21:05

## 2025-07-20 RX ADMIN — SEVELAMER CARBONATE 800 MG: 800 TABLET, FILM COATED ORAL at 12:23

## 2025-07-20 RX ADMIN — SODIUM CHLORIDE, PRESERVATIVE FREE 10 ML: 5 INJECTION INTRAVENOUS at 21:05

## 2025-07-20 RX ADMIN — HEPARIN SODIUM 5000 UNITS: 5000 INJECTION INTRAVENOUS; SUBCUTANEOUS at 23:47

## 2025-07-20 RX ADMIN — ASPIRIN 81 MG: 81 TABLET, COATED ORAL at 09:53

## 2025-07-20 RX ADMIN — FUROSEMIDE 40 MG: 40 TABLET ORAL at 18:48

## 2025-07-20 RX ADMIN — SEVELAMER CARBONATE 800 MG: 800 TABLET, FILM COATED ORAL at 09:53

## 2025-07-20 RX ADMIN — METOPROLOL SUCCINATE 25 MG: 25 TABLET, EXTENDED RELEASE ORAL at 09:53

## 2025-07-20 ASSESSMENT — PAIN SCALES - GENERAL
PAINLEVEL_OUTOF10: 0
PAINLEVEL_OUTOF10: 0

## 2025-07-20 NOTE — PROGRESS NOTES
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   Past Medical History:  has a past medical history of Anemia, CAD (coronary artery disease), CKD (chronic kidney disease), History of blood transfusion, Hyperlipidemia, and Hypertension.  Past Surgical History:  has a past surgical history that includes fracture surgery; Cystocopy (2013); Upper gastrointestinal endoscopy (N/A, 2021); Colonoscopy (N/A, 2021); hemicolectomy (Right, 2021); Cholecystectomy, laparoscopic (N/A, 2021);  section; Upper gastrointestinal endoscopy (N/A, 2023); IR TUNNELED CVC PLACE WO SQ PORT/PUMP > 5 YEARS (2024); vascular surgery (Left, 2024); and ep device procedure (N/A, 2024).    Discharge Recommendations: Megha Rojas scored a 24/24 on the AM-PAC ADL Inpatient form.  At this time, no further OT is recommended upon discharge due to patient at independent level.  Recommend patient returns to prior setting with prior services.      DME Required For Discharge: No DME required    Required Braces/Orthotics: no braces required                   [] Right            [] Left  Positional Restrictions:no positional restrictions     Pre-Admission Information   Lives With: Granddaughter                 Type of Home: house  Home Layout: one level  Home Access: ramped entry  Bathroom Layout: walk in shower  Bathroom Equipment: grab bars in

## 2025-07-20 NOTE — PROGRESS NOTES
Hospitalist Progress Note    Name:  Megha Rojas    /Age/Sex: 1940  (84 y.o. female)  MRN & CSN:  [id number] & [id number]    PCP: Denice Reynolds MD    Date of Admission: 2025    Patient Status:  Inpatient     Chief Complaint:   Chief Complaint   Patient presents with    Shortness of Breath     Pt. Brought in via [facility] from home, Pt. C/O shortness of breathes since 4am. Per EMS .       Hospital Course:   Patient admitted for shortness of breath.  Concern for possible CHF exacerbation on admission.  BNP >70k on admission.  Cardiology consulted.    Patient is an ESRD patients, nephrology consulted.  Receives dialysis TTS.    Subjective:  Today is:  Hospital Day: 5.  Patient seen and examined in [id number]-.     Sitting in bed.  No chest pain or shortness of breath.  Eating and drinking okay.      Medications:  Reviewed    Infusion Medications    dextrose      sodium chloride       Scheduled Medications    furosemide  40 mg Oral BID    cephALEXin  500 mg Oral 2 times per day    sodium zirconium cyclosilicate  10 g Oral Once    aspirin EC  81 mg Oral Daily    HYDROcodone-acetaminophen  1 tablet Oral BID    metoprolol succinate  25 mg Oral Daily    rosuvastatin  20 mg Oral Nightly    sevelamer  800 mg Oral TID WC    sodium chloride flush  5-40 mL IntraVENous 2 times per day    heparin (porcine)  5,000 Units SubCUTAneous 3 times per day     PRN Meds: dextrose bolus **OR** dextrose bolus, glucagon (rDNA), dextrose, sodium chloride flush, sodium chloride, ondansetron **OR** ondansetron, acetaminophen **OR** acetaminophen      Intake/Output Summary (Last 24 hours) at 2025 1757  Last data filed at 2025 0955  Gross per 24 hour   Intake 250 ml   Output 125 ml   Net 125 ml       Physical Exam Performed:    /60   Pulse 79   Temp 97.9 °F (36.6 °C)   Resp 16   Ht 1.524 m (5')   Wt 82 kg (180 lb 11.2 oz)   SpO2 93%   BMI 35.29 kg/m²     General appearance: No  apparent distress, appears stated age and cooperative.   HEENT: Pupils equal, round, and reactive to light. Conjunctivae/corneas clear.  Neck: Supple, with full range of motion. No jugular venous distention. Trachea midline.  Respiratory:  Normal respiratory effort. Clear to auscultation, bilaterally without Rales/Wheezes/Rhonchi.  Cardiovascular: Regular rate and rhythm with normal S1/S2 without murmurs, rubs or gallops.  Trace pitting edema bilateral lower extremities..  Abdomen: Soft, non-tender, non-distended with normal bowel sounds.      Labs:   Recent Labs     07/18/25  0538 07/19/25  0458 07/20/25  0443   WBC 6.6 6.3 5.7   HGB 12.2 11.5* 11.9*   HCT 38.3 36.2 36.9   PLT 99* 99* 85*     Recent Labs     07/18/25  0538 07/19/25 0458 07/20/25  0443    139 138   K 4.6 4.9 4.4   CL 99 100 101   CO2 24 26 24   BUN 19 31* 29*   CREATININE 4.0* 5.2* 4.5*   CALCIUM 9.2 9.3 9.0   PHOS 4.2 5.0* 4.3     No results for input(s): \"AST\", \"ALT\", \"BILIDIR\", \"BILITOT\", \"ALKPHOS\" in the last 72 hours.  No results for input(s): \"INR\" in the last 72 hours.  No results for input(s): \"CKTOTAL\", \"TROPONINI\" in the last 72 hours.    Urinalysis:      Lab Results   Component Value Date/Time    NITRU Negative 07/16/2025 06:14 PM    WBCUA 576 07/16/2025 06:14 PM    BACTERIA 1+ 07/16/2025 06:14 PM    RBCUA 271 07/16/2025 06:14 PM    BLOODU LARGE 07/16/2025 06:14 PM    GLUCOSEU 100 07/16/2025 06:14 PM       Radiology:  Vascular duplex lower extremity venous bilateral   Final Result      CT CHEST PULMONARY EMBOLISM W CONTRAST   Final Result   1. No pulmonary embolism.   2. Congestive heart failure with mild interstitial edema.   3. Moderate right and small left pleural effusions with mild adjacent   consolidation in the lower lobes the likely represents atelectasis.   Pneumonia is considered less likely.   4. Mild enlargement of the central pulmonary arteries which can be seen with   pulmonary hypertension.         XR CHEST PORTABLE

## 2025-07-20 NOTE — PROGRESS NOTES
[facility], [facility] [place]   Cardiology   Date: 7/20/2025  Reason for Follow up: Shortness of breath    Chief Complaint   Patient presents with    Shortness of Breath     Pt. Brought in via [facility] from home, Pt. C/O shortness of breathes since 4am. Per EMS .       HPI: Megha Rojas is a 84 y.o. female past medical history significant for ESRD on hemodialysis, AF, CAD, PCI, HFpEF, HTN, s/p watchman implantation admitted for shortness of breath.  Was seen by heart failure team and EP team.  She has been treated for cellulitis of lower extremities.    Interval history:   Patient seen and examined. Clinical notes reviewed.   Telemetry reviewed. No new complaint today.   No major events overnight.   Denies having chest pain, shortness of breath, dyspnea on exertion, Orthopnea, PND at the time of this visit.    Assessment:   Permanent Afib   S/p SAIDA closure with Watchman on 7/1/2024,  Left bundle branch block  Acute on chronic HFpEF  SAHAR  CAD s/p PCI to LAD  HTN  Cellulitis  Chronic anemia  End-stage renal disease on hemodialysis    Plan:   Permanent atrial fibrillation.   High risk [id number] score. Anticoagulation is recommended.  S/p Watchman.   Continue with ASA.   Not interested in invasive procedure and/or antiarrhythmic therapy.      On hemodialysis for fluid overload.  Daily weight.  Monitor I&O's closely.    She does have a left bundle branch block.     Continue antibiotic therapy for lower extremity cellulitis.    Plan for completing a stress test on Monday.    Follow up with heart failure team.     Relevant available labs, and cardiovascular diagnostics, documents reviewed.     NA: 138  K4.4   creatinine 4.5   NT proBNP > 70,000  Hemoglobin 11.9  Platelet 85    Echo: 7/18/2025: EF 40 to 45%.  Mild AI.  Mild MR.  Mild TR.  LA severely dilated.        7/20/2025     4:38 AM 7/19/2025    11:13 AM 7/19/2025     8:12 AM 7/19/2025     5:43 AM 7/18/2025    11:09 AM 7/18/2025     5:49 AM

## 2025-07-20 NOTE — PROGRESS NOTES
Nephrology Progress Note                                                                                                                                                                                                                                                                                                                                                               Office : [phone]     Fax :[phone]    Patient's Name: Megha Rojas  7/20/2025    Reason for Consult:  ESRD on HD   Requesting Physician:  Denice Reynolds MD  Chief Complaint:    Chief Complaint   Patient presents with    Shortness of Breath     Pt. Brought in via [facility] from home, Pt. C/O shortness of breathes since 4am. Per EMS .       Assessment/Plan     # ESRD on HD  # Volume overload   - Dialyzes TTS at [facility]  - Plan for next HD TTS [facility]   - Renally dose meds  - Monitor renal labs     # CHF- GXT tomorrow   # Dyspnea  # Bilateral pleural effusions   - Volume management with HD, as above  - Cardiology consulted   - resumed lasix 40 mg I.v daily   HD TTS     # HTN  - BP's controlled on BB  - Monitor     # Anemia of chronic disease   - Hgb at goal for ESRD  - Monitor     # Acid- base/ Electrolyte imbalance   - Low K diet  - Lokelma PRN    # MBD management  - Sevelamer with meals  - Low phos diet      History of Present Ilness:    Megha Rojas is a 84 y.o. female with a PMH of ESRD on HD, A-fib s/p Watchman, CAD, CHF, and HTN, who presented to the ED with complaints of shortness of breath. Patient has not missed any of her outpatient HD sessions. Imaging revealed bilateral pleural effusions. She was admitted for further management of CHF exacebration. We are consulted for ESRD and HD management.     Interval hx:  Pt feels well   GXT on Monday   Lytes ok   BP controlled   HD today UF 1.2 L   BP's controlled  On RA  Electrolytes stable       Past Medical History:   Diagnosis Date    Anemia

## 2025-07-20 NOTE — PROGRESS NOTES
[facility] - Inpatient Rehabilitation Department   Phone: [phone]    Physical Therapy    [x] Initial Evaluation            [] Daily Treatment Note         [] Discharge Summary      Patient: Megha Rojas   : 1940   MRN: [id number]   Date of Service:  2025  Admitting Diagnosis: Acute on chronic congestive heart failure (HCC)  Current Admission Summary: This 84-year-old female with PMHx of ESRD on HD, A-fib; s/p Watchman, CAD; s/p PCI to LAD , HFpEF, GIB 2/2 AVM (s/p APC &clip), colon cancer s/p hemicolectomy on 3/2021 hypertension hyperlipidemia, who presented with SOB.   Past Medical History:  has a past medical history of Anemia, CAD (coronary artery disease), CKD (chronic kidney disease), History of blood transfusion, Hyperlipidemia, and Hypertension.  Past Surgical History:  has a past surgical history that includes fracture surgery; Cystocopy (2013); Upper gastrointestinal endoscopy (N/A, 2021); Colonoscopy (N/A, 2021); hemicolectomy (Right, 2021); Cholecystectomy, laparoscopic (N/A, 2021);  section; Upper gastrointestinal endoscopy (N/A, 2023); IR TUNNELED CVC PLACE WO SQ PORT/PUMP > 5 YEARS (2024); vascular surgery (Left, 2024); and ep device procedure (N/A, 2024).  Discharge Recommendations: Megha Rojas scored a 19/24 on the AM-PAC short mobility form. Current research shows that an AM-PAC score of 18 or greater is typically associated with a discharge to the patient's home setting. Based on the patient's AM-PAC score and their current functional mobility deficits, it is recommended that the patient have 2-3 sessions per week of Physical Therapy at d/c to increase the patient's independence.  At this time, this patient demonstrates the endurance and safety to discharge home with HHPT and a follow up treatment frequency of 2-3x/wk.  Please see assessment section for further patient specific details.    If patient

## 2025-07-21 ENCOUNTER — APPOINTMENT (OUTPATIENT)
Age: 85
DRG: 291 | End: 2025-07-21
Payer: MEDICARE

## 2025-07-21 VITALS
TEMPERATURE: 97.2 F | WEIGHT: 180.5 LBS | SYSTOLIC BLOOD PRESSURE: 147 MMHG | BODY MASS INDEX: 35.44 KG/M2 | HEIGHT: 60 IN | RESPIRATION RATE: 16 BRPM | DIASTOLIC BLOOD PRESSURE: 79 MMHG | HEART RATE: 74 BPM | OXYGEN SATURATION: 94 %

## 2025-07-21 PROBLEM — I50.23 ACUTE ON CHRONIC SYSTOLIC HEART FAILURE (HCC): Status: ACTIVE | Noted: 2025-07-21

## 2025-07-21 LAB
ANION GAP SERPL CALCULATED.3IONS-SCNC: 15 MMOL/L (ref 3–16)
BACTERIA BLD CULT ORG #2: NORMAL
BACTERIA BLD CULT: NORMAL
BUN SERPL-MCNC: 44 MG/DL (ref 7–20)
CALCIUM SERPL-MCNC: 9 MG/DL (ref 8.3–10.6)
CHLORIDE SERPL-SCNC: 100 MMOL/L (ref 99–110)
CO2 SERPL-SCNC: 23 MMOL/L (ref 21–32)
CREAT SERPL-MCNC: 5.6 MG/DL (ref 0.6–1.2)
ECHO BSA: 1.89 M2
GFR SERPLBLD CREATININE-BSD FMLA CKD-EPI: 7 ML/MIN/{1.73_M2}
GLUCOSE BLD-MCNC: 92 MG/DL (ref 70–99)
GLUCOSE BLD-MCNC: 97 MG/DL (ref 70–99)
GLUCOSE SERPL-MCNC: 91 MG/DL (ref 70–99)
MAGNESIUM SERPL-MCNC: 2.42 MG/DL (ref 1.8–2.4)
NUC STRESS EJECTION FRACTION: 41 %
NUC STRESS LV EDV: 131 ML (ref 56–104)
NUC STRESS LV ESV: 77 ML (ref 19–49)
NUC STRESS LV MASS: 163 G
PERFORMED ON: NORMAL
PERFORMED ON: NORMAL
POTASSIUM SERPL-SCNC: 4.5 MMOL/L (ref 3.5–5.1)
SODIUM SERPL-SCNC: 138 MMOL/L (ref 136–145)
STRESS BASELINE DIAS BP: 72 MMHG
STRESS BASELINE HR: 77 BPM
STRESS BASELINE SYS BP: 156 MMHG
STRESS ESTIMATED WORKLOAD: 1 METS
STRESS EXERCISE DUR MIN: 4 MIN
STRESS EXERCISE DUR SEC: 0 SEC
STRESS O2 SAT PEAK: 94 %
STRESS O2 SAT REST: 94 %
STRESS PEAK DIAS BP: 65 MMHG
STRESS PEAK SYS BP: 139 MMHG
STRESS PERCENT HR ACHIEVED: 57 %
STRESS POST PEAK HR: 78 BPM
STRESS RATE PRESSURE PRODUCT: ABNORMAL BPM*MMHG
STRESS TARGET HR: 138 BPM
TID: 1.01

## 2025-07-21 PROCEDURE — 6370000000 HC RX 637 (ALT 250 FOR IP): Performed by: INTERNAL MEDICINE

## 2025-07-21 PROCEDURE — 78452 HT MUSCLE IMAGE SPECT MULT: CPT

## 2025-07-21 PROCEDURE — 93018 CV STRESS TEST I&R ONLY: CPT | Performed by: INTERNAL MEDICINE

## 2025-07-21 PROCEDURE — 6360000002 HC RX W HCPCS: Performed by: HOSPITALIST

## 2025-07-21 PROCEDURE — 2500000003 HC RX 250 WO HCPCS: Performed by: HOSPITALIST

## 2025-07-21 PROCEDURE — 80048 BASIC METABOLIC PNL TOTAL CA: CPT

## 2025-07-21 PROCEDURE — 78452 HT MUSCLE IMAGE SPECT MULT: CPT | Performed by: INTERNAL MEDICINE

## 2025-07-21 PROCEDURE — 6370000000 HC RX 637 (ALT 250 FOR IP): Performed by: CLINICAL NURSE SPECIALIST

## 2025-07-21 PROCEDURE — 3430000000 HC RX DIAGNOSTIC RADIOPHARMACEUTICAL: Performed by: NURSE PRACTITIONER

## 2025-07-21 PROCEDURE — 93016 CV STRESS TEST SUPVJ ONLY: CPT | Performed by: INTERNAL MEDICINE

## 2025-07-21 PROCEDURE — 6370000000 HC RX 637 (ALT 250 FOR IP): Performed by: STUDENT IN AN ORGANIZED HEALTH CARE EDUCATION/TRAINING PROGRAM

## 2025-07-21 PROCEDURE — A9502 TC99M TETROFOSMIN: HCPCS | Performed by: NURSE PRACTITIONER

## 2025-07-21 PROCEDURE — 6370000000 HC RX 637 (ALT 250 FOR IP): Performed by: HOSPITALIST

## 2025-07-21 PROCEDURE — 99232 SBSQ HOSP IP/OBS MODERATE 35: CPT | Performed by: HOSPITALIST

## 2025-07-21 PROCEDURE — 99232 SBSQ HOSP IP/OBS MODERATE 35: CPT | Performed by: CLINICAL NURSE SPECIALIST

## 2025-07-21 PROCEDURE — 36415 COLL VENOUS BLD VENIPUNCTURE: CPT

## 2025-07-21 PROCEDURE — 93017 CV STRESS TEST TRACING ONLY: CPT

## 2025-07-21 PROCEDURE — 6360000002 HC RX W HCPCS: Performed by: NURSE PRACTITIONER

## 2025-07-21 PROCEDURE — 83735 ASSAY OF MAGNESIUM: CPT

## 2025-07-21 RX ORDER — CEPHALEXIN 500 MG/1
500 CAPSULE ORAL EVERY 12 HOURS SCHEDULED
Qty: 3 CAPSULE | Refills: 0 | Status: SHIPPED | OUTPATIENT
Start: 2025-07-21 | End: 2025-07-23

## 2025-07-21 RX ORDER — REGADENOSON 0.08 MG/ML
0.4 INJECTION, SOLUTION INTRAVENOUS
Status: COMPLETED | OUTPATIENT
Start: 2025-07-21 | End: 2025-07-21

## 2025-07-21 RX ORDER — VALSARTAN 80 MG/1
20 TABLET ORAL DAILY
Status: DISCONTINUED | OUTPATIENT
Start: 2025-07-21 | End: 2025-07-21 | Stop reason: HOSPADM

## 2025-07-21 RX ORDER — FUROSEMIDE 40 MG/1
40 TABLET ORAL 2 TIMES DAILY
Qty: 60 TABLET | Refills: 3 | Status: SHIPPED | OUTPATIENT
Start: 2025-07-21

## 2025-07-21 RX ORDER — VALSARTAN 40 MG/1
20 TABLET ORAL DAILY
Qty: 15 TABLET | Refills: 0 | Status: SHIPPED | OUTPATIENT
Start: 2025-07-21

## 2025-07-21 RX ADMIN — REGADENOSON 0.4 MG: 0.08 INJECTION, SOLUTION INTRAVENOUS at 10:02

## 2025-07-21 RX ADMIN — HYDROCODONE BITARTRATE AND ACETAMINOPHEN 1 TABLET: 5; 325 TABLET ORAL at 14:12

## 2025-07-21 RX ADMIN — ASPIRIN 81 MG: 81 TABLET, COATED ORAL at 14:13

## 2025-07-21 RX ADMIN — SODIUM CHLORIDE, PRESERVATIVE FREE 10 ML: 5 INJECTION INTRAVENOUS at 14:15

## 2025-07-21 RX ADMIN — SEVELAMER CARBONATE 800 MG: 800 TABLET, FILM COATED ORAL at 14:12

## 2025-07-21 RX ADMIN — FUROSEMIDE 40 MG: 40 TABLET ORAL at 14:17

## 2025-07-21 RX ADMIN — TETROFOSMIN 33.1 MILLICURIE: 1.38 INJECTION, POWDER, LYOPHILIZED, FOR SOLUTION INTRAVENOUS at 10:04

## 2025-07-21 RX ADMIN — TETROFOSMIN 10.3 MILLICURIE: 1.38 INJECTION, POWDER, LYOPHILIZED, FOR SOLUTION INTRAVENOUS at 08:51

## 2025-07-21 RX ADMIN — HEPARIN SODIUM 5000 UNITS: 5000 INJECTION INTRAVENOUS; SUBCUTANEOUS at 06:43

## 2025-07-21 RX ADMIN — METOPROLOL SUCCINATE 25 MG: 25 TABLET, EXTENDED RELEASE ORAL at 14:17

## 2025-07-21 RX ADMIN — CEPHALEXIN 500 MG: 250 CAPSULE ORAL at 14:13

## 2025-07-21 RX ADMIN — VALSARTAN 20 MG: 80 TABLET, FILM COATED ORAL at 14:17

## 2025-07-21 RX ADMIN — HEPARIN SODIUM 5000 UNITS: 5000 INJECTION INTRAVENOUS; SUBCUTANEOUS at 14:19

## 2025-07-21 ASSESSMENT — PAIN DESCRIPTION - ORIENTATION: ORIENTATION: RIGHT;LEFT

## 2025-07-21 ASSESSMENT — PAIN SCALES - GENERAL
PAINLEVEL_OUTOF10: 3
PAINLEVEL_OUTOF10: 4

## 2025-07-21 ASSESSMENT — PAIN DESCRIPTION - LOCATION: LOCATION: KNEE

## 2025-07-21 NOTE — CARE COORDINATION
Case Management -  Discharge Note      Patient Name: Megha Rojas                   YOB: 1940  Room: [place] [address]            Readmission Risk (Low < 19, Mod (19-27), High > 27): Readmission Risk Score: 15.7    Current PCP: Denice Reynolds MD      (IMM) Important Message from Medicare:    Has pt received appropriate compliance notices before being discharged if required: yes  Compliance doc:  [x] 2nd IMM; [] Code 44 [] [place]  Date Given: 7/21/25 Given By: KALLI    PT AM-PAC: 19 /24  OT AM-PAC: 24 /24    Patient discharged to home with family to continue with the outpatient HD via  [place] [address] [place] [address]  Phone: [phone]  Fax:  [phone]    Schedule: T, Th, S  Time:  11.15 am     (Add Smart Phrase Here/Delete)      [facility] agency notified of discharge:  [] Yes [] No  [x] NA    Family notified of discharge:  [x] Yes  [] No  [] NA    Facility notified of discharge:  [] Yes  [] No  [x] NA    Pt is being discharged with Outpt IV Antibiotics  [] Yes [x] No  [] NA  If yes, make sure ANUP is faxed to [facility] agency, and meds are called in to pharmacy by RN from ANUP orders only.      Financial    Payor: [facility] MEDICARE / Plan: [facility] MEDICARE / Product Type: *No Product type* /     Pharmacy:  Potential assistance Purchasing Medications: No  Meds-to-Beds request: Yes      [facility] [address] [facility] ([facility] [place] - [address] - P [phone] - F [phone]  [address]  [place] ([place]) [place] [address]  Phone: [phone] Fax: [phone]    [facility] [place], [place] - [address] - P [phone] - F [phone]  [address] [facility] [address]  Phone: [phone] Fax: [phone]      Notes:    Additional Case Management Notes:   Family member will transport patient home.    Electronically signed by RON RUEDA RN /BSN/CCM on 7/21/2025

## 2025-07-21 NOTE — PROGRESS NOTES
Physical Therapy           Megha Rojas    PT treatment session attempted.  The pt was eating for the first time today and stated she had no therapy needs today.  She may be discharging home this afternoon.  Dtr agreed.  PT will follow-up with this pt as the schedule allows.  Thanks.  Renee Brar, PT DPT [id number]

## 2025-07-21 NOTE — CARE COORDINATION
07/21/25 1530   IMM Letter   IMM Letter given to Patient/Family/Significant other/Guardian/POA/by: IMM given   IMM Letter date given: 07/21/25   IMM Letter time given: 0330

## 2025-07-21 NOTE — PROGRESS NOTES
CLINICAL PHARMACY NOTE: MEDS TO BEDS    Total # of Prescriptions Filled: 3   The following medications were delivered to the patient:  Valsartan 40 mg   Furosemide 40 mg  Cephalexin 500 mg     Additional Documentation: [facility] approved to deliver medication to patient room=signed  Deisi Godoy CPhT

## 2025-07-21 NOTE — DISCHARGE SUMMARY
Hospital Medicine Discharge Summary    Name:  Megha Rojas  Gender: female  : 1940  84 y.o.  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    Hospital Course:  Megha Rojas is a 84 y.o. year old female who presented to [facility] on 2025  3:16 PM.      Patient admitted for shortness of breath.  Concern for possible CHF exacerbation on admission.  BNP >70k on admission.  Cardiology consulted.     Patient is an ESRD patients, nephrology consulted.  Receives dialysis TTS.    Stress test on  showed fixed defect. No need for intervention.      On the last day of hospital stay, patient was doing well. No SP or SOB.  The patient expressed appropriate understanding of and agreement with the discharge recommendations, medications, and plan.      Physical Exam Performed:     BP (!) 147/79   Pulse 74

## 2025-07-21 NOTE — PROGRESS NOTES
Data- discharge order received, pt verbalized agreement to discharge, disposition to previous residence, no needs for HHC/DME.     Action- discharge instructions prepared and given to pt and daughter, pt verbalized understanding. Medication information packet given r/t NEW and/or CHANGED prescriptions emphasizing name/purpose/side effects, pt verbalized understanding. Discharge instruction summary: Diet- cardiac, Activity- up as tolerated, Primary Care Physician as follows: Denice Reynolds -[phone] f/u appointment in a week, immunizations reviewed and updated, prescription medications filled at pharmacy of choice. Inpatient surgical procedure precautions reviewed: NA CHF Education reviewed. Pt/ Family has had a total of 60 minutes CHF education this admission encounter.     1. WEIGHT: Admit Weight - Scale: 85.3 kg (188 lb) (07/16/25 1528)        Today  Weight - Scale: 81.9 kg (180 lb 8 oz) (07/21/25 0600)       2. O2 SAT.: SpO2: 94 % (07/21/25 1143)    Response- Pt belongings gathered, IV removed. Disposition is home (no HHC/DME needs), transported with belongings and discharge instructions, taken to lobby via w/c w/ RN, no complications.

## 2025-07-21 NOTE — PROGRESS NOTES
[facility]   Daily Progress Note      Admit Date:  7/16/2025    HPI:    Ms. Rojas is an 84 year old female with history of ESRD on HD, AF, CAD, PCI, HFpEF, hypertension, status post watchman     She presented to the hospital with shortness of breath.  She has been treated for cellulitis of lower extremities       Subjective:  Patient is being seen for acute on chronic preserved EF. There were no acute overnight cardiac events.  She is sitting up in the bed on room air with her daughter at her side.  Stress test pending.  She has been on HD for over a year now and has a fistula   Bnp >70K creat 5.6     Objective:   BP (!) 146/92   Pulse 64   Temp 97.5 °F (36.4 °C) (Temporal)   Resp 16   Ht 1.524 m (5')   Wt 81.9 kg (180 lb 8 oz)   SpO2 94%   BMI 35.25 kg/m²     Intake/Output Summary (Last 24 hours) at 7/21/2025 0857  Last data filed at 7/20/2025 0955  Gross per 24 hour   Intake 10 ml   Output --   Net 10 ml          Physical Exam:  General:  Awake, alert, oriented in NAD  Skin:  Warm and dry.  No unusual bruising or rash  Neck:  Supple.  No JVD or carotid bruit appreciated  Chest:  Normal effort.  Clear to auscultation, no wheezes/rhonchi/rales  Cardiovascular:  RRR, S1/S2, no murmur/gallop/rub  Abdomen:  Soft, nontender, +bowel sounds  Extremities:  No edema  Neurological: No focal deficits  Psychological: Normal mood and affect      Medications:    furosemide  40 mg Oral BID    cephALEXin  500 mg Oral 2 times per day    sodium zirconium cyclosilicate  10 g Oral Once    aspirin EC  81 mg Oral Daily    HYDROcodone-acetaminophen  1 tablet Oral BID    metoprolol succinate  25 mg Oral Daily    rosuvastatin  20 mg Oral Nightly    sevelamer  800 mg Oral TID WC    sodium chloride flush  5-40 mL IntraVENous 2 times per day    heparin (porcine)  5,000 Units SubCUTAneous 3 times per day      dextrose      sodium chloride         Lab Data:  CBC:   Recent Labs     07/19/25  0458 07/20/25  0443   WBC 6.3

## 2025-07-21 NOTE — PROGRESS NOTES
Nephrology Progress Note                                                                                                                                                                                                                                                                                                                                                               Office : [phone]     Fax :[phone]    Patient's Name: Megha Rojas  7/21/2025    Reason for Consult:  ESRD on HD   Requesting Physician:  Denice Reynolds MD  Chief Complaint:    Chief Complaint   Patient presents with    Shortness of Breath     Pt. Brought in via [facility] from home, Pt. C/O shortness of breathes since 4am. Per EMS .       Assessment/Plan     # ESRD on HD  # Volume overload   - Dialyzes TTS at [facility]  - Plan for next HD tomorrow   - Renally dose meds  - Monitor renal labs     # CHF  # Dyspnea  # Bilateral pleural effusions   - Volume management with HD, as above  - Cardiology consulted   - Plan for stress test today   - Resumed Lasix 40 mg BID    # HTN  - BP's controlled on BB  - Monitor     # Anemia of chronic disease   - Hgb at goal for ESRD  - Monitor     # Acid- base/ Electrolyte imbalance   - Low K diet  - Lokelma PRN    # MBD management  - Sevelamer with meals  - Low phos diet      History of Present Ilness:    Megha Rojas is a 84 y.o. female with a PMH of ESRD on HD, A-fib s/p Watchman, CAD, CHF, and HTN, who presented to the ED with complaints of shortness of breath. Patient has not missed any of her outpatient HD sessions. Imaging revealed bilateral pleural effusions. She was admitted for further management of CHF exacebration. We are consulted for ESRD and HD management.     Interval hx:    Plans for a stress test today  BP's controlled  On RA  Electrolytes stable       Past Medical History:   Diagnosis Date    Anemia     CAD (coronary artery disease) 11/2020    Stent    CKD

## 2025-07-22 ENCOUNTER — TELEPHONE (OUTPATIENT)
Dept: OTHER | Age: 85
End: 2025-07-22

## 2025-07-22 NOTE — TELEPHONE ENCOUNTER
[facility]  HEART FAILURE PROGRAM  TELEPHONE ENCOUNTER FORM    Megha Rojas 1940    Attempted to call patient for HF follow-up. No answer at this time.      Next MD/ Clinic appointment: 7/24 with cardiology      MURALI ESPINOZA RN 7/22/2025 1:51 PM

## 2025-07-23 ENCOUNTER — TELEPHONE (OUTPATIENT)
Dept: OTHER | Age: 85
End: 2025-07-23

## 2025-07-23 NOTE — TELEPHONE ENCOUNTER
[facility]  HEART FAILURE PROGRAM  TELEPHONE ENCOUNTER FORM    Megha Rojas 1940    ASSESSMENT:   Baseline weight:   180 lbs  Current weight: 182 lbs  Symptoms: dyspnea; denies dyspnea at this time  Diet history: following a low sodium diet Yes  Medication history: taking medications as instructed Yes; medication side effects noted No  Tobacco history: never but  was a smoker  Activity history: normal activities of daily living  Have you made a lifestyle change since being home? Yes,  \"I'm feeling better than I have in a long time and I'm moving better\"    RECOMMENDATIONS:   Medication: Continue to take as prescribed  Diet: no salt added diet    MD/ Clinic appointment: 7/28 with Emani Laura NP  Other: monitor for s/s fluid retention and contact provider if noted.    Danielle Welsh RN 7/23/2025 2:43 PM

## 2025-07-28 ENCOUNTER — OFFICE VISIT (OUTPATIENT)
Dept: CARDIOLOGY CLINIC | Age: 85
End: 2025-07-28
Payer: MEDICARE

## 2025-07-28 VITALS
SYSTOLIC BLOOD PRESSURE: 118 MMHG | HEIGHT: 60 IN | DIASTOLIC BLOOD PRESSURE: 50 MMHG | BODY MASS INDEX: 34.73 KG/M2 | WEIGHT: 176.9 LBS | HEART RATE: 62 BPM | OXYGEN SATURATION: 97 %

## 2025-07-28 DIAGNOSIS — I50.22 CHRONIC SYSTOLIC CONGESTIVE HEART FAILURE (HCC): Primary | ICD-10-CM

## 2025-07-28 DIAGNOSIS — I25.10 CORONARY ARTERY DISEASE INVOLVING NATIVE CORONARY ARTERY OF NATIVE HEART WITHOUT ANGINA PECTORIS: ICD-10-CM

## 2025-07-28 DIAGNOSIS — I48.19 PERSISTENT ATRIAL FIBRILLATION (HCC): ICD-10-CM

## 2025-07-28 DIAGNOSIS — I10 ESSENTIAL HYPERTENSION: ICD-10-CM

## 2025-07-28 PROCEDURE — 99214 OFFICE O/P EST MOD 30 MIN: CPT | Performed by: NURSE PRACTITIONER

## 2025-07-28 PROCEDURE — 1159F MED LIST DOCD IN RCRD: CPT | Performed by: NURSE PRACTITIONER

## 2025-07-28 PROCEDURE — 3078F DIAST BP <80 MM HG: CPT | Performed by: NURSE PRACTITIONER

## 2025-07-28 PROCEDURE — 3074F SYST BP LT 130 MM HG: CPT | Performed by: NURSE PRACTITIONER

## 2025-07-28 PROCEDURE — 1123F ACP DISCUSS/DSCN MKR DOCD: CPT | Performed by: NURSE PRACTITIONER

## 2025-07-28 RX ORDER — ROSUVASTATIN CALCIUM 20 MG/1
20 TABLET, COATED ORAL DAILY
Qty: 90 TABLET | Refills: 3 | Status: SHIPPED | OUTPATIENT
Start: 2025-07-28

## 2025-07-28 RX ORDER — ROSUVASTATIN CALCIUM 20 MG/1
20 TABLET, COATED ORAL DAILY
Qty: 30 TABLET | Refills: 3 | Status: CANCELLED | OUTPATIENT
Start: 2025-07-28

## 2025-07-28 RX ORDER — ROSUVASTATIN CALCIUM 20 MG/1
TABLET, COATED ORAL
Qty: 30 TABLET | Refills: 0 | OUTPATIENT
Start: 2025-07-28

## (undated) DEVICE — SUTURE PERMAHAND SZ 4-0 L18IN NONABSORBABLE BLK SILK BRAID A183H

## (undated) DEVICE — SET VLV 3 PC AWS DISPOSABLE GRDIAN SCOPEVALET

## (undated) DEVICE — BANDAGE COMPR W6INXL10YD ST M E WHITE/BEIGE

## (undated) DEVICE — CHECK-FLO PERFORMER INTRODUCER: Brand: PERFORMER

## (undated) DEVICE — BANDAGE COMPR W4INXL5YD WHT BGE POLY COT M E WRP WV HK AND

## (undated) DEVICE — APPLIER CLP M/L SHFT DIA5MM 15 LIG LIGAMAX 5

## (undated) DEVICE — COVER LT HNDL BLU PLAS

## (undated) DEVICE — SOLUTION IRRIG 1000ML 0.9% SOD CHL USP POUR PLAS BTL

## (undated) DEVICE — PLUMEPORT LAPAROSCOPIC SMOKE FILTRATION DEVICE: Brand: PLUMEPORT ACTIV

## (undated) DEVICE — SYRINGE, LUER LOCK, 10ML: Brand: MEDLINE

## (undated) DEVICE — CORD ES L10FT MPLR LAP

## (undated) DEVICE — FORCEPS BX L240CM WRK CHN 2.8MM STD CAP W/ NDL MIC MESH

## (undated) DEVICE — STRIP,CLOSURE,WOUND,MEDI-STRIP,1/2X4: Brand: MEDLINE

## (undated) DEVICE — AIR/WATER CLEANING ADAPTER FOR OLYMPUS® GI ENDOSCOPE: Brand: BULLDOG®

## (undated) DEVICE — SOLUTION IV IRRIG WATER 500ML POUR BRL ST 2F7113

## (undated) DEVICE — TROCAR: Brand: KII FIOS FIRST ENTRY

## (undated) DEVICE — LOTION PREP REMV 5OZ IODO CLR TINC OF BENZ DURAPREP

## (undated) DEVICE — ELECTRODE PT RET AD L9FT HI MOIST COND ADH HYDRGEL CORDED

## (undated) DEVICE — LIGHT HANDLE COVER: Brand: UNBRANDED

## (undated) DEVICE — 1 X VERSACROSS CONNECT TRANSSEPTAL DILATOR (INCLUDING 1 X J-TIP MECHANICAL GUIDEWIRE); 1 X VERSACROSS RF WIRE (INCLUDING 1 X CONNECTOR CABLE (SINGLE USE)); 1 X DISPERSIVE ELECTRODE: Brand: VERSACROSS CONNECT LAAC ACCESS SOLUTION

## (undated) DEVICE — STAPLER INT L34CM 60MM LNG ENDOSCP ARTC PWR + ECHELON FLX

## (undated) DEVICE — SYSTEM SKIN CLSR 22CM DERMBND PRINEO

## (undated) DEVICE — SOLUTION IV IRRIG POUR BRL 0.9% SODIUM CHL 2F7124

## (undated) DEVICE — GOWN AURORA NONREINF LG: Brand: MEDLINE INDUSTRIES, INC.

## (undated) DEVICE — GLOVE SURG SZ 7.5 L11.73IN FNGR THK9.8MIL STRW LTX POLYMER

## (undated) DEVICE — [HIGH FLOW INSUFFLATOR,  DO NOT USE IF PACKAGE IS DAMAGED,  KEEP DRY,  KEEP AWAY FROM SUNLIGHT,  PROTECT FROM HEAT AND RADIOACTIVE SOURCES.]: Brand: PNEUMOSURE

## (undated) DEVICE — BW-412T DISP COMBO CLEANING BRUSH: Brand: SINGLE USE COMBINATION CLEANING BRUSH

## (undated) DEVICE — GOWN SIRUS NONREIN XL W/TWL: Brand: MEDLINE INDUSTRIES, INC.

## (undated) DEVICE — NEEDLE HYPO 22GA L1.5IN BLK POLYPR HUB S STL REG BVL STR

## (undated) DEVICE — SHEET,DRAPE,53X77,STERILE: Brand: MEDLINE

## (undated) DEVICE — LAPAROSCOPIC ACCESS SYSTEM: Brand: ALEXIS LAPAROSCOPIC SYSTEM WITH KII FIOS FIRST ENTRY

## (undated) DEVICE — STAPLER INT H4.4X1.5MM DIA75MM 0DEG TI UNIV 6 ROW CART

## (undated) DEVICE — DECANTER FLD 9IN ST BG FOR ASEP TRNSF OF FLD

## (undated) DEVICE — CATHETER CHOLANGIOGRAM 4.5 FRX3 IN W/ 20018M55 TAUT INTRO

## (undated) DEVICE — BLADE ES ELASTOMERIC COAT INSUL DURABLE BEND UPTO 90DEG

## (undated) DEVICE — TISSUE RETRIEVAL SYSTEM: Brand: INZII RETRIEVAL SYSTEM

## (undated) DEVICE — PINNACLE INTRODUCER SHEATH: Brand: PINNACLE

## (undated) DEVICE — BANDAGE COBAN 4 IN COMPR W4INXL5YD FOAM COHESIVE QUIK STK SELF ADH SFT

## (undated) DEVICE — MASIMOSET LNCS ADTX SPO2 ADULT PULSE OXIMETER ADHESIVE SENSOR: Brand: MASIMOSET® LNCS® ADTX SPO2 ADULT PULSE OXIMETER ADHESIVE SENSOR

## (undated) DEVICE — DRAPE HND W114XL142IN BLU POLYPR W O PCH FEN CRD AND TB HLDR

## (undated) DEVICE — ADHESIVE SKIN CLSR 0.7ML TOP DERMBND ADV

## (undated) DEVICE — TUBING, SUCTION, 1/4" X 10', STRAIGHT: Brand: MEDLINE

## (undated) DEVICE — RESTRAINT EXT ANK WRST LT BLU FOAM 2 STRP SIDE BCKL HK AND

## (undated) DEVICE — PAD, DEFIB, ADULT, RADIOTRANS, PHYSIO: Brand: MEDLINE

## (undated) DEVICE — SYRINGE MED 30ML STD CLR PLAS LUERLOCK TIP N CTRL DISP

## (undated) DEVICE — SUTURE PERMAHAND SZ 2-0 L30IN NONABSORBABLE BLK SILK W/O A305H

## (undated) DEVICE — SUTURE VCRL SZ 3-0 L18IN ABSRB UD L26MM SH 1/2 CIR J864D

## (undated) DEVICE — SHEET,DRAPE,40X58,STERILE: Brand: MEDLINE

## (undated) DEVICE — INTENDED FOR TISSUE SEPARATION, AND OTHER PROCEDURES THAT REQUIRE A SHARP SURGICAL BLADE TO PUNCTURE OR CUT.: Brand: BARD-PARKER ® STAINLESS STEEL BLADES

## (undated) DEVICE — CATHETER CHOLGM 4.5FR L18IN W/ MTL SUPP TB

## (undated) DEVICE — STAPLER INT L75MM H1.5X1.8X2MM STD TI 6 ROW LIN CUT

## (undated) DEVICE — TOWEL,OR,DSP,ST,BLUE,STD,4/PK,20PK/CS: Brand: MEDLINE

## (undated) DEVICE — 6FR ANGLED PIGTAIL CATHETER 110CM

## (undated) DEVICE — MOUTHPIECE ENDOSCP L CTRL OPN AND SIDE PORTS DISP

## (undated) DEVICE — SEALER LAP L37CM MARYLAND JAW OPN NANO COAT MULTIFUNCTIONAL

## (undated) DEVICE — MERCY FAIRFIELD TURNOVER KIT: Brand: MEDLINE INDUSTRIES, INC.

## (undated) DEVICE — ACCESS SHEATH WITH DILATOR: Brand: WATCHMAN FXD CURVE™ ACCESS SYSTEM

## (undated) DEVICE — 30977 SEE SHARP - ENHANCED INTRAOPERATIVE LAPAROSCOPE CLEANING & DEFOGGING: Brand: 30977 SEE SHARP - ENHANCED INTRAOPERATIVE LAPAROSCOPE CLEANING & DEFOGGING

## (undated) DEVICE — LAPAROSCOPIC SCISSORS: Brand: EPIX LAPAROSCOPIC SCISSORS

## (undated) DEVICE — SUTURE VCRL SZ 3-0 L27IN ABSRB UD L26MM SH 1/2 CIR J416H

## (undated) DEVICE — STERILE POLYISOPRENE POWDER-FREE SURGICAL GLOVES: Brand: PROTEXIS

## (undated) DEVICE — SPONGE LAP W18XL18IN WHT COT 4 PLY FLD STRUNG RADPQ DISP ST

## (undated) DEVICE — PERCLOSE™ PROSTYLE™ SUTURE-MEDIATED CLOSURE AND REPAIR SYSTEM: Brand: PERCLOSE™ PROSTYLE™

## (undated) DEVICE — Device: Brand: NOMOLINE™ LH ADULT NASAL CO2 CANNULA WITH O2 4M

## (undated) DEVICE — PACEMAKER PACK: Brand: MEDLINE INDUSTRIES, INC.

## (undated) DEVICE — SUTURE MONOCRYL + SZ 4-0 L27IN ABSRB UD L19MM PS-2 3/8 CIR MCP426H

## (undated) DEVICE — PROBE COVER KIT: Brand: MEDLINE INDUSTRIES, INC.

## (undated) DEVICE — PENCIL ES L3M BTTN SWCH S STL HEX LOK BLDE ELECTRD HOLSTER

## (undated) DEVICE — CARDINAL HEALTH VESSEL LOOPS MINI RED: Brand: DEVON

## (undated) DEVICE — DRAPE C ARM UNIV W41XL74IN CLR PLAS XR VELC CLSR POLY STRP

## (undated) DEVICE — MAJOR SET UP PK

## (undated) DEVICE — LAPAROSCOPY PACK: Brand: MEDLINE INDUSTRIES, INC.

## (undated) DEVICE — TROCAR: Brand: KII® SLEEVE

## (undated) DEVICE — VALVE SUCTION AIR H2O SET ORCA POD + DISP

## (undated) DEVICE — SYRINGE IRRIG 60ML SFT PLIABLE BLB EZ TO GRP 1 HND USE W/

## (undated) DEVICE — SUTURE MCRYL SZ 4-0 L27IN ABSRB UD L19MM PS-2 1/2 CIR PRIM Y426H

## (undated) DEVICE — TOWEL,OR,DSP,ST,WHITE,DLX,XR,4/PK,20PK/C: Brand: MEDLINE

## (undated) DEVICE — SUTURE BOOT: Brand: DEROYAL

## (undated) DEVICE — BANDAGE,GAUZE,BULKEE II,4.5"X4.1YD,STRL: Brand: MEDLINE

## (undated) DEVICE — DRAPE,LAP,CHOLE,W/TROUGHS,STERILE: Brand: MEDLINE

## (undated) DEVICE — WOUND RETRACTOR AND PROTECTOR: Brand: ALEXIS O WOUND PROTECTOR-RETRACTOR

## (undated) DEVICE — FIAPC® PROBE W/ FILTER 2200 C OD 2.3MM/6.9FR; L 2.2M/7.2FT: Brand: ERBE

## (undated) DEVICE — 3M™ IOBAN™ 2 ANTIMICROBIAL INCISE DRAPE 6650EZ: Brand: IOBAN™ 2

## (undated) DEVICE — SOLUTION IV IRRIG WATER 1000ML POUR BRL 2F7114

## (undated) DEVICE — APPLICATOR PREP 26ML 0.7% IOD POVACRYLEX 74% ISO ALC ST

## (undated) DEVICE — PROCEDURE KIT ENDOSCP CUST

## (undated) DEVICE — REPLAY HEMOSTASIS CLIP, 16MM SPAN: Brand: REPLAY

## (undated) DEVICE — RELOAD STPL L60MM H1.5-3.6MM REG TISS BLU GRIPPING SURF B

## (undated) DEVICE — BLANKET WRM W40.2XL55.9IN IORT LO BODY + MISTRAL AIR

## (undated) DEVICE — ACCESS PLATFORM FOR MINIMALLY INVASIVE SURGERY.: Brand: GELPORT® LAPAROSCOPIC  SYSTEM

## (undated) DEVICE — SUTURE VICRYL + SZ 3-0 L27IN ABSRB UD L26MM SH 1/2 CIR VCP416H

## (undated) DEVICE — Device

## (undated) DEVICE — LIQUIBAND RAPID ADHESIVE 36/CS 0.8ML: Brand: MEDLINE

## (undated) DEVICE — FOGARTY - HYDRAGRIP SURGICAL - CLAMP INSERTS: Brand: FOGARTY SOFTJAW

## (undated) DEVICE — ENDOSCOPIC KIT 6X3/16 FT COLON W/ 1.1 OZ 2 GWN W/O BRSH

## (undated) DEVICE — NEEDLE INSUF L120MM DIA2MM DISP FOR PNEUMOPERI ENDOPATH

## (undated) DEVICE — CHLORAPREP 26ML ORANGE

## (undated) DEVICE — CONTROL SYRINGE LUER-LOCK TIP: Brand: MONOJECT

## (undated) DEVICE — SUTURE PDS II SZ 0 L60IN ABSRB VLT L48MM CTX 1/2 CIR Z990G

## (undated) DEVICE — SUTURE PERMAHAND SZ 2-0 L12X18IN NONABSORBABLE BLK SILK A185H

## (undated) DEVICE — SUTURE VCRL SZ 0 L27IN ABSRB UD L26MM CT-2 1/2 CIR J270H

## (undated) DEVICE — SET EXTN PRIMING 4.9ML L30IN INCL SLDE CLMP SPIN M LUERLOCK